# Patient Record
Sex: MALE | Race: ASIAN | NOT HISPANIC OR LATINO
[De-identification: names, ages, dates, MRNs, and addresses within clinical notes are randomized per-mention and may not be internally consistent; named-entity substitution may affect disease eponyms.]

---

## 2020-05-02 RX ADMIN — CEFEPIME 100 MILLIGRAM(S): 1 INJECTION, POWDER, FOR SOLUTION INTRAMUSCULAR; INTRAVENOUS at 14:15

## 2020-08-03 ENCOUNTER — FORM ENCOUNTER (OUTPATIENT)
Age: 60
End: 2020-08-03

## 2020-08-04 ENCOUNTER — INPATIENT (INPATIENT)
Facility: HOSPITAL | Age: 60
LOS: 28 days | Discharge: NURSING, REHAB OR LONG-TERM CARE | DRG: 23 | End: 2020-09-02
Attending: NEUROLOGICAL SURGERY | Admitting: NEUROLOGICAL SURGERY
Payer: COMMERCIAL

## 2020-08-04 VITALS
RESPIRATION RATE: 22 BRPM | SYSTOLIC BLOOD PRESSURE: 159 MMHG | DIASTOLIC BLOOD PRESSURE: 92 MMHG | HEART RATE: 70 BPM | OXYGEN SATURATION: 100 % | TEMPERATURE: 98 F

## 2020-08-04 DIAGNOSIS — S92.902A UNSPECIFIED FRACTURE OF LEFT FOOT, INITIAL ENCOUNTER FOR CLOSED FRACTURE: Chronic | ICD-10-CM

## 2020-08-04 DIAGNOSIS — B19.10 UNSPECIFIED VIRAL HEPATITIS B WITHOUT HEPATIC COMA: ICD-10-CM

## 2020-08-04 DIAGNOSIS — I61.4 NONTRAUMATIC INTRACEREBRAL HEMORRHAGE IN CEREBELLUM: ICD-10-CM

## 2020-08-04 DIAGNOSIS — E78.5 HYPERLIPIDEMIA, UNSPECIFIED: ICD-10-CM

## 2020-08-04 DIAGNOSIS — B20 HUMAN IMMUNODEFICIENCY VIRUS [HIV] DISEASE: ICD-10-CM

## 2020-08-04 LAB
A1C WITH ESTIMATED AVERAGE GLUCOSE RESULT: 5.6 % — SIGNIFICANT CHANGE UP (ref 4–5.6)
ALBUMIN SERPL ELPH-MCNC: 4.8 G/DL — SIGNIFICANT CHANGE UP (ref 3.3–5)
ALP SERPL-CCNC: 92 U/L — SIGNIFICANT CHANGE UP (ref 40–120)
ALT FLD-CCNC: 29 U/L — SIGNIFICANT CHANGE UP (ref 10–45)
ANION GAP SERPL CALC-SCNC: 12 MMOL/L — SIGNIFICANT CHANGE UP (ref 5–17)
ANION GAP SERPL CALC-SCNC: 15 MMOL/L — SIGNIFICANT CHANGE UP (ref 5–17)
APTT BLD: 31.1 SEC — SIGNIFICANT CHANGE UP (ref 27.5–35.5)
APTT BLD: 36.6 SEC — HIGH (ref 27.5–35.5)
AST SERPL-CCNC: 28 U/L — SIGNIFICANT CHANGE UP (ref 10–40)
BASE EXCESS BLDA CALC-SCNC: -0.2 MMOL/L — SIGNIFICANT CHANGE UP (ref -2–3)
BASE EXCESS BLDA CALC-SCNC: -4.2 MMOL/L — LOW (ref -2–3)
BASE EXCESS BLDA CALC-SCNC: -4.3 MMOL/L — LOW (ref -2–3)
BASE EXCESS BLDA CALC-SCNC: -8.4 MMOL/L — LOW (ref -2–3)
BASOPHILS # BLD AUTO: 0.01 K/UL — SIGNIFICANT CHANGE UP (ref 0–0.2)
BASOPHILS # BLD AUTO: 0.05 K/UL — SIGNIFICANT CHANGE UP (ref 0–0.2)
BASOPHILS NFR BLD AUTO: 0.1 % — SIGNIFICANT CHANGE UP (ref 0–2)
BASOPHILS NFR BLD AUTO: 0.3 % — SIGNIFICANT CHANGE UP (ref 0–2)
BILIRUB SERPL-MCNC: 0.7 MG/DL — SIGNIFICANT CHANGE UP (ref 0.2–1.2)
BUN SERPL-MCNC: 16 MG/DL — SIGNIFICANT CHANGE UP (ref 7–23)
BUN SERPL-MCNC: 18 MG/DL — SIGNIFICANT CHANGE UP (ref 7–23)
CA-I BLDA-SCNC: 0.72 MMOL/L — LOW (ref 1.12–1.3)
CA-I BLDA-SCNC: 1.05 MMOL/L — LOW (ref 1.12–1.3)
CA-I BLDA-SCNC: SIGNIFICANT CHANGE UP MMOL/L (ref 1.12–1.3)
CALCIUM SERPL-MCNC: 10.7 MG/DL — HIGH (ref 8.4–10.5)
CALCIUM SERPL-MCNC: 9.2 MG/DL — SIGNIFICANT CHANGE UP (ref 8.4–10.5)
CHLORIDE SERPL-SCNC: 108 MMOL/L — SIGNIFICANT CHANGE UP (ref 96–108)
CHLORIDE SERPL-SCNC: 98 MMOL/L — SIGNIFICANT CHANGE UP (ref 96–108)
CHOLEST SERPL-MCNC: 271 MG/DL — HIGH (ref 10–199)
CO2 SERPL-SCNC: 23 MMOL/L — SIGNIFICANT CHANGE UP (ref 22–31)
CO2 SERPL-SCNC: 24 MMOL/L — SIGNIFICANT CHANGE UP (ref 22–31)
COHGB MFR BLDA: 0.3 % — SIGNIFICANT CHANGE UP
COHGB MFR BLDA: 0.4 % — SIGNIFICANT CHANGE UP
COHGB MFR BLDA: 0.8 % — SIGNIFICANT CHANGE UP
CREAT SERPL-MCNC: 0.87 MG/DL — SIGNIFICANT CHANGE UP (ref 0.5–1.3)
CREAT SERPL-MCNC: 0.95 MG/DL — SIGNIFICANT CHANGE UP (ref 0.5–1.3)
EOSINOPHIL # BLD AUTO: 0 K/UL — SIGNIFICANT CHANGE UP (ref 0–0.5)
EOSINOPHIL # BLD AUTO: 0 K/UL — SIGNIFICANT CHANGE UP (ref 0–0.5)
EOSINOPHIL NFR BLD AUTO: 0 % — SIGNIFICANT CHANGE UP (ref 0–6)
EOSINOPHIL NFR BLD AUTO: 0 % — SIGNIFICANT CHANGE UP (ref 0–6)
ESTIMATED AVERAGE GLUCOSE: 114 MG/DL — SIGNIFICANT CHANGE UP (ref 68–114)
GLUCOSE BLDC GLUCOMTR-MCNC: 149 MG/DL — HIGH (ref 70–99)
GLUCOSE BLDC GLUCOMTR-MCNC: 151 MG/DL — HIGH (ref 70–99)
GLUCOSE BLDC GLUCOMTR-MCNC: 163 MG/DL — HIGH (ref 70–99)
GLUCOSE BLDC GLUCOMTR-MCNC: 166 MG/DL — HIGH (ref 70–99)
GLUCOSE BLDC GLUCOMTR-MCNC: 174 MG/DL — HIGH (ref 70–99)
GLUCOSE SERPL-MCNC: 144 MG/DL — HIGH (ref 70–99)
GLUCOSE SERPL-MCNC: 183 MG/DL — HIGH (ref 70–99)
HAV IGM SER-ACNC: ABNORMAL
HBV CORE IGM SER-ACNC: SIGNIFICANT CHANGE UP
HBV SURFACE AG SER-ACNC: REACTIVE
HCO3 BLDA-SCNC: 15 MMOL/L — LOW (ref 21–28)
HCO3 BLDA-SCNC: 18 MMOL/L — LOW (ref 21–28)
HCO3 BLDA-SCNC: 18 MMOL/L — LOW (ref 21–28)
HCO3 BLDA-SCNC: 24 MMOL/L — SIGNIFICANT CHANGE UP (ref 21–28)
HCT VFR BLD CALC: 36.3 % — LOW (ref 39–50)
HCT VFR BLD CALC: 48.7 % — SIGNIFICANT CHANGE UP (ref 39–50)
HCV AB S/CO SERPL IA: 0.12 S/CO — SIGNIFICANT CHANGE UP
HCV AB SERPL-IMP: SIGNIFICANT CHANGE UP
HDLC SERPL-MCNC: 36 MG/DL — LOW
HGB BLD-MCNC: 12.1 G/DL — LOW (ref 13–17)
HGB BLD-MCNC: 16.1 G/DL — SIGNIFICANT CHANGE UP (ref 13–17)
HGB BLDA-MCNC: 11.5 G/DL — LOW (ref 13–17)
HGB BLDA-MCNC: 12 G/DL — LOW (ref 13–17)
HGB BLDA-MCNC: 9.9 G/DL — LOW (ref 13–17)
IMM GRANULOCYTES NFR BLD AUTO: 0.6 % — SIGNIFICANT CHANGE UP (ref 0–1.5)
IMM GRANULOCYTES NFR BLD AUTO: 0.6 % — SIGNIFICANT CHANGE UP (ref 0–1.5)
INR BLD: 1.09 — SIGNIFICANT CHANGE UP (ref 0.88–1.16)
INR BLD: 1.21 — HIGH (ref 0.88–1.16)
LIPID PNL WITH DIRECT LDL SERPL: 185 MG/DL — HIGH
LYMPHOCYTES # BLD AUTO: 0.75 K/UL — LOW (ref 1–3.3)
LYMPHOCYTES # BLD AUTO: 0.81 K/UL — LOW (ref 1–3.3)
LYMPHOCYTES # BLD AUTO: 4.8 % — LOW (ref 13–44)
LYMPHOCYTES # BLD AUTO: 9.2 % — LOW (ref 13–44)
MAGNESIUM SERPL-MCNC: 2.2 MG/DL — SIGNIFICANT CHANGE UP (ref 1.6–2.6)
MAGNESIUM SERPL-MCNC: 2.3 MG/DL — SIGNIFICANT CHANGE UP (ref 1.6–2.6)
MCHC RBC-ENTMCNC: 27.8 PG — SIGNIFICANT CHANGE UP (ref 27–34)
MCHC RBC-ENTMCNC: 28.2 PG — SIGNIFICANT CHANGE UP (ref 27–34)
MCHC RBC-ENTMCNC: 33.1 GM/DL — SIGNIFICANT CHANGE UP (ref 32–36)
MCHC RBC-ENTMCNC: 33.3 GM/DL — SIGNIFICANT CHANGE UP (ref 32–36)
MCV RBC AUTO: 84.1 FL — SIGNIFICANT CHANGE UP (ref 80–100)
MCV RBC AUTO: 84.6 FL — SIGNIFICANT CHANGE UP (ref 80–100)
METHGB MFR BLDA: 0.4 % — SIGNIFICANT CHANGE UP
MONOCYTES # BLD AUTO: 0.17 K/UL — SIGNIFICANT CHANGE UP (ref 0–0.9)
MONOCYTES # BLD AUTO: 0.64 K/UL — SIGNIFICANT CHANGE UP (ref 0–0.9)
MONOCYTES NFR BLD AUTO: 1.9 % — LOW (ref 2–14)
MONOCYTES NFR BLD AUTO: 4.1 % — SIGNIFICANT CHANGE UP (ref 2–14)
NEUTROPHILS # BLD AUTO: 13.96 K/UL — HIGH (ref 1.8–7.4)
NEUTROPHILS # BLD AUTO: 7.72 K/UL — HIGH (ref 1.8–7.4)
NEUTROPHILS NFR BLD AUTO: 88.2 % — HIGH (ref 43–77)
NEUTROPHILS NFR BLD AUTO: 90.2 % — HIGH (ref 43–77)
NRBC # BLD: 0 /100 WBCS — SIGNIFICANT CHANGE UP (ref 0–0)
NRBC # BLD: 0 /100 WBCS — SIGNIFICANT CHANGE UP (ref 0–0)
O2 CT VFR BLDA CALC: 14.9 ML/DL — SIGNIFICANT CHANGE UP (ref 15–23)
O2 CT VFR BLDA CALC: 17 ML/DL — SIGNIFICANT CHANGE UP (ref 15–23)
O2 CT VFR BLDA CALC: 17.7 ML/DL — SIGNIFICANT CHANGE UP (ref 15–23)
OXYHGB MFR BLDA: 99 % — SIGNIFICANT CHANGE UP (ref 94–100)
PCO2 BLDA: 25 MMHG — LOW (ref 35–48)
PCO2 BLDA: 26 MMHG — LOW (ref 35–48)
PCO2 BLDA: 27 MMHG — LOW (ref 35–48)
PCO2 BLDA: 36 MMHG — SIGNIFICANT CHANGE UP (ref 35–48)
PH BLDA: 7.38 — SIGNIFICANT CHANGE UP (ref 7.35–7.45)
PH BLDA: 7.43 — SIGNIFICANT CHANGE UP (ref 7.35–7.45)
PH BLDA: 7.46 — HIGH (ref 7.35–7.45)
PH BLDA: 7.47 — HIGH (ref 7.35–7.45)
PHOSPHATE SERPL-MCNC: 3 MG/DL — SIGNIFICANT CHANGE UP (ref 2.5–4.5)
PHOSPHATE SERPL-MCNC: 3.3 MG/DL — SIGNIFICANT CHANGE UP (ref 2.5–4.5)
PLATELET # BLD AUTO: 189 K/UL — SIGNIFICANT CHANGE UP (ref 150–400)
PLATELET # BLD AUTO: 207 K/UL — SIGNIFICANT CHANGE UP (ref 150–400)
PO2 BLDA: 172 MMHG — HIGH (ref 83–108)
PO2 BLDA: 377 MMHG — HIGH (ref 83–108)
PO2 BLDA: 384 MMHG — HIGH (ref 83–108)
PO2 BLDA: 409 MMHG — HIGH (ref 83–108)
POTASSIUM BLDA-SCNC: 3.2 MMOL/L — LOW (ref 3.5–4.9)
POTASSIUM BLDA-SCNC: 3.5 MMOL/L — SIGNIFICANT CHANGE UP (ref 3.5–4.9)
POTASSIUM BLDA-SCNC: 3.7 MMOL/L — SIGNIFICANT CHANGE UP (ref 3.5–4.9)
POTASSIUM SERPL-MCNC: 3.8 MMOL/L — SIGNIFICANT CHANGE UP (ref 3.5–5.3)
POTASSIUM SERPL-MCNC: 4 MMOL/L — SIGNIFICANT CHANGE UP (ref 3.5–5.3)
POTASSIUM SERPL-SCNC: 3.8 MMOL/L — SIGNIFICANT CHANGE UP (ref 3.5–5.3)
POTASSIUM SERPL-SCNC: 4 MMOL/L — SIGNIFICANT CHANGE UP (ref 3.5–5.3)
PROT SERPL-MCNC: 8.6 G/DL — HIGH (ref 6–8.3)
PROTHROM AB SERPL-ACNC: 13 SEC — SIGNIFICANT CHANGE UP (ref 10.6–13.6)
PROTHROM AB SERPL-ACNC: 14.4 SEC — HIGH (ref 10.6–13.6)
RBC # BLD: 4.29 M/UL — SIGNIFICANT CHANGE UP (ref 4.2–5.8)
RBC # BLD: 5.79 M/UL — SIGNIFICANT CHANGE UP (ref 4.2–5.8)
RBC # FLD: 13.6 % — SIGNIFICANT CHANGE UP (ref 10.3–14.5)
RBC # FLD: 14.2 % — SIGNIFICANT CHANGE UP (ref 10.3–14.5)
SAO2 % BLDA: 100 % — SIGNIFICANT CHANGE UP (ref 95–100)
SARS-COV-2 RNA SPEC QL NAA+PROBE: SIGNIFICANT CHANGE UP
SODIUM BLDA-SCNC: 121 MMOL/L — LOW (ref 138–146)
SODIUM BLDA-SCNC: 123 MMOL/L — LOW (ref 138–146)
SODIUM BLDA-SCNC: 125 MMOL/L — LOW (ref 138–146)
SODIUM SERPL-SCNC: 136 MMOL/L — SIGNIFICANT CHANGE UP (ref 135–145)
SODIUM SERPL-SCNC: 144 MMOL/L — SIGNIFICANT CHANGE UP (ref 135–145)
TOTAL CHOLESTEROL/HDL RATIO MEASUREMENT: 7.5 RATIO — SIGNIFICANT CHANGE UP (ref 3.4–9.6)
TRIGL SERPL-MCNC: 252 MG/DL — HIGH (ref 10–149)
TROPONIN T SERPL-MCNC: <0.01 NG/ML — SIGNIFICANT CHANGE UP (ref 0–0.01)
TSH SERPL-MCNC: 0.46 UIU/ML — SIGNIFICANT CHANGE UP (ref 0.35–4.94)
WBC # BLD: 15.5 K/UL — HIGH (ref 3.8–10.5)
WBC # BLD: 8.76 K/UL — SIGNIFICANT CHANGE UP (ref 3.8–10.5)
WBC # FLD AUTO: 15.5 K/UL — HIGH (ref 3.8–10.5)
WBC # FLD AUTO: 8.76 K/UL — SIGNIFICANT CHANGE UP (ref 3.8–10.5)

## 2020-08-04 PROCEDURE — 36556 INSERT NON-TUNNEL CV CATH: CPT

## 2020-08-04 PROCEDURE — 99223 1ST HOSP IP/OBS HIGH 75: CPT | Mod: 25

## 2020-08-04 PROCEDURE — 69990 MICROSURGERY ADD-ON: CPT | Mod: 59

## 2020-08-04 PROCEDURE — 36227 PLACE CATH XTRNL CAROTID: CPT

## 2020-08-04 PROCEDURE — 71045 X-RAY EXAM CHEST 1 VIEW: CPT | Mod: 26

## 2020-08-04 PROCEDURE — 99292 CRITICAL CARE ADDL 30 MIN: CPT

## 2020-08-04 PROCEDURE — 61343 CRNEC SOPL CRV LAM DCMPRN: CPT

## 2020-08-04 PROCEDURE — 70450 CT HEAD/BRAIN W/O DYE: CPT | Mod: 26,77

## 2020-08-04 PROCEDURE — 93970 EXTREMITY STUDY: CPT | Mod: 26

## 2020-08-04 PROCEDURE — 99291 CRITICAL CARE FIRST HOUR: CPT | Mod: 25

## 2020-08-04 PROCEDURE — 70450 CT HEAD/BRAIN W/O DYE: CPT | Mod: 26

## 2020-08-04 PROCEDURE — 36620 INSERTION CATHETER ARTERY: CPT

## 2020-08-04 PROCEDURE — 36224 PLACE CATH CAROTD ART: CPT | Mod: 50

## 2020-08-04 PROCEDURE — 36226 PLACE CATH VERTEBRAL ART: CPT | Mod: LT

## 2020-08-04 RX ORDER — SODIUM CHLORIDE 9 MG/ML
1000 INJECTION INTRAMUSCULAR; INTRAVENOUS; SUBCUTANEOUS
Refills: 0 | Status: DISCONTINUED | OUTPATIENT
Start: 2020-08-04 | End: 2020-08-05

## 2020-08-04 RX ORDER — CHLORHEXIDINE GLUCONATE 213 G/1000ML
1 SOLUTION TOPICAL
Refills: 0 | Status: DISCONTINUED | OUTPATIENT
Start: 2020-08-04 | End: 2020-08-21

## 2020-08-04 RX ORDER — SODIUM CHLORIDE 9 MG/ML
30 INJECTION INTRAMUSCULAR; INTRAVENOUS; SUBCUTANEOUS ONCE
Refills: 0 | Status: COMPLETED | OUTPATIENT
Start: 2020-08-04 | End: 2020-08-04

## 2020-08-04 RX ORDER — TRANEXAMIC ACID 100 MG/ML
1000 INJECTION, SOLUTION INTRAVENOUS EVERY 8 HOURS
Refills: 0 | Status: DISCONTINUED | OUTPATIENT
Start: 2020-08-04 | End: 2020-08-04

## 2020-08-04 RX ORDER — PROPOFOL 10 MG/ML
5 INJECTION, EMULSION INTRAVENOUS
Qty: 1000 | Refills: 0 | Status: DISCONTINUED | OUTPATIENT
Start: 2020-08-04 | End: 2020-08-05

## 2020-08-04 RX ORDER — LEVETIRACETAM 250 MG/1
1000 TABLET, FILM COATED ORAL EVERY 12 HOURS
Refills: 0 | Status: DISCONTINUED | OUTPATIENT
Start: 2020-08-04 | End: 2020-08-08

## 2020-08-04 RX ORDER — DEXTROSE 50 % IN WATER 50 %
25 SYRINGE (ML) INTRAVENOUS ONCE
Refills: 0 | Status: DISCONTINUED | OUTPATIENT
Start: 2020-08-04 | End: 2020-08-13

## 2020-08-04 RX ORDER — TENOFOVIR DISOPROXIL FUMARATE 300 MG/1
300 TABLET, FILM COATED ORAL DAILY
Refills: 0 | Status: DISCONTINUED | OUTPATIENT
Start: 2020-08-04 | End: 2020-08-04

## 2020-08-04 RX ORDER — SODIUM CHLORIDE 9 MG/ML
10 INJECTION INTRAMUSCULAR; INTRAVENOUS; SUBCUTANEOUS
Refills: 0 | Status: DISCONTINUED | OUTPATIENT
Start: 2020-08-04 | End: 2020-09-02

## 2020-08-04 RX ORDER — ATORVASTATIN CALCIUM 80 MG/1
80 TABLET, FILM COATED ORAL AT BEDTIME
Refills: 0 | Status: DISCONTINUED | OUTPATIENT
Start: 2020-08-04 | End: 2020-08-10

## 2020-08-04 RX ORDER — TENOFOVIR DISOPROXIL FUMARATE 300 MG/1
300 TABLET, FILM COATED ORAL EVERY 24 HOURS
Refills: 0 | Status: DISCONTINUED | OUTPATIENT
Start: 2020-08-04 | End: 2020-09-02

## 2020-08-04 RX ORDER — ACETAMINOPHEN 500 MG
650 TABLET ORAL EVERY 6 HOURS
Refills: 0 | Status: DISCONTINUED | OUTPATIENT
Start: 2020-08-04 | End: 2020-08-14

## 2020-08-04 RX ORDER — SODIUM CHLORIDE 9 MG/ML
1000 INJECTION, SOLUTION INTRAVENOUS
Refills: 0 | Status: DISCONTINUED | OUTPATIENT
Start: 2020-08-04 | End: 2020-09-02

## 2020-08-04 RX ORDER — SENNA PLUS 8.6 MG/1
2 TABLET ORAL AT BEDTIME
Refills: 0 | Status: DISCONTINUED | OUTPATIENT
Start: 2020-08-04 | End: 2020-08-08

## 2020-08-04 RX ORDER — INSULIN LISPRO 100/ML
VIAL (ML) SUBCUTANEOUS EVERY 6 HOURS
Refills: 0 | Status: DISCONTINUED | OUTPATIENT
Start: 2020-08-04 | End: 2020-08-12

## 2020-08-04 RX ORDER — PANTOPRAZOLE SODIUM 20 MG/1
40 TABLET, DELAYED RELEASE ORAL DAILY
Refills: 0 | Status: DISCONTINUED | OUTPATIENT
Start: 2020-08-04 | End: 2020-08-08

## 2020-08-04 RX ORDER — HYDRALAZINE HCL 50 MG
10 TABLET ORAL
Refills: 0 | Status: DISCONTINUED | OUTPATIENT
Start: 2020-08-04 | End: 2020-09-02

## 2020-08-04 RX ORDER — DEXTROSE 50 % IN WATER 50 %
25 SYRINGE (ML) INTRAVENOUS ONCE
Refills: 0 | Status: DISCONTINUED | OUTPATIENT
Start: 2020-08-04 | End: 2020-09-02

## 2020-08-04 RX ORDER — INSULIN LISPRO 100/ML
VIAL (ML) SUBCUTANEOUS
Refills: 0 | Status: DISCONTINUED | OUTPATIENT
Start: 2020-08-04 | End: 2020-08-04

## 2020-08-04 RX ORDER — NICARDIPINE HYDROCHLORIDE 30 MG/1
5 CAPSULE, EXTENDED RELEASE ORAL
Qty: 40 | Refills: 0 | Status: DISCONTINUED | OUTPATIENT
Start: 2020-08-04 | End: 2020-08-04

## 2020-08-04 RX ORDER — GLUCAGON INJECTION, SOLUTION 0.5 MG/.1ML
1 INJECTION, SOLUTION SUBCUTANEOUS ONCE
Refills: 0 | Status: DISCONTINUED | OUTPATIENT
Start: 2020-08-04 | End: 2020-09-02

## 2020-08-04 RX ORDER — DEXTROSE 50 % IN WATER 50 %
12.5 SYRINGE (ML) INTRAVENOUS ONCE
Refills: 0 | Status: DISCONTINUED | OUTPATIENT
Start: 2020-08-04 | End: 2020-09-02

## 2020-08-04 RX ORDER — CEFAZOLIN SODIUM 1 G
2000 VIAL (EA) INJECTION ONCE
Refills: 0 | Status: COMPLETED | OUTPATIENT
Start: 2020-08-04 | End: 2020-08-04

## 2020-08-04 RX ORDER — DEXAMETHASONE 0.5 MG/5ML
6 ELIXIR ORAL EVERY 6 HOURS
Refills: 0 | Status: DISCONTINUED | OUTPATIENT
Start: 2020-08-04 | End: 2020-08-06

## 2020-08-04 RX ORDER — DEXTROSE 50 % IN WATER 50 %
15 SYRINGE (ML) INTRAVENOUS ONCE
Refills: 0 | Status: DISCONTINUED | OUTPATIENT
Start: 2020-08-04 | End: 2020-09-02

## 2020-08-04 RX ORDER — CHLORHEXIDINE GLUCONATE 213 G/1000ML
15 SOLUTION TOPICAL EVERY 12 HOURS
Refills: 0 | Status: DISCONTINUED | OUTPATIENT
Start: 2020-08-04 | End: 2020-08-12

## 2020-08-04 RX ORDER — NICARDIPINE HYDROCHLORIDE 30 MG/1
5 CAPSULE, EXTENDED RELEASE ORAL
Qty: 40 | Refills: 0 | Status: DISCONTINUED | OUTPATIENT
Start: 2020-08-04 | End: 2020-08-09

## 2020-08-04 RX ADMIN — ATORVASTATIN CALCIUM 80 MILLIGRAM(S): 80 TABLET, FILM COATED ORAL at 22:41

## 2020-08-04 RX ADMIN — Medication 6 MILLIGRAM(S): at 19:08

## 2020-08-04 RX ADMIN — Medication 2: at 17:16

## 2020-08-04 RX ADMIN — TENOFOVIR DISOPROXIL FUMARATE 300 MILLIGRAM(S): 300 TABLET, FILM COATED ORAL at 22:41

## 2020-08-04 RX ADMIN — Medication 100 MILLIGRAM(S): at 22:08

## 2020-08-04 RX ADMIN — NICARDIPINE HYDROCHLORIDE 25 MG/HR: 30 CAPSULE, EXTENDED RELEASE ORAL at 05:13

## 2020-08-04 RX ADMIN — LEVETIRACETAM 400 MILLIGRAM(S): 250 TABLET, FILM COATED ORAL at 22:42

## 2020-08-04 RX ADMIN — SODIUM CHLORIDE 30 MILLILITER(S): 9 INJECTION INTRAMUSCULAR; INTRAVENOUS; SUBCUTANEOUS at 06:53

## 2020-08-04 RX ADMIN — NICARDIPINE HYDROCHLORIDE 25 MG/HR: 30 CAPSULE, EXTENDED RELEASE ORAL at 22:14

## 2020-08-04 RX ADMIN — PANTOPRAZOLE SODIUM 40 MILLIGRAM(S): 20 TABLET, DELAYED RELEASE ORAL at 17:24

## 2020-08-04 NOTE — PROGRESS NOTE ADULT - ASSESSMENT
59y/M with  HLD (hyperlipidemia)      PLAN:   NEURO:  REHAB:  physical therapy evaluation and management    EARLY MOB:      PULM:  Room air, incentive spirometry  CARDIO:  SBP goal 100-150mm Hg  ENDO:  Blood sugar goals 140-180 mg/dL, continue insulin sliding scale  GI:  PPI for GI prophylaxis  DIET:  RENAL:    HEM/ONC:  VTE Prophylaxis:   ID: afebrile, no leukocytosis  Social: will update family    CORE MEASURES:  1.  NIHSS =   2.  ICH Score =   3.  VTE prophylaxis: [ ] administered within 24h of admission OR [ ] reason not done  4.  Dysphagia screening: [ ] performed before any oral meds / liquids / food    ATTENDING ATTESTATION:  I was physically present for the key portions of the evaluation and management (E/M) service provided.  I agree with the above history, physical and plan, which I have reviewed and edited where appropriate.    Patient at high risk for neurological deterioration or death due to:  ICU delirium, aspiration PNA, DVT / PE.  Critical care time:  I have personally provided 60 minutes of critical care time, excluding time spent on separate procedures.      Plan discussed with RN, house staff.

## 2020-08-04 NOTE — H&P ADULT - ATTENDING COMMENTS
Patient seen and examined 8/4/20. He was transferred from an outside hospital with large cerebellar ICH/IVH. Catheter angiogram negative for vascular bleed source. Patient taken emergently for suboccipital decompression and ICH evacuation. PA team able to reach son for consent. I personally attempted to speak with son multiple times preoperatively via the listed phone number but was unsuccessful. Given patient poor examination at presentation, he is high risk for death and permanent morbidity. Will move ahead with planned surgery as a potentially life-saving measure.    Bryan Blank M.D.

## 2020-08-04 NOTE — PROGRESS NOTE ADULT - ATTENDING COMMENTS
ATTENDING ATTESTATION:  I was physically present for the key portions of the evaluation and management (E/M) service provided.  I agree with the above history, physical and plan, which I have reviewed and edited where appropriate.    Patient at high risk for neurological deterioration or death due to:  infections, herniation, DVT/PE.  Critical care time:  I have personally provided 60 minutes of critical care time, excluding time spent on separate procedures.      Plan discussed with RN, house staff.

## 2020-08-04 NOTE — H&P ADULT - NSHPLABSRESULTS_GEN_ALL_CORE
16.1   15.50 )-----------( 207      ( 04 Aug 2020 05:03 )             48.7   08-04    136  |  98  |  16  ----------------------------<  144<H>  4.0   |  23  |  0.87    Ca    9.2      04 Aug 2020 05:03  Phos  3.0     08-04  Mg     2.3     08-04    TPro  8.6<H>  /  Alb  4.8  /  TBili  0.7  /  DBili  x   /  AST  28  /  ALT  29  /  AlkPhos  92  08-04

## 2020-08-04 NOTE — BRIEF OPERATIVE NOTE - NSICDXBRIEFPREOP_GEN_ALL_CORE_FT
PRE-OP DIAGNOSIS:  ICH (intracerebral hemorrhage) 04-Aug-2020 08:19:11  Davon Gonzalez
PRE-OP DIAGNOSIS:  ICH (intracerebral hemorrhage) 04-Aug-2020 08:19:11  Davon Gonzalez

## 2020-08-04 NOTE — BRIEF OPERATIVE NOTE - NSICDXBRIEFPOSTOP_GEN_ALL_CORE_FT
POST-OP DIAGNOSIS:  ICH (intracerebral hemorrhage) 04-Aug-2020 08:19:20  Davon Gonzalez
POST-OP DIAGNOSIS:  ICH (intracerebral hemorrhage) 04-Aug-2020 08:19:20  Davon Gonzalez

## 2020-08-04 NOTE — H&P ADULT - NSHPPHYSICALEXAM_GEN_ALL_CORE
Gen: NAD, Intubated, sedated  HEENT: PERRL, 2mm. EOM limited. Right frontal EVD site C/D/I.  Neck: Supple  Lungs: Clear b/l, no W/R/R.  Heart: S1, S2. NSR.  Abd: Soft, NT/ND. +BS  Exts: Pulses 2+ throughout  Neuro: CNs limited. Left LE withdrawal to noxious stimulus. No eye opening. Not following commands. Exam performed under some sedation.

## 2020-08-04 NOTE — PROGRESS NOTE ADULT - ASSESSMENT
59y/M with cerebellar ICH with 4th ventricular effacement    PLAN:     NEURO:  -Urgent cerebral angiogram to rule out underlying vascular malformation, and operative decompression for evacuation of cerebellar ICH  -Received both mannitol and 23.4% NaCl; on propofol infusion 5-20 mcg/kg/min  -sBP < 150 mmHg  -EVD at 15 cm    PULM:  ventilated, no chest XR infiltrates  CARDIO:  SBP goal 100-150mm Hg, on nicardipine infusion  ENDO:  Blood sugar goals 140-180 mg/dL, continue insulin sliding scale  GI:  PPI for GI prophylaxis  DIET:  NPO  RENAL:  euvolemic  HEM:  Hb 16.1  VTE Prophylaxis:  SCDs  ID: leukocytosis  Social: updated family    *****    CORE MEASURES:    1.  ICH Score = 4  2.  VTE prophylaxis: [ ] administered within 24h of admission OR [X ] reason not done (acute ICH)    ***** 59y/M with cerebellar ICH with 4th ventricular effacement    PLAN:     NEURO:  -Urgent cerebral angiogram to rule out underlying vascular malformation, and operative decompression for evacuation of cerebellar ICH  -Received both mannitol and 23.4% NaCl; on propofol infusion 5-20 mcg/kg/min  -sBP < 150 mmHg  -EVD at 15 cm    PULM:  ventilated, no chest XR infiltrates  CARDIO:  SBP goal 100-150mm Hg, on nicardipine infusion  ENDO:  Blood sugar goals 140-180 mg/dL, continue insulin sliding scale  GI:  PPI for GI prophylaxis  DIET:  NPO  RENAL:  euvolemic  HEM:  Hb 16.1  VTE Prophylaxis:  SCDs  ID: leukocytosis  Social: updated family    *****    CORE MEASURES:    1.  ICH Score = 4  2.  VTE prophylaxis: [ ] administered within 24h of admission OR [X ] reason not done (acute ICH)    *****    59y/M with  Hepatitis B  HLD (hyperlipidemia)      PLAN:   NEURO: neurochecks q1h, continue propofol, fentanyl PRN tylenol for pain  SBP control  REHAB:  physical therapy evaluation and management    EARLY MOB:      PULM:  cont full vent support   CARDIO:  SBP goal 100-140mm Hg, TTE with bubble study  ENDO:  Blood sugar goals 140-180 mg/dL, continue insulin sliding scale, A1C lipid profile TSH  GI:  PPI for GI prophylaxis  DIET: NPO for now  RENAL:  Na goal ~145, cont NS, recheck BMP, may need hypertonic saline  HEM/ONC: no coagulopathy, no h/o antiplatelet / anticoagulant use  VTE Prophylaxis: SCDs, no DVT chemoprophylaxis for now as patient is high risk for bleed (post-op); baseline LE Doppler for DVT suspected on admission (transfer)  ID: afebrile, no leukocytosis, periop ancef then d/c  Social: will update family    CORE MEASURES:  1.  NIHSS =   2.  ICH Score = 4  3.  VTE prophylaxis: [ ] administered within 24h of admission OR [x] reason not done - fresh bleed  4.  Dysphagia screening: [X] performed before any oral meds / liquids / food 59y/M with  1.	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	Hepatitis B  3.	dyslipidemia     PLAN:   NEURO: neurochecks q1h, continue propofol, fentanyl PRN tylenol for pain  SBP control  REHAB:  physical therapy evaluation and management    EARLY MOB:      PULM:  cont full vent support   CARDIO:  SBP goal 100-140mm Hg, TTE with bubble study  ENDO:  Blood sugar goals 140-180 mg/dL, continue insulin sliding scale, A1C lipid profile TSH; continue statins  GI:  PPI for GI prophylaxis  DIET: NPO for now  RENAL:  Na goal ~145, cont NS, recheck BMP, may need hypertonic saline  HEM/ONC: no coagulopathy, no h/o antiplatelet / anticoagulant use  VTE Prophylaxis: SCDs, no DVT chemoprophylaxis for now as patient is high risk for bleed (post-op); baseline LE Doppler for DVT suspected on admission (transfer)  ID: afebrile, no leukocytosis, periop ancef then d/c  Social: will update family    CORE MEASURES:  1.  NIHSS =   2.  ICH Score = 4  3.  VTE prophylaxis: [ ] administered within 24h of admission OR [x] reason not done - fresh bleed  4.  Dysphagia screening: [X] performed before any oral meds / liquids / food 59y/M with  1.	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	Hepatitis B  3.	dyslipidemia     PLAN:   NEURO: neurochecks q1h, continue propofol, fentanyl PRN tylenol for pain  SBP control  REHAB:  physical therapy evaluation and management    EARLY MOB:      PULM:  cont full vent support   CARDIO:  SBP goal 100-140mm Hg, TTE with bubble study  ENDO:  Blood sugar goals 140-180 mg/dL, continue insulin sliding scale, A1C lipid profile TSH; continue statins  GI:  PPI for GI prophylaxis  DIET: NPO for now  RENAL:  Na goal ~145, cont NS, recheck BMP, may need hypertonic saline  HEM/ONC: no coagulopathy, no h/o antiplatelet / anticoagulant use  VTE Prophylaxis: SCDs, no DVT chemoprophylaxis for now as patient is high risk for bleed (post-op); baseline LE Doppler for DVT suspected on admission (transfer)  ID: afebrile, no leukocytosis, periop ancef then d/c  Social: son Perry updated ([phone]).  2 daughters Shantell  and Tammie ; wife name is Julián Sahu    CORE MEASURES:  1.  NIHSS =   2.  ICH Score = 4  3.  VTE prophylaxis: [ ] administered within 24h of admission OR [x] reason not done - fresh bleed  4.  Dysphagia screening: [X] performed before any oral meds / liquids / food 59y/M with  1.	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	Hepatitis B  3.	dyslipidemia     PLAN:   NEURO: neurochecks q1h, continue propofol, fentanyl PRN tylenol for pain  SBP control  REHAB:  physical therapy evaluation and management    EARLY MOB:      PULM:  cont full vent support   CARDIO:  SBP goal 100-140mm Hg, TTE with bubble study  ENDO:  Blood sugar goals 140-180 mg/dL, continue insulin sliding scale, A1C lipid profile TSH; continue statins  GI:  PPI for GI prophylaxis  DIET: NPO for now  RENAL:  Na goal ~145, cont NS, recheck BMP, may need hypertonic saline  HEM/ONC: no coagulopathy, no h/o antiplatelet / anticoagulant use  VTE Prophylaxis: SCDs, no DVT chemoprophylaxis for now as patient is high risk for bleed (post-op); baseline LE Doppler for DVT suspected on admission (transfer)  ID: afebrile, no leukocytosis, periop ancef then d/c  Social: son Perry updated ([phone]).  2 daughters Shantell  and Tammie ; wife name is Julián Sahu    CORE MEASURES:  1.  NIHSS =   2.  ICH Score = 4  3.  VTE prophylaxis: [ ] administered within 24h of admission OR [x] reason not done - fresh bleed  4.  Dysphagia screening: [X] performed before any oral meds / liquids / food    ATTENDING ATTESTATION:  I was physically present for the key portions of the evaluation and management (E/M) service provided.  I agree with the above history, physical and plan, which I have reviewed and edited where appropriate.    Patient at high risk for neurological deterioration or death due to:  ICU delirium, aspiration PNA, DVT / PE.  Critical care time:  I have personally provided 30 minutes of critical care time, excluding time spent on separate procedures.      Plan discussed with RN, house staff.

## 2020-08-04 NOTE — PROGRESS NOTE ADULT - SUBJECTIVE AND OBJECTIVE BOX
=================================  NEUROCRITICAL CARE ATTENDING NOTE  =================================    VEDA WARREN   MRN-[id number]  Summary:  59y/M    HPI:  59 year old male with PMH of HLD and unclear liver disease had acute onset of severe headache, dizziness and vomiting x1 today, was brought into [facility] with continued symptoms, as well as hypertension and multiple episodes of vomiting. Patient was intubated for airway protection with CT Head performed demonstrating a large left cerebellar hemorrhage. CTA Head/Neck performed is suggestive of underlying vascular pathology such as AVM. RIght frontal ventriculostomy placed at [facility] and patient transferred to [facility] for further work-up. Patient arrives to [facility] intubated, sedated and on Nicardipine drip. Patient's son provided history and denies any Aspirin or other anticoagulant use. Denies any prior history of hypertension, smoking, or ETOH abuse. (04 Aug 2020 05:01)      COURSE IN THE HOSPITAL:    Past Medical History: HLD (hyperlipidemia)    Allergies:  No Known Allergies    Home meds:   Omega-3 350 mg oral capsule: 1 cap(s) orally once a day  tenofovir disoproxil fumarate 300 mg oral tablet: 1 tab(s) orally once a day      PHYSICAL EXAMINATION  T(C): 36.7 (08-04 @ 04:30), Max: 36.7 (08-04 @ 04:30)  HR: 72 (08-04 @ 06:04) (70 - 72)  BP: 136/76 (08-04 @ 06:00) (136/76 - 159/92)  RR: 15 (08-04 @ 06:04) (14 - 22)  SpO2: 100% (08-04 @ 06:04) (100% - 100%)  CVP(mm Hg): --  NEUROLOGIC EXAMINATION:  Patient is awake, alert, fully oriented, pupils 2-3mm equal and briskly reactive to light, EOMs intact, muscle strength 5/5 on all 4s.  GENERAL: not intubated, not in cardiorespiratory distress  EENT:  anicteric  CARDIOVASCULAR: (+) S1 S2, normal rate and regular rhythm  PULMONARY: clear to auscultation bilaterally  ABDOMEN: soft, nontender with normoactive bowel sounds  EXTREMITIES: no edema  SKIN: no rash    LABS:  CAPILLARY BLOOD GLUCOSE      POCT Blood Glucose.: 149 mg/dL (04 Aug 2020 06:26)                          16.1   15.50 )-----------( 207      ( 04 Aug 2020 05:03 )             48.7     08-04    136  |  98  |  16  ----------------------------<  144<H>  4.0   |  23  |  0.87    Ca    9.2      04 Aug 2020 05:03  Phos  3.0     08-04  Mg     2.3     08-04    TPro  8.6<H>  /  Alb  4.8  /  TBili  0.7  /  DBili  x   /  AST  28  /  ALT  29  /  AlkPhos  92  08-04 08-03 @ 07:01  -  08-04 @ 06:45  --------------------------------------------------------  IN: 0 mL / OUT: 200 mL / NET: -200 mL        Bacteriology:  CSF studies:  EEG:  Neuroimaging:  Other imaging:    MEDICATIONS:  tranexamic acid IVPB 1000 milliGRAM(s) IV Intermittent every 8 hours    tenofovir disoproxil fumarate (VIREAD) 300 milliGRAM(s) Oral daily    acetaminophen   Tablet .. 650 milliGRAM(s) Oral every 6 hours PRN  propofol Infusion 5 MICROgram(s)/kG/Min IV Continuous <Continuous>    hydrALAZINE Injectable 10 milliGRAM(s) IV Push every 2 hours PRN  niCARdipine Infusion 5 mG/Hr IV Continuous <Continuous>      senna 2 Tablet(s) Oral at bedtime PRN      atorvastatin 80 milliGRAM(s) Oral at bedtime  dextrose 50% Injectable 25 Gram(s) IV Push once  glucagon  Injectable 1 milliGRAM(s) IntraMuscular once PRN  insulin lispro (HumaLOG) corrective regimen sliding scale   SubCutaneous every 6 hours    chlorhexidine 0.12% Liquid 15 milliLiter(s) Oral Mucosa every 12 hours  sodium chloride 0.9%. 1000 milliLiter(s) IV Continuous <Continuous>  sodium chloride 23.4% Injectable 30 milliLiter(s) IV Push once      IV FLUIDS:  DRIPS:  DIET:  Lines:  Drains:      Wounds:    CODE STATUS:  Full Code                       GOALS OF CARE:  aggressive                      DISPOSITION:  ICU

## 2020-08-04 NOTE — H&P ADULT - ASSESSMENT
59 year old male with HLD, ?HIV presents with acute severe headache, dizziness and vomiting, found to have large left cerebellar IPH with IVH, s/p right frontal EVD at [facility] and transfer to [facility] for further intervention, NIH 28, ICH5, BD#1 59 year old male with HLD, ?Hep B? presents with acute severe headache, dizziness and vomiting, found to have large left cerebellar IPH with IVH, s/p right frontal EVD at [facility] and transfer to [facility] for further intervention, NIH 28, ICH5, BD#1

## 2020-08-04 NOTE — BRIEF OPERATIVE NOTE - OPERATION/FINDINGS
Under general anesthesia using a 6 fr sheath right CFA, cerebral angiogram was performed.  Findings: No aneurysm, AVM, or early venous shunting.  Full report to follow.  Patient tolerated procedure well,  hemodynamically stable.  Right groin/vasc access site: Exoseal and  manual compression applied, hemostasis achieved, no hematoma.  Patient was transferred to OR  Above d/w Dr. Mckee

## 2020-08-04 NOTE — PROGRESS NOTE ADULT - SUBJECTIVE AND OBJECTIVE BOX
==============================================   NEUROCRITICAL CARE ATTENDING NOTE (Tuesday, August 4, 2020)  ==============================================    VEDA WARREN   MRN-[id number]     HPI:  59 year old male with PMH of HLD and unclear liver disease had acute onset of severe headache, dizziness and vomiting x1 today, was brought into [facility] with continued symptoms, as well as hypertension and multiple episodes of vomiting. Patient was intubated for airway protection with CT Head performed demonstrating a large left cerebellar hemorrhage. CTA Head/Neck performed is suggestive of underlying vascular pathology such as AVM. [place] [facility] [place] [facility] for further work-up. Patient arrives to [facility] intubated, sedated and on Nicardipine drip. Patient's son provided history and denies any Aspirin or other anticoagulant use. Denies any prior history of hypertension, smoking, or ETOH abuse. (04 Aug 2020 05:01)      Past Medical History: HLD (hyperlipidemia), hepatitis B    Allergies:  No Known Allergies    Home meds:   Omega-3 350 mg oral capsule: 1 cap(s) orally once a day  tenofovir disoproxil fumarate 300 mg oral tablet: 1 tab(s) orally once a day    Current Meds:  MEDICATIONS  (STANDING):  atorvastatin 80 milliGRAM(s) Oral at bedtime  insulin lispro (HumaLOG) corrective regimen sliding scale   SubCutaneous every 6 hours  niCARdipine Infusion 5 mG/Hr (25 mL/Hr) IV Continuous <Continuous>  propofol Infusion 5 MICROgram(s)/kG/Min (1.79 mL/Hr) IV Continuous <Continuous>  sodium chloride 0.9%. 1000 milliLiter(s) (75 mL/Hr) IV Continuous <Continuous>  tenofovir disoproxil fumarate (VIREAD) 300 milliGRAM(s) Oral daily  tranexamic acid IVPB 1000 milliGRAM(s) IV Intermittent every 8 hours    MEDICATIONS  (PRN):  acetaminophen   Tablet .. 650 milliGRAM(s) Oral every 6 hours PRN Temp greater or equal to 38.5C (101.3F), Mild Pain (1 - 3)  glucagon  Injectable 1 milliGRAM(s) IntraMuscular once PRN Glucose LESS THAN 70 milligrams/deciliter  hydrALAZINE Injectable 10 milliGRAM(s) IV Push every 2 hours PRN SBP > 140  senna 2 Tablet(s) Oral at bedtime PRN Constipation    PHYSICAL EXAMINATION    ICU Vital Signs Last 24 Hrs  T(C): 36.7 (04 Aug 2020 04:30), Max: 36.7 (04 Aug 2020 04:30)  T(F): 98 (04 Aug 2020 04:30), Max: 98 (04 Aug 2020 04:30)  HR: 77 (04 Aug 2020 07:00) (70 - 77)  BP: 138/75 (04 Aug 2020 07:00) (136/76 - 159/92)  BP(mean): 100 (04 Aug 2020 07:00) (100 - 100)  RR: 19 (04 Aug 2020 07:00) (14 - 22)  SpO2: 100% (04 Aug 2020 07:00) (100% - 100%)    NEUROLOGIC EXAMINATION:  Patient is intubated and sedated, on nicardipine infusion at 5 mg/hour.  Pupils 3 mm sluggish.  LEs withdraw to pain.   GENERAL: AC/CMV FiO2 50,  mL, PEEP 5, RR 14, PIP 18  EENT:  anicteric  CARDIOVASCULAR: (+) S1 S2, normal rate and regular rhythm  PULMONARY: clear to auscultation bilaterally  ABDOMEN: soft, nontender with normoactive bowel sounds  EXTREMITIES: no edema  SKIN: no rash    08-03 @ 07:01  -  08-04 @ 06:45  --------------------------------------------------------  IN: 0 mL / OUT: 200 mL / NET: -200 mL    LABS:                        16.1   15.50 )-----------( 207      ( 04 Aug 2020 05:03 )             48.7     08-04    136  |  98  |  16  ----------------------------<  144<H>  4.0   |  23  |  0.87    Ca    9.2      04 Aug 2020 05:03  Phos  3.0     08-04  Mg     2.3     08-04    TPro  8.6<H>  /  Alb  4.8  /  TBili  0.7  /  DBili  x   /  AST  28  /  ALT  29  /  AlkPhos  92  08-04    CAPILLARY BLOOD GLUCOSE    POCT Blood Glucose.: 149 mg/dL (04 Aug 2020 06:26    Neuroimaging:  CT 08.04.2020 - 4.8 X 2.7 cm cerebellar midline ICH with effacement of 4th ventricle, crowding of foramen magnum, early upward herniation, IVH noted, EVD in place  CTA 08.04.2020 - Spot sign noted within cerebellar ICH  Other imaging:  CXR 08.03.2020 - No infiltrates    IV FLUIDS: IV NS at 75 mL/hour  DRIPS: Propofol infusion at 5 mcg/kg/min, Nicardipine infusion at 5 mg/hr  Lines: Right IJ central line, Left radial arterial line  Drains:  EVD at 15 cm    CODE STATUS:  Full Code                       GOALS OF CARE:  aggressive                      DISPOSITION:  ICU ==============================================   NEUROCRITICAL CARE ATTENDING NOTE (Tuesday, August 4, 2020)  ==============================================    VEDA WARREN   MRN-[id number]     HPI:  59 year old male with PMH of HLD and unclear liver disease had acute onset of severe headache, dizziness and vomiting x1 today, was brought into [facility] with continued symptoms, as well as hypertension and multiple episodes of vomiting. Patient was intubated for airway protection with CT Head performed demonstrating a large left cerebellar hemorrhage. CTA Head/Neck performed is suggestive of underlying vascular pathology such as AVM. RIght frontal ventriculostomy placed at [facility] [place] and patient transferred to [facility] for further work-up. Patient arrives to [facility] intubated, sedated and on Nicardipine drip. Patient's son provided history and denies any Aspirin or other anticoagulant use. Denies any prior history of hypertension, smoking, or ETOH abuse. (04 Aug 2020 05:01)      Past Medical History: HLD (hyperlipidemia), hepatitis B    Allergies:  No Known Allergies    Home meds:   Omega-3 350 mg oral capsule: 1 cap(s) orally once a day  tenofovir disoproxil fumarate 300 mg oral tablet: 1 tab(s) orally once a day    Current Meds:  MEDICATIONS  (STANDING):  atorvastatin 80 milliGRAM(s) Oral at bedtime  insulin lispro (HumaLOG) corrective regimen sliding scale   SubCutaneous every 6 hours  niCARdipine Infusion 5 mG/Hr (25 mL/Hr) IV Continuous <Continuous>  propofol Infusion 5 MICROgram(s)/kG/Min (1.79 mL/Hr) IV Continuous <Continuous>  sodium chloride 0.9%. 1000 milliLiter(s) (75 mL/Hr) IV Continuous <Continuous>  tenofovir disoproxil fumarate (VIREAD) 300 milliGRAM(s) Oral daily  tranexamic acid IVPB 1000 milliGRAM(s) IV Intermittent every 8 hours    MEDICATIONS  (PRN):  acetaminophen   Tablet .. 650 milliGRAM(s) Oral every 6 hours PRN Temp greater or equal to 38.5C (101.3F), Mild Pain (1 - 3)  glucagon  Injectable 1 milliGRAM(s) IntraMuscular once PRN Glucose LESS THAN 70 milligrams/deciliter  hydrALAZINE Injectable 10 milliGRAM(s) IV Push every 2 hours PRN SBP > 140  senna 2 Tablet(s) Oral at bedtime PRN Constipation    PHYSICAL EXAMINATION    ICU Vital Signs Last 24 Hrs  T(C): 36.7 (04 Aug 2020 04:30), Max: 36.7 (04 Aug 2020 04:30)  T(F): 98 (04 Aug 2020 04:30), Max: 98 (04 Aug 2020 04:30)  HR: 77 (04 Aug 2020 07:00) (70 - 77)  BP: 138/75 (04 Aug 2020 07:00) (136/76 - 159/92)  BP(mean): 100 (04 Aug 2020 07:00) (100 - 100)  RR: 19 (04 Aug 2020 07:00) (14 - 22)  SpO2: 100% (04 Aug 2020 07:00) (100% - 100%)    NEUROLOGIC EXAMINATION:  Patient is intubated and sedated, on nicardipine infusion at 5 mg/hour.  Pupils 3 mm sluggish, decreased corneals.  LEs withdraw to pain.   GENERAL: AC/CMV FiO2 50,  mL, PEEP 5, RR 14, PIP 18  EENT:  anicteric  CARDIOVASCULAR: (+) S1 S2, normal rate and regular rhythm  PULMONARY: clear to auscultation bilaterally  ABDOMEN: soft, nontender with normoactive bowel sounds  EXTREMITIES: no edema  SKIN: no rash    08-03 @ 07:01  -  08-04 @ 06:45  --------------------------------------------------------  IN: 0 mL / OUT: 200 mL / NET: -200 mL    LABS:                        16.1   15.50 )-----------( 207      ( 04 Aug 2020 05:03 )             48.7     08-04    136  |  98  |  16  ----------------------------<  144<H>  4.0   |  23  |  0.87    Ca    9.2      04 Aug 2020 05:03  Phos  3.0     08-04  Mg     2.3     08-04    TPro  8.6<H>  /  Alb  4.8  /  TBili  0.7  /  DBili  x   /  AST  28  /  ALT  29  /  AlkPhos  92  08-04    CAPILLARY BLOOD GLUCOSE    POCT Blood Glucose.: 149 mg/dL (04 Aug 2020 06:26    Neuroimaging:  CT 08.04.2020 - 4.8 X 2.7 cm cerebellar midline ICH with effacement of 4th ventricle, crowding of foramen magnum, early upward herniation, IVH noted, EVD in place  CTA 08.04.2020 - Spot sign noted within cerebellar ICH  Other imaging:  CXR 08.03.2020 - No infiltrates    IV FLUIDS: IV NS at 75 mL/hour  DRIPS: Propofol infusion at 5 mcg/kg/min, Nicardipine infusion at 5 mg/hr  Lines: Right IJ central line, Left radial arterial line  Drains:  EVD at 15 cm    CODE STATUS:  Full Code                       GOALS OF CARE:  aggressive                      DISPOSITION:  ICU ==============================================   NEUROCRITICAL CARE ATTENDING NOTE (Tuesday, August 4, 2020)  ==============================================    VEDA WARREN   MRN-[id number]     HPI:  59 year old male with PMH of HLD and unclear liver disease had acute onset of severe headache, dizziness and vomiting x1 today, was brought into [facility] with continued symptoms, as well as hypertension and multiple episodes of vomiting. Patient was intubated for airway protection with CT Head performed demonstrating a large left cerebellar hemorrhage. CTA Head/Neck performed is suggestive of underlying vascular pathology such as AVM. RIght frontal ventriculostomy placed at [facility] [place] and patient transferred to [facility] for further work-up. Patient arrives to [facility] intubated, sedated and on Nicardipine drip. Patient's son provided history and denies any Aspirin or other anticoagulant use. Denies any prior history of hypertension, smoking, or ETOH abuse. (04 Aug 2020 05:01)      Past Medical History: HLD (hyperlipidemia), hepatitis B    Allergies:  No Known Allergies    Home meds:   Omega-3 350 mg oral capsule: 1 cap(s) orally once a day  tenofovir disoproxil fumarate 300 mg oral tablet: 1 tab(s) orally once a day    Current Meds:  MEDICATIONS  (STANDING):  atorvastatin 80 milliGRAM(s) Oral at bedtime  insulin lispro (HumaLOG) corrective regimen sliding scale   SubCutaneous every 6 hours  niCARdipine Infusion 5 mG/Hr (25 mL/Hr) IV Continuous <Continuous>  propofol Infusion 5 MICROgram(s)/kG/Min (1.79 mL/Hr) IV Continuous <Continuous>  sodium chloride 0.9%. 1000 milliLiter(s) (75 mL/Hr) IV Continuous <Continuous>  tenofovir disoproxil fumarate (VIREAD) 300 milliGRAM(s) Oral daily  tranexamic acid IVPB 1000 milliGRAM(s) IV Intermittent every 8 hours    MEDICATIONS  (PRN):  acetaminophen   Tablet .. 650 milliGRAM(s) Oral every 6 hours PRN Temp greater or equal to 38.5C (101.3F), Mild Pain (1 - 3)  glucagon  Injectable 1 milliGRAM(s) IntraMuscular once PRN Glucose LESS THAN 70 milligrams/deciliter  hydrALAZINE Injectable 10 milliGRAM(s) IV Push every 2 hours PRN SBP > 140  senna 2 Tablet(s) Oral at bedtime PRN Constipation    PHYSICAL EXAMINATION    ICU Vital Signs Last 24 Hrs  T(C): 36.7 (04 Aug 2020 04:30), Max: 36.7 (04 Aug 2020 04:30)  T(F): 98 (04 Aug 2020 04:30), Max: 98 (04 Aug 2020 04:30)  HR: 77 (04 Aug 2020 07:00) (70 - 77)  BP: 138/75 (04 Aug 2020 07:00) (136/76 - 159/92)  BP(mean): 100 (04 Aug 2020 07:00) (100 - 100)  RR: 19 (04 Aug 2020 07:00) (14 - 22)  SpO2: 100% (04 Aug 2020 07:00) (100% - 100%)    NEUROLOGIC EXAMINATION:  Patient is intubated and sedated, on nicardipine infusion at 5 mg/hour.  Pupils 3 mm sluggish, decreased corneals.  LEs TF.  Upgoing plantars bilaterally.   GENERAL: AC/CMV FiO2 50,  mL, PEEP 5, RR 14, PIP 18  EENT:  anicteric  CARDIOVASCULAR: (+) S1 S2, normal rate and regular rhythm  PULMONARY: clear to auscultation bilaterally  ABDOMEN: soft, nontender with normoactive bowel sounds  EXTREMITIES: no edema  SKIN: no rash    08-03 @ 07:01  -  08-04 @ 06:45  --------------------------------------------------------  IN: 0 mL / OUT: 200 mL / NET: -200 mL    LABS:                        16.1   15.50 )-----------( 207      ( 04 Aug 2020 05:03 )             48.7     08-04    136  |  98  |  16  ----------------------------<  144<H>  4.0   |  23  |  0.87    Ca    9.2      04 Aug 2020 05:03  Phos  3.0     08-04  Mg     2.3     08-04    TPro  8.6<H>  /  Alb  4.8  /  TBili  0.7  /  DBili  x   /  AST  28  /  ALT  29  /  AlkPhos  92  08-04    CAPILLARY BLOOD GLUCOSE    POCT Blood Glucose.: 149 mg/dL (04 Aug 2020 06:26    Neuroimaging:  CT 08.04.2020 - 4.8 X 2.7 cm cerebellar midline ICH with effacement of 4th ventricle, crowding of foramen magnum, early upward herniation, IVH noted, EVD in place  CTA 08.04.2020 - Spot sign noted within cerebellar ICH  Other imaging:  CXR 08.03.2020 - No infiltrates    IV FLUIDS: IV NS at 75 mL/hour  DRIPS: Propofol infusion at 5 mcg/kg/min, Nicardipine infusion at 5 mg/hr  Lines: Right IJ central line, Left radial arterial line  Drains:  EVD at 15 cm    CODE STATUS:  Full Code                       GOALS OF CARE:  aggressive                      DISPOSITION:  ICU =================================  NEUROCRITICAL CARE ATTENDING NOTE  =================================    VEDA WARREN   MRN-[id number]  HPI: 59M with dyslipidemia, ?liver disease, presented with acute onset severe HA, dizziness, vomiting - brought to [facility] where CT showed L cerebella rhemorrahge.  Intubated for airway protection.  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  R EVD inserted, transferred to [facility] for further management.  given mannitol 25G in [facility],     COURSE IN THE HOSPITAL:  08/03 bleed  08/04 admitted to [facility], 23.4 given x1     Past Medical History: Hepatitis B HLD (hyperlipidemia)  Allergies:  No Known Allergies  Home meds:   ·	Omega-3 350 mg oral capsule: 1 cap(s) orally once a day  ·	tenofovir disoproxil fumarate 300 mg oral tablet: 1 tab(s) orally once a day    PHYSICAL EXAMINATION  T(C): 36.7 (08-04 @ 04:30), Max: 36.7 (08-04 @ 04:30) HR: 77 (08-04 @ 07:00) (70 - 77) BP: 138/75 (08-04 @ 07:00) (136/76 - 159/92) RR: 19 (08-04 @ 07:00) (14 - 22) SpO2: 100% (08-04 @ 07:00) (100% - 100%)  NEUROLOGIC EXAMINATION:  Patient is sedated, does not open eyes does not follow commands withdraws L LE, pupils fixed? 2mm   GENERAL: intubated AC 34534 40% +5  EENT:  anicteric  CARDIOVASCULAR: (+) S1 S2, normal rate and regular rhythm  PULMONARY: clear to auscultation bilaterally  ABDOMEN: soft, nontender with normoactive bowel sounds  EXTREMITIES: no edema  SKIN: no rash    LABS:  CAPILLARY BLOOD GLUCOSE 149                16.1   15.50 )-----------( 207      ( 04 Aug 2020 05:03 )             48.7     136  |  98  |  16  ----------------------------<  144<H>  4.0   |  23  |  0.87    Ca    9.2      04 Aug 2020 05:03  Phos  3.0     08-04  Mg     2.3     08-04    TPro  8.6<H>  /  Alb  4.8  /  TBili  0.7  /  DBili  x   /  AST  28  /  ALT  29  /  AlkPhos  92  08-04    08-03 @ 07:01  -  08-04 @ 07:00  IN: 142.5 mL / OUT: 385 mL / NET: -242.5 mL    PT/INR - ( 04 Aug 2020 05:03 )   PT: 13.0 sec;   INR: 1.09   PTT - ( 04 Aug 2020 05:03 )  PTT:36.6 sec    Bacteriology:  CSF studies:  EEG:  Neuroimaging:  Other imaging:    MEDICATIONS: tranexamic acid 1G IV q8h tenofovir 300mg  PO daily senna PRN atorvastatin 80mg PO HS mod ISS peridex    IV FLUIDS: NS@75  DRIPS: propofol cardene  DIET:  Lines:  Drains:  Vernell HORTON  08/04 EVD at 7cm H20  35cc/hr  Wounds:    CODE STATUS:  Full Code                       GOALS OF CARE:  aggressive                      DISPOSITION:  ICU  ICH Score on admission  = 4, NIHSS =================================  NEUROCRITICAL CARE ATTENDING NOTE  =================================    VEDA WARREN   MRN-[id number]  HPI: 59M with dyslipidemia, ?liver disease, presented with acute onset severe HA, dizziness, vomiting - brought to [facility] where CT showed L cerebellar hemorrahge.  Intubated for airway protection.  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  R EVD inserted, transferred to [facility] for further management.  given mannitol 25G in [facility],     COURSE IN THE HOSPITAL:   bleed   admitted to [facility], 23.4 given x1     Past Medical History: Hepatitis B HLD (hyperlipidemia)  Allergies:  No Known Allergies  Home meds:   ·	Omega-3 350 mg oral capsule: 1 cap(s) orally once a day  ·	tenofovir disoproxil fumarate 300 mg oral tablet: 1 tab(s) orally once a day    PHYSICAL EXAMINATION  T(C): 36.7 (- @ 04:30), Max: 36.7 (08- @ 04:30) HR: 77 (- @ 07:00) (70 - 77) BP: 138/75 (-04 @ 07:00) (136/76 - 159/92) RR: 19 (- @ 07:00) (14 - 22) SpO2: 100% (- @ 07:00) (100% - 100%)  NEUROLOGIC EXAMINATION:  Patient is sedated, does not open eyes does not follow commands withdraws L LE, pupils fixed? 2mm   GENERAL: intubated AC 79887 40% +5  EENT:  anicteric  CARDIOVASCULAR: (+) S1 S2, normal rate and regular rhythm  PULMONARY: clear to auscultation bilaterally  ABDOMEN: soft, nontender with normoactive bowel sounds  EXTREMITIES: no edema  SKIN: no rash    LABS:  CAPILLARY BLOOD GLUCOSE 149                16.1   15.50 )-----------( 207      ( 04 Aug 2020 05:03 )             48.7     136  |  98  |  16  ----------------------------<  144<H>  4.0   |  23  |  0.87    Ca    9.2      04 Aug 2020 05:03  Phos  3.0     -  Mg     2.3     -    TPro  8.6<H>  /  Alb  4.8  /  TBili  0.7  /  DBili  x   /  AST  28  /  ALT  29  /  AlkPhos  92      08-03 @ 07:01  -   @ 07:00  IN: 142.5 mL / OUT: 385 mL / NET: -242.5 mL    PT/INR - ( 04 Aug 2020 05:03 )   PT: 13.0 sec;   INR: 1.09   PTT - ( 04 Aug 2020 05:03 )  PTT:36.6 sec    Bacteriology:  CSF studies:  EEG:  Neuroimaging:    Other imagin/04 CXR:  mild atelectasis L lung base    MEDICATIONS: tranexamic acid 1G IV q8h tenofovir 300mg  PO daily senna PRN atorvastatin 80mg PO HS mod ISS peridex    IV FLUIDS: NS@75  DRIPS: propofol cardene  DIET:  Lines:  Drains:  Vernell HORTON   EVD at 7cm H20  35cc/hr  Wounds:    CODE STATUS:  Full Code                       GOALS OF CARE:  aggressive                      DISPOSITION:  ICU  ICH Score on admission  = 4, NIHSS

## 2020-08-04 NOTE — CHART NOTE - NSCHARTNOTEFT_GEN_A_CORE
Neurosurgical Indications for Screening Dopplers on Admission:       1) Known hypercoagulation disorder (h/o VTE, thrombophilia, HIT, etc.)   2) Admitted from prolonged stay from another institution (straight forward ER transfers not included)  3) Presenting with significant leg immobility   4) Presenting with signs and symptoms of VTE?    5) With significant critical illness, Including "found down" for unknown period of time in HPI  6) With significant neurotrauma (TBI, SCI / TLS spine fractures)   7) Who are comatose   8) With known malignancy (e.g. glioblastoma multiforme, meningioma, etc.). Excludes IA chemo 23hr observation stays  9) On hemodialysis   10) Who have received platelet transfusion or antithrombotic reversal agents recently   11) Who have had recent major orthopedic surgery          Screening dopplers indicated?   Y x  N _    DVT Prophylaxis:  x SCD's   _ chemoprophylaxis

## 2020-08-04 NOTE — H&P ADULT - NSHPSOCIALHISTORY_GEN_ALL_CORE
Family denies any smoking, ETOH use or illicit drug abuse. Works and lives with his wife and 2 children.

## 2020-08-04 NOTE — CHART NOTE - NSCHARTNOTEFT_GEN_A_CORE
Stroke team consulted by neurosurgery today for consult for intracranial hemorrhage.    Imaging reviewed with Dr. Baker    Patient was out of the room for angio and OR. Will follow up tomorrow.

## 2020-08-04 NOTE — H&P ADULT - HISTORY OF PRESENT ILLNESS
59 year old male with PMH of HLD and unclear liver disease had acute onset of severe headache, dizziness and vomiting x1 today, was brought into [facility] with continued symptoms, as well as hypertension and multiple episodes of vomiting. Patient was intubated for airway protection with CT Head performed demonstrating a large left cerebellar hemorrhage. CTA Head/Neck performed is suggestive of underlying vascular pathology such as AVM. [place] [facility] [place] to [facility] for further work-up. Patient arrives to [facility] intubated, sedated and on Nicardipine drip. Patient's son provided history and denies any Aspirin or other anticoagulant use. Denies any prior history of hypertension, smoking, or ETOH abuse.

## 2020-08-04 NOTE — H&P ADULT - NSICDXPASTMEDICALHX_GEN_ALL_CORE_FT
PAST MEDICAL HISTORY:  HLD (hyperlipidemia) PAST MEDICAL HISTORY:  Hepatitis B     HLD (hyperlipidemia)

## 2020-08-04 NOTE — H&P ADULT - PROBLEM SELECTOR PLAN 1
CT Head performed, reviewed with Dr. Blank,  RIght frontal EVD, maintain at 10cm above tragus,  Mannitol received at [facility], Sodium Bullet x1 now,  Tranexamic Acid 1g q8h,  Continue sedation and ventilator support,  Strict neuro checks, I&Os,  Case d/w with family for cerebral angiography and need for suboccipital craniectomy and IPH evacuation, consents in chart,  D/w Dr. Blank, Dr. Whiting, Dr. Mckee

## 2020-08-05 LAB
ANION GAP SERPL CALC-SCNC: 12 MMOL/L — SIGNIFICANT CHANGE UP (ref 5–17)
APPEARANCE CSF: SIGNIFICANT CHANGE UP
APPEARANCE SPUN FLD: SIGNIFICANT CHANGE UP
APPEARANCE UR: CLEAR — SIGNIFICANT CHANGE UP
BILIRUB UR-MCNC: NEGATIVE — SIGNIFICANT CHANGE UP
BUN SERPL-MCNC: 23 MG/DL — SIGNIFICANT CHANGE UP (ref 7–23)
CALCIUM SERPL-MCNC: 9.2 MG/DL — SIGNIFICANT CHANGE UP (ref 8.4–10.5)
CHLORIDE SERPL-SCNC: 112 MMOL/L — HIGH (ref 96–108)
CO2 SERPL-SCNC: 23 MMOL/L — SIGNIFICANT CHANGE UP (ref 22–31)
COLOR CSF: ABNORMAL
COLOR SPEC: YELLOW — SIGNIFICANT CHANGE UP
CREAT SERPL-MCNC: 0.91 MG/DL — SIGNIFICANT CHANGE UP (ref 0.5–1.3)
CSF COMMENTS: SIGNIFICANT CHANGE UP
DIFF PNL FLD: ABNORMAL
GLUCOSE BLDC GLUCOMTR-MCNC: 156 MG/DL — HIGH (ref 70–99)
GLUCOSE BLDC GLUCOMTR-MCNC: 169 MG/DL — HIGH (ref 70–99)
GLUCOSE BLDC GLUCOMTR-MCNC: 172 MG/DL — HIGH (ref 70–99)
GLUCOSE BLDC GLUCOMTR-MCNC: 179 MG/DL — HIGH (ref 70–99)
GLUCOSE BLDC GLUCOMTR-MCNC: 187 MG/DL — HIGH (ref 70–99)
GLUCOSE CSF-MCNC: 117 MG/DL — HIGH (ref 40–70)
GLUCOSE SERPL-MCNC: 178 MG/DL — HIGH (ref 70–99)
GLUCOSE UR QL: NEGATIVE — SIGNIFICANT CHANGE UP
GRAM STN FLD: SIGNIFICANT CHANGE UP
HCT VFR BLD CALC: 33.9 % — LOW (ref 39–50)
HCV AB S/CO SERPL IA: 0.08 S/CO — SIGNIFICANT CHANGE UP
HCV AB SERPL-IMP: SIGNIFICANT CHANGE UP
HGB BLD-MCNC: 11.1 G/DL — LOW (ref 13–17)
KETONES UR-MCNC: NEGATIVE — SIGNIFICANT CHANGE UP
LEUKOCYTE ESTERASE UR-ACNC: NEGATIVE — SIGNIFICANT CHANGE UP
LYMPHOCYTES # CSF: 5 % — LOW (ref 40–80)
MAGNESIUM SERPL-MCNC: 2 MG/DL — SIGNIFICANT CHANGE UP (ref 1.6–2.6)
MCHC RBC-ENTMCNC: 28.5 PG — SIGNIFICANT CHANGE UP (ref 27–34)
MCHC RBC-ENTMCNC: 32.7 GM/DL — SIGNIFICANT CHANGE UP (ref 32–36)
MCV RBC AUTO: 87.1 FL — SIGNIFICANT CHANGE UP (ref 80–100)
NEUTROPHILS # CSF: 31 % — HIGH (ref 0–6)
NITRITE UR-MCNC: NEGATIVE — SIGNIFICANT CHANGE UP
NRBC # BLD: 0 /100 WBCS — SIGNIFICANT CHANGE UP (ref 0–0)
NRBC NFR CSF: 18 /UL — HIGH (ref 0–5)
PH UR: 5.5 — SIGNIFICANT CHANGE UP (ref 5–8)
PHOSPHATE SERPL-MCNC: 2.6 MG/DL — SIGNIFICANT CHANGE UP (ref 2.5–4.5)
PLATELET # BLD AUTO: 190 K/UL — SIGNIFICANT CHANGE UP (ref 150–400)
POTASSIUM SERPL-MCNC: 3.5 MMOL/L — SIGNIFICANT CHANGE UP (ref 3.5–5.3)
POTASSIUM SERPL-SCNC: 3.5 MMOL/L — SIGNIFICANT CHANGE UP (ref 3.5–5.3)
PROT CSF-MCNC: 39 MG/DL — SIGNIFICANT CHANGE UP (ref 15–45)
PROT UR-MCNC: NEGATIVE MG/DL — SIGNIFICANT CHANGE UP
RBC # BLD: 3.89 M/UL — LOW (ref 4.2–5.8)
RBC # CSF: HIGH /UL (ref 0–0)
RBC # FLD: 14.4 % — SIGNIFICANT CHANGE UP (ref 10.3–14.5)
SODIUM SERPL-SCNC: 147 MMOL/L — HIGH (ref 135–145)
SP GR SPEC: 1.02 — SIGNIFICANT CHANGE UP (ref 1–1.03)
SPECIMEN SOURCE: SIGNIFICANT CHANGE UP
TUBE TYPE: SIGNIFICANT CHANGE UP
UROBILINOGEN FLD QL: 0.2 E.U./DL — SIGNIFICANT CHANGE UP
WBC # BLD: 12.56 K/UL — HIGH (ref 3.8–10.5)
WBC # FLD AUTO: 12.56 K/UL — HIGH (ref 3.8–10.5)

## 2020-08-05 PROCEDURE — 99291 CRITICAL CARE FIRST HOUR: CPT

## 2020-08-05 PROCEDURE — 71045 X-RAY EXAM CHEST 1 VIEW: CPT | Mod: 26

## 2020-08-05 PROCEDURE — 95720 EEG PHY/QHP EA INCR W/VEEG: CPT

## 2020-08-05 PROCEDURE — 99222 1ST HOSP IP/OBS MODERATE 55: CPT

## 2020-08-05 PROCEDURE — 99232 SBSQ HOSP IP/OBS MODERATE 35: CPT | Mod: 25

## 2020-08-05 PROCEDURE — 93306 TTE W/DOPPLER COMPLETE: CPT | Mod: 26

## 2020-08-05 RX ORDER — SODIUM CHLORIDE 9 MG/ML
1000 INJECTION INTRAMUSCULAR; INTRAVENOUS; SUBCUTANEOUS
Refills: 0 | Status: DISCONTINUED | OUTPATIENT
Start: 2020-08-05 | End: 2020-08-06

## 2020-08-05 RX ORDER — POTASSIUM PHOSPHATE, MONOBASIC POTASSIUM PHOSPHATE, DIBASIC 236; 224 MG/ML; MG/ML
30 INJECTION, SOLUTION INTRAVENOUS ONCE
Refills: 0 | Status: COMPLETED | OUTPATIENT
Start: 2020-08-05 | End: 2020-08-05

## 2020-08-05 RX ORDER — SODIUM CHLORIDE 9 MG/ML
1000 INJECTION INTRAMUSCULAR; INTRAVENOUS; SUBCUTANEOUS ONCE
Refills: 0 | Status: COMPLETED | OUTPATIENT
Start: 2020-08-05 | End: 2020-08-05

## 2020-08-05 RX ADMIN — TENOFOVIR DISOPROXIL FUMARATE 300 MILLIGRAM(S): 300 TABLET, FILM COATED ORAL at 23:17

## 2020-08-05 RX ADMIN — LEVETIRACETAM 400 MILLIGRAM(S): 250 TABLET, FILM COATED ORAL at 10:03

## 2020-08-05 RX ADMIN — SODIUM CHLORIDE 2000 MILLILITER(S): 9 INJECTION INTRAMUSCULAR; INTRAVENOUS; SUBCUTANEOUS at 22:14

## 2020-08-05 RX ADMIN — Medication 2 MILLIGRAM(S): at 12:06

## 2020-08-05 RX ADMIN — Medication 2: at 01:09

## 2020-08-05 RX ADMIN — ATORVASTATIN CALCIUM 80 MILLIGRAM(S): 80 TABLET, FILM COATED ORAL at 23:17

## 2020-08-05 RX ADMIN — CHLORHEXIDINE GLUCONATE 1 APPLICATION(S): 213 SOLUTION TOPICAL at 07:27

## 2020-08-05 RX ADMIN — Medication 2: at 17:51

## 2020-08-05 RX ADMIN — LEVETIRACETAM 400 MILLIGRAM(S): 250 TABLET, FILM COATED ORAL at 23:17

## 2020-08-05 RX ADMIN — CHLORHEXIDINE GLUCONATE 15 MILLILITER(S): 213 SOLUTION TOPICAL at 17:50

## 2020-08-05 RX ADMIN — NICARDIPINE HYDROCHLORIDE 25 MG/HR: 30 CAPSULE, EXTENDED RELEASE ORAL at 09:20

## 2020-08-05 RX ADMIN — Medication 6 MILLIGRAM(S): at 06:28

## 2020-08-05 RX ADMIN — Medication 2: at 07:25

## 2020-08-05 RX ADMIN — PANTOPRAZOLE SODIUM 40 MILLIGRAM(S): 20 TABLET, DELAYED RELEASE ORAL at 12:06

## 2020-08-05 RX ADMIN — Medication 2: at 12:00

## 2020-08-05 RX ADMIN — Medication 6 MILLIGRAM(S): at 17:50

## 2020-08-05 RX ADMIN — Medication 650 MILLIGRAM(S): at 10:33

## 2020-08-05 RX ADMIN — Medication 6 MILLIGRAM(S): at 00:43

## 2020-08-05 RX ADMIN — POTASSIUM PHOSPHATE, MONOBASIC POTASSIUM PHOSPHATE, DIBASIC 83.33 MILLIMOLE(S): 236; 224 INJECTION, SOLUTION INTRAVENOUS at 12:06

## 2020-08-05 RX ADMIN — Medication 6 MILLIGRAM(S): at 23:17

## 2020-08-05 RX ADMIN — Medication 2: at 22:00

## 2020-08-05 RX ADMIN — CHLORHEXIDINE GLUCONATE 15 MILLILITER(S): 213 SOLUTION TOPICAL at 07:39

## 2020-08-05 RX ADMIN — Medication 650 MILLIGRAM(S): at 12:00

## 2020-08-05 RX ADMIN — Medication 6 MILLIGRAM(S): at 12:06

## 2020-08-05 NOTE — PROGRESS NOTE ADULT - SUBJECTIVE AND OBJECTIVE BOX
S/Overnight events:        Hospital Course:   POD# 1 S/P cerebral angio, negative for AVM.   s/p SOC craniectomy evacuation of cerebellar hematoma.         Vital Signs Last 24 Hrs  T(C): 38.4 (05 Aug 2020 10:33), Max: 38.4 (05 Aug 2020 10:33)  T(F): 101.2 (05 Aug 2020 10:33), Max: 101.2 (05 Aug 2020 10:33)  HR: 109 (05 Aug 2020 11:00) (69 - 115)  BP: 124/59 (05 Aug 2020 11:00) (90/52 - 124/66)  BP(mean): 83 (05 Aug 2020 11:00) (66 - 90)  RR: 18 (05 Aug 2020 11:00) (12 - 20)  SpO2: 99% (05 Aug 2020 11:00) (96% - 100%)    I&O's Detail    04 Aug 2020 07:01  -  05 Aug 2020 07:00  --------------------------------------------------------  IN:    Enteral Tube Flush: 80 mL    IV PiggyBack: 200 mL    niCARdipine Infusion: 550 mL    propofol Infusion: 14.2 mL    sodium chloride 0.9%.: 1125 mL  Total IN: 1969.2 mL    OUT:    External Ventricular Device: 68 mL    Indwelling Catheter - Urethral: 1315 mL  Total OUT: 1383 mL    Total NET: 586.2 mL      05 Aug 2020 07:01  -  05 Aug 2020 11:41  --------------------------------------------------------  IN:    Enteral Tube Flush: 100 mL    IV PiggyBack: 100 mL    niCARdipine Infusion: 187.5 mL    sodium chloride 0.9%.: 375 mL  Total IN: 762.5 mL    OUT:    External Ventricular Device: 39 mL    Indwelling Catheter - Urethral: 340 mL  Total OUT: 379 mL    Total NET: 383.5 mL        I&O's Summary    04 Aug 2020 07:01  -  05 Aug 2020 07:00  --------------------------------------------------------  IN: 1969.2 mL / OUT: 1383 mL / NET: 586.2 mL    05 Aug 2020 07:01  -  05 Aug 2020 11:41  --------------------------------------------------------  IN: 762.5 mL / OUT: 379 mL / NET: 383.5 mL        PHYSICAL EXAM:  Pt intubated   HEENT: 3mm b/l non reactive to light, not opening eyes   Neck: supple   Lungs: ctab   Heart: s1, s2  Abd: soft, non-distended   Exts: warm, 2+ pulses throughout   Neuro: Not following commands, no corneal, localizes on the left/slightly on right, +gag       TUBES/LINES:  [] CVC  [] A-line  [] Lumbar Drain  [] Ventriculostomy  [] Other    DIET:  [] NPO  [] Mechanical  [] Tube feeds    LABS:                        11.1   12.56 )-----------( 190      ( 05 Aug 2020 05:42 )             33.9     08-05    147<H>  |  112<H>  |  23  ----------------------------<  178<H>  3.5   |  23  |  0.91    Ca    9.2      05 Aug 2020 05:42  Phos  2.6     08-05  Mg     2.0     08-05    TPro  8.6<H>  /  Alb  4.8  /  TBili  0.7  /  DBili  x   /  AST  28  /  ALT  29  /  AlkPhos  92  08-04    PT/INR - ( 04 Aug 2020 15:15 )   PT: 14.4 sec;   INR: 1.21          PTT - ( 04 Aug 2020 15:15 )  PTT:31.1 sec    CARDIAC MARKERS ( 04 Aug 2020 05:03 )  x     / <0.01 ng/mL / x     / x     / x          CAPILLARY BLOOD GLUCOSE      POCT Blood Glucose.: 169 mg/dL (05 Aug 2020 11:12)  POCT Blood Glucose.: 172 mg/dL (05 Aug 2020 05:49)  POCT Blood Glucose.: 156 mg/dL (05 Aug 2020 00:50)  POCT Blood Glucose.: 166 mg/dL (04 Aug 2020 16:49)  POCT Blood Glucose.: 151 mg/dL (04 Aug 2020 13:31)  POCT Blood Glucose.: 163 mg/dL (04 Aug 2020 12:42)  POCT Blood Glucose.: 174 mg/dL (04 Aug 2020 11:47)      Drug Levels: [] N/A    CSF Analysis: [] N/A      Allergies    No Known Allergies    Intolerances      MEDICATIONS:  Antibiotics:  tenofovir disoproxil fumarate (VIREAD) 300 milliGRAM(s) Oral every 24 hours    Neuro:  acetaminophen   Tablet .. 650 milliGRAM(s) Oral every 6 hours PRN  levETIRAcetam  IVPB 1000 milliGRAM(s) IV Intermittent every 12 hours    Anticoagulation:    OTHER:  atorvastatin 80 milliGRAM(s) Oral at bedtime  chlorhexidine 0.12% Liquid 15 milliLiter(s) Oral Mucosa every 12 hours  chlorhexidine 4% Liquid 1 Application(s) Topical <User Schedule>  dexAMETHasone  Injectable 6 milliGRAM(s) IV Push every 6 hours  dextrose 40% Gel 15 Gram(s) Oral once PRN  dextrose 50% Injectable 12.5 Gram(s) IV Push once  dextrose 50% Injectable 25 Gram(s) IV Push once  dextrose 50% Injectable 25 Gram(s) IV Push once  dextrose 50% Injectable 25 Gram(s) IV Push once  glucagon  Injectable 1 milliGRAM(s) IntraMuscular once PRN  glucagon  Injectable 1 milliGRAM(s) IntraMuscular once PRN  hydrALAZINE Injectable 10 milliGRAM(s) IV Push every 2 hours PRN  insulin lispro (HumaLOG) corrective regimen sliding scale   SubCutaneous every 6 hours  niCARdipine Infusion 5 mG/Hr IV Continuous <Continuous>  pantoprazole  Injectable 40 milliGRAM(s) IV Push daily  senna 2 Tablet(s) Oral at bedtime PRN    IVF:  dextrose 5%. 1000 milliLiter(s) IV Continuous <Continuous>  potassium phosphate IVPB 30 milliMole(s) IV Intermittent once  sodium chloride 0.9%. 1000 milliLiter(s) IV Continuous <Continuous>    CULTURES:    RADIOLOGY & ADDITIONAL TESTS:      ASSESSMENT:  59y male transfer from [place] presents s/p SOC craniectomy for cerebellar hematoma evacuation     INTRACRANIAL BLEED  No pertinent family history in first degree relatives  Handoff  Hepatitis B  HLD (hyperlipidemia)  ICH (intracerebral hemorrhage)  ICH (intracerebral hemorrhage)  Hepatitis B  HLD (hyperlipidemia)  HIV (human immunodeficiency virus infection)  Cerebellar hemorrhage  Craniectomy, subocciptal, with cervical laminectomy for medulla and spinal cord decompression  Percutaneous insertion of arterial line  Angiogram, carotid and cerebral vessels, bilateral  Foot fracture, left      PLAN:  NEURO:    CARDIOVASCULAR:    PULMONARY:    RENAL:    GI:    ID:    ENDO:    DVT PROPHYLAXIS:    DISPOSITION:       Assessment:  Present when checked    []  GCS  E   V  M     Heart Failure: []Acute, [] acute on chronic , []chronic  Heart Failure:  [] Diastolic (HFpEF), [] Systolic (HFrEF), []Combined (HFpEF and HFrEF), [] RHF, [] Pulm HTN, [] Other    [] SARAH, [] ATN, [] AIN, [] other  [] CKD1, [] CKD2, [] CKD 3, [] CKD 4, [] CKD 5, []ESRD    Encephalopathy: [] Metabolic, [] Hepatic, [] toxic, [] Neurological, [] Other    Abnormal Nurtitional Status: [] malnurtition (see nutrition note), [ ]underweight: BMI < 19, [] morbid obesity: BMI >40, [] Cachexia    [] Sepsis  [] hypovolemic shock,[] cardiogenic shock, [] hemorrhagic shock, [] neuogenic shock  [] Acute Respiratory Failure  []Cerebral edema, [] Brain compression/ herniation,   [] Functional quadriplegia  [] Acute blood loss anemia S/Overnight events: No overnight events       Hospital Course:   POD# 1 S/P cerebral angio, negative for AVM.   s/p SOC craniectomy evacuation of cerebellar hematoma.         Vital Signs Last 24 Hrs  T(C): 38.4 (05 Aug 2020 10:33), Max: 38.4 (05 Aug 2020 10:33)  T(F): 101.2 (05 Aug 2020 10:33), Max: 101.2 (05 Aug 2020 10:33)  HR: 109 (05 Aug 2020 11:00) (69 - 115)  BP: 124/59 (05 Aug 2020 11:00) (90/52 - 124/66)  BP(mean): 83 (05 Aug 2020 11:00) (66 - 90)  RR: 18 (05 Aug 2020 11:00) (12 - 20)  SpO2: 99% (05 Aug 2020 11:00) (96% - 100%)    I&O's Detail    04 Aug 2020 07:01  -  05 Aug 2020 07:00  --------------------------------------------------------  IN:    Enteral Tube Flush: 80 mL    IV PiggyBack: 200 mL    niCARdipine Infusion: 550 mL    propofol Infusion: 14.2 mL    sodium chloride 0.9%.: 1125 mL  Total IN: 1969.2 mL    OUT:    External Ventricular Device: 68 mL    Indwelling Catheter - Urethral: 1315 mL  Total OUT: 1383 mL    Total NET: 586.2 mL      05 Aug 2020 07:01  -  05 Aug 2020 11:41  --------------------------------------------------------  IN:    Enteral Tube Flush: 100 mL    IV PiggyBack: 100 mL    niCARdipine Infusion: 187.5 mL    sodium chloride 0.9%.: 375 mL  Total IN: 762.5 mL    OUT:    External Ventricular Device: 39 mL    Indwelling Catheter - Urethral: 340 mL  Total OUT: 379 mL    Total NET: 383.5 mL        I&O's Summary    04 Aug 2020 07:01  -  05 Aug 2020 07:00  --------------------------------------------------------  IN: 1969.2 mL / OUT: 1383 mL / NET: 586.2 mL    05 Aug 2020 07:01  -  05 Aug 2020 11:41  --------------------------------------------------------  IN: 762.5 mL / OUT: 379 mL / NET: 383.5 mL        PHYSICAL EXAM:  Pt intubated   HEENT: 3mm b/l non reactive to light, not opening eyes   Neck: supple   Lungs: ctab   Heart: s1, s2  Abd: soft, non-distended   Exts: warm, 2+ pulses throughout   Neuro: Not following commands, no corneal, localizes on the left/slightly on right, +gag       TUBES/LINES:  [] CVC  [] A-line  [] Lumbar Drain  [] Ventriculostomy  [] Other    DIET:  [] NPO  [] Mechanical  [] Tube feeds    LABS:                        11.1   12.56 )-----------( 190      ( 05 Aug 2020 05:42 )             33.9     08-05    147<H>  |  112<H>  |  23  ----------------------------<  178<H>  3.5   |  23  |  0.91    Ca    9.2      05 Aug 2020 05:42  Phos  2.6     08-05  Mg     2.0     08-05    TPro  8.6<H>  /  Alb  4.8  /  TBili  0.7  /  DBili  x   /  AST  28  /  ALT  29  /  AlkPhos  92  08-04    PT/INR - ( 04 Aug 2020 15:15 )   PT: 14.4 sec;   INR: 1.21          PTT - ( 04 Aug 2020 15:15 )  PTT:31.1 sec    CARDIAC MARKERS ( 04 Aug 2020 05:03 )  x     / <0.01 ng/mL / x     / x     / x          CAPILLARY BLOOD GLUCOSE      POCT Blood Glucose.: 169 mg/dL (05 Aug 2020 11:12)  POCT Blood Glucose.: 172 mg/dL (05 Aug 2020 05:49)  POCT Blood Glucose.: 156 mg/dL (05 Aug 2020 00:50)  POCT Blood Glucose.: 166 mg/dL (04 Aug 2020 16:49)  POCT Blood Glucose.: 151 mg/dL (04 Aug 2020 13:31)  POCT Blood Glucose.: 163 mg/dL (04 Aug 2020 12:42)  POCT Blood Glucose.: 174 mg/dL (04 Aug 2020 11:47)      Drug Levels: [] N/A    CSF Analysis: [] N/A      Allergies    No Known Allergies    Intolerances      MEDICATIONS:  Antibiotics:  tenofovir disoproxil fumarate (VIREAD) 300 milliGRAM(s) Oral every 24 hours    Neuro:  acetaminophen   Tablet .. 650 milliGRAM(s) Oral every 6 hours PRN  levETIRAcetam  IVPB 1000 milliGRAM(s) IV Intermittent every 12 hours    Anticoagulation:    OTHER:  atorvastatin 80 milliGRAM(s) Oral at bedtime  chlorhexidine 0.12% Liquid 15 milliLiter(s) Oral Mucosa every 12 hours  chlorhexidine 4% Liquid 1 Application(s) Topical <User Schedule>  dexAMETHasone  Injectable 6 milliGRAM(s) IV Push every 6 hours  dextrose 40% Gel 15 Gram(s) Oral once PRN  dextrose 50% Injectable 12.5 Gram(s) IV Push once  dextrose 50% Injectable 25 Gram(s) IV Push once  dextrose 50% Injectable 25 Gram(s) IV Push once  dextrose 50% Injectable 25 Gram(s) IV Push once  glucagon  Injectable 1 milliGRAM(s) IntraMuscular once PRN  glucagon  Injectable 1 milliGRAM(s) IntraMuscular once PRN  hydrALAZINE Injectable 10 milliGRAM(s) IV Push every 2 hours PRN  insulin lispro (HumaLOG) corrective regimen sliding scale   SubCutaneous every 6 hours  niCARdipine Infusion 5 mG/Hr IV Continuous <Continuous>  pantoprazole  Injectable 40 milliGRAM(s) IV Push daily  senna 2 Tablet(s) Oral at bedtime PRN    IVF:  dextrose 5%. 1000 milliLiter(s) IV Continuous <Continuous>  potassium phosphate IVPB 30 milliMole(s) IV Intermittent once  sodium chloride 0.9%. 1000 milliLiter(s) IV Continuous <Continuous>    CULTURES:    RADIOLOGY & ADDITIONAL TESTS:      ASSESSMENT:  59y male transfer from [place] presents s/p SOC craniectomy for cerebellar hematoma evacuation     INTRACRANIAL BLEED  No pertinent family history in first degree relatives  Handoff  Hepatitis B  HLD (hyperlipidemia)  ICH (intracerebral hemorrhage)  ICH (intracerebral hemorrhage)  Hepatitis B  HLD (hyperlipidemia)  HIV (human immunodeficiency virus infection)  Cerebellar hemorrhage  Craniectomy, subocciptal, with cervical laminectomy for medulla and spinal cord decompression  Percutaneous insertion of arterial line  Angiogram, carotid and cerebral vessels, bilateral  Foot fracture, left      PLAN:  NEURO:  -neuro checks q1h   -EVD @ 5cm H20   -continue sz prophylaxis with Levetiracetam    CARDIOVASCULAR:  -SBP goal 100-140 mmHg    PULMONARY:  -Continue ventilation support     RENAL:  Na goal , continue NS     GI:  -Bowel regimen  -PPI for GI ppx    ID:  -spiked temp 101  -f/i fever workup/culture     ENDO:      DVT PROPHYLAXIS:    DISPOSITION:       Assessment:  Present when checked    []  GCS  E   V  M     Heart Failure: []Acute, [] acute on chronic , []chronic  Heart Failure:  [] Diastolic (HFpEF), [] Systolic (HFrEF), []Combined (HFpEF and HFrEF), [] RHF, [] Pulm HTN, [] Other    [] SARAH, [] ATN, [] AIN, [] other  [] CKD1, [] CKD2, [] CKD 3, [] CKD 4, [] CKD 5, []ESRD    Encephalopathy: [] Metabolic, [] Hepatic, [] toxic, [] Neurological, [] Other    Abnormal Nurtitional Status: [] malnurtition (see nutrition note), [ ]underweight: BMI < 19, [] morbid obesity: BMI >40, [] Cachexia    [] Sepsis  [] hypovolemic shock,[] cardiogenic shock, [] hemorrhagic shock, [] neuogenic shock  [] Acute Respiratory Failure  []Cerebral edema, [] Brain compression/ herniation,   [] Functional quadriplegia  [] Acute blood loss anemia S/Overnight events: No overnight events       Hospital Course:   POD# 1 S/P cerebral angio, negative for AVM.   s/p SOC craniectomy evacuation of cerebellar hematoma.         Vital Signs Last 24 Hrs  T(C): 38.4 (05 Aug 2020 10:33), Max: 38.4 (05 Aug 2020 10:33)  T(F): 101.2 (05 Aug 2020 10:33), Max: 101.2 (05 Aug 2020 10:33)  HR: 109 (05 Aug 2020 11:00) (69 - 115)  BP: 124/59 (05 Aug 2020 11:00) (90/52 - 124/66)  BP(mean): 83 (05 Aug 2020 11:00) (66 - 90)  RR: 18 (05 Aug 2020 11:00) (12 - 20)  SpO2: 99% (05 Aug 2020 11:00) (96% - 100%)    I&O's Detail    04 Aug 2020 07:01  -  05 Aug 2020 07:00  --------------------------------------------------------  IN:    Enteral Tube Flush: 80 mL    IV PiggyBack: 200 mL    niCARdipine Infusion: 550 mL    propofol Infusion: 14.2 mL    sodium chloride 0.9%.: 1125 mL  Total IN: 1969.2 mL    OUT:    External Ventricular Device: 68 mL    Indwelling Catheter - Urethral: 1315 mL  Total OUT: 1383 mL    Total NET: 586.2 mL      05 Aug 2020 07:01  -  05 Aug 2020 11:41  --------------------------------------------------------  IN:    Enteral Tube Flush: 100 mL    IV PiggyBack: 100 mL    niCARdipine Infusion: 187.5 mL    sodium chloride 0.9%.: 375 mL  Total IN: 762.5 mL    OUT:    External Ventricular Device: 39 mL    Indwelling Catheter - Urethral: 340 mL  Total OUT: 379 mL    Total NET: 383.5 mL        I&O's Summary    04 Aug 2020 07:01  -  05 Aug 2020 07:00  --------------------------------------------------------  IN: 1969.2 mL / OUT: 1383 mL / NET: 586.2 mL    05 Aug 2020 07:01  -  05 Aug 2020 11:41  --------------------------------------------------------  IN: 762.5 mL / OUT: 379 mL / NET: 383.5 mL        PHYSICAL EXAM:  Pt intubated   HEENT: 3mm b/l non reactive to light, not opening eyes   Neck: supple   Lungs: ctab   Heart: s1, s2  Abd: soft, non-distended   Exts: warm, 2+ pulses throughout   Neuro: Not following commands, no corneal, localizes on the left/slightly on right, +gag       TUBES/LINES:  [] CVC  [] A-line  [] Lumbar Drain  [] Ventriculostomy  [] Other    DIET:  [] NPO  [] Mechanical  [] Tube feeds    LABS:                        11.1   12.56 )-----------( 190      ( 05 Aug 2020 05:42 )             33.9     08-05    147<H>  |  112<H>  |  23  ----------------------------<  178<H>  3.5   |  23  |  0.91    Ca    9.2      05 Aug 2020 05:42  Phos  2.6     08-05  Mg     2.0     08-05    TPro  8.6<H>  /  Alb  4.8  /  TBili  0.7  /  DBili  x   /  AST  28  /  ALT  29  /  AlkPhos  92  08-04    PT/INR - ( 04 Aug 2020 15:15 )   PT: 14.4 sec;   INR: 1.21          PTT - ( 04 Aug 2020 15:15 )  PTT:31.1 sec    CARDIAC MARKERS ( 04 Aug 2020 05:03 )  x     / <0.01 ng/mL / x     / x     / x          CAPILLARY BLOOD GLUCOSE      POCT Blood Glucose.: 169 mg/dL (05 Aug 2020 11:12)  POCT Blood Glucose.: 172 mg/dL (05 Aug 2020 05:49)  POCT Blood Glucose.: 156 mg/dL (05 Aug 2020 00:50)  POCT Blood Glucose.: 166 mg/dL (04 Aug 2020 16:49)  POCT Blood Glucose.: 151 mg/dL (04 Aug 2020 13:31)  POCT Blood Glucose.: 163 mg/dL (04 Aug 2020 12:42)  POCT Blood Glucose.: 174 mg/dL (04 Aug 2020 11:47)      Drug Levels: [] N/A    CSF Analysis: [] N/A      Allergies    No Known Allergies    Intolerances      MEDICATIONS:  Antibiotics:  tenofovir disoproxil fumarate (VIREAD) 300 milliGRAM(s) Oral every 24 hours    Neuro:  acetaminophen   Tablet .. 650 milliGRAM(s) Oral every 6 hours PRN  levETIRAcetam  IVPB 1000 milliGRAM(s) IV Intermittent every 12 hours    Anticoagulation:    OTHER:  atorvastatin 80 milliGRAM(s) Oral at bedtime  chlorhexidine 0.12% Liquid 15 milliLiter(s) Oral Mucosa every 12 hours  chlorhexidine 4% Liquid 1 Application(s) Topical <User Schedule>  dexAMETHasone  Injectable 6 milliGRAM(s) IV Push every 6 hours  dextrose 40% Gel 15 Gram(s) Oral once PRN  dextrose 50% Injectable 12.5 Gram(s) IV Push once  dextrose 50% Injectable 25 Gram(s) IV Push once  dextrose 50% Injectable 25 Gram(s) IV Push once  dextrose 50% Injectable 25 Gram(s) IV Push once  glucagon  Injectable 1 milliGRAM(s) IntraMuscular once PRN  glucagon  Injectable 1 milliGRAM(s) IntraMuscular once PRN  hydrALAZINE Injectable 10 milliGRAM(s) IV Push every 2 hours PRN  insulin lispro (HumaLOG) corrective regimen sliding scale   SubCutaneous every 6 hours  niCARdipine Infusion 5 mG/Hr IV Continuous <Continuous>  pantoprazole  Injectable 40 milliGRAM(s) IV Push daily  senna 2 Tablet(s) Oral at bedtime PRN    IVF:  dextrose 5%. 1000 milliLiter(s) IV Continuous <Continuous>  potassium phosphate IVPB 30 milliMole(s) IV Intermittent once  sodium chloride 0.9%. 1000 milliLiter(s) IV Continuous <Continuous>    CULTURES:    RADIOLOGY & ADDITIONAL TESTS:      ASSESSMENT:  59y male transfer from [place] presents s/p SOC craniectomy for cerebellar hematoma evacuation     INTRACRANIAL BLEED  No pertinent family history in first degree relatives  Handoff  Hepatitis B  HLD (hyperlipidemia)  ICH (intracerebral hemorrhage)  ICH (intracerebral hemorrhage)  Hepatitis B  HLD (hyperlipidemia)  HIV (human immunodeficiency virus infection)  Cerebellar hemorrhage  Craniectomy, subocciptal, with cervical laminectomy for medulla and spinal cord decompression  Percutaneous insertion of arterial line  Angiogram, carotid and cerebral vessels, bilateral  Foot fracture, left      PLAN:  NEURO:  -neuro checks q1h   -EVD @ 5cm H20   -continue sz prophylaxis with Levetiracetam    CARDIOVASCULAR:  -SBP goal 100-140 mmHg    PULMONARY:  -Continue ventilation support     RENAL:  Na goal , continue NS     GI:  -Bowel regimen  -PPI for GI ppx    ID:  -spiked temp 101  -f/i fever workup/culture       DVT PROPHYLAXIS: SCDs in place    DISPOSITION:   Continue ICU care  Plan discussed with Dr. Blank and       Assessment:  Present when checked    []  GCS  E   V  M     Heart Failure: []Acute, [] acute on chronic , []chronic  Heart Failure:  [] Diastolic (HFpEF), [] Systolic (HFrEF), []Combined (HFpEF and HFrEF), [] RHF, [] Pulm HTN, [] Other    [] SARAH, [] ATN, [] AIN, [] other  [] CKD1, [] CKD2, [] CKD 3, [] CKD 4, [] CKD 5, []ESRD    Encephalopathy: [] Metabolic, [] Hepatic, [] toxic, [] Neurological, [] Other    Abnormal Nurtitional Status: [] malnurtition (see nutrition note), [ ]underweight: BMI < 19, [] morbid obesity: BMI >40, [] Cachexia    [] Sepsis  [] hypovolemic shock,[] cardiogenic shock, [] hemorrhagic shock, [] neuogenic shock  [] Acute Respiratory Failure  []Cerebral edema, [] Brain compression/ herniation,   [] Functional quadriplegia  [] Acute blood loss anemia

## 2020-08-05 NOTE — PROGRESS NOTE ADULT - SUBJECTIVE AND OBJECTIVE BOX
=================================  NEUROCRITICAL CARE ATTENDING NOTE  =================================    VEDA WARREN   MRN-[id number]  HPI: 59M with dyslipidemia, ?liver disease, presented with acute onset severe HA, dizziness, vomiting - brought to [facility] where CT showed L cerebellar hemorrahge.  Intubated for airway protection.  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  R EVD inserted, transferred to [facility] for further management.  given mannitol 25G in [facility],     COURSE IN THE HOSPITAL:   bleed   admitted to [facility], 23.4 given x1     Past Medical History: Hepatitis B HLD (hyperlipidemia)  Allergies:  No Known Allergies  Home meds:   ·	Omega-3 350 mg oral capsule: 1 cap(s) orally once a day  ·	tenofovir disoproxil fumarate 300 mg oral tablet: 1 tab(s) orally once a day    PHYSICAL EXAMINATION  T(C): 36.7 (- @ 04:30), Max: 36.7 (08- @ 04:30) HR: 77 (- @ 07:00) (70 - 77) BP: 138/75 (-04 @ 07:00) (136/76 - 159/92) RR: 19 (- @ 07:00) (14 - 22) SpO2: 100% (- @ 07:00) (100% - 100%)  NEUROLOGIC EXAMINATION:  Patient is sedated, does not open eyes does not follow commands withdraws L LE, pupils fixed? 2mm   GENERAL: intubated AC 73466 40% +5  EENT:  anicteric  CARDIOVASCULAR: (+) S1 S2, normal rate and regular rhythm  PULMONARY: clear to auscultation bilaterally  ABDOMEN: soft, nontender with normoactive bowel sounds  EXTREMITIES: no edema  SKIN: no rash    LABS:  CAPILLARY BLOOD GLUCOSE 149                16.1   15.50 )-----------( 207      ( 04 Aug 2020 05:03 )             48.7     136  |  98  |  16  ----------------------------<  144<H>  4.0   |  23  |  0.87    Ca    9.2      04 Aug 2020 05:03  Phos  3.0     -  Mg     2.3     -    TPro  8.6<H>  /  Alb  4.8  /  TBili  0.7  /  DBili  x   /  AST  28  /  ALT  29  /  AlkPhos  92      08-03 @ 07:01  -   @ 07:00  IN: 142.5 mL / OUT: 385 mL / NET: -242.5 mL    PT/INR - ( 04 Aug 2020 05:03 )   PT: 13.0 sec;   INR: 1.09   PTT - ( 04 Aug 2020 05:03 )  PTT:36.6 sec    Bacteriology:  CSF studies:  EEG:  Neuroimaging:    Other imagin/04 CXR:  mild atelectasis L lung base    MEDICATIONS: tranexamic acid 1G IV q8h tenofovir 300mg  PO daily senna PRN atorvastatin 80mg PO HS mod ISS peridex    IV FLUIDS: NS@75  DRIPS: propofol cardene  DIET:  Lines:  Drains:  Vernell HORTON   EVD at 7cm H20  35cc/hr  Wounds:    CODE STATUS:  Full Code                       GOALS OF CARE:  aggressive                      DISPOSITION:  ICU  ICH Score on admission  = 4, NIHSS =================================  NEUROCRITICAL CARE ATTENDING NOTE  =================================    VEDA WARREN   MRN-[id number]  HPI: 59M with dyslipidemia, ?liver disease, presented with acute onset severe HA, dizziness, vomiting - brought to [facility] where CT showed L cerebellar hemorrahge.  Intubated for airway protection.  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  R EVD inserted, transferred to [facility] for further management.  given mannitol 25G in [facility],     COURSE IN THE HOSPITAL:   bleed   admitted to [facility], 23.4 given x1        Past Medical History: Hepatitis B HLD (hyperlipidemia)  Allergies:  No Known Allergies  Home meds:   ·	Omega-3 350 mg oral capsule: 1 cap(s) orally once a day  ·	tenofovir disoproxil fumarate 300 mg oral tablet: 1 tab(s) orally once a day    PHYSICAL EXAMINATION  T(C): 36.8 ( @ 06:03), Max: 37.2 ( @ 00:47) HR: 107 ( @ 07:00) (69 - 113) BP: 120/59 ( @ 07:00) (90/52 - 124/66) RR: 17 ( @ 07:00) (12 - 19) SpO2: 99% ( @ 07:00) (99% - 100%)  NEUROLOGIC EXAMINATION:  Patient is sedated, does not open eyes does not follow commands withdraws L LE, pupils fixed? 2mm   does not open eyes, does not follow commands, (+) corneals, pupils fixed, (+) cough/gag, withdrawing all 4s  GENERAL: intubated AC 71587 40% +5  EENT:  anicteric  CARDIOVASCULAR: (+) S1 S2, normal rate and regular rhythm  PULMONARY: clear to auscultation bilaterally  ABDOMEN: soft, nontender with normoactive bowel sounds  EXTREMITIES: no edema  SKIN: no rash    LABS:   CAPILLARY BLOOD GLUCOSE 172 156 166 151 163 174                           11.1   12.56 )-----------( 190      ( 05 Aug 2020 05:42 )             33.9     WBC: 12.56 ()  8.76 ()  15.50 ()   Hb: 11.1 ()  12.1 ()  16.1 ()   Plt: 190 ()  189 ()  207 ()        147<H>  |  112<H>  |  23  ----------------------------<  178<H>  3.5   |  23  |  0.91    Ca    9.2      05 Aug 2020 05:42  Phos  2.6       Mg     2.0         TPro  8.6<H>  /  Alb  4.8  /  TBili  0.7  /  DBili  x   /  AST  28  /  ALT  29  /  AlkPhos  92        Na:147 ( @ 05:42)  144 ( @ 15:15)  136 ( @ 05:03)   BUN: 23 ()  18 ()  16 ()   Crea:0.91 ()  0.95 ()  0.87 ()         @ 07:01  -   @ 07:00  --------------------------------------------------------  IN: 1969.2 mL / OUT: 1383 mL / NET: 586.2 mL     @ 07:01  -   @ 07:16  --------------------------------------------------------  IN: 112.5 mL / OUT: 0 mL / NET: 112.5 mL        LABS:  CAPILLARY BLOOD GLUCOSE 149                16.1   15.50 )-----------( 207      ( 04 Aug 2020 05:03 )             48.7     136  |  98  |  16  ----------------------------<  144<H>  4.0   |  23  |  0.87    Ca    9.2      04 Aug 2020 05:03  Phos  3.0       Mg     2.3         TPro  8.6<H>  /  Alb  4.8  /  TBili  0.7  /  DBili  x   /  AST  28  /  ALT  29  /  AlkPhos  92   @ 07:01  -   @ 07:00  IN: 142.5 mL / OUT: 385 mL / NET: -242.5 mL    PT/INR - ( 04 Aug 2020 05:03 )   PT: 13.0 sec;   INR: 1.09   PTT - ( 04 Aug 2020 05:03 )  PTT:36.6 sec    Bacteriology:  CSF studies:  EEG:  Neuroimaging:    Other imagin/04 CXR:  mild atelectasis L lung base    MEDICATIONS: tranexamic acid 1G IV q8h tenofovir 300mg  PO daily senna PRN atorvastatin 80mg PO HS mod ISS peridex    IV FLUIDS: NS@75  DRIPS: propofol cardene  DIET:  Lines:  Drains:  Matt R IJ   EVD at 7cm H20  35cc/hr   EVD at 5cm H20   Wounds:    CODE STATUS:  Full Code                       GOALS OF CARE:  aggressive                      DISPOSITION:  ICU  ICH Score on admission  = 4, NIHSS =================================  NEUROCRITICAL CARE ATTENDING NOTE  =================================    VEDA WARREN   MRN-[id number]  HPI: 59M with dyslipidemia, h/o Hep B infection, presented with acute onset severe HA, dizziness, vomiting - brought to [facility] where CT showed L cerebellar hemorrhage.  Intubated for airway protection.  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  R EVD inserted, transferred to [facility] for further management.  given mannitol 25G in [facility],     COURSE IN THE HOSPITAL:   bleed   admitted to [facility], 23.4 given x1; OR for SOC evacuation of ICH   remained intubated overnight    Past Medical History: Hepatitis B HLD (hyperlipidemia)  Allergies:  No Known Allergies  Home meds:   ·	Omega-3 350 mg oral capsule: 1 cap(s) orally once a day  ·	tenofovir disoproxil fumarate 300 mg oral tablet: 1 tab(s) orally once a day    PHYSICAL EXAMINATION  T(C): 36.8 ( @ 06:03), Max: 37.2 ( @ 00:47) HR: 107 ( @ 07:00) (69 - 113) BP: 120/59 (- @ 07:00) (90/52 - 124/66) RR: 17 ( @ 07:00) (12 - 19) SpO2: 99% ( @ 07:00) (99% - 100%)  NEUROLOGIC EXAMINATION:  Patient is sedated, does not open eyes does not follow commands withdraws L LE, pupils 2mm nonreactive, localized L UE, withdrew L LE, withdraws R UE, R LE withdraws (+) cough/gag  GENERAL: intubated  12 40% +5  EENT:  anicteric  CARDIOVASCULAR: (+) S1 S2, normal rate and regular rhythm  PULMONARY: clear to auscultation bilaterally  ABDOMEN: soft, nontender with normoactive bowel sounds  EXTREMITIES: no edema  SKIN: no rash    LABS:   CAPILLARY BLOOD GLUCOSE 172 156 166 151 163 174                           11.1 (12.1)  12.56 )-----------( 190      ( 05 Aug 2020 05:42 )             33.9     147<H>  |  112<H>  |  23  ----------------------------<  178<H>  3.5   |  23  |  0.91    Ca    9.2      05 Aug 2020 05:42  Phos  2.6       Mg     2.0         TPro  8.6<H>  /  Alb  4.8  /  TBili  0.7  /  DBili  x   /  AST  28  /  ALT  29  /  AlkPhos  92   @ 07:01  -   @ 07:00  IN: 1969.2 mL / OUT: 1383 mL / NET: 586.2 mL    HbA1C = 5.6 ()  LDL = 185 ()   HDL = 36 () TG = 252 ()  TSH = 0.460 ()    Bacteriology:  CSF studies:  EEG:  Neuroimagin/04 CT head:  post-surgical changes, stable HCP  Other imagin/04 LE doppler: NEG   CXR:  mild atelectasis L lung base    MEDICATIONS: tranexamic acid 1G IV q8h tenofovir 300mg  PO daily senna PRN atorvastatin 80mg PO HS mod ISS peridex  MEDICATIONS:     tenofovir disoproxil fumarate (VIREAD) 300 Oral every 24 hours  levETIRAcetam  IVPB 1000 IV Intermittent every 12 hours  propofol Infusion 5 IV Continuous <Continuous>  niCARdipine Infusion 5 mG/Hr IV Continuous <Continuous>  pantoprazole  Injectable 40 IV Push daily  atorvastatin 80 Oral at bedtime  dexAMETHasone  Injectable 6 IV Push every 6 hours  dextrose 50% Injectable 12.5 IV Push once  dextrose 50% Injectable 25 IV Push once  dextrose 50% Injectable 25 IV Push once  dextrose 50% Injectable 25 IV Push once  insulin lispro (HumaLOG) corrective regimen sliding scale  SubCutaneous every 6 hours  chlorhexidine 0.12% Liquid 15 Oral Mucosa every 12 hours  chlorhexidine 4% Liquid 1 Topical <User Schedule>  dextrose 5%. 1000 IV Continuous <Continuous>  sodium chloride 0.9%. 1000 IV Continuous <Continuous>  acetaminophen   Tablet .. 650 Oral every 6 hours PRN  dextrose 40% Gel 15 Oral once PRN  glucagon  Injectable 1 IntraMuscular once PRN  glucagon  Injectable 1 IntraMuscular once PRN  hydrALAZINE Injectable 10 IV Push every 2 hours PRN  senna 2 Oral at bedtime PRN  sodium chloride 0.9% lock flush 10 IV Push every 1 hour PRN  niCARdipine Infusion 5 mG/Hr (25 mL/Hr) IV Continuous <Continuous>  propofol Infusion 5 MICROgram(s)/kG/Min (1.79 mL/Hr) IV Continuous <Continuous>      IV FLUIDS: NS@75  DRIPS: cardene @ 7.5  DIET:  Lines:  Drains:  Matt R IJ   EVD at 7cm H20  35cc/hr  / EVD at 5cm H20 ICP 2-5 68cc/24h  Wounds:    CODE STATUS:  Full Code                       GOALS OF CARE:  aggressive                      DISPOSITION:  ICU  ICH Score on admission  = 4, NIHSS =================================  NEUROCRITICAL CARE ATTENDING NOTE  =================================    VEDA WARREN   MRN-[id number]  HPI: 59M with dyslipidemia, h/o Hep B infection, presented with acute onset severe HA, dizziness, vomiting - brought to [facility] where CT showed L cerebellar hemorrhage.  Intubated for airway protection.  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  R EVD inserted, transferred to [facility] for further management.  given mannitol 25G in [facility],     COURSE IN THE HOSPITAL:   bleed   admitted to [facility], 23.4 given x1; OR for SOC evacuation of ICH   remained intubated overnight    Past Medical History: Hepatitis B HLD (hyperlipidemia)  Allergies:  No Known Allergies  Home meds:   ·	Omega-3 350 mg oral capsule: 1 cap(s) orally once a day  ·	tenofovir disoproxil fumarate 300 mg oral tablet: 1 tab(s) orally once a day    PHYSICAL EXAMINATION  T(C): 36.8 ( @ 06:03), Max: 37.2 ( @ 00:47) HR: 107 ( @ 07:00) (69 - 113) BP: 120/59 (- @ 07:00) (90/52 - 124/66) RR: 17 ( @ 07:00) (12 - 19) SpO2: 99% ( @ 07:00) (99% - 100%)  NEUROLOGIC EXAMINATION:  Patient is sedated, does not open eyes does not follow commands withdraws L LE, pupils 2mm nonreactive, localized L UE, withdrew L LE, withdraws R UE, R LE withdraws (+) cough/gag  GENERAL: intubated  12 40% +5  EENT:  anicteric  CARDIOVASCULAR: (+) S1 S2, normal rate and regular rhythm  PULMONARY: clear to auscultation bilaterally  ABDOMEN: soft, nontender with normoactive bowel sounds  EXTREMITIES: no edema  SKIN: no rash    LABS:   CAPILLARY BLOOD GLUCOSE 172 156 166 151 163 174                           11.1 (12.1)  12.56 )-----------( 190      ( 05 Aug 2020 05:42 )             33.9     147<H>  |  112<H>  |  23  ----------------------------<  178<H>  3.5   |  23  |  0.91    Ca    9.2      05 Aug 2020 05:42  Phos  2.6       Mg     2.0         TPro  8.6<H>  /  Alb  4.8  /  TBili  0.7  /  DBili  x   /  AST  28  /  ALT  29  /  AlkPhos  92   @ 07:01  -   @ 07:00  IN: 1969.2 mL / OUT: 1383 mL / NET: 586.2 mL    HbA1C = 5.6 ()  LDL = 185 ()   HDL = 36 () TG = 252 ()  TSH = 0.460 ()    Bacteriology:  CSF studies:  EEG:  Neuroimagin/04 CT head:  post-surgical changes, stable HCP  Other imagin/04 LE doppler: NEG   CXR:  mild atelectasis L lung base    MEDICATIONS:   tenofovir 300mg PO q24h levetiracetam 1G IV q12h pantoprazole 40 IV daily atorvastatin 80mg PO HS dexamethasone 6mg IV q6h mod ISS Peridex senna PRN     IV FLUIDS: NS@75  DRIPS: propofol cardene @ 7.5  DIET:  Lines:  Drains:  Vernell HORTON   EVD at 7cm H20  35cc/hr   EVD at 5cm H20 ICP 2-5 68cc/24h  Wounds:    CODE STATUS:  Full Code                       GOALS OF CARE:  aggressive                      DISPOSITION:  ICU  ICH Score on admission  = 4, NIHSS

## 2020-08-05 NOTE — CONSULT NOTE ADULT - SUBJECTIVE AND OBJECTIVE BOX
Neurology Stroke Consult Note    HPI:  obtained from chart - unable to obtain from patient     59 year old male with PMH of HLD and unclear liver disease had acute onset of severe headache, dizziness and vomiting x1 today, was brought into [facility] on 2020 with continued symptoms, as well as hypertension and multiple episodes of vomiting. Patient was intubated for airway protection with CT Head performed demonstrating a large left cerebellar hemorrhage. CTA Head/Neck performed is suggestive of underlying vascular pathology such as AVM. RIght frontal ventriculostomy placed at [facility] [place] [facility] and patient transferred to [facility] for further work-up. Patient arrived to [facility] intubated, sedated and on Nicardipine drip. Patient's son provided history and denies any Aspirin or other anticoagulant use. Denies any prior history of hypertension, smoking, or ETOH abuse.    Stroke consulted due to bleed. patient currently POD# 1 S/P cerebral angio, negative for AVM.  s/p SOC craniectomy evacuation of cerebellar hematoma.     Patient seen and examined this morning. Patient intubated, however, has been off propofol since last night at 7pm. The patient was given ativan 2mg prior to neuro examination by team.       PAST MEDICAL & SURGICAL HISTORY:  Hepatitis B  HLD (hyperlipidemia)  Foot fracture, left: s/p repair       FAMILY HISTORY:  No pertinent family history in first degree relatives      SOCIAL HISTORY:  Denies smoking, drinking, or drug use    ROS:  Constitutional: No fever, weight loss or fatigue  Eyes: No eye pain, visual disturbances, or discharge  ENMT:  No difficulty hearing, tinnitus, vertigo;  No sinus or throat pain  Neck: No pain or stiffness  Respiratory: No cough, wheezing, chills or hemoptysis  Cardiovascular: No chest pain, palpitations, shortness of breath, dizziness or leg swelling  Gastrointestinal: No abdominal pain. No nausea, vomiting or hematemesis; No diarrhea or constipation. Nohematochezia.  Genitourinary: No dysuria, frequency, hematuria or incontinence  Neurological: As per HPI  Skin: No itching, burning, rashes or lesions   Endocrine: No heat or cold intolerance; No hair loss  Musculoskeletal: No joint pain or swelling; No muscle, back or extremity pain  Psychiatric: No depression, anxiety, mood swings or difficulty sleeping  Heme/Lymph: No easy bruising or bleeding gums    MEDICATIONS  (STANDING):  atorvastatin 80 milliGRAM(s) Oral at bedtime  chlorhexidine 0.12% Liquid 15 milliLiter(s) Oral Mucosa every 12 hours  chlorhexidine 4% Liquid 1 Application(s) Topical <User Schedule>  dexAMETHasone  Injectable 6 milliGRAM(s) IV Push every 6 hours  dextrose 5%. 1000 milliLiter(s) (50 mL/Hr) IV Continuous <Continuous>  dextrose 50% Injectable 12.5 Gram(s) IV Push once  dextrose 50% Injectable 25 Gram(s) IV Push once  dextrose 50% Injectable 25 Gram(s) IV Push once  dextrose 50% Injectable 25 Gram(s) IV Push once  insulin lispro (HumaLOG) corrective regimen sliding scale   SubCutaneous every 6 hours  levETIRAcetam  IVPB 1000 milliGRAM(s) IV Intermittent every 12 hours  niCARdipine Infusion 5 mG/Hr (25 mL/Hr) IV Continuous <Continuous>  pantoprazole  Injectable 40 milliGRAM(s) IV Push daily  sodium chloride 0.9%. 1000 milliLiter(s) (75 mL/Hr) IV Continuous <Continuous>  tenofovir disoproxil fumarate (VIREAD) 300 milliGRAM(s) Oral every 24 hours    MEDICATIONS  (PRN):  acetaminophen   Tablet .. 650 milliGRAM(s) Oral every 6 hours PRN Temp greater or equal to 38.5C (101.3F), Mild Pain (1 - 3)  dextrose 40% Gel 15 Gram(s) Oral once PRN Blood Glucose LESS THAN 70 milliGRAM(s)/deciliter  glucagon  Injectable 1 milliGRAM(s) IntraMuscular once PRN Glucose LESS THAN 70 milligrams/deciliter  glucagon  Injectable 1 milliGRAM(s) IntraMuscular once PRN Glucose LESS THAN 70 milligrams/deciliter  hydrALAZINE Injectable 10 milliGRAM(s) IV Push every 2 hours PRN SBP > 140  senna 2 Tablet(s) Oral at bedtime PRN Constipation  sodium chloride 0.9% lock flush 10 milliLiter(s) IV Push every 1 hour PRN Pre/post blood products, medications, blood draw, and to maintain line patency      Allergies    No Known Allergies    Intolerances        Vital Signs Last 24 Hrs  T(C): 38.4 (05 Aug 2020 10:33), Max: 38.4 (05 Aug 2020 10:33)  T(F): 101.2 (05 Aug 2020 10:33), Max: 101.2 (05 Aug 2020 10:33)  HR: 106 (05 Aug 2020 13:17) (69 - 115)  BP: 114/59 (05 Aug 2020 13:00) (90/52 - 124/66)  BP(mean): 80 (05 Aug 2020 13:00) (66 - 90)  RR: 16 (05 Aug 2020 13:00) (12 - 20)  SpO2: 98% (05 Aug 2020 13:17) (96% - 100%)    Physical exam:  General: No acute distress,  intubated, comatose     Neurologic:  -Mental status:  intubated, off propofol- given ativan 2mg prior to examination, comatose, not following commands, no verbal output, does not attempt to speak   -Cranial nerves:   II: Absent blink to threat  III, IV, VI: Absent oculocephalic reflex, pupils about 3mm, fixed, nonreactive  V: Present Corneal reflex  VII: Face is appears symmetric with ETT in place  Motor/Sensation: No movement or withdrawal to noxious x 4 extremities  Coordination: unable to assess   Reflexes: Downgoing toes bilaterally     NIHSS: 30     LABS:                        11.1   12.56 )-----------( 190      ( 05 Aug 2020 05:42 )             33.9     08-05    147<H>  |  112<H>  |  23  ----------------------------<  178<H>  3.5   |  23  |  0.91    Ca    9.2      05 Aug 2020 05:42  Phos  2.6     08-05  Mg     2.0     08-05    TPro  8.6<H>  /  Alb  4.8  /  TBili  0.7  /  DBili  x   /  AST  28  /  ALT  29  /  AlkPhos  92  08-04    PT/INR - ( 04 Aug 2020 15:15 )   PT: 14.4 sec;   INR: 1.21          PTT - ( 04 Aug 2020 15:15 )  PTT:31.1 sec  Urinalysis Basic - ( 05 Aug 2020 11:51 )    Color: Yellow / Appearance: Clear / S.025 / pH: x  Gluc: x / Ketone: NEGATIVE  / Bili: Negative / Urobili: 0.2 E.U./dL   Blood: x / Protein: NEGATIVE mg/dL / Nitrite: NEGATIVE   Leuk Esterase: NEGATIVE / RBC: > 10 /HPF / WBC < 5 /HPF   Sq Epi: x / Non Sq Epi: 0-5 /HPF / Bacteria: Present /HPF    RADIOLOGY & ADDITIONAL TESTS:     CT Head No Cont (20 @ 16:11) >  IMPRESSION: Status post midline suboccipital craniectomy for evacuation of large cerebellar hematoma with postsurgical changes. Right-sided EVD catheter in place with relatively stable hydrocephalus.    CT Head No Cont (20 @ 05:35) >    IMPRESSION:    Compared to 2020, there is increased intraventricular hemorrhage in hydrocephalus despite interval placement of EVD.Source of hemorrhage is from a left cerebellar parenchymal bleed. Small volume subarachnoid hemorrhage, and new small right posterior interhemispheric subdural hemorrhage.      Assessment and Plan  59 year old male with PMH of HLD and unclear liver disease  with left cerebellar parenchymal bleed, small SAH, and small right posterior subdural hemorrhage s/p POD#1 cerebral angio which was negative for AVM & and SOC craniectomy evacuation of cerebellar hematoma, EVD in place.     Neuro  - Repeat CT head with any acute change in mental status.   - Keep temp <100F and maintain glucose 140-180.   - No antiplatelets/ AC due to bleed  - follow up veeg read  - continue seizure prophylaxis and maintain seizure precautions     -A1C with Estimated Average Glucose Result: 5.6  - LDL: 85   - TSH: 0.460      DVT Prophylaxis:   - Bilateral SCDs Neurology Stroke Consult Note    HPI:  obtained from chart - unable to obtain from patient     59 year old male with PMH of HLD and unclear liver disease had acute onset of severe headache, dizziness and vomiting x1 today, was brought into [facility] on 2020 with continued symptoms, as well as hypertension and multiple episodes of vomiting. Patient was intubated for airway protection with CT Head performed demonstrating a large left cerebellar hemorrhage. CTA Head/Neck performed is suggestive of underlying vascular pathology such as AVM. RIght frontal ventriculostomy placed at [facility] and patient transferred to [facility] for further work-up. Patient arrived to [facility] intubated, sedated and on Nicardipine drip. Patient's son provided history and denies any Aspirin or other anticoagulant use. Denies any prior history of hypertension, smoking, or ETOH abuse.    Stroke consulted due to bleed. patient currently POD# 1 S/P cerebral angio, negative for AVM.  s/p SOC craniectomy evacuation of cerebellar hematoma.     Patient seen and examined this morning. Patient intubated, however, has been off propofol since last night at 7pm. The patient was given ativan 2mg prior to neuro examination by team.       PAST MEDICAL & SURGICAL HISTORY:  Hepatitis B  HLD (hyperlipidemia)  Foot fracture, left: s/p repair       FAMILY HISTORY:  No pertinent family history in first degree relatives      SOCIAL HISTORY:  Denies smoking, drinking, or drug use    ROS:  Constitutional: No fever, weight loss or fatigue  Eyes: No eye pain, visual disturbances, or discharge  ENMT:  No difficulty hearing, tinnitus, vertigo;  No sinus or throat pain  Neck: No pain or stiffness  Respiratory: No cough, wheezing, chills or hemoptysis  Cardiovascular: No chest pain, palpitations, shortness of breath, dizziness or leg swelling  Gastrointestinal: No abdominal pain. No nausea, vomiting or hematemesis; No diarrhea or constipation. Nohematochezia.  Genitourinary: No dysuria, frequency, hematuria or incontinence  Neurological: As per HPI  Skin: No itching, burning, rashes or lesions   Endocrine: No heat or cold intolerance; No hair loss  Musculoskeletal: No joint pain or swelling; No muscle, back or extremity pain  Psychiatric: No depression, anxiety, mood swings or difficulty sleeping  Heme/Lymph: No easy bruising or bleeding gums    MEDICATIONS  (STANDING):  atorvastatin 80 milliGRAM(s) Oral at bedtime  chlorhexidine 0.12% Liquid 15 milliLiter(s) Oral Mucosa every 12 hours  chlorhexidine 4% Liquid 1 Application(s) Topical <User Schedule>  dexAMETHasone  Injectable 6 milliGRAM(s) IV Push every 6 hours  dextrose 5%. 1000 milliLiter(s) (50 mL/Hr) IV Continuous <Continuous>  dextrose 50% Injectable 12.5 Gram(s) IV Push once  dextrose 50% Injectable 25 Gram(s) IV Push once  dextrose 50% Injectable 25 Gram(s) IV Push once  dextrose 50% Injectable 25 Gram(s) IV Push once  insulin lispro (HumaLOG) corrective regimen sliding scale   SubCutaneous every 6 hours  levETIRAcetam  IVPB 1000 milliGRAM(s) IV Intermittent every 12 hours  niCARdipine Infusion 5 mG/Hr (25 mL/Hr) IV Continuous <Continuous>  pantoprazole  Injectable 40 milliGRAM(s) IV Push daily  sodium chloride 0.9%. 1000 milliLiter(s) (75 mL/Hr) IV Continuous <Continuous>  tenofovir disoproxil fumarate (VIREAD) 300 milliGRAM(s) Oral every 24 hours    MEDICATIONS  (PRN):  acetaminophen   Tablet .. 650 milliGRAM(s) Oral every 6 hours PRN Temp greater or equal to 38.5C (101.3F), Mild Pain (1 - 3)  dextrose 40% Gel 15 Gram(s) Oral once PRN Blood Glucose LESS THAN 70 milliGRAM(s)/deciliter  glucagon  Injectable 1 milliGRAM(s) IntraMuscular once PRN Glucose LESS THAN 70 milligrams/deciliter  glucagon  Injectable 1 milliGRAM(s) IntraMuscular once PRN Glucose LESS THAN 70 milligrams/deciliter  hydrALAZINE Injectable 10 milliGRAM(s) IV Push every 2 hours PRN SBP > 140  senna 2 Tablet(s) Oral at bedtime PRN Constipation  sodium chloride 0.9% lock flush 10 milliLiter(s) IV Push every 1 hour PRN Pre/post blood products, medications, blood draw, and to maintain line patency      Allergies    No Known Allergies    Intolerances        Vital Signs Last 24 Hrs  T(C): 38.4 (05 Aug 2020 10:33), Max: 38.4 (05 Aug 2020 10:33)  T(F): 101.2 (05 Aug 2020 10:33), Max: 101.2 (05 Aug 2020 10:33)  HR: 106 (05 Aug 2020 13:17) (69 - 115)  BP: 114/59 (05 Aug 2020 13:00) (90/52 - 124/66)  BP(mean): 80 (05 Aug 2020 13:00) (66 - 90)  RR: 16 (05 Aug 2020 13:00) (12 - 20)  SpO2: 98% (05 Aug 2020 13:17) (96% - 100%)    Physical exam:  General: No acute distress,  intubated, comatose     Neurologic:  -Mental status:  intubated, off propofol- given ativan 2mg prior to examination, comatose, not following commands, no verbal output, does not attempt to speak   -Cranial nerves:   II: Absent blink to threat  III, IV, VI: Absent oculocephalic reflex, pupils about 3mm- reactive but sluggish, left eye slightly deviated downward  V: Present Corneal reflex  VII: Face is appears symmetric with ETT in place  X, XI: gag reflext present   Motor/Sensation: Withdraws to noxious x 4 extremities, withdraws more quickly on the right side.  Coordination: unable to assess   Reflexes: Downgoing toes bilaterally     NIHSS: 26     LABS:                        11.1   12.56 )-----------( 190      ( 05 Aug 2020 05:42 )             33.9     08-05    147<H>  |  112<H>  |  23  ----------------------------<  178<H>  3.5   |  23  |  0.91    Ca    9.2      05 Aug 2020 05:42  Phos  2.6     08-05  Mg     2.0     08-05    TPro  8.6<H>  /  Alb  4.8  /  TBili  0.7  /  DBili  x   /  AST  28  /  ALT  29  /  AlkPhos  92  08-04    PT/INR - ( 04 Aug 2020 15:15 )   PT: 14.4 sec;   INR: 1.21          PTT - ( 04 Aug 2020 15:15 )  PTT:31.1 sec  Urinalysis Basic - ( 05 Aug 2020 11:51 )    Color: Yellow / Appearance: Clear / S.025 / pH: x  Gluc: x / Ketone: NEGATIVE  / Bili: Negative / Urobili: 0.2 E.U./dL   Blood: x / Protein: NEGATIVE mg/dL / Nitrite: NEGATIVE   Leuk Esterase: NEGATIVE / RBC: > 10 /HPF / WBC < 5 /HPF   Sq Epi: x / Non Sq Epi: 0-5 /HPF / Bacteria: Present /HPF    RADIOLOGY & ADDITIONAL TESTS:     CT Head No Cont (20 @ 16:11) >  IMPRESSION: Status post midline suboccipital craniectomy for evacuation of large cerebellar hematoma with postsurgical changes. Right-sided EVD catheter in place with relatively stable hydrocephalus.    CT Head No Cont (20 @ 05:35) >    IMPRESSION:    Compared to 2020, there is increased intraventricular hemorrhage in hydrocephalus despite interval placement of EVD.Source of hemorrhage is from a left cerebellar parenchymal bleed. Small volume subarachnoid hemorrhage, and new small right posterior interhemispheric subdural hemorrhage.      Assessment and Plan  59 year old male with PMH of HLD and unclear liver disease  with left cerebellar parenchymal bleed, small SAH, and small right posterior subdural hemorrhage s/p POD#1 cerebral angio which was negative for AVM & and SOC craniectomy evacuation of cerebellar hematoma, EVD in place. Hemorrhage may be secondary to hypertension. Stroke will continue to follow.      Neuro  - Repeat CT head with any acute change in mental status.   - Keep temp <100F and maintain glucose 140-180.   - No antiplatelets/ AC due to bleed  - follow up veeg report - evidence of possible seizures   - continue seizure prophylaxis and maintain seizure precautions   - agree with BP goals 100-140    -A1C with Estimated Average Glucose Result: 5.6  - LDL: 85   - TSH: 0.460    DVT Prophylaxis:   - Bilateral SCDs     - inpatient management as per NSGY Neurology Stroke Consult Note    HPI:  obtained from chart - unable to obtain from patient     59 year old male with PMH of HLD and unclear liver disease had acute onset of severe headache, dizziness and vomiting x1 today, was brought into [facility] on 2020 with continued symptoms, as well as hypertension and multiple episodes of vomiting. Patient was intubated for airway protection with CT Head performed demonstrating a large left cerebellar hemorrhage. CTA Head/Neck performed is suggestive of underlying vascular pathology such as AVM. RIght frontal ventriculostomy placed at [facility] and patient transferred to [facility] for further work-up. Patient arrived to [facility] intubated, sedated and on Nicardipine drip. Patient's son provided history and denies any Aspirin or other anticoagulant use. Denies any prior history of hypertension, smoking, or ETOH abuse.    Stroke consulted due to bleed. patient currently POD# 1 S/P cerebral angio, negative for AVM.  s/p SOC craniectomy evacuation of cerebellar hematoma.     Patient seen and examined this morning. Patient intubated, however, has been off propofol since last night at 7pm. The patient was given ativan 2mg prior to neuro examination by team.       PAST MEDICAL & SURGICAL HISTORY:  Hepatitis B  HLD (hyperlipidemia)  Foot fracture, left: s/p repair       FAMILY HISTORY:  No pertinent family history in first degree relatives      SOCIAL HISTORY:  Unknown     ROS:  Constitutional: No fever, weight loss or fatigue  Eyes: No eye pain, visual disturbances, or discharge  ENMT:  No difficulty hearing, tinnitus, vertigo;  No sinus or throat pain  Neck: No pain or stiffness  Respiratory: No cough, wheezing, chills or hemoptysis  Cardiovascular: No chest pain, palpitations, shortness of breath, dizziness or leg swelling  Gastrointestinal: No abdominal pain. No nausea, vomiting or hematemesis; No diarrhea or constipation. Nohematochezia.  Genitourinary: No dysuria, frequency, hematuria or incontinence  Neurological: As per HPI  Skin: No itching, burning, rashes or lesions   Endocrine: No heat or cold intolerance; No hair loss  Musculoskeletal: No joint pain or swelling; No muscle, back or extremity pain  Psychiatric: No depression, anxiety, mood swings or difficulty sleeping  Heme/Lymph: No easy bruising or bleeding gums    MEDICATIONS  (STANDING):  atorvastatin 80 milliGRAM(s) Oral at bedtime  chlorhexidine 0.12% Liquid 15 milliLiter(s) Oral Mucosa every 12 hours  chlorhexidine 4% Liquid 1 Application(s) Topical <User Schedule>  dexAMETHasone  Injectable 6 milliGRAM(s) IV Push every 6 hours  dextrose 5%. 1000 milliLiter(s) (50 mL/Hr) IV Continuous <Continuous>  dextrose 50% Injectable 12.5 Gram(s) IV Push once  dextrose 50% Injectable 25 Gram(s) IV Push once  dextrose 50% Injectable 25 Gram(s) IV Push once  dextrose 50% Injectable 25 Gram(s) IV Push once  insulin lispro (HumaLOG) corrective regimen sliding scale   SubCutaneous every 6 hours  levETIRAcetam  IVPB 1000 milliGRAM(s) IV Intermittent every 12 hours  niCARdipine Infusion 5 mG/Hr (25 mL/Hr) IV Continuous <Continuous>  pantoprazole  Injectable 40 milliGRAM(s) IV Push daily  sodium chloride 0.9%. 1000 milliLiter(s) (75 mL/Hr) IV Continuous <Continuous>  tenofovir disoproxil fumarate (VIREAD) 300 milliGRAM(s) Oral every 24 hours    MEDICATIONS  (PRN):  acetaminophen   Tablet .. 650 milliGRAM(s) Oral every 6 hours PRN Temp greater or equal to 38.5C (101.3F), Mild Pain (1 - 3)  dextrose 40% Gel 15 Gram(s) Oral once PRN Blood Glucose LESS THAN 70 milliGRAM(s)/deciliter  glucagon  Injectable 1 milliGRAM(s) IntraMuscular once PRN Glucose LESS THAN 70 milligrams/deciliter  glucagon  Injectable 1 milliGRAM(s) IntraMuscular once PRN Glucose LESS THAN 70 milligrams/deciliter  hydrALAZINE Injectable 10 milliGRAM(s) IV Push every 2 hours PRN SBP > 140  senna 2 Tablet(s) Oral at bedtime PRN Constipation  sodium chloride 0.9% lock flush 10 milliLiter(s) IV Push every 1 hour PRN Pre/post blood products, medications, blood draw, and to maintain line patency      Allergies    No Known Allergies    Intolerances        Vital Signs Last 24 Hrs  T(C): 38.4 (05 Aug 2020 10:33), Max: 38.4 (05 Aug 2020 10:33)  T(F): 101.2 (05 Aug 2020 10:33), Max: 101.2 (05 Aug 2020 10:33)  HR: 106 (05 Aug 2020 13:17) (69 - 115)  BP: 114/59 (05 Aug 2020 13:00) (90/52 - 124/66)  BP(mean): 80 (05 Aug 2020 13:00) (66 - 90)  RR: 16 (05 Aug 2020 13:00) (12 - 20)  SpO2: 98% (05 Aug 2020 13:17) (96% - 100%)    Physical exam:  General: No acute distress,  intubated, comatose     Neurologic:  -Mental status:  intubated, off propofol- given ativan 2mg prior to examination, comatose, not following commands, no verbal output, does not attempt to speak   -Cranial nerves:   II: Absent blink to threat  III, IV, VI: Absent oculocephalic reflex, pupils about 3mm- reactive but sluggish, left eye slightly deviated downward  V: Present Corneal reflex  VII: Face is appears symmetric with ETT in place  X, XI: gag reflex  present   Motor/Sensation: Withdraws to noxious x 4 extremities, withdraws more quickly on the right side.  Coordination: unable to assess   Reflexes: Downgoing toes bilaterally     NIHSS: 26  ICH Score: 4    LABS:                        11.1   12.56 )-----------( 190      ( 05 Aug 2020 05:42 )             33.9     08-05    147<H>  |  112<H>  |  23  ----------------------------<  178<H>  3.5   |  23  |  0.91    Ca    9.2      05 Aug 2020 05:42  Phos  2.6     08-05  Mg     2.0     08-05    TPro  8.6<H>  /  Alb  4.8  /  TBili  0.7  /  DBili  x   /  AST  28  /  ALT  29  /  AlkPhos  92  08-04    PT/INR - ( 04 Aug 2020 15:15 )   PT: 14.4 sec;   INR: 1.21          PTT - ( 04 Aug 2020 15:15 )  PTT:31.1 sec  Urinalysis Basic - ( 05 Aug 2020 11:51 )    Color: Yellow / Appearance: Clear / S.025 / pH: x  Gluc: x / Ketone: NEGATIVE  / Bili: Negative / Urobili: 0.2 E.U./dL   Blood: x / Protein: NEGATIVE mg/dL / Nitrite: NEGATIVE   Leuk Esterase: NEGATIVE / RBC: > 10 /HPF / WBC < 5 /HPF   Sq Epi: x / Non Sq Epi: 0-5 /HPF / Bacteria: Present /HPF    RADIOLOGY & ADDITIONAL TESTS:     CT Head No Cont (20 @ 16:11) >  IMPRESSION: Status post midline suboccipital craniectomy for evacuation of large cerebellar hematoma with postsurgical changes. Right-sided EVD catheter in place with relatively stable hydrocephalus.    CT Head No Cont (20 @ 05:35) >    IMPRESSION:    Compared to 2020, there is increased intraventricular hemorrhage in hydrocephalus despite interval placement of EVD.Source of hemorrhage is from a left cerebellar parenchymal bleed. Small volume subarachnoid hemorrhage, and new small right posterior interhemispheric subdural hemorrhage.      Assessment and Plan  59 year old male with PMH of HLD and unclear liver disease  with left cerebellar parenchymal bleed, small SAH, and small right posterior subdural hemorrhage s/p POD#1 cerebral angio which was negative for AVM & and SOC craniectomy evacuation of cerebellar hematoma, EVD in place. Hemorrhage may be secondary to hypertension. Stroke will continue to follow.      Neuro  - Repeat CT head with any acute change in mental status.   - Keep temp <100F and maintain glucose 140-180.   - No antiplatelets/ AC due to bleed  - follow up veeg report - evidence of possible seizures   - continue seizure prophylaxis and maintain seizure precautions   - agree with BP goals 100-140    -A1C with Estimated Average Glucose Result: 5.6  - LDL: 85   - TSH: 0.460    DVT Prophylaxis:   - Bilateral SCDs     - inpatient management as per NSGY

## 2020-08-05 NOTE — EEG REPORT - NS EEG TEXT BOX
[facility] Department of Neurology  Inpatient Continuous video-Electroencephalography Report    Patient Name:	VEDA WARREN    :	1960  MRN:	[id number]    Study Start Date/Time:  2020, 7:18:32 PM  Study End Date/Time:	    Referred by: select    Brief Clinical History:  VEDA WARREN is a 59 year old Male.    Technologist notes:-    Diagnosis Code:   R56.9 convulsions/seizure  CPT: 60763 EEG with video 12-26h    Pertinent Medications on Initiation      Acquisition Details:  Electroencephalography was acquired using a minimum of 21 channels on an [employer] Neurology system v 8.5.1 with electrode placement according to the standard International 10-20 system following ACNS (American Clinical Neurophysiology Society) guidelines for Long-Term Video EEG monitoring.  Anterior temporal T1 and T2 electrodes were utilized whenever possible.   The XLTEK automated spike & seizure detections were all reviewed in detail, in addition to extensive portions of raw EEG.    Day 1: 2020 @ 7:18:32 PM to next morning @ 07:00 am  Background:  continuous, with predominantly theta/delta.frequencies.  Symmetry:  Continuous (90+%)high voltage polymorphic delta  slowing over the Left Central and Parietal regions.   Posterior Dominant Rhythm:  No clear PDR   Organization: Rudimentary.  Voltage:  Normal (20+ uV)  Variability: Yes. 		Reactivity: Yes.  N2 sleep: Symmetric, synchronous spindles and K complexes.  Spontaneous Activity:  GPDs   with    triphasic  morphology   were present, at times  at  2 Hz  frequency range  Periodic/rhythmic activity:  None.  Events:  No electrographic seizures or significant clinical events.  Provocations:  Hyperventilation and Photic stimulation: was not performed.  Daily Summary:    Moderate generalized  slowing suggestive of a similar degree of diffuse or multifocal  dysfunction. The  findings   are     c/w  breach   artifact    over    the   Left    central   and  parietal    regions.  Generalized periodic discharges with triphasic morphology, which is a nonspecific finding often seen in the setting of a diffuse or multifocal pathologic process (e.g. from renal/hepatic disease), but may also indicate underlying hyperexcitability. Clinical correlation is advised. The findings  were discussed with Neurosurgery  team.     Read by:  Jada Rodriguez MD

## 2020-08-05 NOTE — PROGRESS NOTE ADULT - ASSESSMENT
59y/M with  1.	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	Hepatitis B  3.	dyslipidemia     PLAN:   NEURO: neurochecks q1h, continue propofol, fentanyl PRN tylenol for pain  SBP control  REHAB:  physical therapy evaluation and management    EARLY MOB:      PULM:  cont full vent support   CARDIO:  SBP goal 100-140mm Hg, TTE with bubble study  ENDO:  Blood sugar goals 140-180 mg/dL, continue insulin sliding scale, A1C lipid profile TSH; continue statins  GI:  PPI for GI prophylaxis  DIET: NPO for now  RENAL:  Na goal ~145, cont NS, recheck BMP, may need hypertonic saline  HEM/ONC: no coagulopathy, no h/o antiplatelet / anticoagulant use  VTE Prophylaxis: SCDs, no DVT chemoprophylaxis for now as patient is high risk for bleed (post-op); baseline LE Doppler for DVT suspected on admission (transfer)  ID: afebrile, no leukocytosis, periop ancef then d/c  Social: son Perry updated ([phone]).  2 daughters Shantell  and Tammie ; wife name is Julián Sahu    CORE MEASURES:  1.  NIHSS =   2.  ICH Score = 4  3.  VTE prophylaxis: [ ] administered within 24h of admission OR [x] reason not done - fresh bleed  4.  Dysphagia screening: [X] performed before any oral meds / liquids / food    ATTENDING ATTESTATION:  I was physically present for the key portions of the evaluation and management (E/M) service provided.  I agree with the above history, physical and plan, which I have reviewed and edited where appropriate.    Patient at high risk for neurological deterioration or death due to:  ICU delirium, aspiration PNA, DVT / PE.  Critical care time:  I have personally provided 30 minutes of critical care time, excluding time spent on separate procedures.      Plan discussed with RN, house staff. 59y/M with  1.	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	Hepatitis B  3.	dyslipidemia     PLAN:   NEURO: neurochecks q1h, dexmedetomidine if sedation needed  EVD at 5 cm H20   f/u EEG results, if NEG d/c   seizure prophylaxis: levetiracetam  as per NS  REHAB:  physical therapy evaluation and management    EARLY MOB:   bed rest    PULM:  cont full vent support  CARDIO:  SBP goal 100-140mm Hg, f/u TTE with bubble study  ENDO:  Blood sugar goals 140-180 mg/dL, continue insulin sliding, continue statins  GI:  PPI for GI prophylaxis  DIET: jevity to start  RENAL:  d/c IVF once TF at goal; Na goal ~145, cont NS,   HEM/ONC: no coagulopathy, no h/o antiplatelet / anticoagulant use  VTE Prophylaxis: SCDs, no DVT chemoprophylaxis for now as patient is high risk for bleed (post-op); will re-evaluate   ID: afebrile, no leukocytosis  Social: son Perry updated ([phone]).  2 daughters Shantell  and Tammie ; wife name is Julián Sahu    CORE MEASURES:  1.  NIHSS =   2.  ICH Score = 4  3.  VTE prophylaxis: [ ] administered within 24h of admission OR [x] reason not done - fresh bleed  4.  Dysphagia screening: [X] performed before any oral meds / liquids / food    ATTENDING ATTESTATION:  I was physically present for the key portions of the evaluation and management (E/M) service provided.  I agree with the above history, physical and plan, which I have reviewed and edited where appropriate.    Patient at high risk for neurological deterioration or death due to:  ICU delirium, aspiration PNA, DVT / PE.  Critical care time:  I have personally provided 30 minutes of critical care time, excluding time spent on separate procedures.      Plan discussed with RN, house staff. 59y/M with  1.	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	Hepatitis B  3.	dyslipidemia     PLAN:   NEURO: neurochecks q1h, dexmedetomidine if sedation needed  EVD at 5 cm H20   f/u EEG results, if NEG d/c   seizure prophylaxis: levetiracetam  as per NS  REHAB:  physical therapy evaluation and management    EARLY MOB:   bed rest    PULM:  cont full vent support  CARDIO:  SBP goal 100-140mm Hg, f/u TTE with bubble study  ENDO:  Blood sugar goals 140-180 mg/dL, continue insulin sliding, continue statins  GI:  PPI for GI prophylaxis  DIET: jevity to start  RENAL:  d/c IVF once TF at goal; Na goal ~145, cont NS,   HEM/ONC: no coagulopathy, no h/o antiplatelet / anticoagulant use  VTE Prophylaxis: SCDs, no DVT chemoprophylaxis for now as patient is high risk for bleed (post-op); will re-evaluate   ID: afebrile, no leukocytosis  Social: son Perry updated ([phone]).  2 daughters Shantell  and Tammie ; wife name is Julián Sahu    CORE MEASURES:  1.  NIHSS =   2.  ICH Score = 4  3.  VTE prophylaxis: [ ] administered within 24h of admission OR [x] reason not done - fresh bleed  4.  Dysphagia screening: [X] performed before any oral meds / liquids / food    ATTENDING ATTESTATION:  I was physically present for the key portions of the evaluation and management (E/M) service provided.  I agree with the above history, physical and plan, which I have reviewed and edited where appropriate.    Patient at high risk for neurological deterioration or death due to:  ICU delirium, aspiration PNA, DVT / PE.  Critical care time:  I have personally provided 60 minutes of critical care time, excluding time spent on separate procedures.      Plan discussed with RN, house staff. 59y/M with  1.	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	Hepatitis B  3.	dyslipidemia     PLAN:   NEURO: neurochecks q1h, dexmedetomidine if sedation needed  EVD at 5 cm H20   f/u EEG results, if NEG d/c   seizure prophylaxis: levetiracetam  as per NS  REHAB:  physical therapy evaluation and management    EARLY MOB:   bed rest    PULM:  cont full vent support  CARDIO:  SBP goal 100-140mm Hg, f/u TTE with bubble study  ENDO:  Blood sugar goals 140-180 mg/dL, continue insulin sliding, continue statins  GI:  PPI for GI prophylaxis  DIET: jevity to start  RENAL:  d/c IVF once TF at goal; Na goal ~145, cont NS,   HEM/ONC: no coagulopathy, no h/o antiplatelet / anticoagulant use  VTE Prophylaxis: SCDs, no DVT chemoprophylaxis for now as patient is high risk for bleed (post-op); will re-evaluate   ID: afebrile, no leukocytosis  Social: son Perry updated ([phone]).  2 daughters Shantell  and Tamime ; wife name is Julián Sahu    CORE MEASURES:  1.  NIHSS =   2.  ICH Score = 4  3.  VTE prophylaxis: [ ] administered within 24h of admission OR [x] reason not done - fresh bleed  4.  Dysphagia screening: [X] performed before any oral meds / liquids / food    ATTENDING ATTESTATION:  I was physically present for the key portions of the evaluation and management (E/M) service provided.  I agree with the above history, physical and plan, which I have reviewed and edited where appropriate.    Patient at high risk for neurological deterioration or death due to:  ICU delirium, aspiration PNA, DVT / PE.  Critical care time:  I have personally provided 30 minutes of critical care time, excluding time spent on separate procedures.      Plan discussed with RN, house staff.

## 2020-08-06 LAB
ANION GAP SERPL CALC-SCNC: 10 MMOL/L — SIGNIFICANT CHANGE UP (ref 5–17)
BUN SERPL-MCNC: 25 MG/DL — HIGH (ref 7–23)
CALCIUM SERPL-MCNC: 8.5 MG/DL — SIGNIFICANT CHANGE UP (ref 8.4–10.5)
CHLORIDE SERPL-SCNC: 116 MMOL/L — HIGH (ref 96–108)
CO2 SERPL-SCNC: 24 MMOL/L — SIGNIFICANT CHANGE UP (ref 22–31)
CREAT SERPL-MCNC: 0.79 MG/DL — SIGNIFICANT CHANGE UP (ref 0.5–1.3)
GLUCOSE BLDC GLUCOMTR-MCNC: 179 MG/DL — HIGH (ref 70–99)
GLUCOSE BLDC GLUCOMTR-MCNC: 187 MG/DL — HIGH (ref 70–99)
GLUCOSE BLDC GLUCOMTR-MCNC: 189 MG/DL — HIGH (ref 70–99)
GLUCOSE BLDC GLUCOMTR-MCNC: 206 MG/DL — HIGH (ref 70–99)
GLUCOSE BLDC GLUCOMTR-MCNC: 231 MG/DL — HIGH (ref 70–99)
GLUCOSE SERPL-MCNC: 188 MG/DL — HIGH (ref 70–99)
HCT VFR BLD CALC: 30.4 % — LOW (ref 39–50)
HGB BLD-MCNC: 9.9 G/DL — LOW (ref 13–17)
LACTATE SERPL-SCNC: 1.3 MMOL/L — SIGNIFICANT CHANGE UP (ref 0.5–2)
MAGNESIUM SERPL-MCNC: 2.4 MG/DL — SIGNIFICANT CHANGE UP (ref 1.6–2.6)
MCHC RBC-ENTMCNC: 28.4 PG — SIGNIFICANT CHANGE UP (ref 27–34)
MCHC RBC-ENTMCNC: 32.6 GM/DL — SIGNIFICANT CHANGE UP (ref 32–36)
MCV RBC AUTO: 87.1 FL — SIGNIFICANT CHANGE UP (ref 80–100)
NRBC # BLD: 0 /100 WBCS — SIGNIFICANT CHANGE UP (ref 0–0)
PHOSPHATE SERPL-MCNC: 2.4 MG/DL — LOW (ref 2.5–4.5)
PLATELET # BLD AUTO: 190 K/UL — SIGNIFICANT CHANGE UP (ref 150–400)
POTASSIUM SERPL-MCNC: 3.8 MMOL/L — SIGNIFICANT CHANGE UP (ref 3.5–5.3)
POTASSIUM SERPL-SCNC: 3.8 MMOL/L — SIGNIFICANT CHANGE UP (ref 3.5–5.3)
RBC # BLD: 3.49 M/UL — LOW (ref 4.2–5.8)
RBC # FLD: 14.9 % — HIGH (ref 10.3–14.5)
SODIUM SERPL-SCNC: 150 MMOL/L — HIGH (ref 135–145)
WBC # BLD: 19.92 K/UL — HIGH (ref 3.8–10.5)
WBC # FLD AUTO: 19.92 K/UL — HIGH (ref 3.8–10.5)

## 2020-08-06 PROCEDURE — 99292 CRITICAL CARE ADDL 30 MIN: CPT | Mod: 24

## 2020-08-06 PROCEDURE — 95720 EEG PHY/QHP EA INCR W/VEEG: CPT

## 2020-08-06 PROCEDURE — 71045 X-RAY EXAM CHEST 1 VIEW: CPT | Mod: 26

## 2020-08-06 PROCEDURE — 70450 CT HEAD/BRAIN W/O DYE: CPT | Mod: 26

## 2020-08-06 PROCEDURE — 99291 CRITICAL CARE FIRST HOUR: CPT

## 2020-08-06 RX ORDER — DEXAMETHASONE 0.5 MG/5ML
1 ELIXIR ORAL EVERY 12 HOURS
Refills: 0 | Status: COMPLETED | OUTPATIENT
Start: 2020-08-09 | End: 2020-08-09

## 2020-08-06 RX ORDER — DEXAMETHASONE 0.5 MG/5ML
2 ELIXIR ORAL EVERY 8 HOURS
Refills: 0 | Status: COMPLETED | OUTPATIENT
Start: 2020-08-07 | End: 2020-08-08

## 2020-08-06 RX ORDER — ENOXAPARIN SODIUM 100 MG/ML
40 INJECTION SUBCUTANEOUS
Refills: 0 | Status: DISCONTINUED | OUTPATIENT
Start: 2020-08-06 | End: 2020-08-10

## 2020-08-06 RX ORDER — VANCOMYCIN HCL 1 G
1000 VIAL (EA) INTRAVENOUS EVERY 12 HOURS
Refills: 0 | Status: DISCONTINUED | OUTPATIENT
Start: 2020-08-06 | End: 2020-08-06

## 2020-08-06 RX ORDER — POTASSIUM PHOSPHATE, MONOBASIC POTASSIUM PHOSPHATE, DIBASIC 236; 224 MG/ML; MG/ML
30 INJECTION, SOLUTION INTRAVENOUS ONCE
Refills: 0 | Status: COMPLETED | OUTPATIENT
Start: 2020-08-06 | End: 2020-08-06

## 2020-08-06 RX ORDER — DEXAMETHASONE 0.5 MG/5ML
ELIXIR ORAL
Refills: 0 | Status: COMPLETED | OUTPATIENT
Start: 2020-08-06 | End: 2020-08-10

## 2020-08-06 RX ORDER — SODIUM CHLORIDE 9 MG/ML
1000 INJECTION INTRAMUSCULAR; INTRAVENOUS; SUBCUTANEOUS ONCE
Refills: 0 | Status: COMPLETED | OUTPATIENT
Start: 2020-08-06 | End: 2020-08-06

## 2020-08-06 RX ORDER — AMLODIPINE BESYLATE 2.5 MG/1
10 TABLET ORAL DAILY
Refills: 0 | Status: DISCONTINUED | OUTPATIENT
Start: 2020-08-06 | End: 2020-08-15

## 2020-08-06 RX ORDER — VANCOMYCIN HCL 1 G
1000 VIAL (EA) INTRAVENOUS EVERY 12 HOURS
Refills: 0 | Status: DISCONTINUED | OUTPATIENT
Start: 2020-08-06 | End: 2020-08-07

## 2020-08-06 RX ORDER — DOXAZOSIN MESYLATE 4 MG
2 TABLET ORAL AT BEDTIME
Refills: 0 | Status: DISCONTINUED | OUTPATIENT
Start: 2020-08-06 | End: 2020-08-07

## 2020-08-06 RX ORDER — FUROSEMIDE 40 MG
20 TABLET ORAL ONCE
Refills: 0 | Status: COMPLETED | OUTPATIENT
Start: 2020-08-06 | End: 2020-08-06

## 2020-08-06 RX ORDER — PIPERACILLIN AND TAZOBACTAM 4; .5 G/20ML; G/20ML
3.38 INJECTION, POWDER, LYOPHILIZED, FOR SOLUTION INTRAVENOUS EVERY 6 HOURS
Refills: 0 | Status: COMPLETED | OUTPATIENT
Start: 2020-08-06 | End: 2020-08-13

## 2020-08-06 RX ORDER — DEXAMETHASONE 0.5 MG/5ML
4 ELIXIR ORAL EVERY 6 HOURS
Refills: 0 | Status: COMPLETED | OUTPATIENT
Start: 2020-08-06 | End: 2020-08-07

## 2020-08-06 RX ORDER — SODIUM CHLORIDE 9 MG/ML
1000 INJECTION INTRAMUSCULAR; INTRAVENOUS; SUBCUTANEOUS
Refills: 0 | Status: DISCONTINUED | OUTPATIENT
Start: 2020-08-06 | End: 2020-08-06

## 2020-08-06 RX ORDER — PIPERACILLIN AND TAZOBACTAM 4; .5 G/20ML; G/20ML
3.38 INJECTION, POWDER, LYOPHILIZED, FOR SOLUTION INTRAVENOUS ONCE
Refills: 0 | Status: COMPLETED | OUTPATIENT
Start: 2020-08-06 | End: 2020-08-06

## 2020-08-06 RX ADMIN — Medication 250 MILLIGRAM(S): at 07:30

## 2020-08-06 RX ADMIN — PIPERACILLIN AND TAZOBACTAM 200 GRAM(S): 4; .5 INJECTION, POWDER, LYOPHILIZED, FOR SOLUTION INTRAVENOUS at 23:53

## 2020-08-06 RX ADMIN — Medication 4 MILLIGRAM(S): at 17:57

## 2020-08-06 RX ADMIN — Medication 250 MILLIGRAM(S): at 19:08

## 2020-08-06 RX ADMIN — Medication 6 MILLIGRAM(S): at 06:58

## 2020-08-06 RX ADMIN — ENOXAPARIN SODIUM 40 MILLIGRAM(S): 100 INJECTION SUBCUTANEOUS at 22:12

## 2020-08-06 RX ADMIN — Medication 2 MILLIGRAM(S): at 22:12

## 2020-08-06 RX ADMIN — PANTOPRAZOLE SODIUM 40 MILLIGRAM(S): 20 TABLET, DELAYED RELEASE ORAL at 12:47

## 2020-08-06 RX ADMIN — AMLODIPINE BESYLATE 10 MILLIGRAM(S): 2.5 TABLET ORAL at 22:34

## 2020-08-06 RX ADMIN — Medication 2: at 12:50

## 2020-08-06 RX ADMIN — Medication 650 MILLIGRAM(S): at 01:37

## 2020-08-06 RX ADMIN — CHLORHEXIDINE GLUCONATE 15 MILLILITER(S): 213 SOLUTION TOPICAL at 17:57

## 2020-08-06 RX ADMIN — Medication 2: at 06:00

## 2020-08-06 RX ADMIN — Medication 2: at 17:56

## 2020-08-06 RX ADMIN — PIPERACILLIN AND TAZOBACTAM 200 GRAM(S): 4; .5 INJECTION, POWDER, LYOPHILIZED, FOR SOLUTION INTRAVENOUS at 07:52

## 2020-08-06 RX ADMIN — CHLORHEXIDINE GLUCONATE 15 MILLILITER(S): 213 SOLUTION TOPICAL at 06:59

## 2020-08-06 RX ADMIN — ATORVASTATIN CALCIUM 80 MILLIGRAM(S): 80 TABLET, FILM COATED ORAL at 22:12

## 2020-08-06 RX ADMIN — TENOFOVIR DISOPROXIL FUMARATE 300 MILLIGRAM(S): 300 TABLET, FILM COATED ORAL at 22:12

## 2020-08-06 RX ADMIN — Medication 650 MILLIGRAM(S): at 00:34

## 2020-08-06 RX ADMIN — Medication 650 MILLIGRAM(S): at 23:01

## 2020-08-06 RX ADMIN — LEVETIRACETAM 400 MILLIGRAM(S): 250 TABLET, FILM COATED ORAL at 10:17

## 2020-08-06 RX ADMIN — CHLORHEXIDINE GLUCONATE 1 APPLICATION(S): 213 SOLUTION TOPICAL at 06:56

## 2020-08-06 RX ADMIN — NICARDIPINE HYDROCHLORIDE 25 MG/HR: 30 CAPSULE, EXTENDED RELEASE ORAL at 22:12

## 2020-08-06 RX ADMIN — PIPERACILLIN AND TAZOBACTAM 200 GRAM(S): 4; .5 INJECTION, POWDER, LYOPHILIZED, FOR SOLUTION INTRAVENOUS at 17:57

## 2020-08-06 RX ADMIN — PIPERACILLIN AND TAZOBACTAM 200 GRAM(S): 4; .5 INJECTION, POWDER, LYOPHILIZED, FOR SOLUTION INTRAVENOUS at 12:46

## 2020-08-06 RX ADMIN — SODIUM CHLORIDE 2000 MILLILITER(S): 9 INJECTION INTRAMUSCULAR; INTRAVENOUS; SUBCUTANEOUS at 05:00

## 2020-08-06 RX ADMIN — NICARDIPINE HYDROCHLORIDE 25 MG/HR: 30 CAPSULE, EXTENDED RELEASE ORAL at 18:23

## 2020-08-06 RX ADMIN — Medication 6 MILLIGRAM(S): at 12:47

## 2020-08-06 RX ADMIN — POTASSIUM PHOSPHATE, MONOBASIC POTASSIUM PHOSPHATE, DIBASIC 85 MILLIMOLE(S): 236; 224 INJECTION, SOLUTION INTRAVENOUS at 06:58

## 2020-08-06 RX ADMIN — Medication 20 MILLIGRAM(S): at 22:34

## 2020-08-06 RX ADMIN — LEVETIRACETAM 400 MILLIGRAM(S): 250 TABLET, FILM COATED ORAL at 22:11

## 2020-08-06 RX ADMIN — Medication 4: at 22:14

## 2020-08-06 RX ADMIN — Medication 4 MILLIGRAM(S): at 23:53

## 2020-08-06 NOTE — PROGRESS NOTE ADULT - SUBJECTIVE AND OBJECTIVE BOX
=================================  NEUROCRITICAL CARE ATTENDING NOTE  =================================    VEDA WARREN   MRN-[id number]  HPI: 59M with dyslipidemia, h/o Hep B infection, presented with acute onset severe HA, dizziness, vomiting - brought to [facility] where CT showed L cerebellar hemorrhage.  Intubated for airway protection.  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  R EVD inserted, transferred to [facility] for further management.  given mannitol 25G in [facility],     COURSE IN THE HOSPITAL:   bleed   admitted to [facility], 23.4 given x1; OR for SOC evacuation of ICH   remained intubated overnight   tachycardia overnight, Tmax 38.4, ?PNA, ABx     Past Medical History: Hepatitis B HLD (hyperlipidemia)  Allergies:  No Known Allergies  Home meds:   ·	Omega-3 350 mg oral capsule: 1 cap(s) orally once a day  ·	tenofovir disoproxil fumarate 300 mg oral tablet: 1 tab(s) orally once a day    PHYSICAL EXAMINATION  T(C): 37.2 ( @ 04:53), Max: 38.4 ( @ 10:33)  HR: 110 ( @ 05:45) (103 - 127)  BP: 118/58 ( @ 05:00) (105/69 - 141/67)  RR: 17 ( @ 05:45) (13 - 20)  SpO2: 99% ( @ 05:45) (96% - 100%)    NEUROLOGIC EXAMINATION:  Patient is sedated, does not open eyes does not follow commands withdraws L LE, pupils 2mm nonreactive, localized L UE, withdrew L LE, withdraws R UE, R LE withdraws (+) cough/gag  SPONTANEOUS MOVEMENT L ARM /LEG WITHDRAWAL ALL 4S, PUPILS SLUGGISH BILATERALLY (+) COUGH/GAG  GENERAL: intubated  12 40% +5  EENT:  anicteric  CARDIOVASCULAR: (+) S1 S2, normal rate and regular rhythm  PULMONARY: clear to auscultation bilaterally  ABDOMEN: soft, nontender with normoactive bowel sounds  EXTREMITIES: no edema  SKIN: no rash    LABS:   CAPILLARY BLOOD GLUCOSE      POCT Blood Glucose.: 179 mg/dL (06 Aug 2020 05:35)  POCT Blood Glucose.: 187 mg/dL (05 Aug 2020 21:38)  POCT Blood Glucose.: 179 mg/dL (05 Aug 2020 17:44)  POCT Blood Glucose.: 169 mg/dL (05 Aug 2020 11:12)                          9.9    19.92 )-----------( 190      ( 06 Aug 2020 03:31 )             30.4     WBC: 19.92 ()  12.56 ()  8.76 ()   Hb: 9.9 ()  11.1 ()  12.1 ()   Plt: 190 ()  190 ()  189 ()        150<H>  |  116<H>  |  25<H>  ----------------------------<  188<H>  3.8   |  24  |  0.79    Ca    8.5      06 Aug 2020 03:31  Phos  2.4       Mg     2.4             Na:150 ( @ 03:31)  147 ( @ 05:42)  144 ( @ 15:15)  136 ( @ 05:03)   BUN: 25 ()  23 ()  18 ()  16 ()   Crea:0.79 ()  0.91 ()  0.95 ()  0.87 ()         @ 07:01  -   @ 07:00  --------------------------------------------------------  IN: 1969.2 mL / OUT: 1383 mL / NET: 586.2 mL     @ 07:01  -   @ 06:51  --------------------------------------------------------  IN: 4142.3 mL / OUT: 1613 mL / NET: 2529.3 mL        LABS:   CAPILLARY BLOOD GLUCOSE 172 156 166 151 163 174                11.1 (12.1)  12.56 )-----------( 190      ( 05 Aug 2020 05:42 )             33.9     147<H>  |  112<H>  |  23  ----------------------------<  178<H>  3.5   |  23  |  0.91    Ca    9.2      05 Aug 2020 05:42  Phos  2.6       Mg     2.0         TPro  8.6<H>  /  Alb  4.8  /  TBili  0.7  /  DBili  x   /  AST  28  /  ALT  29  /  AlkPhos  92   @ 07:01  -   @ 07:00  IN: 1969.2 mL / OUT: 1383 mL / NET: 586.2 mL    HbA1C = 5.6 ()  LDL = 185 ()   HDL = 36 () TG = 252 ()  TSH = 0.460 ()    Bacteriology:  CSF studies:  EEG:  Neuroimagin/04 CT head:  post-surgical changes, stable HCP  Other imagin/04 LE doppler: NEG   CXR:  mild atelectasis L lung base    MEDICATIONS:   tenofovir 300mg PO q24h levetiracetam 1G IV q12h pantoprazole 40 IV daily atorvastatin 80mg PO HS dexamethasone 6mg IV q6h mod ISS Peridex senna PRN     IV FLUIDS: NS@75  DRIPS: propofol cardene @ 7.5  DIET:  Lines:  Drains:  Matt R IJ   EVD at 7cm H20  35cc/hr   EVD at 5cm H20 ICP 2-5 68cc/24h   EVD at 5cm H20    Wounds:      CODE STATUS:  Full Code                       GOALS OF CARE:  aggressive                      DISPOSITION:  ICU  ICH Score on admission  = 4, NIHSS =================================  NEUROCRITICAL CARE ATTENDING NOTE  =================================    VEDA WARREN   MRN-[id number]  HPI: 59M with dyslipidemia, h/o Hep B infection, presented with acute onset severe HA, dizziness, vomiting - brought to [facility] where CT showed L cerebellar hemorrhage.  Intubated for airway protection.  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  R EVD inserted, transferred to [facility] for further management.  given mannitol 25G in [facility],     COURSE IN THE HOSPITAL:   bleed   admitted to [facility], 23.4 given x1; OR for SOC evacuation of ICH   remained intubated overnight   tachycardia overnight, Tmax 38.4, ?PNA, ABx     Past Medical History: Hepatitis B HLD (hyperlipidemia)  Allergies:  No Known Allergies  Home meds:   ·	Omega-3 350 mg oral capsule: 1 cap(s) orally once a day  ·	tenofovir disoproxil fumarate 300 mg oral tablet: 1 tab(s) orally once a day    PHYSICAL EXAMINATION  T(C): 37.2 ( @ 04:53), Max: 38.4 ( @ 10:33)  HR: 110 ( @ 05:45) (103 - 127)  BP: 118/58 ( @ 05:00) (105/69 - 141/67)  RR: 17 ( @ 05:45) (13 - 20)  SpO2: 99% ( @ 05:45) (96% - 100%)    NEUROLOGIC EXAMINATION:  Patient is sedated, does not open eyes does not follow commands withdraws L LE, pupils 2mm nonreactive, localized L UE, withdrew L LE, withdraws R UE, R LE withdraws (+) cough/gag  SPONTANEOUS MOVEMENT L ARM /LEG WITHDRAWAL ALL 4S, PUPILS SLUGGISH BILATERALLY (+) COUGH/GAG  GENERAL: intubated  12 40% +5  EENT:  anicteric  CARDIOVASCULAR: (+) S1 S2, normal rate and regular rhythm  PULMONARY: clear to auscultation bilaterally  ABDOMEN: soft, nontender with normoactive bowel sounds  EXTREMITIES: no edema  SKIN: no rash    LABS:   CAPILLARY BLOOD GLUCOSE      POCT Blood Glucose.: 179 mg/dL (06 Aug 2020 05:35)  POCT Blood Glucose.: 187 mg/dL (05 Aug 2020 21:38)  POCT Blood Glucose.: 179 mg/dL (05 Aug 2020 17:44)  POCT Blood Glucose.: 169 mg/dL (05 Aug 2020 11:12)                          9.9    19.92 )-----------( 190      ( 06 Aug 2020 03:31 )             30.4     WBC: 19.92 ()  12.56 ()  8.76 ()   Hb: 9.9 ()  11.1 ()  12.1 ()   Plt: 190 ()  190 ()  189 ()        150<H>  |  116<H>  |  25<H>  ----------------------------<  188<H>  3.8   |  24  |  0.79    Ca    8.5      06 Aug 2020 03:31  Phos  2.4       Mg     2.4             Na:150 ( @ 03:31)  147 ( @ 05:42)  144 ( @ 15:15)  136 ( @ 05:03)   BUN: 25 ()  23 ()  18 ()  16 ()   Crea:0.79 ()  0.91 ()  0.95 ()  0.87 ()         @ 07:01  -   @ 07:00  --------------------------------------------------------  IN: 1969.2 mL / OUT: 1383 mL / NET: 586.2 mL     @ 07:01  -   @ 06:51  --------------------------------------------------------  IN: 4142.3 mL / OUT: 1613 mL / NET: 2529.3 mL        LABS:   CAPILLARY BLOOD GLUCOSE 172 156 166 151 163 174                11.1 (12.1)  12.56 )-----------( 190      ( 05 Aug 2020 05:42 )             33.9     147<H>  |  112<H>  |  23  ----------------------------<  178<H>  3.5   |  23  |  0.91    Ca    9.2      05 Aug 2020 05:42  Phos  2.6       Mg     2.0         TPro  8.6<H>  /  Alb  4.8  /  TBili  0.7  /  DBili  x   /  AST  28  /  ALT  29  /  AlkPhos  92   @ 07:01  -   @ 07:00  IN: 1969.2 mL / OUT: 1383 mL / NET: 586.2 mL    HbA1C = 5.6 ()  LDL = 185 ()   HDL = 36 () TG = 252 ()  TSH = 0.460 ()    Bacteriology:  CSF studies:  EEG:  Neuroimagin/04 CT head:  post-surgical changes, stable HCP  Other imagin/04 LE doppler: NEG   CXR:  mild atelectasis L lung base    MEDICATIONS:   tenofovir 300mg PO q24h levetiracetam 1G IV q12h pantoprazole 40 IV daily atorvastatin 80mg PO HS dexamethasone 6mg IV q6h mod ISS Peridex senna PRN   MEDICATIONS:   piperacillin/tazobactam 3.375 IV q6h tenofovir 300mg PO daily vancomycin 1G IV q12h levetiracetam 1G IV q12h pantoprazole 40 IV daily atorvastatin 80mg PO HS dexamethasone 6mg IV q6h mod ISS Peridex senna HS PRN   cardene    IV FLUIDS: NS@75  DRIPS: propofol cardene @ 7.5  DIET:  Lines:  Drains:  Vernell HORTON   EVD at 7cm H20  35cc/hr   EVD at 5cm H20 ICP 2-5 68cc/24h   EVD at 5cm H20    Wounds:      CODE STATUS:  Full Code                       GOALS OF CARE:  aggressive                      DISPOSITION:  ICU  ICH Score on admission  = 4, NIHSS =================================  NEUROCRITICAL CARE ATTENDING NOTE  =================================    VEDA WARREN   MRN-[id number]  HPI: 59M with dyslipidemia, h/o Hep B infection, presented with acute onset severe HA, dizziness, vomiting - brought to [facility] where CT showed L cerebellar hemorrhage.  Intubated for airway protection.  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  R EVD inserted, transferred to [facility] for further management.  given mannitol 25G in [facility],     COURSE IN THE HOSPITAL:   bleed   admitted to [facility], 23.4 given x1; OR for SOC evacuation of ICH   remained intubated overnight   tachycardia overnight, Tmax 38.4, ?PNA, ABx     Past Medical History: Hepatitis B HLD (hyperlipidemia)  Allergies:  No Known Allergies  Home meds:   ·	Omega-3 350 mg oral capsule: 1 cap(s) orally once a day  ·	tenofovir disoproxil fumarate 300 mg oral tablet: 1 tab(s) orally once a day    PHYSICAL EXAMINATION  T(C): 37.2 ( @ 04:53), Max: 38.4 ( @ 10:33) HR: 110 ( @ 05:45) (103 - 127) BP: 118/58 ( @ 05:00) (105/69 - 141/67) RR: 17 ( @ 05:45) (13 - 20) SpO2: 99% ( @ 05:45) (96% - 100%)  NEUROLOGIC EXAMINATION:  Patient opens eyes to noxious, B pupils 3mm sluggish, does not follow commands, localizes B UE R>L, withdraws B LE , (+) cough / gag   GENERAL: CPAP 10/5 40%  EENT:  anicteric  CARDIOVASCULAR: (+) S1 S2, normal rate and regular rhythm  PULMONARY: clear to auscultation bilaterally  ABDOMEN: soft, nontender with normoactive bowel sounds  EXTREMITIES: no edema  SKIN: no rash    LABS:   CAPILLARY BLOOD GLUCOSE 179 187 179 169 - received 8 units insulin lispro              (12)     9.9  (11)  19.92 )-----------( 190      ( 06 Aug 2020 03:31 )             30.4     150<H>  |  116<H>  |  25<H>  ----------------------------<  188<H>  3.8   |  24  |  0.79    Ca    8.5      06 Aug 2020 03:31  Phos  2.4       Mg     2.4      @ 07:01  -  0805 @ 07:00  IN: 1969.2 mL / OUT: 1383 mL / NET: 586.2 mL    HbA1C = 5.6 ()  LDL = 185 ()   HDL = 36 () TG = 252 ()  TSH = 0.460 ()    Bacteriology:   UA NEG   CSF NGTD   Blood CS NG12h x2    CSF studies:  EEG:  Neuroimagin/04 CT head:  post-surgical changes, stable HCP  Other imagin/04 LE doppler: NEG   CXR:  mild atelectasis L lung base    MEDICATIONS:   piperacillin/tazobactam 3.375 IV q6h tenofovir 300mg PO daily vancomycin 1G IV q12h levetiracetam 1G IV q12h pantoprazole 40 IV daily atorvastatin 80mg PO HS dexamethasone 6mg IV q6h mod ISS Peridex senna HS PRN   cardene    IV FLUIDS: NS@100  DRIPS: cardene @ 2.5  DIET:  Lines:  Drains:  Matt R IJ   EVD at 7cm H20  35cc/hr   EVD at 5cm H20 ICP 2-5 68cc/24h   EVD at 5cm H20    Wounds:    CODE STATUS:  Full Code                       GOALS OF CARE:  aggressive                      DISPOSITION:  ICU  ICH Score on admission  = 4, NIHSS

## 2020-08-06 NOTE — CHART NOTE - NSCHARTNOTEFT_GEN_A_CORE
Admitting Diagnosis:   Patient is a 59y old  Male who presents with a chief complaint of Left cerebellar intraparenchymal hemorrhage (06 Aug 2020 04:48)    Consult: Yes [   ]  No [ X  ]    Reason for Initial Nutrition Assessment: ICU LOS    PAST MEDICAL & SURGICAL HISTORY:  Hepatitis B  HLD (hyperlipidemia)  Foot fracture, left: s/p repair 2019    Current Nutrition Order:  NPO with TF via NGT  Jevity 1.2 @50mL/hr x24h (1200mL TV, 1440 kcal, 66gm protein, 968mL free water, 120% RDI)    PO Intake: Good (%) [   ]  Fair (50-75%) [   ] Poor (<25%) [   ]-N/A    GI Issues: no vomiting noted, unable to assess for nausea 2/2 clinical status, no BM during admission    Pain: none apparent at this time    Skin Integrity: Zachariah score 14    Labs:   08-06    150<H>  |  116<H>  |  25<H>  ----------------------------<  188<H>  3.8   |  24  |  0.79    Ca    8.5      06 Aug 2020 03:31  Phos  2.4     08-06  Mg     2.4     08-06    CAPILLARY BLOOD GLUCOSE  POCT Blood Glucose.: 187 mg/dL (06 Aug 2020 12:41)  POCT Blood Glucose.: 206 mg/dL (06 Aug 2020 11:14)  POCT Blood Glucose.: 179 mg/dL (06 Aug 2020 05:35)  POCT Blood Glucose.: 187 mg/dL (05 Aug 2020 21:38)  POCT Blood Glucose.: 179 mg/dL (05 Aug 2020 17:44)    Nutritionally Pertinent Lab Values:  8/4: A1c 5.6%  8/6: POCT 187, 206, 179, Na 150, Glu 188, phos 2.4    Medications:  MEDICATIONS  (STANDING):  atorvastatin 80 milliGRAM(s) Oral at bedtime  chlorhexidine 0.12% Liquid 15 milliLiter(s) Oral Mucosa every 12 hours  chlorhexidine 4% Liquid 1 Application(s) Topical <User Schedule>  dexAMETHasone     Tablet   Oral   dexAMETHasone     Tablet 4 milliGRAM(s) Oral every 6 hours  dextrose 5%. 1000 milliLiter(s) (50 mL/Hr) IV Continuous <Continuous>  dextrose 50% Injectable 12.5 Gram(s) IV Push once  dextrose 50% Injectable 25 Gram(s) IV Push once  dextrose 50% Injectable 25 Gram(s) IV Push once  dextrose 50% Injectable 25 Gram(s) IV Push once  enoxaparin Injectable 40 milliGRAM(s) SubCutaneous <User Schedule>  insulin lispro (HumaLOG) corrective regimen sliding scale   SubCutaneous every 6 hours  levETIRAcetam  IVPB 1000 milliGRAM(s) IV Intermittent every 12 hours  niCARdipine Infusion 5 mG/Hr (25 mL/Hr) IV Continuous <Continuous>  pantoprazole  Injectable 40 milliGRAM(s) IV Push daily  piperacillin/tazobactam IVPB.. 3.375 Gram(s) IV Intermittent every 6 hours  tenofovir disoproxil fumarate (VIREAD) 300 milliGRAM(s) Oral every 24 hours  vancomycin  IVPB 1000 milliGRAM(s) IV Intermittent every 12 hours    MEDICATIONS  (PRN):  acetaminophen   Tablet .. 650 milliGRAM(s) Oral every 6 hours PRN Temp greater or equal to 38.5C (101.3F), Mild Pain (1 - 3)  dextrose 40% Gel 15 Gram(s) Oral once PRN Blood Glucose LESS THAN 70 milliGRAM(s)/deciliter  glucagon  Injectable 1 milliGRAM(s) IntraMuscular once PRN Glucose LESS THAN 70 milligrams/deciliter  glucagon  Injectable 1 milliGRAM(s) IntraMuscular once PRN Glucose LESS THAN 70 milligrams/deciliter  hydrALAZINE Injectable 10 milliGRAM(s) IV Push every 2 hours PRN SBP > 140  senna 2 Tablet(s) Oral at bedtime PRN Constipation  sodium chloride 0.9% lock flush 10 milliLiter(s) IV Push every 1 hour PRN Pre/post blood products, medications, blood draw, and to maintain line patency    Admitted Anthropometrics:  Ht (8/6 per RN): 157.48cm, Wt (8/4): 59.6kg, IBW: 50kg+/-10%, %IBW: 119%, BMI: 24.0    Nutrition Focused Physical Exam: Completed [   ]  Unable to complete [   ]-N/A    Estimated energy needs:  IBW (50kg) used to calculate energy needs as pt vented.  Needs adjusted for vent, post-op.     3017-3904 kcal (25-30 kcal/kg IBW)  70-80gm protein (1.4-1.6gm/kg IBW)  Fluid needs per team    Subjective: 59yMale hx HLD, ?Hep B, presents with acute severe headache, dizziness and vomiting, found to have large left cerebellar IPH with IVH, s/p right frontal EVD at [facility] and transfer to [facility] for further intervention. Now s/p cerebral angio, negative for AVM and s/p SOC craniectomy evacuation of cerebellar hematoma. Pt intubated, on CPAP trial this AM. Infusions running: Nicardipine. MAP 82. Unable to obtain information regarding diet/wt hx 2/2 pt's clinical status and no family at bedside. Per RN, pt tolerating TF at 40mL/hr during assessment. Of note, pt with hyperglycemia, likely steroid induced as pt on Decadron and A1c 5.6%. Please see below for full nutritional recommendations- d/w team, placed pending order for verification. RD to monitor and f/u per protocol.    Nutrition Diagnosis:  Increased nutrient need RT increased protein demand AEB post-op, vented    Goal: Meet >/=75%EER via most appropriate route with good tolerance    Recommendations:  1. Recommend continue Jevity 1.2 change goal rate to 45mL/hr x24h and add Prostat SF 1x/day via NGT (1080mL TV, 1396 kcal, 74gm protein, 871mL free water, 108% RDI, ~28 kcal/kg IBW, 1.5gm protein/kg IBW)  >>order for volume based feeding protocol, monitor tolerance, maintain aspiration precautions  >>water flushes per team  >>monitor need to change to Glucerna TF 2/2 BG levels  2. Monitor labs (BMP, lytes, QECFm1y); replete lytes prn  3. Trend bi-weekly weights    Education: N/A 2/2 pt's clinical status    Risk Level: High [ X  ] Moderate [   ] Low [   ]

## 2020-08-06 NOTE — PROGRESS NOTE ADULT - SUBJECTIVE AND OBJECTIVE BOX
=================================  NEUROCRITICAL CARE ATTENDING NOTE  =================================    VEDA WARREN   MRN-[id number]  HPI: 59M with dyslipidemia, h/o Hep B infection, presented with acute onset severe HA, dizziness, vomiting - brought to [facility] where CT showed L cerebellar hemorrhage.  Intubated for airway protection.  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  R EVD inserted, transferred to [facility] for further management.  given mannitol 25G in [facility],     COURSE IN THE HOSPITAL:   bleed   admitted to [facility], 23.4 given x1; OR for SOC evacuation of ICH   remained intubated overnight   tachycardia overnight, Tmax 38.4, ?PNA, ABx     Past Medical History: Hepatitis B HLD (hyperlipidemia)  Allergies:  No Known Allergies  Home meds:   ·	Omega-3 350 mg oral capsule: 1 cap(s) orally once a day  ·	tenofovir disoproxil fumarate 300 mg oral tablet: 1 tab(s) orally once a day    PHYSICAL EXAMINATION  T(C): 37.2 ( @ 04:53), Max: 38.4 ( @ 10:33) HR: 110 ( @ 05:45) (103 - 127) BP: 118/58 ( @ 05:00) (105/69 - 141/67) RR: 17 ( @ 05:45) (13 - 20) SpO2: 99% ( @ 05:45) (96% - 100%)  NEUROLOGIC EXAMINATION:  Patient opens eyes to noxious, B pupils 3mm sluggish, does not follow commands, localizes B UE R>L, withdraws B LE , (+) cough / gag   GENERAL: CPAP 10/5 40%  EENT:  anicteric  CARDIOVASCULAR: (+) S1 S2, normal rate and regular rhythm  PULMONARY: clear to auscultation bilaterally  ABDOMEN: soft, nontender with normoactive bowel sounds  EXTREMITIES: no edema  SKIN: no rash    LABS:   CAPILLARY BLOOD GLUCOSE 179 187 179 169 - received 8 units insulin lispro              (12)     9.9  (11)  19.92 )-----------( 190      ( 06 Aug 2020 03:31 )             30.4     150<H>  |  116<H>  |  25<H>  ----------------------------<  188<H>  3.8   |  24  |  0.79    Ca    8.5      06 Aug 2020 03:31  Phos  2.4       Mg     2.4      @ 07:01  -  0805 @ 07:00  IN: 1969.2 mL / OUT: 1383 mL / NET: 586.2 mL    HbA1C = 5.6 ()  LDL = 185 ()   HDL = 36 () TG = 252 ()  TSH = 0.460 ()    Bacteriology:   UA NEG   CSF NGTD   Blood CS NG12h x2    CSF studies:  EEG:  Neuroimagin/04 CT head:  post-surgical changes, stable HCP  Other imagin/04 LE doppler: NEG   CXR:  mild atelectasis L lung base    MEDICATIONS:   piperacillin/tazobactam 3.375 IV q6h tenofovir 300mg PO daily vancomycin 1G IV q12h levetiracetam 1G IV q12h pantoprazole 40 IV daily atorvastatin 80mg PO HS dexamethasone 6mg IV q6h mod ISS Peridex senna HS PRN   cardene    IV FLUIDS: NS@100  DRIPS: cardene @ 2.5  DIET:  Lines:  Drains:  Matt R IJ   EVD at 7cm H20  35cc/hr   EVD at 5cm H20 ICP 2-5 68cc/24h   EVD at 5cm H20    Wounds:    CODE STATUS:  Full Code                       GOALS OF CARE:  aggressive                      DISPOSITION:  ICU  ICH Score on admission  = 4, NIHSS

## 2020-08-06 NOTE — PROGRESS NOTE ADULT - ASSESSMENT
59y/M with  1.	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	Hepatitis B  3.	dyslipidemia     PLAN:   NEURO: neurochecks q1h, dexmedetomidine if sedation needed  EVD at 5 cm H20   f/u EEG results, if NEG d/c; decadron - taper quickly if ok with NS  seizure prophylaxis: levetiracetam  as per NS  REHAB:  physical therapy evaluation and management    EARLY MOB:   bed rest    PULM:  CPAP 10/5  CARDIO:  SBP goal 100-140mm Hg, f/u TTE with bubble study  ENDO:  Blood sugar goals 140-180 mg/dL, continue insulin sliding, continue statins  GI:  PPI for GI prophylaxis  DIET: jevity   RENAL:  d/c IVF once TF Na goal ~145, cont NS  HEM/ONC: no coagulopathy, no h/o antiplatelet / anticoagulant use  VTE Prophylaxis: SCDs, SQL tonight if ok with NS  ID: afebrile, leukocytosis, piperacillin/tazobactam vanc, vanc trough prior to 4th dose (last day 08/10)  Social: son Perry updated ([phone]).  2 daughters Shantell  and Tammie ; wife name is Julián Sahu    CORE MEASURES:  1.  NIHSS =   2.  ICH Score = 4  3.  VTE prophylaxis: [ ] administered within 24h of admission OR [x] reason not done - fresh bleed  4.  Dysphagia screening: [X] performed before any oral meds / liquids / food    ATTENDING ATTESTATION:  I was physically present for the key portions of the evaluation and management (E/M) service provided.  I agree with the above history, physical and plan, which I have reviewed and edited where appropriate.    Patient at high risk for neurological deterioration or death due to:  ICU delirium, aspiration PNA, DVT / PE.  Critical care time:  I have personally provided 30 minutes of critical care time, excluding time spent on separate procedures.      Plan discussed with RN, house staff.

## 2020-08-06 NOTE — PROGRESS NOTE ADULT - ASSESSMENT
59y/M with  1.	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	Hepatitis B  3.	dyslipidemia     PLAN:   NEURO: neurochecks q1h, dexmedetomidine if sedation needed  EVD at 5 cm H20   f/u EEG results, if NEG d/c   seizure prophylaxis: levetiracetam  as per NS  REHAB:  physical therapy evaluation and management    EARLY MOB:   bed rest    PULM:  cont full vent support  CARDIO:  SBP goal 100-140mm Hg, f/u TTE with bubble study  ENDO:  Blood sugar goals 140-180 mg/dL, continue insulin sliding, continue statins  GI:  PPI for GI prophylaxis  DIET: jevity to start  RENAL:  d/c IVF once TF at goal; Na goal ~145, cont NS,   HEM/ONC: no coagulopathy, no h/o antiplatelet / anticoagulant use  VTE Prophylaxis: SCDs, no DVT chemoprophylaxis for now as patient is high risk for bleed (post-op); will re-evaluate   ID: afebrile, no leukocytosis  Social: son Perry updated ([phone]).  2 daughters Shantell  and Tammie ; wife name is Julián Sahu    CORE MEASURES:  1.  NIHSS =   2.  ICH Score = 4  3.  VTE prophylaxis: [ ] administered within 24h of admission OR [x] reason not done - fresh bleed  4.  Dysphagia screening: [X] performed before any oral meds / liquids / food    ATTENDING ATTESTATION:  I was physically present for the key portions of the evaluation and management (E/M) service provided.  I agree with the above history, physical and plan, which I have reviewed and edited where appropriate.    Patient at high risk for neurological deterioration or death due to:  ICU delirium, aspiration PNA, DVT / PE.  Critical care time:  I have personally provided 60 minutes of critical care time, excluding time spent on separate procedures.      Plan discussed with RN, house staff. 59y/M with  1.	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	Hepatitis B  3.	dyslipidemia     PLAN:   NEURO: neurochecks q1h, dexmedetomidine if sedation needed  EVD at 5 cm H20   f/u EEG results, if NEG d/c; decadron - taper quickly if ok with NS  seizure prophylaxis: levetiracetam  as per NS  REHAB:  physical therapy evaluation and management    EARLY MOB:   bed rest    PULM:  CPAP 10/5  CARDIO:  SBP goal 100-140mm Hg, f/u TTE with bubble study  ENDO:  Blood sugar goals 140-180 mg/dL, continue insulin sliding, continue statins  GI:  PPI for GI prophylaxis  DIET: jevity   RENAL:  d/c IVF once TF Na goal ~145, cont NS  HEM/ONC: no coagulopathy, no h/o antiplatelet / anticoagulant use  VTE Prophylaxis: SCDs, SQL tonight if ok with NS  ID: afebrile, leukocytosis, piperacillin/tazobactam vanc, vanc trough prior to 4th dose (last day 08/10)  Social: son Perry updated ([phone]).  2 daughters Shantell  and Tammie ; wife name is Julián Sahu    CORE MEASURES:  1.  NIHSS =   2.  ICH Score = 4  3.  VTE prophylaxis: [ ] administered within 24h of admission OR [x] reason not done - fresh bleed  4.  Dysphagia screening: [X] performed before any oral meds / liquids / food    ATTENDING ATTESTATION:  I was physically present for the key portions of the evaluation and management (E/M) service provided.  I agree with the above history, physical and plan, which I have reviewed and edited where appropriate.    Patient at high risk for neurological deterioration or death due to:  ICU delirium, aspiration PNA, DVT / PE.  Critical care time:  I have personally provided 60 minutes of critical care time, excluding time spent on separate procedures.      Plan discussed with RN, house staff.

## 2020-08-06 NOTE — EEG REPORT - NS EEG TEXT BOX
[facility] Department of Neurology  Inpatient Continuous video-Electroencephalography Report    Patient Name:	VEDA WARREN    :	1960  MRN:	[id number]    Study Start Date/Time:  2020, 7:18:32 PM  Study End Date/Time:	    Referred by: select    Brief Clinical History:  VEDA WARREN is a 59 year old Male.    Technologist notes:-    Diagnosis Code:   R56.9 convulsions/seizure  CPT: 23434 EEG with video 12-26h    Pertinent Medications on Initiation      Acquisition Details:  Electroencephalography was acquired using a minimum of 21 channels on an [employer] system v 8.5.1 with electrode placement according to the standard International 10-20 system following ACNS (American Clinical Neurophysiology Society) guidelines for Long-Term Video EEG monitoring.  Anterior temporal T1 and T2 electrodes were utilized whenever possible.   The [employer] automated spike & seizure detections were all reviewed in detail, in addition to extensive portions of raw EEG.    Day 1: 2020 @ 7:18:32 PM to next morning @ 07:00 am  Background:  continuous, with predominantly theta/delta.frequencies.  Symmetry:  Continuous (90+%)high voltage polymorphic delta  slowing over the Left Central and Parietal regions.   Posterior Dominant Rhythm:  No clear PDR   Organization: Rudimentary.  Voltage:  Normal (20+ uV)  Variability: Yes. 		Reactivity: Yes.  N2 sleep: Symmetric, synchronous spindles and K complexes.  Spontaneous Activity:  GPDs   with    triphasic  morphology   were present, at times  at  2 Hz  frequency range  Periodic/rhythmic activity:  None.  Events:  No electrographic seizures or significant clinical events.  Provocations:  Hyperventilation and Photic stimulation: was not performed.  Daily Summary:    Moderate generalized  slowing suggestive of a similar degree of diffuse or multifocal  dysfunction. The  findings   are     c/w  breach   artifact    over    the   Left    central   and  parietal    regions.  Generalized periodic discharges with triphasic morphology, which is a nonspecific finding often seen in the setting of a diffuse or multifocal pathologic process (e.g. from renal/hepatic disease), but may also indicate underlying hyperexcitability. Clinical correlation is advised. The findings  were discussed with Neurosurgery  team.     Read by:  Jada Rodriguez MD        Daily Updates (from 07:00 am until 07:00 am):  Day 2  : -    Pertinent medications:   Awake Background:  continuous, with predominantly theta-delta frequencies.  Symmetry:  Occasional (1-9%)FIRDA   Organization: rudimentary anterior-posterior gradient.  Posterior Dominant Rhythm: no clear PDR  Voltage:  Normal (20+ uV)  Variability: Yes. 		Reactivity: Yes.  N2 sleep: symmetric, synchronous sleep spindles/K-complexes.  Spontaneous Activity:  Frequent (1+/min < 1/10s)GPDs   with triphasic morphology, max  1/sec frequency  Periodic/rhythmic activity:  Events:  No electrographic seizures. HR  up  to   100 b/min    was noticed  Provocations:  Hyperventilation and Photic stimulation: not performed.  Daily Summary:  No change from prior day, no events occurred.     Read by:  Jada Rodriguez MD

## 2020-08-06 NOTE — PROGRESS NOTE ADULT - SUBJECTIVE AND OBJECTIVE BOX
HPI:  59 year old male with PMH of HLD and unclear liver disease had acute onset of severe headache, dizziness and vomiting x1 today, was brought into [facility] with continued symptoms, as well as hypertension and multiple episodes of vomiting. Patient was intubated for airway protection with CT Head performed demonstrating a large left cerebellar hemorrhage. CTA Head/Neck performed is suggestive of underlying vascular pathology such as AVM. RIght frontal ventriculostomy placed at [facility] and patient transferred to [facility] for further work-up. Patient arrives to [facility] intubated, sedated and on Nicardipine drip. Patient's son provided history and denies any Aspirin or other anticoagulant use. Denies any prior history of hypertension, smoking, or ETOH abuse. (04 Aug 2020 05:01)        S/Overnight events:  overnight tachycardic, ekg without significant changes, lactate wnl, low grade fever, s/p tyl. bolus x 1L , NS increased to 100cc/hr       Hospital Course:   POD# 0 S/P cerebral angio, negative for AVM. s/p SOC craniectomy evacuation of cerebellar hematoma.   POD # 1:   overnight tachycardic, ekg without significant changes, lactate wnl, low grade fever, s/p tyl. bolus x 1L , NS increased to 100cc/hr; neuro improving.  EVD at 5 cmH2O       Vital Signs Last 24 Hrs  T(C): 37.3 (06 Aug 2020 00:23), Max: 38.4 (05 Aug 2020 10:33)  T(F): 99.1 (06 Aug 2020 00:23), Max: 101.2 (05 Aug 2020 10:33)  HR: 103 (06 Aug 2020 03:00) (103 - 127)  BP: 117/58 (06 Aug 2020 03:00) (105/69 - 141/67)  BP(mean): 82 (06 Aug 2020 03:00) (78 - 96)  RR: 15 (06 Aug 2020 03:00) (13 - 20)  SpO2: 98% (06 Aug 2020 03:00) (96% - 100%)    I&O's Detail    04 Aug 2020 07:01  -  05 Aug 2020 07:00  --------------------------------------------------------  IN:    Enteral Tube Flush: 80 mL    IV PiggyBack: 200 mL    niCARdipine Infusion: 550 mL    propofol Infusion: 14.2 mL    sodium chloride 0.9%: 1125 mL  Total IN: 1969.2 mL    OUT:    External Ventricular Device: 68 mL    Indwelling Catheter - Urethral: 1315 mL  Total OUT: 1383 mL    Total NET: 586.2 mL      05 Aug 2020 07:  -  06 Aug 2020 04:49  --------------------------------------------------------  IN:    Enteral Tube Flush: 220 mL    IV PiggyBack: 100 mL    niCARdipine Infusion: 807.5 mL    ns in tub fed  [id number]: 290 mL    sodium chloride 0.9%: 675 mL    sodium chloride 0.9%: 150 mL    Sodium Chloride 0.9% IV Bolus: 1000 mL    sodium chloride 0.9%.: 200 mL    Solution: 499.8 mL  Total IN: 3942.3 mL    OUT:    External Ventricular Device: 188 mL    Indwelling Catheter - Urethral: 340 mL    Intermittent Catheterization -  Stomal: 750 mL    Voided: 325 mL  Total OUT: 1603 mL    Total NET: 2339.3 mL        I&O's Summary    04 Aug 2020 07:  -  05 Aug 2020 07:00  --------------------------------------------------------  IN: 1969.2 mL / OUT: 1383 mL / NET: 586.2 mL    05 Aug 2020 07:  -  06 Aug 2020 04:49  --------------------------------------------------------  IN: 3942.3 mL / OUT: 1603 mL / NET: 2339.3 mL        PHYSICAL EXAM:  *****      Incision/Wound:    DEVICE/DRAIN DRESSING:    TUBES/LINES:  [x] CVC  [x] A-line  [] Lumbar Drain  [x] Ventriculostomy  [] Other    DIET:  [] NPO  [] Mechanical  [x] Tube feeds    LABS:                        9.9    19.92 )-----------( 190      ( 06 Aug 2020 03:31 )             30.4     08-06    150<H>  |  116<H>  |  25<H>  ----------------------------<  188<H>  3.8   |  24  |  0.79    Ca    8.5      06 Aug 2020 03:31  Phos  2.4     08-06  Mg     2.4     08-06    TPro  8.6<H>  /  Alb  4.8  /  TBili  0.7  /  DBili  x   /  AST  28  /  ALT  29  /  AlkPhos  92  08-04    PT/INR - ( 04 Aug 2020 15:15 )   PT: 14.4 sec;   INR: 1.21          PTT - ( 04 Aug 2020 15:15 )  PTT:31.1 sec  Urinalysis Basic - ( 05 Aug 2020 11:51 )    Color: Yellow / Appearance: Clear / S.025 / pH: x  Gluc: x / Ketone: NEGATIVE  / Bili: Negative / Urobili: 0.2 E.U./dL   Blood: x / Protein: NEGATIVE mg/dL / Nitrite: NEGATIVE   Leuk Esterase: NEGATIVE / RBC: > 10 /HPF / WBC < 5 /HPF   Sq Epi: x / Non Sq Epi: 0-5 /HPF / Bacteria: Present /HPF      CARDIAC MARKERS ( 04 Aug 2020 05:03 )  x     / <0.01 ng/mL / x     / x     / x          CAPILLARY BLOOD GLUCOSE      POCT Blood Glucose.: 187 mg/dL (05 Aug 2020 21:38)  POCT Blood Glucose.: 179 mg/dL (05 Aug 2020 17:44)  POCT Blood Glucose.: 169 mg/dL (05 Aug 2020 11:12)  POCT Blood Glucose.: 172 mg/dL (05 Aug 2020 05:49)      Drug Levels: [] N/A    CSF Analysis: [] N/A  RBC Count - Spinal Fluid: 093901 /uL ( @ 11:26)  CSF Lymphocytes: 5 % ( @ 11:26)  Glucose, CSF: 117 mg/dL ( @ 11:26)  Protein, CSF: 39 mg/dL ( @ 11:26)      Allergies    No Known Allergies    Intolerances      MEDICATIONS:  Antibiotics:  tenofovir disoproxil fumarate (VIREAD) 300 milliGRAM(s) Oral every 24 hours    Neuro:  acetaminophen   Tablet .. 650 milliGRAM(s) Oral every 6 hours PRN  levETIRAcetam  IVPB 1000 milliGRAM(s) IV Intermittent every 12 hours    Anticoagulation:    OTHER:  atorvastatin 80 milliGRAM(s) Oral at bedtime  chlorhexidine 0.12% Liquid 15 milliLiter(s) Oral Mucosa every 12 hours  chlorhexidine 4% Liquid 1 Application(s) Topical <User Schedule>  dexAMETHasone  Injectable 6 milliGRAM(s) IV Push every 6 hours  dextrose 40% Gel 15 Gram(s) Oral once PRN  dextrose 50% Injectable 12.5 Gram(s) IV Push once  dextrose 50% Injectable 25 Gram(s) IV Push once  dextrose 50% Injectable 25 Gram(s) IV Push once  dextrose 50% Injectable 25 Gram(s) IV Push once  glucagon  Injectable 1 milliGRAM(s) IntraMuscular once PRN  glucagon  Injectable 1 milliGRAM(s) IntraMuscular once PRN  hydrALAZINE Injectable 10 milliGRAM(s) IV Push every 2 hours PRN  insulin lispro (HumaLOG) corrective regimen sliding scale   SubCutaneous every 6 hours  niCARdipine Infusion 5 mG/Hr IV Continuous <Continuous>  pantoprazole  Injectable 40 milliGRAM(s) IV Push daily  senna 2 Tablet(s) Oral at bedtime PRN    IVF:  dextrose 5%. 1000 milliLiter(s) IV Continuous <Continuous>  sodium chloride 0.9%. 1000 milliLiter(s) IV Continuous <Continuous>    CULTURES:  Culture Results:   No growth at 12 hours ( @ 11:44)  Culture Results:   No growth at 12 hours ( @ 11:44)    RADIOLOGY & ADDITIONAL TESTS:      ASSESSMENT:  59yMale HLD, ?Hep B? presents with acute severe headache, dizziness and vomiting, found to have large left cerebellar IPH with IVH, s/p right frontal EVD at [facility] and transfer to [facility] for further intervention, NIH 28, ICH5, BD#3    INTRACRANIAL BLEED  No pertinent family history in first degree relatives  Handoff  Hepatitis B  HLD (hyperlipidemia)  ICH (intracerebral hemorrhage)  ICH (intracerebral hemorrhage)  Hepatitis B  HLD (hyperlipidemia)  HIV (human immunodeficiency virus infection)  Cerebellar hemorrhage  Craniectomy, subocciptal, with cervical laminectomy for medulla and spinal cord decompression  Percutaneous insertion of arterial line  Angiogram, carotid and cerebral vessels, bilateral  Foot fracture, left      Plan:     Neuro:   - neurochecks q 1   - vitals q 1   - pain control prn with tyl  - post op CT stable   - cont. decadron - 1 week taper   - cont. keppra 1 g BID   - Keep EEG for now, questionable hyperexcitability   - Stroke core measure   Cardio:   - SBP goal < 140  - cont. cardene     Pulm:   - wean to extubate   - on full vent support now  - put in cpap   - echo with no HF     Renal:   - IVF restarted   - f/u UO   - voididng   - na 150     GI:   - Diet- tf   - PPI for ulcer prophylaxis   - Bowel regimen prn     Heme:   - H&H stable     ID:   - fever culture yesterday   - increased wbc   - s/p bolus   - cxr this am with mild increased markings and posssible consolidation   - ? start abx   - f/u final culture     Endo:   - ISS         DVT PROPHYLAXIS: start chemo ppx if ok with Dr. Blank, baseline doppler neg   [x] Venodynes                                [] Heparin/Lovenox    FALL RISK:  [] Low Risk                                    [] Impulsive    DISPOSITION: ICU status       Assessment:  Present when checked    []  GCS  E   V  M     Heart Failure: []Acute, [] acute on chronic , []chronic  Heart Failure:  [] Diastolic (HFpEF), [] Systolic (HFrEF), []Combined (HFpEF and HFrEF), [] RHF, [] Pulm HTN, [] Other    [] SARAH, [] ATN, [] AIN, [] other  [] CKD1, [] CKD2, [] CKD 3, [] CKD 4, [] CKD 5, []ESRD    Encephalopathy: [] Metabolic, [] Hepatic, [] toxic, [] Neurological, [] Other    Abnormal Nurtitional Status: [] malnurtition (see nutrition note), [ ]underweight: BMI < 19, [] morbid obesity: BMI >40, [] Cachexia    [] Sepsis  [] hypovolemic shock,[] cardiogenic shock, [] hemorrhagic shock, [] neuogenic shock  [] Acute Respiratory Failure  []Cerebral edema, [] Brain compression/ herniation,   [] Functional quadriplegia  [] Acute blood loss anemia HPI:  59 year old male with PMH of HLD and unclear liver disease had acute onset of severe headache, dizziness and vomiting x1 today, was brought into [facility] with continued symptoms, as well as hypertension and multiple episodes of vomiting. Patient was intubated for airway protection with CT Head performed demonstrating a large left cerebellar hemorrhage. CTA Head/Neck performed is suggestive of underlying vascular pathology such as AVM. RIght frontal ventriculostomy placed at [facility] and patient transferred to [facility] for further work-up. Patient arrives to [facility] intubated, sedated and on Nicardipine drip. Patient's son provided history and denies any Aspirin or other anticoagulant use. Denies any prior history of hypertension, smoking, or ETOH abuse. (04 Aug 2020 05:01)        S/Overnight events:  overnight tachycardic, ekg without significant changes, lactate wnl, low grade fever, s/p tyl. bolus x 1L , NS increased to 100cc/hr       Hospital Course:   POD# 0 S/P cerebral angio, negative for AVM. s/p SOC craniectomy evacuation of cerebellar hematoma.   POD # 1:   overnight tachycardic, ekg without significant changes, lactate wnl, low grade fever, s/p tyl. bolus x 1L , NS increased to 100cc/hr; neuro improving.  EVD at 5 cmH2O       Vital Signs Last 24 Hrs  T(C): 37.3 (06 Aug 2020 00:23), Max: 38.4 (05 Aug 2020 10:33)  T(F): 99.1 (06 Aug 2020 00:23), Max: 101.2 (05 Aug 2020 10:33)  HR: 103 (06 Aug 2020 03:00) (103 - 127)  BP: 117/58 (06 Aug 2020 03:00) (105/69 - 141/67)  BP(mean): 82 (06 Aug 2020 03:00) (78 - 96)  RR: 15 (06 Aug 2020 03:00) (13 - 20)  SpO2: 98% (06 Aug 2020 03:00) (96% - 100%)    I&O's Detail    04 Aug 2020 07:01  -  05 Aug 2020 07:00  --------------------------------------------------------  IN:    Enteral Tube Flush: 80 mL    IV PiggyBack: 200 mL    niCARdipine Infusion: 550 mL    propofol Infusion: 14.2 mL    sodium chloride 0.9%: 1125 mL  Total IN: 1969.2 mL    OUT:    External Ventricular Device: 68 mL    Indwelling Catheter - Urethral: 1315 mL  Total OUT: 1383 mL    Total NET: 586.2 mL      05 Aug 2020 07:  -  06 Aug 2020 04:49  --------------------------------------------------------  IN:    Enteral Tube Flush: 220 mL    IV PiggyBack: 100 mL    niCARdipine Infusion: 807.5 mL    ns in tub fed  [id number]: 290 mL    sodium chloride 0.9%: 675 mL    sodium chloride 0.9%: 150 mL    Sodium Chloride 0.9% IV Bolus: 1000 mL    sodium chloride 0.9%.: 200 mL    Solution: 499.8 mL  Total IN: 3942.3 mL    OUT:    External Ventricular Device: 188 mL    Indwelling Catheter - Urethral: 340 mL    Intermittent Catheterization -  Stomal: 750 mL    Voided: 325 mL  Total OUT: 1603 mL    Total NET: 2339.3 mL        I&O's Summary    04 Aug 2020 07:  -  05 Aug 2020 07:00  --------------------------------------------------------  IN: 1969.2 mL / OUT: 1383 mL / NET: 586.2 mL    05 Aug 2020 07:  -  06 Aug 2020 04:49  --------------------------------------------------------  IN: 3942.3 mL / OUT: 1603 mL / NET: 2339.3 mL        PHYSICAL EXAM:  Intubated, not opening eyes spont.   + cough, + gag, +corneals   Pupils 4mm bilaterally sluggish reactive   briskly localizing in all ext.   ruff   EVD   A line       Incision/Wound: crani incision site c/d/i     DEVICE/DRAIN DRESSING:    TUBES/LINES:  [x] CVC  [x] A-line  [] Lumbar Drain  [x] Ventriculostomy  [] Other    DIET:  [] NPO  [] Mechanical  [x] Tube feeds    LABS:                        9.9    19.92 )-----------( 190      ( 06 Aug 2020 03:31 )             30.4     08-06    150<H>  |  116<H>  |  25<H>  ----------------------------<  188<H>  3.8   |  24  |  0.79    Ca    8.5      06 Aug 2020 03:31  Phos  2.4     08-06  Mg     2.4     08-06    TPro  8.6<H>  /  Alb  4.8  /  TBili  0.7  /  DBili  x   /  AST  28  /  ALT  29  /  AlkPhos  92  08-04    PT/INR - ( 04 Aug 2020 15:15 )   PT: 14.4 sec;   INR: 1.21          PTT - ( 04 Aug 2020 15:15 )  PTT:31.1 sec  Urinalysis Basic - ( 05 Aug 2020 11:51 )    Color: Yellow / Appearance: Clear / S.025 / pH: x  Gluc: x / Ketone: NEGATIVE  / Bili: Negative / Urobili: 0.2 E.U./dL   Blood: x / Protein: NEGATIVE mg/dL / Nitrite: NEGATIVE   Leuk Esterase: NEGATIVE / RBC: > 10 /HPF / WBC < 5 /HPF   Sq Epi: x / Non Sq Epi: 0-5 /HPF / Bacteria: Present /HPF      CARDIAC MARKERS ( 04 Aug 2020 05:03 )  x     / <0.01 ng/mL / x     / x     / x          CAPILLARY BLOOD GLUCOSE      POCT Blood Glucose.: 187 mg/dL (05 Aug 2020 21:38)  POCT Blood Glucose.: 179 mg/dL (05 Aug 2020 17:44)  POCT Blood Glucose.: 169 mg/dL (05 Aug 2020 11:12)  POCT Blood Glucose.: 172 mg/dL (05 Aug 2020 05:49)      Drug Levels: [] N/A    CSF Analysis: [] N/A  RBC Count - Spinal Fluid: 206283 /uL (08-05 @ 11:26)  CSF Lymphocytes: 5 % ( @ :)  Glucose, CSF: 117 mg/dL ( @ :)  Protein, CSF: 39 mg/dL ( @ :)      Allergies    No Known Allergies    Intolerances      MEDICATIONS:  Antibiotics:  tenofovir disoproxil fumarate (VIREAD) 300 milliGRAM(s) Oral every 24 hours    Neuro:  acetaminophen   Tablet .. 650 milliGRAM(s) Oral every 6 hours PRN  levETIRAcetam  IVPB 1000 milliGRAM(s) IV Intermittent every 12 hours    Anticoagulation:    OTHER:  atorvastatin 80 milliGRAM(s) Oral at bedtime  chlorhexidine 0.12% Liquid 15 milliLiter(s) Oral Mucosa every 12 hours  chlorhexidine 4% Liquid 1 Application(s) Topical <User Schedule>  dexAMETHasone  Injectable 6 milliGRAM(s) IV Push every 6 hours  dextrose 40% Gel 15 Gram(s) Oral once PRN  dextrose 50% Injectable 12.5 Gram(s) IV Push once  dextrose 50% Injectable 25 Gram(s) IV Push once  dextrose 50% Injectable 25 Gram(s) IV Push once  dextrose 50% Injectable 25 Gram(s) IV Push once  glucagon  Injectable 1 milliGRAM(s) IntraMuscular once PRN  glucagon  Injectable 1 milliGRAM(s) IntraMuscular once PRN  hydrALAZINE Injectable 10 milliGRAM(s) IV Push every 2 hours PRN  insulin lispro (HumaLOG) corrective regimen sliding scale   SubCutaneous every 6 hours  niCARdipine Infusion 5 mG/Hr IV Continuous <Continuous>  pantoprazole  Injectable 40 milliGRAM(s) IV Push daily  senna 2 Tablet(s) Oral at bedtime PRN    IVF:  dextrose 5%. 1000 milliLiter(s) IV Continuous <Continuous>  sodium chloride 0.9%. 1000 milliLiter(s) IV Continuous <Continuous>    CULTURES:  Culture Results:   No growth at 12 hours ( @ 11:44)  Culture Results:   No growth at 12 hours ( @ 11:44)    RADIOLOGY & ADDITIONAL TESTS:      ASSESSMENT:  59yMale HLD, ?Hep B? presents with acute severe headache, dizziness and vomiting, found to have large left cerebellar IPH with IVH, s/p right frontal EVD at [facility] and transfer to [facility] for further intervention, NIH 28, ICH5, BD#3    INTRACRANIAL BLEED  No pertinent family history in first degree relatives  Handoff  Hepatitis B  HLD (hyperlipidemia)  ICH (intracerebral hemorrhage)  ICH (intracerebral hemorrhage)  Hepatitis B  HLD (hyperlipidemia)  HIV (human immunodeficiency virus infection)  Cerebellar hemorrhage  Craniectomy, subocciptal, with cervical laminectomy for medulla and spinal cord decompression  Percutaneous insertion of arterial line  Angiogram, carotid and cerebral vessels, bilateral  Foot fracture, left      Plan:     Neuro:   - neurochecks q 1   - vitals q 1   - pain control prn with tyl  - post op CT stable   - cont. decadron - 1 week taper   - cont. keppra 1 g BID   - Keep EEG for now, questionable hyperexcitability   - Stroke core measure   Cardio:   - SBP goal < 140  - cont. cardene     Pulm:   - wean to extubate   - on full vent support now  - put in cpap   - echo with no HF     Renal:   - IVF restarted   - f/u UO   - voididng   - na 150     GI:   - Diet- tf   - PPI for ulcer prophylaxis   - Bowel regimen prn     Heme:   - H&H stable     ID:   - fever culture yesterday   - increased wbc   - s/p bolus   - cxr this am with mild increased markings and posssible consolidation   - ? start abx   - f/u final culture     Endo:   - ISS         DVT PROPHYLAXIS: start chemo ppx if ok with Dr. Blank, baseline doppler neg   [x] Venodynes                                [] Heparin/Lovenox    FALL RISK:  [] Low Risk                                    [] Impulsive    DISPOSITION: ICU status       Assessment:  Present when checked    []  GCS  E   V  M     Heart Failure: []Acute, [] acute on chronic , []chronic  Heart Failure:  [] Diastolic (HFpEF), [] Systolic (HFrEF), []Combined (HFpEF and HFrEF), [] RHF, [] Pulm HTN, [] Other    [] SARAH, [] ATN, [] AIN, [] other  [] CKD1, [] CKD2, [] CKD 3, [] CKD 4, [] CKD 5, []ESRD    Encephalopathy: [] Metabolic, [] Hepatic, [] toxic, [] Neurological, [] Other    Abnormal Nurtitional Status: [] malnurtition (see nutrition note), [ ]underweight: BMI < 19, [] morbid obesity: BMI >40, [] Cachexia    [] Sepsis  [] hypovolemic shock,[] cardiogenic shock, [] hemorrhagic shock, [] neuogenic shock  [] Acute Respiratory Failure  []Cerebral edema, [] Brain compression/ herniation,   [] Functional quadriplegia  [] Acute blood loss anemia

## 2020-08-07 LAB
ALBUMIN SERPL ELPH-MCNC: 3.2 G/DL — LOW (ref 3.3–5)
ALP SERPL-CCNC: 42 U/L — SIGNIFICANT CHANGE UP (ref 40–120)
ALT FLD-CCNC: 17 U/L — SIGNIFICANT CHANGE UP (ref 10–45)
ANION GAP SERPL CALC-SCNC: 9 MMOL/L — SIGNIFICANT CHANGE UP (ref 5–17)
AST SERPL-CCNC: 26 U/L — SIGNIFICANT CHANGE UP (ref 10–40)
BILIRUB SERPL-MCNC: 0.3 MG/DL — SIGNIFICANT CHANGE UP (ref 0.2–1.2)
BUN SERPL-MCNC: 27 MG/DL — HIGH (ref 7–23)
CALCIUM SERPL-MCNC: 7.8 MG/DL — LOW (ref 8.4–10.5)
CHLORIDE SERPL-SCNC: 120 MMOL/L — HIGH (ref 96–108)
CO2 SERPL-SCNC: 26 MMOL/L — SIGNIFICANT CHANGE UP (ref 22–31)
CREAT SERPL-MCNC: 0.85 MG/DL — SIGNIFICANT CHANGE UP (ref 0.5–1.3)
GLUCOSE BLDC GLUCOMTR-MCNC: 199 MG/DL — HIGH (ref 70–99)
GLUCOSE BLDC GLUCOMTR-MCNC: 203 MG/DL — HIGH (ref 70–99)
GLUCOSE BLDC GLUCOMTR-MCNC: 243 MG/DL — HIGH (ref 70–99)
GLUCOSE SERPL-MCNC: 203 MG/DL — HIGH (ref 70–99)
GRAM STN FLD: SIGNIFICANT CHANGE UP
HCT VFR BLD CALC: 28.8 % — LOW (ref 39–50)
HGB BLD-MCNC: 9.3 G/DL — LOW (ref 13–17)
MAGNESIUM SERPL-MCNC: 2.8 MG/DL — HIGH (ref 1.6–2.6)
MCHC RBC-ENTMCNC: 28.2 PG — SIGNIFICANT CHANGE UP (ref 27–34)
MCHC RBC-ENTMCNC: 32.3 GM/DL — SIGNIFICANT CHANGE UP (ref 32–36)
MCV RBC AUTO: 87.3 FL — SIGNIFICANT CHANGE UP (ref 80–100)
NRBC # BLD: 0 /100 WBCS — SIGNIFICANT CHANGE UP (ref 0–0)
PHOSPHATE SERPL-MCNC: 2.7 MG/DL — SIGNIFICANT CHANGE UP (ref 2.5–4.5)
PLATELET # BLD AUTO: 155 K/UL — SIGNIFICANT CHANGE UP (ref 150–400)
POTASSIUM SERPL-MCNC: 3.7 MMOL/L — SIGNIFICANT CHANGE UP (ref 3.5–5.3)
POTASSIUM SERPL-SCNC: 3.7 MMOL/L — SIGNIFICANT CHANGE UP (ref 3.5–5.3)
PROCALCITONIN SERPL-MCNC: 0.1 NG/ML — SIGNIFICANT CHANGE UP (ref 0.02–0.1)
PROT SERPL-MCNC: 5.6 G/DL — LOW (ref 6–8.3)
RBC # BLD: 3.3 M/UL — LOW (ref 4.2–5.8)
RBC # FLD: 14.8 % — HIGH (ref 10.3–14.5)
SODIUM SERPL-SCNC: 155 MMOL/L — HIGH (ref 135–145)
SPECIMEN SOURCE: SIGNIFICANT CHANGE UP
VANCOMYCIN TROUGH SERPL-MCNC: 6.7 UG/ML — LOW (ref 10–20)
WBC # BLD: 14.26 K/UL — HIGH (ref 3.8–10.5)
WBC # FLD AUTO: 14.26 K/UL — HIGH (ref 3.8–10.5)

## 2020-08-07 PROCEDURE — 99291 CRITICAL CARE FIRST HOUR: CPT

## 2020-08-07 PROCEDURE — 99292 CRITICAL CARE ADDL 30 MIN: CPT

## 2020-08-07 PROCEDURE — 99232 SBSQ HOSP IP/OBS MODERATE 35: CPT

## 2020-08-07 PROCEDURE — 99024 POSTOP FOLLOW-UP VISIT: CPT

## 2020-08-07 RX ORDER — METOPROLOL TARTRATE 50 MG
50 TABLET ORAL EVERY 12 HOURS
Refills: 0 | Status: DISCONTINUED | OUTPATIENT
Start: 2020-08-07 | End: 2020-08-08

## 2020-08-07 RX ORDER — POTASSIUM PHOSPHATE, MONOBASIC POTASSIUM PHOSPHATE, DIBASIC 236; 224 MG/ML; MG/ML
15 INJECTION, SOLUTION INTRAVENOUS ONCE
Refills: 0 | Status: COMPLETED | OUTPATIENT
Start: 2020-08-07 | End: 2020-08-07

## 2020-08-07 RX ORDER — VANCOMYCIN HCL 1 G
1250 VIAL (EA) INTRAVENOUS EVERY 12 HOURS
Refills: 0 | Status: DISCONTINUED | OUTPATIENT
Start: 2020-08-07 | End: 2020-08-08

## 2020-08-07 RX ORDER — AMANTADINE HCL 100 MG
100 CAPSULE ORAL
Refills: 0 | Status: DISCONTINUED | OUTPATIENT
Start: 2020-08-07 | End: 2020-08-08

## 2020-08-07 RX ORDER — DOXAZOSIN MESYLATE 4 MG
4 TABLET ORAL AT BEDTIME
Refills: 0 | Status: DISCONTINUED | OUTPATIENT
Start: 2020-08-07 | End: 2020-09-02

## 2020-08-07 RX ORDER — AMANTADINE HCL 100 MG
100 CAPSULE ORAL
Refills: 0 | Status: DISCONTINUED | OUTPATIENT
Start: 2020-08-07 | End: 2020-08-07

## 2020-08-07 RX ORDER — METOPROLOL TARTRATE 50 MG
25 TABLET ORAL EVERY 12 HOURS
Refills: 0 | Status: DISCONTINUED | OUTPATIENT
Start: 2020-08-07 | End: 2020-08-07

## 2020-08-07 RX ORDER — INSULIN GLARGINE 100 [IU]/ML
10 INJECTION, SOLUTION SUBCUTANEOUS ONCE
Refills: 0 | Status: COMPLETED | OUTPATIENT
Start: 2020-08-07 | End: 2020-08-07

## 2020-08-07 RX ADMIN — Medication 650 MILLIGRAM(S): at 00:00

## 2020-08-07 RX ADMIN — ENOXAPARIN SODIUM 40 MILLIGRAM(S): 100 INJECTION SUBCUTANEOUS at 22:54

## 2020-08-07 RX ADMIN — Medication 25 MILLIGRAM(S): at 11:57

## 2020-08-07 RX ADMIN — PIPERACILLIN AND TAZOBACTAM 200 GRAM(S): 4; .5 INJECTION, POWDER, LYOPHILIZED, FOR SOLUTION INTRAVENOUS at 06:26

## 2020-08-07 RX ADMIN — Medication 100 MILLIGRAM(S): at 18:15

## 2020-08-07 RX ADMIN — Medication 2: at 18:15

## 2020-08-07 RX ADMIN — CHLORHEXIDINE GLUCONATE 15 MILLILITER(S): 213 SOLUTION TOPICAL at 06:27

## 2020-08-07 RX ADMIN — LEVETIRACETAM 400 MILLIGRAM(S): 250 TABLET, FILM COATED ORAL at 22:57

## 2020-08-07 RX ADMIN — Medication 4 MILLIGRAM(S): at 23:03

## 2020-08-07 RX ADMIN — LEVETIRACETAM 400 MILLIGRAM(S): 250 TABLET, FILM COATED ORAL at 10:16

## 2020-08-07 RX ADMIN — TENOFOVIR DISOPROXIL FUMARATE 300 MILLIGRAM(S): 300 TABLET, FILM COATED ORAL at 22:59

## 2020-08-07 RX ADMIN — INSULIN GLARGINE 10 UNIT(S): 100 INJECTION, SOLUTION SUBCUTANEOUS at 11:24

## 2020-08-07 RX ADMIN — CHLORHEXIDINE GLUCONATE 1 APPLICATION(S): 213 SOLUTION TOPICAL at 06:27

## 2020-08-07 RX ADMIN — Medication 4 MILLIGRAM(S): at 11:24

## 2020-08-07 RX ADMIN — CHLORHEXIDINE GLUCONATE 15 MILLILITER(S): 213 SOLUTION TOPICAL at 17:03

## 2020-08-07 RX ADMIN — AMLODIPINE BESYLATE 10 MILLIGRAM(S): 2.5 TABLET ORAL at 06:26

## 2020-08-07 RX ADMIN — Medication 4: at 22:19

## 2020-08-07 RX ADMIN — POTASSIUM PHOSPHATE, MONOBASIC POTASSIUM PHOSPHATE, DIBASIC 63.75 MILLIMOLE(S): 236; 224 INJECTION, SOLUTION INTRAVENOUS at 10:57

## 2020-08-07 RX ADMIN — ATORVASTATIN CALCIUM 80 MILLIGRAM(S): 80 TABLET, FILM COATED ORAL at 22:57

## 2020-08-07 RX ADMIN — PANTOPRAZOLE SODIUM 40 MILLIGRAM(S): 20 TABLET, DELAYED RELEASE ORAL at 11:24

## 2020-08-07 RX ADMIN — Medication 250 MILLIGRAM(S): at 07:05

## 2020-08-07 RX ADMIN — Medication 50 MILLIGRAM(S): at 22:57

## 2020-08-07 RX ADMIN — Medication 4 MILLIGRAM(S): at 06:26

## 2020-08-07 RX ADMIN — Medication 100 MILLIGRAM(S): at 12:21

## 2020-08-07 RX ADMIN — PIPERACILLIN AND TAZOBACTAM 200 GRAM(S): 4; .5 INJECTION, POWDER, LYOPHILIZED, FOR SOLUTION INTRAVENOUS at 11:24

## 2020-08-07 RX ADMIN — Medication 4: at 06:56

## 2020-08-07 RX ADMIN — Medication 4: at 11:26

## 2020-08-07 RX ADMIN — PIPERACILLIN AND TAZOBACTAM 200 GRAM(S): 4; .5 INJECTION, POWDER, LYOPHILIZED, FOR SOLUTION INTRAVENOUS at 17:03

## 2020-08-07 RX ADMIN — Medication 2 MILLIGRAM(S): at 23:04

## 2020-08-07 NOTE — PROGRESS NOTE ADULT - ASSESSMENT
59y/M with  1.	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	Hepatitis B  3.	dyslipidemia     PLAN:   NEURO: neurochecks q1h, dexmedetomidine if sedation needed  EVD at 5 cm H20   f/u EEG results, if NEG d/c; decadron - taper quickly if ok with NS  seizure prophylaxis: levetiracetam  as per NS  REHAB:  physical therapy evaluation and management    EARLY MOB:   bed rest    PULM:  CPAP 10/5  CARDIO:  SBP goal 100-140mm Hg, f/u TTE with bubble study  ENDO:  Blood sugar goals 140-180 mg/dL, continue insulin sliding, continue statins  GI:  PPI for GI prophylaxis  DIET: jevity   RENAL:  d/c IVF once TF Na goal ~145, cont NS  HEM/ONC: no coagulopathy, no h/o antiplatelet / anticoagulant use  VTE Prophylaxis: SCDs, SQL tonight if ok with NS  ID: afebrile, leukocytosis, piperacillin/tazobactam vanc, vanc trough prior to 4th dose (last day 08/10)  Social: son Perry updated ([phone]).  2 daughters Shantell  and Tammie ; wife name is Julián Sahu    CORE MEASURES:  1.  NIHSS =   2.  ICH Score = 4  3.  VTE prophylaxis: [ ] administered within 24h of admission OR [x] reason not done - fresh bleed  4.  Dysphagia screening: [X] performed before any oral meds / liquids / food    ATTENDING ATTESTATION:  I was physically present for the key portions of the evaluation and management (E/M) service provided.  I agree with the above history, physical and plan, which I have reviewed and edited where appropriate.    Patient at high risk for neurological deterioration or death due to:  ICU delirium, aspiration PNA, DVT / PE.  Critical care time:  I have personally provided 60 minutes of critical care time, excluding time spent on separate procedures.      Plan discussed with RN, house staff. 59y/M with  1.	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	Hepatitis B  3.	dyslipidemia     PLAN:   NEURO: neurochecks q1h  EVD at 5 cm H20  open to drain   decadron - taper quickly if ok with NS  neurostimulant: start amantadine 100mg PO BID   seizure prophylaxis: levetiracetam  as per NS  REHAB:  physical therapy evaluation and management    EARLY MOB:  HOB up    PULM:  CPAP 8/5  CARDIO:  SBP goal 100-140mm Hg start metoprolol 25mg PO BID, continue amlodipine, taper off cardene  ENDO:  Blood sugar goals 140-180 mg/dL, continue insulin sliding, continue statins; insulin glargine 10 x1 now  GI:  PPI for GI prophylaxis  DIET: jevity   RENAL:  IVL, start free water 250 q8h   HEM/ONC: no coagulopathy, no h/o antiplatelet / anticoagulant use  VTE Prophylaxis: SCDs, SQL   ID: afebrile, leukocytosis, piperacillin/tazobactam vanc, vanc trough prior to 4th dose (last day 08/10)  Social: son Perry updated ([phone]).  2 daughters Shantell  and Tammie ; wife name is Julián Sahu    CORE MEASURES:  1.  NIHSS =   2.  ICH Score = 4  3.  VTE prophylaxis: [ ] administered within 24h of admission OR [x] reason not done - fresh bleed  4.  Dysphagia screening: [X] performed before any oral meds / liquids / food    ATTENDING ATTESTATION:  I was physically present for the key portions of the evaluation and management (E/M) service provided.  I agree with the above history, physical and plan, which I have reviewed and edited where appropriate.    Patient at high risk for neurological deterioration or death due to:  ICU delirium, aspiration PNA, DVT / PE.  Critical care time:  I have personally provided 60 minutes of critical care time, excluding time spent on separate procedures.      Plan discussed with RN, house staff.

## 2020-08-07 NOTE — PROGRESS NOTE ADULT - SUBJECTIVE AND OBJECTIVE BOX
Neurology Stroke Progress Note    INTERVAL HPI/OVERNIGHT EVENTS:  Febrile yesterday evening  Patient seen and examined.     MEDICATIONS  (STANDING):  amLODIPine   Tablet 10 milliGRAM(s) Oral daily  atorvastatin 80 milliGRAM(s) Oral at bedtime  chlorhexidine 0.12% Liquid 15 milliLiter(s) Oral Mucosa every 12 hours  chlorhexidine 4% Liquid 1 Application(s) Topical <User Schedule>  dexAMETHasone     Tablet   Oral   dexAMETHasone     Tablet 4 milliGRAM(s) Oral every 6 hours  dexAMETHasone     Tablet 2 milliGRAM(s) Oral every 8 hours  dextrose 5%. 1000 milliLiter(s) (50 mL/Hr) IV Continuous <Continuous>  dextrose 50% Injectable 12.5 Gram(s) IV Push once  dextrose 50% Injectable 25 Gram(s) IV Push once  dextrose 50% Injectable 25 Gram(s) IV Push once  dextrose 50% Injectable 25 Gram(s) IV Push once  doxazosin 2 milliGRAM(s) Oral at bedtime  enoxaparin Injectable 40 milliGRAM(s) SubCutaneous <User Schedule>  insulin lispro (HumaLOG) corrective regimen sliding scale   SubCutaneous every 6 hours  levETIRAcetam  IVPB 1000 milliGRAM(s) IV Intermittent every 12 hours  niCARdipine Infusion 5 mG/Hr (25 mL/Hr) IV Continuous <Continuous>  pantoprazole  Injectable 40 milliGRAM(s) IV Push daily  piperacillin/tazobactam IVPB.. 3.375 Gram(s) IV Intermittent every 6 hours  potassium phosphate IVPB 15 milliMole(s) IV Intermittent once  tenofovir disoproxil fumarate (VIREAD) 300 milliGRAM(s) Oral every 24 hours  vancomycin  IVPB 1000 milliGRAM(s) IV Intermittent every 12 hours    MEDICATIONS  (PRN):  acetaminophen   Tablet .. 650 milliGRAM(s) Oral every 6 hours PRN Temp greater or equal to 38.5C (101.3F), Mild Pain (1 - 3)  dextrose 40% Gel 15 Gram(s) Oral once PRN Blood Glucose LESS THAN 70 milliGRAM(s)/deciliter  glucagon  Injectable 1 milliGRAM(s) IntraMuscular once PRN Glucose LESS THAN 70 milligrams/deciliter  glucagon  Injectable 1 milliGRAM(s) IntraMuscular once PRN Glucose LESS THAN 70 milligrams/deciliter  hydrALAZINE Injectable 10 milliGRAM(s) IV Push every 2 hours PRN SBP > 140  senna 2 Tablet(s) Oral at bedtime PRN Constipation  sodium chloride 0.9% lock flush 10 milliLiter(s) IV Push every 1 hour PRN Pre/post blood products, medications, blood draw, and to maintain line patency      Allergies    No Known Allergies    Intolerances        ROS: As per HPI, otherwise negative    Vital Signs Last 24 Hrs  T(C): 34.9 (07 Aug 2020 09:15), Max: 38.1 (06 Aug 2020 17:15)  T(F): 94.9 (07 Aug 2020 09:15), Max: 100.6 (06 Aug 2020 17:15)  HR: 91 (07 Aug 2020 09:00) (82 - 119)  BP: 123/59 (07 Aug 2020 09:00) (108/57 - 152/72)  BP(mean): 83 (07 Aug 2020 09:00) (77 - 104)  RR: 22 (07 Aug 2020 07:00) (11 - 25)  SpO2: 99% (07 Aug 2020 09:00) (96% - 99%)    Physical exam:    Neurologic:  Mental status: intubated, not sedated, comatose  +corneal reflex  no movement of any extremity          LABS:                        9.3    14.26 )-----------( 155      ( 07 Aug 2020 06:27 )             28.8     08-07    155<H>  |  120<H>  |  27<H>  ----------------------------<  203<H>  3.7   |  26  |  0.85    Ca    7.8<L>      07 Aug 2020 06:27  Phos  2.7     08-07  Mg     2.8     08-07    TPro  5.6<L>  /  Alb  3.2<L>  /  TBili  0.3  /  DBili  x   /  AST  26  /  ALT  17  /  AlkPhos  42  08-07      Urinalysis Basic - ( 05 Aug 2020 11:51 )    Color: Yellow / Appearance: Clear / S.025 / pH: x  Gluc: x / Ketone: NEGATIVE  / Bili: Negative / Urobili: 0.2 E.U./dL   Blood: x / Protein: NEGATIVE mg/dL / Nitrite: NEGATIVE   Leuk Esterase: NEGATIVE / RBC: > 10 /HPF / WBC < 5 /HPF   Sq Epi: x / Non Sq Epi: 0-5 /HPF / Bacteria: Present /HPF        RADIOLOGY & ADDITIONAL TESTS:  EEG - Daily Summary:    Moderate generalized  slowing suggestive of a similar degree of diffuse or multifocal  dysfunction. The  findings   are     c/w  breach   artifact    over    the   Left    central   and  parietal    regions.  Generalized periodic discharges with triphasic morphology, which is a nonspecific finding often seen in the setting of a diffuse or multifocal pathologic process (e.g. from renal/hepatic disease), but may also indicate underlying hyperexcitability. Clinical correlation is advised. The findings  were discussed with Neurosurgery  team.     Assessment and Plan  59 year old male with PMH of HLD and unclear liver disease  with left cerebellar parenchymal bleed, small SAH, and small right posterior subdural hemorrhage s/p POD#1 cerebral angio which was negative for AVM & and SOC craniectomy evacuation of cerebellar hematoma, EVD in place. Hemorrhage may be secondary to hypertension. Stroke will continue to follow.      Secondary stroke prevention:  -NO antiplatelet as patient has hemorrhage  -Atorvastatin 80    Neuro  - Repeat CT head with any acute change in mental status.   - Keep temp <100F and maintain glucose 140-180.   - No antiplatelets/ AC due to bleed  - EEG with moderate slowing with GPDs - continue Keppra  - continue seizure prophylaxis and maintain seizure precautions     -A1C with Estimated Average Glucose Result: 5.6  - LDL: 85   - TSH: 0.460    DVT Prophylaxis:   - Bilateral SCDs     - inpatient management as per [facility] Neurology Stroke Progress Note    INTERVAL HPI/OVERNIGHT EVENTS:  Febrile yesterday evening  Patient seen and examined. Withdraws to pain only.    MEDICATIONS  (STANDING):  amLODIPine   Tablet 10 milliGRAM(s) Oral daily  atorvastatin 80 milliGRAM(s) Oral at bedtime  chlorhexidine 0.12% Liquid 15 milliLiter(s) Oral Mucosa every 12 hours  chlorhexidine 4% Liquid 1 Application(s) Topical <User Schedule>  dexAMETHasone     Tablet   Oral   dexAMETHasone     Tablet 4 milliGRAM(s) Oral every 6 hours  dexAMETHasone     Tablet 2 milliGRAM(s) Oral every 8 hours  dextrose 5%. 1000 milliLiter(s) (50 mL/Hr) IV Continuous <Continuous>  dextrose 50% Injectable 12.5 Gram(s) IV Push once  dextrose 50% Injectable 25 Gram(s) IV Push once  dextrose 50% Injectable 25 Gram(s) IV Push once  dextrose 50% Injectable 25 Gram(s) IV Push once  doxazosin 2 milliGRAM(s) Oral at bedtime  enoxaparin Injectable 40 milliGRAM(s) SubCutaneous <User Schedule>  insulin lispro (HumaLOG) corrective regimen sliding scale   SubCutaneous every 6 hours  levETIRAcetam  IVPB 1000 milliGRAM(s) IV Intermittent every 12 hours  niCARdipine Infusion 5 mG/Hr (25 mL/Hr) IV Continuous <Continuous>  pantoprazole  Injectable 40 milliGRAM(s) IV Push daily  piperacillin/tazobactam IVPB.. 3.375 Gram(s) IV Intermittent every 6 hours  potassium phosphate IVPB 15 milliMole(s) IV Intermittent once  tenofovir disoproxil fumarate (VIREAD) 300 milliGRAM(s) Oral every 24 hours  vancomycin  IVPB 1000 milliGRAM(s) IV Intermittent every 12 hours    MEDICATIONS  (PRN):  acetaminophen   Tablet .. 650 milliGRAM(s) Oral every 6 hours PRN Temp greater or equal to 38.5C (101.3F), Mild Pain (1 - 3)  dextrose 40% Gel 15 Gram(s) Oral once PRN Blood Glucose LESS THAN 70 milliGRAM(s)/deciliter  glucagon  Injectable 1 milliGRAM(s) IntraMuscular once PRN Glucose LESS THAN 70 milligrams/deciliter  glucagon  Injectable 1 milliGRAM(s) IntraMuscular once PRN Glucose LESS THAN 70 milligrams/deciliter  hydrALAZINE Injectable 10 milliGRAM(s) IV Push every 2 hours PRN SBP > 140  senna 2 Tablet(s) Oral at bedtime PRN Constipation  sodium chloride 0.9% lock flush 10 milliLiter(s) IV Push every 1 hour PRN Pre/post blood products, medications, blood draw, and to maintain line patency      Allergies    No Known Allergies    Intolerances        ROS: As per HPI, otherwise negative    Vital Signs Last 24 Hrs  T(C): 34.9 (07 Aug 2020 09:15), Max: 38.1 (06 Aug 2020 17:15)  T(F): 94.9 (07 Aug 2020 09:15), Max: 100.6 (06 Aug 2020 17:15)  HR: 91 (07 Aug 2020 09:00) (82 - 119)  BP: 123/59 (07 Aug 2020 09:00) (108/57 - 152/72)  BP(mean): 83 (07 Aug 2020 09:00) (77 - 104)  RR: 22 (07 Aug 2020 07:00) (11 - 25)  SpO2: 99% (07 Aug 2020 09:00) (96% - 99%)    Physical exam:    Neurologic:  Mental status: intubated, not sedated, comatose  +corneal reflex  left UE withdraws more than right, both legs withdraw to noxious stimuli          LABS:                        9.3    14.26 )-----------( 155      ( 07 Aug 2020 06:27 )             28.8     08-07    155<H>  |  120<H>  |  27<H>  ----------------------------<  203<H>  3.7   |  26  |  0.85    Ca    7.8<L>      07 Aug 2020 06:27  Phos  2.7     08-07  Mg     2.8     08-07    TPro  5.6<L>  /  Alb  3.2<L>  /  TBili  0.3  /  DBili  x   /  AST  26  /  ALT  17  /  AlkPhos  42  08-07      Urinalysis Basic - ( 05 Aug 2020 11:51 )    Color: Yellow / Appearance: Clear / S.025 / pH: x  Gluc: x / Ketone: NEGATIVE  / Bili: Negative / Urobili: 0.2 E.U./dL   Blood: x / Protein: NEGATIVE mg/dL / Nitrite: NEGATIVE   Leuk Esterase: NEGATIVE / RBC: > 10 /HPF / WBC < 5 /HPF   Sq Epi: x / Non Sq Epi: 0-5 /HPF / Bacteria: Present /HPF        RADIOLOGY & ADDITIONAL TESTS:  < from: CT Head No Cont (20 @ 12:53) >  IMPRESSION:    *  Portable technique and significant streak artifact particularly within the posterior fossa, obscures evaluation of the brain parenchyma. Apparent hypoattenuation of the cerebral hemispheres and occipital lobes bilaterally, right greater than left which may be artifactual but appears more prominent when compared to the 2020; however underlying ischemia is not entirely excluded. Continued attention on follow-up is recommended.  *  Status post midline suboccipital craniectomy for evacuation of large posterior fossa hematoma with continued evolution of postsurgical changesincluding interval decrease in extensive extra-axial, intra-axial  and intra-ventricular air. Right-sided ventricular drainage catheter in place with relatively stable hydrocephalus.    < end of copied text >      EEG - Daily Summary:    Moderate generalized  slowing suggestive of a similar degree of diffuse or multifocal  dysfunction. The  findings   are     c/w  breach   artifact    over    the   Left    central   and  parietal    regions.  Generalized periodic discharges with triphasic morphology, which is a nonspecific finding often seen in the setting of a diffuse or multifocal pathologic process (e.g. from renal/hepatic disease), but may also indicate underlying hyperexcitability. Clinical correlation is advised. The findings  were discussed with Neurosurgery  team.     Assessment and Plan  59 year old male with PMH of HLD and unclear liver disease  with left cerebellar parenchymal bleed, small SAH, and small right posterior subdural hemorrhage s/p POD#1 cerebral angio which was negative for AVM & and SOC craniectomy evacuation of cerebellar hematoma, EVD in place. Hemorrhage may be secondary to hypertension. Stroke will continue to follow.      Secondary stroke prevention:  -NO antiplatelet as patient has hemorrhage  -Atorvastatin 80    Neuro  - Repeat CT head with any acute change in mental status.   - Keep temp <100F and maintain glucose 140-180.   - No antiplatelets/ AC due to bleed  - EEG with moderate slowing with GPDs - continue Keppra  - continue seizure prophylaxis and maintain seizure precautions     -A1C with Estimated Average Glucose Result: 5.6  - LDL: 85   - TSH: 0.460    DVT Prophylaxis:   - Bilateral SCDs     - inpatient management as per [facility] Neurology Stroke Progress Note    INTERVAL HPI/OVERNIGHT EVENTS:  Febrile yesterday evening  Patient seen and examined. Withdraws to pain only.    MEDICATIONS  (STANDING):  amLODIPine   Tablet 10 milliGRAM(s) Oral daily  atorvastatin 80 milliGRAM(s) Oral at bedtime  chlorhexidine 0.12% Liquid 15 milliLiter(s) Oral Mucosa every 12 hours  chlorhexidine 4% Liquid 1 Application(s) Topical <User Schedule>  dexAMETHasone     Tablet   Oral   dexAMETHasone     Tablet 4 milliGRAM(s) Oral every 6 hours  dexAMETHasone     Tablet 2 milliGRAM(s) Oral every 8 hours  dextrose 5%. 1000 milliLiter(s) (50 mL/Hr) IV Continuous <Continuous>  dextrose 50% Injectable 12.5 Gram(s) IV Push once  dextrose 50% Injectable 25 Gram(s) IV Push once  dextrose 50% Injectable 25 Gram(s) IV Push once  dextrose 50% Injectable 25 Gram(s) IV Push once  doxazosin 2 milliGRAM(s) Oral at bedtime  enoxaparin Injectable 40 milliGRAM(s) SubCutaneous <User Schedule>  insulin lispro (HumaLOG) corrective regimen sliding scale   SubCutaneous every 6 hours  levETIRAcetam  IVPB 1000 milliGRAM(s) IV Intermittent every 12 hours  niCARdipine Infusion 5 mG/Hr (25 mL/Hr) IV Continuous <Continuous>  pantoprazole  Injectable 40 milliGRAM(s) IV Push daily  piperacillin/tazobactam IVPB.. 3.375 Gram(s) IV Intermittent every 6 hours  potassium phosphate IVPB 15 milliMole(s) IV Intermittent once  tenofovir disoproxil fumarate (VIREAD) 300 milliGRAM(s) Oral every 24 hours  vancomycin  IVPB 1000 milliGRAM(s) IV Intermittent every 12 hours    MEDICATIONS  (PRN):  acetaminophen   Tablet .. 650 milliGRAM(s) Oral every 6 hours PRN Temp greater or equal to 38.5C (101.3F), Mild Pain (1 - 3)  dextrose 40% Gel 15 Gram(s) Oral once PRN Blood Glucose LESS THAN 70 milliGRAM(s)/deciliter  glucagon  Injectable 1 milliGRAM(s) IntraMuscular once PRN Glucose LESS THAN 70 milligrams/deciliter  glucagon  Injectable 1 milliGRAM(s) IntraMuscular once PRN Glucose LESS THAN 70 milligrams/deciliter  hydrALAZINE Injectable 10 milliGRAM(s) IV Push every 2 hours PRN SBP > 140  senna 2 Tablet(s) Oral at bedtime PRN Constipation  sodium chloride 0.9% lock flush 10 milliLiter(s) IV Push every 1 hour PRN Pre/post blood products, medications, blood draw, and to maintain line patency      Allergies    No Known Allergies    Intolerances        ROS: As per HPI, otherwise negative    Vital Signs Last 24 Hrs  T(C): 34.9 (07 Aug 2020 09:15), Max: 38.1 (06 Aug 2020 17:15)  T(F): 94.9 (07 Aug 2020 09:15), Max: 100.6 (06 Aug 2020 17:15)  HR: 91 (07 Aug 2020 09:00) (82 - 119)  BP: 123/59 (07 Aug 2020 09:00) (108/57 - 152/72)  BP(mean): 83 (07 Aug 2020 09:00) (77 - 104)  RR: 22 (07 Aug 2020 07:00) (11 - 25)  SpO2: 99% (07 Aug 2020 09:00) (96% - 99%)    Physical exam:    Neurologic:  Mental status: intubated, not sedated, comatose - not responsive to deep pain  pupils not responsive to light, +corneal reflex, no oculocephalic reflex, +gag reflex  no spontaneous movement  left UE withdraws more than right, both legs withdraw to noxious stimuli          LABS:                        9.3    14.26 )-----------( 155      ( 07 Aug 2020 06:27 )             28.8     08-07    155<H>  |  120<H>  |  27<H>  ----------------------------<  203<H>  3.7   |  26  |  0.85    Ca    7.8<L>      07 Aug 2020 06:27  Phos  2.7     08-07  Mg     2.8     08-07    TPro  5.6<L>  /  Alb  3.2<L>  /  TBili  0.3  /  DBili  x   /  AST  26  /  ALT  17  /  AlkPhos  42  08-07      Urinalysis Basic - ( 05 Aug 2020 11:51 )    Color: Yellow / Appearance: Clear / S.025 / pH: x  Gluc: x / Ketone: NEGATIVE  / Bili: Negative / Urobili: 0.2 E.U./dL   Blood: x / Protein: NEGATIVE mg/dL / Nitrite: NEGATIVE   Leuk Esterase: NEGATIVE / RBC: > 10 /HPF / WBC < 5 /HPF   Sq Epi: x / Non Sq Epi: 0-5 /HPF / Bacteria: Present /HPF        RADIOLOGY & ADDITIONAL TESTS:  < from: CT Head No Cont (20 @ 12:53) >  IMPRESSION:    *  Portable technique and significant streak artifact particularly within the posterior fossa, obscures evaluation of the brain parenchyma. Apparent hypoattenuation of the cerebral hemispheres and occipital lobes bilaterally, right greater than left which may be artifactual but appears more prominent when compared to the 2020; however underlying ischemia is not entirely excluded. Continued attention on follow-up is recommended.  *  Status post midline suboccipital craniectomy for evacuation of large posterior fossa hematoma with continued evolution of postsurgical changesincluding interval decrease in extensive extra-axial, intra-axial  and intra-ventricular air. Right-sided ventricular drainage catheter in place with relatively stable hydrocephalus.    < end of copied text >      EEG - Daily Summary:    Moderate generalized  slowing suggestive of a similar degree of diffuse or multifocal  dysfunction. The  findings   are     c/w  breach   artifact    over    the   Left    central   and  parietal    regions.  Generalized periodic discharges with triphasic morphology, which is a nonspecific finding often seen in the setting of a diffuse or multifocal pathologic process (e.g. from renal/hepatic disease), but may also indicate underlying hyperexcitability. Clinical correlation is advised. The findings  were discussed with Neurosurgery  team.     Assessment and Plan  59 year old male with PMH of HLD and unclear liver disease  with left cerebellar parenchymal bleed, small SAH, and small right posterior subdural hemorrhage s/p POD#1 cerebral angio which was negative for AVM & and SOC craniectomy evacuation of cerebellar hematoma, EVD in place. Hemorrhage may be secondary to hypertension. Stroke will continue to follow.      Secondary stroke prevention:  -NO antiplatelet as patient has hemorrhage  -Atorvastatin 80    Neuro  - Repeat CT head with any acute change in mental status.   - Keep temp <100F and maintain glucose 140-180.   - No antiplatelets/ AC due to bleed  - EEG with moderate slowing with GPDs - continue Keppra  - continue seizure prophylaxis and maintain seizure precautions     -A1C with Estimated Average Glucose Result: 5.6  - LDL: 85   - TSH: 0.460    DVT Prophylaxis:   - Bilateral SCDs     - inpatient management as per [facility]

## 2020-08-07 NOTE — PROGRESS NOTE ADULT - ATTENDING COMMENTS
Patient seen and examined with PA.  Agree with above.  Large left cerebellar ICH with IVH extension s/p posterior craniotomy  No major change from yesterday's exam - Question if his pupils were reactive to light.  Minimal withdrawal of left arm and right leg on exam.  Plan as per NSICU/Neurosurgery teams

## 2020-08-07 NOTE — PROGRESS NOTE ADULT - SUBJECTIVE AND OBJECTIVE BOX
=================================  NEUROCRITICAL CARE ATTENDING NOTE  =================================    VEDA WARREN   MRN-[id number]  HPI: 59M with dyslipidemia, h/o Hep B infection, presented with acute onset severe HA, dizziness, vomiting - brought to [facility] where CT showed L cerebellar hemorrhage.  Intubated for airway protection.  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  R EVD inserted, transferred to [facility] for further management.  given mannitol 25G in [facility],     COURSE IN THE HOSPITAL:   bleed   admitted to [facility], 23.4 given x1; OR for SOC evacuation of ICH   remained intubated overnight   tachycardia overnight, Tmax 38.4, ?PNA, ABx    Tmax 38.1 diuresed overnight 1.8L    Past Medical History: Hepatitis B HLD (hyperlipidemia)  Allergies:  No Known Allergies  Home meds:   ·	Omega-3 350 mg oral capsule: 1 cap(s) orally once a day  ·	tenofovir disoproxil fumarate 300 mg oral tablet: 1 tab(s) orally once a day    PHYSICAL EXAMINATION  T(C): 38.1 ( @ 01:34), Max: 38.1 ( @ 17:15)   HR: 98 ( @ 05:10) (92 - 119)  BP: 109/58 ( @ 04:00) (108/57 - 152/72)  RR: 21 ( @ 05:10) (11 - 25)  SpO2: 99% ( @ 05:10) (96% - 99%)  NEUROLOGIC EXAMINATION:  Patient opens eyes to noxious, B pupils 3mm sluggish, does not follow commands, localizes B UE R>L, withdraws B LE , (+) cough / gag   GENERAL: CPAP 10/5 40%  EENT:  anicteric  CARDIOVASCULAR: (+) S1 S2, normal rate and regular rhythm  PULMONARY: clear to auscultation bilaterally  ABDOMEN: soft, nontender with normoactive bowel sounds  EXTREMITIES: no edema  SKIN: no rash    LABS:   CAPILLARY BLOOD GLUCOSE 231 189 187 206 - given 12 units insulin lispro over 24h                        9.3    14.26 )-----------( 155      ( 07 Aug 2020 06:27 )             28.8     WBC: 14.26 ()  19.92 ()   Hb: 9.3 ()  9.9 ()   Plt: 155 ()  190 ()        155<H>  |  120<H>  |  27<H>  ----------------------------<  203<H>  3.7   |  26  |  0.85    Ca    7.8<L>      07 Aug 2020 06:27  Phos  2.7       Mg     2.8         TPro  5.6<L>  /  Alb  3.2<L>  /  TBili  0.3  /  DBili  x   /  AST  26  /  ALT  17  /  AlkPhos  42  07      Na:155 ( @ 06:27)  150 ( @ 03:31)  147 ( @ 05:42)  144 ( @ 15:15)   BUN: 27 ()  25 ()  23 ()  18 ()   Crea:0.85 ()  0.79 ()  0.91 ()  0.95 ()    0806 @ 07:01  -  08 @ 07:00  --------------------------------------------------------  IN: 3294.8 mL / OUT: 4347 mL / NET: -1052.2 mL    HbA1C = 5.6 ()  LDL = 185 ()   HDL = 36 () TG = 252 ()  TSH = 0.460 ()    Bacteriology:   UA NEG   CSF NGTD   Blood CS NG12h x2    Culture - CSF with Gram Stain (collected )  Gram Stain ():    Rare White blood cells    No organisms seen  Preliminary Report ():    No growth to date    Culture - Blood (collected )  Preliminary Report ():    No growth at 1 day.    Culture - Blood (collected )  Preliminary Report ():    No growth at 1 day.        CSF studies:  EEG:  Neuroimagin/04 CT head:  post-surgical changes, stable HCP  Other imagin/04 LE doppler: NEG   CXR:  mild atelectasis L lung base    MEDICATIONS:   piperacillin/tazobactam 3.375 IV q6h tenofovir 300mg PO daily vancomycin 1G IV q12h levetiracetam 1G IV q12h pantoprazole 40 IV daily atorvastatin 80mg PO HS dexamethasone 6mg IV q6h mod ISS Peridex senna HS PRN   cardene    MEDICATIONS:     enoxaparin Injectable 40 SubCutaneous <User Schedule>  piperacillin/tazobactam IVPB.. 3.375 IV Intermittent every 6 hours  tenofovir disoproxil fumarate (VIREAD) 300 Oral every 24 hours  vancomycin  IVPB 1000 IV Intermittent every 12 hours  levETIRAcetam  IVPB 1000 IV Intermittent every 12 hours  amLODIPine   Tablet 10 milliGRAM(s) Oral daily  doxazosin 2 milliGRAM(s) Oral at bedtime  niCARdipine Infusion 5 mG/Hr IV Continuous <Continuous>  pantoprazole  Injectable 40 IV Push daily  atorvastatin 80 Oral at bedtime  dexAMETHasone     Tablet  Oral   dexAMETHasone     Tablet 4 Oral every 6 hours  dexAMETHasone     Tablet 2 Oral every 8 hours  dextrose 50% Injectable 12.5 IV Push once  dextrose 50% Injectable 25 IV Push once  dextrose 50% Injectable 25 IV Push once  dextrose 50% Injectable 25 IV Push once  insulin lispro (HumaLOG) corrective regimen sliding scale  SubCutaneous every 6 hours  chlorhexidine 0.12% Liquid 15 Oral Mucosa every 12 hours  chlorhexidine 4% Liquid 1 Topical <User Schedule>  dextrose 5%. 1000 IV Continuous <Continuous>  acetaminophen   Tablet .. 650 Oral every 6 hours PRN  dextrose 40% Gel 15 Oral once PRN  glucagon  Injectable 1 IntraMuscular once PRN  glucagon  Injectable 1 IntraMuscular once PRN  hydrALAZINE Injectable 10 IV Push every 2 hours PRN  senna 2 Oral at bedtime PRN  sodium chloride 0.9% lock flush 10 IV Push every 1 hour PRN  niCARdipine Infusion 5 mG/Hr (25 mL/Hr) IV Continuous <Continuous>      IV FLUIDS: NS@100  DRIPS: cardene @ 2.5  DIET:  Lines:  Drains:  Matt R IJ   EVD at 7cm H20  35cc/hr   EVD at 5cm H20 ICP 2-5 68cc/24h   EVD at 5cm H20    Wounds:    CODE STATUS:  Full Code                       GOALS OF CARE:  aggressive                      DISPOSITION:  ICU  ICH Score on admission  = 4, NIHSS =================================  NEUROCRITICAL CARE ATTENDING NOTE  =================================    VEDA WARREN   MRN-[id number]  HPI: 59M with dyslipidemia, h/o Hep B infection, presented with acute onset severe HA, dizziness, vomiting - brought to [facility] where CT showed L cerebellar hemorrhage.  Intubated for airway protection.  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  R EVD inserted, transferred to [facility] for further management.  given mannitol 25G in [facility],     COURSE IN THE HOSPITAL:   bleed   admitted to [facility], 23.4 given x1; OR for SOC evacuation of ICH   remained intubated overnight   tachycardia overnight, Tmax 38.4, ?PNA, ABx    Tmax 38.1 diuresed overnight 1.8L    Past Medical History: Hepatitis B HLD (hyperlipidemia)  Allergies:  No Known Allergies  Home meds:   ·	Omega-3 350 mg oral capsule: 1 cap(s) orally once a day  ·	tenofovir disoproxil fumarate 300 mg oral tablet: 1 tab(s) orally once a day    PHYSICAL EXAMINATION  T(C): 38.1 ( @ 01:34), Max: 38.1 ( @ 17:15)  HR: 98 ( @ 05:10) (92 - 119) BP: 109/58 ( @ 04:00) (108/57 - 152/72) RR: 21 ( @ 05:10) (11 - 25) SpO2: 99% ( @ 05:10) (96% - 99%)  NEUROLOGIC EXAMINATION:  Patient opens eyes to noxious stimulation, follows commands, pupils 3mm sluggish, does not follow commands, moving all 4s  GENERAL: CPAP 10/5 40%  EENT:  anicteric  CARDIOVASCULAR: (+) S1 S2, normal rate and regular rhythm  PULMONARY: clear to auscultation bilaterally  ABDOMEN: soft, nontender with normoactive bowel sounds  EXTREMITIES: no edema  SKIN: no rash    LABS:   CAPILLARY BLOOD GLUCOSE 231 189 187 206 - given 12 units insulin lispro over 24h             (19)      9.3  (9.9)  14.26 )-----------( 155      ( 07 Aug 2020 06:27 )             28.8     155<H>  |  120<H>  |  27<H>  ----------------------------<  203<H>  3.7   |  26  |  0.85    Ca    7.8<L>      07 Aug 2020 06:27  Phos  2.7       Mg     2.8         TPro  5.6<L>  /  Alb  3.2<L>  /  TBili  0.3  /  DBili  x   /  AST  26  /  ALT  17  /  AlkPhos  42   @ 07:01  -   @ 07:00  IN: 3294.8 mL / OUT: 4347 mL / NET: -1052.2 mL    HbA1C = 5.6 ()  LDL = 185 ()   HDL = 36 () TG = 252 ()  TSH = 0.460 ()    Bacteriology:   UA NEG   CSF NGTD   Blood CS NG1D x2    CSF studies:  EEG:  Neuroimagin/06 CT head:     CT head:  post-surgical changes, stable HCP  Other imagin/04 LE doppler: NEG   CXR:  mild atelectasis L lung base    MEDICATIONS:    SQL 40 daily piperacillin/tazobactam 3.375 IV q6h tenofovir q24h vancomycin 1G IV q12h levetiracetam 1G IV q12h amlodipine 10mg PO daily doxazosin 2mg PO HS pantoprazole 40 IV daily atorvastatin 80mg PO HS dexamethasone 4mg PO q6h mod ISS Peridex senna 2mg HS PRN     IV FLUIDS: NS@100  DRIPS: cardene @ 10  DIET:  Lines:  Drains:  Vernell Lewis   EVD at 7cm H20  35cc/hr   EVD at 5cm H20 ICP 2-5 68cc/24h   EVD at 5cm H20    EVD at 7cm H20 ICP 1-3 202cc/24h    Wounds:    CODE STATUS:  Full Code                       GOALS OF CARE:  aggressive                      DISPOSITION:  ICU  ICH Score on admission  = 4, NIHSS

## 2020-08-07 NOTE — PROGRESS NOTE ADULT - SUBJECTIVE AND OBJECTIVE BOX
HPI:  59 year old male with PMH of HLD and unclear liver disease had acute onset of severe headache, dizziness and vomiting x1 today, was brought into [facility] with continued symptoms, as well as hypertension and multiple episodes of vomiting. Patient was intubated for airway protection with CT Head performed demonstrating a large left cerebellar hemorrhage. CTA Head/Neck performed is suggestive of underlying vascular pathology such as AVM. RIght frontal ventriculostomy placed at [facility] and patient transferred to [facility] for further work-up. Patient arrives to [facility] intubated, sedated and on Nicardipine drip. Patient's son provided history and denies any Aspirin or other anticoagulant use. Denies any prior history of hypertension, smoking, or ETOH abuse. (04 Aug 2020 05:01)          Hospital Course:   POD# 0 S/P cerebral angio, negative for AVM. s/p SOC craniectomy evacuation of cerebellar hematoma.   POD # 1:   overnight tachycardic, ekg without significant changes, lactate wnl, low grade fever, s/p tyl. bolus x 1L , NS increased to 100cc/hr; neuro improving.  EVD at 5 cmH2O   8/6 POD#3, BD#4, Tmax 100.6, Pan Cx 7/5 NGTD, Vanc/Zosyn until 8/10, Vanc trough due today @ 6pm, EEG = no seizure, Lasix overnight for Pulm congest and tachycard, ruff placed for strict I&Os, pending anemia w/u, EVD @ 5, CPAP trials, Card GTT for SBP<140, Started Norvasc for BP optimization      ICU Vital Signs Last 24 Hrs  T(C): 38.1 (07 Aug 2020 01:34), Max: 38.1 (06 Aug 2020 17:15)  T(F): 100.6 (07 Aug 2020 01:34), Max: 100.6 (06 Aug 2020 17:15)  HR: 94 (07 Aug 2020 03:00) (94 - 119)  BP: 110/58 (07 Aug 2020 03:00) (108/57 - 152/72)  BP(mean): 80 (07 Aug 2020 03:00) (77 - 104)  ABP: 118/53 (07 Aug 2020 03:00) (116/53 - 153/64)  ABP(mean): 71 (07 Aug 2020 03:00) (70 - 89)  RR: 14 (07 Aug 2020 03:00) (11 - 25)  SpO2: 99% (07 Aug 2020 03:00) (96% - 99%)          PHYSICAL EXAM:  Intubated, off sedation, opening eyes to noxious stim.   + cough, + gag, +corneals   Pupils 4mm bilaterally sluggish reactive   briskly localizing in all ext, R side better than Left  ruff   EVD @ 5cm H2O  A line       Incision/Wound: crani incision site c/d/i     DEVICE/DRAIN DRESSING:    TUBES/LINES:  [x] CVC  [x] A-line  [] Lumbar Drain  [x] Ventriculostomy  [] Other    DIET:  [] NPO  [] Mechanical  [x] Tube feeds    LABS:          Pending AM Collection Labs             Urinalysis Basic - ( 05 Aug 2020 11:51 )    Color: Yellow / Appearance: Clear / S.025 / pH: x  Gluc: x / Ketone: NEGATIVE  / Bili: Negative / Urobili: 0.2 E.U./dL   Blood: x / Protein: NEGATIVE mg/dL / Nitrite: NEGATIVE   Leuk Esterase: NEGATIVE / RBC: > 10 /HPF / WBC < 5 /HPF   Sq Epi: x / Non Sq Epi: 0-5 /HPF / Bacteria: Present /HPF      CARDIAC MARKERS ( 04 Aug 2020 05:03 )  x     / <0.01 ng/mL / x     / x     / x          CAPILLARY BLOOD GLUCOSE      POCT Blood Glucose.: 187 mg/dL (05 Aug 2020 21:38)  POCT Blood Glucose.: 179 mg/dL (05 Aug 2020 17:44)  POCT Blood Glucose.: 169 mg/dL (05 Aug 2020 11:12)  POCT Blood Glucose.: 172 mg/dL (05 Aug 2020 05:49)      Drug Levels: [] N/A    CSF Analysis: [] N/A  RBC Count - Spinal Fluid: 666409 /uL ( @ 11:26)  CSF Lymphocytes: 5 % ( @ 11:26)  Glucose, CSF: 117 mg/dL ( @ 11:26)  Protein, CSF: 39 mg/dL ( @ 11:26)      Allergies    No Known Allergies    Intolerances      MEDICATIONS:  Antibiotics:  tenofovir disoproxil fumarate (VIREAD) 300 milliGRAM(s) Oral every 24 hours    Neuro:  acetaminophen   Tablet .. 650 milliGRAM(s) Oral every 6 hours PRN  levETIRAcetam  IVPB 1000 milliGRAM(s) IV Intermittent every 12 hours    Anticoagulation:    OTHER:  atorvastatin 80 milliGRAM(s) Oral at bedtime  chlorhexidine 0.12% Liquid 15 milliLiter(s) Oral Mucosa every 12 hours  chlorhexidine 4% Liquid 1 Application(s) Topical <User Schedule>  dexAMETHasone  Injectable 6 milliGRAM(s) IV Push every 6 hours  dextrose 40% Gel 15 Gram(s) Oral once PRN  dextrose 50% Injectable 12.5 Gram(s) IV Push once  dextrose 50% Injectable 25 Gram(s) IV Push once  dextrose 50% Injectable 25 Gram(s) IV Push once  dextrose 50% Injectable 25 Gram(s) IV Push once  glucagon  Injectable 1 milliGRAM(s) IntraMuscular once PRN  glucagon  Injectable 1 milliGRAM(s) IntraMuscular once PRN  hydrALAZINE Injectable 10 milliGRAM(s) IV Push every 2 hours PRN  insulin lispro (HumaLOG) corrective regimen sliding scale   SubCutaneous every 6 hours  niCARdipine Infusion 5 mG/Hr IV Continuous <Continuous>  pantoprazole  Injectable 40 milliGRAM(s) IV Push daily  senna 2 Tablet(s) Oral at bedtime PRN    IVF:  dextrose 5%. 1000 milliLiter(s) IV Continuous <Continuous>  sodium chloride 0.9%. 1000 milliLiter(s) IV Continuous <Continuous>    CULTURES:  Culture Results:   No growth at 12 hours ( @ 11:44)  Culture Results:   No growth at 12 hours ( @ 11:44)    RADIOLOGY & ADDITIONAL TESTS:      ASSESSMENT:  59yMale HLD, ?Hep B? presents with acute severe headache, dizziness and vomiting, found to have large left cerebellar IPH with IVH, s/p right frontal EVD at [facility] and transfer to [facility] for further intervention, NIH 28, ICH5, BD#4    INTRACRANIAL BLEED  No pertinent family history in first degree relatives  Handoff  Hepatitis B  HLD (hyperlipidemia)  ICH (intracerebral hemorrhage)  ICH (intracerebral hemorrhage)  Hepatitis B  HLD (hyperlipidemia)  HIV (human immunodeficiency virus infection)  Cerebellar hemorrhage  Craniectomy, subocciptal, with cervical laminectomy for medulla and spinal cord decompression  Percutaneous insertion of arterial line  Angiogram, carotid and cerebral vessels, bilateral  Foot fracture, left      Plan:     Neuro:   - neurochecks q 1   - vitals q 1   - pain control prn with tyl  - post op CT stable   - cont. decadron - 1 week taper   - cont. keppra 1 g BID   - EEG is off = no evidence of seizure  - Stroke core measure     Cardio:   - SBP goal < 140  - cont. cardene   - Started Norvasc    Pulm:   - CPAP trials  - on full vent support now  - put in cpap   - echo with no HF     Renal:   - Lasix for Pulm Congest  - ruff placed for strict I&Os      GI:   - Diet- tf   - PPI for ulcer prophylaxis   - Bowel regimen prn     Heme:   - H&H stable     ID:   - f/u Lam Cx  - trend WBC, + leukocytosis  - s/p bolus        Endo:   - ISS         DVT PROPHYLAXIS:  [x] Venodynes                                [x] Heparin/Lovenox    FALL RISK:  [] Low Risk                                    [] Impulsive    DISPOSITION: ICU status       Assessment:  Present when checked    []  GCS  E   V  M     Heart Failure: []Acute, [] acute on chronic , []chronic  Heart Failure:  [] Diastolic (HFpEF), [] Systolic (HFrEF), []Combined (HFpEF and HFrEF), [] RHF, [] Pulm HTN, [] Other    [] SARAH, [] ATN, [] AIN, [] other  [] CKD1, [] CKD2, [] CKD 3, [] CKD 4, [] CKD 5, []ESRD    Encephalopathy: [] Metabolic, [] Hepatic, [] toxic, [] Neurological, [] Other    Abnormal Nurtitional Status: [] malnurtition (see nutrition note), [ ]underweight: BMI < 19, [] morbid obesity: BMI >40, [] Cachexia    [] Sepsis  [] hypovolemic shock,[] cardiogenic shock, [] hemorrhagic shock, [] neuogenic shock  [] Acute Respiratory Failure  []Cerebral edema, [] Brain compression/ herniation,   [] Functional quadriplegia  [] Acute blood loss anemia

## 2020-08-07 NOTE — PROGRESS NOTE ADULT - ASSESSMENT
59y/M with  1.	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	Hepatitis B  3.	dyslipidemia     PLAN:   NEURO: neurochecks q1h  EVD at 5 cm H20  open to drain   decadron - taper quickly if ok with NS  neurostimulant: start amantadine 100mg PO BID   seizure prophylaxis: levetiracetam  as per NS  REHAB:  physical therapy evaluation and management    EARLY MOB:  HOB up    PULM:  CPAP 8/5  CARDIO:  SBP goal 100-140mm Hg start metoprolol 25mg PO BID, continue amlodipine, taper off cardene  ENDO:  Blood sugar goals 140-180 mg/dL, continue insulin sliding, continue statins; insulin glargine 10 x1 now  GI:  PPI for GI prophylaxis  DIET: jevity   RENAL:  IVL, start free water 250 q8h   HEM/ONC: no coagulopathy, no h/o antiplatelet / anticoagulant use  VTE Prophylaxis: SCDs, SQL   ID: afebrile, leukocytosis, piperacillin/tazobactam vanc, vanc trough prior to 4th dose (last day 08/10)  Social: son Perry updated ([phone]).  2 daughters Shantell  and Tammie ; wife name is Julián Sahu    CORE MEASURES:  1.  NIHSS =   2.  ICH Score = 4  3.  VTE prophylaxis: [ ] administered within 24h of admission OR [x] reason not done - fresh bleed  4.  Dysphagia screening: [X] performed before any oral meds / liquids / food    ATTENDING ATTESTATION:  I was physically present for the key portions of the evaluation and management (E/M) service provided.  I agree with the above history, physical and plan, which I have reviewed and edited where appropriate.    Patient at high risk for neurological deterioration or death due to:  ICU delirium, aspiration PNA, DVT / PE.  Critical care time:  I have personally provided 30 minutes of critical care time, excluding time spent on separate procedures.      Plan discussed with RN, house staff.

## 2020-08-07 NOTE — PROGRESS NOTE ADULT - SUBJECTIVE AND OBJECTIVE BOX
=================================  NEUROCRITICAL CARE ATTENDING NOTE  =================================    VEDA WARREN   MRN-[id number]  HPI: 59M with dyslipidemia, h/o Hep B infection, presented with acute onset severe HA, dizziness, vomiting - brought to [facility] where CT showed L cerebellar hemorrhage.  Intubated for airway protection.  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  R EVD inserted, transferred to [facility] for further management.  given mannitol 25G in [facility],     COURSE IN THE HOSPITAL:   bleed   admitted to [facility], 23.4 given x1; OR for SOC evacuation of ICH   remained intubated overnight   tachycardia overnight, Tmax 38.4, ?PNA, ABx    Tmax 38.1 diuresed overnight 1.8L    Past Medical History: Hepatitis B HLD (hyperlipidemia)  Allergies:  No Known Allergies  Home meds:   ·	Omega-3 350 mg oral capsule: 1 cap(s) orally once a day  ·	tenofovir disoproxil fumarate 300 mg oral tablet: 1 tab(s) orally once a day    PHYSICAL EXAMINATION  T(C): 38.1 ( @ 01:34), Max: 38.1 ( @ 17:15)  HR: 98 ( @ 05:10) (92 - 119) BP: 109/58 ( @ 04:00) (108/57 - 152/72) RR: 21 ( @ 05:10) (11 - 25) SpO2: 99% ( @ 05:10) (96% - 99%)  NEUROLOGIC EXAMINATION:  Patient opens eyes to noxious stimulation, follows commands, pupils 3mm sluggish, does not follow commands, moving all 4s  GENERAL: CPAP 10/5 40%  EENT:  anicteric  CARDIOVASCULAR: (+) S1 S2, normal rate and regular rhythm  PULMONARY: clear to auscultation bilaterally  ABDOMEN: soft, nontender with normoactive bowel sounds  EXTREMITIES: no edema  SKIN: no rash    LABS:   CAPILLARY BLOOD GLUCOSE 231 189 187 206 - given 12 units insulin lispro over 24h             (19)      9.3  (9.9)  14.26 )-----------( 155      ( 07 Aug 2020 06:27 )             28.8     155<H>  |  120<H>  |  27<H>  ----------------------------<  203<H>  3.7   |  26  |  0.85    Ca    7.8<L>      07 Aug 2020 06:27  Phos  2.7       Mg     2.8         TPro  5.6<L>  /  Alb  3.2<L>  /  TBili  0.3  /  DBili  x   /  AST  26  /  ALT  17  /  AlkPhos  42   @ 07:01  -   @ 07:00  IN: 3294.8 mL / OUT: 4347 mL / NET: -1052.2 mL    HbA1C = 5.6 ()  LDL = 185 ()   HDL = 36 () TG = 252 ()  TSH = 0.460 ()    Bacteriology:   UA NEG   CSF NGTD   Blood CS NG1D x2    CSF studies:  EEG:  Neuroimagin/06 CT head:     CT head:  post-surgical changes, stable HCP  Other imagin/04 LE doppler: NEG   CXR:  mild atelectasis L lung base    MEDICATIONS:    SQL 40 daily piperacillin/tazobactam 3.375 IV q6h tenofovir q24h vancomycin 1G IV q12h levetiracetam 1G IV q12h amlodipine 10mg PO daily doxazosin 2mg PO HS pantoprazole 40 IV daily atorvastatin 80mg PO HS dexamethasone 4mg PO q6h mod ISS Peridex senna 2mg HS PRN     IV FLUIDS: NS@100  DRIPS: cardene @ 10  DIET:  Lines:  Drains:  Vernell Lewis   EVD at 7cm H20  35cc/hr   EVD at 5cm H20 ICP 2-5 68cc/24h   EVD at 5cm H20    EVD at 7cm H20 ICP 1-3 202cc/24h    Wounds:    CODE STATUS:  Full Code                       GOALS OF CARE:  aggressive                      DISPOSITION:  ICU  ICH Score on admission  = 4, NIHSS

## 2020-08-08 PROBLEM — Z00.00 ENCOUNTER FOR PREVENTIVE HEALTH EXAMINATION: Status: ACTIVE | Noted: 2020-08-08

## 2020-08-08 LAB
ANION GAP SERPL CALC-SCNC: 8 MMOL/L — SIGNIFICANT CHANGE UP (ref 5–17)
BUN SERPL-MCNC: 27 MG/DL — HIGH (ref 7–23)
CALCIUM SERPL-MCNC: 7.8 MG/DL — LOW (ref 8.4–10.5)
CHLORIDE SERPL-SCNC: 118 MMOL/L — HIGH (ref 96–108)
CO2 SERPL-SCNC: 28 MMOL/L — SIGNIFICANT CHANGE UP (ref 22–31)
CREAT SERPL-MCNC: 0.76 MG/DL — SIGNIFICANT CHANGE UP (ref 0.5–1.3)
GLUCOSE BLDC GLUCOMTR-MCNC: 156 MG/DL — HIGH (ref 70–99)
GLUCOSE BLDC GLUCOMTR-MCNC: 161 MG/DL — HIGH (ref 70–99)
GLUCOSE BLDC GLUCOMTR-MCNC: 204 MG/DL — HIGH (ref 70–99)
GLUCOSE BLDC GLUCOMTR-MCNC: 206 MG/DL — HIGH (ref 70–99)
GLUCOSE SERPL-MCNC: 209 MG/DL — HIGH (ref 70–99)
HCT VFR BLD CALC: 29.3 % — LOW (ref 39–50)
HGB BLD-MCNC: 9.4 G/DL — LOW (ref 13–17)
MAGNESIUM SERPL-MCNC: 3 MG/DL — HIGH (ref 1.6–2.6)
MCHC RBC-ENTMCNC: 28.5 PG — SIGNIFICANT CHANGE UP (ref 27–34)
MCHC RBC-ENTMCNC: 32.1 GM/DL — SIGNIFICANT CHANGE UP (ref 32–36)
MCV RBC AUTO: 88.8 FL — SIGNIFICANT CHANGE UP (ref 80–100)
NRBC # BLD: 0 /100 WBCS — SIGNIFICANT CHANGE UP (ref 0–0)
PHOSPHATE SERPL-MCNC: 2.3 MG/DL — LOW (ref 2.5–4.5)
PLATELET # BLD AUTO: 151 K/UL — SIGNIFICANT CHANGE UP (ref 150–400)
POTASSIUM SERPL-MCNC: 3.8 MMOL/L — SIGNIFICANT CHANGE UP (ref 3.5–5.3)
POTASSIUM SERPL-SCNC: 3.8 MMOL/L — SIGNIFICANT CHANGE UP (ref 3.5–5.3)
RBC # BLD: 3.3 M/UL — LOW (ref 4.2–5.8)
RBC # FLD: 14.4 % — SIGNIFICANT CHANGE UP (ref 10.3–14.5)
SODIUM SERPL-SCNC: 154 MMOL/L — HIGH (ref 135–145)
WBC # BLD: 12.93 K/UL — HIGH (ref 3.8–10.5)
WBC # FLD AUTO: 12.93 K/UL — HIGH (ref 3.8–10.5)

## 2020-08-08 PROCEDURE — 99291 CRITICAL CARE FIRST HOUR: CPT

## 2020-08-08 PROCEDURE — 99232 SBSQ HOSP IP/OBS MODERATE 35: CPT

## 2020-08-08 PROCEDURE — 71045 X-RAY EXAM CHEST 1 VIEW: CPT | Mod: 26

## 2020-08-08 PROCEDURE — 99292 CRITICAL CARE ADDL 30 MIN: CPT | Mod: 24

## 2020-08-08 RX ORDER — INSULIN GLARGINE 100 [IU]/ML
14 INJECTION, SOLUTION SUBCUTANEOUS EVERY MORNING
Refills: 0 | Status: DISCONTINUED | OUTPATIENT
Start: 2020-08-09 | End: 2020-08-09

## 2020-08-08 RX ORDER — AMANTADINE HCL 100 MG
200 CAPSULE ORAL
Refills: 0 | Status: DISCONTINUED | OUTPATIENT
Start: 2020-08-08 | End: 2020-08-12

## 2020-08-08 RX ORDER — ENOXAPARIN SODIUM 100 MG/ML
40 INJECTION SUBCUTANEOUS DAILY
Refills: 0 | Status: DISCONTINUED | OUTPATIENT
Start: 2020-08-08 | End: 2020-08-08

## 2020-08-08 RX ORDER — LEVETIRACETAM 250 MG/1
1000 TABLET, FILM COATED ORAL
Refills: 0 | Status: DISCONTINUED | OUTPATIENT
Start: 2020-08-08 | End: 2020-08-17

## 2020-08-08 RX ORDER — INSULIN HUMAN 100 [IU]/ML
3 INJECTION, SOLUTION SUBCUTANEOUS EVERY 6 HOURS
Refills: 0 | Status: DISCONTINUED | OUTPATIENT
Start: 2020-08-08 | End: 2020-08-09

## 2020-08-08 RX ORDER — VANCOMYCIN HCL 1 G
VIAL (EA) INTRAVENOUS
Refills: 0 | Status: DISCONTINUED | OUTPATIENT
Start: 2020-08-08 | End: 2020-08-08

## 2020-08-08 RX ORDER — PANTOPRAZOLE SODIUM 20 MG/1
40 TABLET, DELAYED RELEASE ORAL DAILY
Refills: 0 | Status: DISCONTINUED | OUTPATIENT
Start: 2020-08-08 | End: 2020-08-08

## 2020-08-08 RX ORDER — POTASSIUM PHOSPHATE, MONOBASIC POTASSIUM PHOSPHATE, DIBASIC 236; 224 MG/ML; MG/ML
30 INJECTION, SOLUTION INTRAVENOUS ONCE
Refills: 0 | Status: COMPLETED | OUTPATIENT
Start: 2020-08-08 | End: 2020-08-08

## 2020-08-08 RX ORDER — FUROSEMIDE 40 MG
30 TABLET ORAL ONCE
Refills: 0 | Status: COMPLETED | OUTPATIENT
Start: 2020-08-09 | End: 2020-08-09

## 2020-08-08 RX ORDER — AMANTADINE HCL 100 MG
200 CAPSULE ORAL ONCE
Refills: 0 | Status: COMPLETED | OUTPATIENT
Start: 2020-08-08 | End: 2020-08-08

## 2020-08-08 RX ORDER — METOPROLOL TARTRATE 50 MG
75 TABLET ORAL EVERY 12 HOURS
Refills: 0 | Status: DISCONTINUED | OUTPATIENT
Start: 2020-08-08 | End: 2020-08-10

## 2020-08-08 RX ORDER — INSULIN GLARGINE 100 [IU]/ML
14 INJECTION, SOLUTION SUBCUTANEOUS ONCE
Refills: 0 | Status: COMPLETED | OUTPATIENT
Start: 2020-08-08 | End: 2020-08-08

## 2020-08-08 RX ORDER — FUROSEMIDE 40 MG
20 TABLET ORAL ONCE
Refills: 0 | Status: COMPLETED | OUTPATIENT
Start: 2020-08-08 | End: 2020-08-08

## 2020-08-08 RX ORDER — VANCOMYCIN HCL 1 G
1250 VIAL (EA) INTRAVENOUS EVERY 12 HOURS
Refills: 0 | Status: DISCONTINUED | OUTPATIENT
Start: 2020-08-08 | End: 2020-08-09

## 2020-08-08 RX ORDER — PANTOPRAZOLE SODIUM 20 MG/1
40 TABLET, DELAYED RELEASE ORAL DAILY
Refills: 0 | Status: DISCONTINUED | OUTPATIENT
Start: 2020-08-08 | End: 2020-08-12

## 2020-08-08 RX ADMIN — TENOFOVIR DISOPROXIL FUMARATE 300 MILLIGRAM(S): 300 TABLET, FILM COATED ORAL at 21:49

## 2020-08-08 RX ADMIN — INSULIN GLARGINE 14 UNIT(S): 100 INJECTION, SOLUTION SUBCUTANEOUS at 11:44

## 2020-08-08 RX ADMIN — Medication 4 MILLIGRAM(S): at 23:17

## 2020-08-08 RX ADMIN — Medication 50 MILLIGRAM(S): at 07:30

## 2020-08-08 RX ADMIN — ATORVASTATIN CALCIUM 80 MILLIGRAM(S): 80 TABLET, FILM COATED ORAL at 21:49

## 2020-08-08 RX ADMIN — PIPERACILLIN AND TAZOBACTAM 200 GRAM(S): 4; .5 INJECTION, POWDER, LYOPHILIZED, FOR SOLUTION INTRAVENOUS at 07:29

## 2020-08-08 RX ADMIN — Medication 1 DROP(S): at 21:55

## 2020-08-08 RX ADMIN — AMLODIPINE BESYLATE 10 MILLIGRAM(S): 2.5 TABLET ORAL at 07:30

## 2020-08-08 RX ADMIN — Medication 1 DROP(S): at 13:33

## 2020-08-08 RX ADMIN — Medication 20 MILLIGRAM(S): at 10:19

## 2020-08-08 RX ADMIN — INSULIN HUMAN 3 UNIT(S): 100 INJECTION, SOLUTION SUBCUTANEOUS at 12:52

## 2020-08-08 RX ADMIN — NICARDIPINE HYDROCHLORIDE 25 MG/HR: 30 CAPSULE, EXTENDED RELEASE ORAL at 23:15

## 2020-08-08 RX ADMIN — CHLORHEXIDINE GLUCONATE 15 MILLILITER(S): 213 SOLUTION TOPICAL at 18:15

## 2020-08-08 RX ADMIN — Medication 2 MILLIGRAM(S): at 13:33

## 2020-08-08 RX ADMIN — CHLORHEXIDINE GLUCONATE 1 APPLICATION(S): 213 SOLUTION TOPICAL at 07:30

## 2020-08-08 RX ADMIN — Medication 200 MILLIGRAM(S): at 10:21

## 2020-08-08 RX ADMIN — PANTOPRAZOLE SODIUM 40 MILLIGRAM(S): 20 TABLET, DELAYED RELEASE ORAL at 12:44

## 2020-08-08 RX ADMIN — Medication 166.67 MILLIGRAM(S): at 18:15

## 2020-08-08 RX ADMIN — Medication 200 MILLIGRAM(S): at 15:50

## 2020-08-08 RX ADMIN — Medication 166.67 MILLIGRAM(S): at 03:38

## 2020-08-08 RX ADMIN — Medication 2: at 23:20

## 2020-08-08 RX ADMIN — LEVETIRACETAM 1000 MILLIGRAM(S): 250 TABLET, FILM COATED ORAL at 21:49

## 2020-08-08 RX ADMIN — CHLORHEXIDINE GLUCONATE 15 MILLILITER(S): 213 SOLUTION TOPICAL at 07:29

## 2020-08-08 RX ADMIN — Medication 4: at 11:48

## 2020-08-08 RX ADMIN — INSULIN HUMAN 3 UNIT(S): 100 INJECTION, SOLUTION SUBCUTANEOUS at 18:32

## 2020-08-08 RX ADMIN — ENOXAPARIN SODIUM 40 MILLIGRAM(S): 100 INJECTION SUBCUTANEOUS at 21:49

## 2020-08-08 RX ADMIN — Medication 50 MILLIGRAM(S): at 17:28

## 2020-08-08 RX ADMIN — Medication 4: at 07:22

## 2020-08-08 RX ADMIN — Medication 2 MILLIGRAM(S): at 07:29

## 2020-08-08 RX ADMIN — PIPERACILLIN AND TAZOBACTAM 200 GRAM(S): 4; .5 INJECTION, POWDER, LYOPHILIZED, FOR SOLUTION INTRAVENOUS at 00:14

## 2020-08-08 RX ADMIN — POTASSIUM PHOSPHATE, MONOBASIC POTASSIUM PHOSPHATE, DIBASIC 83.33 MILLIMOLE(S): 236; 224 INJECTION, SOLUTION INTRAVENOUS at 10:19

## 2020-08-08 RX ADMIN — Medication 2: at 17:30

## 2020-08-08 RX ADMIN — PIPERACILLIN AND TAZOBACTAM 200 GRAM(S): 4; .5 INJECTION, POWDER, LYOPHILIZED, FOR SOLUTION INTRAVENOUS at 11:44

## 2020-08-08 RX ADMIN — PIPERACILLIN AND TAZOBACTAM 200 GRAM(S): 4; .5 INJECTION, POWDER, LYOPHILIZED, FOR SOLUTION INTRAVENOUS at 17:28

## 2020-08-08 NOTE — PROGRESS NOTE ADULT - ATTENDING COMMENTS
ATTENDING ATTESTATION:  I was physically present for the key portions of the evaluation and management (E/M) service provided.  I agree with the above history, physical and plan, which I have reviewed and edited where appropriate.    Patient at high risk for neurological deterioration or death due to:  ICU delirium, aspiration PNA, DVT / PE.  Critical care time:  I have personally provided 30 minutes of critical care time, excluding time spent on separate procedures.      Plan discussed with RN, house staff.

## 2020-08-08 NOTE — PROGRESS NOTE ADULT - SUBJECTIVE AND OBJECTIVE BOX
=================================  NEUROCRITICAL CARE ATTENDING NOTE  =================================    VEDA WARREN   MRN-[id number]  HPI: 59M with dyslipidemia, h/o Hep B infection, presented with acute onset severe HA, dizziness, vomiting - brought to [facility] where CT showed L cerebellar hemorrhage.  Intubated for airway protection.  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  R EVD inserted, transferred to [facility] for further management.  given mannitol 25G in [facility],     COURSE IN THE HOSPITAL:   bleed   admitted to [facility], 23.4 given x1; OR for SOC evacuation of ICH   remained intubated overnight   tachycardia overnight, Tmax 38.4, ?PNA, ABx    Tmax 38.1 diuresed overnight 1.8L    Past Medical History: Hepatitis B HLD (hyperlipidemia)  Allergies:  No Known Allergies  Home meds:   ·	Omega-3 350 mg oral capsule: 1 cap(s) orally once a day  ·	tenofovir disoproxil fumarate 300 mg oral tablet: 1 tab(s) orally once a day    PHYSICAL EXAMINATION  T(C): 38.1 ( @ 01:34), Max: 38.1 ( @ 17:15)  HR: 98 ( @ 05:10) (92 - 119) BP: 109/58 ( @ 04:00) (108/57 - 152/72) RR: 21 ( @ 05:10) (11 - 25) SpO2: 99% ( @ 05:10) (96% - 99%)  NEUROLOGIC EXAMINATION:  Patient opens eyes to noxious stimulation, follows commands, pupils 3mm sluggish, does not follow commands, moving all 4s  GENERAL: CPAP 10/5 40%  EENT:  anicteric  CARDIOVASCULAR: (+) S1 S2, normal rate and regular rhythm  PULMONARY: clear to auscultation bilaterally  ABDOMEN: soft, nontender with normoactive bowel sounds  EXTREMITIES: no edema  SKIN: no rash    LABS:   CAPILLARY BLOOD GLUCOSE 231 189 187 206 - given 12 units insulin lispro over 24h             (19)      9.3  (9.9)  14.26 )-----------( 155      ( 07 Aug 2020 06:27 )             28.8     155<H>  |  120<H>  |  27<H>  ----------------------------<  203<H>  3.7   |  26  |  0.85    Ca    7.8<L>      07 Aug 2020 06:27  Phos  2.7       Mg     2.8         TPro  5.6<L>  /  Alb  3.2<L>  /  TBili  0.3  /  DBili  x   /  AST  26  /  ALT  17  /  AlkPhos  42   @ 07:01  -   @ 07:00  IN: 3294.8 mL / OUT: 4347 mL / NET: -1052.2 mL    HbA1C = 5.6 ()  LDL = 185 ()   HDL = 36 () TG = 252 ()  TSH = 0.460 ()    Bacteriology:   UA NEG   CSF NGTD   Blood CS NG1D x2    CSF studies:  EEG:  Neuroimagin/06 CT head:     CT head:  post-surgical changes, stable HCP  Other imagin/04 LE doppler: NEG   CXR:  mild atelectasis L lung base    MEDICATIONS:    SQL 40 daily piperacillin/tazobactam 3.375 IV q6h tenofovir q24h vancomycin 1G IV q12h levetiracetam 1G IV q12h amlodipine 10mg PO daily doxazosin 2mg PO HS pantoprazole 40 IV daily atorvastatin 80mg PO HS dexamethasone 4mg PO q6h mod ISS Peridex senna 2mg HS PRN     IV FLUIDS: NS@100  DRIPS: cardene @ 10  DIET:  Lines:  Drains:  Vernell Lewis   EVD at 7cm H20  35cc/hr   EVD at 5cm H20 ICP 2-5 68cc/24h   EVD at 5cm H20    EVD at 7cm H20 ICP 1-3 202cc/24h    Wounds:    CODE STATUS:  Full Code                       GOALS OF CARE:  aggressive                      DISPOSITION:  ICU  ICH Score on admission  = 4, NIHSS

## 2020-08-08 NOTE — PROGRESS NOTE ADULT - ASSESSMENT
59y/M with  1.	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	Hepatitis B  3.	dyslipidemia     PLAN:   NEURO: neurochecks q1h,   EVD at 5 cm H20  open to drain   decadron - taper quickly if ok with NS  neurostimulant: inc amantadine 200mg PO BID   seizure prophylaxis: levetiracetam  as per NS  REHAB:  physical therapy evaluation and management    EARLY MOB:  HOB up    PULM:  CPAP 8/5 40%, furosemide 20mg IV x1; 1L net negative   CARDIO:  SBP goal 100-140mm Hg start metoprolol 25mg PO BID, continue amlodipine, taper off cardene  ENDO:  Blood sugar goals 140-180 mg/dL, continue insulin sliding, continue statins; insulin glargine 14, nutritional 3-3-3-3  GI:  PPI for GI prophylaxis; d/c bowel regimen   DIET: jevity   RENAL:  IVL, start free water 250 q6h, cont cardura, TOV tomorrow  HEM/ONC: no coagulopathy, no h/o antiplatelet / anticoagulant use  VTE Prophylaxis: SCDs, SQL   ID: afebrile, leukocytosis, piperacillin/tazobactam vanc, vanc trough prior to 4th dose (last day 08/10)  Social: updated Tammie yesterday; son Perry updated ([phone]).  2 daughters Shantell  and Tammie ; wife name is Julián Sahu    CORE MEASURES:  1.  NIHSS =   2.  ICH Score = 4  3.  VTE prophylaxis: [ ] administered within 24h of admission OR [x] reason not done - fresh bleed  4.  Dysphagia screening: [X] performed before any oral meds / liquids / food

## 2020-08-08 NOTE — PROGRESS NOTE ADULT - ASSESSMENT
59y/M with  1.	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	Hepatitis B  3.	dyslipidemia     PLAN:   NEURO: neurochecks q1h  EVD at 5 cm H20  open to drain   decadron - taper quickly if ok with NS  neurostimulant: start amantadine 100mg PO BID   seizure prophylaxis: levetiracetam  as per NS  REHAB:  physical therapy evaluation and management    EARLY MOB:  HOB up    PULM:  CPAP 8/5  CARDIO:  SBP goal 100-140mm Hg start metoprolol 25mg PO BID, continue amlodipine, taper off cardene  ENDO:  Blood sugar goals 140-180 mg/dL, continue insulin sliding, continue statins; insulin glargine 10 x1 now  GI:  PPI for GI prophylaxis  DIET: jevity   RENAL:  IVL, start free water 250 q8h   HEM/ONC: no coagulopathy, no h/o antiplatelet / anticoagulant use  VTE Prophylaxis: SCDs, SQL   ID: afebrile, leukocytosis, piperacillin/tazobactam vanc, vanc trough prior to 4th dose (last day 08/10)  Social: son Perry updated ([phone]).  2 daughters Shantell  and Tammie ; wife name is Julián Sahu    CORE MEASURES:  1.  NIHSS =   2.  ICH Score = 4  3.  VTE prophylaxis: [ ] administered within 24h of admission OR [x] reason not done - fresh bleed  4.  Dysphagia screening: [X] performed before any oral meds / liquids / food    ATTENDING ATTESTATION:  I was physically present for the key portions of the evaluation and management (E/M) service provided.  I agree with the above history, physical and plan, which I have reviewed and edited where appropriate.    Patient at high risk for neurological deterioration or death due to:  ICU delirium, aspiration PNA, DVT / PE.  Critical care time:  I have personally provided 30 minutes of critical care time, excluding time spent on separate procedures.      Plan discussed with RN, house staff.

## 2020-08-08 NOTE — PROGRESS NOTE ADULT - SUBJECTIVE AND OBJECTIVE BOX
S/Overnight events: No signigicant events overnight. On CPAP this morning       Hospital Course:   POD#       Vital Signs Last 24 Hrs  T(C): 37.7 (08 Aug 2020 09:00), Max: 37.7 (08 Aug 2020 09:00)  T(F): 99.9 (08 Aug 2020 09:00), Max: 99.9 (08 Aug 2020 09:00)  HR: 84 (08 Aug 2020 13:00) (72 - 105)  BP: 117/59 (08 Aug 2020 01:00) (116/59 - 145/67)  BP(mean): 82 (08 Aug 2020 01:00) (81 - 97)  RR: 16 (08 Aug 2020 13:00) (11 - 20)  SpO2: 100% (08 Aug 2020 13:00) (96% - 100%)    I&O's Detail    07 Aug 2020 07:01  -  08 Aug 2020 07:00  --------------------------------------------------------  IN:    Enteral Tube Flush: 830 mL    IV PiggyBack: 900 mL    niCARdipine Infusion: 440 mL    ns in tub fed  [id number]: 1035 mL    Solution: 100 mL  Total IN: 3305 mL    OUT:    External Ventricular Device: 194 mL    Indwelling Catheter - Urethral: 2808 mL  Total OUT: 3002 mL    Total NET: 303 mL      08 Aug 2020 07:01  -  08 Aug 2020 13:13  --------------------------------------------------------  IN:    IV PiggyBack: 100 mL    niCARdipine Infusion: 150 mL    ns in tub fed  [id number]: 45 mL    Solution: 333.2 mL  Total IN: 628.2 mL    OUT:    External Ventricular Device: 76 mL    Indwelling Catheter - Urethral: 1235 mL  Total OUT: 1311 mL    Total NET: -682.8 mL        I&O's Summary    07 Aug 2020 07:01  -  08 Aug 2020 07:00  --------------------------------------------------------  IN: 3305 mL / OUT: 3002 mL / NET: 303 mL    08 Aug 2020 07:01  -  08 Aug 2020 13:13  --------------------------------------------------------  IN: 628.2 mL / OUT: 1311 mL / NET: -682.8 mL        PHYSICAL EXAM:  Gen:  HEENT:  Neck:  Lungs:  Heart:  Abd:  Exts:  Neuro:    TUBES/LINES:  [] CVC  [] A-line  [] Lumbar Drain  [] Ventriculostomy  [] Other    DIET:  [] NPO  [] Mechanical  [] Tube feeds    LABS:                        9.4    12.93 )-----------( 151      ( 08 Aug 2020 05:28 )             29.3     08-08    154<H>  |  118<H>  |  27<H>  ----------------------------<  209<H>  3.8   |  28  |  0.76    Ca    7.8<L>      08 Aug 2020 05:28  Phos  2.3     08-08  Mg     3.0     08-08    TPro  5.6<L>  /  Alb  3.2<L>  /  TBili  0.3  /  DBili  x   /  AST  26  /  ALT  17  /  AlkPhos  42  08-07            CAPILLARY BLOOD GLUCOSE      POCT Blood Glucose.: 206 mg/dL (08 Aug 2020 11:28)  POCT Blood Glucose.: 204 mg/dL (08 Aug 2020 06:37)  POCT Blood Glucose.: 243 mg/dL (07 Aug 2020 21:51)  POCT Blood Glucose.: 199 mg/dL (07 Aug 2020 17:49)      Drug Levels: [] N/A  Vancomycin Level, Trough: 6.7 ug/mL (08-07 @ 17:57)    CSF Analysis: [] N/A      Allergies    No Known Allergies    Intolerances      MEDICATIONS:  Antibiotics:  piperacillin/tazobactam IVPB.. 3.375 Gram(s) IV Intermittent every 6 hours  tenofovir disoproxil fumarate (VIREAD) 300 milliGRAM(s) Oral every 24 hours  vancomycin  IVPB 1250 milliGRAM(s) IV Intermittent every 12 hours    Neuro:  acetaminophen   Tablet .. 650 milliGRAM(s) Oral every 6 hours PRN  amantadine Syrup 200 milliGRAM(s) Oral <User Schedule>  levETIRAcetam  Solution 1000 milliGRAM(s) Oral two times a day    Anticoagulation:  enoxaparin Injectable 40 milliGRAM(s) SubCutaneous <User Schedule>    OTHER:  amLODIPine   Tablet 10 milliGRAM(s) Oral daily  artificial tears (preservative free) Ophthalmic Solution 1 Drop(s) Both EYES three times a day  atorvastatin 80 milliGRAM(s) Oral at bedtime  chlorhexidine 0.12% Liquid 15 milliLiter(s) Oral Mucosa every 12 hours  chlorhexidine 4% Liquid 1 Application(s) Topical <User Schedule>  dexAMETHasone     Tablet   Oral   dexAMETHasone     Tablet 2 milliGRAM(s) Oral every 8 hours  dextrose 40% Gel 15 Gram(s) Oral once PRN  dextrose 50% Injectable 12.5 Gram(s) IV Push once  dextrose 50% Injectable 25 Gram(s) IV Push once  dextrose 50% Injectable 25 Gram(s) IV Push once  dextrose 50% Injectable 25 Gram(s) IV Push once  doxazosin 4 milliGRAM(s) Oral at bedtime  glucagon  Injectable 1 milliGRAM(s) IntraMuscular once PRN  glucagon  Injectable 1 milliGRAM(s) IntraMuscular once PRN  hydrALAZINE Injectable 10 milliGRAM(s) IV Push every 2 hours PRN  insulin lispro (HumaLOG) corrective regimen sliding scale   SubCutaneous every 6 hours  insulin regular  human recombinant 3 Unit(s) SubCutaneous every 6 hours  metoprolol tartrate 50 milliGRAM(s) Oral every 12 hours  niCARdipine Infusion 5 mG/Hr IV Continuous <Continuous>  pantoprazole  Injectable 40 milliGRAM(s) IV Push daily    IVF:  dextrose 5%. 1000 milliLiter(s) IV Continuous <Continuous>    CULTURES:  Culture Results:   Numerous Enterobacter cloacae complex  Susceptibility to follow.  Culture in progress (08-07 @ 19:32)  Culture Results:   No growth to date (08-05 @ 11:46)    RADIOLOGY & ADDITIONAL TESTS:      ASSESSMENT:  59y Male s/p    INTRACRANIAL BLEED  No pertinent family history in first degree relatives  Handoff  Hepatitis B  HLD (hyperlipidemia)  ICH (intracerebral hemorrhage)  ICH (intracerebral hemorrhage)  Hepatitis B  HLD (hyperlipidemia)  HIV (human immunodeficiency virus infection)  Cerebellar hemorrhage  Craniectomy, subocciptal, with cervical laminectomy for medulla and spinal cord decompression  Percutaneous insertion of arterial line  Angiogram, carotid and cerebral vessels, bilateral  Foot fracture, left      PLAN:  NEURO:    CARDIOVASCULAR:    PULMONARY:    RENAL:    GI:    ID:    ENDO:    DVT PROPHYLAXIS:    DISPOSITION:       Assessment:  Present when checked    []  GCS  E   V  M     Heart Failure: []Acute, [] acute on chronic , []chronic  Heart Failure:  [] Diastolic (HFpEF), [] Systolic (HFrEF), []Combined (HFpEF and HFrEF), [] RHF, [] Pulm HTN, [] Other    [] SARAH, [] ATN, [] AIN, [] other  [] CKD1, [] CKD2, [] CKD 3, [] CKD 4, [] CKD 5, []ESRD    Encephalopathy: [] Metabolic, [] Hepatic, [] toxic, [] Neurological, [] Other    Abnormal Nurtitional Status: [] malnurtition (see nutrition note), [ ]underweight: BMI < 19, [] morbid obesity: BMI >40, [] Cachexia    [] Sepsis  [] hypovolemic shock,[] cardiogenic shock, [] hemorrhagic shock, [] neuogenic shock  [] Acute Respiratory Failure  []Cerebral edema, [] Brain compression/ herniation,   [] Functional quadriplegia  [] Acute blood loss anemia Overnight events: No significant events. On CPAP this AM    HPI:  59 year old male with PMH of HLD and unclear liver disease had acute onset of severe headache, dizziness and vomiting x1 today, was brought into [facility] with continued symptoms, as well as hypertension and multiple episodes of vomiting. Patient was intubated for airway protection with CT Head performed demonstrating a large left cerebellar hemorrhage. CTA Head/Neck performed is suggestive of underlying vascular pathology such as AVM. RIght frontal ventriculostomy placed at [facility] and patient transferred to [facility] for further work-up. Patient arrives to [facility] intubated, sedated and on Nicardipine drip. Patient's son provided history and denies any Aspirin or other anticoagulant use. Denies any prior history of hypertension, smoking, or ETOH abuse. (04 Aug 2020 05:01)      POD# 0 S/P cerebral angio, negative for AVM. s/p SOC craniectomy evacuation of cerebellar hematoma.   POD # 1:   overnight tachycardic, ekg without significant changes, lactate wnl, low grade fever, s/p tyl. bolus x 1L , NS increased to 100cc/hr; neuro improving.  EVD at 5 cmH2O   8/6 POD#3, BD#4, Tmax 100.6, Pan Cx 7/5 NGTD, Vanc/Zosyn until 8/10, Vanc trough due today @ 6pm, EEG = no seizure, Lasix overnight for Pulm congest and tachycard, ruff placed for strict I&Os, pending anemia w/u, EVD @ 5, CPAP trials, Card GTT for SBP<140, Started Norvasc for BP optimization  8/7:  8/8:  S/P cerebral angio, negative for AVM. s/p SOC craniectomy evacuation of cerebellar hematoma. No signigicant events overnight. On CPAP this morning       Vital Signs Last 24 Hrs  T(C): 37.7 (08 Aug 2020 09:00), Max: 37.7 (08 Aug 2020 09:00)  T(F): 99.9 (08 Aug 2020 09:00), Max: 99.9 (08 Aug 2020 09:00)  HR: 84 (08 Aug 2020 13:00) (72 - 105)  BP: 117/59 (08 Aug 2020 01:00) (116/59 - 145/67)  BP(mean): 82 (08 Aug 2020 01:00) (81 - 97)  RR: 16 (08 Aug 2020 13:00) (11 - 20)  SpO2: 100% (08 Aug 2020 13:00) (96% - 100%)    I&O's Detail    07 Aug 2020 07:01  -  08 Aug 2020 07:00  --------------------------------------------------------  IN:    Enteral Tube Flush: 830 mL    IV PiggyBack: 900 mL    niCARdipine Infusion: 440 mL    ns in tub fed  [id number]: 1035 mL    Solution: 100 mL  Total IN: 3305 mL    OUT:    External Ventricular Device: 194 mL    Indwelling Catheter - Urethral: 2808 mL  Total OUT: 3002 mL    Total NET: 303 mL      08 Aug 2020 07:01  -  08 Aug 2020 13:13  --------------------------------------------------------  IN:    IV PiggyBack: 100 mL    niCARdipine Infusion: 150 mL    ns in tub fed  [id number]: 45 mL    Solution: 333.2 mL  Total IN: 628.2 mL    OUT:    External Ventricular Device: 76 mL    Indwelling Catheter - Urethral: 1235 mL  Total OUT: 1311 mL    Total NET: -682.8 mL        I&O's Summary    07 Aug 2020 07:01  -  08 Aug 2020 07:00  --------------------------------------------------------  IN: 3305 mL / OUT: 3002 mL / NET: 303 mL    08 Aug 2020 07:01  -  08 Aug 2020 13:13  --------------------------------------------------------  IN: 628.2 mL / OUT: 1311 mL / NET: -682.8 mL        PHYSICAL EXAM:  Intubated   OE to noxious stim, following simple commands  +cough, +gag, + corneal   pupils 4mm slowly reactive to light   Localizing to pain on all ext's, more significant on right side     EVD @ 5 cm H20    TUBES/LINES:  [] CVC  [] A-line  [] Lumbar Drain  [] Ventriculostomy  [] Other    DIET:  [] NPO  [] Mechanical  [] Tube feeds    LABS:                        9.4    12.93 )-----------( 151      ( 08 Aug 2020 05:28 )             29.3     08-08    154<H>  |  118<H>  |  27<H>  ----------------------------<  209<H>  3.8   |  28  |  0.76    Ca    7.8<L>      08 Aug 2020 05:28  Phos  2.3     08-08  Mg     3.0     08-08    TPro  5.6<L>  /  Alb  3.2<L>  /  TBili  0.3  /  DBili  x   /  AST  26  /  ALT  17  /  AlkPhos  42  08-07            CAPILLARY BLOOD GLUCOSE      POCT Blood Glucose.: 206 mg/dL (08 Aug 2020 11:28)  POCT Blood Glucose.: 204 mg/dL (08 Aug 2020 06:37)  POCT Blood Glucose.: 243 mg/dL (07 Aug 2020 21:51)  POCT Blood Glucose.: 199 mg/dL (07 Aug 2020 17:49)      Drug Levels: [] N/A  Vancomycin Level, Trough: 6.7 ug/mL (08-07 @ 17:57)    CSF Analysis: [] N/A      Allergies    No Known Allergies    Intolerances      MEDICATIONS:  Antibiotics:  piperacillin/tazobactam IVPB.. 3.375 Gram(s) IV Intermittent every 6 hours  tenofovir disoproxil fumarate (VIREAD) 300 milliGRAM(s) Oral every 24 hours  vancomycin  IVPB 1250 milliGRAM(s) IV Intermittent every 12 hours    Neuro:  acetaminophen   Tablet .. 650 milliGRAM(s) Oral every 6 hours PRN  amantadine Syrup 200 milliGRAM(s) Oral <User Schedule>  levETIRAcetam  Solution 1000 milliGRAM(s) Oral two times a day    Anticoagulation:  enoxaparin Injectable 40 milliGRAM(s) SubCutaneous <User Schedule>    OTHER:  amLODIPine   Tablet 10 milliGRAM(s) Oral daily  artificial tears (preservative free) Ophthalmic Solution 1 Drop(s) Both EYES three times a day  atorvastatin 80 milliGRAM(s) Oral at bedtime  chlorhexidine 0.12% Liquid 15 milliLiter(s) Oral Mucosa every 12 hours  chlorhexidine 4% Liquid 1 Application(s) Topical <User Schedule>  dexAMETHasone     Tablet   Oral   dexAMETHasone     Tablet 2 milliGRAM(s) Oral every 8 hours  dextrose 40% Gel 15 Gram(s) Oral once PRN  dextrose 50% Injectable 12.5 Gram(s) IV Push once  dextrose 50% Injectable 25 Gram(s) IV Push once  dextrose 50% Injectable 25 Gram(s) IV Push once  dextrose 50% Injectable 25 Gram(s) IV Push once  doxazosin 4 milliGRAM(s) Oral at bedtime  glucagon  Injectable 1 milliGRAM(s) IntraMuscular once PRN  glucagon  Injectable 1 milliGRAM(s) IntraMuscular once PRN  hydrALAZINE Injectable 10 milliGRAM(s) IV Push every 2 hours PRN  insulin lispro (HumaLOG) corrective regimen sliding scale   SubCutaneous every 6 hours  insulin regular  human recombinant 3 Unit(s) SubCutaneous every 6 hours  metoprolol tartrate 50 milliGRAM(s) Oral every 12 hours  niCARdipine Infusion 5 mG/Hr IV Continuous <Continuous>  pantoprazole  Injectable 40 milliGRAM(s) IV Push daily    IVF:  dextrose 5%. 1000 milliLiter(s) IV Continuous <Continuous>    CULTURES:  Culture Results:   Numerous Enterobacter cloacae complex  Susceptibility to follow.  Culture in progress (08-07 @ 19:32)  Culture Results:   No growth to date (08-05 @ 11:46)    RADIOLOGY & ADDITIONAL TESTS:      ASSESSMENT:  59y Male s/p SOC craniectomy evacuation of cerebellar hematoma.     INTRACRANIAL BLEED  No pertinent family history in first degree relatives  Handoff  Hepatitis B  HLD (hyperlipidemia)  ICH (intracerebral hemorrhage)  ICH (intracerebral hemorrhage)  Hepatitis B  HLD (hyperlipidemia)  HIV (human immunodeficiency virus infection)  Cerebellar hemorrhage  Craniectomy, subocciptal, with cervical laminectomy for medulla and spinal cord decompression  Percutaneous insertion of arterial line  Angiogram, carotid and cerebral vessels, bilateral  Foot fracture, left      PLAN:  NEURO:  -neuro checks q1hr  -EVD remains open at 5 cm H20  -Amantadine 200mg BID  -Keppra continued for sz ppx    CARDIOVASCULAR:    PULMONARY:    RENAL:    GI:    ID:    ENDO:    DVT PROPHYLAXIS:    DISPOSITION:       Assessment:  Present when checked    []  GCS  E   V  M     Heart Failure: []Acute, [] acute on chronic , []chronic  Heart Failure:  [] Diastolic (HFpEF), [] Systolic (HFrEF), []Combined (HFpEF and HFrEF), [] RHF, [] Pulm HTN, [] Other    [] SARAH, [] ATN, [] AIN, [] other  [] CKD1, [] CKD2, [] CKD 3, [] CKD 4, [] CKD 5, []ESRD    Encephalopathy: [] Metabolic, [] Hepatic, [] toxic, [] Neurological, [] Other    Abnormal Nurtitional Status: [] malnurtition (see nutrition note), [ ]underweight: BMI < 19, [] morbid obesity: BMI >40, [] Cachexia    [] Sepsis  [] hypovolemic shock,[] cardiogenic shock, [] hemorrhagic shock, [] neuogenic shock  [] Acute Respiratory Failure  []Cerebral edema, [] Brain compression/ herniation,   [] Functional quadriplegia  [] Acute blood loss anemia Overnight events: No significant events. On CPAP this AM    HPI:  59 year old male with PMH of HLD and unclear liver disease had acute onset of severe headache, dizziness and vomiting x1 today, was brought into [facility] with continued symptoms, as well as hypertension and multiple episodes of vomiting. Patient was intubated for airway protection with CT Head performed demonstrating a large left cerebellar hemorrhage. CTA Head/Neck performed is suggestive of underlying vascular pathology such as AVM. RIght frontal ventriculostomy placed at [facility] and patient transferred to [facility] for further work-up. Patient arrives to [facility] intubated, sedated and on Nicardipine drip. Patient's son provided history and denies any Aspirin or other anticoagulant use. Denies any prior history of hypertension, smoking, or ETOH abuse. (04 Aug 2020 05:01)      POD# 0 S/P cerebral angio, negative for AVM. s/p SOC craniectomy evacuation of cerebellar hematoma.   POD # 1:   overnight tachycardic, ekg without significant changes, lactate wnl, low grade fever, s/p tyl. bolus x 1L , NS increased to 100cc/hr; neuro improving.  EVD at 5 cmH2O   8/6 POD#3, BD#4, Tmax 100.6, Pan Cx 7/5 NGTD, Vanc/Zosyn until 8/10, Vanc trough due today @ 6pm, EEG = no seizure, Lasix overnight for Pulm congest and tachycard, ruff placed for strict I&Os, pending anemia w/u, EVD @ 5, CPAP trials, Card GTT for SBP<140, Started Norvasc for BP optimization  8/7:  8/8:  S/P cerebral angio, negative for AVM. s/p SOC craniectomy evacuation of cerebellar hematoma. No signigicant events overnight. On CPAP this morning       Vital Signs Last 24 Hrs  T(C): 37.7 (08 Aug 2020 09:00), Max: 37.7 (08 Aug 2020 09:00)  T(F): 99.9 (08 Aug 2020 09:00), Max: 99.9 (08 Aug 2020 09:00)  HR: 84 (08 Aug 2020 13:00) (72 - 105)  BP: 117/59 (08 Aug 2020 01:00) (116/59 - 145/67)  BP(mean): 82 (08 Aug 2020 01:00) (81 - 97)  RR: 16 (08 Aug 2020 13:00) (11 - 20)  SpO2: 100% (08 Aug 2020 13:00) (96% - 100%)    I&O's Detail    07 Aug 2020 07:01  -  08 Aug 2020 07:00  --------------------------------------------------------  IN:    Enteral Tube Flush: 830 mL    IV PiggyBack: 900 mL    niCARdipine Infusion: 440 mL    ns in tub fed  [id number]: 1035 mL    Solution: 100 mL  Total IN: 3305 mL    OUT:    External Ventricular Device: 194 mL    Indwelling Catheter - Urethral: 2808 mL  Total OUT: 3002 mL    Total NET: 303 mL      08 Aug 2020 07:01  -  08 Aug 2020 13:13  --------------------------------------------------------  IN:    IV PiggyBack: 100 mL    niCARdipine Infusion: 150 mL    ns in tub fed  [id number]: 45 mL    Solution: 333.2 mL  Total IN: 628.2 mL    OUT:    External Ventricular Device: 76 mL    Indwelling Catheter - Urethral: 1235 mL  Total OUT: 1311 mL    Total NET: -682.8 mL        I&O's Summary    07 Aug 2020 07:01  -  08 Aug 2020 07:00  --------------------------------------------------------  IN: 3305 mL / OUT: 3002 mL / NET: 303 mL    08 Aug 2020 07:01  -  08 Aug 2020 13:13  --------------------------------------------------------  IN: 628.2 mL / OUT: 1311 mL / NET: -682.8 mL        PHYSICAL EXAM:  Intubated   OE to noxious stim, following simple commands  +cough, +gag, + corneal   pupils 4mm slowly reactive to light   Localizing to pain on all ext's, more significant on right side     EVD @ 5 cm H20    TUBES/LINES:  [] CVC  [] A-line  [] Lumbar Drain  [] Ventriculostomy  [] Other    DIET:  [] NPO  [] Mechanical  [] Tube feeds    LABS:                        9.4    12.93 )-----------( 151      ( 08 Aug 2020 05:28 )             29.3     08-08    154<H>  |  118<H>  |  27<H>  ----------------------------<  209<H>  3.8   |  28  |  0.76    Ca    7.8<L>      08 Aug 2020 05:28  Phos  2.3     08-08  Mg     3.0     08-08    TPro  5.6<L>  /  Alb  3.2<L>  /  TBili  0.3  /  DBili  x   /  AST  26  /  ALT  17  /  AlkPhos  42  08-07            CAPILLARY BLOOD GLUCOSE      POCT Blood Glucose.: 206 mg/dL (08 Aug 2020 11:28)  POCT Blood Glucose.: 204 mg/dL (08 Aug 2020 06:37)  POCT Blood Glucose.: 243 mg/dL (07 Aug 2020 21:51)  POCT Blood Glucose.: 199 mg/dL (07 Aug 2020 17:49)      Drug Levels: [] N/A  Vancomycin Level, Trough: 6.7 ug/mL (08-07 @ 17:57)    CSF Analysis: [] N/A      Allergies    No Known Allergies    Intolerances      MEDICATIONS:  Antibiotics:  piperacillin/tazobactam IVPB.. 3.375 Gram(s) IV Intermittent every 6 hours  tenofovir disoproxil fumarate (VIREAD) 300 milliGRAM(s) Oral every 24 hours  vancomycin  IVPB 1250 milliGRAM(s) IV Intermittent every 12 hours    Neuro:  acetaminophen   Tablet .. 650 milliGRAM(s) Oral every 6 hours PRN  amantadine Syrup 200 milliGRAM(s) Oral <User Schedule>  levETIRAcetam  Solution 1000 milliGRAM(s) Oral two times a day    Anticoagulation:  enoxaparin Injectable 40 milliGRAM(s) SubCutaneous <User Schedule>    OTHER:  amLODIPine   Tablet 10 milliGRAM(s) Oral daily  artificial tears (preservative free) Ophthalmic Solution 1 Drop(s) Both EYES three times a day  atorvastatin 80 milliGRAM(s) Oral at bedtime  chlorhexidine 0.12% Liquid 15 milliLiter(s) Oral Mucosa every 12 hours  chlorhexidine 4% Liquid 1 Application(s) Topical <User Schedule>  dexAMETHasone     Tablet   Oral   dexAMETHasone     Tablet 2 milliGRAM(s) Oral every 8 hours  dextrose 40% Gel 15 Gram(s) Oral once PRN  dextrose 50% Injectable 12.5 Gram(s) IV Push once  dextrose 50% Injectable 25 Gram(s) IV Push once  dextrose 50% Injectable 25 Gram(s) IV Push once  dextrose 50% Injectable 25 Gram(s) IV Push once  doxazosin 4 milliGRAM(s) Oral at bedtime  glucagon  Injectable 1 milliGRAM(s) IntraMuscular once PRN  glucagon  Injectable 1 milliGRAM(s) IntraMuscular once PRN  hydrALAZINE Injectable 10 milliGRAM(s) IV Push every 2 hours PRN  insulin lispro (HumaLOG) corrective regimen sliding scale   SubCutaneous every 6 hours  insulin regular  human recombinant 3 Unit(s) SubCutaneous every 6 hours  metoprolol tartrate 50 milliGRAM(s) Oral every 12 hours  niCARdipine Infusion 5 mG/Hr IV Continuous <Continuous>  pantoprazole  Injectable 40 milliGRAM(s) IV Push daily    IVF:  dextrose 5%. 1000 milliLiter(s) IV Continuous <Continuous>    CULTURES:  Culture Results:   Numerous Enterobacter cloacae complex  Susceptibility to follow.  Culture in progress (08-07 @ 19:32)  Culture Results:   No growth to date (08-05 @ 11:46)    RADIOLOGY & ADDITIONAL TESTS:      ASSESSMENT:  59y Male s/p SOC craniectomy evacuation of cerebellar hematoma.     INTRACRANIAL BLEED  No pertinent family history in first degree relatives  Handoff  Hepatitis B  HLD (hyperlipidemia)  ICH (intracerebral hemorrhage)  ICH (intracerebral hemorrhage)  Hepatitis B  HLD (hyperlipidemia)  HIV (human immunodeficiency virus infection)  Cerebellar hemorrhage  Craniectomy, subocciptal, with cervical laminectomy for medulla and spinal cord decompression  Percutaneous insertion of arterial line  Angiogram, carotid and cerebral vessels, bilateral  Foot fracture, left      PLAN:  NEURO:  -neuro checks q1hr  -EVD remains open at 5 cm H20  -Amantadine 200mg BID  -Keppra continued for sz ppx    CARDIOVASCULAR:  -SBP goal 100-140 mmHg    PULMONARY:  -Continue vent support     RENAL:  -On free water 250 q6h  -Cardura to be continued     GI:  -PPI for GI ppx     ID:  -afebrile  -trend WBCs    ENDO:  -ISS    DVT PROPHYLAXIS: On Lovenox 40 subQ    DISPOSITION:   Continue ICU care  Plan d/w Dr. Sneed and Dr. Blank       Assessment:  Present when checked    []  GCS  E   V  M     Heart Failure: []Acute, [] acute on chronic , []chronic  Heart Failure:  [] Diastolic (HFpEF), [] Systolic (HFrEF), []Combined (HFpEF and HFrEF), [] RHF, [] Pulm HTN, [] Other    [] SARAH, [] ATN, [] AIN, [] other  [] CKD1, [] CKD2, [] CKD 3, [] CKD 4, [] CKD 5, []ESRD    Encephalopathy: [] Metabolic, [] Hepatic, [] toxic, [] Neurological, [] Other    Abnormal Nurtitional Status: [] malnurtition (see nutrition note), [ ]underweight: BMI < 19, [] morbid obesity: BMI >40, [] Cachexia    [] Sepsis  [] hypovolemic shock,[] cardiogenic shock, [] hemorrhagic shock, [] neuogenic shock  [] Acute Respiratory Failure  []Cerebral edema, [] Brain compression/ herniation,   [] Functional quadriplegia  [] Acute blood loss anemia

## 2020-08-08 NOTE — PROGRESS NOTE ADULT - ASSESSMENT
S/Overnight events:     HPI: 59M with dyslipidemia, h/o Hep B infection, presented with acute onset severe HA, dizziness, vomiting - brought to [facility] where CT showed L cerebellar hemorrhage.  Intubated for airway protection.  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  R EVD inserted, transferred to [facility] for further management.  given mannitol 25G in [facility],       Hospital Course:   POD#       Vital Signs Last 24 Hrs  T(C): 37.7 (08 Aug 2020 09:00), Max: 37.7 (08 Aug 2020 09:00)  T(F): 99.9 (08 Aug 2020 09:00), Max: 99.9 (08 Aug 2020 09:00)  HR: 80 (08 Aug 2020 12:00) (72 - 105)  BP: 117/59 (08 Aug 2020 01:00) (116/59 - 145/67)  BP(mean): 82 (08 Aug 2020 01:00) (81 - 97)  RR: 16 (08 Aug 2020 12:00) (11 - 20)  SpO2: 100% (08 Aug 2020 12:00) (96% - 100%)    I&O's Detail    07 Aug 2020 07:01  -  08 Aug 2020 07:00  --------------------------------------------------------  IN:    Enteral Tube Flush: 830 mL    IV PiggyBack: 900 mL    niCARdipine Infusion: 440 mL    ns in tub fed  [id number]: 1035 mL    Solution: 100 mL  Total IN: 3305 mL    OUT:    External Ventricular Device: 194 mL    Indwelling Catheter - Urethral: 2808 mL  Total OUT: 3002 mL    Total NET: 303 mL      08 Aug 2020 07:01  -  08 Aug 2020 12:47  --------------------------------------------------------  IN:    IV PiggyBack: 100 mL    niCARdipine Infusion: 100 mL    ns in tub fed  [id number]: 45 mL    Solution: 249.9 mL  Total IN: 494.9 mL    OUT:    External Ventricular Device: 62 mL    Indwelling Catheter - Urethral: 1085 mL  Total OUT: 1147 mL    Total NET: -652.1 mL        I&O's Summary    07 Aug 2020 07:01  -  08 Aug 2020 07:00  --------------------------------------------------------  IN: 3305 mL / OUT: 3002 mL / NET: 303 mL    08 Aug 2020 07:01  -  08 Aug 2020 12:47  --------------------------------------------------------  IN: 494.9 mL / OUT: 1147 mL / NET: -652.1 mL        PHYSICAL EXAM:  Gen:  HEENT:  Neck:  Lungs:  Heart:  Abd:  Exts:  Neuro:    TUBES/LINES:  [] CVC  [] A-line  [] Lumbar Drain  [] Ventriculostomy  [] Other    DIET:  [] NPO  [] Mechanical  [] Tube feeds    LABS:                        9.4    12.93 )-----------( 151      ( 08 Aug 2020 05:28 )             29.3     08-08    154<H>  |  118<H>  |  27<H>  ----------------------------<  209<H>  3.8   |  28  |  0.76    Ca    7.8<L>      08 Aug 2020 05:28  Phos  2.3     08-08  Mg     3.0     08-08    TPro  5.6<L>  /  Alb  3.2<L>  /  TBili  0.3  /  DBili  x   /  AST  26  /  ALT  17  /  AlkPhos  42  08-07            CAPILLARY BLOOD GLUCOSE      POCT Blood Glucose.: 206 mg/dL (08 Aug 2020 11:28)  POCT Blood Glucose.: 204 mg/dL (08 Aug 2020 06:37)  POCT Blood Glucose.: 243 mg/dL (07 Aug 2020 21:51)  POCT Blood Glucose.: 199 mg/dL (07 Aug 2020 17:49)      Drug Levels: [] N/A  Vancomycin Level, Trough: 6.7 ug/mL (08-07 @ 17:57)    CSF Analysis: [] N/A      Allergies    No Known Allergies    Intolerances      MEDICATIONS:  Antibiotics:  piperacillin/tazobactam IVPB.. 3.375 Gram(s) IV Intermittent every 6 hours  tenofovir disoproxil fumarate (VIREAD) 300 milliGRAM(s) Oral every 24 hours  vancomycin  IVPB 1250 milliGRAM(s) IV Intermittent every 12 hours    Neuro:  acetaminophen   Tablet .. 650 milliGRAM(s) Oral every 6 hours PRN  amantadine Syrup 200 milliGRAM(s) Oral <User Schedule>  levETIRAcetam  Solution 1000 milliGRAM(s) Oral two times a day    Anticoagulation:  enoxaparin Injectable 40 milliGRAM(s) SubCutaneous <User Schedule>    OTHER:  amLODIPine   Tablet 10 milliGRAM(s) Oral daily  artificial tears (preservative free) Ophthalmic Solution 1 Drop(s) Both EYES three times a day  atorvastatin 80 milliGRAM(s) Oral at bedtime  chlorhexidine 0.12% Liquid 15 milliLiter(s) Oral Mucosa every 12 hours  chlorhexidine 4% Liquid 1 Application(s) Topical <User Schedule>  dexAMETHasone     Tablet   Oral   dexAMETHasone     Tablet 2 milliGRAM(s) Oral every 8 hours  dextrose 40% Gel 15 Gram(s) Oral once PRN  dextrose 50% Injectable 12.5 Gram(s) IV Push once  dextrose 50% Injectable 25 Gram(s) IV Push once  dextrose 50% Injectable 25 Gram(s) IV Push once  dextrose 50% Injectable 25 Gram(s) IV Push once  doxazosin 4 milliGRAM(s) Oral at bedtime  glucagon  Injectable 1 milliGRAM(s) IntraMuscular once PRN  glucagon  Injectable 1 milliGRAM(s) IntraMuscular once PRN  hydrALAZINE Injectable 10 milliGRAM(s) IV Push every 2 hours PRN  insulin lispro (HumaLOG) corrective regimen sliding scale   SubCutaneous every 6 hours  insulin regular  human recombinant 3 Unit(s) SubCutaneous every 6 hours  metoprolol tartrate 50 milliGRAM(s) Oral every 12 hours  niCARdipine Infusion 5 mG/Hr IV Continuous <Continuous>  pantoprazole  Injectable 40 milliGRAM(s) IV Push daily    IVF:  dextrose 5%. 1000 milliLiter(s) IV Continuous <Continuous>    CULTURES:  Culture Results:   Numerous Enterobacter cloacae complex  Susceptibility to follow.  Culture in progress (08-07 @ 19:32)  Culture Results:   No growth to date (08-05 @ 11:46)    RADIOLOGY & ADDITIONAL TESTS:      ASSESSMENT:  59y Male s/p    INTRACRANIAL BLEED  No pertinent family history in first degree relatives  Handoff  Hepatitis B  HLD (hyperlipidemia)  ICH (intracerebral hemorrhage)  ICH (intracerebral hemorrhage)  Hepatitis B  HLD (hyperlipidemia)  HIV (human immunodeficiency virus infection)  Cerebellar hemorrhage  Craniectomy, subocciptal, with cervical laminectomy for medulla and spinal cord decompression  Percutaneous insertion of arterial line  Angiogram, carotid and cerebral vessels, bilateral  Foot fracture, left      PLAN:  NEURO:    CARDIOVASCULAR:    PULMONARY:    RENAL:    GI:    ID:    ENDO:    DVT PROPHYLAXIS:    DISPOSITION:       Assessment:  Present when checked    []  GCS  E   V  M     Heart Failure: []Acute, [] acute on chronic , []chronic  Heart Failure:  [] Diastolic (HFpEF), [] Systolic (HFrEF), []Combined (HFpEF and HFrEF), [] RHF, [] Pulm HTN, [] Other    [] SARAH, [] ATN, [] AIN, [] other  [] CKD1, [] CKD2, [] CKD 3, [] CKD 4, [] CKD 5, []ESRD    Encephalopathy: [] Metabolic, [] Hepatic, [] toxic, [] Neurological, [] Other    Abnormal Nurtitional Status: [] malnurtition (see nutrition note), [ ]underweight: BMI < 19, [] morbid obesity: BMI >40, [] Cachexia    [] Sepsis  [] hypovolemic shock,[] cardiogenic shock, [] hemorrhagic shock, [] neuogenic shock  [] Acute Respiratory Failure  []Cerebral edema, [] Brain compression/ herniation,   [] Functional quadriplegia  [] Acute blood loss anemia

## 2020-08-08 NOTE — PROGRESS NOTE ADULT - SUBJECTIVE AND OBJECTIVE BOX
=================================  NEUROCRITICAL CARE ATTENDING NOTE  =================================    VEDA WARREN   MRN-[id number]  HPI: 59M with dyslipidemia, h/o Hep B infection, presented with acute onset severe HA, dizziness, vomiting - brought to [facility] where CT showed L cerebellar hemorrhage.  Intubated for airway protection.  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  R EVD inserted, transferred to [facility] for further management.  given mannitol 25G in [facility],     COURSE IN THE HOSPITAL:   bleed   admitted to [facility], 23.4 given x1; OR for SOC evacuation of ICH   remained intubated overnight   tachycardia overnight, Tmax 38.4, ?PNA, ABx    Tmax 38.1 diuresed overnight 1.8L   No significant events overnight.     Past Medical History: Hepatitis B HLD (hyperlipidemia)  Allergies:  No Known Allergies  Home meds:   ·	Omega-3 350 mg oral capsule: 1 cap(s) orally once a day  ·	tenofovir disoproxil fumarate 300 mg oral tablet: 1 tab(s) orally once a day    PHYSICAL EXAMINATION  T(C): 37.1 ( @ 05:46), Max: 37.1 ( @ 17:30) HR: 81 ( @ 07:00) (72 - 105) BP: 117/59 ( @ 01:00) (116/59 - 145/67) RR: 20 ( @ 07:00) (12 - 20) SpO2: 99% ( @ 07:00) (96% - 100%)  NEUROLOGIC EXAMINATION:  Patient opens eyes to noxious stimulation, follows commands, pupils 3mm sluggish, does not follow commands, moving all 4s  GENERAL: CPAP 10/5 40%  EENT:  anicteric  CARDIOVASCULAR: (+) S1 S2, normal rate and regular rhythm  PULMONARY: clear to auscultation bilaterally  ABDOMEN: soft, nontender with normoactive bowel sounds  EXTREMITIES: no edema  SKIN: no rash    LABS:   CAPILLARY BLOOD GLUCOSE 204 243 199 203     (14.26)   9.4  (9.3)  12.93 )-----------( 151      ( 08 Aug 2020 05:28 )             29.3   (155)  154<H>  |  118<H>  |  27<H>  ----------------------------<  209<H>  3.8   |  28  |  0.76    Ca    7.8<L>      08 Aug 2020 05:28  Phos  2.3       Mg     3.0         TPro  5.6<L>  /  Alb  3.2<L>  /  TBili  0.3  /  DBili  x   /  AST  26  /  ALT  17  /  AlkPhos  42   @ 07:01  -   @ 07:00  IN: 3110 mL / OUT: 2805 mL / NET: 305 mL    HbA1C = 5.6 ()  LDL = 185 ()   HDL = 36 () TG = 252 ()  TSH = 0.460 ()    Bacteriology:  X   UA NEG   sputum few GNR   CSF NGTD   Blood CS NG2D x2    CSF studies:    L   *** [id number] *** %N31 %L5   EEG:  Neuroimagin/06 CT head:     CT head:  post-surgical changes, stable HCP  Other imagin/04 LE doppler: NEG   CXR:  mild atelectasis L lung base    MEDICATIONS:    SQL 40 daily piperacillin/tazobactam 3.375 IV q6h tenofovir q24h vancomycin 1G IV q12h levetiracetam 1G IV q12h amlodipine 10mg PO daily doxazosin 2mg PO HS pantoprazole 40 IV daily atorvastatin 80mg PO HS dexamethasone 4mg PO q6h mod ISS Peridex senna 2mg HS PRN   MEDICATIONS:  SQL 40 daily piperacillin/tazobactam 3.375 IV q6h tenofovir 300mg PO q24h vancomycin 1250 IV q12h amantadine 100mg PO BID levetiracetam 1G IV q12h amlodipine 10mg PO daily doxazosin 4mg PO HS metoprolol 50mg PO q12h pantoprazole 40 IV daily atorvastatin 80mg PO HS dexamethasone 2mg PO q8hh mod ISS Peridex senna PRN      IV FLUIDS: NS@100  DRIPS: cardene @ 10  DIET:  Lines:  Drains:  Vernell Lewis   EVD at 7cm H20  35cc/hr  / EVD at 5cm H20 ICP 2-5 68cc/24h  / EVD at 5cm H20   08/ EVD at 7cm H20 ICP 1-3 202cc/24h    Wounds:    CODE STATUS:  Full Code                       GOALS OF CARE:  aggressive                      DISPOSITION:  ICU  ICH Score on admission  = 4, NIHSS =================================  NEUROCRITICAL CARE ATTENDING NOTE  =================================    VEDA WARREN   MRN-[id number]  HPI: 59M with dyslipidemia, h/o Hep B infection, presented with acute onset severe HA, dizziness, vomiting - brought to [facility] where CT showed L cerebellar hemorrhage.  Intubated for airway protection.  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  R EVD inserted, transferred to [facility] for further management.  given mannitol 25G in [facility],     COURSE IN THE HOSPITAL:   bleed   admitted to [facility], 23.4 given x1; OR for SOC evacuation of ICH   remained intubated overnight   tachycardia overnight, Tmax 38.4, ?PNA, ABx    Tmax 38.1 diuresed overnight 1.8L.     No significant events overnight.  CPAP this morning    Past Medical History: Hepatitis B HLD (hyperlipidemia)  Allergies:  No Known Allergies  Home meds:   ·	Omega-3 350 mg oral capsule: 1 cap(s) orally once a day  ·	tenofovir disoproxil fumarate 300 mg oral tablet: 1 tab(s) orally once a day    PHYSICAL EXAMINATION  T(C): 37.1 ( @ 05:46), Max: 37.1 ( @ 17:30) HR: 81 ( @ 07:00) (72 - 105) BP: 117/59 ( @ 01:00) (116/59 - 145/67) RR: 20 ( @ 07:00) (12 - 20) SpO2: 99% ( @ 07:00) (96% - 100%)  NEUROLOGIC EXAMINATION:  Patient opens eyes to voice, follows commands, pupils 3mm sluggish, does not follow commands, moving all 4s  GENERAL: CPAP 10/5 40%  EENT:  anicteric  CARDIOVASCULAR: (+) S1 S2, normal rate and regular rhythm  PULMONARY: clear to auscultation bilaterally  ABDOMEN: soft, nontender with normoactive bowel sounds  EXTREMITIES: no edema  SKIN: no rash    LABS:   CAPILLARY BLOOD GLUCOSE 204 243 199 203  - insulin lispro 14 units over 24h     (14.26)   9.4  (9.3)  12.93 )-----------( 151      ( 08 Aug 2020 05:28 )             29.3   (155)  154<H>  |  118<H>  |  27<H>  ----------------------------<  209<H>  3.8   |  28  |  0.76    Ca    7.8<L>      08 Aug 2020 05:28  Phos  2.3       Mg     3.0         TPro  5.6<L>  /  Alb  3.2<L>  /  TBili  0.3  /  DBili  x   /  AST  26  /  ALT  17  /  AlkPhos  42   @ 07:01  -   @ 07:00  IN: 3110 mL / OUT: 2805 mL / NET: 305 mL    HbA1C = 5.6 ()  LDL = 185 ()   HDL = 36 () TG = 252 ()  TSH = 0.460 ()    Bacteriology:    UA NEG   sputum few GNR   CSF NGTD   Blood CS NG2D x2    CSF studies:    L   *** [id number] WBC36 *** %N31 %L5   EEG:  Neuroimagin/06 CT head:     CT head:  post-surgical changes, stable HCP  Other imagin/04 LE doppler: NEG   CXR:  mild atelectasis L lung base    MEDICATIONS:  SQL 40 daily piperacillin/tazobactam 3.375 IV q6h tenofovir 300mg PO q24h vancomycin 1250 IV q12h amantadine 100mg PO BID levetiracetam 1G IV q12h amlodipine 10mg PO daily doxazosin 4mg PO HS metoprolol 50mg PO q12h pantoprazole 40 IV daily atorvastatin 80mg PO HS dexamethasone 2mg PO q8hh mod ISS Peridex senna PRN      IV FLUIDS: IVL  DRIPS: cardene @ 5  DIET: Jevity   Lines:  Drains:  Vernell Lewis   EVD at 7cm H20  35cc/hr   EVD at 5cm H20 ICP 2-5 68cc/24h   EVD at 5cm H20    EVD at 5cm H20 ICP 1-3 202cc/24h     EVD at 5 cm H20 ICP 1-4 output 194cc/24h  Wounds:    CODE STATUS:  Full Code                       GOALS OF CARE:  aggressive                      DISPOSITION:  ICU  ICH Score on admission  = 4, NIHSS

## 2020-08-08 NOTE — PROGRESS NOTE ADULT - SUBJECTIVE AND OBJECTIVE BOX
======================================================   NEUROCRITICAL CARE ATTENDING NOTE (2020 EVENING)  ======================================================    VEDA WARREN   MRN-[id number]  HPI: 59M with dyslipidemia, h/o Hep B infection, presented with acute onset severe HA, dizziness, vomiting - brought to [facility] where CT showed L cerebellar hemorrhage.  Intubated for airway protection.  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  R EVD inserted, transferred to [facility] for further management.  given mannitol 25G in [facility],     COURSE IN THE HOSPITAL:   bleed   admitted to [facility], 23.4 given x1; OR for SOC evacuation of ICH   remained intubated overnight   tachycardia overnight, Tmax 38.4, ?PNA, ABx    Tmax 38.1 diuresed overnight 1.8L.     No significant events overnight.  CPAP this morning    Past Medical History: Hepatitis B HLD (hyperlipidemia)  Allergies:  No Known Allergies  Home meds:   ·	Omega-3 350 mg oral capsule: 1 cap(s) orally once a day  ·	tenofovir disoproxil fumarate 300 mg oral tablet: 1 tab(s) orally once a day    Current Meds:  MEDICATIONS  (STANDING):  amantadine Syrup 200 milliGRAM(s) Oral <User Schedule>  amLODIPine   Tablet 10 milliGRAM(s) Oral daily  artificial tears (preservative free) Ophthalmic Solution 1 Drop(s) Both EYES three times a day  atorvastatin 80 milliGRAM(s) Oral at bedtime  dexAMETHasone     Tablet   Oral   doxazosin 4 milliGRAM(s) Oral at bedtime  enoxaparin Injectable 40 milliGRAM(s) SubCutaneous <User Schedule>  insulin lispro (HumaLOG) corrective regimen sliding scale   SubCutaneous every 6 hours  insulin regular  human recombinant 3 Unit(s) SubCutaneous every 6 hours  levETIRAcetam  Solution 1000 milliGRAM(s) Oral two times a day  metoprolol tartrate 50 milliGRAM(s) Oral every 12 hours  niCARdipine Infusion 5 mG/Hr (25 mL/Hr) IV Continuous <Continuous>  pantoprazole  Injectable 40 milliGRAM(s) IV Push daily  piperacillin/tazobactam IVPB.. 3.375 Gram(s) IV Intermittent every 6 hours  tenofovir disoproxil fumarate (VIREAD) 300 milliGRAM(s) Oral every 24 hours  vancomycin  IVPB 1250 milliGRAM(s) IV Intermittent every 12 hours    MEDICATIONS  (PRN):  acetaminophen   Tablet .. 650 milliGRAM(s) Oral every 6 hours PRN Temp greater or equal to 38.5C (101.3F), Mild Pain (1 - 3)  hydrALAZINE Injectable 10 milliGRAM(s) IV Push every 2 hours PRN SBP > 140    PHYSICAL EXAMINATION    ICU Vital Signs Last 24 Hrs ___  T(C): 37.7 (08 Aug 2020 09:00), Max: 37.7 (08 Aug 2020 09:00)  T(F): 99.9 (08 Aug 2020 09:00), Max: 99.9 (08 Aug 2020 09:00)  HR: 84 (08 Aug 2020 14:00) (72 - 105)  BP: 117/59 (08 Aug 2020 01:00) (116/59 - 145/67)  BP(mean): 82 (08 Aug 2020 01:00) (81 - 97)  ABP: 138/61 (08 Aug 2020 14:00) (118/50 - 150/60)  ABP(mean): 84 (08 Aug 2020 14:00) (69 - 86)  RR: 16 (08 Aug 2020 13:00) (11 - 20)  SpO2: 100% (08 Aug 2020 14:00) (96% - 100%)    NEUROLOGIC EXAMINATION:  Patient opens eyes to voice, ____ commands, pupils 3mm sluggish, moving all 4s _____  GENERAL: CPAP 10/5 40%  EENT:  anicteric  CARDIOVASCULAR: (+) S1 S2, normal rate and regular rhythm  PULMONARY: clear to auscultation bilaterally  ABDOMEN: soft, nontender with normoactive bowel sounds  EXTREMITIES: no edema  SKIN: no rash    LABS:   CAPILLARY BLOOD GLUCOSE 204 243 199 203  - insulin lispro 14 units over 24h     (14.26)   9.4  (9.3)  12.93 )-----------( 151      ( 08 Aug 2020 05:28 )             29.3   (155)  154<H>  |  118<H>  |  27<H>  ----------------------------<  209<H>  3.8   |  28  |  0.76    Ca    7.8<L>      08 Aug 2020 05:28  Phos  2.3       Mg     3.0         TPro  5.6<L>  /  Alb  3.2<L>  /  TBili  0.3  /  DBili  x   /  AST  26  /  ALT  17  /  AlkPhos  42   @ 07:01  -   @ 07:00  IN: 3110 mL / OUT: 2805 mL / NET: 305 mL    HbA1C = 5.6 ()  LDL = 185 ()   HDL = 36 () TG = 252 ()  TSH = 0.460 ()    Bacteriology:    UA NEG   sputum few GNR   CSF NGTD   Blood CS NG2D x2    CSF studies:    L   *** [id number] *** %N31 %L5   EEG:  Neuroimagin/06 CT head:    Status post midline suboccipital craniectomy for evacuation of large posterior fossa hematoma with continued evolution of postsurgical changesincluding interval decrease in extensive extra-axial, intra-axial  and intra-ventricular air. Right-sided ventricular drainage catheter in place with relatively stable hydrocephalus.   CT head:  post-surgical changes, stable HCP  Other imagin/04 LE doppler: NEG   CXR:  mild atelectasis L lung base     IV FLUIDS: IVL  DRIPS: cardene @ 5 mg/hr  DIET: Jevity   Lines:  Drains:  Matt KSENIA HORTON Debbie   EVD at 7cm H20  35cc/hr   EVD at 5cm H20 ICP 2-5 68cc/24h   EVD at 5cm H20    EVD at 5cm H20 ICP 1-3 202cc/24h     EVD at 5 cm H20 ICP 1-4 output 194cc/24h  Wounds:    CODE STATUS:  Full Code                       GOALS OF CARE:  aggressive                      DISPOSITION:  ICU  ICH Score on admission  = 4, NIHSS ======================================================   NEUROCRITICAL CARE ATTENDING NOTE (2020 EVENING)  ======================================================    VEDA WARREN   MRN-[id number]  HPI: 59M with dyslipidemia, h/o Hep B infection, presented with acute onset severe HA, dizziness, vomiting - brought to [facility] where CT showed L cerebellar hemorrhage.  Intubated for airway protection.  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  R EVD inserted, transferred to [facility] for further management.  given mannitol 25G in [facility],     COURSE IN THE HOSPITAL:   bleed   admitted to [facility], 23.4 given x1; OR for SOC evacuation of ICH   remained intubated overnight   tachycardia overnight, Tmax 38.4, ?PNA, ABx    Tmax 38.1 diuresed overnight 1.8L.     No significant events overnight.  CPAP this morning    Past Medical History: Hepatitis B HLD (hyperlipidemia)  Allergies:  No Known Allergies  Home meds:   ·	Omega-3 350 mg oral capsule: 1 cap(s) orally once a day  ·	tenofovir disoproxil fumarate 300 mg oral tablet: 1 tab(s) orally once a day    Current Meds:  MEDICATIONS  (STANDING):  amantadine Syrup 200 milliGRAM(s) Oral <User Schedule>  amLODIPine   Tablet 10 milliGRAM(s) Oral daily  artificial tears (preservative free) Ophthalmic Solution 1 Drop(s) Both EYES three times a day  atorvastatin 80 milliGRAM(s) Oral at bedtime  dexAMETHasone     Tablet   Oral   doxazosin 4 milliGRAM(s) Oral at bedtime  enoxaparin Injectable 40 milliGRAM(s) SubCutaneous <User Schedule>  insulin lispro (HumaLOG) corrective regimen sliding scale   SubCutaneous every 6 hours  insulin regular  human recombinant 3 Unit(s) SubCutaneous every 6 hours  levETIRAcetam  Solution 1000 milliGRAM(s) Oral two times a day  metoprolol tartrate 75 milliGRAM(s) Oral every 12 hours  niCARdipine Infusion 5 mG/Hr (25 mL/Hr) IV Continuous <Continuous>  pantoprazole  Injectable 40 milliGRAM(s) IV Push daily  piperacillin/tazobactam IVPB.. 3.375 Gram(s) IV Intermittent every 6 hours  tenofovir disoproxil fumarate (VIREAD) 300 milliGRAM(s) Oral every 24 hours  vancomycin  IVPB 1250 milliGRAM(s) IV Intermittent every 12 hours    MEDICATIONS  (PRN):  acetaminophen   Tablet .. 650 milliGRAM(s) Oral every 6 hours PRN Temp greater or equal to 38.5C (101.3F), Mild Pain (1 - 3)  hydrALAZINE Injectable 10 milliGRAM(s) IV Push every 2 hours PRN SBP > 140    PHYSICAL EXAMINATION    ICU Vital Signs Last 24 Hrs  T(C): 37.2 (08 Aug 2020 22:15), Max: 38.1 (08 Aug 2020 15:00)  T(F): 99 (08 Aug 2020 22:15), Max: 100.5 (08 Aug 2020 15:00)  HR: 90 (08 Aug 2020 22:00) (72 - 105)  BP: 130/64 (08 Aug 2020 22:00) (116/59 - 145/67)  BP(mean): 91 (08 Aug 2020 22:00) (81 - 97)  ABP: 143/60 (08 Aug 2020 22:00) (118/50 - 159/66)  ABP(mean): 84 (08 Aug 2020 22:00) (69 - 93)  RR: 12 (08 Aug 2020 22:00) (11 - 20)  SpO2: 99% (08 Aug 2020 22:00) (99% - 100%)    NEUROLOGIC EXAMINATION:  pupils 3 mm reactive, does not obey for EOM, tongue midline,  and moves toes bilaterally; not able to lift head  GENERAL: CPAP 10/5 40% -> AC 12 450 PEEP 5 PIP 15 (O/N()  EENT:  anicteric  CARDIOVASCULAR: (+) S1 S2, normal rate and regular rhythm  PULMONARY: clear to auscultation bilaterally  ABDOMEN: soft, nontender with normoactive bowel sounds  EXTREMITIES: no edema  SKIN: no rash    LABS:   CAPILLARY BLOOD GLUCOSE 204 243 199 203  - insulin lispro 14 units over 24h     (14.26)   9.4  (9.3)  12.93 )-----------( 151      ( 08 Aug 2020 05:28 )             29.3   (155)  154<H>  |  118<H>  |  27<H>  ----------------------------<  209<H>  3.8   |  28  |  0.76    Ca    7.8<L>      08 Aug 2020 05:28  Phos  2.3       Mg     3.0         TPro  5.6<L>  /  Alb  3.2<L>  /  TBili  0.3  /  DBili  x   /  AST  26  /  ALT  17  /  AlkPhos  42   @ 07:01  -   @ 07:00  IN: 3110 mL / OUT: 2805 mL / NET: 305 mL    HbA1C = 5.6 ()  LDL = 185 ()   HDL = 36 () TG = 252 ()  TSH = 0.460 ()    Bacteriology:    UA NEG   sputum few GNR   CSF NGTD   Blood CS NG2D x2    CSF studies:    L   *** [id number] *** %N31 %L5   EEG:  Neuroimagin/06 CT head:    Status post midline suboccipital craniectomy for evacuation of large posterior fossa hematoma with continued evolution of postsurgical changesincluding interval decrease in extensive extra-axial, intra-axial  and intra-ventricular air. Right-sided ventricular drainage catheter in place with relatively stable hydrocephalus.   CT head:  post-surgical changes, stable HCP  Other imagin/04 LE doppler: NEG   CXR:  mild atelectasis L lung base     IV FLUIDS: IVL  DRIPS: cardene @ 5 mg/hr  DIET: Jevity   Lines:  Drains:  Matt KSENIA Lewis   EVD at 7cm H20  35cc/hr   EVD at 5cm H20 ICP 2-5 68cc/24h   EVD at 5cm H20    EVD at 5cm H20 ICP 1-3 202cc/24h     EVD at 5 cm H20 ICP 1-4 output 194cc/24h  Wounds:    CODE STATUS:  Full Code                       GOALS OF CARE:  aggressive                      DISPOSITION:  ICU  ICH Score on admission  = 4, NIHSS ======================================================   NEUROCRITICAL CARE ATTENDING NOTE (2020 EVENING)  ======================================================    VEDA WARREN   MRN-[id number]  HPI: 59M with dyslipidemia, h/o Hep B infection, presented with acute onset severe HA, dizziness, vomiting - brought to [facility] where CT showed L cerebellar hemorrhage.  Intubated for airway protection.  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  R EVD inserted, transferred to [facility] for further management.  given mannitol 25G in [facility].    COURSE IN THE HOSPITAL:   bleed   admitted to [facility], 23.4 given x1; OR for SOC evacuation of ICH   remained intubated overnight   tachycardia overnight, Tmax 38.4, ?PNA, ABx    Tmax 38.1 diuresed overnight 1.8L.     No significant events overnight.  CPAP trials.    Past Medical History: Hepatitis B HLD (hyperlipidemia)  Allergies:  No Known Allergies  Home meds:   ·	Omega-3 350 mg oral capsule: 1 cap(s) orally once a day  ·	tenofovir disoproxil fumarate 300 mg oral tablet: 1 tab(s) orally once a day    Current Meds:  MEDICATIONS  (STANDING):  amantadine Syrup 200 milliGRAM(s) Oral <User Schedule>  amLODIPine   Tablet 10 milliGRAM(s) Oral daily  artificial tears (preservative free) Ophthalmic Solution 1 Drop(s) Both EYES three times a day  atorvastatin 80 milliGRAM(s) Oral at bedtime  dexAMETHasone     Tablet   Oral   doxazosin 4 milliGRAM(s) Oral at bedtime  enoxaparin Injectable 40 milliGRAM(s) SubCutaneous <User Schedule>  insulin lispro (HumaLOG) corrective regimen sliding scale   SubCutaneous every 6 hours  insulin regular  human recombinant 3 Unit(s) SubCutaneous every 6 hours  levETIRAcetam  Solution 1000 milliGRAM(s) Oral two times a day  metoprolol tartrate 75 milliGRAM(s) Oral every 12 hours  niCARdipine Infusion 5 mG/Hr (25 mL/Hr) IV Continuous <Continuous>  pantoprazole  Injectable 40 milliGRAM(s) IV Push daily  piperacillin/tazobactam IVPB.. 3.375 Gram(s) IV Intermittent every 6 hours  tenofovir disoproxil fumarate (VIREAD) 300 milliGRAM(s) Oral every 24 hours  vancomycin  IVPB 1250 milliGRAM(s) IV Intermittent every 12 hours    MEDICATIONS  (PRN):  acetaminophen   Tablet .. 650 milliGRAM(s) Oral every 6 hours PRN Temp greater or equal to 38.5C (101.3F), Mild Pain (1 - 3)  hydrALAZINE Injectable 10 milliGRAM(s) IV Push every 2 hours PRN SBP > 140    PHYSICAL EXAMINATION    ICU Vital Signs Last 24 Hrs  T(C): 37.2 (08 Aug 2020 22:15), Max: 38.1 (08 Aug 2020 15:00)  T(F): 99 (08 Aug 2020 22:15), Max: 100.5 (08 Aug 2020 15:00)  HR: 90 (08 Aug 2020 22:00) (72 - 105)  BP: 130/64 (08 Aug 2020 22:00) (116/59 - 145/67)  BP(mean): 91 (08 Aug 2020 22:00) (81 - 97)  ABP: 143/60 (08 Aug 2020 22:00) (118/50 - 159/66)  ABP(mean): 84 (08 Aug 2020 22:00) (69 - 93)  RR: 12 (08 Aug 2020 22:00) (11 - 20)  SpO2: 99% (08 Aug 2020 22:00) (99% - 100%)    NEUROLOGIC EXAMINATION:  pupils 3 mm reactive, does not obey for EOM, tongue midline,  and moves toes bilaterally; not able to lift head  GENERAL: CPAP 10/5 40% -> AC 12 450 PEEP 5 PIP 15 (O/N()  EENT:  anicteric  CARDIOVASCULAR: (+) S1 S2, normal rate and regular rhythm  PULMONARY: clear to auscultation bilaterally  ABDOMEN: soft, nontender with normoactive bowel sounds  EXTREMITIES: no edema  SKIN: no rash    LABS:   CAPILLARY BLOOD GLUCOSE 204 243 199 203  - insulin lispro 14 units over 24h     (14.26)   9.4  (9.3)  12.93 )-----------( 151      ( 08 Aug 2020 05:28 )             29.3   (155)  154<H>  |  118<H>  |  27<H>  ----------------------------<  209<H>  3.8   |  28  |  0.76    Ca    7.8<L>      08 Aug 2020 05:28  Phos  2.3       Mg     3.0         TPro  5.6<L>  /  Alb  3.2<L>  /  TBili  0.3  /  DBili  x   /  AST  26  /  ALT  17  /  AlkPhos  42   @ 07:01  -   @ 07:00  IN: 3110 mL / OUT: 2805 mL / NET: 305 mL    HbA1C = 5.6 ()  LDL = 185 ()   HDL = 36 () TG = 252 ()  TSH = 0.460 ()    Bacteriology:    UA NEG   sputum few GNR   CSF NGTD   Blood CS NG2D x2    CSF studies:    L   *** [id number] *** %N31 %L5   EEG:  Neuroimagin/06 CT head:    Status post midline suboccipital craniectomy for evacuation of large posterior fossa hematoma with continued evolution of postsurgical changesincluding interval decrease in extensive extra-axial, intra-axial  and intra-ventricular air. Right-sided ventricular drainage catheter in place with relatively stable hydrocephalus.   CT head:  post-surgical changes, stable HCP  Other imagin/04 LE doppler: NEG   CXR:  mild atelectasis L lung base     IV FLUIDS: IVL  DRIPS: cardene @ 5 mg/hr  DIET: Jevity   Lines:  Drains:  Matt KSENIA Lewis   EVD at 7cm H20  35cc/hr   EVD at 5cm H20 ICP 2-5 68cc/24h   EVD at 5cm H20    EVD at 5cm H20 ICP 1-3 202cc/24h     EVD at 5 cm H20 ICP 1-4 output 194cc/24h  Wounds:    CODE STATUS:  Full Code                       GOALS OF CARE:  aggressive                      DISPOSITION:  ICU  ICH Score on admission  = 4, NIHSS

## 2020-08-08 NOTE — PROGRESS NOTE ADULT - ASSESSMENT
59y/M with  1.	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	Hepatitis B  3.	dyslipidemia     PLAN:   NEURO: neurochecks q1h  EVD at 5 cm H20  open to drain   decadron - taper quickly if ok with NS  neurostimulant: start amantadine 100mg PO BID   seizure prophylaxis: levetiracetam  as per NS  REHAB:  physical therapy evaluation and management    EARLY MOB:  HOB up    PULM:  CPAP 8/5  CARDIO:  SBP goal 100-140mm Hg start metoprolol 25mg PO BID, continue amlodipine, taper off cardene  ENDO:  Blood sugar goals 140-180 mg/dL, continue insulin sliding, continue statins; insulin glargine 10 x1 now  GI:  PPI for GI prophylaxis  DIET: jevity   RENAL:  IVL, start free water 250 q8h   HEM/ONC: no coagulopathy, no h/o antiplatelet / anticoagulant use  VTE Prophylaxis: SCDs, SQL   ID: afebrile, leukocytosis, piperacillin/tazobactam vanc, vanc trough prior to 4th dose (last day 08/10)  Social: son Perry updated ([phone]).  2 daughters Shantell  and Tammie ; wife name is Julián Sahu    CORE MEASURES:  1.  NIHSS =   2.  ICH Score = 4  3.  VTE prophylaxis: [ ] administered within 24h of admission OR [x] reason not done - fresh bleed  4.  Dysphagia screening: [X] performed before any oral meds / liquids / food    ATTENDING ATTESTATION:  I was physically present for the key portions of the evaluation and management (E/M) service provided.  I agree with the above history, physical and plan, which I have reviewed and edited where appropriate.    Patient at high risk for neurological deterioration or death due to:  ICU delirium, aspiration PNA, DVT / PE.  Critical care time:  I have personally provided 30 minutes of critical care time, excluding time spent on separate procedures.      Plan discussed with RN, house staff. 59y/M with  1.	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	Hepatitis B  3.	dyslipidemia     PLAN:   NEURO: neurochecks q1h,   EVD at 5 cm H20  open to drain   decadron - taper quickly if ok with NS  neurostimulant: inc amantadine 200mg PO BID   seizure prophylaxis: levetiracetam  as per NS  REHAB:  physical therapy evaluation and management    EARLY MOB:  HOB up    PULM:  CPAP 8/5 40%, furosemide 20mg IV x1; 1L net negative   CARDIO:  SBP goal 100-140mm Hg start metoprolol 25mg PO BID, continue amlodipine, taper off cardene  ENDO:  Blood sugar goals 140-180 mg/dL, continue insulin sliding, continue statins; insulin glargine 14, nutritional 3-3-3-3  GI:  PPI for GI prophylaxis; d/c bowel regimen   DIET: jevity   RENAL:  IVL, start free water 250 q6h, cont cardura, TOV tomorrow  HEM/ONC: no coagulopathy, no h/o antiplatelet / anticoagulant use  VTE Prophylaxis: SCDs, SQL   ID: afebrile, leukocytosis, piperacillin/tazobactam vanc, vanc trough prior to 4th dose (last day 08/10)  Social: updated Tammie yesterday; son Perry updated ([phone]).  2 daughters Shantell  and Tammie ; wife name is Julián Sahu    CORE MEASURES:  1.  NIHSS =   2.  ICH Score = 4  3.  VTE prophylaxis: [ ] administered within 24h of admission OR [x] reason not done - fresh bleed  4.  Dysphagia screening: [X] performed before any oral meds / liquids / food    ATTENDING ATTESTATION:  I was physically present for the key portions of the evaluation and management (E/M) service provided.  I agree with the above history, physical and plan, which I have reviewed and edited where appropriate.    Patient at high risk for neurological deterioration or death due to:  ICU delirium, aspiration PNA, DVT / PE.  Critical care time:  I have personally provided 30 minutes of critical care time, excluding time spent on separate procedures.      Plan discussed with RN, house staff.

## 2020-08-09 LAB
-  AMPICILLIN/SULBACTAM: SIGNIFICANT CHANGE UP
-  AMPICILLIN: SIGNIFICANT CHANGE UP
-  CEFAZOLIN: SIGNIFICANT CHANGE UP
-  CEFEPIME: SIGNIFICANT CHANGE UP
-  CEFTRIAXONE: SIGNIFICANT CHANGE UP
-  CIPROFLOXACIN: SIGNIFICANT CHANGE UP
-  GENTAMICIN: SIGNIFICANT CHANGE UP
-  PIPERACILLIN/TAZOBACTAM: SIGNIFICANT CHANGE UP
-  TOBRAMYCIN: SIGNIFICANT CHANGE UP
-  TRIMETHOPRIM/SULFAMETHOXAZOLE: SIGNIFICANT CHANGE UP
ANION GAP SERPL CALC-SCNC: 10 MMOL/L — SIGNIFICANT CHANGE UP (ref 5–17)
APPEARANCE CSF: SIGNIFICANT CHANGE UP
APPEARANCE SPUN FLD: ABNORMAL
APPEARANCE UR: CLEAR — SIGNIFICANT CHANGE UP
BILIRUB UR-MCNC: NEGATIVE — SIGNIFICANT CHANGE UP
BUN SERPL-MCNC: 31 MG/DL — HIGH (ref 7–23)
CALCIUM SERPL-MCNC: 7.7 MG/DL — LOW (ref 8.4–10.5)
CHLORIDE SERPL-SCNC: 116 MMOL/L — HIGH (ref 96–108)
CO2 SERPL-SCNC: 26 MMOL/L — SIGNIFICANT CHANGE UP (ref 22–31)
COLOR CSF: ABNORMAL
COLOR SPEC: YELLOW — SIGNIFICANT CHANGE UP
CREAT SERPL-MCNC: 1.04 MG/DL — SIGNIFICANT CHANGE UP (ref 0.5–1.3)
CSF COMMENTS: SIGNIFICANT CHANGE UP
CULTURE RESULTS: SIGNIFICANT CHANGE UP
DIFF PNL FLD: ABNORMAL
GLUCOSE BLDC GLUCOMTR-MCNC: 157 MG/DL — HIGH (ref 70–99)
GLUCOSE BLDC GLUCOMTR-MCNC: 158 MG/DL — HIGH (ref 70–99)
GLUCOSE BLDC GLUCOMTR-MCNC: 174 MG/DL — HIGH (ref 70–99)
GLUCOSE BLDC GLUCOMTR-MCNC: 178 MG/DL — HIGH (ref 70–99)
GLUCOSE CSF-MCNC: 102 MG/DL — HIGH (ref 40–70)
GLUCOSE SERPL-MCNC: 171 MG/DL — HIGH (ref 70–99)
GLUCOSE UR QL: NEGATIVE — SIGNIFICANT CHANGE UP
GRAM STN FLD: SIGNIFICANT CHANGE UP
HCT VFR BLD CALC: 32.3 % — LOW (ref 39–50)
HGB BLD-MCNC: 10.5 G/DL — LOW (ref 13–17)
KETONES UR-MCNC: NEGATIVE — SIGNIFICANT CHANGE UP
LACTATE CSF-MCNC: 3.1 MMOL/L — HIGH (ref 1.1–2.4)
LEUKOCYTE ESTERASE UR-ACNC: NEGATIVE — SIGNIFICANT CHANGE UP
LYMPHOCYTES # CSF: 3 % — LOW (ref 40–80)
MAGNESIUM SERPL-MCNC: 2.8 MG/DL — HIGH (ref 1.6–2.6)
MCHC RBC-ENTMCNC: 28.5 PG — SIGNIFICANT CHANGE UP (ref 27–34)
MCHC RBC-ENTMCNC: 32.5 GM/DL — SIGNIFICANT CHANGE UP (ref 32–36)
MCV RBC AUTO: 87.5 FL — SIGNIFICANT CHANGE UP (ref 80–100)
METHOD TYPE: SIGNIFICANT CHANGE UP
NEUTROPHILS # CSF: 1 % — SIGNIFICANT CHANGE UP (ref 0–6)
NITRITE UR-MCNC: NEGATIVE — SIGNIFICANT CHANGE UP
NRBC # BLD: 0 /100 WBCS — SIGNIFICANT CHANGE UP (ref 0–0)
NRBC NFR CSF: 4 /UL — SIGNIFICANT CHANGE UP (ref 0–5)
ORGANISM # SPEC MICROSCOPIC CNT: SIGNIFICANT CHANGE UP
ORGANISM # SPEC MICROSCOPIC CNT: SIGNIFICANT CHANGE UP
PH UR: 5.5 — SIGNIFICANT CHANGE UP (ref 5–8)
PHOSPHATE SERPL-MCNC: 3.1 MG/DL — SIGNIFICANT CHANGE UP (ref 2.5–4.5)
PLATELET # BLD AUTO: 185 K/UL — SIGNIFICANT CHANGE UP (ref 150–400)
POTASSIUM SERPL-MCNC: 3.6 MMOL/L — SIGNIFICANT CHANGE UP (ref 3.5–5.3)
POTASSIUM SERPL-SCNC: 3.6 MMOL/L — SIGNIFICANT CHANGE UP (ref 3.5–5.3)
PROT CSF-MCNC: 16 MG/DL — SIGNIFICANT CHANGE UP (ref 15–45)
PROT UR-MCNC: NEGATIVE MG/DL — SIGNIFICANT CHANGE UP
RBC # BLD: 3.69 M/UL — LOW (ref 4.2–5.8)
RBC # CSF: 4150 /UL — HIGH (ref 0–0)
RBC # FLD: 14.3 % — SIGNIFICANT CHANGE UP (ref 10.3–14.5)
SODIUM SERPL-SCNC: 152 MMOL/L — HIGH (ref 135–145)
SP GR SPEC: 1.01 — SIGNIFICANT CHANGE UP (ref 1–1.03)
SPECIMEN SOURCE: SIGNIFICANT CHANGE UP
SPECIMEN SOURCE: SIGNIFICANT CHANGE UP
TUBE TYPE: SIGNIFICANT CHANGE UP
UROBILINOGEN FLD QL: 0.2 E.U./DL — SIGNIFICANT CHANGE UP
WBC # BLD: 11.1 K/UL — HIGH (ref 3.8–10.5)
WBC # FLD AUTO: 11.1 K/UL — HIGH (ref 3.8–10.5)

## 2020-08-09 PROCEDURE — 99291 CRITICAL CARE FIRST HOUR: CPT

## 2020-08-09 RX ORDER — SODIUM CHLORIDE 9 MG/ML
500 INJECTION, SOLUTION INTRAVENOUS ONCE
Refills: 0 | Status: COMPLETED | OUTPATIENT
Start: 2020-08-09 | End: 2020-08-09

## 2020-08-09 RX ORDER — ACETAMINOPHEN 500 MG
650 TABLET ORAL EVERY 6 HOURS
Refills: 0 | Status: COMPLETED | OUTPATIENT
Start: 2020-08-09 | End: 2020-08-10

## 2020-08-09 RX ORDER — INSULIN GLARGINE 100 [IU]/ML
20 INJECTION, SOLUTION SUBCUTANEOUS EVERY MORNING
Refills: 0 | Status: DISCONTINUED | OUTPATIENT
Start: 2020-08-10 | End: 2020-08-14

## 2020-08-09 RX ORDER — INSULIN GLARGINE 100 [IU]/ML
6 INJECTION, SOLUTION SUBCUTANEOUS ONCE
Refills: 0 | Status: COMPLETED | OUTPATIENT
Start: 2020-08-09 | End: 2020-08-09

## 2020-08-09 RX ORDER — ACETAMINOPHEN 500 MG
1000 TABLET ORAL ONCE
Refills: 0 | Status: COMPLETED | OUTPATIENT
Start: 2020-08-09 | End: 2020-08-09

## 2020-08-09 RX ORDER — INSULIN HUMAN 100 [IU]/ML
4 INJECTION, SOLUTION SUBCUTANEOUS EVERY 6 HOURS
Refills: 0 | Status: DISCONTINUED | OUTPATIENT
Start: 2020-08-09 | End: 2020-08-19

## 2020-08-09 RX ADMIN — AMLODIPINE BESYLATE 10 MILLIGRAM(S): 2.5 TABLET ORAL at 06:02

## 2020-08-09 RX ADMIN — CHLORHEXIDINE GLUCONATE 1 APPLICATION(S): 213 SOLUTION TOPICAL at 06:44

## 2020-08-09 RX ADMIN — INSULIN HUMAN 4 UNIT(S): 100 INJECTION, SOLUTION SUBCUTANEOUS at 16:29

## 2020-08-09 RX ADMIN — Medication 650 MILLIGRAM(S): at 12:00

## 2020-08-09 RX ADMIN — PANTOPRAZOLE SODIUM 40 MILLIGRAM(S): 20 TABLET, DELAYED RELEASE ORAL at 11:11

## 2020-08-09 RX ADMIN — Medication 75 MILLIGRAM(S): at 17:13

## 2020-08-09 RX ADMIN — Medication 2: at 16:30

## 2020-08-09 RX ADMIN — Medication 650 MILLIGRAM(S): at 01:32

## 2020-08-09 RX ADMIN — TENOFOVIR DISOPROXIL FUMARATE 300 MILLIGRAM(S): 300 TABLET, FILM COATED ORAL at 22:24

## 2020-08-09 RX ADMIN — Medication 2: at 23:51

## 2020-08-09 RX ADMIN — Medication 4 MILLIGRAM(S): at 22:24

## 2020-08-09 RX ADMIN — PIPERACILLIN AND TAZOBACTAM 200 GRAM(S): 4; .5 INJECTION, POWDER, LYOPHILIZED, FOR SOLUTION INTRAVENOUS at 17:12

## 2020-08-09 RX ADMIN — Medication 1 DROP(S): at 07:30

## 2020-08-09 RX ADMIN — INSULIN HUMAN 3 UNIT(S): 100 INJECTION, SOLUTION SUBCUTANEOUS at 06:10

## 2020-08-09 RX ADMIN — LEVETIRACETAM 1000 MILLIGRAM(S): 250 TABLET, FILM COATED ORAL at 22:24

## 2020-08-09 RX ADMIN — ATORVASTATIN CALCIUM 80 MILLIGRAM(S): 80 TABLET, FILM COATED ORAL at 22:25

## 2020-08-09 RX ADMIN — LEVETIRACETAM 1000 MILLIGRAM(S): 250 TABLET, FILM COATED ORAL at 11:10

## 2020-08-09 RX ADMIN — CHLORHEXIDINE GLUCONATE 15 MILLILITER(S): 213 SOLUTION TOPICAL at 06:03

## 2020-08-09 RX ADMIN — Medication 2: at 11:30

## 2020-08-09 RX ADMIN — Medication 30 MILLIGRAM(S): at 04:00

## 2020-08-09 RX ADMIN — SODIUM CHLORIDE 500 MILLILITER(S): 9 INJECTION, SOLUTION INTRAVENOUS at 11:15

## 2020-08-09 RX ADMIN — INSULIN HUMAN 4 UNIT(S): 100 INJECTION, SOLUTION SUBCUTANEOUS at 11:12

## 2020-08-09 RX ADMIN — ENOXAPARIN SODIUM 40 MILLIGRAM(S): 100 INJECTION SUBCUTANEOUS at 22:25

## 2020-08-09 RX ADMIN — PIPERACILLIN AND TAZOBACTAM 200 GRAM(S): 4; .5 INJECTION, POWDER, LYOPHILIZED, FOR SOLUTION INTRAVENOUS at 11:11

## 2020-08-09 RX ADMIN — CHLORHEXIDINE GLUCONATE 15 MILLILITER(S): 213 SOLUTION TOPICAL at 17:13

## 2020-08-09 RX ADMIN — Medication 1 DROP(S): at 22:26

## 2020-08-09 RX ADMIN — Medication 200 MILLIGRAM(S): at 11:06

## 2020-08-09 RX ADMIN — Medication 1000 MILLIGRAM(S): at 07:12

## 2020-08-09 RX ADMIN — Medication 650 MILLIGRAM(S): at 11:11

## 2020-08-09 RX ADMIN — Medication 75 MILLIGRAM(S): at 06:02

## 2020-08-09 RX ADMIN — Medication 1 DROP(S): at 13:09

## 2020-08-09 RX ADMIN — INSULIN GLARGINE 6 UNIT(S): 100 INJECTION, SOLUTION SUBCUTANEOUS at 11:29

## 2020-08-09 RX ADMIN — Medication 1 MILLIGRAM(S): at 17:13

## 2020-08-09 RX ADMIN — PIPERACILLIN AND TAZOBACTAM 200 GRAM(S): 4; .5 INJECTION, POWDER, LYOPHILIZED, FOR SOLUTION INTRAVENOUS at 06:03

## 2020-08-09 RX ADMIN — INSULIN GLARGINE 14 UNIT(S): 100 INJECTION, SOLUTION SUBCUTANEOUS at 07:06

## 2020-08-09 RX ADMIN — INSULIN HUMAN 4 UNIT(S): 100 INJECTION, SOLUTION SUBCUTANEOUS at 23:52

## 2020-08-09 RX ADMIN — Medication 650 MILLIGRAM(S): at 01:02

## 2020-08-09 RX ADMIN — INSULIN HUMAN 3 UNIT(S): 100 INJECTION, SOLUTION SUBCUTANEOUS at 00:08

## 2020-08-09 RX ADMIN — Medication 166.67 MILLIGRAM(S): at 07:12

## 2020-08-09 RX ADMIN — Medication 2: at 07:08

## 2020-08-09 RX ADMIN — Medication 200 MILLIGRAM(S): at 15:00

## 2020-08-09 RX ADMIN — Medication 400 MILLIGRAM(S): at 06:45

## 2020-08-09 RX ADMIN — PIPERACILLIN AND TAZOBACTAM 200 GRAM(S): 4; .5 INJECTION, POWDER, LYOPHILIZED, FOR SOLUTION INTRAVENOUS at 23:33

## 2020-08-09 RX ADMIN — Medication 1 MILLIGRAM(S): at 06:02

## 2020-08-09 RX ADMIN — Medication 650 MILLIGRAM(S): at 17:12

## 2020-08-09 RX ADMIN — PIPERACILLIN AND TAZOBACTAM 200 GRAM(S): 4; .5 INJECTION, POWDER, LYOPHILIZED, FOR SOLUTION INTRAVENOUS at 00:20

## 2020-08-09 RX ADMIN — Medication 650 MILLIGRAM(S): at 18:00

## 2020-08-09 NOTE — PROGRESS NOTE ADULT - SUBJECTIVE AND OBJECTIVE BOX
=================================  NEUROCRITICAL CARE ATTENDING NOTE  =================================    VEDA WARREN   MRN-[id number]  HPI: 59M with dyslipidemia, h/o Hep B infection, presented with acute onset severe HA, dizziness, vomiting - brought to [facility] where CT showed L cerebellar hemorrhage.  Intubated for airway protection.  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  R EVD inserted, transferred to [facility] for further management.  given mannitol 25G in [facility],     COURSE IN THE HOSPITAL:   bleed   admitted to [facility], 23.4 given x1; OR for SOC evacuation of ICH   remained intubated overnight   tachycardia overnight, Tmax 38.4, ?PNA, ABx    Tmax 38.1 diuresed overnight 1.8L.     No significant events overnight.  CPAP this morning   Tmax 39.7    Past Medical History: Hepatitis B HLD (hyperlipidemia)  Allergies:  No Known Allergies  Home meds:   ·	Omega-3 350 mg oral capsule: 1 cap(s) orally once a day  ·	tenofovir disoproxil fumarate 300 mg oral tablet: 1 tab(s) orally once a day    PHYSICAL EXAMINATION  T(C): 37.8 ( @ 09:30), Max: 39.7 ( @ 06:00) HR: 69 ( @ :00) (69 - 120) BP: 113/64 ( @ 09:00) (105/54 - 134/62) RR: 20 ( @ 09:00) (12 - 20) SpO2: 100% ( @ :00) (98% - 100%)  NEUROLOGIC EXAMINATION:  Patient opens eyes to vigorous noxious stimulation; follows commands (two fingers R UE), pupils 3mm sluggish, withdraws all 4s to noxious  GENERAL:  40% 12 +5  EENT:  anicteric  CARDIOVASCULAR: (+) S1 S2, normal rate and regular rhythm  PULMONARY: clear to auscultation bilaterally  ABDOMEN: soft, nontender with normoactive bowel sounds  EXTREMITIES: no edema  SKIN: no rash    LABS:   CAPILLARY BLOOD GLUCOSE 174 156 161 206  14 unit insulin glargine given- 10 units coverage, 3-3-3-3 nutritional              (12.93)    10.5  (9.4)   11.10 )-----------( 185      ( 09 Aug 2020 04:59 )             32.3     152<H>  |  116<H>  |  31<H>  ----------------------------<  171<H>  3.6   |  26  |  1.04    Ca    7.7<L>      09 Aug 2020 04:59  Phos  3.1       Mg     2.8      @ 07:01  -   @ 07:00  IN: 2738.8 mL / OUT: 3930 mL / NET: -1191.2 mL    HbA1C = 5.6 ()  LDL = 185 ()   HDL = 36 () TG = 252 ()  TSH = 0.460 ()    Bacteriology:    UA NEG   CSF no organisms   sputum CS: enterobacter cloacae S: piperacillin/tazobactam    CSF NGTD   Blood CS NG3D x2    CSF studies:    L   *** [id number] WBC36 *** %N31 %L5   EEG:  Neuroimagin/06 CT head:     CT head:  post-surgical changes, stable HCP  Other imagin/04 LE doppler: NEG   CXR:  mild atelectasis L lung base    MEDICATIONS:   SQL 40 dailiy piperacillin/tazobactam 3.375 IV q6h tenofovir 300 q24h vancomycin 1250 IV q12h amantadine 200mg PO BID levetiracetam 1G BID amlodipine 10mg PO daily doxazosin 4mg PO HS metoprolol 75mg PO q12h pantoprazole 40 IV atorvastatin 80gm PO HS dexamethasone 1mg PO q12h insulin glargine 14 lispro 3 q6 Peridex artificial tears      IV FLUIDS: IVL  DRIPS: cardene @ off  DIET: Glucerna   Lines:  Drains:  Vernell Lewis   EVD at 7cm H20  35cc/hr   EVD at 5cm H20 ICP 2-5 68cc/24h   EVD at 5cm H20    EVD at 5cm H20 ICP 1-3 202cc/24h     EVD at 5 cm H20 ICP 1-4 output 194cc/24h   EVD at 5cm H20 ICP 0 to 5	 output 280cc/24h  Wounds:    CODE STATUS:  Full Code                       GOALS OF CARE:  aggressive                      DISPOSITION:  ICU  ICH Score on admission  = 4, NIHSS

## 2020-08-09 NOTE — PROGRESS NOTE ADULT - ASSESSMENT
59y/M with  1.	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	Hepatitis B  3.	dyslipidemia     PLAN:   NEURO: neurochecks q1h, no sedation  EVD at 5 cm H20  open to drain   decadron - quick taper  neurostimulant: amantadine 200mg PO BID   seizure prophylaxis: levetiracetam  as per NS  REHAB:  physical therapy evaluation and management    EARLY MOB:  HOB up    PULM:  CPAP 10/5 40%  CARDIO:  SBP goal 100-140mm Hg cont metoprolol 75mg PO BID, continue amlodipine  ENDO:  Blood sugar goals 140-180 mg/dL, continue insulin sliding, continue statins; insulin glargine 20 nutritional 4-4-4-4  GI:  PPI for GI prophylaxis  DIET: jevity   RENAL:  IVL, inc free water 350 q6h, LR bolus 500cc x1, cont cardura, TOV today   HEM/ONC: no coagulopathy, no h/o antiplatelet / anticoagulant use  VTE Prophylaxis: SCDs, SQL   ID: febrile, leukocytosis, piperacillin/tazobactam d/c vanc (last day 08/12)  Social: updated wife yesterday; son Perry updated ([phone]).  2 daughters Shantell  and Tammie ; wife name is Julián Sahu    CORE MEASURES:  1.  NIHSS =   2.  ICH Score = 4  3.  VTE prophylaxis: [ ] administered within 24h of admission OR [x] reason not done - fresh bleed  4.  Dysphagia screening: [X] performed before any oral meds / liquids / food    ATTENDING ATTESTATION:  I was physically present for the key portions of the evaluation and management (E/M) service provided.  I agree with the above history, physical and plan, which I have reviewed and edited where appropriate.    Patient at high risk for neurological deterioration or death due to:  ICU delirium, aspiration PNA, DVT / PE.  Critical care time:  I have personally provided 30 minutes of critical care time, excluding time spent on separate procedures.      Plan discussed with RN, house staff.

## 2020-08-09 NOTE — PROGRESS NOTE ADULT - SUBJECTIVE AND OBJECTIVE BOX
S/Overnight events:        Hospital Course:   POD#       Vital Signs Last 24 Hrs  T(C): 36.8 (09 Aug 2020 11:00), Max: 39.7 (09 Aug 2020 06:00)  T(F): 98.2 (09 Aug 2020 11:00), Max: 103.5 (09 Aug 2020 06:00)  HR: 66 (09 Aug 2020 11:42) (63 - 120)  BP: 119/63 (09 Aug 2020 11:00) (105/54 - 134/62)  BP(mean): 86 (09 Aug 2020 11:00) (75 - 92)  RR: 2 (09 Aug 2020 11:00) (2 - 20)  SpO2: 100% (09 Aug 2020 11:42) (98% - 100%)    I&O's Detail    08 Aug 2020 07:  -  09 Aug 2020 07:00  --------------------------------------------------------  IN:    IV PiggyBack: 600 mL    niCARdipine Infusion: 604 mL    ns in tub fed  [id number]: 1035 mL    Solution: 499.8 mL  Total IN: 2738.8 mL    OUT:    External Ventricular Device: 280 mL    Indwelling Catheter - Urethral: 3650 mL  Total OUT: 3930 mL    Total NET: -1191.2 mL      09 Aug 2020 07:  -  09 Aug 2020 13:07  --------------------------------------------------------  IN:    IV PiggyBack: 100 mL    Lactated Ringers IV Bolus: 500 mL    ns in tub fed  [id number]: 180 mL  Total IN: 780 mL    OUT:    External Ventricular Device: 57 mL    Indwelling Catheter - Urethral: 500 mL  Total OUT: 557 mL    Total NET: 223 mL        I&O's Summary    08 Aug 2020 07:01  -  09 Aug 2020 07:00  --------------------------------------------------------  IN: 2738.8 mL / OUT: 3930 mL / NET: -1191.2 mL    09 Aug 2020 07:01  -  09 Aug 2020 13:07  --------------------------------------------------------  IN: 780 mL / OUT: 557 mL / NET: 223 mL        PHYSICAL EXAM:  Gen:  HEENT:  Neck:  Lungs:  Heart:  Abd:  Exts:  Neuro:    TUBES/LINES:  [] CVC  [] A-line  [] Lumbar Drain  [] Ventriculostomy  [] Other    DIET:  [] NPO  [] Mechanical  [] Tube feeds    LABS:                        10.5   11.10 )-----------( 185      ( 09 Aug 2020 04:59 )             32.3     08-09    152<H>  |  116<H>  |  31<H>  ----------------------------<  171<H>  3.6   |  26  |  1.04    Ca    7.7<L>      09 Aug 2020 04:59  Phos  3.1       Mg     2.8     0809        Urinalysis Basic - ( 09 Aug 2020 04:59 )    Color: Yellow / Appearance: Clear / S.010 / pH: x  Gluc: x / Ketone: NEGATIVE  / Bili: Negative / Urobili: 0.2 E.U./dL   Blood: x / Protein: NEGATIVE mg/dL / Nitrite: NEGATIVE   Leuk Esterase: NEGATIVE / RBC: < 5 /HPF / WBC < 5 /HPF   Sq Epi: x / Non Sq Epi: 0-5 /HPF / Bacteria: Present /HPF          CAPILLARY BLOOD GLUCOSE      POCT Blood Glucose.: 178 mg/dL (09 Aug 2020 11:01)  POCT Blood Glucose.: 174 mg/dL (09 Aug 2020 06:04)  POCT Blood Glucose.: 156 mg/dL (08 Aug 2020 23:09)  POCT Blood Glucose.: 161 mg/dL (08 Aug 2020 15:52)      Drug Levels: [] N/A  Vancomycin Level, Trough: 6.7 ug/mL ( @ 17:57)    CSF Analysis: [] N/A  RBC Count - Spinal Fluid: 4150 /uL ( @ 03:46)  CSF Lymphocytes: 3 % ( @ 03:46)  Glucose, CSF: 102 mg/dL ( @ 03:38)  Protein, CSF: 16 mg/dL ( @ 03:38)      Allergies    No Known Allergies    Intolerances      MEDICATIONS:  Antibiotics:  piperacillin/tazobactam IVPB.. 3.375 Gram(s) IV Intermittent every 6 hours  tenofovir disoproxil fumarate (VIREAD) 300 milliGRAM(s) Oral every 24 hours    Neuro:  acetaminophen    Suspension .. 650 milliGRAM(s) Oral every 6 hours  acetaminophen   Tablet .. 650 milliGRAM(s) Oral every 6 hours PRN  amantadine Syrup 200 milliGRAM(s) Oral <User Schedule>  levETIRAcetam  Solution 1000 milliGRAM(s) Oral two times a day    Anticoagulation:  enoxaparin Injectable 40 milliGRAM(s) SubCutaneous <User Schedule>    OTHER:  amLODIPine   Tablet 10 milliGRAM(s) Oral daily  artificial tears (preservative free) Ophthalmic Solution 1 Drop(s) Both EYES three times a day  atorvastatin 80 milliGRAM(s) Oral at bedtime  chlorhexidine 0.12% Liquid 15 milliLiter(s) Oral Mucosa every 12 hours  chlorhexidine 4% Liquid 1 Application(s) Topical <User Schedule>  dexAMETHasone     Tablet   Oral   dexAMETHasone     Tablet 1 milliGRAM(s) Oral every 12 hours  dextrose 40% Gel 15 Gram(s) Oral once PRN  dextrose 50% Injectable 12.5 Gram(s) IV Push once  dextrose 50% Injectable 25 Gram(s) IV Push once  dextrose 50% Injectable 25 Gram(s) IV Push once  dextrose 50% Injectable 25 Gram(s) IV Push once  doxazosin 4 milliGRAM(s) Oral at bedtime  glucagon  Injectable 1 milliGRAM(s) IntraMuscular once PRN  glucagon  Injectable 1 milliGRAM(s) IntraMuscular once PRN  hydrALAZINE Injectable 10 milliGRAM(s) IV Push every 2 hours PRN  insulin glargine Injectable (LANTUS) 20 Unit(s) SubCutaneous every morning  insulin lispro (HumaLOG) corrective regimen sliding scale   SubCutaneous every 6 hours  insulin regular  human recombinant 4 Unit(s) SubCutaneous every 6 hours  metoprolol tartrate 75 milliGRAM(s) Oral every 12 hours  pantoprazole  Injectable 40 milliGRAM(s) IV Push daily    IVF:  dextrose 5%. 1000 milliLiter(s) IV Continuous <Continuous>    CULTURES:  Culture Results:   Numerous Enterobacter cloacae complex  No routine respiratory aliza Isolated ( @ 19:32)  Culture Results:   No growth to date ( @ 11:46)    RADIOLOGY & ADDITIONAL TESTS:      ASSESSMENT:  59y Male s/p    INTRACRANIAL BLEED  No pertinent family history in first degree relatives  Handoff  Hepatitis B  HLD (hyperlipidemia)  ICH (intracerebral hemorrhage)  ICH (intracerebral hemorrhage)  Hepatitis B  HLD (hyperlipidemia)  HIV (human immunodeficiency virus infection)  Cerebellar hemorrhage  Craniectomy, subocciptal, with cervical laminectomy for medulla and spinal cord decompression  Percutaneous insertion of arterial line  Angiogram, carotid and cerebral vessels, bilateral  Foot fracture, left      PLAN:  NEURO:    CARDIOVASCULAR:    PULMONARY:    RENAL:    GI:    ID:    ENDO:    DVT PROPHYLAXIS:    DISPOSITION:       Assessment:  Present when checked    []  GCS  E   V  M     Heart Failure: []Acute, [] acute on chronic , []chronic  Heart Failure:  [] Diastolic (HFpEF), [] Systolic (HFrEF), []Combined (HFpEF and HFrEF), [] RHF, [] Pulm HTN, [] Other    [] SARAH, [] ATN, [] AIN, [] other  [] CKD1, [] CKD2, [] CKD 3, [] CKD 4, [] CKD 5, []ESRD    Encephalopathy: [] Metabolic, [] Hepatic, [] toxic, [] Neurological, [] Other    Abnormal Nurtitional Status: [] malnurtition (see nutrition note), [ ]underweight: BMI < 19, [] morbid obesity: BMI >40, [] Cachexia    [] Sepsis  [] hypovolemic shock,[] cardiogenic shock, [] hemorrhagic shock, [] neuogenic shock  [] Acute Respiratory Failure  []Cerebral edema, [] Brain compression/ herniation,   [] Functional quadriplegia  [] Acute blood loss anemia

## 2020-08-10 LAB
ALBUMIN SERPL ELPH-MCNC: 3.1 G/DL — LOW (ref 3.3–5)
ALP SERPL-CCNC: 54 U/L — SIGNIFICANT CHANGE UP (ref 40–120)
ALT FLD-CCNC: 118 U/L — HIGH (ref 10–45)
ANION GAP SERPL CALC-SCNC: 10 MMOL/L — SIGNIFICANT CHANGE UP (ref 5–17)
AST SERPL-CCNC: 65 U/L — HIGH (ref 10–40)
BILIRUB SERPL-MCNC: 0.3 MG/DL — SIGNIFICANT CHANGE UP (ref 0.2–1.2)
BUN SERPL-MCNC: 31 MG/DL — HIGH (ref 7–23)
CALCIUM SERPL-MCNC: 8.2 MG/DL — LOW (ref 8.4–10.5)
CHLORIDE SERPL-SCNC: 116 MMOL/L — HIGH (ref 96–108)
CO2 SERPL-SCNC: 26 MMOL/L — SIGNIFICANT CHANGE UP (ref 22–31)
CREAT SERPL-MCNC: 0.75 MG/DL — SIGNIFICANT CHANGE UP (ref 0.5–1.3)
CULTURE RESULTS: SIGNIFICANT CHANGE UP
CULTURE RESULTS: SIGNIFICANT CHANGE UP
GLUCOSE BLDC GLUCOMTR-MCNC: 108 MG/DL — HIGH (ref 70–99)
GLUCOSE BLDC GLUCOMTR-MCNC: 117 MG/DL — HIGH (ref 70–99)
GLUCOSE BLDC GLUCOMTR-MCNC: 138 MG/DL — HIGH (ref 70–99)
GLUCOSE BLDC GLUCOMTR-MCNC: 138 MG/DL — HIGH (ref 70–99)
GLUCOSE SERPL-MCNC: 156 MG/DL — HIGH (ref 70–99)
HCT VFR BLD CALC: 32.6 % — LOW (ref 39–50)
HGB BLD-MCNC: 10.7 G/DL — LOW (ref 13–17)
MAGNESIUM SERPL-MCNC: 2.7 MG/DL — HIGH (ref 1.6–2.6)
MCHC RBC-ENTMCNC: 28.7 PG — SIGNIFICANT CHANGE UP (ref 27–34)
MCHC RBC-ENTMCNC: 32.8 GM/DL — SIGNIFICANT CHANGE UP (ref 32–36)
MCV RBC AUTO: 87.4 FL — SIGNIFICANT CHANGE UP (ref 80–100)
NRBC # BLD: 0 /100 WBCS — SIGNIFICANT CHANGE UP (ref 0–0)
OSMOLALITY SERPL: 322 MOSM/KG — HIGH (ref 280–301)
PHOSPHATE SERPL-MCNC: 2.9 MG/DL — SIGNIFICANT CHANGE UP (ref 2.5–4.5)
PLATELET # BLD AUTO: 177 K/UL — SIGNIFICANT CHANGE UP (ref 150–400)
POTASSIUM SERPL-MCNC: 3.6 MMOL/L — SIGNIFICANT CHANGE UP (ref 3.5–5.3)
POTASSIUM SERPL-SCNC: 3.6 MMOL/L — SIGNIFICANT CHANGE UP (ref 3.5–5.3)
PROT SERPL-MCNC: 5.4 G/DL — LOW (ref 6–8.3)
RBC # BLD: 3.73 M/UL — LOW (ref 4.2–5.8)
RBC # FLD: 13.8 % — SIGNIFICANT CHANGE UP (ref 10.3–14.5)
SODIUM SERPL-SCNC: 152 MMOL/L — HIGH (ref 135–145)
SPECIMEN SOURCE: SIGNIFICANT CHANGE UP
SPECIMEN SOURCE: SIGNIFICANT CHANGE UP
WBC # BLD: 12.66 K/UL — HIGH (ref 3.8–10.5)
WBC # FLD AUTO: 12.66 K/UL — HIGH (ref 3.8–10.5)

## 2020-08-10 PROCEDURE — 70450 CT HEAD/BRAIN W/O DYE: CPT | Mod: 26

## 2020-08-10 PROCEDURE — 71045 X-RAY EXAM CHEST 1 VIEW: CPT | Mod: 26

## 2020-08-10 PROCEDURE — 61107 TDH PNXR IMPLT VENTR CATH: CPT

## 2020-08-10 PROCEDURE — 99291 CRITICAL CARE FIRST HOUR: CPT | Mod: 24

## 2020-08-10 RX ORDER — POTASSIUM PHOSPHATE, MONOBASIC POTASSIUM PHOSPHATE, DIBASIC 236; 224 MG/ML; MG/ML
15 INJECTION, SOLUTION INTRAVENOUS ONCE
Refills: 0 | Status: COMPLETED | OUTPATIENT
Start: 2020-08-10 | End: 2020-08-10

## 2020-08-10 RX ORDER — ATORVASTATIN CALCIUM 80 MG/1
40 TABLET, FILM COATED ORAL AT BEDTIME
Refills: 0 | Status: DISCONTINUED | OUTPATIENT
Start: 2020-08-10 | End: 2020-08-17

## 2020-08-10 RX ORDER — MIDAZOLAM HYDROCHLORIDE 1 MG/ML
2 INJECTION, SOLUTION INTRAMUSCULAR; INTRAVENOUS ONCE
Refills: 0 | Status: DISCONTINUED | OUTPATIENT
Start: 2020-08-10 | End: 2020-08-10

## 2020-08-10 RX ORDER — METOPROLOL TARTRATE 50 MG
100 TABLET ORAL EVERY 12 HOURS
Refills: 0 | Status: DISCONTINUED | OUTPATIENT
Start: 2020-08-10 | End: 2020-08-14

## 2020-08-10 RX ORDER — FENTANYL CITRATE 50 UG/ML
50 INJECTION INTRAVENOUS ONCE
Refills: 0 | Status: DISCONTINUED | OUTPATIENT
Start: 2020-08-10 | End: 2020-08-10

## 2020-08-10 RX ORDER — POTASSIUM PHOSPHATE, MONOBASIC POTASSIUM PHOSPHATE, DIBASIC 236; 224 MG/ML; MG/ML
15 INJECTION, SOLUTION INTRAVENOUS ONCE
Refills: 0 | Status: DISCONTINUED | OUTPATIENT
Start: 2020-08-10 | End: 2020-08-10

## 2020-08-10 RX ADMIN — LEVETIRACETAM 1000 MILLIGRAM(S): 250 TABLET, FILM COATED ORAL at 21:50

## 2020-08-10 RX ADMIN — Medication 1 DROP(S): at 13:51

## 2020-08-10 RX ADMIN — MIDAZOLAM HYDROCHLORIDE 2 MILLIGRAM(S): 1 INJECTION, SOLUTION INTRAMUSCULAR; INTRAVENOUS at 21:52

## 2020-08-10 RX ADMIN — POTASSIUM PHOSPHATE, MONOBASIC POTASSIUM PHOSPHATE, DIBASIC 63.75 MILLIMOLE(S): 236; 224 INJECTION, SOLUTION INTRAVENOUS at 07:48

## 2020-08-10 RX ADMIN — FENTANYL CITRATE 50 MICROGRAM(S): 50 INJECTION INTRAVENOUS at 22:26

## 2020-08-10 RX ADMIN — Medication 1 DROP(S): at 21:48

## 2020-08-10 RX ADMIN — AMLODIPINE BESYLATE 10 MILLIGRAM(S): 2.5 TABLET ORAL at 06:40

## 2020-08-10 RX ADMIN — LEVETIRACETAM 1000 MILLIGRAM(S): 250 TABLET, FILM COATED ORAL at 11:18

## 2020-08-10 RX ADMIN — PIPERACILLIN AND TAZOBACTAM 200 GRAM(S): 4; .5 INJECTION, POWDER, LYOPHILIZED, FOR SOLUTION INTRAVENOUS at 11:25

## 2020-08-10 RX ADMIN — Medication 650 MILLIGRAM(S): at 00:01

## 2020-08-10 RX ADMIN — Medication 650 MILLIGRAM(S): at 23:19

## 2020-08-10 RX ADMIN — INSULIN HUMAN 4 UNIT(S): 100 INJECTION, SOLUTION SUBCUTANEOUS at 11:32

## 2020-08-10 RX ADMIN — Medication 650 MILLIGRAM(S): at 06:39

## 2020-08-10 RX ADMIN — Medication 650 MILLIGRAM(S): at 23:20

## 2020-08-10 RX ADMIN — FENTANYL CITRATE 50 MICROGRAM(S): 50 INJECTION INTRAVENOUS at 21:52

## 2020-08-10 RX ADMIN — Medication 4 MILLIGRAM(S): at 21:50

## 2020-08-10 RX ADMIN — PIPERACILLIN AND TAZOBACTAM 200 GRAM(S): 4; .5 INJECTION, POWDER, LYOPHILIZED, FOR SOLUTION INTRAVENOUS at 18:12

## 2020-08-10 RX ADMIN — Medication 200 MILLIGRAM(S): at 15:44

## 2020-08-10 RX ADMIN — PIPERACILLIN AND TAZOBACTAM 200 GRAM(S): 4; .5 INJECTION, POWDER, LYOPHILIZED, FOR SOLUTION INTRAVENOUS at 23:14

## 2020-08-10 RX ADMIN — INSULIN GLARGINE 20 UNIT(S): 100 INJECTION, SOLUTION SUBCUTANEOUS at 07:00

## 2020-08-10 RX ADMIN — Medication 75 MILLIGRAM(S): at 06:39

## 2020-08-10 RX ADMIN — CHLORHEXIDINE GLUCONATE 15 MILLILITER(S): 213 SOLUTION TOPICAL at 17:51

## 2020-08-10 RX ADMIN — TENOFOVIR DISOPROXIL FUMARATE 300 MILLIGRAM(S): 300 TABLET, FILM COATED ORAL at 21:50

## 2020-08-10 RX ADMIN — Medication 10 MILLIGRAM(S): at 23:39

## 2020-08-10 RX ADMIN — INSULIN HUMAN 4 UNIT(S): 100 INJECTION, SOLUTION SUBCUTANEOUS at 06:40

## 2020-08-10 RX ADMIN — PANTOPRAZOLE SODIUM 40 MILLIGRAM(S): 20 TABLET, DELAYED RELEASE ORAL at 11:25

## 2020-08-10 RX ADMIN — Medication 200 MILLIGRAM(S): at 11:18

## 2020-08-10 RX ADMIN — INSULIN HUMAN 4 UNIT(S): 100 INJECTION, SOLUTION SUBCUTANEOUS at 18:12

## 2020-08-10 RX ADMIN — PIPERACILLIN AND TAZOBACTAM 200 GRAM(S): 4; .5 INJECTION, POWDER, LYOPHILIZED, FOR SOLUTION INTRAVENOUS at 06:39

## 2020-08-10 RX ADMIN — Medication 1 DROP(S): at 06:40

## 2020-08-10 RX ADMIN — Medication 650 MILLIGRAM(S): at 00:15

## 2020-08-10 RX ADMIN — CHLORHEXIDINE GLUCONATE 15 MILLILITER(S): 213 SOLUTION TOPICAL at 06:39

## 2020-08-10 RX ADMIN — Medication 650 MILLIGRAM(S): at 07:18

## 2020-08-10 RX ADMIN — CHLORHEXIDINE GLUCONATE 1 APPLICATION(S): 213 SOLUTION TOPICAL at 06:40

## 2020-08-10 RX ADMIN — ATORVASTATIN CALCIUM 40 MILLIGRAM(S): 80 TABLET, FILM COATED ORAL at 21:49

## 2020-08-10 RX ADMIN — Medication 100 MILLIGRAM(S): at 18:12

## 2020-08-10 NOTE — PROGRESS NOTE ADULT - SUBJECTIVE AND OBJECTIVE BOX
==============================================   NEUROCRITICAL CARE ATTENDING NOTE (Monday, August 10, 2020)  ==============================================    VEDA WARREN   MRN-[id number]  HPI: 59M with dyslipidemia, h/o Hep B infection, presented with acute onset severe HA, dizziness, vomiting - brought to [facility] where CT showed L cerebellar hemorrhage.  Intubated for airway protection.  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  R EVD inserted, transferred to [facility] for further management.  given mannitol 25G in [facility],     COURSE IN THE HOSPITAL:   bleed   admitted to [facility], 23.4 given x1; OR for SOC evacuation of ICH   remained intubated overnight   tachycardia overnight, Tmax 38.4, ?PNA, ABx    Tmax 38.1 diuresed overnight 1.8L.     No significant events overnight.  CPAP this morning   Tmax 39.7 (enterobacter sputum, on zosyn)  08/10 Trial of CPAP    Past Medical History: Hepatitis B HLD (hyperlipidemia)  Allergies:  No Known Allergies  Home meds:   ·	Omega-3 350 mg oral capsule: 1 cap(s) orally once a day  ·	tenofovir disoproxil fumarate 300 mg oral tablet: 1 tab(s) orally once a day    Current Meds:  MEDICATIONS  (STANDING):  amantadine Syrup 200 milliGRAM(s) Oral <User Schedule>  amLODIPine   Tablet 10 milliGRAM(s) Oral daily  atorvastatin 40 milliGRAM(s) Oral at bedtime  doxazosin 4 milliGRAM(s) Oral at bedtime  enoxaparin Injectable 40 milliGRAM(s) SubCutaneous <User Schedule>  insulin glargine Injectable (LANTUS) 20 Unit(s) SubCutaneous every morning  insulin lispro (HumaLOG) corrective regimen sliding scale   SubCutaneous every 6 hours  insulin regular  human recombinant 4 Unit(s) SubCutaneous every 6 hours  levETIRAcetam  Solution 1000 milliGRAM(s) Oral two times a day  metoprolol tartrate 100 milliGRAM(s) Oral every 12 hours  pantoprazole  Injectable 40 milliGRAM(s) IV Push daily  piperacillin/tazobactam IVPB.. 3.375 Gram(s) IV Intermittent every 6 hours  tenofovir disoproxil fumarate (VIREAD) 300 milliGRAM(s) Oral every 24 hours    MEDICATIONS  (PRN):  acetaminophen   Tablet .. 650 milliGRAM(s) Oral every 6 hours PRN Temp greater or equal to 38.5C (101.3F), Mild Pain (1 - 3)  hydrALAZINE Injectable 10 milliGRAM(s) IV Push every 2 hours PRN SBP > 140    PHYSICAL EXAMINATION    ICU Vital Signs Last 24 Hrs  T(C): 37.2 (10 Aug 2020 09:28), Max: 37.4 (09 Aug 2020 22:16)  T(F): 99 (10 Aug 2020 09:28), Max: 99.4 (09 Aug 2020 22:16)  HR: 63 (10 Aug 2020 12:) (61 - 75)  BP: 125/73 (10 Aug 2020 12:) (125/69 - 150/68)  BP(mean): 94 (10 Aug 2020 12:) (88 - 110)  ABP: 130/58 (10 Aug 2020 12:00) (130/58 - 167/70)  ABP(mean): 81 (10 Aug 2020 12:) (81 - 101)  RR: 12 (10 Aug 2020 12:00) (10 - 18)  SpO2: 100% (10 Aug 2020 12:) (97% - 100%)    NEUROLOGIC EXAMINATION:    -opens eyes spontaneously today, pupils 3 mm reactive bilaterally, tongue midline, obeys first order commands ( and plantars)  GENERAL: trial of CPAP/PS (apneas noted)  AC RR 12, , FiO2 40, PEEP 5, ITime 1, MAP 7, PIP 15  EENT:  anicteric  CARDIOVASCULAR: (+) S1 S2, normal rate and regular rhythm  PULMONARY: clear to auscultation bilaterally  ABDOMEN: soft, nontender with normoactive bowel sounds  EXTREMITIES: no edema  SKIN: no rash    I/O's    20 @ 07:01  -  08-10-20 @ 07:00  --------------------------------------------------------  IN: 3650 mL / OUT: 4432 mL / NET: -782 mL    08-10-20 @ 07:01  -  08-10-20 @ 13:13  --------------------------------------------------------  IN: 45 mL / OUT: 245 mL / NET: -200 mL    LABS:                        10.7   12.66 )-----------( 177      ( 10 Aug 2020 06:07 )             32.6     08-10    152<H>  |  116<H>  |  31<H>  ----------------------------<  156<H>  3.6   |  26  |  0.75    Ca    8.2<L>      10 Aug 2020 06:07  Phos  2.9     08-10  Mg     2.7     10    TPro  5.4<L>  /  Alb  3.1<L>  /  TBili  0.3  /  DBili  x   /  AST  65<H>  /  ALT  118<H>  /  AlkPhos  54  08-10    Urinalysis Basic - ( 09 Aug 2020 04:59 )    Color: Yellow / Appearance: Clear / S.010 / pH: x  Gluc: x / Ketone: NEGATIVE  / Bili: Negative / Urobili: 0.2 E.U./dL   Blood: x / Protein: NEGATIVE mg/dL / Nitrite: NEGATIVE   Leuk Esterase: NEGATIVE / RBC: < 5 /HPF / WBC < 5 /HPF   Sq Epi: x / Non Sq Epi: 0-5 /HPF / Bacteria: Present /HPF    CAPILLARY BLOOD GLUCOSE    POCT Blood Glucose.: 138 mg/dL (10 Aug 2020 10:27)  POCT Blood Glucose.: 138 mg/dL (10 Aug 2020 06:36)  POCT Blood Glucose.: 158 mg/dL (09 Aug 2020 23:39)  POCT Blood Glucose.: 157 mg/dL (09 Aug 2020 16:01)    HbA1C = 5.6 (-04)  LDL = 185 (08-04)   HDL = 36 () TG = 252 ()  TSH = 0.460 ()    Bacteriology:    CSF no organisms   sputum CS: enterobacter cloacae S: piperacillin/tazobactam    CSF NGTD   Blood CS NG5D x2   UA NEG    CSF studies:    L   *** [id number] *** %N31 %L5   EEG:  Neuroimagin/10 CT head:  Compared to 2020, there is improving intraventricular hemorrhage, resolution of intraventricular gas, and mild decrease in ventricular size. Postsurgical change and edema in the posterior fossa is approximately stable.   CT head:  post-surgical changes, stable HCP  Other imagin/04 LE doppler: NEG   CXR:  mild atelectasis L lung base  08/10 CXR:  Small left pleural effusion, unchanged.    IV FLUIDS: IVL  DRIPS: cardene @ off  DIET: Glucerna   Lines:  Drains:  Matt R IJ Debbie   EVD at 7cm H20  35cc/hr   EVD at 5cm H20 ICP 2-5 68cc/24h   EVD at 5cm H20    EVD at 5cm H20 ICP 1-3 202cc/24h     EVD at 5 cm H20 ICP 1-4 output 194cc/24h   EVD at 5cm H20 ICP 0 to 5	 output 280cc/24h  Wounds:    CODE STATUS:  Full Code                       GOALS OF CARE:  aggressive                      DISPOSITION:  ICU  ICH Score on admission  = 4, NIHSS

## 2020-08-10 NOTE — MEDICAL STUDENT PROGRESS NOTE(EDUCATION) - SUBJECTIVE AND OBJECTIVE BOX
Overnight events: Fever of 101.8 F around 7 pm last night, given tylenol, at 9 pm fever came down to 100.1 F and then resolved. On CPAP this AM    HPI:  59 year old male with PMH of HLD and unclear liver disease had acute onset of severe headache, dizziness and vomiting x1 today, was brought into [facility] with continued symptoms, as well as hypertension and multiple episodes of vomiting. Patient was intubated for airway protection with CT Head performed demonstrating a large left cerebellar hemorrhage. CTA Head/Neck performed is suggestive of underlying vascular pathology such as AVM. RIght frontal ventriculostomy placed at [facility] and patient transferred to [facility] for further work-up. Patient arrives to [facility] intubated, sedated and on Nicardipine drip. Patient's son provided history and denies any Aspirin or other anticoagulant use. Denies any prior history of hypertension, smoking, or ETOH abuse. (04 Aug 2020 05:01)    POD# 0 S/P cerebral angio, negative for AVM. s/p SOC craniectomy evacuation of cerebellar hematoma.   POD # 1:   overnight tachycardic, ekg without significant changes, lactate wnl, low grade fever, s/p tyl. bolus x 1L , NS increased to 100cc/hr; neuro improving.  EVD at 5 cmH2O   8/6 POD#3, BD#4, Tmax 100.6, Pan Cx 7/ NGTD, Vanc/Zosyn until 8/10, Vanc trough due today @ 6pm, EEG = no seizure, Lasix overnight for Pulm congest and tachycard, ruff placed for strict I&Os, pending anemia w/u, EVD @ 5, CPAP trials, Card GTT for SBP<140, Started Norvasc for BP optimization  :  :  S/P cerebral angio, negative for AVM. s/p SOC craniectomy evacuation of cerebellar hematoma. No significant events overnight. On CPAP this morning   8/10: fevers overnight, resolved this morning, Pancultured, (+) Enterbacter, on CPAP this AM    Allergies: No Known Allergies or Intolerances    MEDICATIONS:  Antibiotics:  piperacillin/tazobactam IVPB.. 3.375 Gram(s) IV Intermittent every 6 hours  tenofovir disoproxil fumarate (VIREAD) 300 milliGRAM(s) Oral every 24 hours  vancomycin  IVPB 1250 milliGRAM(s) IV Intermittent every 12 hours    Neuro:  acetaminophen   Tablet .. 650 milliGRAM(s) Oral every 6 hours PRN  amantadine Syrup 200 milliGRAM(s) Oral <User Schedule>  levETIRAcetam  Solution 1000 milliGRAM(s) Oral two times a day    Anticoagulation:  enoxaparin Injectable 40 milliGRAM(s) SubCutaneous <User Schedule>    OTHER:  amLODIPine   Tablet 10 milliGRAM(s) Oral daily  artificial tears (preservative free) Ophthalmic Solution 1 Drop(s) Both EYES three times a day  atorvastatin 80 milliGRAM(s) Oral at bedtime  chlorhexidine 0.12% Liquid 15 milliLiter(s) Oral Mucosa every 12 hours  chlorhexidine 4% Liquid 1 Application(s) Topical <User Schedule>  dexAMETHasone     Tablet   Oral   dexAMETHasone     Tablet 2 milliGRAM(s) Oral every 8 hours  dextrose 40% Gel 15 Gram(s) Oral once PRN  dextrose 50% Injectable 12.5 Gram(s) IV Push once  dextrose 50% Injectable 25 Gram(s) IV Push once  dextrose 50% Injectable 25 Gram(s) IV Push once  dextrose 50% Injectable 25 Gram(s) IV Push once  doxazosin 4 milliGRAM(s) Oral at bedtime  glucagon  Injectable 1 milliGRAM(s) IntraMuscular once PRN  glucagon  Injectable 1 milliGRAM(s) IntraMuscular once PRN  hydrALAZINE Injectable 10 milliGRAM(s) IV Push every 2 hours PRN  insulin lispro (HumaLOG) corrective regimen sliding scale   SubCutaneous every 6 hours  insulin regular  human recombinant 3 Unit(s) SubCutaneous every 6 hours  metoprolol tartrate 50 milliGRAM(s) Oral every 12 hours  niCARdipine Infusion 5 mG/Hr IV Continuous <Continuous>  pantoprazole  Injectable 40 milliGRAM(s) IV Push daily    IVF:  dextrose 5%. 1000 milliLiter(s) IV Continuous <Continuous>    Vital Signs Last 24 Hrs  T(C): 37.7 (08 Aug 2020 09:00), Max: 37.7 (08 Aug 2020 09:00)  T(F): 99.9 (08 Aug 2020 09:00), Max: 99.9 (08 Aug 2020 09:00)  HR: 84 (08 Aug 2020 13:00) (72 - 105)  BP: 117/59 (08 Aug 2020 01:00) (116/59 - 145/67)  BP(mean): 82 (08 Aug 2020 01:00) (81 - 97)  RR: 16 (08 Aug 2020 13:) (11 - 20)  SpO2: 100% (08 Aug 2020 13:) (96% - 100%)    I&O's Detail    07 Aug 2020 07:01  -  08 Aug 2020 07:00  --------------------------------------------------------  IN:    Enteral Tube Flush: 830 mL    IV PiggyBack: 900 mL    niCARdipine Infusion: 440 mL    ns in tub fed  [id number]: 1035 mL    Solution: 100 mL  Total IN: 3305 mL    OUT:    External Ventricular Device: 194 mL    Indwelling Catheter - Urethral: 2808 mL  Total OUT: 3002 mL    Total NET: 303 mL    08 Aug 2020 07:  -  08 Aug 2020 13:13  --------------------------------------------------------  IN:    IV PiggyBack: 100 mL    niCARdipine Infusion: 150 mL    ns in tub fed  [id number]: 45 mL    Solution: 333.2 mL  Total IN: 628.2 mL    OUT:    External Ventricular Device: 76 mL    Indwelling Catheter - Urethral: 1235 mL  Total OUT: 1311 mL    Total NET: -682.8 mL    I&O's Summary    07 Aug 2020 07:  -  08 Aug 2020 07:00  --------------------------------------------------------  IN: 3305 mL / OUT: 3002 mL / NET: 303 mL    08 Aug 2020 07:  -  08 Aug 2020 13:13  --------------------------------------------------------  IN: 628.2 mL / OUT: 1311 mL / NET: -682.8 mL        PHYSICAL EXAM:  General: Patient is on CPAP/PS (Apneas noted)  	AC RR 12, , FiO2 40, PEEP 5, ITime 1, MAP 7, PIP 15  EENT: anicteric, pupils 3mm, PERRL B/L, EOMs sluggish  CARDIOVASCULAR: (+) S1 S2, normal rate and regular rhythm  PULMONARY: clear to auscultation bilaterally  ABDOMEN: soft, nontender with normoactive bowel sounds  EXTREMITIES: no edema, DP/PTs palpable B/L  Neuro: opens eyes spontaneously, obeys simple commands to , wiggling toes and plantar flexion, localizing to pain x 4,  +cough, +gag, + corneal   Skin: no rashes      EVD @ 5 cm H20    TUBES/LINES:  [] CVC  [] A-line  [] Lumbar Drain  [] Ventriculostomy  [] Other    DIET:  [] NPO  [] Mechanical  [] Tube feeds    LABS:                        9.4    12.93 )-----------( 151      ( 08 Aug 2020 05:28 )             29.3     08-08    154<H>  |  118<H>  |  27<H>  ----------------------------<  209<H>  3.8   |  28  |  0.76    Ca    7.8<L>      08 Aug 2020 05:28  Phos  2.3     08-08  Mg     3.0     08-08    TPro  5.6<L>  /  Alb  3.2<L>  /  TBili  0.3  /  DBili  x   /  AST  26  /  ALT  17  /  AlkPhos  42      CAPILLARY BLOOD GLUCOSE    POCT Blood Glucose.: 206 mg/dL (08 Aug 2020 11:28)  POCT Blood Glucose.: 204 mg/dL (08 Aug 2020 06:37)  POCT Blood Glucose.: 243 mg/dL (07 Aug 2020 21:51)  POCT Blood Glucose.: 199 mg/dL (07 Aug 2020 17:49)      Drug Levels: [] N/A  Vancomycin Level, Trough: 6.7 ug/mL ( @ 17:57)    CSF studies:    L   *** [id number] *** %N31 %L5     CULTURES:  Culture Results:   Numerous Enterobacter cloacae complex  Susceptibility to follow.  Culture in progress ( @ 19:32)  Culture Results:   No growth to date ( @ 11:46)    RADIOLOGY & ADDITIONAL TESTS:  Neuroimagin/10 CT head:  Compared to 2020, there is improving intraventricular hemorrhage, resolution of intraventricular gas, and mild decrease in ventricular size. Postsurgical change and edema in the posterior fossa is approximately stable.   CT head:  post-surgical changes, stable HCP  Other imagin/04 LE doppler: NEG   CXR:  mild atelectasis L lung base  08/10 CXR:  Small left pleural effusion, unchanged.

## 2020-08-10 NOTE — PROGRESS NOTE ADULT - ASSESSMENT
59y/M with  1.	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	Hepatitis B  3.	dyslipidemia     PLAN:   NEURO: neurochecks q1h, no sedation  EVD at 5 cm H20  open to drain   neurostimulant: amantadine 200mg PO BID   seizure prophylaxis: levetiracetam  as per NS  REHAB:  physical therapy evaluation and management    EARLY MOB:  HOB up    PULM:  CPAP 10/5 40%  CARDIO:  SBP goal 100-140mm Hg cont metoprolol 100 mg PO BID, continue amlodipine 10 mg daily  ENDO:  Blood sugar goals 140-180 mg/dL, continue insulin sliding, continue statins; insulin glargine 20 nutritional 4-4-4-4  GI:  PPI for GI prophylaxis  DIET: jevity   RENAL:  IVL, D/C Matt --> [place] cath today  HEM/ONC: no coagulopathy, no h/o antiplatelet / anticoagulant use  VTE Prophylaxis: SCDs, SQL   ID: febrile, leukocytosis, piperacillin/tazobactam d/c vanc (last day 08/12)  Social: updated wife yesterday; son Perry updated ([phone]).  2 daughters Shantell  and Tammie ; wife name is Julián Sahu    CORE MEASURES:  1.  NIHSS =   2.  ICH Score = 4  3.  VTE prophylaxis: [ ] administered within 24h of admission OR [x] reason not done - fresh bleed  4.  Dysphagia screening: [X] performed before any oral meds / liquids / food

## 2020-08-10 NOTE — MEDICAL STUDENT PROGRESS NOTE(EDUCATION) - NS MD HP STUD ASPLAN PLAN FT
ASSESSMENT:  59y Male s/p SOC craniectomy evacuation of cerebellar hematoma.     INTRACRANIAL BLEED  No pertinent family history in first degree relatives  Handoff  Hepatitis B  HLD (hyperlipidemia)  ICH (intracerebral hemorrhage)  ICH (intracerebral hemorrhage)  Hepatitis B  HLD (hyperlipidemia)  HIV (human immunodeficiency virus infection)  Cerebellar hemorrhage  Craniectomy, subocciptal, with cervical laminectomy for medulla and spinal cord decompression  Percutaneous insertion of arterial line  Angiogram, carotid and cerebral vessels, bilateral  Foot fracture, left      PLAN:   NEURO: neurochecks q1h, no sedation  EVD at 5 cm H20  open to drain   neurostimulant: amantadine 200mg PO BID   seizure prophylaxis: levetiracetam  as per NS  REHAB:  physical therapy evaluation and management    EARLY MOB:  HOB up    PULM:  CPAP 10/5 40% continued, Plan to Extubate if tolerating CPAP  CARDIO:  SBP goal 100-140mm Hg cont metoprolol 100 mg PO BID, continue amlodipine 10 mg daily  	holding parameters: SBP <110, HR <55  ENDO:  Blood sugar goals 140-180 mg/dL, continue insulin sliding, continue statins; insulin glargine 20 nutritional 4-4-4-4  GI:  PPI for GI prophylaxis, has a rectal tube in place, monitor liver enzymes (elevated AST/ALT and ALP but previously normal)  DIET: jevity   RENAL:  IVL, D/C Matt --> [place] cath today,  HEM/ONC: no coagulopathy, no h/o antiplatelet / anticoagulant use  VTE Prophylaxis: SCDs, SQL   ID: febrile, leukocytosis, piperacillin/tazobactam d/c vanc (last day 08/12)  Social: updated wife yesterday; son Perry updated ([phone]).  2 daughters Shantell  and Tammie ; wife name is Julián Sahu    CORE MEASURES:  1.  NIHSS =   2.  ICH Score = 4  3.  VTE prophylaxis: [ ] administered within 24h of admission OR [x] reason not done - fresh bleed  4.  Dysphagia screening: [X] performed before any oral meds / liquids / food    D/W Dr. Whiting and Dr. Blank ASSESSMENT:  59y Male s/p SOC craniectomy evacuation of cerebellar hematoma.     INTRACRANIAL BLEED  No pertinent family history in first degree relatives  Handoff  Hepatitis B  HLD (hyperlipidemia)  ICH (intracerebral hemorrhage)  ICH (intracerebral hemorrhage)  Hepatitis B  HLD (hyperlipidemia)  HIV (human immunodeficiency virus infection)  Cerebellar hemorrhage  Craniectomy, subocciptal, with cervical laminectomy for medulla and spinal cord decompression  Percutaneous insertion of arterial line  Angiogram, carotid and cerebral vessels, bilateral  Foot fracture, left      PLAN:   NEURO: neurochecks q1h, no sedation  EVD at 5 cm H20  open to drain   neurostimulant: amantadine 200mg PO BID   seizure prophylaxis: levetiracetam  as per NS  REHAB:  physical therapy evaluation and management    EARLY MOB:  HOB up    PULM:  CPAP 10/5 40% continued, Plan to Extubate if tolerating CPAP  CARDIO:  SBP goal 100-140mm Hg cont metoprolol 100 mg PO BID, continue amlodipine 10 mg daily  	holding parameters: SBP <110, HR <55  ENDO:  Blood sugar goals 140-180 mg/dL, continue insulin sliding, continue statins; insulin glargine 20 nutritional 4-4-4-4  GI:  PPI for GI prophylaxis, has a rectal tube in place, monitor liver enzymes (elevated AST/ALT and ALP but previously normal), NPO at 6 am for extubation tomorrow  DIET: jevity   RENAL:  IVL, D/C Matt --> [place] cath today,   - check plasma osm, if <320, then start on Mannitol 30g, followed by Lasix 20g - to decrease cerebellar swelling  HEM/ONC: no coagulopathy, no h/o antiplatelet / anticoagulant use  VTE Prophylaxis: SCDs, SQL   ID: febrile, leukocytosis, piperacillin/tazobactam d/c vanc (last day 08/12)  Social: updated wife yesterday; son Perry updated ([phone]).  2 daughters Shantell  and Tammie ; wife name is Julián Sahu    CORE MEASURES:  1.  NIHSS =   2.  ICH Score = 4  3.  VTE prophylaxis: [ ] administered within 24h of admission OR [x] reason not done - fresh bleed  4.  Dysphagia screening: [X] performed before any oral meds / liquids / food    D/W Dr. Whiting and Dr. Blank

## 2020-08-10 NOTE — PROGRESS NOTE ADULT - ATTENDING COMMENTS
ATTENDING ATTESTATION:    I was physically present for the key portions of the evaluation and management (E/M) service provided.  I agree with the above history, physical and plan, which I have reviewed and edited where appropriate.    Patient at high risk for neurological deterioration or death due to: infections, DVT/PE.    Critical care time:  I have personally provided 60 minutes of critical care time, excluding time spent on separately-billable procedures.    Plan discussed with multidisciplinary staff and attendings.

## 2020-08-10 NOTE — CHART NOTE - NSCHARTNOTEFT_GEN_A_CORE
Admitting Diagnosis:   Patient is a 59y old  Male who presents with a chief complaint of Left cerebellar intraparenchymal hemorrhage (10 Aug 2020 13:09)    PAST MEDICAL & SURGICAL HISTORY:  Hepatitis B  HLD (hyperlipidemia)  Foot fracture, left: s/p repair 2019    Current Nutrition Order:  NPO with TF via NGT  Glucerna 1.2 @45mL/hr x24h (1080mL TV, 1296 kcal, 64gm protein, 869mL free water, 86% RDI)     PO Intake: Good (%) [   ]  Fair (50-75%) [   ] Poor (<25%) [   ]-N/A    GI Issues: no vomiting noted, unable to assess for nausea 2/2 clinical status, +rectal tube (2000mL output x24h)    Pain: none apparent    Skin Integrity: Zachariah score 13    Labs:   08-10    152<H>  |  116<H>  |  31<H>  ----------------------------<  156<H>  3.6   |  26  |  0.75    Ca    8.2<L>      10 Aug 2020 06:07  Phos  2.9     08-10  Mg     2.7     08-10    TPro  5.4<L>  /  Alb  3.1<L>  /  TBili  0.3  /  DBili  x   /  AST  65<H>  /  ALT  118<H>  /  AlkPhos  54  08-10    CAPILLARY BLOOD GLUCOSE  POCT Blood Glucose.: 138 mg/dL (10 Aug 2020 10:27)  POCT Blood Glucose.: 138 mg/dL (10 Aug 2020 06:36)  POCT Blood Glucose.: 158 mg/dL (09 Aug 2020 23:39)  POCT Blood Glucose.: 157 mg/dL (09 Aug 2020 16:01)    Medications:  MEDICATIONS  (STANDING):  amantadine Syrup 200 milliGRAM(s) Oral <User Schedule>  amLODIPine   Tablet 10 milliGRAM(s) Oral daily  artificial tears (preservative free) Ophthalmic Solution 1 Drop(s) Both EYES three times a day  atorvastatin 40 milliGRAM(s) Oral at bedtime  chlorhexidine 0.12% Liquid 15 milliLiter(s) Oral Mucosa every 12 hours  chlorhexidine 4% Liquid 1 Application(s) Topical <User Schedule>  dextrose 5%. 1000 milliLiter(s) (50 mL/Hr) IV Continuous <Continuous>  dextrose 50% Injectable 12.5 Gram(s) IV Push once  dextrose 50% Injectable 25 Gram(s) IV Push once  dextrose 50% Injectable 25 Gram(s) IV Push once  dextrose 50% Injectable 25 Gram(s) IV Push once  doxazosin 4 milliGRAM(s) Oral at bedtime  enoxaparin Injectable 40 milliGRAM(s) SubCutaneous <User Schedule>  insulin glargine Injectable (LANTUS) 20 Unit(s) SubCutaneous every morning  insulin lispro (HumaLOG) corrective regimen sliding scale   SubCutaneous every 6 hours  insulin regular  human recombinant 4 Unit(s) SubCutaneous every 6 hours  levETIRAcetam  Solution 1000 milliGRAM(s) Oral two times a day  metoprolol tartrate 100 milliGRAM(s) Oral every 12 hours  pantoprazole  Injectable 40 milliGRAM(s) IV Push daily  piperacillin/tazobactam IVPB.. 3.375 Gram(s) IV Intermittent every 6 hours  tenofovir disoproxil fumarate (VIREAD) 300 milliGRAM(s) Oral every 24 hours    MEDICATIONS  (PRN):  acetaminophen   Tablet .. 650 milliGRAM(s) Oral every 6 hours PRN Temp greater or equal to 38.5C (101.3F), Mild Pain (1 - 3)  dextrose 40% Gel 15 Gram(s) Oral once PRN Blood Glucose LESS THAN 70 milliGRAM(s)/deciliter  glucagon  Injectable 1 milliGRAM(s) IntraMuscular once PRN Glucose LESS THAN 70 milligrams/deciliter  glucagon  Injectable 1 milliGRAM(s) IntraMuscular once PRN Glucose LESS THAN 70 milligrams/deciliter  hydrALAZINE Injectable 10 milliGRAM(s) IV Push every 2 hours PRN SBP > 140  sodium chloride 0.9% lock flush 10 milliLiter(s) IV Push every 1 hour PRN Pre/post blood products, medications, blood draw, and to maintain line patency    Weight:  59.6kg (8/4)    Weight Change:     Admitted Anthropometrics:  Ht (8/6 per RN): 157.48cm, Wt (8/4): 59.6kg, IBW: 50kg+/-10%, %IBW: 119%, BMI: 24.0    Nutrition Focused Physical Exam: Completed [   ]  Unable to complete [   ]-N/A    Estimated energy needs:  IBW (50kg) used to calculate energy needs as pt vented.  Needs adjusted for vent, post-op.     6298-5792 kcal (25-30 kcal/kg IBW)  70-80gm protein (1.4-1.6gm/kg IBW)  Fluid needs per team    Subjective: 59yMale hx HLD, ?Hep B, presents with acute severe headache, dizziness and vomiting, found to have large left cerebellar IPH with IVH, s/p right frontal EVD at [facility] and transfer to [facility] for further intervention. Now s/p cerebral angio, negative for AVM and s/p SOC craniectomy evacuation of cerebellar hematoma. Pt intubated, on CPAP trial this AM. Infusions running: Nicardipine. MAP 82. Unable to obtain information regarding diet/wt hx 2/2 pt's clinical status and no family at bedside. Per RN, pt tolerating TF at 40mL/hr during assessment. Of note, pt with hyperglycemia, likely steroid induced as pt on Decadron and A1c 5.6%. Please see below for full nutritional recommendations- d/w team, placed pending order for verification. RD to monitor and f/u per protocol.    Nutrition Diagnosis:  Increased nutrient need RT increased protein demand AEB post-op, vented    Goal: Meet >/=75%EER via most appropriate route with good tolerance    Recommendations:  1. Recommend continue Jevity 1.2 change goal rate to 45mL/hr x24h and add Prostat SF 1x/day via NGT (1080mL TV, 1396 kcal, 74gm protein, 871mL free water, 108% RDI, ~28 kcal/kg IBW, 1.5gm protein/kg IBW)  >>order for volume based feeding protocol, monitor tolerance, maintain aspiration precautions  >>water flushes per team  >>monitor need to change to Glucerna TF 2/2 BG levels  2. Monitor labs (BMP, lytes, [id number]); replete lytes prn  3. Trend bi-weekly weights    Education: N/A 2/2 pt's clinical status    Risk Level: High [ X ] Moderate [   ] Low [   ] Admitting Diagnosis:   Patient is a 59y old  Male who presents with a chief complaint of Left cerebellar intraparenchymal hemorrhage (10 Aug 2020 13:09)    PAST MEDICAL & SURGICAL HISTORY:  Hepatitis B  HLD (hyperlipidemia)  Foot fracture, left: s/p repair 2019    Current Nutrition Order:  NPO with TF via NGT  Glucerna 1.2 @45mL/hr x24h (1080mL TV, 1296 kcal, 64gm protein, 869mL free water, 86% RDI)     PO Intake: Good (%) [   ]  Fair (50-75%) [   ] Poor (<25%) [   ]-N/A    GI Issues: no vomiting noted, unable to assess for nausea 2/2 clinical status, +rectal tube (2000mL output x24h)    Pain: none apparent    Skin Integrity: Zachariah score 13    Labs:   08-10    152<H>  |  116<H>  |  31<H>  ----------------------------<  156<H>  3.6   |  26  |  0.75    Ca    8.2<L>      10 Aug 2020 06:07  Phos  2.9     08-10  Mg     2.7     08-10    TPro  5.4<L>  /  Alb  3.1<L>  /  TBili  0.3  /  DBili  x   /  AST  65<H>  /  ALT  118<H>  /  AlkPhos  54  08-10    CAPILLARY BLOOD GLUCOSE  POCT Blood Glucose.: 138 mg/dL (10 Aug 2020 10:27)  POCT Blood Glucose.: 138 mg/dL (10 Aug 2020 06:36)  POCT Blood Glucose.: 158 mg/dL (09 Aug 2020 23:39)  POCT Blood Glucose.: 157 mg/dL (09 Aug 2020 16:01)    Medications:  MEDICATIONS  (STANDING):  amantadine Syrup 200 milliGRAM(s) Oral <User Schedule>  amLODIPine   Tablet 10 milliGRAM(s) Oral daily  artificial tears (preservative free) Ophthalmic Solution 1 Drop(s) Both EYES three times a day  atorvastatin 40 milliGRAM(s) Oral at bedtime  chlorhexidine 0.12% Liquid 15 milliLiter(s) Oral Mucosa every 12 hours  chlorhexidine 4% Liquid 1 Application(s) Topical <User Schedule>  dextrose 5%. 1000 milliLiter(s) (50 mL/Hr) IV Continuous <Continuous>  dextrose 50% Injectable 12.5 Gram(s) IV Push once  dextrose 50% Injectable 25 Gram(s) IV Push once  dextrose 50% Injectable 25 Gram(s) IV Push once  dextrose 50% Injectable 25 Gram(s) IV Push once  doxazosin 4 milliGRAM(s) Oral at bedtime  enoxaparin Injectable 40 milliGRAM(s) SubCutaneous <User Schedule>  insulin glargine Injectable (LANTUS) 20 Unit(s) SubCutaneous every morning  insulin lispro (HumaLOG) corrective regimen sliding scale   SubCutaneous every 6 hours  insulin regular  human recombinant 4 Unit(s) SubCutaneous every 6 hours  levETIRAcetam  Solution 1000 milliGRAM(s) Oral two times a day  metoprolol tartrate 100 milliGRAM(s) Oral every 12 hours  pantoprazole  Injectable 40 milliGRAM(s) IV Push daily  piperacillin/tazobactam IVPB.. 3.375 Gram(s) IV Intermittent every 6 hours  tenofovir disoproxil fumarate (VIREAD) 300 milliGRAM(s) Oral every 24 hours    MEDICATIONS  (PRN):  acetaminophen   Tablet .. 650 milliGRAM(s) Oral every 6 hours PRN Temp greater or equal to 38.5C (101.3F), Mild Pain (1 - 3)  dextrose 40% Gel 15 Gram(s) Oral once PRN Blood Glucose LESS THAN 70 milliGRAM(s)/deciliter  glucagon  Injectable 1 milliGRAM(s) IntraMuscular once PRN Glucose LESS THAN 70 milligrams/deciliter  glucagon  Injectable 1 milliGRAM(s) IntraMuscular once PRN Glucose LESS THAN 70 milligrams/deciliter  hydrALAZINE Injectable 10 milliGRAM(s) IV Push every 2 hours PRN SBP > 140  sodium chloride 0.9% lock flush 10 milliLiter(s) IV Push every 1 hour PRN Pre/post blood products, medications, blood draw, and to maintain line patency    Weight:  59.6kg (8/4)    Weight Change:     Admitted Anthropometrics:  Ht (8/6 per RN): 157.48cm, Wt (8/4): 59.6kg, IBW: 50kg+/-10%, %IBW: 119%, BMI: 24.0    Nutrition Focused Physical Exam: Completed [   ]  Unable to complete [   ]-N/A    Estimated energy needs:  IBW (50kg) used to calculate energy needs as pt vented.  Needs adjusted for vent, post-op.     7174-6461 kcal (25-30 kcal/kg IBW)  70-80gm protein (1.4-1.6gm/kg IBW)  Fluid needs per team    Subjective: 59 year old male with PMH of HLD and unclear liver disease had acute onset of severe headache, dizziness and vomiting x1 today, was brought into [facility] with continued symptoms, as well as hypertension and multiple episodes of vomiting. Pt was intubated for airway protection with CT Head performed demonstrating a large left cerebellar hemorrhage. CTA Head/Neck performed is suggestive of underlying vascular pathology such as AVM. RIght frontal ventriculostomy placed at [facility] and patient transferred to [facility] for further work-up. S/P cerebral angio, negative for AVM and s/p SOC craniectomy evacuation of cerebellar hematoma on 8/4. Pt assessed this AM, remains intubated, vent mode: AC/CMV. Plan for CPAP trial. Pt now off decadron, POCT 138-178. Pt has been previously tolerating TF at goal of 45mL/hr but TF currently held to check NGT placement via xray.     Nutrition Diagnosis:  Increased nutrient need RT increased protein demand AEB post-op, vented    Goal: Meet >/=75%EER via most appropriate route with good tolerance    Recommendations:  1. Recommend change TF regimen to Jevity 1.2 goal of 45mL/hr x24h and add Prostat SF 1x/day via NGT to best meet pt's needs at this time (1080mL TV, 1396 kcal, 74gm protein, 871mL free water, 108% RDI, ~28 kcal/kg IBW, 1.5gm protein/kg IBW)  >>order for volume based feeding protocol, monitor tolerance, maintain aspiration precautions  >>water flushes per team  2. Monitor labs (BMP, lytes, [id number]); replete lytes prn  3. Obtain current weight and trend bi-weekly weights  4. If diarrhea continues, consider r/o cdiff and/or add metamucil and probiotic    Education: N/A 2/2 pt's clinical status    Risk Level: High [ X ]  Moderate [   ] Low [   ]

## 2020-08-11 LAB
ALBUMIN SERPL ELPH-MCNC: 3 G/DL — LOW (ref 3.3–5)
ALP SERPL-CCNC: 46 U/L — SIGNIFICANT CHANGE UP (ref 40–120)
ALT FLD-CCNC: 103 U/L — HIGH (ref 10–45)
ANION GAP SERPL CALC-SCNC: 8 MMOL/L — SIGNIFICANT CHANGE UP (ref 5–17)
APPEARANCE CSF: SIGNIFICANT CHANGE UP
APPEARANCE SPUN FLD: ABNORMAL
APPEARANCE UR: CLEAR — SIGNIFICANT CHANGE UP
AST SERPL-CCNC: 48 U/L — HIGH (ref 10–40)
BILIRUB SERPL-MCNC: 0.3 MG/DL — SIGNIFICANT CHANGE UP (ref 0.2–1.2)
BILIRUB UR-MCNC: NEGATIVE — SIGNIFICANT CHANGE UP
BUN SERPL-MCNC: 30 MG/DL — HIGH (ref 7–23)
CALCIUM SERPL-MCNC: 8.3 MG/DL — LOW (ref 8.4–10.5)
CHLORIDE SERPL-SCNC: 118 MMOL/L — HIGH (ref 96–108)
CO2 SERPL-SCNC: 25 MMOL/L — SIGNIFICANT CHANGE UP (ref 22–31)
COLOR CSF: ABNORMAL
COLOR SPEC: YELLOW — SIGNIFICANT CHANGE UP
CREAT SERPL-MCNC: 0.76 MG/DL — SIGNIFICANT CHANGE UP (ref 0.5–1.3)
CSF COMMENTS: SIGNIFICANT CHANGE UP
DIFF PNL FLD: ABNORMAL
GLUCOSE BLDC GLUCOMTR-MCNC: 111 MG/DL — HIGH (ref 70–99)
GLUCOSE BLDC GLUCOMTR-MCNC: 114 MG/DL — HIGH (ref 70–99)
GLUCOSE BLDC GLUCOMTR-MCNC: 128 MG/DL — HIGH (ref 70–99)
GLUCOSE BLDC GLUCOMTR-MCNC: 147 MG/DL — HIGH (ref 70–99)
GLUCOSE CSF-MCNC: 81 MG/DL — HIGH (ref 40–70)
GLUCOSE SERPL-MCNC: 150 MG/DL — HIGH (ref 70–99)
GLUCOSE UR QL: NEGATIVE — SIGNIFICANT CHANGE UP
GRAM STN FLD: SIGNIFICANT CHANGE UP
GRAM STN FLD: SIGNIFICANT CHANGE UP
HCT VFR BLD CALC: 33.3 % — LOW (ref 39–50)
HGB BLD-MCNC: 10.6 G/DL — LOW (ref 13–17)
KETONES UR-MCNC: NEGATIVE — SIGNIFICANT CHANGE UP
LDH CSF L TO P-CCNC: 160 U/L — SIGNIFICANT CHANGE UP
LDH FLD-CCNC: 160 U/L — SIGNIFICANT CHANGE UP
LEUKOCYTE ESTERASE UR-ACNC: NEGATIVE — SIGNIFICANT CHANGE UP
LYMPHOCYTES # CSF: 2 % — LOW (ref 40–80)
MAGNESIUM SERPL-MCNC: 2.7 MG/DL — HIGH (ref 1.6–2.6)
MCHC RBC-ENTMCNC: 28 PG — SIGNIFICANT CHANGE UP (ref 27–34)
MCHC RBC-ENTMCNC: 31.8 GM/DL — LOW (ref 32–36)
MCV RBC AUTO: 88.1 FL — SIGNIFICANT CHANGE UP (ref 80–100)
MONOS+MACROS NFR CSF: 1 % — LOW (ref 15–45)
NEUTROPHILS # CSF: 8 % — HIGH (ref 0–6)
NITRITE UR-MCNC: NEGATIVE — SIGNIFICANT CHANGE UP
NRBC # BLD: 0 /100 WBCS — SIGNIFICANT CHANGE UP (ref 0–0)
NRBC NFR CSF: 11 /UL — HIGH (ref 0–5)
PH UR: 7 — SIGNIFICANT CHANGE UP (ref 5–8)
PHOSPHATE SERPL-MCNC: 3.2 MG/DL — SIGNIFICANT CHANGE UP (ref 2.5–4.5)
PLATELET # BLD AUTO: 191 K/UL — SIGNIFICANT CHANGE UP (ref 150–400)
POTASSIUM SERPL-MCNC: 3.5 MMOL/L — SIGNIFICANT CHANGE UP (ref 3.5–5.3)
POTASSIUM SERPL-SCNC: 3.5 MMOL/L — SIGNIFICANT CHANGE UP (ref 3.5–5.3)
PROT CSF-MCNC: 20 MG/DL — SIGNIFICANT CHANGE UP (ref 15–45)
PROT SERPL-MCNC: 5.8 G/DL — LOW (ref 6–8.3)
PROT UR-MCNC: NEGATIVE MG/DL — SIGNIFICANT CHANGE UP
RBC # BLD: 3.78 M/UL — LOW (ref 4.2–5.8)
RBC # CSF: HIGH /UL (ref 0–0)
RBC # FLD: 14 % — SIGNIFICANT CHANGE UP (ref 10.3–14.5)
SODIUM SERPL-SCNC: 151 MMOL/L — HIGH (ref 135–145)
SP GR SPEC: 1.02 — SIGNIFICANT CHANGE UP (ref 1–1.03)
SPECIMEN SOURCE: SIGNIFICANT CHANGE UP
SPECIMEN SOURCE: SIGNIFICANT CHANGE UP
TUBE TYPE: SIGNIFICANT CHANGE UP
UROBILINOGEN FLD QL: 0.2 E.U./DL — SIGNIFICANT CHANGE UP
WBC # BLD: 16.66 K/UL — HIGH (ref 3.8–10.5)
WBC # FLD AUTO: 16.66 K/UL — HIGH (ref 3.8–10.5)

## 2020-08-11 PROCEDURE — 99291 CRITICAL CARE FIRST HOUR: CPT | Mod: 24

## 2020-08-11 PROCEDURE — 99024 POSTOP FOLLOW-UP VISIT: CPT

## 2020-08-11 PROCEDURE — 70450 CT HEAD/BRAIN W/O DYE: CPT | Mod: 26

## 2020-08-11 PROCEDURE — 71045 X-RAY EXAM CHEST 1 VIEW: CPT | Mod: 26

## 2020-08-11 RX ORDER — LOSARTAN POTASSIUM 100 MG/1
25 TABLET, FILM COATED ORAL DAILY
Refills: 0 | Status: DISCONTINUED | OUTPATIENT
Start: 2020-08-11 | End: 2020-08-11

## 2020-08-11 RX ORDER — ENOXAPARIN SODIUM 100 MG/ML
40 INJECTION SUBCUTANEOUS EVERY 24 HOURS
Refills: 0 | Status: DISCONTINUED | OUTPATIENT
Start: 2020-08-11 | End: 2020-08-19

## 2020-08-11 RX ORDER — SODIUM CHLORIDE 9 MG/ML
1000 INJECTION, SOLUTION INTRAVENOUS
Refills: 0 | Status: DISCONTINUED | OUTPATIENT
Start: 2020-08-11 | End: 2020-08-12

## 2020-08-11 RX ORDER — METHYLPHENIDATE HCL 5 MG
10 TABLET ORAL ONCE
Refills: 0 | Status: DISCONTINUED | OUTPATIENT
Start: 2020-08-11 | End: 2020-08-11

## 2020-08-11 RX ORDER — POTASSIUM CHLORIDE 20 MEQ
20 PACKET (EA) ORAL ONCE
Refills: 0 | Status: COMPLETED | OUTPATIENT
Start: 2020-08-11 | End: 2020-08-11

## 2020-08-11 RX ORDER — LOSARTAN POTASSIUM 100 MG/1
25 TABLET, FILM COATED ORAL DAILY
Refills: 0 | Status: DISCONTINUED | OUTPATIENT
Start: 2020-08-11 | End: 2020-08-12

## 2020-08-11 RX ADMIN — ATORVASTATIN CALCIUM 40 MILLIGRAM(S): 80 TABLET, FILM COATED ORAL at 21:19

## 2020-08-11 RX ADMIN — CHLORHEXIDINE GLUCONATE 1 APPLICATION(S): 213 SOLUTION TOPICAL at 07:00

## 2020-08-11 RX ADMIN — Medication 100 MILLIGRAM(S): at 17:32

## 2020-08-11 RX ADMIN — Medication 10 MILLIGRAM(S): at 11:47

## 2020-08-11 RX ADMIN — AMLODIPINE BESYLATE 10 MILLIGRAM(S): 2.5 TABLET ORAL at 07:10

## 2020-08-11 RX ADMIN — Medication 1 DROP(S): at 07:00

## 2020-08-11 RX ADMIN — LEVETIRACETAM 1000 MILLIGRAM(S): 250 TABLET, FILM COATED ORAL at 21:20

## 2020-08-11 RX ADMIN — Medication 200 MILLIGRAM(S): at 10:51

## 2020-08-11 RX ADMIN — Medication 650 MILLIGRAM(S): at 17:00

## 2020-08-11 RX ADMIN — Medication 650 MILLIGRAM(S): at 22:53

## 2020-08-11 RX ADMIN — CHLORHEXIDINE GLUCONATE 15 MILLILITER(S): 213 SOLUTION TOPICAL at 17:32

## 2020-08-11 RX ADMIN — CHLORHEXIDINE GLUCONATE 15 MILLILITER(S): 213 SOLUTION TOPICAL at 07:09

## 2020-08-11 RX ADMIN — PIPERACILLIN AND TAZOBACTAM 200 GRAM(S): 4; .5 INJECTION, POWDER, LYOPHILIZED, FOR SOLUTION INTRAVENOUS at 11:46

## 2020-08-11 RX ADMIN — INSULIN HUMAN 4 UNIT(S): 100 INJECTION, SOLUTION SUBCUTANEOUS at 00:22

## 2020-08-11 RX ADMIN — Medication 200 MILLIGRAM(S): at 16:25

## 2020-08-11 RX ADMIN — INSULIN HUMAN 4 UNIT(S): 100 INJECTION, SOLUTION SUBCUTANEOUS at 23:19

## 2020-08-11 RX ADMIN — Medication 50 MILLIEQUIVALENT(S): at 10:49

## 2020-08-11 RX ADMIN — LOSARTAN POTASSIUM 25 MILLIGRAM(S): 100 TABLET, FILM COATED ORAL at 17:32

## 2020-08-11 RX ADMIN — SODIUM CHLORIDE 75 MILLILITER(S): 9 INJECTION, SOLUTION INTRAVENOUS at 11:47

## 2020-08-11 RX ADMIN — TENOFOVIR DISOPROXIL FUMARATE 300 MILLIGRAM(S): 300 TABLET, FILM COATED ORAL at 21:22

## 2020-08-11 RX ADMIN — Medication 1 DROP(S): at 16:25

## 2020-08-11 RX ADMIN — ENOXAPARIN SODIUM 40 MILLIGRAM(S): 100 INJECTION SUBCUTANEOUS at 21:18

## 2020-08-11 RX ADMIN — PANTOPRAZOLE SODIUM 40 MILLIGRAM(S): 20 TABLET, DELAYED RELEASE ORAL at 11:46

## 2020-08-11 RX ADMIN — PIPERACILLIN AND TAZOBACTAM 200 GRAM(S): 4; .5 INJECTION, POWDER, LYOPHILIZED, FOR SOLUTION INTRAVENOUS at 17:32

## 2020-08-11 RX ADMIN — INSULIN GLARGINE 20 UNIT(S): 100 INJECTION, SOLUTION SUBCUTANEOUS at 10:55

## 2020-08-11 RX ADMIN — PIPERACILLIN AND TAZOBACTAM 200 GRAM(S): 4; .5 INJECTION, POWDER, LYOPHILIZED, FOR SOLUTION INTRAVENOUS at 07:00

## 2020-08-11 RX ADMIN — Medication 4 MILLIGRAM(S): at 21:20

## 2020-08-11 RX ADMIN — Medication 650 MILLIGRAM(S): at 16:25

## 2020-08-11 RX ADMIN — PIPERACILLIN AND TAZOBACTAM 200 GRAM(S): 4; .5 INJECTION, POWDER, LYOPHILIZED, FOR SOLUTION INTRAVENOUS at 23:14

## 2020-08-11 RX ADMIN — Medication 100 MILLIGRAM(S): at 07:08

## 2020-08-11 RX ADMIN — Medication 1 DROP(S): at 21:19

## 2020-08-11 RX ADMIN — LEVETIRACETAM 1000 MILLIGRAM(S): 250 TABLET, FILM COATED ORAL at 10:49

## 2020-08-11 RX ADMIN — Medication 650 MILLIGRAM(S): at 21:32

## 2020-08-11 RX ADMIN — INSULIN HUMAN 4 UNIT(S): 100 INJECTION, SOLUTION SUBCUTANEOUS at 07:00

## 2020-08-11 NOTE — PROGRESS NOTE ADULT - SUBJECTIVE AND OBJECTIVE BOX
==============================================   NEUROCRITICAL CARE ATTENDING NOTE (2020)  ==============================================    VEDA WARREN   MRN-[id number]  HPI: 59M with dyslipidemia, h/o Hep B infection, presented with acute onset severe HA, dizziness, vomiting - brought to [facility] where CT showed L cerebellar hemorrhage.  Intubated for airway protection.  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  R EVD inserted, transferred to [facility] for further management.  Given mannitol 25G in [facility],     COURSE IN THE HOSPITAL:   bleed   admitted to [facility], 23.4 given x1; OR for SOC evacuation of ICH   remained intubated overnight   tachycardia overnight, Tmax 38.4, ?PNA, ABx    Tmax 38.1 diuresed overnight 1.8L.     No significant events overnight.  CPAP this morning   Tmax 39.7 (enterobacter sputum, on zosyn)  08/10 Trial of CPAP   Neurologically stable, for extubation.    Past Medical History: Hepatitis B HLD (hyperlipidemia)  Allergies:  No Known Allergies  Home meds:   ·	Omega-3 350 mg oral capsule: 1 cap(s) orally once a day  ·	tenofovir disoproxil fumarate 300 mg oral tablet: 1 tab(s) orally once a day    Current Meds:  MEDICATIONS  (STANDING):  amantadine Syrup 200 milliGRAM(s) Oral <User Schedule>  amLODIPine   Tablet 10 milliGRAM(s) Oral daily  artificial tears (preservative free) Ophthalmic Solution 1 Drop(s) Both EYES three times a day  atorvastatin 40 milliGRAM(s) Oral at bedtime  doxazosin 4 milliGRAM(s) Oral at bedtime  enoxaparin Injectable 40 milliGRAM(s) SubCutaneous every 24 hours  insulin glargine Injectable (LANTUS) 20 Unit(s) SubCutaneous every morning  insulin lispro (HumaLOG) corrective regimen sliding scale   SubCutaneous every 6 hours  insulin regular  human recombinant 4 Unit(s) SubCutaneous every 6 hours  lactated ringers. 1000 milliLiter(s) (75 mL/Hr) IV Continuous <Continuous>  levETIRAcetam  Solution 1000 milliGRAM(s) Oral two times a day  metoprolol tartrate 100 milliGRAM(s) Oral every 12 hours  pantoprazole  Injectable 40 milliGRAM(s) IV Push daily  piperacillin/tazobactam IVPB.. 3.375 Gram(s) IV Intermittent every 6 hours  tenofovir disoproxil fumarate (VIREAD) 300 milliGRAM(s) Oral every 24 hours    MEDICATIONS  (PRN):  acetaminophen   Tablet .. 650 milliGRAM(s) Oral every 6 hours PRN Temp greater or equal to 38.5C (101.3F), Mild Pain (1 - 3)  hydrALAZINE Injectable 10 milliGRAM(s) IV Push every 2 hours PRN SBP > 140    PHYSICAL EXAMINATION    ICU Vital Signs Last 24 Hrs  T(C): 37.8 (11 Aug 2020 14:15), Max: 38.4 (10 Aug 2020 22:01)  T(F): 100.1 (11 Aug 2020 14:15), Max: 101.1 (10 Aug 2020 22:01)  HR: 74 (11 Aug 2020 15:) (55 - 83)  BP: 143/70 (11 Aug 2020 15:) (108/57 - 155/109)  BP(mean): 95 (11 Aug 2020 15:) (77 - 126)  ABP: 156/65 (11 Aug 2020 15:) (118/52 - 164/79)  ABP(mean): 91 (11 Aug 2020 15:00) (72 - 107)  RR: 14 (11 Aug 2020 15:) (8 - 20)  SpO2: 100% (11 Aug 2020 15:) (100% - 100%)    NEUROLOGIC EXAMINATION:    -opens eyes spontaneously today, pupils 3 mm reactive bilaterally, occasional skew, tongue midline, obeys second order commands, able to lift head  EENT:  anicteric  CARDIOVASCULAR: (+) S1 S2, normal rate and regular rhythm  PULMONARY: clear to auscultation bilaterally  ABDOMEN: soft, nontender with normoactive bowel sounds  EXTREMITIES: no edema  SKIN: no rash    I/O's    08-10-20 @ 07:01  -  20 @ 07:00  --------------------------------------------------------  IN: 1110 mL / OUT: 3326 mL / NET: -2216 mL    20 @ 07:01  -  20 @ 15:33  --------------------------------------------------------  IN: 510 mL / OUT: 620 mL / NET: -110 mL    LABS:                        10.6   16.66 )-----------( 191      ( 11 Aug 2020 05:11 )             33.3     08-11    151<H>  |  118<H>  |  30<H>  ----------------------------<  150<H>  3.5   |  25  |  0.76    Ca    8.3<L>      11 Aug 2020 05:11  Phos  3.2     08-11  Mg     2.7     -11    TPro  5.8<L>  /  Alb  3.0<L>  /  TBili  0.3  /  DBili  x   /  AST  48<H>  /  ALT  103<H>  /  AlkPhos  46  08-11    Urinalysis Basic - ( 11 Aug 2020 13:31 )    Color: Yellow / Appearance: Clear / S.020 / pH: x  Gluc: x / Ketone: NEGATIVE  / Bili: Negative / Urobili: 0.2 E.U./dL   Blood: x / Protein: NEGATIVE mg/dL / Nitrite: NEGATIVE   Leuk Esterase: NEGATIVE / RBC: > 10 /HPF / WBC < 5 /HPF   Sq Epi: x / Non Sq Epi: 0-5 /HPF / Bacteria: Present /HPF    CAPILLARY BLOOD GLUCOSE    POCT Blood Glucose.: 114 mg/dL (11 Aug 2020 10:52)  POCT Blood Glucose.: 147 mg/dL (11 Aug 2020 07:40)  POCT Blood Glucose.: 117 mg/dL (10 Aug 2020 23:45)  POCT Blood Glucose.: 108 mg/dL (10 Aug 2020 16:28)    Culture - CSF with Gram Stain (collected 20 @ 09:11)  Source: .CSF CSF  Gram Stain (20 @ 09:40):    No organisms seen    No White blood cells    HbA1C = 5.6 ()  LDL = 185 ()   HDL = 36 () TG = 252 ()  TSH = 0.460 ()    Bacteriology:    CSF no organisms   sputum CS: enterobacter cloacae S: piperacillin/tazobactam    CSF NGTD   Blood CS NG5D x2   UA NEG    CSF studies:    L   *** [id number] *** %N31 %L5     Neuroimagin/10 CT head:  Compared to 2020, there is improving intraventricular hemorrhage, resolution of intraventricular gas, and mild decrease in ventricular size. Postsurgical change and edema in the posterior fossa is approximately stable.   CT head:  post-surgical changes, stable HCP  Other imagin/04 LE doppler: NEG   CXR:  mild atelectasis L lung base  08/10 CXR:  Small left pleural effusion, unchanged.    IV FLUIDS: IV RL at 75 mL/hr  DRIPS:  DIET: NPO  Lines:  Drains:  Matt R SOL Lewis   EVD at 7cm H20  35cc/hr   EVD at 5cm H20 ICP 2-5 68cc/24h   EVD at 5cm H20    EVD at 5cm H20 ICP 1-3 202cc/24h     EVD at 5 cm H20 ICP 1-4 output 194cc/24h   EVD at 5 cm H20 ICP 0 to 5 output 280cc/24h  08/10 EVD at 5 cm H20 ICP 5 to 10 output 5-20 cc/h    CODE STATUS:  Full Code                       GOALS OF CARE:  aggressive                      DISPOSITION:  ICU  ICH Score on admission  = 4, NIHSS

## 2020-08-11 NOTE — PROGRESS NOTE ADULT - SUBJECTIVE AND OBJECTIVE BOX
HPI:  59 year old male with PMH of HLD and unclear liver disease had acute onset of severe headache, dizziness and vomiting x1 today, was brought into [facility] with continued symptoms, as well as hypertension and multiple episodes of vomiting. Patient was intubated for airway protection with CT Head performed demonstrating a large left cerebellar hemorrhage. CTA Head/Neck performed is suggestive of underlying vascular pathology such as AVM. [place] [facility] [place] [facility] for further work-up. Patient arrives to [facility] intubated, sedated and on Nicardipine drip. Patient's son provided history and denies any Aspirin or other anticoagulant use. Denies any prior history of hypertension, smoking, or ETOH abuse. (04 Aug 2020 05:01)    POD# 0 S/P cerebral angio, negative for AVM. s/p SOC craniectomy evacuation of cerebellar hematoma.   POD # 1:   overnight tachycardic, ekg without significant changes, lactate wnl, low grade fever, s/p tyl. bolus x 1L , NS increased to 100cc/hr; neuro improving.  EVD at 5 cmH2O   8/ POD#3, BD#4, Tmax 100.6, Pan Cx 7/5 NGTD, Vanc/Zosyn until 8/10, Vanc trough due today @ 6pm, EEG = no seizure, Lasix overnight for Pulm congest and tachycard, ruff placed for strict I&Os, pending anemia w/u, EVD @ 5, CPAP trials, Card GTT for SBP<140, Started Norvasc for BP optimization  :  :  S/P cerebral angio, negative for AVM. s/p SOC craniectomy evacuation of cerebellar hematoma. No significant events overnight. On CPAP this morning   8/10: fevers overnight, resolved this morning, Pancultured, (+) Enterbacter, on CPAP this AM   POD#7, EVD replaced (hard pass), Neuro exam stable, EVD @ 5cm H2O, Zosyn for sputum + enterobact, Plan to extubated today, HCT in AM    ICU Vital Signs Last 24 Hrs  T(C): 37.4 (11 Aug 2020 03:00), Max: 38.4 (10 Aug 2020 22:01)  T(F): 99.4 (11 Aug 2020 03:00), Max: 101.1 (10 Aug 2020 22:01)  HR: 60 (11 Aug 2020 04:00) (60 - 83)  BP: 126/66 (11 Aug 2020 03:00) (108/57 - 154/81)  BP(mean): 90 (11 Aug 2020 03:00) (77 - 119)  ABP: 143/61 (11 Aug 2020 04:00) (118/52 - 167/70)  ABP(mean): 87 (11 Aug 2020 04:00) (72 - 107)  RR: 16 (11 Aug 2020 04:00) (12 - 16)  SpO2: 100% (11 Aug 2020 04:00) (97% - 100%)      PHYSICAL EXAM:  General: Patient is on CPAP/PS (Apneas noted)  	AC RR 12, , FiO2 40, PEEP 5, ITime 1, MAP 7, PIP 15  EENT: anicteric, pupils 3mm, PERRL B/L, EOMs sluggish  CARDIOVASCULAR: (+) S1 S2, normal rate and regular rhythm  PULMONARY: clear to auscultation bilaterally  ABDOMEN: soft, nontender with normoactive bowel sounds  EXTREMITIES: no edema, DP/PTs palpable B/L  Neuro: opens eyes spontaneously, obeys simple commands to , wiggling toes and plantar flexion, localizing to pain x 4,  +cough, +gag, + corneal   Skin: no rashes      EVD @ 5 cm H20    TUBES/LINES:  [] CVC  [] A-line  [] Lumbar Drain  [] Ventriculostomy  [] Other    DIET:  [] NPO  [] Mechanical  [] Tube feeds    LABS:                     Pending AM Labs Collection    CAPILLARY BLOOD GLUCOSE    POCT Blood Glucose.: 206 mg/dL (08 Aug 2020 11:28)  POCT Blood Glucose.: 204 mg/dL (08 Aug 2020 06:37)  POCT Blood Glucose.: 243 mg/dL (07 Aug 2020 21:51)  POCT Blood Glucose.: 199 mg/dL (07 Aug 2020 17:49)      Drug Levels: [] N/A  Vancomycin Level, Trough: 6.7 ug/mL ( @ 17:57)    CSF studies:    L   *** [id number] *** %N31 %L5     CULTURES:  Culture Results:   Numerous Enterobacter cloacae complex  Susceptibility to follow.  Culture in progress ( @ 19:32)  Culture Results:   No growth to date ( @ 11:46)    RADIOLOGY & ADDITIONAL TESTS:  Neuroimagin/10 CT head:  Compared to 2020, there is improving intraventricular hemorrhage, resolution of intraventricular gas, and mild decrease in ventricular size. Postsurgical change and edema in the posterior fossa is approximately stable.   CT head:  post-surgical changes, stable HCP  Other imagin/04 LE doppler: NEG   CXR:  mild atelectasis L lung base  08/10 CXR:  Small left pleural effusion, unchanged.    Assessment and Plan:    Assessment/Plan:  · Plan		  ASSESSMENT:  59y Male s/p SOC craniectomy evacuation of cerebellar hematoma.     INTRACRANIAL BLEED  No pertinent family history in first degree relatives  Handoff  Hepatitis B  HLD (hyperlipidemia)  ICH (intracerebral hemorrhage)  ICH (intracerebral hemorrhage)  Hepatitis B  HLD (hyperlipidemia)  HIV (human immunodeficiency virus infection)  Cerebellar hemorrhage  Craniectomy, subocciptal, with cervical laminectomy for medulla and spinal cord decompression  Percutaneous insertion of arterial line  Angiogram, carotid and cerebral vessels, bilateral  Foot fracture, left      PLAN:   NEURO: neurochecks q1h, no sedation  EVD at 5 cm H20  open to drain   neurostimulant: amantadine 200mg PO BID   seizure prophylaxis: levetiracetam  as per NS  REHAB:  physical therapy evaluation and management    EARLY MOB:  HOB up    PULM:  CPAP 10/5 40% continued, Plan to Extubate if tolerating CPAP  CARDIO:  SBP goal 100-140mm Hg cont metoprolol 100 mg PO BID, continue amlodipine 10 mg daily  	holding parameters: SBP <110, HR <55  ENDO:  Blood sugar goals 140-180 mg/dL, continue insulin sliding, continue statins; insulin glargine 20 nutritional 4-4-4-4  GI:  PPI for GI prophylaxis, has a rectal tube in place, monitor liver enzymes (elevated AST/ALT and ALP but previously normal), NPO at 6 am for extubation tomorrow  DIET: jevity   RENAL:  IVL, D/C Ruff --> SC PRN  HEM/ONC: no coagulopathy, no h/o antiplatelet / anticoagulant use  VTE Prophylaxis: SCDs, SQL   ID: febrile, leukocytosis, piperacillin/tazobactam d/c vanc (last day )  Social: updated wife yesterday; son Perry updated ([phone]).  2 daughters Shantell  and Tammie ; wife name is Julián Sahu    CORE MEASURES:  1.  NIHSS =   2.  ICH Score = 4  3.  VTE prophylaxis: [ ] administered within 24h of admission OR [x] reason not done - fresh bleed  4.  Dysphagia screening: [X] performed before any oral meds / liquids / food    D/W Dr. Whiting and Dr. Blank HPI:  59 year old male with PMH of HLD and unclear liver disease had acute onset of severe headache, dizziness and vomiting x1 today, was brought into [facility] with continued symptoms, as well as hypertension and multiple episodes of vomiting. Patient was intubated for airway protection with CT Head performed demonstrating a large left cerebellar hemorrhage. CTA Head/Neck performed is suggestive of underlying vascular pathology such as AVM. [place]ht frontal ventricul[place] [facility] [place] and patient transferred to [facility] for further work-up. Patient arrives to [facility] intubated, sedated and on Nicardipine drip. Patient's son provided history and denies any Aspirin or other anticoagulant use. Denies any prior history of hypertension, smoking, or ETOH abuse. (04 Aug 2020 05:01)    POD# 0 S/P cerebral angio, negative for AVM. s/p SOC craniectomy evacuation of cerebellar hematoma.   POD # 1:   overnight tachycardic, ekg without significant changes, lactate wnl, low grade fever, s/p tyl. bolus x 1L , NS increased to 100cc/hr; neuro improving.  EVD at 5 cmH2O   8/ POD#3, BD#4, Tmax 100.6, Pan Cx 7/ NGTD, Vanc/Zosyn until 8/10, Vanc trough due today @ 6pm, EEG = no seizure, Lasix overnight for Pulm congest and tachycard, ruff placed for strict I&Os, pending anemia w/u, EVD @ 5, CPAP trials, Card GTT for SBP<140, Started Norvasc for BP optimization  :  :  S/P cerebral angio, negative for AVM. s/p SOC craniectomy evacuation of cerebellar hematoma. No significant events overnight. On CPAP this morning   8/10: fevers overnight, resolved this morning, Pancultured, (+) Enterbacter, on CPAP this AM   POD#7 SOC, BD # 8, EVD replaced (hard pass), Neuro exam stable, EVD @ 5cm H2O, Zosyn for sputum + enterobact, Plan to extubated today, HCT in AM    ICU Vital Signs Last 24 Hrs  T(C): 37.4 (11 Aug 2020 03:00), Max: 38.4 (10 Aug 2020 22:01)  T(F): 99.4 (11 Aug 2020 03:00), Max: 101.1 (10 Aug 2020 22:01)  HR: 60 (11 Aug 2020 04:00) (60 - 83)  BP: 126/66 (11 Aug 2020 03:00) (108/57 - 154/81)  BP(mean): 90 (11 Aug 2020 03:00) (77 - 119)  ABP: 143/61 (11 Aug 2020 04:00) (118/52 - 167/70)  ABP(mean): 87 (11 Aug 2020 04:00) (72 - 107)  RR: 16 (11 Aug 2020 04:00) (12 - 16)  SpO2: 100% (11 Aug 2020 04:00) (97% - 100%)      PHYSICAL EXAM:  General: Patient is on CPAP/PS (Apneas noted)  	AC RR 12, , FiO2 40, PEEP 5, ITime 1, MAP 7, PIP 15  EENT: anicteric, pupils 3mm, PERRL B/L, EOMs sluggish  CARDIOVASCULAR: (+) S1 S2, normal rate and regular rhythm  PULMONARY: clear to auscultation bilaterally  ABDOMEN: soft, nontender with normoactive bowel sounds  EXTREMITIES: no edema, DP/PTs palpable B/L  Neuro: opens eyes spontaneously, obeys simple commands to , wiggling toes and plantar flexion, localizing to pain x 4,  +cough, +gag, + corneal   Skin: no rashes      EVD @ 5 cm H20    TUBES/LINES:  [] CVC  [] A-line  [] Lumbar Drain  [] Ventriculostomy  [] Other    DIET:  [] NPO  [] Mechanical  [] Tube feeds    LABS:                     Pending AM Labs Collection    CAPILLARY BLOOD GLUCOSE    POCT Blood Glucose.: 206 mg/dL (08 Aug 2020 11:28)  POCT Blood Glucose.: 204 mg/dL (08 Aug 2020 06:37)  POCT Blood Glucose.: 243 mg/dL (07 Aug 2020 21:51)  POCT Blood Glucose.: 199 mg/dL (07 Aug 2020 17:49)      Drug Levels: [] N/A  Vancomycin Level, Trough: 6.7 ug/mL ( @ 17:57)    CSF studies:    L   *** [id number] WBC36 *** %N31 %L5     CULTURES:  Culture Results:   Numerous Enterobacter cloacae complex  Susceptibility to follow.  Culture in progress ( @ 19:32)  Culture Results:   No growth to date ( @ 11:46)    RADIOLOGY & ADDITIONAL TESTS:  Neuroimagin/10 CT head:  Compared to 2020, there is improving intraventricular hemorrhage, resolution of intraventricular gas, and mild decrease in ventricular size. Postsurgical change and edema in the posterior fossa is approximately stable.   CT head:  post-surgical changes, stable HCP  Other imagin/04 LE doppler: NEG   CXR:  mild atelectasis L lung base  08/10 CXR:  Small left pleural effusion, unchanged.    Assessment and Plan:    Assessment/Plan:  · Plan		  ASSESSMENT:  59y Male s/p SOC craniectomy evacuation of cerebellar hematoma.     INTRACRANIAL BLEED  No pertinent family history in first degree relatives  Handoff  Hepatitis B  HLD (hyperlipidemia)  ICH (intracerebral hemorrhage)  ICH (intracerebral hemorrhage)  Hepatitis B  HLD (hyperlipidemia)  HIV (human immunodeficiency virus infection)  Cerebellar hemorrhage  Craniectomy, subocciptal, with cervical laminectomy for medulla and spinal cord decompression  Percutaneous insertion of arterial line  Angiogram, carotid and cerebral vessels, bilateral  Foot fracture, left      PLAN:   NEURO: neurochecks q1h, no sedation  EVD at 5 cm H20  open to drain   neurostimulant: amantadine 200mg PO BID   seizure prophylaxis: levetiracetam  as per NS  REHAB:  physical therapy evaluation and management    EARLY MOB:  HOB up    PULM:  CPAP 10/5 40% continued, Plan to Extubate if tolerating CPAP  CARDIO:  SBP goal 100-140mm Hg cont metoprolol 100 mg PO BID, continue amlodipine 10 mg daily  	holding parameters: SBP <110, HR <55  ENDO:  Blood sugar goals 140-180 mg/dL, continue insulin sliding, continue statins; insulin glargine 20 nutritional 4-4-4-4  GI:  PPI for GI prophylaxis, has a rectal tube in place, monitor liver enzymes (elevated AST/ALT and ALP but previously normal), NPO at 6 am for extubation tomorrow  DIET: jevity   RENAL:  IVL, D/C Ruff --> SC PRN  HEM/ONC: no coagulopathy, no h/o antiplatelet / anticoagulant use  VTE Prophylaxis: SCDs, SQL   ID: febrile, leukocytosis, piperacillin/tazobactam d/c vanc (last day )  Social: updated wife yesterday; son Perry updated ([phone]).  2 daughters Shantell  and Tammie ; wife name is Julián Sahu    CORE MEASURES:  1.  NIHSS =   2.  ICH Score = 4  3.  VTE prophylaxis: [ ] administered within 24h of admission OR [x] reason not done - fresh bleed  4.  Dysphagia screening: [X] performed before any oral meds / liquids / food    D/W Dr. Whiting and Dr. Blank

## 2020-08-11 NOTE — PROGRESS NOTE ADULT - ASSESSMENT
59y/M with  1.	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	Hepatitis B  3.	dyslipidemia     PLAN:   NEURO: neurochecks q1h, no sedation  EVD at 5 cm H20 open to drain   neurostimulant: amantadine 200mg PO BID, given ritalin 10 mg this AM (for extubation)  seizure prophylaxis: levetiracetam as per NS  REHAB:  physical therapy evaluation and management    EARLY MOB:  HOB up    PULM:  CPAP 10/5 40% --> extubation  CARDIO:  SBP goal 100-140mm Hg cont metoprolol 100 mg PO BID, continue amlodipine 10 mg daily  ENDO:  Blood sugar goals 140-180 mg/dL, continue insulin sliding, continue statins; insulin glargine 20 nutritional 4-4-4-4  GI:  PPI for GI prophylaxis  DIET: jevity   RENAL:  IVL, D/C Matt --> [place] cath today  HEM/ONC: no coagulopathy, no h/o antiplatelet / anticoagulant use  VTE Prophylaxis: SCDs, SQL   ID: febrile, leukocytosis, piperacillin/tazobactam d/c vanc (last day 08/12)  Social: updated wife yesterday; son Perry updated ([phone]).  2 daughters Shantell  and Tammie ; wife name is Julián Sahu    CORE MEASURES:  1.  NIHSS =   2.  ICH Score = 4  3.  VTE prophylaxis: [ ] administered within 24h of admission OR [x] reason not done - fresh bleed  4.  Dysphagia screening: [X] performed before any oral meds / liquids / food

## 2020-08-12 LAB
ANION GAP SERPL CALC-SCNC: 10 MMOL/L — SIGNIFICANT CHANGE UP (ref 5–17)
ANION GAP SERPL CALC-SCNC: 8 MMOL/L — SIGNIFICANT CHANGE UP (ref 5–17)
BUN SERPL-MCNC: 21 MG/DL — SIGNIFICANT CHANGE UP (ref 7–23)
BUN SERPL-MCNC: 25 MG/DL — HIGH (ref 7–23)
C DIFF GDH STL QL: NEGATIVE — SIGNIFICANT CHANGE UP
C DIFF GDH STL QL: SIGNIFICANT CHANGE UP
CALCIUM SERPL-MCNC: 8 MG/DL — LOW (ref 8.4–10.5)
CALCIUM SERPL-MCNC: 8.1 MG/DL — LOW (ref 8.4–10.5)
CHLORIDE SERPL-SCNC: 116 MMOL/L — HIGH (ref 96–108)
CHLORIDE SERPL-SCNC: 117 MMOL/L — HIGH (ref 96–108)
CO2 SERPL-SCNC: 24 MMOL/L — SIGNIFICANT CHANGE UP (ref 22–31)
CO2 SERPL-SCNC: 25 MMOL/L — SIGNIFICANT CHANGE UP (ref 22–31)
CREAT SERPL-MCNC: 0.67 MG/DL — SIGNIFICANT CHANGE UP (ref 0.5–1.3)
CREAT SERPL-MCNC: 0.7 MG/DL — SIGNIFICANT CHANGE UP (ref 0.5–1.3)
GLUCOSE BLDC GLUCOMTR-MCNC: 113 MG/DL — HIGH (ref 70–99)
GLUCOSE BLDC GLUCOMTR-MCNC: 122 MG/DL — HIGH (ref 70–99)
GLUCOSE BLDC GLUCOMTR-MCNC: 129 MG/DL — HIGH (ref 70–99)
GLUCOSE BLDC GLUCOMTR-MCNC: 148 MG/DL — HIGH (ref 70–99)
GLUCOSE SERPL-MCNC: 130 MG/DL — HIGH (ref 70–99)
GLUCOSE SERPL-MCNC: 148 MG/DL — HIGH (ref 70–99)
HCT VFR BLD CALC: 31.3 % — LOW (ref 39–50)
HGB BLD-MCNC: 10.1 G/DL — LOW (ref 13–17)
MAGNESIUM SERPL-MCNC: 2.4 MG/DL — SIGNIFICANT CHANGE UP (ref 1.6–2.6)
MCHC RBC-ENTMCNC: 28.1 PG — SIGNIFICANT CHANGE UP (ref 27–34)
MCHC RBC-ENTMCNC: 32.3 GM/DL — SIGNIFICANT CHANGE UP (ref 32–36)
MCV RBC AUTO: 86.9 FL — SIGNIFICANT CHANGE UP (ref 80–100)
NRBC # BLD: 0 /100 WBCS — SIGNIFICANT CHANGE UP (ref 0–0)
PHOSPHATE SERPL-MCNC: 2.7 MG/DL — SIGNIFICANT CHANGE UP (ref 2.5–4.5)
PLATELET # BLD AUTO: 190 K/UL — SIGNIFICANT CHANGE UP (ref 150–400)
POTASSIUM SERPL-MCNC: 3.1 MMOL/L — LOW (ref 3.5–5.3)
POTASSIUM SERPL-MCNC: 3.3 MMOL/L — LOW (ref 3.5–5.3)
POTASSIUM SERPL-SCNC: 3.1 MMOL/L — LOW (ref 3.5–5.3)
POTASSIUM SERPL-SCNC: 3.3 MMOL/L — LOW (ref 3.5–5.3)
RBC # BLD: 3.6 M/UL — LOW (ref 4.2–5.8)
RBC # FLD: 13.5 % — SIGNIFICANT CHANGE UP (ref 10.3–14.5)
SODIUM SERPL-SCNC: 150 MMOL/L — HIGH (ref 135–145)
SODIUM SERPL-SCNC: 150 MMOL/L — HIGH (ref 135–145)
WBC # BLD: 16.14 K/UL — HIGH (ref 3.8–10.5)
WBC # FLD AUTO: 16.14 K/UL — HIGH (ref 3.8–10.5)

## 2020-08-12 PROCEDURE — 99291 CRITICAL CARE FIRST HOUR: CPT | Mod: 24

## 2020-08-12 PROCEDURE — 71045 X-RAY EXAM CHEST 1 VIEW: CPT | Mod: 26

## 2020-08-12 PROCEDURE — 70450 CT HEAD/BRAIN W/O DYE: CPT | Mod: 26

## 2020-08-12 RX ORDER — FINASTERIDE 5 MG/1
5 TABLET, FILM COATED ORAL DAILY
Refills: 0 | Status: DISCONTINUED | OUTPATIENT
Start: 2020-08-12 | End: 2020-09-02

## 2020-08-12 RX ORDER — LOSARTAN POTASSIUM 100 MG/1
50 TABLET, FILM COATED ORAL DAILY
Refills: 0 | Status: DISCONTINUED | OUTPATIENT
Start: 2020-08-13 | End: 2020-08-13

## 2020-08-12 RX ORDER — INSULIN HUMAN 100 [IU]/ML
INJECTION, SOLUTION SUBCUTANEOUS EVERY 6 HOURS
Refills: 0 | Status: DISCONTINUED | OUTPATIENT
Start: 2020-08-12 | End: 2020-09-02

## 2020-08-12 RX ORDER — FENTANYL CITRATE 50 UG/ML
50 INJECTION INTRAVENOUS ONCE
Refills: 0 | Status: DISCONTINUED | OUTPATIENT
Start: 2020-08-12 | End: 2020-08-12

## 2020-08-12 RX ORDER — LOSARTAN POTASSIUM 100 MG/1
25 TABLET, FILM COATED ORAL ONCE
Refills: 0 | Status: COMPLETED | OUTPATIENT
Start: 2020-08-12 | End: 2020-08-12

## 2020-08-12 RX ORDER — POTASSIUM CHLORIDE 20 MEQ
40 PACKET (EA) ORAL ONCE
Refills: 0 | Status: COMPLETED | OUTPATIENT
Start: 2020-08-12 | End: 2020-08-12

## 2020-08-12 RX ORDER — PSYLLIUM SEED (WITH DEXTROSE)
1 POWDER (GRAM) ORAL DAILY
Refills: 0 | Status: DISCONTINUED | OUTPATIENT
Start: 2020-08-12 | End: 2020-08-12

## 2020-08-12 RX ORDER — PSYLLIUM SEED (WITH DEXTROSE)
1 POWDER (GRAM) ORAL DAILY
Refills: 0 | Status: DISCONTINUED | OUTPATIENT
Start: 2020-08-12 | End: 2020-09-02

## 2020-08-12 RX ORDER — MIDAZOLAM HYDROCHLORIDE 1 MG/ML
2 INJECTION, SOLUTION INTRAMUSCULAR; INTRAVENOUS ONCE
Refills: 0 | Status: DISCONTINUED | OUTPATIENT
Start: 2020-08-12 | End: 2020-08-12

## 2020-08-12 RX ORDER — POTASSIUM CHLORIDE 20 MEQ
20 PACKET (EA) ORAL
Refills: 0 | Status: COMPLETED | OUTPATIENT
Start: 2020-08-12 | End: 2020-08-12

## 2020-08-12 RX ORDER — LIDOCAINE HYDROCHLORIDE AND EPINEPHRINE 10; 10 MG/ML; UG/ML
20 INJECTION, SOLUTION INFILTRATION; PERINEURAL ONCE
Refills: 0 | Status: COMPLETED | OUTPATIENT
Start: 2020-08-12 | End: 2020-08-12

## 2020-08-12 RX ORDER — AMANTADINE HCL 100 MG
100 CAPSULE ORAL
Refills: 0 | Status: DISCONTINUED | OUTPATIENT
Start: 2020-08-12 | End: 2020-08-14

## 2020-08-12 RX ADMIN — PIPERACILLIN AND TAZOBACTAM 200 GRAM(S): 4; .5 INJECTION, POWDER, LYOPHILIZED, FOR SOLUTION INTRAVENOUS at 19:18

## 2020-08-12 RX ADMIN — INSULIN HUMAN 4 UNIT(S): 100 INJECTION, SOLUTION SUBCUTANEOUS at 11:43

## 2020-08-12 RX ADMIN — ENOXAPARIN SODIUM 40 MILLIGRAM(S): 100 INJECTION SUBCUTANEOUS at 21:27

## 2020-08-12 RX ADMIN — LEVETIRACETAM 1000 MILLIGRAM(S): 250 TABLET, FILM COATED ORAL at 21:27

## 2020-08-12 RX ADMIN — Medication 1 DROP(S): at 05:50

## 2020-08-12 RX ADMIN — Medication 200 MILLIGRAM(S): at 09:19

## 2020-08-12 RX ADMIN — Medication 40 MILLIEQUIVALENT(S): at 19:20

## 2020-08-12 RX ADMIN — LOSARTAN POTASSIUM 25 MILLIGRAM(S): 100 TABLET, FILM COATED ORAL at 11:48

## 2020-08-12 RX ADMIN — Medication 50 MILLIEQUIVALENT(S): at 11:28

## 2020-08-12 RX ADMIN — INSULIN HUMAN 4 UNIT(S): 100 INJECTION, SOLUTION SUBCUTANEOUS at 23:56

## 2020-08-12 RX ADMIN — LOSARTAN POTASSIUM 25 MILLIGRAM(S): 100 TABLET, FILM COATED ORAL at 05:52

## 2020-08-12 RX ADMIN — Medication 1 PACKET(S): at 16:36

## 2020-08-12 RX ADMIN — Medication 1 DROP(S): at 21:25

## 2020-08-12 RX ADMIN — LIDOCAINE HYDROCHLORIDE AND EPINEPHRINE 20 MILLILITER(S): 10; 10 INJECTION, SOLUTION INFILTRATION; PERINEURAL at 14:32

## 2020-08-12 RX ADMIN — PIPERACILLIN AND TAZOBACTAM 200 GRAM(S): 4; .5 INJECTION, POWDER, LYOPHILIZED, FOR SOLUTION INTRAVENOUS at 05:53

## 2020-08-12 RX ADMIN — INSULIN HUMAN 4 UNIT(S): 100 INJECTION, SOLUTION SUBCUTANEOUS at 06:11

## 2020-08-12 RX ADMIN — AMLODIPINE BESYLATE 10 MILLIGRAM(S): 2.5 TABLET ORAL at 05:49

## 2020-08-12 RX ADMIN — FENTANYL CITRATE 50 MICROGRAM(S): 50 INJECTION INTRAVENOUS at 15:16

## 2020-08-12 RX ADMIN — CHLORHEXIDINE GLUCONATE 1 APPLICATION(S): 213 SOLUTION TOPICAL at 05:50

## 2020-08-12 RX ADMIN — Medication 650 MILLIGRAM(S): at 05:57

## 2020-08-12 RX ADMIN — PIPERACILLIN AND TAZOBACTAM 200 GRAM(S): 4; .5 INJECTION, POWDER, LYOPHILIZED, FOR SOLUTION INTRAVENOUS at 23:59

## 2020-08-12 RX ADMIN — Medication 50 MILLIEQUIVALENT(S): at 19:20

## 2020-08-12 RX ADMIN — Medication 50 MILLIEQUIVALENT(S): at 06:53

## 2020-08-12 RX ADMIN — TENOFOVIR DISOPROXIL FUMARATE 300 MILLIGRAM(S): 300 TABLET, FILM COATED ORAL at 21:29

## 2020-08-12 RX ADMIN — ATORVASTATIN CALCIUM 40 MILLIGRAM(S): 80 TABLET, FILM COATED ORAL at 21:26

## 2020-08-12 RX ADMIN — INSULIN GLARGINE 20 UNIT(S): 100 INJECTION, SOLUTION SUBCUTANEOUS at 09:18

## 2020-08-12 RX ADMIN — INSULIN HUMAN 4 UNIT(S): 100 INJECTION, SOLUTION SUBCUTANEOUS at 18:10

## 2020-08-12 RX ADMIN — Medication 100 MILLIGRAM(S): at 16:36

## 2020-08-12 RX ADMIN — Medication 50 MILLIEQUIVALENT(S): at 09:30

## 2020-08-12 RX ADMIN — FENTANYL CITRATE 50 MICROGRAM(S): 50 INJECTION INTRAVENOUS at 14:30

## 2020-08-12 RX ADMIN — MIDAZOLAM HYDROCHLORIDE 2 MILLIGRAM(S): 1 INJECTION, SOLUTION INTRAMUSCULAR; INTRAVENOUS at 15:31

## 2020-08-12 RX ADMIN — CHLORHEXIDINE GLUCONATE 15 MILLILITER(S): 213 SOLUTION TOPICAL at 05:50

## 2020-08-12 RX ADMIN — Medication 650 MILLIGRAM(S): at 06:11

## 2020-08-12 RX ADMIN — Medication 1 DROP(S): at 14:35

## 2020-08-12 RX ADMIN — PIPERACILLIN AND TAZOBACTAM 200 GRAM(S): 4; .5 INJECTION, POWDER, LYOPHILIZED, FOR SOLUTION INTRAVENOUS at 11:43

## 2020-08-12 RX ADMIN — LEVETIRACETAM 1000 MILLIGRAM(S): 250 TABLET, FILM COATED ORAL at 09:20

## 2020-08-12 RX ADMIN — Medication 4 MILLIGRAM(S): at 21:26

## 2020-08-12 RX ADMIN — Medication 50 MILLIEQUIVALENT(S): at 21:27

## 2020-08-12 RX ADMIN — Medication 100 MILLIGRAM(S): at 18:10

## 2020-08-12 RX ADMIN — Medication 100 MILLIGRAM(S): at 05:52

## 2020-08-12 NOTE — PROVIDER CONTACT NOTE (CHANGE IN STATUS NOTIFICATION) - SITUATION
Pt agitated, squirming and struggling in be, legs repeatedly off bed multiple times in less than 5 min. Noted EVD output rate 10 ml in less than 1 minute. Previous duty RN performing SC q 4 hrs. Pt reportedly becomes agitated if retaining urine.

## 2020-08-12 NOTE — PROGRESS NOTE ADULT - SUBJECTIVE AND OBJECTIVE BOX
HPI:  59 year old male with PMH of HLD and unclear liver disease had acute onset of severe headache, dizziness and vomiting x1 today, was brought into [facility] with continued symptoms, as well as hypertension and multiple episodes of vomiting. Patient was intubated for airway protection with CT Head performed demonstrating a large left cerebellar hemorrhage. CTA Head/Neck performed is suggestive of underlying vascular pathology such as AVM. RIght frontal ventriculostomy placed at [facility] [place] and patient transferred to [facility] for further work-up. Patient arrives to [facility] intubated, sedated and on Nicardipine drip. Patient's son provided history and denies any Aspirin or other anticoagulant use. Denies any prior history of hypertension, smoking, or ETOH abuse. (04 Aug 2020 05:01)    S/Overnight events: Neuro exam stable. EVD stopped draining, flushed distally and proximally, ICP waveform diminished and not draining, otherwise no issues.       Hospital Course:   POD# 0 S/P cerebral angio, negative for AVM. s/p SOC craniectomy evacuation of cerebellar hematoma.   POD # 1:   overnight tachycardic, ekg without significant changes, lactate wnl, low grade fever, s/p tyl. bolus x 1L , NS increased to 100cc/hr; neuro improving.  EVD at 5 cmH2O   8/ POD#3, BD#4, Tmax 100.6, Pan Cx 7/ NGTD, Vanc/Zosyn until 8/10, Vanc trough due today @ 6pm, EEG = no seizure, Lasix overnight for Pulm congest and tachycard, ruff placed for strict I&Os, pending anemia w/u, EVD @ 5, CPAP trials, Card GTT for SBP<140, Started Norvasc for BP optimization  :  :  S/P cerebral angio, negative for AVM. s/p SOC craniectomy evacuation of cerebellar hematoma. No significant events overnight. On CPAP this morning   8/10: fevers overnight, resolved this morning, Pancultured, (+) Enterbacter, on CPAP this AM   POD#7 SOC, BD # 8, EVD replaced (hard pass), Neuro exam stable, EVD @ 5cm H2O, Zosyn for sputum + enterobact, Plan to extubated today, HCT in AM   POD#8 s/p SOC, BD#9, s/p EVD replacement (hard pass on 8/10/20), neuro exam stable, EVD @5cm H2O. Zosyn for enterobacter in sputum. O/n EVD stopped draining, flushed distally and proximally, waveform poor, neuro exam remained stable throughout, ICPs <8 prior.        Vital Signs Last 24 Hrs  T(C): 36.9 (11 Aug 2020 23:00), Max: 38.1 (11 Aug 2020 17:00)  T(F): 98.5 (11 Aug 2020 23:00), Max: 100.5 (11 Aug 2020 17:00)  HR: 59 (12 Aug 2020 04:00) (54 - 83)  BP: 133/71 (12 Aug 2020 04:00) (114/64 - 155/109)  BP(mean): 97 (12 Aug 2020 04:00) (84 - 126)  RR: 15 (12 Aug 2020 04:00) (8 - 20)  SpO2: 99% (12 Aug 2020 04:00) (98% - 100%)    I&O's Detail    10 Aug 2020 07:  -  11 Aug 2020 07:00  --------------------------------------------------------  IN:    Enteral Tube Flush: 120 mL    ns in tub fed  [id number]: 990 mL  Total IN: 1110 mL    OUT:    External Ventricular Device: 126 mL    Indwelling Catheter - Urethral: 200 mL    Intermittent Catheterization - Urethral: 2200 mL    Stool: 800 mL  Total OUT: 3326 mL    Total NET: -2216 mL      11 Aug 2020 07:01  -  12 Aug 2020 04:20  --------------------------------------------------------  IN:    Enteral Tube Flush: 120 mL    IV PiggyBack: 150 mL    lactated ringers.: 525 mL    ns in tub fed  [id number]: 497 mL  Total IN: 1292 mL    OUT:    External Ventricular Device: 166 mL    Intermittent Catheterization - Urethral: 2380 mL    Stool: 850 mL  Total OUT: 3396 mL    Total NET: -2104 mL        I&O's Summary    10 Aug 2020 07:  -  11 Aug 2020 07:00  --------------------------------------------------------  IN: 1110 mL / OUT: 3326 mL / NET: -2216 mL    11 Aug 2020 07:  -  12 Aug 2020 04:20  --------------------------------------------------------  IN: 1292 mL / OUT: 3396 mL / NET: -2104 mL        PHYSICAL EXAM:  General: Patient sat well on RA, NAD, NGT in-place, A&O x3  EENT: anicteric, pupils 3mm, PERRL B/L, EOMs sluggish  CARDIOVASCULAR: (+) S1 S2, normal rate and regular rhythm  PULMONARY: clear to auscultation bilaterally  ABDOMEN: soft, nontender with normoactive bowel sounds  EXTREMITIES: no edema, DP/PTs palpable B/L  Neuro: dysarthric speech, OE spontaneously, FC x4 extremities (, shows 2 fingers, wiggling toes)   MUNOZ x4 spont and to command   Skin: no rashes    Incision/Wound: R frontal EVD site oozing blood, dressing replaced, SOC site dressing C/D/I     DEVICE/DRAIN DRESSING:    TUBES/LINES:  [] CVC  [X] A-line  [] Lumbar Drain  [X] Ventriculostomy - @5cm H2O   [] Other    DIET:  [] NPO  [] Mechanical  [X] Tube feeds    LABS:                        10.6   16.66 )-----------( 191      ( 11 Aug 2020 05:11 )             33.3     08-11    151<H>  |  118<H>  |  30<H>  ----------------------------<  150<H>  3.5   |  25  |  0.76    Ca    8.3<L>      11 Aug 2020 05:11  Phos  3.2       Mg     2.7         TPro  5.8<L>  /  Alb  3.0<L>  /  TBili  0.3  /  DBili  x   /  AST  48<H>  /  ALT  103<H>  /  AlkPhos  46        Urinalysis Basic - ( 11 Aug 2020 13:31 )    Color: Yellow / Appearance: Clear / S.020 / pH: x  Gluc: x / Ketone: NEGATIVE  / Bili: Negative / Urobili: 0.2 E.U./dL   Blood: x / Protein: NEGATIVE mg/dL / Nitrite: NEGATIVE   Leuk Esterase: NEGATIVE / RBC: > 10 /HPF / WBC < 5 /HPF   Sq Epi: x / Non Sq Epi: 0-5 /HPF / Bacteria: Present /HPF          CAPILLARY BLOOD GLUCOSE      POCT Blood Glucose.: 128 mg/dL (11 Aug 2020 23:15)  POCT Blood Glucose.: 111 mg/dL (11 Aug 2020 16:15)  POCT Blood Glucose.: 114 mg/dL (11 Aug 2020 10:52)  POCT Blood Glucose.: 147 mg/dL (11 Aug 2020 07:40)      Drug Levels: [] N/A  Vancomycin Level, Trough: 6.7 ug/mL ( @ 17:57)    CSF Analysis: [] N/A  RBC Count - Spinal Fluid: 81584 /uL ( @ 09:08)  CSF Lymphocytes: 2 % ( @ 09:08)  Glucose, CSF: 81 mg/dL ( @ 09:08)  Protein, CSF: 20 mg/dL ( @ 09:08)      Allergies    No Known Allergies    Intolerances      MEDICATIONS:  Antibiotics:  piperacillin/tazobactam IVPB.. 3.375 Gram(s) IV Intermittent every 6 hours  tenofovir disoproxil fumarate (VIREAD) 300 milliGRAM(s) Oral every 24 hours    Neuro:  acetaminophen   Tablet .. 650 milliGRAM(s) Oral every 6 hours PRN  amantadine Syrup 200 milliGRAM(s) Oral <User Schedule>  levETIRAcetam  Solution 1000 milliGRAM(s) Oral two times a day    Anticoagulation:  enoxaparin Injectable 40 milliGRAM(s) SubCutaneous every 24 hours    OTHER:  amLODIPine   Tablet 10 milliGRAM(s) Oral daily  artificial tears (preservative free) Ophthalmic Solution 1 Drop(s) Both EYES three times a day  atorvastatin 40 milliGRAM(s) Oral at bedtime  chlorhexidine 0.12% Liquid 15 milliLiter(s) Oral Mucosa every 12 hours  chlorhexidine 4% Liquid 1 Application(s) Topical <User Schedule>  dextrose 40% Gel 15 Gram(s) Oral once PRN  dextrose 50% Injectable 12.5 Gram(s) IV Push once  dextrose 50% Injectable 25 Gram(s) IV Push once  dextrose 50% Injectable 25 Gram(s) IV Push once  dextrose 50% Injectable 25 Gram(s) IV Push once  doxazosin 4 milliGRAM(s) Oral at bedtime  glucagon  Injectable 1 milliGRAM(s) IntraMuscular once PRN  glucagon  Injectable 1 milliGRAM(s) IntraMuscular once PRN  hydrALAZINE Injectable 10 milliGRAM(s) IV Push every 2 hours PRN  insulin glargine Injectable (LANTUS) 20 Unit(s) SubCutaneous every morning  insulin lispro (HumaLOG) corrective regimen sliding scale   SubCutaneous every 6 hours  insulin regular  human recombinant 4 Unit(s) SubCutaneous every 6 hours  losartan 25 milliGRAM(s) Oral daily  metoprolol tartrate 100 milliGRAM(s) Oral every 12 hours  pantoprazole  Injectable 40 milliGRAM(s) IV Push daily    IVF:  dextrose 5%. 1000 milliLiter(s) IV Continuous <Continuous>  lactated ringers. 1000 milliLiter(s) IV Continuous <Continuous>    CULTURES:  Culture Results:   No growth at 12 hours (08-11 @ 15:24)  Culture Results:   No growth at 12 hours (08-11 @ 15:24)    RADIOLOGY & ADDITIONAL TESTS:      ASSESSMENT:  59y Male POD#8 s/p SOC craniectomy evacuation of cerebellar hematoma.     INTRACRANIAL BLEED  No pertinent family history in first degree relatives  Handoff  Hepatitis B  HLD (hyperlipidemia)  ICH (intracerebral hemorrhage)  ICH (intracerebral hemorrhage)  Hepatitis B  HLD (hyperlipidemia)  HIV (human immunodeficiency virus infection)  Cerebellar hemorrhage  Craniectomy, subocciptal, with cervical laminectomy for medulla and spinal cord decompression  Percutaneous insertion of arterial line  Angiogram, carotid and cerebral vessels, bilateral  Foot fracture, left      PLAN:    NEURO: neurochecks q1h, no sedation  EVD at 5 cm H20  open to drain   neurostimulant: amantadine 200mg PO BID   seizure prophylaxis: levetiracetam  as per NS  REHAB:  physical therapy evaluation and management    EARLY MOB:  HOB up    PULM:  CPAP 10/5 40% continued, Plan to Extubate if tolerating CPAP  CARDIO:  SBP goal 100-140mm Hg cont metoprolol 100 mg PO BID, continue amlodipine 10 mg daily  	holding parameters: SBP <110, HR <55  ENDO:  Blood sugar goals 140-180 mg/dL, continue insulin sliding, continue statins; insulin glargine 20 nutritional 4-4-4-4  GI:  PPI for GI prophylaxis, has a rectal tube in place, monitor liver enzymes (elevated AST/ALT and ALP but previously normal), NPO at 6 am for extubation tomorrow  DIET: jevity   RENAL:  IVL, D/C Ruff --> SC PRN  HEM/ONC: no coagulopathy, no h/o antiplatelet / anticoagulant use  VTE Prophylaxis: SCDs, SQL   ID: febrile, leukocytosis, piperacillin/tazobactam d/c vanc (last day )  Social: updated wife yesterday; son Perry updated ([phone]).  2 daughters Shantell  and Tammie ; wife name is Julián Sahu    CORE MEASURES:  1.  NIHSS =   2.  ICH Score = 4  3.  VTE prophylaxis: [ ] administered within 24h of admission OR [x] reason not done - fresh bleed  4.  Dysphagia screening: [X] performed before any oral meds / liquids / food    D/W Dr. Whiting and Dr. Blank

## 2020-08-12 NOTE — PROGRESS NOTE ADULT - SUBJECTIVE AND OBJECTIVE BOX
================================================   NEUROCRITICAL CARE ATTENDING NOTE (2020)  ================================================    VEDA WARREN   MRN-[id number]  HPI: 59M with dyslipidemia, h/o Hep B infection, presented with acute onset severe HA, dizziness, vomiting - brought to [facility] where CT showed L cerebellar hemorrhage.  Intubated for airway protection.  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  R EVD inserted, transferred to [facility] for further management.  Given mannitol 25G in [facility].    COURSE IN THE HOSPITAL:   bleed   admitted to [facility], 23.4 given x1; OR for SOC evacuation of ICH   remained intubated overnight   tachycardia overnight, Tmax 38.4, ?PNA, ABx    Tmax 38.1 diuresed overnight 1.8L.     No significant events overnight.  CPAP this morning   Tmax 39.7 (enterobacter sputum, on zosyn)  08/10 Trial of CPAP   Neurologically stable, for extubation.   Extubated.  Neurologically stable.    Past Medical History: Hepatitis B HLD (hyperlipidemia)  Allergies:  No Known Allergies  Home meds:   ·	Omega-3 350 mg oral capsule: 1 cap(s) orally once a day  ·	tenofovir disoproxil fumarate 300 mg oral tablet: 1 tab(s) orally once a day    Current Meds:  MEDICATIONS  (STANDING):  amantadine Syrup 100 milliGRAM(s) Oral <User Schedule>  amLODIPine   Tablet 10 milliGRAM(s) Oral daily  atorvastatin 40 milliGRAM(s) Oral at bedtime  doxazosin 4 milliGRAM(s) Oral at bedtime  enoxaparin Injectable 40 milliGRAM(s) SubCutaneous every 24 hours  insulin glargine Injectable (LANTUS) 20 Unit(s) SubCutaneous every morning  insulin regular  human corrective regimen sliding scale.   SubCutaneous every 6 hours  insulin regular  human recombinant 4 Unit(s) SubCutaneous every 6 hours  levETIRAcetam  Solution 1000 milliGRAM(s) Oral two times a day  losartan 50 milliGRAM(s) Oral daily  metoprolol tartrate 100 milliGRAM(s) Oral every 12 hours  piperacillin/tazobactam IVPB.. 3.375 Gram(s) IV Intermittent every 6 hours  psyllium Powder 1 Packet(s) Oral daily  tenofovir disoproxil fumarate (VIREAD) 300 milliGRAM(s) Oral every 24 hours    MEDICATIONS  (PRN):  acetaminophen   Tablet .. 650 milliGRAM(s) Oral every 6 hours PRN Temp greater or equal to 38.5C (101.3F), Mild Pain (1 - 3)  hydrALAZINE Injectable 10 milliGRAM(s) IV Push every 2 hours PRN SBP > 140    PHYSICAL EXAMINATION    ICU Vital Signs Last 24 Hrs  T(C): 37.3 (12 Aug 2020 09:20), Max: 38.1 (11 Aug 2020 17:00)  T(F): 99.2 (12 Aug 2020 09:20), Max: 100.5 (11 Aug 2020 17:00)  HR: 64 (12 Aug 2020 09:00) (54 - 83)  BP: 130/71 (12 Aug 2020 09:00) (114/64 - 168/80)  BP(mean): 94 (12 Aug 2020 09:00) (84 - 115)  ABP: 148/64 (12 Aug 2020 09:00) (132/59 - 172/64)  ABP(mean): 90 (12 Aug 2020 09:00) (80 - 106)  RR: 14 (12 Aug 2020 09:) (12 - 18)  SpO2: 99% (12 Aug 2020 09:) (98% - 100%)    NEUROLOGIC EXAMINATION:    -opens eyes spontaneously today, pupils 3 mm reactive bilaterally, otqenx2ro (L > R), hypophonic, tongue midline, obeys second order commands UE/LE  EENT:  anicteric  CARDIOVASCULAR: (+) S1 S2, normal rate and regular rhythm  PULMONARY: clear to auscultation bilaterally  ABDOMEN: soft, nontender with normoactive bowel sounds  EXTREMITIES: no edema  SKIN: no rash    I/O's    20 @ 07:01  -  20 @ 07:00  --------------------------------------------------------  IN: 1442 mL / OUT: 4097 mL / NET: -2655 mL    20 @ 07:01  -  20 @ 13:46  --------------------------------------------------------  IN: 180 mL / OUT: 225 mL / NET: -45 mL    LABS:                        10.1   16.14 )-----------( 190      ( 12 Aug 2020 05:31 )             31.3     08-12    150<H>  |  116<H>  |  25<H>  ----------------------------<  148<H>  3.1<L>   |  24  |  0.70    Ca    8.1<L>      12 Aug 2020 05:31  Phos  2.7     -12  Mg     2.4     -12    TPro  5.8<L>  /  Alb  3.0<L>  /  TBili  0.3  /  DBili  x   /  AST  48<H>  /  ALT  103<H>  /  AlkPhos  46  08-11    Urinalysis Basic - ( 11 Aug 2020 13:31 )    Color: Yellow / Appearance: Clear / S.020 / pH: x  Gluc: x / Ketone: NEGATIVE  / Bili: Negative / Urobili: 0.2 E.U./dL   Blood: x / Protein: NEGATIVE mg/dL / Nitrite: NEGATIVE   Leuk Esterase: NEGATIVE / RBC: > 10 /HPF / WBC < 5 /HPF   Sq Epi: x / Non Sq Epi: 0-5 /HPF / Bacteria: Present /HPF    CAPILLARY BLOOD GLUCOSE    POCT Blood Glucose.: 129 mg/dL (12 Aug 2020 12:11)  POCT Blood Glucose.: 113 mg/dL (12 Aug 2020 05:48)  POCT Blood Glucose.: 128 mg/dL (11 Aug 2020 23:15)  POCT Blood Glucose.: 111 mg/dL (11 Aug 2020 16:15)    Culture - Sputum (collected 20 @ 17:29)  Source: .Sputum Sputum  Gram Stain (20 @ 22:04):    Rare epithelial cells    Rare WBC's    Few Gram Negative Rods  Preliminary Report (20 @ 13:11):    Moderate Enterobacter cloacae complex    Moderate Klebsiella pneumoniae / variicola    Susceptibility to follow.    Culture in progress    Culture - Blood (collected 20 @ 15:24)  Source: .Blood Blood-Peripheral  Preliminary Report (20 @ 04:00):    No growth at 12 hours    Culture - Blood (collected 20 @ 15:24)  Source: .Blood Blood-Peripheral  Preliminary Report (20 @ 04:00):    No growth at 12 hours    HbA1C = 5.6 ()  LDL = 185 ()   HDL = 36 () TG = 252 ()  TSH = 0.460 ()    Bacteriology:   sputum: enterobacter cloacae, klebsiella pneumoniae    CSF no organisms   sputum CS: enterobacter cloacae S: piperacillin/tazobactam    CSF NGTD   Blood CS NG5D x2   UA NEG    CSF studies:    L   *** [id number] *** %N31 %L5     Neuroimagin/11 CT head:  Right frontal EVD catheter as above. The ventricles are stable in size from prior examination. No new or increasing intracranial hemorrhage or mass effect.  08/10 CT head:  Compared to 2020, there is improving intraventricular hemorrhage, resolution of intraventricular gas, and mild decrease in ventricular size. Postsurgical change and edema in the posterior fossa is approximately stable.   CT head:  post-surgical changes, stable HCP  Other imagin/04 LE doppler: NEG   CXR:  clear   CXR:  mild atelectasis L lung base  08/10 CXR:  Small left pleural effusion, unchanged.    IV FLUIDS: IVL  DRIPS:  DIET: Glucerna 1.2 goal of 45 mL/hr  Lines:  Drains:  Matt R SOL Debbie   EVD at 7cm H20  35cc/hr   EVD at 5cm H20 ICP 2-5 68cc/24h   EVD at 5cm H20    EVD at 5cm H20 ICP 1-3 202cc/24h     EVD at 5 cm H20 ICP 1-4 output 194cc/24h   EVD at 5 cm H20 ICP 0 to 5 output 280cc/24h  08/10 EVD at 5 cm H20 ICP 5 to 10 output 5-20cc/h   EVD at 5 cm H20 ICP < 10 output 167cc/24h    CODE STATUS:  Full Code                       GOALS OF CARE:  aggressive                      DISPOSITION:  ICU  ICH Score on admission  = 4, NIHSS

## 2020-08-12 NOTE — PROCEDURE NOTE - ADDITIONAL PROCEDURE DETAILS
Insertion of right internal jugular central venous line under sterile Seldinger technique.  Pending chest X-ray to confirm placement.
RF EVD site prepped and draped in sterile fashion. Skin staples and anchoring sutures removed. [place] hole irrigated with saline. Prior EVD removed and new bactiseal catheter easily placed. blood tinged CSF egress noted, did not appear to be under high pressure. Catheter was tunneled using the same exit site and secured with sutures. Sterile biopatch dressing placed. No complications.

## 2020-08-12 NOTE — PROGRESS NOTE ADULT - ASSESSMENT
59y/M with  1.	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	Hepatitis B  3.	dyslipidemia     PLAN:   NEURO: neurochecks q1h, no sedation  EVD at 5 cm H20 open to drain  neurostimulant: amantadine 100 mg PO BID  neurologically stable post extubation  seizure prophylaxis: levetiracetam as per NS  REHAB:  physical therapy evaluation and management    EARLY MOB:  HOB up    PULM:  on room air, good oxygen saturations  CARDIO:  SBP goal 100-140mm Hg, metoprolol 100 mg BID, norvasc 10 mg OD, losartan 50 mg OD  ENDO:  Blood sugar goals 140-180 mg/dL, continue insulin sliding, continue statins; insulin glargine 20 nutritional 4-4-4-4  GI:  PPI for GI prophylaxis  DIET: glucerna 1.2 (dietitian - assess to add metamucil)  RENAL:  IVL [place] cath today, slight negative balance  HEM/ONC: no coagulopathy, no h/o antiplatelet / anticoagulant use  VTE Prophylaxis: SCDs, SQL   ID: febrile, leukocytosis, piperacillin/tazobactam d/c vanc (last day 08/12)  Social: updated wife yesterday; son Perry updated ([phone]).  2 daughters Shantell  and Tammie ; wife name is Julián Sahu    CORE MEASURES:  1.  NIHSS =   2.  ICH Score = 4  3.  VTE prophylaxis: [ ] administered within 24h of admission OR [x] reason not done - fresh bleed  4.  Dysphagia screening: [X] performed before any oral meds / liquids / food

## 2020-08-13 LAB
-  AMPICILLIN/SULBACTAM: SIGNIFICANT CHANGE UP
-  AMPICILLIN/SULBACTAM: SIGNIFICANT CHANGE UP
-  AMPICILLIN: SIGNIFICANT CHANGE UP
-  AMPICILLIN: SIGNIFICANT CHANGE UP
-  CEFAZOLIN: SIGNIFICANT CHANGE UP
-  CEFAZOLIN: SIGNIFICANT CHANGE UP
-  CEFEPIME: SIGNIFICANT CHANGE UP
-  CEFTRIAXONE: SIGNIFICANT CHANGE UP
-  CEFTRIAXONE: SIGNIFICANT CHANGE UP
-  CIPROFLOXACIN: SIGNIFICANT CHANGE UP
-  CIPROFLOXACIN: SIGNIFICANT CHANGE UP
-  GENTAMICIN: SIGNIFICANT CHANGE UP
-  GENTAMICIN: SIGNIFICANT CHANGE UP
-  MEROPENEM: SIGNIFICANT CHANGE UP
-  PIPERACILLIN/TAZOBACTAM: SIGNIFICANT CHANGE UP
-  PIPERACILLIN/TAZOBACTAM: SIGNIFICANT CHANGE UP
-  TOBRAMYCIN: SIGNIFICANT CHANGE UP
-  TOBRAMYCIN: SIGNIFICANT CHANGE UP
-  TRIMETHOPRIM/SULFAMETHOXAZOLE: SIGNIFICANT CHANGE UP
-  TRIMETHOPRIM/SULFAMETHOXAZOLE: SIGNIFICANT CHANGE UP
ANION GAP SERPL CALC-SCNC: 8 MMOL/L — SIGNIFICANT CHANGE UP (ref 5–17)
APPEARANCE CSF: SIGNIFICANT CHANGE UP
APPEARANCE SPUN FLD: ABNORMAL
APPEARANCE UR: CLEAR — SIGNIFICANT CHANGE UP
BILIRUB UR-MCNC: NEGATIVE — SIGNIFICANT CHANGE UP
BUN SERPL-MCNC: 22 MG/DL — SIGNIFICANT CHANGE UP (ref 7–23)
CALCIUM SERPL-MCNC: 8.4 MG/DL — SIGNIFICANT CHANGE UP (ref 8.4–10.5)
CHLORIDE SERPL-SCNC: 115 MMOL/L — HIGH (ref 96–108)
CHLORIDE UR-SCNC: 167 MMOL/L — SIGNIFICANT CHANGE UP
CO2 SERPL-SCNC: 26 MMOL/L — SIGNIFICANT CHANGE UP (ref 22–31)
COLOR CSF: ABNORMAL
COLOR SPEC: YELLOW — SIGNIFICANT CHANGE UP
CREAT SERPL-MCNC: 0.72 MG/DL — SIGNIFICANT CHANGE UP (ref 0.5–1.3)
CSF COMMENTS: SIGNIFICANT CHANGE UP
CULTURE RESULTS: SIGNIFICANT CHANGE UP
DIFF PNL FLD: ABNORMAL
GLUCOSE BLDC GLUCOMTR-MCNC: 110 MG/DL — HIGH (ref 70–99)
GLUCOSE BLDC GLUCOMTR-MCNC: 133 MG/DL — HIGH (ref 70–99)
GLUCOSE BLDC GLUCOMTR-MCNC: 139 MG/DL — HIGH (ref 70–99)
GLUCOSE BLDC GLUCOMTR-MCNC: 149 MG/DL — HIGH (ref 70–99)
GLUCOSE CSF-MCNC: 66 MG/DL — SIGNIFICANT CHANGE UP (ref 40–70)
GLUCOSE SERPL-MCNC: 141 MG/DL — HIGH (ref 70–99)
GLUCOSE UR QL: NEGATIVE — SIGNIFICANT CHANGE UP
GRAM STN FLD: SIGNIFICANT CHANGE UP
HCT VFR BLD CALC: 31.5 % — LOW (ref 39–50)
HGB BLD-MCNC: 10.2 G/DL — LOW (ref 13–17)
KETONES UR-MCNC: NEGATIVE — SIGNIFICANT CHANGE UP
LEUKOCYTE ESTERASE UR-ACNC: NEGATIVE — SIGNIFICANT CHANGE UP
LYMPHOCYTES # CSF: 3 % — SIGNIFICANT CHANGE UP (ref 40–80)
MAGNESIUM SERPL-MCNC: 2.5 MG/DL — SIGNIFICANT CHANGE UP (ref 1.6–2.6)
MCHC RBC-ENTMCNC: 27.9 PG — SIGNIFICANT CHANGE UP (ref 27–34)
MCHC RBC-ENTMCNC: 32.4 GM/DL — SIGNIFICANT CHANGE UP (ref 32–36)
MCV RBC AUTO: 86.3 FL — SIGNIFICANT CHANGE UP (ref 80–100)
METHOD TYPE: SIGNIFICANT CHANGE UP
METHOD TYPE: SIGNIFICANT CHANGE UP
MONOS+MACROS NFR CSF: 1 % — SIGNIFICANT CHANGE UP (ref 15–45)
NEUTROPHILS # CSF: 81 % — SIGNIFICANT CHANGE UP (ref 0–6)
NITRITE UR-MCNC: NEGATIVE — SIGNIFICANT CHANGE UP
NRBC # BLD: 0 /100 WBCS — SIGNIFICANT CHANGE UP (ref 0–0)
NRBC NFR CSF: 85 /UL — HIGH (ref 0–5)
ORGANISM # SPEC MICROSCOPIC CNT: SIGNIFICANT CHANGE UP
OSMOLALITY SERPL: 311 MOSM/KG — HIGH (ref 280–301)
OSMOLALITY UR: 653 MOSM/KG — SIGNIFICANT CHANGE UP (ref 300–900)
PH UR: 6.5 — SIGNIFICANT CHANGE UP (ref 5–8)
PHOSPHATE SERPL-MCNC: 2.9 MG/DL — SIGNIFICANT CHANGE UP (ref 2.5–4.5)
PLATELET # BLD AUTO: 210 K/UL — SIGNIFICANT CHANGE UP (ref 150–400)
POTASSIUM SERPL-MCNC: 3.4 MMOL/L — LOW (ref 3.5–5.3)
POTASSIUM SERPL-SCNC: 3.4 MMOL/L — LOW (ref 3.5–5.3)
POTASSIUM UR-SCNC: 82 MMOL/L — SIGNIFICANT CHANGE UP
PROT CSF-MCNC: 42 MG/DL — SIGNIFICANT CHANGE UP (ref 15–45)
PROT UR-MCNC: NEGATIVE MG/DL — SIGNIFICANT CHANGE UP
RBC # BLD: 3.65 M/UL — LOW (ref 4.2–5.8)
RBC # CSF: HIGH /UL (ref 0–0)
RBC # FLD: 13.7 % — SIGNIFICANT CHANGE UP (ref 10.3–14.5)
SODIUM SERPL-SCNC: 149 MMOL/L — HIGH (ref 135–145)
SODIUM UR-SCNC: 145 MMOL/L — SIGNIFICANT CHANGE UP
SP GR SPEC: 1.02 — SIGNIFICANT CHANGE UP (ref 1–1.03)
SPECIMEN SOURCE: SIGNIFICANT CHANGE UP
SPECIMEN SOURCE: SIGNIFICANT CHANGE UP
TUBE TYPE: SIGNIFICANT CHANGE UP
UROBILINOGEN FLD QL: 0.2 E.U./DL — SIGNIFICANT CHANGE UP
WBC # BLD: 14.6 K/UL — HIGH (ref 3.8–10.5)
WBC # FLD AUTO: 14.6 K/UL — HIGH (ref 3.8–10.5)

## 2020-08-13 PROCEDURE — 99291 CRITICAL CARE FIRST HOUR: CPT | Mod: 24

## 2020-08-13 PROCEDURE — 93970 EXTREMITY STUDY: CPT | Mod: 26

## 2020-08-13 PROCEDURE — 36415 COLL VENOUS BLD VENIPUNCTURE: CPT

## 2020-08-13 PROCEDURE — 62272 THER SPI PNXR DRG CSF: CPT

## 2020-08-13 PROCEDURE — 71045 X-RAY EXAM CHEST 1 VIEW: CPT | Mod: 26

## 2020-08-13 RX ORDER — POTASSIUM CHLORIDE 20 MEQ
40 PACKET (EA) ORAL ONCE
Refills: 0 | Status: COMPLETED | OUTPATIENT
Start: 2020-08-13 | End: 2020-08-13

## 2020-08-13 RX ORDER — LOSARTAN POTASSIUM 100 MG/1
25 TABLET, FILM COATED ORAL ONCE
Refills: 0 | Status: COMPLETED | OUTPATIENT
Start: 2020-08-13 | End: 2020-08-13

## 2020-08-13 RX ORDER — POTASSIUM CHLORIDE 20 MEQ
20 PACKET (EA) ORAL
Refills: 0 | Status: COMPLETED | OUTPATIENT
Start: 2020-08-13 | End: 2020-08-13

## 2020-08-13 RX ORDER — LOSARTAN POTASSIUM 100 MG/1
75 TABLET, FILM COATED ORAL DAILY
Refills: 0 | Status: DISCONTINUED | OUTPATIENT
Start: 2020-08-14 | End: 2020-08-14

## 2020-08-13 RX ORDER — ACETYLCYSTEINE 200 MG/ML
1 VIAL (ML) MISCELLANEOUS ONCE
Refills: 0 | Status: COMPLETED | OUTPATIENT
Start: 2020-08-13 | End: 2020-08-13

## 2020-08-13 RX ADMIN — INSULIN HUMAN 4 UNIT(S): 100 INJECTION, SOLUTION SUBCUTANEOUS at 06:57

## 2020-08-13 RX ADMIN — Medication 40 MILLIEQUIVALENT(S): at 09:26

## 2020-08-13 RX ADMIN — Medication 4 MILLIGRAM(S): at 22:32

## 2020-08-13 RX ADMIN — TENOFOVIR DISOPROXIL FUMARATE 300 MILLIGRAM(S): 300 TABLET, FILM COATED ORAL at 22:32

## 2020-08-13 RX ADMIN — Medication 1 PACKET(S): at 12:29

## 2020-08-13 RX ADMIN — Medication 10 MILLIGRAM(S): at 05:23

## 2020-08-13 RX ADMIN — Medication 1 DROP(S): at 05:46

## 2020-08-13 RX ADMIN — INSULIN HUMAN 4 UNIT(S): 100 INJECTION, SOLUTION SUBCUTANEOUS at 23:20

## 2020-08-13 RX ADMIN — Medication 50 MILLIEQUIVALENT(S): at 06:51

## 2020-08-13 RX ADMIN — LOSARTAN POTASSIUM 50 MILLIGRAM(S): 100 TABLET, FILM COATED ORAL at 05:26

## 2020-08-13 RX ADMIN — Medication 100 MILLIGRAM(S): at 15:51

## 2020-08-13 RX ADMIN — Medication 100 MILLIGRAM(S): at 17:06

## 2020-08-13 RX ADMIN — PIPERACILLIN AND TAZOBACTAM 200 GRAM(S): 4; .5 INJECTION, POWDER, LYOPHILIZED, FOR SOLUTION INTRAVENOUS at 05:21

## 2020-08-13 RX ADMIN — INSULIN HUMAN 4 UNIT(S): 100 INJECTION, SOLUTION SUBCUTANEOUS at 17:05

## 2020-08-13 RX ADMIN — LEVETIRACETAM 1000 MILLIGRAM(S): 250 TABLET, FILM COATED ORAL at 09:27

## 2020-08-13 RX ADMIN — CHLORHEXIDINE GLUCONATE 1 APPLICATION(S): 213 SOLUTION TOPICAL at 05:21

## 2020-08-13 RX ADMIN — LEVETIRACETAM 1000 MILLIGRAM(S): 250 TABLET, FILM COATED ORAL at 22:31

## 2020-08-13 RX ADMIN — Medication 650 MILLIGRAM(S): at 22:31

## 2020-08-13 RX ADMIN — Medication 100 MILLIGRAM(S): at 05:22

## 2020-08-13 RX ADMIN — ATORVASTATIN CALCIUM 40 MILLIGRAM(S): 80 TABLET, FILM COATED ORAL at 22:33

## 2020-08-13 RX ADMIN — Medication 1 DROP(S): at 15:50

## 2020-08-13 RX ADMIN — INSULIN HUMAN 4 UNIT(S): 100 INJECTION, SOLUTION SUBCUTANEOUS at 12:29

## 2020-08-13 RX ADMIN — Medication 1 DROP(S): at 22:32

## 2020-08-13 RX ADMIN — LOSARTAN POTASSIUM 25 MILLIGRAM(S): 100 TABLET, FILM COATED ORAL at 12:29

## 2020-08-13 RX ADMIN — Medication 650 MILLIGRAM(S): at 23:15

## 2020-08-13 RX ADMIN — INSULIN GLARGINE 20 UNIT(S): 100 INJECTION, SOLUTION SUBCUTANEOUS at 06:58

## 2020-08-13 RX ADMIN — Medication 1 MILLILITER(S): at 22:31

## 2020-08-13 RX ADMIN — Medication 100 MILLIGRAM(S): at 09:26

## 2020-08-13 RX ADMIN — FINASTERIDE 5 MILLIGRAM(S): 5 TABLET, FILM COATED ORAL at 12:29

## 2020-08-13 RX ADMIN — AMLODIPINE BESYLATE 10 MILLIGRAM(S): 2.5 TABLET ORAL at 05:23

## 2020-08-13 RX ADMIN — ENOXAPARIN SODIUM 40 MILLIGRAM(S): 100 INJECTION SUBCUTANEOUS at 22:32

## 2020-08-13 NOTE — PROGRESS NOTE ADULT - SUBJECTIVE AND OBJECTIVE BOX
===============================================   NEUROCRITICAL CARE ATTENDING NOTE ()  ===============================================    VEDA WARREN   MRN-[id number]  HPI: 59M with dyslipidemia, h/o Hep B infection, presented with acute onset severe HA, dizziness, vomiting - brought to [facility] where CT showed L cerebellar hemorrhage.  Intubated for airway protection.  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  R EVD inserted, transferred to [facility] for further management.  Given mannitol 25G in [facility].    COURSE IN THE HOSPITAL:   bleed   admitted to [facility], 23.4 given x1; OR for SOC evacuation of ICH   remained intubated overnight   tachycardia overnight, Tmax 38.4, ?PNA, ABx    Tmax 38.1 diuresed overnight 1.8L.     No significant events overnight.  CPAP this morning   Tmax 39.7 (enterobacter sputum, on zosyn)  08/10 Trial of CPAP   Neurologically stable, for extubation.   Extubated.  Neurologically stable.   Neurologically stable.    Past Medical History: Hepatitis B HLD (hyperlipidemia)  Allergies:  No Known Allergies  Home meds:   ·	Omega-3 350 mg oral capsule: 1 cap(s) orally once a day  ·	tenofovir disoproxil fumarate 300 mg oral tablet: 1 tab(s) orally once a day    Current Meds:  MEDICATIONS  (STANDING):  amantadine Syrup 100 milliGRAM(s) Oral <User Schedule>  amLODIPine   Tablet 10 milliGRAM(s) Oral daily  atorvastatin 40 milliGRAM(s) Oral at bedtime  doxazosin 4 milliGRAM(s) Oral at bedtime  enoxaparin Injectable 40 milliGRAM(s) SubCutaneous every 24 hours  finasteride 5 milliGRAM(s) Oral daily  insulin glargine Injectable (LANTUS) 20 Unit(s) SubCutaneous every morning  insulin regular  human corrective regimen sliding scale.   SubCutaneous every 6 hours  insulin regular  human recombinant 4 Unit(s) SubCutaneous every 6 hours  levETIRAcetam  Solution 1000 milliGRAM(s) Oral two times a day  metoprolol tartrate 100 milliGRAM(s) Oral every 12 hours  losartan 75 mg Oral daily  psyllium Powder 1 Packet(s) Oral daily  tenofovir disoproxil fumarate (VIREAD) 300 milliGRAM(s) Oral every 24 hours    MEDICATIONS  (PRN):  acetaminophen   Tablet .. 650 milliGRAM(s) Oral every 6 hours PRN Temp greater or equal to 38.5C (101.3F), Mild Pain (1 - 3)  hydrALAZINE Injectable 10 milliGRAM(s) IV Push every 2 hours PRN SBP > 140    PHYSICAL EXAMINATION    ICU Vital Signs Last 24 Hrs  T(C): 37.4 (13 Aug 2020 09:56), Max: 37.4 (13 Aug 2020 09:56)  T(F): 99.3 (13 Aug 2020 09:56), Max: 99.3 (13 Aug 2020 09:56)  HR: 69 (13 Aug 2020 13:00) (60 - 76)  BP: 133/73 (13 Aug 2020 13:00) (100/56 - 174/92)  BP(mean): 97 (13 Aug 2020 13:00) (73 - 123)  ABP: 158/63 (13 Aug 2020 13:00) (112/45 - 182/72)  ABP(mean): 89 (13 Aug 2020 13:00) (63 - 107)  RR: 17 (13 Aug 2020 13:00) (10 - 27)  SpO2: 98% (13 Aug 2020 13:00) (96% - 100%)    NEUROLOGIC EXAMINATION:    -opens eyes spontaneously today, pupils 3 mm reactive bilaterally, improving [id number] (L > R), hypophonic, cerebellar dysarthria, tongue midline, obeys second order commands UE/LE  EENT:  anicteric  CARDIOVASCULAR: (+) S1 S2, normal rate and regular rhythm  PULMONARY: clear to auscultation bilaterally  ABDOMEN: soft, nontender with normoactive bowel sounds  EXTREMITIES: no edema  SKIN: no rash    Allergies: No Known Allergies    I/O's    20 @ 07:01  -  20 @ 07:00  --------------------------------------------------------  IN: 1760 mL / OUT: 3543 mL / NET: -1783 mL    20 @ 07:01  -  20 @ 14:11  --------------------------------------------------------  IN: 325 mL / OUT: 563 mL / NET: -238 mL    LABS:                        10.2   14.60 )-----------( 210      ( 13 Aug 2020 05:01 )             31.5     08-13    149<H>  |  115<H>  |  22  ----------------------------<  141<H>  3.4<L>   |  26  |  0.72    Ca    8.4      13 Aug 2020 05:01  Phos  2.9     08-  Mg     2.5         CAPILLARY BLOOD GLUCOSE    POCT Blood Glucose.: 110 mg/dL (13 Aug 2020 11:37)  POCT Blood Glucose.: 149 mg/dL (13 Aug 2020 06:55)  POCT Blood Glucose.: 122 mg/dL (12 Aug 2020 23:45)  POCT Blood Glucose.: 148 mg/dL (12 Aug 2020 17:45)    HbA1C = 5.6 ()  LDL = 185 ()   HDL = 36 () TG = 252 ()  TSH = 0.460 ()    Bacteriology:   sputum: enterobacter cloacae, klebsiella pneumoniae    CSF no organisms   sputum CS: enterobacter cloacae S: piperacillin/tazobactam    CSF NGTD   Blood CS NG5D x2   UA NEG    CSF studies:    L   *** [id number] *** %N31 %L5     Neuroimagin/11 CT head:  Right frontal EVD catheter as above. The ventricles are stable in size from prior examination. No new or increasing intracranial hemorrhage or mass effect.  08/10 CT head:  Compared to 2020, there is improving intraventricular hemorrhage, resolution of intraventricular gas, and mild decrease in ventricular size. Postsurgical change and edema in the posterior fossa is approximately stable.   CT head:  post-surgical changes, stable HCP  Other imagin/04 LE doppler: NEG   CXR:  clear   CXR:  mild atelectasis L lung base  08/10 CXR:  Small left pleural effusion, unchanged.    IV FLUIDS: IVL  DRIPS:  DIET: Glucerna 1.2 goal of 45 mL/hr  Lines:  Drains:  Matt R IJ [place]   EVD at 7cm H20  35cc/hr   EVD at 5cm H20 ICP 2-5 68cc/24h   EVD at 5cm H20    EVD at 5cm H20 ICP 1-3 202cc/24h     EVD at 5 cm H20 ICP 1-4 output 194cc/24h   EVD at 5 cm H20 ICP 0 to 5 output 280cc/24h  08/10 EVD at 5 cm H20 ICP 5 to 10 output 5-20cc/h   EVD at 5 cm H20 ICP < 10 output 167cc/24h   EVD at 0 cm H20 ICP 1-6 output 5-20 cc/h    CODE STATUS:  Full Code                       GOALS OF CARE:  aggressive                      DISPOSITION:  ICU  ICH Score on admission  = 4, NIHSS

## 2020-08-13 NOTE — CHART NOTE - NSCHARTNOTEFT_GEN_A_CORE
Admitting Diagnosis:   Patient is a 59y old  Male who presents with a chief complaint of Left cerebellar intraparenchymal hemorrhage (12 Aug 2020 13:42)    PAST MEDICAL & SURGICAL HISTORY:  Hepatitis B  HLD (hyperlipidemia)  Foot fracture, left: s/p repair 2019    Current Nutrition Order:   NPO with TF via NGT  Jevity 1.2 goal 45mL/hr x24h with Prostat Sf 1x/day   +VBF protocol     PO Intake: Good (%) [   ]  Fair (50-75%) [   ] Poor (<25%) [   ]-N/A    GI Issues: no vomiting noted, unable to assess for nausea 2/2 clinical status, +rectal tube (250mL output x24h)    Pain: none apparent at this time    Skin Integrity: Zachariah score 15    Labs:   08-13    149<H>  |  115<H>  |  22  ----------------------------<  141<H>  3.4<L>   |  26  |  0.72    Ca    8.4      13 Aug 2020 05:01  Phos  2.9     08-13  Mg     2.5     08-13    CAPILLARY BLOOD GLUCOSE  POCT Blood Glucose.: 110 mg/dL (13 Aug 2020 11:37)  POCT Blood Glucose.: 149 mg/dL (13 Aug 2020 06:55)  POCT Blood Glucose.: 122 mg/dL (12 Aug 2020 23:45)  POCT Blood Glucose.: 148 mg/dL (12 Aug 2020 17:45)  POCT Blood Glucose.: 129 mg/dL (12 Aug 2020 12:11)    Medications:  MEDICATIONS  (STANDING):  amantadine Syrup 100 milliGRAM(s) Oral <User Schedule>  amLODIPine   Tablet 10 milliGRAM(s) Oral daily  artificial tears (preservative free) Ophthalmic Solution 1 Drop(s) Both EYES three times a day  atorvastatin 40 milliGRAM(s) Oral at bedtime  chlorhexidine 4% Liquid 1 Application(s) Topical <User Schedule>  dextrose 5%. 1000 milliLiter(s) (50 mL/Hr) IV Continuous <Continuous>  dextrose 50% Injectable 12.5 Gram(s) IV Push once  dextrose 50% Injectable 25 Gram(s) IV Push once  dextrose 50% Injectable 25 Gram(s) IV Push once  dextrose 50% Injectable 25 Gram(s) IV Push once  doxazosin 4 milliGRAM(s) Oral at bedtime  enoxaparin Injectable 40 milliGRAM(s) SubCutaneous every 24 hours  finasteride 5 milliGRAM(s) Oral daily  insulin glargine Injectable (LANTUS) 20 Unit(s) SubCutaneous every morning  insulin regular  human corrective regimen sliding scale.   SubCutaneous every 6 hours  insulin regular  human recombinant 4 Unit(s) SubCutaneous every 6 hours  levETIRAcetam  Solution 1000 milliGRAM(s) Oral two times a day  losartan 25 milliGRAM(s) Oral once  metoprolol tartrate 100 milliGRAM(s) Oral every 12 hours  psyllium Powder 1 Packet(s) Oral daily  tenofovir disoproxil fumarate (VIREAD) 300 milliGRAM(s) Oral every 24 hours    MEDICATIONS  (PRN):  acetaminophen   Tablet .. 650 milliGRAM(s) Oral every 6 hours PRN Temp greater or equal to 38.5C (101.3F), Mild Pain (1 - 3)  dextrose 40% Gel 15 Gram(s) Oral once PRN Blood Glucose LESS THAN 70 milliGRAM(s)/deciliter  glucagon  Injectable 1 milliGRAM(s) IntraMuscular once PRN Glucose LESS THAN 70 milligrams/deciliter  glucagon  Injectable 1 milliGRAM(s) IntraMuscular once PRN Glucose LESS THAN 70 milligrams/deciliter  hydrALAZINE Injectable 10 milliGRAM(s) IV Push every 2 hours PRN SBP > 140  sodium chloride 0.9% lock flush 10 milliLiter(s) IV Push every 1 hour PRN Pre/post blood products, medications, blood draw, and to maintain line patency    Weight:  59.6kg (8/4)    Weight Change: No updated weights since 8/4. Recommend obtain current weight and trend bi-weekly weights for further assessment.    Admitted Anthropometrics:  Ht (8/6 per RN): 157.48cm, Wt (8/4): 59.6kg, IBW: 50kg+/-10%, %IBW: 119%, BMI: 24.0    Nutrition Focused Physical Exam: Completed [   ]  Unable to complete [   ]-N/A    Estimated energy needs:  ABW (59.6kg) used to calculate energy needs due to pt's current body weight within % IBW.  Needs adjusted for post-op, hypermetabolic state.     3530-0268 kcal (25-30 kcal/kg IBW)  72-83gm protein (1.2-1.4gm/kg IBW)  Fluid needs per team    Subjective: 59 year old male with PMH of HLD and unclear liver disease had acute onset of severe headache, dizziness and vomiting x1 today, was brought into [facility] with continued symptoms, as well as hypertension and multiple episodes of vomiting. Pt was intubated for airway protection with CT Head performed demonstrating a large left cerebellar hemorrhage. CTA Head/Neck performed is suggestive of underlying vascular pathology such as AVM. RIght frontal ventriculostomy placed at [facility] and patient transferred to [facility] for further work-up. S/P cerebral angio, negative for AVM and s/p SOC craniectomy evacuation of cerebellar hematoma on 8/4. Pt assessed this AM, extubated on 8/11. Pt remains on TF as not appropriate for SLP eval at this time. Feeds running at 45mL/hr, tolerating per RN. Pt with less output in rectal tube. Please see below for full nutritional recommendations- d/w team, placed pending order for verification. RD to monitor and f/u per protocol.    Nutrition Diagnosis:  Increased nutrient need RT increased protein demand AEB post-op, vented    Goal: Meet >/=75%EER via most appropriate route with good tolerance    Recommendations:  1. Recommend continue Jevity 1.2 change increase 10-20mL q4h to goal 60mL/hr x24h via NGT to best meet pt's needs at this time (1440mL TV, 1728 kcal, 79gm protein, 1162mL free water, 140% RDI, ~29 kcal/kg IBW, ~1.3gm protein/kg IBW)  >>continue order for volume based feeding protocol, monitor tolerance, maintain aspiration precautions  >>water flushes per team  2. Recommend SLP eval as medically appropriate  3. Monitor labs (BMP, lytes, [id number]); replete lytes prn  4. Obtain current weight and trend bi-weekly weights  5. If diarrhea continues, add metamucil and/or probiotic    Education: N/A 2/2 pt's clinical status    Risk Level: High [ X ]  Moderate [   ] Low [   ]

## 2020-08-13 NOTE — PROGRESS NOTE ADULT - ASSESSMENT
59y/M with  1.	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	Hepatitis B  3.	dyslipidemia     PLAN:   NEURO: neurologically stable  EVD at 0 cm H20 open to drain  neurostimulant: amantadine 100 mg PO BID  seizure prophylaxis: levetiracetam as per NS  REHAB:  physical therapy evaluation and management    EARLY MOB:  HOB up    PULM:  on room air, good oxygen saturations  CARDIO:  SBP goal 100-140mm Hg, metoprolol 100 mg BID, norvasc 10 mg OD, losartan 75 mg OD  ENDO:  Blood sugar goals 140-180 mg/dL, continue insulin sliding, continue statins; insulin glargine 20 nutritional 4-4-4-4  GI:  PPI for GI prophylaxis  DIET: glucerna 1.2 with metamucil  RENAL:  negative balance (goal euvolemia)  HEM/ONC: no coagulopathy, no h/o antiplatelet / anticoagulant use  VTE Prophylaxis: SCDs, SQL   ID: febrile, leukocytosis, piperacillin/tazobactam finished 7 day course  Social: updated wife yesterday; son Perry updated ([phone]).  2 daughters Shantell  and Tammie ; wife name is Juilán Sahu    CORE MEASURES:  1.  NIHSS =   2.  ICH Score = 4  3.  VTE prophylaxis: [ ] administered within 24h of admission OR [x] reason not done - fresh bleed  4.  Dysphagia screening: [X] performed before any oral meds / liquids / food

## 2020-08-13 NOTE — SWALLOW BEDSIDE ASSESSMENT ADULT - ADDITIONAL RECOMMENDATIONS
-Please provide conservative ice chips for health and hydration of the oropharyngeal mucosa following aggressive oral care.  -Ongoing reassessment

## 2020-08-13 NOTE — PROGRESS NOTE ADULT - SUBJECTIVE AND OBJECTIVE BOX
S/Overnight events:        Hospital Course:   POD#       Vital Signs Last 24 Hrs  T(C): 38.2 (13 Aug 2020 14:00), Max: 38.2 (13 Aug 2020 14:00)  T(F): 100.7 (13 Aug 2020 14:00), Max: 100.7 (13 Aug 2020 14:00)  HR: 74 (13 Aug 2020 14:00) (60 - 76)  BP: 118/65 (13 Aug 2020 14:00) (100/56 - 174/92)  BP(mean): 87 (13 Aug 2020 14:00) (73 - 123)  RR: 15 (13 Aug 2020 14:00) (10 - 27)  SpO2: 98% (13 Aug 2020 14:00) (98% - 100%)    I&O's Detail    12 Aug 2020 07:  -  13 Aug 2020 07:00  --------------------------------------------------------  IN:    Enteral Tube Flush: 380 mL    IV PiggyBack: 300 mL    ns in tub fed  [id number]: 1080 mL  Total IN: 1760 mL    OUT:    External Ventricular Device: 213 mL    Indwelling Catheter - Urethral: 1680 mL    Intermittent Catheterization - Urethral: 1400 mL    Stool: 250 mL  Total OUT: 3543 mL    Total NET: -1783 mL      13 Aug 2020 07:  -  13 Aug 2020 16:43  --------------------------------------------------------  IN:    Enteral Tube Flush: 220 mL    ns in tub fed  [id number]: 505 mL  Total IN: 725 mL    OUT:    External Ventricular Device: 97 mL    Indwelling Catheter - Urethral: 690 mL  Total OUT: 787 mL    Total NET: -62 mL        I&O's Summary    12 Aug 2020 07:  -  13 Aug 2020 07:00  --------------------------------------------------------  IN: 1760 mL / OUT: 3543 mL / NET: -1783 mL    13 Aug 2020 07:01  -  13 Aug 2020 16:43  --------------------------------------------------------  IN: 725 mL / OUT: 787 mL / NET: -62 mL        PHYSICAL EXAM:  Neurological:    Motor exam:         [] Upper extremity              Bi(c5)  WE(c6)  EE(c7)   FF(c8)                                                R         5/5        5/5        5/5       5/5                                               L          5/5        5/5        5/5       5/5         [] Lower extremeity          HF(l2)   KE(l3)    TA(l4)   EHL(l5)  GS(s1)                                                 R        5/5        5/5        5/5       5/5         5/5                                               L         5/5        5/5       5/5       5/5          5/5                                                        [] warm well perfused; capillary refill <3 seconds     Sensation: [] intact to light touch  [] decreased:       Cardiovascular:  Respiratory:  Gastrointestinal:  Genitourinary:  Extremities:    Incision/Wound:    DEVICE/DRAIN DRESSING:    TUBES/LINES:  [] CVC  [] A-line  [] Lumbar Drain  [] Ventriculostomy  [] Other    DIET:  [] NPO  [] Mechanical  [] Tube feeds    LABS:                        10.2   14.60 )-----------( 210      ( 13 Aug 2020 05:01 )             31.5     08-13    149<H>  |  115<H>  |  22  ----------------------------<  141<H>  3.4<L>   |  26  |  0.72    Ca    8.4      13 Aug 2020 05:01  Phos  2.9     08-13  Mg     2.5     08-13        Urinalysis Basic - ( 13 Aug 2020 14:52 )    Color: Yellow / Appearance: Clear / S.020 / pH: x  Gluc: x / Ketone: NEGATIVE  / Bili: Negative / Urobili: 0.2 E.U./dL   Blood: x / Protein: NEGATIVE mg/dL / Nitrite: NEGATIVE   Leuk Esterase: NEGATIVE / RBC: 5-10 /HPF / WBC < 5 /HPF   Sq Epi: x / Non Sq Epi: 0-5 /HPF / Bacteria: Present /HPF          CAPILLARY BLOOD GLUCOSE      POCT Blood Glucose.: 110 mg/dL (13 Aug 2020 11:37)  POCT Blood Glucose.: 149 mg/dL (13 Aug 2020 06:55)  POCT Blood Glucose.: 122 mg/dL (12 Aug 2020 23:45)  POCT Blood Glucose.: 148 mg/dL (12 Aug 2020 17:45)      Drug Levels: [] N/A  Vancomycin Level, Trough: 6.7 ug/mL ( @ 17:57)    CSF Analysis: [] N/A  RBC Count - Spinal Fluid: 92541 /uL ( @ 15:35)  CSF Lymphocytes: 3 % ( @ 15:35)  Glucose, CSF: 66 mg/dL ( @ 15:35)  Protein, CSF: 42 mg/dL ( @ 15:35)      Allergies    No Known Allergies    Intolerances      MEDICATIONS:  Antibiotics:  tenofovir disoproxil fumarate (VIREAD) 300 milliGRAM(s) Oral every 24 hours    Neuro:  acetaminophen   Tablet .. 650 milliGRAM(s) Oral every 6 hours PRN  amantadine Syrup 100 milliGRAM(s) Oral <User Schedule>  levETIRAcetam  Solution 1000 milliGRAM(s) Oral two times a day    Anticoagulation:  enoxaparin Injectable 40 milliGRAM(s) SubCutaneous every 24 hours    OTHER:  amLODIPine   Tablet 10 milliGRAM(s) Oral daily  artificial tears (preservative free) Ophthalmic Solution 1 Drop(s) Both EYES three times a day  atorvastatin 40 milliGRAM(s) Oral at bedtime  chlorhexidine 4% Liquid 1 Application(s) Topical <User Schedule>  dextrose 40% Gel 15 Gram(s) Oral once PRN  dextrose 50% Injectable 12.5 Gram(s) IV Push once  dextrose 50% Injectable 25 Gram(s) IV Push once  dextrose 50% Injectable 25 Gram(s) IV Push once  dextrose 50% Injectable 25 Gram(s) IV Push once  doxazosin 4 milliGRAM(s) Oral at bedtime  finasteride 5 milliGRAM(s) Oral daily  glucagon  Injectable 1 milliGRAM(s) IntraMuscular once PRN  glucagon  Injectable 1 milliGRAM(s) IntraMuscular once PRN  hydrALAZINE Injectable 10 milliGRAM(s) IV Push every 2 hours PRN  insulin glargine Injectable (LANTUS) 20 Unit(s) SubCutaneous every morning  insulin regular  human corrective regimen sliding scale.   SubCutaneous every 6 hours  insulin regular  human recombinant 4 Unit(s) SubCutaneous every 6 hours  metoprolol tartrate 100 milliGRAM(s) Oral every 12 hours  psyllium Powder 1 Packet(s) Oral daily    IVF:  dextrose 5%. 1000 milliLiter(s) IV Continuous <Continuous>    CULTURES:  Culture Results:   Moderate Enterobacter cloacae complex  Moderate Klebsiella pneumoniae / variicola  Accompanied by normal respiratory aliza ( @ 17:29)  Culture Results:   No growth at 2 days. ( @ 15:24)    RADIOLOGY & ADDITIONAL TESTS:      ASSESSMENT:  59y Male s/p    INTRACRANIAL BLEED  No pertinent family history in first degree relatives  Handoff  Hepatitis B  HLD (hyperlipidemia)  ICH (intracerebral hemorrhage)  ICH (intracerebral hemorrhage)  Hepatitis B  HLD (hyperlipidemia)  HIV (human immunodeficiency virus infection)  Cerebellar hemorrhage  Craniectomy, subocciptal, with cervical laminectomy for medulla and spinal cord decompression  Percutaneous insertion of arterial line  Angiogram, carotid and cerebral vessels, bilateral  Foot fracture, left      PLAN:  NEURO:    CARDIOVASCULAR:    PULMONARY:    RENAL:    GI:    HEME:    ID:    ENDO:    DVT PROPHYLAXIS:  [] Venodynes                                [] Heparin/Lovenox    FALL RISK:  [] Low Risk                                    [] Impulsive    DISPOSITION:       Assessment:  Present when checked    []  GCS  E   V  M     Heart Failure: []Acute, [] acute on chronic , []chronic  Heart Failure:  [] Diastolic (HFpEF), [] Systolic (HFrEF), []Combined (HFpEF and HFrEF), [] RHF, [] Pulm HTN, [] Other    [] SARAH, [] ATN, [] AIN, [] other  [] CKD1, [] CKD2, [] CKD 3, [] CKD 4, [] CKD 5, []ESRD    Encephalopathy: [] Metabolic, [] Hepatic, [] toxic, [] Neurological, [] Other    Abnormal Nurtitional Status: [] malnurtition (see nutrition note), [ ]underweight: BMI < 19, [] morbid obesity: BMI >40, [] Cachexia    [] Sepsis  [] hypovolemic shock,[] cardiogenic shock, [] hemorrhagic shock, [] neuogenic shock  [] Acute Respiratory Failure  []Cerebral edema, [] Brain compression/ herniation,   [] Functional quadriplegia  [] Acute blood loss anemia HPI:  59 year old male with PMH of HLD and unclear liver disease had acute onset of severe headache, dizziness and vomiting x1 today, was brought into [facility] with continued symptoms, as well as hypertension and multiple episodes of vomiting. Patient was intubated for airway protection with CT Head performed demonstrating a large left cerebellar hemorrhage. CTA Head/Neck performed is suggestive of underlying vascular pathology such as AVM. RIght frontal ventricul[place] placed at [facility] [place] and patient transferred to [facility] for further work-up. Patient arrives to [facility] intubated, sedated and on Nicardipine drip. Patient's son provided history and denies any Aspirin or other anticoagulant use. Denies any prior history of hypertension, smoking, or ETOH abuse. (04 Aug 2020 05:01)    S/Overnight events: Neuro exam stable. EVD stopped draining, flushed distally and proximally, ICP waveform diminished and not draining, otherwise no issues.       Hospital Course:   POD# 0 S/P cerebral angio, negative for AVM. s/p SOC craniectomy evacuation of cerebellar hematoma.   POD # 1:   overnight tachycardic, ekg without significant changes, lactate wnl, low grade fever, s/p tyl. bolus x 1L , NS increased to 100cc/hr; neuro improving.  EVD at 5 cmH2O   8/ POD#3, BD#4, Tmax 100.6, Pan Cx 7/ NGTD, Vanc/Zosyn until 8/10, Vanc trough due today @ 6pm, EEG = no seizure, Lasix overnight for Pulm congest and tachycard, ruff placed for strict I&Os, pending anemia w/u, EVD @ 5, CPAP trials, Card GTT for SBP<140, Started Norvasc for BP optimization  :  :  S/P cerebral angio, negative for AVM. s/p SOC craniectomy evacuation of cerebellar hematoma. No significant events overnight. On CPAP this morning   8/10: fevers overnight, resolved this morning, Pancultured, (+) Enterbacter, on CPAP this AM   POD#7 SOC, BD # 8, EVD replaced (hard pass), Neuro exam stable, EVD @ 5cm H2O, Zosyn for sputum + enterobact, Plan to extubated today, HCT in AM   POD#8 s/p SOC, BD#9, s/p EVD replacement (hard pass on 8/10/20), neuro exam stable, EVD @5cm H2O. Zosyn for enterobacter in sputum. O/n EVD stopped draining, flushed distally and proximally, waveform poor, neuro exam remained stable throughout, ICPs <8 prior.     EVD soft pass yesterday, spiked temp today and re cultured o/w neuro exam stable      Vital Signs Last 24 Hrs  T(C): 38.2 (13 Aug 2020 14:00), Max: 38.2 (13 Aug 2020 14:00)  T(F): 100.7 (13 Aug 2020 14:00), Max: 100.7 (13 Aug 2020 14:00)  HR: 74 (13 Aug 2020 14:00) (60 - 76)  BP: 118/65 (13 Aug 2020 14:00) (100/56 - 174/92)  BP(mean): 87 (13 Aug 2020 14:00) (73 - 123)  RR: 15 (13 Aug 2020 14:00) (10 - 27)  SpO2: 98% (13 Aug 2020 14:00) (98% - 100%)    I&O's Detail    12 Aug 2020 07:01  -  13 Aug 2020 07:00  --------------------------------------------------------  IN:    Enteral Tube Flush: 380 mL    IV PiggyBack: 300 mL    ns in tub fed  [id number]: 1080 mL  Total IN: 1760 mL    OUT:    External Ventricular Device: 213 mL    Indwelling Catheter - Urethral: 1680 mL    Intermittent Catheterization - Urethral: 1400 mL    Stool: 250 mL  Total OUT: 3543 mL    Total NET: -1783 mL      13 Aug 2020 07:01  -  13 Aug 2020 16:43  --------------------------------------------------------  IN:    Enteral Tube Flush: 220 mL    ns in tub fed  [id number]: 505 mL  Total IN: 725 mL    OUT:    External Ventricular Device: 97 mL    Indwelling Catheter - Urethral: 690 mL  Total OUT: 787 mL    Total NET: -62 mL        I&O's Summary    12 Aug 2020 07:  -  13 Aug 2020 07:00  --------------------------------------------------------  IN: 1760 mL / OUT: 3543 mL / NET: -1783 mL    13 Aug 2020 07:  -  13 Aug 2020 16:43  --------------------------------------------------------  IN: 725 mL / OUT: 787 mL / NET: -62 mL        PHYSICAL EXAM:  Neurological:    Motor exam:         [] Upper extremity              Bi(c5)  WE(c6)  EE(c7)   FF(c8)                                                R         5/5        5/5        5/5       5/5                                               L          5/5        5/5        5/5       5/5         [] Lower extremeity          HF(l2)   KE(l3)    TA(l4)   EHL(l5)  GS(s1)                                                 R        5/5        5/5        5/5       5/5         5/5                                               L         5/5        5/5       5/5       5/5          5/5                                                        [] warm well perfused; capillary refill <3 seconds     Sensation: [] intact to light touch  [] decreased:       Cardiovascular:  Respiratory:  Gastrointestinal:  Genitourinary:  Extremities:    Incision/Wound:    DEVICE/DRAIN DRESSING:    TUBES/LINES:  [] CVC  [] A-line  [] Lumbar Drain  [] Ventriculostomy  [] Other    DIET:  [] NPO  [] Mechanical  [] Tube feeds    LABS:                        10.2   14.60 )-----------( 210      ( 13 Aug 2020 05:01 )             31.5     08-13    149<H>  |  115<H>  |  22  ----------------------------<  141<H>  3.4<L>   |  26  |  0.72    Ca    8.4      13 Aug 2020 05:01  Phos  2.9     08-13  Mg     2.5             Urinalysis Basic - ( 13 Aug 2020 14:52 )    Color: Yellow / Appearance: Clear / S.020 / pH: x  Gluc: x / Ketone: NEGATIVE  / Bili: Negative / Urobili: 0.2 E.U./dL   Blood: x / Protein: NEGATIVE mg/dL / Nitrite: NEGATIVE   Leuk Esterase: NEGATIVE / RBC: 5-10 /HPF / WBC < 5 /HPF   Sq Epi: x / Non Sq Epi: 0-5 /HPF / Bacteria: Present /HPF          CAPILLARY BLOOD GLUCOSE      POCT Blood Glucose.: 110 mg/dL (13 Aug 2020 11:37)  POCT Blood Glucose.: 149 mg/dL (13 Aug 2020 06:55)  POCT Blood Glucose.: 122 mg/dL (12 Aug 2020 23:45)  POCT Blood Glucose.: 148 mg/dL (12 Aug 2020 17:45)      Drug Levels: [] N/A  Vancomycin Level, Trough: 6.7 ug/mL ( @ 17:57)    CSF Analysis: [] N/A  RBC Count - Spinal Fluid: 67435 /uL ( @ 15:35)  CSF Lymphocytes: 3 % ( @ 15:35)  Glucose, CSF: 66 mg/dL ( @ 15:35)  Protein, CSF: 42 mg/dL ( @ 15:35)      Allergies    No Known Allergies    Intolerances      MEDICATIONS:  Antibiotics:  tenofovir disoproxil fumarate (VIREAD) 300 milliGRAM(s) Oral every 24 hours    Neuro:  acetaminophen   Tablet .. 650 milliGRAM(s) Oral every 6 hours PRN  amantadine Syrup 100 milliGRAM(s) Oral <User Schedule>  levETIRAcetam  Solution 1000 milliGRAM(s) Oral two times a day    Anticoagulation:  enoxaparin Injectable 40 milliGRAM(s) SubCutaneous every 24 hours    OTHER:  amLODIPine   Tablet 10 milliGRAM(s) Oral daily  artificial tears (preservative free) Ophthalmic Solution 1 Drop(s) Both EYES three times a day  atorvastatin 40 milliGRAM(s) Oral at bedtime  chlorhexidine 4% Liquid 1 Application(s) Topical <User Schedule>  dextrose 40% Gel 15 Gram(s) Oral once PRN  dextrose 50% Injectable 12.5 Gram(s) IV Push once  dextrose 50% Injectable 25 Gram(s) IV Push once  dextrose 50% Injectable 25 Gram(s) IV Push once  dextrose 50% Injectable 25 Gram(s) IV Push once  doxazosin 4 milliGRAM(s) Oral at bedtime  finasteride 5 milliGRAM(s) Oral daily  glucagon  Injectable 1 milliGRAM(s) IntraMuscular once PRN  glucagon  Injectable 1 milliGRAM(s) IntraMuscular once PRN  hydrALAZINE Injectable 10 milliGRAM(s) IV Push every 2 hours PRN  insulin glargine Injectable (LANTUS) 20 Unit(s) SubCutaneous every morning  insulin regular  human corrective regimen sliding scale.   SubCutaneous every 6 hours  insulin regular  human recombinant 4 Unit(s) SubCutaneous every 6 hours  metoprolol tartrate 100 milliGRAM(s) Oral every 12 hours  psyllium Powder 1 Packet(s) Oral daily    IVF:  dextrose 5%. 1000 milliLiter(s) IV Continuous <Continuous>    CULTURES:  Culture Results:   Moderate Enterobacter cloacae complex  Moderate Klebsiella pneumoniae / variicola  Accompanied by normal respiratory aliza ( @ 17:29)  Culture Results:   No growth at 2 days. ( @ 15:24)    RADIOLOGY & ADDITIONAL TESTS:      ASSESSMENT:  59y Male s/p    INTRACRANIAL BLEED  No pertinent family history in first degree relatives  Handoff  Hepatitis B  HLD (hyperlipidemia)  ICH (intracerebral hemorrhage)  ICH (intracerebral hemorrhage)  Hepatitis B  HLD (hyperlipidemia)  HIV (human immunodeficiency virus infection)  Cerebellar hemorrhage  Craniectomy, subocciptal, with cervical laminectomy for medulla and spinal cord decompression  Percutaneous insertion of arterial line  Angiogram, carotid and cerebral vessels, bilateral  Foot fracture, left      PLAN:  NEURO:    CARDIOVASCULAR:    PULMONARY:    RENAL:    GI:    HEME:    ID:    ENDO:    DVT PROPHYLAXIS:  [] Venodynes                                [] Heparin/Lovenox    FALL RISK:  [] Low Risk                                    [] Impulsive    DISPOSITION:       Assessment:  Present when checked    []  GCS  E   V  M     Heart Failure: []Acute, [] acute on chronic , []chronic  Heart Failure:  [] Diastolic (HFpEF), [] Systolic (HFrEF), []Combined (HFpEF and HFrEF), [] RHF, [] Pulm HTN, [] Other    [] SARAH, [] ATN, [] AIN, [] other  [] CKD1, [] CKD2, [] CKD 3, [] CKD 4, [] CKD 5, []ESRD    Encephalopathy: [] Metabolic, [] Hepatic, [] toxic, [] Neurological, [] Other    Abnormal Nurtitional Status: [] malnurtition (see nutrition note), [ ]underweight: BMI < 19, [] morbid obesity: BMI >40, [] Cachexia    [] Sepsis  [] hypovolemic shock,[] cardiogenic shock, [] hemorrhagic shock, [] neuogenic shock  [] Acute Respiratory Failure  []Cerebral edema, [] Brain compression/ herniation,   [] Functional quadriplegia  [] Acute blood loss anemia HPI:  59 year old male with PMH of HLD and unclear liver disease had acute onset of severe headache, dizziness and vomiting x1 today, was brought into [facility] with continued symptoms, as well as hypertension and multiple episodes of vomiting. Patient was intubated for airway protection with CT Head performed demonstrating a large left cerebellar hemorrhage. CTA Head/Neck performed is suggestive of underlying vascular pathology such as AVM. RIght frontal ventriculostomy placed at [facility] [place] [place] and patient transferred to [facility] for further work-up. Patient arrives to [facility] intubated, sedated and on Nicardipine drip. Patient's son provided history and denies any Aspirin or other anticoagulant use. Denies any prior history of hypertension, smoking, or ETOH abuse. (04 Aug 2020 05:01)    S/Overnight events: Neuro exam stable. EVD stopped draining, flushed distally and proximally, ICP waveform diminished and not draining, otherwise no issues.       Hospital Course:   POD# 0 S/P cerebral angio, negative for AVM. s/p SOC craniectomy evacuation of cerebellar hematoma.   POD # 1:   overnight tachycardic, ekg without significant changes, lactate wnl, low grade fever, s/p tyl. bolus x 1L , NS increased to 100cc/hr; neuro improving.  EVD at 5 cmH2O   8/ POD#3, BD#4, Tmax 100.6, Pan Cx 7/ NGTD, Vanc/Zosyn until 8/10, Vanc trough due today @ 6pm, EEG = no seizure, Lasix overnight for Pulm congest and tachycard, ruff placed for strict I&Os, pending anemia w/u, EVD @ 5, CPAP trials, Card GTT for SBP<140, Started Norvasc for BP optimization  :  :  S/P cerebral angio, negative for AVM. s/p SOC craniectomy evacuation of cerebellar hematoma. No significant events overnight. On CPAP this morning   8/10: fevers overnight, resolved this morning, Pancultured, (+) Enterbacter, on CPAP this AM   POD#7 SOC, BD # 8, EVD replaced (hard pass), Neuro exam stable, EVD @ 5cm H2O, Zosyn for sputum + enterobact, Plan to extubated today, HCT in AM   POD#8 s/p SOC, BD#9, s/p EVD replacement (hard pass on 8/10/20), neuro exam stable, EVD @5cm H2O. Zosyn for enterobacter in sputum. O/n EVD stopped draining, flushed distally and proximally, waveform poor, neuro exam remained stable throughout, ICPs <8 prior.     EVD soft pass yesterday, spiked temp today and re cultured o/w neuro exam stable      Vital Signs Last 24 Hrs  T(C): 38.2 (13 Aug 2020 14:00), Max: 38.2 (13 Aug 2020 14:00)  T(F): 100.7 (13 Aug 2020 14:00), Max: 100.7 (13 Aug 2020 14:00)  HR: 74 (13 Aug 2020 14:00) (60 - 76)  BP: 118/65 (13 Aug 2020 14:00) (100/56 - 174/92)  BP(mean): 87 (13 Aug 2020 14:00) (73 - 123)  RR: 15 (13 Aug 2020 14:00) (10 - 27)  SpO2: 98% (13 Aug 2020 14:00) (98% - 100%)    I&O's Detail    12 Aug 2020 07:01  -  13 Aug 2020 07:00  --------------------------------------------------------  IN:    Enteral Tube Flush: 380 mL    IV PiggyBack: 300 mL    ns in tub fed  [id number]: 1080 mL  Total IN: 1760 mL    OUT:    External Ventricular Device: 213 mL    Indwelling Catheter - Urethral: 1680 mL    Intermittent Catheterization - Urethral: 1400 mL    Stool: 250 mL  Total OUT: 3543 mL    Total NET: -1783 mL      13 Aug 2020 07:01  -  13 Aug 2020 16:43  --------------------------------------------------------  IN:    Enteral Tube Flush: 220 mL    ns in tub fed  [id number]: 505 mL  Total IN: 725 mL    OUT:    External Ventricular Device: 97 mL    Indwelling Catheter - Urethral: 690 mL  Total OUT: 787 mL    Total NET: -62 mL        I&O's Summary    12 Aug 2020 07:01  -  13 Aug 2020 07:00  --------------------------------------------------------  IN: 1760 mL / OUT: 3543 mL / NET: -1783 mL    13 Aug 2020 07:  -  13 Aug 2020 16:43  --------------------------------------------------------  IN: 725 mL / OUT: 787 mL / NET: -62 mL        PHYSICAL EXAM:  A&O x 1-2, OE, FC   face symmetric  Follows simple commands all 4's  no focal defitics  incison sites c/d/i  EVD intact    TUBES/LINES:  [x] CVC  [x] A-line  [] Lumbar Drain  [x] Ventriculostomy  [] Other    DIET:  [] NPO  [] Mechanical  [x] Tube feeds    LABS:                        10.2   14.60 )-----------( 210      ( 13 Aug 2020 05:01 )             31.5     08-13    149<H>  |  115<H>  |  22  ----------------------------<  141<H>  3.4<L>   |  26  |  0.72    Ca    8.4      13 Aug 2020 05:01  Phos  2.9     08-13  Mg     2.5     08-13        Urinalysis Basic - ( 13 Aug 2020 14:52 )    Color: Yellow / Appearance: Clear / S.020 / pH: x  Gluc: x / Ketone: NEGATIVE  / Bili: Negative / Urobili: 0.2 E.U./dL   Blood: x / Protein: NEGATIVE mg/dL / Nitrite: NEGATIVE   Leuk Esterase: NEGATIVE / RBC: 5-10 /HPF / WBC < 5 /HPF   Sq Epi: x / Non Sq Epi: 0-5 /HPF / Bacteria: Present /HPF          CAPILLARY BLOOD GLUCOSE      POCT Blood Glucose.: 110 mg/dL (13 Aug 2020 11:37)  POCT Blood Glucose.: 149 mg/dL (13 Aug 2020 06:55)  POCT Blood Glucose.: 122 mg/dL (12 Aug 2020 23:45)  POCT Blood Glucose.: 148 mg/dL (12 Aug 2020 17:45)      Drug Levels: [] N/A  Vancomycin Level, Trough: 6.7 ug/mL ( @ 17:57)    CSF Analysis: [] N/A  RBC Count - Spinal Fluid: 47195 /uL ( @ 15:35)  CSF Lymphocytes: 3 % ( @ 15:35)  Glucose, CSF: 66 mg/dL ( @ 15:35)  Protein, CSF: 42 mg/dL ( @ 15:35)      Allergies    No Known Allergies    Intolerances      MEDICATIONS:  Antibiotics:  tenofovir disoproxil fumarate (VIREAD) 300 milliGRAM(s) Oral every 24 hours    Neuro:  acetaminophen   Tablet .. 650 milliGRAM(s) Oral every 6 hours PRN  amantadine Syrup 100 milliGRAM(s) Oral <User Schedule>  levETIRAcetam  Solution 1000 milliGRAM(s) Oral two times a day    Anticoagulation:  enoxaparin Injectable 40 milliGRAM(s) SubCutaneous every 24 hours    OTHER:  amLODIPine   Tablet 10 milliGRAM(s) Oral daily  artificial tears (preservative free) Ophthalmic Solution 1 Drop(s) Both EYES three times a day  atorvastatin 40 milliGRAM(s) Oral at bedtime  chlorhexidine 4% Liquid 1 Application(s) Topical <User Schedule>  dextrose 40% Gel 15 Gram(s) Oral once PRN  dextrose 50% Injectable 12.5 Gram(s) IV Push once  dextrose 50% Injectable 25 Gram(s) IV Push once  dextrose 50% Injectable 25 Gram(s) IV Push once  dextrose 50% Injectable 25 Gram(s) IV Push once  doxazosin 4 milliGRAM(s) Oral at bedtime  finasteride 5 milliGRAM(s) Oral daily  glucagon  Injectable 1 milliGRAM(s) IntraMuscular once PRN  glucagon  Injectable 1 milliGRAM(s) IntraMuscular once PRN  hydrALAZINE Injectable 10 milliGRAM(s) IV Push every 2 hours PRN  insulin glargine Injectable (LANTUS) 20 Unit(s) SubCutaneous every morning  insulin regular  human corrective regimen sliding scale.   SubCutaneous every 6 hours  insulin regular  human recombinant 4 Unit(s) SubCutaneous every 6 hours  metoprolol tartrate 100 milliGRAM(s) Oral every 12 hours  psyllium Powder 1 Packet(s) Oral daily    IVF:  dextrose 5%. 1000 milliLiter(s) IV Continuous <Continuous>    CULTURES:  Culture Results:   Moderate Enterobacter cloacae complex  Moderate Klebsiella pneumoniae / variicola  Accompanied by normal respiratory aliza ( @ 17:29)  Culture Results:   No growth at 2 days. ( @ 15:24)    RADIOLOGY & ADDITIONAL TESTS:    < from: CT Head No Cont (20 @ 11:17) >    Right frontal EVD catheter as above. The ventricles are stable in size from prior examination. No new or increasing intracranial hemorrhage or mass effect..    < end of copied text >    ASSESSMENT:  59y Male s/p ICH/IVH with EVD     INTRACRANIAL BLEED  No pertinent family history in first degree relatives  Handoff  Hepatitis B  HLD (hyperlipidemia)  ICH (intracerebral hemorrhage)  ICH (intracerebral hemorrhage)  Hepatitis B  HLD (hyperlipidemia)  HIV (human immunodeficiency virus infection)  Cerebellar hemorrhage  Craniectomy, subocciptal, with cervical laminectomy for medulla and spinal cord decompression  Percutaneous insertion of arterial line  Angiogram, carotid and cerebral vessels, bilateral  Foot fracture, left      PLAN:  NEURO:  q1h neuro checkcs  q1h vitals  q1h ICP monitoring  keep EVD open to drain at 0    CARDIOVASCULAR:  stable  inc losartan  keep a line and TLC    PULMONARY:  RA    RENAL:  HLIV  dc ruff    GI:  NGT   failed s/s  pending re-eval  rectal tube  cont metamucil    HEME:  stable    ID:  febrile, f/u pan culture  hold off abx for now    ENDO:  ISS  lantus + reg insuline pre meal    DVT PROPHYLAXIS:  [x] Venodynes                                [x] Heparin/Lovenox    FALL RISK:  [] Low Risk                                    [] Impulsive    DISPOSITION:   rehab pending  ICU status  full code   d.w Dr. Whiting and Abhay    Assessment:  Present when checked    []  GCS  E   V  M     Heart Failure: []Acute, [] acute on chronic , []chronic  Heart Failure:  [] Diastolic (HFpEF), [] Systolic (HFrEF), []Combined (HFpEF and HFrEF), [] RHF, [] Pulm HTN, [] Other    [] SARAH, [] ATN, [] AIN, [] other  [] CKD1, [] CKD2, [] CKD 3, [] CKD 4, [] CKD 5, []ESRD    Encephalopathy: [] Metabolic, [] Hepatic, [] toxic, [] Neurological, [] Other    Abnormal Nurtitional Status: [] malnurtition (see nutrition note), [ ]underweight: BMI < 19, [] morbid obesity: BMI >40, [] Cachexia    [] Sepsis  [] hypovolemic shock,[] cardiogenic shock, [] hemorrhagic shock, [] neuogenic shock  [] Acute Respiratory Failure  []Cerebral edema, [] Brain compression/ herniation,   [] Functional quadriplegia  [] Acute blood loss anemia

## 2020-08-14 LAB
ALBUMIN SERPL ELPH-MCNC: 3.3 G/DL — SIGNIFICANT CHANGE UP (ref 3.3–5)
ALP SERPL-CCNC: 54 U/L — SIGNIFICANT CHANGE UP (ref 40–120)
ALT FLD-CCNC: 60 U/L — HIGH (ref 10–45)
ANION GAP SERPL CALC-SCNC: 8 MMOL/L — SIGNIFICANT CHANGE UP (ref 5–17)
APPEARANCE CSF: SIGNIFICANT CHANGE UP
APPEARANCE SPUN FLD: ABNORMAL
APPEARANCE UR: CLEAR — SIGNIFICANT CHANGE UP
AST SERPL-CCNC: 26 U/L — SIGNIFICANT CHANGE UP (ref 10–40)
BILIRUB DIRECT SERPL-MCNC: <0.2 MG/DL — SIGNIFICANT CHANGE UP (ref 0–0.2)
BILIRUB INDIRECT FLD-MCNC: >0.2 MG/DL — SIGNIFICANT CHANGE UP (ref 0.2–1)
BILIRUB SERPL-MCNC: 0.4 MG/DL — SIGNIFICANT CHANGE UP (ref 0.2–1.2)
BILIRUB UR-MCNC: NEGATIVE — SIGNIFICANT CHANGE UP
BUN SERPL-MCNC: 25 MG/DL — HIGH (ref 7–23)
CALCIUM SERPL-MCNC: 8.6 MG/DL — SIGNIFICANT CHANGE UP (ref 8.4–10.5)
CHLORIDE SERPL-SCNC: 113 MMOL/L — HIGH (ref 96–108)
CO2 SERPL-SCNC: 28 MMOL/L — SIGNIFICANT CHANGE UP (ref 22–31)
COLOR CSF: YELLOW
COLOR SPEC: YELLOW — SIGNIFICANT CHANGE UP
CREAT SERPL-MCNC: 0.77 MG/DL — SIGNIFICANT CHANGE UP (ref 0.5–1.3)
CSF COMMENTS: SIGNIFICANT CHANGE UP
DIFF PNL FLD: ABNORMAL
GLUCOSE BLDC GLUCOMTR-MCNC: 112 MG/DL — HIGH (ref 70–99)
GLUCOSE BLDC GLUCOMTR-MCNC: 127 MG/DL — HIGH (ref 70–99)
GLUCOSE BLDC GLUCOMTR-MCNC: 145 MG/DL — HIGH (ref 70–99)
GLUCOSE BLDC GLUCOMTR-MCNC: 155 MG/DL — HIGH (ref 70–99)
GLUCOSE CSF-MCNC: 77 MG/DL — HIGH (ref 40–70)
GLUCOSE SERPL-MCNC: 149 MG/DL — HIGH (ref 70–99)
GLUCOSE UR QL: NEGATIVE — SIGNIFICANT CHANGE UP
GRAM STN FLD: SIGNIFICANT CHANGE UP
GRAM STN FLD: SIGNIFICANT CHANGE UP
HCT VFR BLD CALC: 32.8 % — LOW (ref 39–50)
HGB BLD-MCNC: 10.7 G/DL — LOW (ref 13–17)
KETONES UR-MCNC: NEGATIVE — SIGNIFICANT CHANGE UP
LACTATE SERPL-SCNC: 2 MMOL/L — SIGNIFICANT CHANGE UP (ref 0.5–2)
LEUKOCYTE ESTERASE UR-ACNC: NEGATIVE — SIGNIFICANT CHANGE UP
LYMPHOCYTES # CSF: 1 % — LOW (ref 40–80)
MAGNESIUM SERPL-MCNC: 2.6 MG/DL — SIGNIFICANT CHANGE UP (ref 1.6–2.6)
MCHC RBC-ENTMCNC: 28.4 PG — SIGNIFICANT CHANGE UP (ref 27–34)
MCHC RBC-ENTMCNC: 32.6 GM/DL — SIGNIFICANT CHANGE UP (ref 32–36)
MCV RBC AUTO: 87 FL — SIGNIFICANT CHANGE UP (ref 80–100)
NEUTROPHILS # CSF: 1 % — SIGNIFICANT CHANGE UP (ref 0–6)
NITRITE UR-MCNC: NEGATIVE — SIGNIFICANT CHANGE UP
NRBC # BLD: 0 /100 WBCS — SIGNIFICANT CHANGE UP (ref 0–0)
NRBC NFR CSF: 2 /UL — SIGNIFICANT CHANGE UP (ref 0–5)
PH UR: 5.5 — SIGNIFICANT CHANGE UP (ref 5–8)
PHOSPHATE SERPL-MCNC: 3.5 MG/DL — SIGNIFICANT CHANGE UP (ref 2.5–4.5)
PLATELET # BLD AUTO: 220 K/UL — SIGNIFICANT CHANGE UP (ref 150–400)
POTASSIUM SERPL-MCNC: 3.9 MMOL/L — SIGNIFICANT CHANGE UP (ref 3.5–5.3)
POTASSIUM SERPL-SCNC: 3.9 MMOL/L — SIGNIFICANT CHANGE UP (ref 3.5–5.3)
PROCALCITONIN SERPL-MCNC: 0.22 NG/ML — HIGH (ref 0.02–0.1)
PROT CSF-MCNC: 39 MG/DL — SIGNIFICANT CHANGE UP (ref 15–45)
PROT SERPL-MCNC: 6.3 G/DL — SIGNIFICANT CHANGE UP (ref 6–8.3)
PROT UR-MCNC: ABNORMAL MG/DL
RBC # BLD: 3.77 M/UL — LOW (ref 4.2–5.8)
RBC # CSF: 4350 /UL — HIGH (ref 0–0)
RBC # FLD: 14 % — SIGNIFICANT CHANGE UP (ref 10.3–14.5)
SODIUM SERPL-SCNC: 149 MMOL/L — HIGH (ref 135–145)
SP GR SPEC: 1.02 — SIGNIFICANT CHANGE UP (ref 1–1.03)
SPECIMEN SOURCE: SIGNIFICANT CHANGE UP
SPECIMEN SOURCE: SIGNIFICANT CHANGE UP
TUBE TYPE: SIGNIFICANT CHANGE UP
UROBILINOGEN FLD QL: 0.2 E.U./DL — SIGNIFICANT CHANGE UP
WBC # BLD: 21.65 K/UL — HIGH (ref 3.8–10.5)
WBC # FLD AUTO: 21.65 K/UL — HIGH (ref 3.8–10.5)

## 2020-08-14 PROCEDURE — 99291 CRITICAL CARE FIRST HOUR: CPT | Mod: 24

## 2020-08-14 PROCEDURE — 70450 CT HEAD/BRAIN W/O DYE: CPT | Mod: 26

## 2020-08-14 PROCEDURE — 71045 X-RAY EXAM CHEST 1 VIEW: CPT | Mod: 26

## 2020-08-14 PROCEDURE — 99254 IP/OBS CNSLTJ NEW/EST MOD 60: CPT | Mod: GC

## 2020-08-14 PROCEDURE — 99024 POSTOP FOLLOW-UP VISIT: CPT

## 2020-08-14 RX ORDER — LABETALOL HCL 100 MG
200 TABLET ORAL
Refills: 0 | Status: DISCONTINUED | OUTPATIENT
Start: 2020-08-14 | End: 2020-08-15

## 2020-08-14 RX ORDER — VANCOMYCIN HCL 1 G
1000 VIAL (EA) INTRAVENOUS EVERY 12 HOURS
Refills: 0 | Status: DISCONTINUED | OUTPATIENT
Start: 2020-08-14 | End: 2020-08-17

## 2020-08-14 RX ORDER — ACETAMINOPHEN 500 MG
1000 TABLET ORAL ONCE
Refills: 0 | Status: COMPLETED | OUTPATIENT
Start: 2020-08-14 | End: 2020-08-14

## 2020-08-14 RX ORDER — CEFEPIME 1 G/1
2000 INJECTION, POWDER, FOR SOLUTION INTRAMUSCULAR; INTRAVENOUS ONCE
Refills: 0 | Status: COMPLETED | OUTPATIENT
Start: 2020-08-14 | End: 2020-08-14

## 2020-08-14 RX ORDER — BROMOCRIPTINE MESYLATE 5 MG/1
5 CAPSULE ORAL EVERY 8 HOURS
Refills: 0 | Status: DISCONTINUED | OUTPATIENT
Start: 2020-08-14 | End: 2020-08-14

## 2020-08-14 RX ORDER — INSULIN GLARGINE 100 [IU]/ML
15 INJECTION, SOLUTION SUBCUTANEOUS EVERY MORNING
Refills: 0 | Status: DISCONTINUED | OUTPATIENT
Start: 2020-08-14 | End: 2020-08-19

## 2020-08-14 RX ORDER — LOSARTAN POTASSIUM 100 MG/1
100 TABLET, FILM COATED ORAL DAILY
Refills: 0 | Status: DISCONTINUED | OUTPATIENT
Start: 2020-08-14 | End: 2020-08-15

## 2020-08-14 RX ORDER — SODIUM CHLORIDE 9 MG/ML
250 INJECTION INTRAMUSCULAR; INTRAVENOUS; SUBCUTANEOUS ONCE
Refills: 0 | Status: COMPLETED | OUTPATIENT
Start: 2020-08-14 | End: 2020-08-14

## 2020-08-14 RX ORDER — CEFEPIME 1 G/1
2000 INJECTION, POWDER, FOR SOLUTION INTRAMUSCULAR; INTRAVENOUS EVERY 8 HOURS
Refills: 0 | Status: DISCONTINUED | OUTPATIENT
Start: 2020-08-14 | End: 2020-08-19

## 2020-08-14 RX ORDER — LOSARTAN POTASSIUM 100 MG/1
25 TABLET, FILM COATED ORAL ONCE
Refills: 0 | Status: COMPLETED | OUTPATIENT
Start: 2020-08-14 | End: 2020-08-14

## 2020-08-14 RX ORDER — ACETAMINOPHEN 500 MG
650 TABLET ORAL EVERY 6 HOURS
Refills: 0 | Status: DISCONTINUED | OUTPATIENT
Start: 2020-08-14 | End: 2020-08-16

## 2020-08-14 RX ORDER — IBUPROFEN 200 MG
400 TABLET ORAL EVERY 8 HOURS
Refills: 0 | Status: DISCONTINUED | OUTPATIENT
Start: 2020-08-14 | End: 2020-08-16

## 2020-08-14 RX ADMIN — INSULIN GLARGINE 20 UNIT(S): 100 INJECTION, SOLUTION SUBCUTANEOUS at 07:21

## 2020-08-14 RX ADMIN — AMLODIPINE BESYLATE 10 MILLIGRAM(S): 2.5 TABLET ORAL at 05:55

## 2020-08-14 RX ADMIN — Medication 1 PACKET(S): at 11:53

## 2020-08-14 RX ADMIN — Medication 1000 MILLIGRAM(S): at 12:00

## 2020-08-14 RX ADMIN — INSULIN HUMAN 4 UNIT(S): 100 INJECTION, SOLUTION SUBCUTANEOUS at 07:21

## 2020-08-14 RX ADMIN — Medication 250 MILLIGRAM(S): at 15:10

## 2020-08-14 RX ADMIN — Medication 100 MILLIGRAM(S): at 05:56

## 2020-08-14 RX ADMIN — Medication 650 MILLIGRAM(S): at 20:00

## 2020-08-14 RX ADMIN — Medication 0.1 MILLIGRAM(S): at 19:14

## 2020-08-14 RX ADMIN — Medication 200 MILLIGRAM(S): at 19:15

## 2020-08-14 RX ADMIN — Medication 1 DROP(S): at 14:00

## 2020-08-14 RX ADMIN — CEFEPIME 100 MILLIGRAM(S): 1 INJECTION, POWDER, FOR SOLUTION INTRAMUSCULAR; INTRAVENOUS at 14:30

## 2020-08-14 RX ADMIN — Medication 650 MILLIGRAM(S): at 19:14

## 2020-08-14 RX ADMIN — Medication 400 MILLIGRAM(S): at 11:36

## 2020-08-14 RX ADMIN — LOSARTAN POTASSIUM 75 MILLIGRAM(S): 100 TABLET, FILM COATED ORAL at 05:56

## 2020-08-14 RX ADMIN — FINASTERIDE 5 MILLIGRAM(S): 5 TABLET, FILM COATED ORAL at 11:53

## 2020-08-14 RX ADMIN — INSULIN HUMAN 2: 100 INJECTION, SOLUTION SUBCUTANEOUS at 11:54

## 2020-08-14 RX ADMIN — CHLORHEXIDINE GLUCONATE 1 APPLICATION(S): 213 SOLUTION TOPICAL at 05:55

## 2020-08-14 RX ADMIN — LEVETIRACETAM 1000 MILLIGRAM(S): 250 TABLET, FILM COATED ORAL at 11:38

## 2020-08-14 RX ADMIN — INSULIN HUMAN 4 UNIT(S): 100 INJECTION, SOLUTION SUBCUTANEOUS at 11:54

## 2020-08-14 RX ADMIN — Medication 1 DROP(S): at 05:55

## 2020-08-14 RX ADMIN — SODIUM CHLORIDE 1000 MILLILITER(S): 9 INJECTION INTRAMUSCULAR; INTRAVENOUS; SUBCUTANEOUS at 20:30

## 2020-08-14 RX ADMIN — LOSARTAN POTASSIUM 25 MILLIGRAM(S): 100 TABLET, FILM COATED ORAL at 11:53

## 2020-08-14 NOTE — OCCUPATIONAL THERAPY INITIAL EVALUATION ADULT - DIAGNOSIS, OT EVAL
Pt presents with deficits in vision, perception, cognition, functional balance, coordination, activity tolerance, currently requiring 2 person assist for bed mobility/transfers, extensive assist for ADLs.

## 2020-08-14 NOTE — PROGRESS NOTE ADULT - SUBJECTIVE AND OBJECTIVE BOX
=============================================   NEUROCRITICAL CARE ATTENDING NOTE (2020)  =============================================    VEDA WARREN   MRN-[id number]  HPI: 59M with dyslipidemia, h/o Hep B infection, presented with acute onset severe HA, dizziness, vomiting - brought to [facility] where CT showed L cerebellar hemorrhage.  Intubated for airway protection.  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  R EVD inserted, transferred to [facility] for further management.  Given mannitol 25G in [facility].    COURSE IN THE HOSPITAL:   bleed   admitted to [facility], 23.4 given x1; OR for SOC evacuation of ICH   remained intubated overnight   tachycardia overnight, Tmax 38.4, ?PNA, ABx    Tmax 38.1 diuresed overnight 1.8L.     No significant events overnight.  CPAP this morning   Tmax 39.7 (enterobacter sputum, on zosyn)  08/10 Trial of CPAP   Neurologically stable, for extubation.   Extubated.  Neurologically stable.   Neurologically stable.   Febrile overnight.  Suctioned for secretions.  Continued neurological improvement.    Past Medical History: Hepatitis B HLD (hyperlipidemia)  Allergies:  No Known Allergies  Home meds:   ·	Omega-3 350 mg oral capsule: 1 cap(s) orally once a day  ·	tenofovir disoproxil fumarate 300 mg oral tablet: 1 tab(s) orally once a day    Current Meds:  MEDICATIONS  (STANDING):  acetaminophen    Suspension .. 650 milliGRAM(s) Oral every 6 hours  amLODIPine   Tablet 10 milliGRAM(s) Oral daily  artificial tears (preservative free) Ophthalmic Solution 1 Drop(s) Both EYES three times a day  atorvastatin 40 milliGRAM(s) Oral at bedtime  bromocriptine Capsule 5 milliGRAM(s) Oral every 8 hours  cefepime   IVPB 2000 milliGRAM(s) IV Intermittent every 8 hours  cloNIDine 0.1 milliGRAM(s) Oral two times a day (hold if sBP < 110 mmHg)  doxazosin 4 milliGRAM(s) Oral at bedtime  enoxaparin Injectable 40 milliGRAM(s) SubCutaneous every 24 hours  finasteride 5 milliGRAM(s) Oral daily  ibuprofen  Tablet. 400 milliGRAM(s) Oral every 8 hours  insulin glargine Injectable (LANTUS) 15 Unit(s) SubCutaneous every morning  insulin regular  human corrective regimen sliding scale.   SubCutaneous every 6 hours  insulin regular  human recombinant 4 Unit(s) SubCutaneous every 6 hours  labetalol 200 milliGRAM(s) Enteral Tube two times a day  levETIRAcetam  Solution 1000 milliGRAM(s) Oral two times a day  losartan 100 milliGRAM(s) Oral daily  psyllium Powder 1 Packet(s) Oral daily  tenofovir disoproxil fumarate (VIREAD) 300 milliGRAM(s) Oral every 24 hours  vancomycin  IVPB 1000 milliGRAM(s) IV Intermittent every 12 hours    MEDICATIONS  (PRN):  hydrALAZINE Injectable 10 milliGRAM(s) IV Push every 2 hours PRN SBP > 140    PHYSICAL EXAMINATION    ICU Vital Signs Last 24 Hrs  T(C): 39.7 (14 Aug 2020 12:00), Max: 40 (14 Aug 2020 11:00)  T(F): 103.4 (14 Aug 2020 12:00), Max: 104 (14 Aug 2020 11:00)  HR: 81 (14 Aug 2020 12:00) (64 - 98)  BP: 124/65 (14 Aug 2020 12:00) (118/64 - 154/73)  BP(mean): 88 (14 Aug 2020 12:00) (84 - 118)  ABP: 185/68 (13 Aug 2020 22:00) (156/63 - 185/68)  ABP(mean): 97 (13 Aug 2020 22:00) (89 - 98)  RR: 19 (14 Aug 2020 12:00) (11 - 27)  SpO2: 92% (14 Aug 2020 12:00) (92% - 100%)    NEUROLOGIC EXAMINATION:    -opens eyes spontaneously today, pupils 3 mm reactive bilaterally, improving dsxtib3df (L > R), hypophonic, cerebellar dysarthria, tongue midline, obeys second order commands UE/LE, sits up with assistance for physiotherapy  EENT:  anicteric  CARDIOVASCULAR: (+) S1 S2, normal rate and regular rhythm  PULMONARY: clear to auscultation bilaterally  ABDOMEN: soft, nontender with normoactive bowel sounds  EXTREMITIES: no edema  SKIN: no rash    I/O's    20 @ 07:01  -  20 @ 07:00  --------------------------------------------------------  IN: 1785 mL / OUT: 2695 mL / NET: -910 mL    20 @ 07:  -  20 @ 12:43  --------------------------------------------------------  IN: 210 mL / OUT: 315 mL / NET: -105 mL    LABS:               10.7   21.65 )-----------( 220      ( 14 Aug 2020 04:28 )             32.8     08-14    149<H>  |  113<H>  |  25<H>  ----------------------------<  149<H>  3.9   |  28  |  0.77    Ca    8.6      14 Aug 2020 04:28  Phos  3.5     08-14  Mg     2.6     -14    TPro  6.3  /  Alb  3.3  /  TBili  0.4  /  DBili  <0.2  /  AST  26  /  ALT  60<H>  /  AlkPhos  54  08-14    Urinalysis Basic - ( 13 Aug 2020 14:52 )    Color: Yellow / Appearance: Clear / S.020 / pH: x  Gluc: x / Ketone: NEGATIVE  / Bili: Negative / Urobili: 0.2 E.U./dL   Blood: x / Protein: NEGATIVE mg/dL / Nitrite: NEGATIVE   Leuk Esterase: NEGATIVE / RBC: 5-10 /HPF / WBC < 5 /HPF   Sq Epi: x / Non Sq Epi: 0-5 /HPF / Bacteria: Present /HPF    CAPILLARY BLOOD GLUCOSE    POCT Blood Glucose.: 155 mg/dL (14 Aug 2020 11:30)  POCT Blood Glucose.: 127 mg/dL (14 Aug 2020 06:38)  POCT Blood Glucose.: 133 mg/dL (13 Aug 2020 23:17)  POCT Blood Glucose.: 139 mg/dL (13 Aug 2020 16:56)    HbA1C = 5.6 ()  LDL = 185 ()   HDL = 36 () TG = 252 ()  TSH = 0.460 ()    C diff negative    Culture - CSF with Gram Stain (collected 20 @ 15:58)  Source: .CSF CSF  Gram Stain (20 @ 16:20):    No organisms seen    No WBC's seen.    Culture - Blood (collected 20 @ 15:50)  Source: .Blood Blood  Preliminary Report (20 @ 04:01):    No growth at 12 hours    Culture - Blood (collected 20 @ 15:50)  Source: .Blood Blood  Preliminary Report (20 @ 04:01):    No growth at 12 hours    CSF WBC:RBC = 0.002 (CSF lactate pending ___)    Culture - Sputum . (20 @ 17:29)    -  Trimethoprim/Sulfamethoxazole: S <=0.5/9.5    -  Trimethoprim/Sulfamethoxazole: S <=0.5/9.5    Gram Stain:   Rare epithelial cells  Rare WBC's  Few Gram Negative Rods    -  Ampicillin: R >16 These ampicillin results predict results for amoxicillin    -  Ampicillin: S <=8 These ampicillin results predict results for amoxicillin    -  Ampicillin/Sulbactam: S <=4/2 Enterobacter, Citrobacter, and Serratia may develop resistance during prolonged therapy (3-4 days)    -  Ampicillin/Sulbactam: R >16/8 Enterobacter, Citrobacter, and Serratia may develop resistance during prolonged therapy (3-4 days)    -  Cefazolin: R >16 Enterobacter, Citrobacter, and Serratia may develop resistance during prolonged therapy (3-4 days)    -  Cefazolin: S <=2 Enterobacter, Citrobacter, and Serratia may develop resistance during prolonged therapy (3-4 days)    -  Cefepime: S <=2    -  Ceftriaxone: R 32 Enterobacter, Citrobacter, and Serratia may develop resistance during prolonged therapy    -  Ceftriaxone: S <=1 Enterobacter, Citrobacter, and Serratia may develop resistance during prolonged therapy    -  Ciprofloxacin: S <=0.25    -  Ciprofloxacin: S <=0.25    -  Gentamicin: S <=2    -  Gentamicin: S <=2    -  Meropenem: S <=1    -  Piperacillin/Tazobactam: S 16    -  Piperacillin/Tazobactam: S <=8    -  Tobramycin: S <=2    -  Tobramycin: S <=2    Specimen Source: .Sputum Sputum    Culture Results:   Moderate Enterobacter cloacae complex  Moderate Klebsiella pneumoniae / variicola  Accompanied by normal respiratory aliza    Organism Identification: Enterobacter cloacae complex  Klebsiella species    Organism: Enterobacter cloacae complex    Organism: Klebsiella species    Method Type: MARKELL    Method Type: MARKELL    Bacteriology:   sputum: enterobacter cloacae, klebsiella pneumoniae    CSF no organisms   sputum CS: enterobacter cloacae S: piperacillin/tazobactam    CSF NGTD   Blood CS NG5D x2   UA NEG    CSF studies:    L   *** [id number] *** %N31 %L5     Neuroimagin/11 CT head:  Right frontal EVD catheter as above. The ventricles are stable in size from prior examination. No new or increasing intracranial hemorrhage or mass effect.  08/10 CT head:  Compared to 2020, there is improving intraventricular hemorrhage, resolution of intraventricular gas, and mild decrease in ventricular size. Postsurgical change and edema in the posterior fossa is approximately stable.   CT head:  post-surgical changes, stable HCP  Other imagin/04 LE doppler: NEG   CXR:  clear   CXR:  mild atelectasis L lung base  08/10 CXR:  Small left pleural effusion, unchanged.    IV FLUIDS: IVL  DRIPS:  DIET: Jevity 1.2 goal of 60 mL/hr with metamucil  Lines:  Drains:  Matt KSENIA Plattne   EVD at 7cm H20  35cc/hr   EVD at 5cm H20 ICP 2-5 68cc/24h   EVD at 5cm H20    EVD at 5cm H20 ICP 1-3 202cc/24h     EVD at 5 cm H20 ICP 1-4 output 194cc/24h   EVD at 5 cm H20 ICP 0 to 5 output 280cc/24h  08/10 EVD at 5 cm H20 ICP 5 to 10 output 5-20cc/h   EVD at 5 cm H20 ICP < 10 output 167cc/24h   EVD at 0 cm H20 ICP 1-6 output 5-20 cc/h   EVD at 0 cm H20 ICP < 6 (flattened waveform occasionally) output 210 cc/24 h    CODE STATUS:  Full Code                       GOALS OF CARE:  aggressive                      DISPOSITION:  ICU  ICH Score on admission  = 4, NIHSS

## 2020-08-14 NOTE — CONSULT NOTE ADULT - SUBJECTIVE AND OBJECTIVE BOX
Consultation Requested by:    Patient is a 59y old  Male who presents with a chief complaint of Left cerebellar intraparenchymal hemorrhage (14 Aug 2020 12:40)    HPI:  59 year old male with PMH of HLD and unclear liver disease had acute onset of severe headache, dizziness and vomiting x1 today, was brought into [facility] with continued symptoms, as well as hypertension and multiple episodes of vomiting. Patient was intubated for airway protection with CT Head performed demonstrating a large left cerebellar hemorrhage. CTA Head/Neck performed is suggestive of underlying vascular pathology such as AVM. RIght frontal ventriculostomy placed at [facility] and patient transferred to [facility] for further work-up. Patient arrives to [facility] intubated, sedated and on Nicardipine drip. Patient's son provided history and denies any Aspirin or other anticoagulant use. Denies any prior history of hypertension, smoking, or ETOH abuse. (04 Aug 2020 05:01)      REVIEW OF SYSTEMS  All review of systems negative, except for those marked:  General:		[] Abnormal:  	[] Night Sweats		[] Fever		[] Weight Loss  Pulmonary/Cough:	[] Abnormal:  Cardiac/Chest Pain:	[] Abnormal:  Gastrointestinal:   	[] Abnormal:  Eyes:			        [] Abnormal:  ENT:		         	[] Abnormal:  Dysuria:		                [] Abnormal:  Musculoskeletal	:	[] Abnormal:  Endocrine:		        [] Abnormal:  Lymph Nodes:		[] Abnormal:  Headache:		        [] Abnormal:  Skin:			        [] Abnormal:  Allergy/Immune:       	[] Abnormal:  Psychiatric:		        [] Abnormal:  [] All other review of systems negative  [] Unable to obtain (explain):    Recent Ill Contacts:	[] No	[] Yes:  Recent Travel History:	[] No	[] Yes:  Recent Animal/Insect Exposure/Tick Bites:	[] No	[] Yes:    Allergies    No Known Allergies    Intolerances      Antimicrobials:  cefepime   IVPB 2000 milliGRAM(s) IV Intermittent once  cefepime   IVPB 2000 milliGRAM(s) IV Intermittent every 8 hours  tenofovir disoproxil fumarate (VIREAD) 300 milliGRAM(s) Oral every 24 hours  vancomycin  IVPB 1000 milliGRAM(s) IV Intermittent every 12 hours      Other Medications:  acetaminophen    Suspension .. 650 milliGRAM(s) Oral every 6 hours  amLODIPine   Tablet 10 milliGRAM(s) Oral daily  artificial tears (preservative free) Ophthalmic Solution 1 Drop(s) Both EYES three times a day  atorvastatin 40 milliGRAM(s) Oral at bedtime  bromocriptine Capsule 5 milliGRAM(s) Oral every 8 hours  chlorhexidine 4% Liquid 1 Application(s) Topical <User Schedule>  cloNIDine 0.1 milliGRAM(s) Oral two times a day  dextrose 40% Gel 15 Gram(s) Oral once PRN  dextrose 5%. 1000 milliLiter(s) IV Continuous <Continuous>  dextrose 50% Injectable 12.5 Gram(s) IV Push once  dextrose 50% Injectable 25 Gram(s) IV Push once  doxazosin 4 milliGRAM(s) Oral at bedtime  enoxaparin Injectable 40 milliGRAM(s) SubCutaneous every 24 hours  finasteride 5 milliGRAM(s) Oral daily  glucagon  Injectable 1 milliGRAM(s) IntraMuscular once PRN  glucagon  Injectable 1 milliGRAM(s) IntraMuscular once PRN  hydrALAZINE Injectable 10 milliGRAM(s) IV Push every 2 hours PRN  ibuprofen  Tablet. 400 milliGRAM(s) Oral every 8 hours  insulin glargine Injectable (LANTUS) 15 Unit(s) SubCutaneous every morning  insulin regular  human corrective regimen sliding scale.   SubCutaneous every 6 hours  insulin regular  human recombinant 4 Unit(s) SubCutaneous every 6 hours  labetalol 200 milliGRAM(s) Enteral Tube two times a day  levETIRAcetam  Solution 1000 milliGRAM(s) Oral two times a day  losartan 100 milliGRAM(s) Oral daily  psyllium Powder 1 Packet(s) Oral daily  sodium chloride 0.9% lock flush 10 milliLiter(s) IV Push every 1 hour PRN      FAMILY HISTORY:  No pertinent family history in first degree relatives    PAST MEDICAL & SURGICAL HISTORY:  Hepatitis B  HLD (hyperlipidemia)  Foot fracture, left: s/p repair 2019    SOCIAL HISTORY:    IMMUNIZATIONS  [] Up to Date		[] Not Up to Date:  Recent Immunizations:	[] No	[] Yes:    Daily     Daily   Head Circumference:  Vital Signs Last 24 Hrs  T(C): 39.7 (14 Aug 2020 12:00), Max: 40 (14 Aug 2020 11:00)  T(F): 103.4 (14 Aug 2020 12:00), Max: 104 (14 Aug 2020 11:00)  HR: 81 (14 Aug 2020 12:00) (64 - 98)  BP: 124/65 (14 Aug 2020 12:00) (118/64 - 154/73)  BP(mean): 88 (14 Aug 2020 12:00) (84 - 118)  RR: 19 (14 Aug 2020 12:00) (11 - 27)  SpO2: 92% (14 Aug 2020 12:00) (92% - 100%)    PHYSICAL EXAM    Respiratory Support:		[] No	[] Yes:  Vasoactive medication infusion:	[] No	[] Yes:  Venous catheters:		[] No	[] Yes:  Bladder catheter:		        [] No	[] Yes:  Other catheters or tubes:	[] No	[] Yes:    Lab Results:                        10.7   21.65 )-----------( 220      ( 14 Aug 2020 04:28 )             32.8     08-14    149<H>  |  113<H>  |  25<H>  ----------------------------<  149<H>  3.9   |  28  |  0.77    Ca    8.6      14 Aug 2020 04:28  Phos  3.5     08-14  Mg     2.6     08-14    TPro  6.3  /  Alb  3.3  /  TBili  0.4  /  DBili  <0.2  /  AST  26  /  ALT  60<H>  /  AlkPhos  54  08-14    LIVER FUNCTIONS - ( 14 Aug 2020 04:28 )  Alb: 3.3 g/dL / Pro: 6.3 g/dL / ALK PHOS: 54 U/L / ALT: 60 U/L / AST: 26 U/L / GGT: x             Urinalysis Basic - ( 13 Aug 2020 14:52 )    Color: Yellow / Appearance: Clear / S.020 / pH: x  Gluc: x / Ketone: NEGATIVE  / Bili: Negative / Urobili: 0.2 E.U./dL   Blood: x / Protein: NEGATIVE mg/dL / Nitrite: NEGATIVE   Leuk Esterase: NEGATIVE / RBC: 5-10 /HPF / WBC < 5 /HPF   Sq Epi: x / Non Sq Epi: 0-5 /HPF / Bacteria: Present /HPF        MICROBIOLOGY  [] Pathology slides reviewed and/or discussed with pathologist  [] Microbiology findings discussed with microbiologist or slides reviewed  [] Images erviewed with radiologist  [] Case discussed with an attending physician in addition to the patient's primary physician  [] Records, reports from outside [facility] reviewed    [] Patient requires continued monitoring for:  [] Total time spent by attending physician: __ minutes, excluding procedure time. Consultation Requested by:    Patient is a 59y old  Male who presents with a chief complaint of Left cerebellar intraparenchymal hemorrhage (14 Aug 2020 12:40)    HPI:  59 year old male with PMH of HLD and unclear liver disease had acute onset of severe headache, dizziness and vomiting x1 today, was brought into [facility] with continued symptoms, as well as hypertension and multiple episodes of vomiting. Patient was intubated for airway protection with CT Head performed demonstrating a large left cerebellar hemorrhage. CTA Head/Neck performed is suggestive of underlying vascular pathology such as AVM. RIght frontal ventriculostomy placed at [facility] and patient transferred to [facility] for further work-up. Patient arrives to [facility] intubated, sedated and on Nicardipine drip. Patient's son provided history and denies any Aspirin or other anticoagulant use. Denies any prior history of hypertension, smoking, or ETOH abuse. (04 Aug 2020 05:01)    Allergies    No Known Allergies    Intolerances      Antimicrobials:  cefepime   IVPB 2000 milliGRAM(s) IV Intermittent once  cefepime   IVPB 2000 milliGRAM(s) IV Intermittent every 8 hours  tenofovir disoproxil fumarate (VIREAD) 300 milliGRAM(s) Oral every 24 hours  vancomycin  IVPB 1000 milliGRAM(s) IV Intermittent every 12 hours      Other Medications:  acetaminophen    Suspension .. 650 milliGRAM(s) Oral every 6 hours  amLODIPine   Tablet 10 milliGRAM(s) Oral daily  artificial tears (preservative free) Ophthalmic Solution 1 Drop(s) Both EYES three times a day  atorvastatin 40 milliGRAM(s) Oral at bedtime  bromocriptine Capsule 5 milliGRAM(s) Oral every 8 hours  chlorhexidine 4% Liquid 1 Application(s) Topical <User Schedule>  cloNIDine 0.1 milliGRAM(s) Oral two times a day  dextrose 40% Gel 15 Gram(s) Oral once PRN  dextrose 5%. 1000 milliLiter(s) IV Continuous <Continuous>  dextrose 50% Injectable 12.5 Gram(s) IV Push once  dextrose 50% Injectable 25 Gram(s) IV Push once  doxazosin 4 milliGRAM(s) Oral at bedtime  enoxaparin Injectable 40 milliGRAM(s) SubCutaneous every 24 hours  finasteride 5 milliGRAM(s) Oral daily  glucagon  Injectable 1 milliGRAM(s) IntraMuscular once PRN  glucagon  Injectable 1 milliGRAM(s) IntraMuscular once PRN  hydrALAZINE Injectable 10 milliGRAM(s) IV Push every 2 hours PRN  ibuprofen  Tablet. 400 milliGRAM(s) Oral every 8 hours  insulin glargine Injectable (LANTUS) 15 Unit(s) SubCutaneous every morning  insulin regular  human corrective regimen sliding scale.   SubCutaneous every 6 hours  insulin regular  human recombinant 4 Unit(s) SubCutaneous every 6 hours  labetalol 200 milliGRAM(s) Enteral Tube two times a day  levETIRAcetam  Solution 1000 milliGRAM(s) Oral two times a day  losartan 100 milliGRAM(s) Oral daily  psyllium Powder 1 Packet(s) Oral daily  sodium chloride 0.9% lock flush 10 milliLiter(s) IV Push every 1 hour PRN      FAMILY HISTORY:  No pertinent family history in first degree relatives    PAST MEDICAL & SURGICAL HISTORY:  Hepatitis B  HLD (hyperlipidemia)  Foot fracture, left: s/p repair     SOCIAL HISTORY:    IMMUNIZATIONS  [] Up to Date		[] Not Up to Date:  Recent Immunizations:	[] No	[] Yes:    Daily     Daily   Head Circumference:  Vital Signs Last 24 Hrs  T(C): 39.7 (14 Aug 2020 12:00), Max: 40 (14 Aug 2020 11:00)  T(F): 103.4 (14 Aug 2020 12:00), Max: 104 (14 Aug 2020 11:00)  HR: 81 (14 Aug 2020 12:00) (64 - 98)  BP: 124/65 (14 Aug 2020 12:00) (118/64 - 154/73)  BP(mean): 88 (14 Aug 2020 12:00) (84 - 118)  RR: 19 (14 Aug 2020 12:00) (11 - 27)  SpO2: 92% (14 Aug 2020 12:00) (92% - 100%)      PHYSICAL EXAM:    Constitutional: alert, minimally interactive, tracks, appears confused, NAD, comfortable in bed,   HEENT: stapales on head with site c/d/i, drain with serosanguinous fluid, PERRLA, EOMI, no conjunctival pallor or scleral icterus, MMM  Neck: Supple, no JVD  Respiratory: CTA B/L. No w/r/r.   Cardiovascular: RRR, normal S1 and S2, no m/r/g.   Gastrointestinal: +BS, soft NTND, no guarding or rebound tenderness, no palpable masses   Extremities: wwp; no cyanosis, clubbing or edema.   Vascular: Pulses equal and strong throughout.   Neurological: AAOx0, minimally interactive, reacts to name, tracks, nonverbal  Skin: No gross skin abnormalities or rashes      Lab Results:                        10.7   21.65 )-----------( 220      ( 14 Aug 2020 04:28 )             32.8     08-14    149<H>  |  113<H>  |  25<H>  ----------------------------<  149<H>  3.9   |  28  |  0.77    Ca    8.6      14 Aug 2020 04:28  Phos  3.5     08-14  Mg     2.6     08-14    TPro  6.3  /  Alb  3.3  /  TBili  0.4  /  DBili  <0.2  /  AST  26  /  ALT  60<H>  /  AlkPhos  54  08-14    LIVER FUNCTIONS - ( 14 Aug 2020 04:28 )  Alb: 3.3 g/dL / Pro: 6.3 g/dL / ALK PHOS: 54 U/L / ALT: 60 U/L / AST: 26 U/L / GGT: x             Urinalysis Basic - ( 13 Aug 2020 14:52 )    Color: Yellow / Appearance: Clear / S.020 / pH: x  Gluc: x / Ketone: NEGATIVE  / Bili: Negative / Urobili: 0.2 E.U./dL   Blood: x / Protein: NEGATIVE mg/dL / Nitrite: NEGATIVE   Leuk Esterase: NEGATIVE / RBC: 5-10 /HPF / WBC < 5 /HPF   Sq Epi: x / Non Sq Epi: 0-5 /HPF / Bacteria: Present /HPF        MICROBIOLOGY  [] Pathology slides reviewed and/or discussed with pathologist  [] Microbiology findings discussed with microbiologist or slides reviewed  [] Images erviewed with radiologist  [] Case discussed with an attending physician in addition to the patient's primary physician  [] Records, reports from outside [facility] [place]    [] Patient requires continued monitoring for:  [] Total time spent by attending physician: __ minutes, excluding procedure time.

## 2020-08-14 NOTE — OCCUPATIONAL THERAPY INITIAL EVALUATION ADULT - VISUAL ASSESSMENT: TRACKING
tracks midline to left; midline to right with max prompting and assistance for head/neck control; unable to track vertically

## 2020-08-14 NOTE — PHYSICAL THERAPY INITIAL EVALUATION ADULT - DID THE PATIENT HAVE SURGERY?
Craniectomy, subocciptal, with cervical laminectomy for medulla and spinal cord decompression 04-Aug-2020 15:13:59 for evacuation of cerebellar hematoma Nancy Ruff E/yes

## 2020-08-14 NOTE — PHYSICAL THERAPY INITIAL EVALUATION ADULT - PERTINENT HX OF CURRENT PROBLEM, REHAB EVAL
59 year old male with PMH of HLD and unclear liver disease had acute onset of severe headache, dizziness and vomiting x1 today, was brought into [facility] with continued symptoms, as well as hypertension and multiple episodes of vomiting. Patient was intubated for airway protection with CT Head performed demonstrating a large left cerebellar hemorrhage.  patient transferred to [facility] for further work-up. See sunrise for full HPI.

## 2020-08-14 NOTE — OCCUPATIONAL THERAPY INITIAL EVALUATION ADULT - MANUAL MUSCLE TESTING RESULTS, REHAB EVAL
bilateral shoulders/elbows 1/5; R forearm/wrist 3/5, R digits 3+/5; L forearm/wrist 2+/5, L digits 3-/5

## 2020-08-14 NOTE — OCCUPATIONAL THERAPY INITIAL EVALUATION ADULT - ADDITIONAL COMMENTS
As per care coordination: Patient lives with wife, son, and daughter in a house. Patient was independent with ADLs and ambulation prior to admission.

## 2020-08-14 NOTE — OCCUPATIONAL THERAPY INITIAL EVALUATION ADULT - PLANNED THERAPY INTERVENTIONS, OT EVAL
motor coordination training/transfer training/fine motor coordination training/parent/caregiver training.../ADL retraining/balance training/cognitive, visual perceptual/neuromuscular re-education/strengthening/bed mobility training/ROM

## 2020-08-14 NOTE — PROGRESS NOTE ADULT - SUBJECTIVE AND OBJECTIVE BOX
HPI:  59 year old male with PMH of HLD and unclear liver disease had acute onset of severe headache, dizziness and vomiting x1 today, was brought into [facility] with continued symptoms, as well as hypertension and multiple episodes of vomiting. Patient was intubated for airway protection with CT Head performed demonstrating a large left cerebellar hemorrhage. CTA Head/Neck performed is suggestive of underlying vascular pathology such as AVM. RIght frontal [place] [facility] [place] to [facility] for further work-up. Patient arrives to [facility] intubated, sedated and on Nicardipine drip. Patient's son provided history and denies any Aspirin or other anticoagulant use. Denies any prior history of hypertension, smoking, or ETOH abuse. (04 Aug 2020 05:01)    Hospital Course:   POD# 0 S/P cerebral angio, negative for AVM. s/p SOC craniectomy evacuation of cerebellar hematoma.   POD # 1:   overnight tachycardic, ekg without significant changes, lactate wnl, low grade fever, s/p tyl. bolus x 1L , NS increased to 100cc/hr; neuro improving.  EVD at 5 cmH2O   8/6 POD#3, BD#4, Tmax 100.6, Pan Cx 7/5 NGTD, Vanc/Zosyn until 8/10, Vanc trough due today @ 6pm, EEG = no seizure, Lasix overnight for Pulm congest and tachycard, ruff placed for strict I&Os, pending anemia w/u, EVD @ 5, CPAP trials, Card GTT for SBP<140, Started Norvasc for BP optimization  :  :  S/P cerebral angio, negative for AVM. s/p SOC craniectomy evacuation of cerebellar hematoma. No significant events overnight. On CPAP this morning   8/10: fevers overnight, resolved this morning, Pancultured, (+) Enterbacter, on CPAP this AM  8/11 POD#7 SOC, BD # 8, EVD replaced (hard pass), Neuro exam stable, EVD @ 5cm H2O, Zosyn for sputum + enterobact, Plan to extubated today, HCT in AM  8/12 POD#8 s/p SOC, BD#9, s/p EVD replacement (hard pass on 8/10/20), neuro exam stable, EVD @5cm H2O. Zosyn for enterobacter in sputum. O/n EVD stopped draining, flushed distally and proximally, waveform poor, neuro exam remained stable throughout, ICPs <8 prior.     EVD soft pass yesterday, spiked temp today and re cultured o/w neuro exam stable  : EMIL overnight, neuro stable. Mucomyst for secretions overnight     Vital Signs Last 24 Hrs  T(C): 38.9 (14 Aug 2020 00:58), Max: 38.9 (14 Aug 2020 00:58)  T(F): 102 (14 Aug 2020 00:58), Max: 102 (14 Aug 2020 00:58)  HR: 80 (14 Aug 2020 00:00) (62 - 81)  BP: 118/64 (14 Aug 2020 00:00) (100/56 - 161/80)  BP(mean): 84 (14 Aug 2020 00:00) (73 - 114)  RR: 16 (14 Aug 2020 00:00) (10 - 27)  SpO2: 97% (14 Aug 2020 00:00) (96% - 100%)    I&O's Detail    12 Aug 2020 07:01  -  13 Aug 2020 07:00  --------------------------------------------------------  IN:    Enteral Tube Flush: 380 mL    IV PiggyBack: 300 mL    ns in tub fed  [id number]: 1080 mL  Total IN: 1760 mL    OUT:    External Ventricular Device: 213 mL    Indwelling Catheter - Urethral: 1680 mL    Intermittent Catheterization - Urethral: 1400 mL    Stool: 250 mL  Total OUT: 3543 mL    Total NET: -1783 mL      13 Aug 2020 07:01  -  14 Aug 2020 01:54  --------------------------------------------------------  IN:    Enteral Tube Flush: 390 mL    ns in tub fed  [id number]: 925 mL  Total IN: 1315 mL    OUT:    External Ventricular Device: 170 mL    Indwelling Catheter - Urethral: 1435 mL    Stool: 550 mL  Total OUT: 2155 mL    Total NET: -840 mL        I&O's Summary    12 Aug 2020 07:01  -  13 Aug 2020 07:00  --------------------------------------------------------  IN: 1760 mL / OUT: 3543 mL / NET: -1783 mL    13 Aug 2020 07:  -  14 Aug 2020 01:54  --------------------------------------------------------  IN: 1315 mL / OUT: 2155 mL / NET: -840 mL        PHYSICAL EXAM:  Gen: laying in hospital bed, NAD  Neuro: Awake and alert, OE spont, FC  CN II-XII: PERRL, EOMI  Motor: MAEx4 with good strength  Crani site C/D/I    TUBES/LINES:  [] CVC  [] A-line  [] Lumbar Drain  [] Ventriculostomy  [] Other    DIET:  [] NPO  [] Mechanical  [] Tube feeds    LABS:                        10.2   14.60 )-----------( 210      ( 13 Aug 2020 05:01 )             31.5     08-13    149<H>  |  115<H>  |  22  ----------------------------<  141<H>  3.4<L>   |  26  |  0.72    Ca    8.4      13 Aug 2020 05:01  Phos  2.9     08-13  Mg     2.5     08-13        Urinalysis Basic - ( 13 Aug 2020 14:52 )    Color: Yellow / Appearance: Clear / S.020 / pH: x  Gluc: x / Ketone: NEGATIVE  / Bili: Negative / Urobili: 0.2 E.U./dL   Blood: x / Protein: NEGATIVE mg/dL / Nitrite: NEGATIVE   Leuk Esterase: NEGATIVE / RBC: 5-10 /HPF / WBC < 5 /HPF   Sq Epi: x / Non Sq Epi: 0-5 /HPF / Bacteria: Present /HPF          CAPILLARY BLOOD GLUCOSE      POCT Blood Glucose.: 133 mg/dL (13 Aug 2020 23:17)  POCT Blood Glucose.: 139 mg/dL (13 Aug 2020 16:56)  POCT Blood Glucose.: 110 mg/dL (13 Aug 2020 11:37)  POCT Blood Glucose.: 149 mg/dL (13 Aug 2020 06:55)      Drug Levels: [] N/A  Vancomycin Level, Trough: 6.7 ug/mL ( @ 17:57)    CSF Analysis: [] N/A  RBC Count - Spinal Fluid: 64524 /uL ( @ 15:35)  CSF Lymphocytes: 3 % ( @ 15:35)  Glucose, CSF: 66 mg/dL ( @ 15:35)  Protein, CSF: 42 mg/dL ( @ 15:35)      Allergies    No Known Allergies    Intolerances      MEDICATIONS:  Antibiotics:  tenofovir disoproxil fumarate (VIREAD) 300 milliGRAM(s) Oral every 24 hours    Neuro:  acetaminophen   Tablet .. 650 milliGRAM(s) Oral every 6 hours PRN  amantadine Syrup 100 milliGRAM(s) Oral <User Schedule>  levETIRAcetam  Solution 1000 milliGRAM(s) Oral two times a day    Anticoagulation:  enoxaparin Injectable 40 milliGRAM(s) SubCutaneous every 24 hours    OTHER:  amLODIPine   Tablet 10 milliGRAM(s) Oral daily  artificial tears (preservative free) Ophthalmic Solution 1 Drop(s) Both EYES three times a day  atorvastatin 40 milliGRAM(s) Oral at bedtime  chlorhexidine 4% Liquid 1 Application(s) Topical <User Schedule>  dextrose 40% Gel 15 Gram(s) Oral once PRN  dextrose 50% Injectable 12.5 Gram(s) IV Push once  dextrose 50% Injectable 25 Gram(s) IV Push once  doxazosin 4 milliGRAM(s) Oral at bedtime  finasteride 5 milliGRAM(s) Oral daily  glucagon  Injectable 1 milliGRAM(s) IntraMuscular once PRN  glucagon  Injectable 1 milliGRAM(s) IntraMuscular once PRN  hydrALAZINE Injectable 10 milliGRAM(s) IV Push every 2 hours PRN  insulin glargine Injectable (LANTUS) 20 Unit(s) SubCutaneous every morning  insulin regular  human corrective regimen sliding scale.   SubCutaneous every 6 hours  insulin regular  human recombinant 4 Unit(s) SubCutaneous every 6 hours  losartan 75 milliGRAM(s) Oral daily  metoprolol tartrate 100 milliGRAM(s) Oral every 12 hours  psyllium Powder 1 Packet(s) Oral daily    IVF:  dextrose 5%. 1000 milliLiter(s) IV Continuous <Continuous>    CULTURES:  Culture Results:   Moderate Enterobacter cloacae complex  Moderate Klebsiella pneumoniae / variicola  Accompanied by normal respiratory aliza ( @ 17:29)  Culture Results:   No growth at 2 days. ( @ 15:24)    RADIOLOGY & ADDITIONAL TESTS:      ASSESSMENT:  59y Male with ICH/IVH s/p SOC crani POD #10 (20), s/p EVD placement    PLAN:  NEURO:  - neuro checks  - vitals checks  - pain control   - EVD at 0, monitor ICP and output  - cont Amantadine  - cont Keppra    CARDIOVASCULAR:  - -140  - cont Losartan, Amlodipine     PULMONARY:  - on room air, no issues    RENAL:  - IVL  - cont Proscar, Cardura     GI:  - TF via NGT  - rectal tube    HEME:  - H/H stable    ID:  - febrile overnight  - f/u saenz cx     ENDO:  - ISS, Lantus, Lispro     DVT PROPHYLAXIS:  [x] Venodynes                                [x] Heparin/Lovenox    DISPOSITION: ICU status, full code, dispo pending    d/w Dr. Blank    Assessment:  Present when checked    []  GCS  E   V  M     Heart Failure: []Acute, [] acute on chronic , []chronic  Heart Failure:  [] Diastolic (HFpEF), [] Systolic (HFrEF), []Combined (HFpEF and HFrEF), [] RHF, [] Pulm HTN, [] Other    [] SARAH, [] ATN, [] AIN, [] other  [] CKD1, [] CKD2, [] CKD 3, [] CKD 4, [] CKD 5, []ESRD    Encephalopathy: [] Metabolic, [] Hepatic, [] toxic, [] Neurological, [] Other    Abnormal Nurtitional Status: [] malnurtition (see nutrition note), [ ]underweight: BMI < 19, [] morbid obesity: BMI >40, [] Cachexia    [] Sepsis  [] hypovolemic shock,[] cardiogenic shock, [] hemorrhagic shock, [] neuogenic shock  [] Acute Respiratory Failure  []Cerebral edema, [] Brain compression/ herniation,   [] Functional quadriplegia  [] Acute blood loss anemia

## 2020-08-14 NOTE — OCCUPATIONAL THERAPY INITIAL EVALUATION ADULT - BALANCE DISTURBANCE, IDENTIFIED IMPAIRMENT CONTRIBUTE, REHAB EVAL
impaired motor control/impaired postural control/impaired coordination/decreased ROM/impaired sensory feedback/abnormal muscle tone/decreased strength

## 2020-08-14 NOTE — OCCUPATIONAL THERAPY INITIAL EVALUATION ADULT - IMPAIRED TRANSFERS: SIT/STAND, REHAB EVAL
impaired motor control/impaired sensory feedback/decreased strength/impaired balance/abnormal muscle tone/decreased ROM/cognition/impaired coordination/narrow base of support/impaired postural control

## 2020-08-14 NOTE — PROGRESS NOTE ADULT - ASSESSMENT
59y/M with  1.	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	Hepatitis B  3.	dyslipidemia     PLAN:   NEURO: neurological improvement  EVD at 0 cm H20 open to drain  seizure prophylaxis: levetiracetam as per NS  REHAB:  physical therapy evaluation and management    EARLY MOB:  HOB up    PULM:  on room air, good oxygen saturations  CARDIO:  SBP goal 100-140mm Hg, MTP changed to labetolol 200 mg BID, norvasc 10 mg OD, losartan 100 mg OD, clonidine 0.1 mg BID (with hold parameters for clonidine)  ENDO:  Blood sugar goals 140-180 mg/dL, continue insulin sliding, continue statins; insulin glargine 20 nutritional 4-4-4-4  GI:  PPI for GI prophylaxis  DIET: jevity 1.2 with metamucil  RENAL:  negative balance (goal euvolemia)  HEM/ONC: no coagulopathy, no h/o antiplatelet / anticoagulant use  VTE Prophylaxis: SCDs, SQL   ID: febrile 104 at noon, leukocytosis (procalcitonin pending) - vancomycin, ceftriaxone; repeat cultures, follow-up with ID (standing tylenol, ibuprofen with ice packs and cooling blankets); will review to rule out drug fever; if persistent, abdominal/liver ultrasound  Social: updated wife yesterday; son Perry updated ([phone]).  2 daughters Shantell  and Tammie ; wife name is Julián Sahu    CORE MEASURES:  1.  NIHSS =   2.  ICH Score = 4  3.  VTE prophylaxis: [ ] administered within 24h of admission OR [x] reason not done - fresh bleed  4.  Dysphagia screening: [X] performed before any oral meds / liquids / food 59y/M with  1.	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	Hepatitis B  3.	dyslipidemia     PLAN:   NEURO: neurological improvement  EVD at 0 cm H20 open to drain  seizure prophylaxis: levetiracetam as per NS  REHAB:  physical therapy evaluation and management    EARLY MOB:  HOB up    PULM:  on room air, good oxygen saturations  CARDIO:  SBP goal 100-140mm Hg, MTP changed to labetolol 200 mg BID, norvasc 10 mg OD, losartan 100 mg OD, clonidine 0.1 mg BID (with hold parameters for clonidine)  ENDO:  Blood sugar goals 140-180 mg/dL, continue insulin sliding, continue statins; insulin glargine 15 nutritional 4-4-4-4  GI:  PPI for GI prophylaxis  DIET: jevity 1.2 with metamucil  RENAL:  negative balance (goal euvolemia)  HEM/ONC: no coagulopathy, no h/o antiplatelet / anticoagulant use  VTE Prophylaxis: SCDs, SQL   ID: febrile 104 at noon, leukocytosis (procalcitonin pending) - vancomycin, ceftriaxone; repeat cultures, follow-up with ID (standing tylenol, ibuprofen with ice packs and cooling blankets); will review to rule out drug fever; if persistent, abdominal/liver ultrasound  Social: updated wife yesterday; son Perry updated ([phone]).  2 daughters Shantell  and Tammie ; wife name is Julián Sahu    CORE MEASURES:  1.  NIHSS =   2.  ICH Score = 4  3.  VTE prophylaxis: [ ] administered within 24h of admission OR [x] reason not done - fresh bleed  4.  Dysphagia screening: [X] performed before any oral meds / liquids / food

## 2020-08-14 NOTE — PHYSICAL THERAPY INITIAL EVALUATION ADULT - GENERAL OBSERVATIONS, REHAB EVAL
Pt presents in [facility]ei-supine, (+) telemetry, EVD (clamped by RN), (+) NGT off feed, ruff cath, consents to PT.

## 2020-08-15 LAB
ANION GAP SERPL CALC-SCNC: 11 MMOL/L — SIGNIFICANT CHANGE UP (ref 5–17)
BASOPHILS # BLD AUTO: 0 K/UL — SIGNIFICANT CHANGE UP (ref 0–0.2)
BASOPHILS NFR BLD AUTO: 0 % — SIGNIFICANT CHANGE UP (ref 0–2)
BUN SERPL-MCNC: 30 MG/DL — HIGH (ref 7–23)
CALCIUM SERPL-MCNC: 8 MG/DL — LOW (ref 8.4–10.5)
CHLORIDE SERPL-SCNC: 117 MMOL/L — HIGH (ref 96–108)
CO2 SERPL-SCNC: 24 MMOL/L — SIGNIFICANT CHANGE UP (ref 22–31)
CREAT SERPL-MCNC: 0.9 MG/DL — SIGNIFICANT CHANGE UP (ref 0.5–1.3)
EOSINOPHIL # BLD AUTO: 0 K/UL — SIGNIFICANT CHANGE UP (ref 0–0.5)
EOSINOPHIL NFR BLD AUTO: 0 % — SIGNIFICANT CHANGE UP (ref 0–6)
GLUCOSE BLDC GLUCOMTR-MCNC: 104 MG/DL — HIGH (ref 70–99)
GLUCOSE BLDC GLUCOMTR-MCNC: 109 MG/DL — HIGH (ref 70–99)
GLUCOSE BLDC GLUCOMTR-MCNC: 122 MG/DL — HIGH (ref 70–99)
GLUCOSE BLDC GLUCOMTR-MCNC: 150 MG/DL — HIGH (ref 70–99)
GLUCOSE BLDC GLUCOMTR-MCNC: 190 MG/DL — HIGH (ref 70–99)
GLUCOSE SERPL-MCNC: 142 MG/DL — HIGH (ref 70–99)
HCT VFR BLD CALC: 29.3 % — LOW (ref 39–50)
HGB BLD-MCNC: 9.3 G/DL — LOW (ref 13–17)
LACTATE CSF-MCNC: 6.3 MMOL/L — HIGH (ref 1.1–2.4)
LACTATE SERPL-SCNC: 1.1 MMOL/L — SIGNIFICANT CHANGE UP (ref 0.5–2)
LYMPHOCYTES # BLD AUTO: 2.07 K/UL — SIGNIFICANT CHANGE UP (ref 1–3.3)
LYMPHOCYTES # BLD AUTO: 6.9 % — LOW (ref 13–44)
MAGNESIUM SERPL-MCNC: 2.6 MG/DL — SIGNIFICANT CHANGE UP (ref 1.6–2.6)
MCHC RBC-ENTMCNC: 28.6 PG — SIGNIFICANT CHANGE UP (ref 27–34)
MCHC RBC-ENTMCNC: 31.7 GM/DL — LOW (ref 32–36)
MCV RBC AUTO: 90.2 FL — SIGNIFICANT CHANGE UP (ref 80–100)
MONOCYTES # BLD AUTO: 1.02 K/UL — HIGH (ref 0–0.9)
MONOCYTES NFR BLD AUTO: 3.4 % — SIGNIFICANT CHANGE UP (ref 2–14)
NEUTROPHILS # BLD AUTO: 26.85 K/UL — HIGH (ref 1.8–7.4)
NEUTROPHILS NFR BLD AUTO: 88.8 % — HIGH (ref 43–77)
NRBC # BLD: 0 /100 WBCS — SIGNIFICANT CHANGE UP (ref 0–0)
PHOSPHATE SERPL-MCNC: 3.8 MG/DL — SIGNIFICANT CHANGE UP (ref 2.5–4.5)
PLATELET # BLD AUTO: 192 K/UL — SIGNIFICANT CHANGE UP (ref 150–400)
POTASSIUM SERPL-MCNC: 3.4 MMOL/L — LOW (ref 3.5–5.3)
POTASSIUM SERPL-SCNC: 3.4 MMOL/L — LOW (ref 3.5–5.3)
PROCALCITONIN SERPL-MCNC: 0.24 NG/ML — HIGH (ref 0.02–0.1)
RBC # BLD: 3.25 M/UL — LOW (ref 4.2–5.8)
RBC # FLD: 14.3 % — SIGNIFICANT CHANGE UP (ref 10.3–14.5)
SODIUM SERPL-SCNC: 152 MMOL/L — HIGH (ref 135–145)
WBC # BLD: 29.93 K/UL — HIGH (ref 3.8–10.5)
WBC # FLD AUTO: 29.93 K/UL — HIGH (ref 3.8–10.5)

## 2020-08-15 PROCEDURE — 71045 X-RAY EXAM CHEST 1 VIEW: CPT | Mod: 26

## 2020-08-15 PROCEDURE — 99233 SBSQ HOSP IP/OBS HIGH 50: CPT

## 2020-08-15 PROCEDURE — 99291 CRITICAL CARE FIRST HOUR: CPT | Mod: 24

## 2020-08-15 RX ORDER — SODIUM CHLORIDE 9 MG/ML
500 INJECTION INTRAMUSCULAR; INTRAVENOUS; SUBCUTANEOUS ONCE
Refills: 0 | Status: COMPLETED | OUTPATIENT
Start: 2020-08-15 | End: 2020-08-15

## 2020-08-15 RX ORDER — NOREPINEPHRINE BITARTRATE/D5W 8 MG/250ML
0.05 PLASTIC BAG, INJECTION (ML) INTRAVENOUS
Qty: 8 | Refills: 0 | Status: DISCONTINUED | OUTPATIENT
Start: 2020-08-15 | End: 2020-08-16

## 2020-08-15 RX ORDER — SODIUM CHLORIDE 9 MG/ML
1000 INJECTION, SOLUTION INTRAVENOUS
Refills: 0 | Status: DISCONTINUED | OUTPATIENT
Start: 2020-08-15 | End: 2020-08-15

## 2020-08-15 RX ORDER — VANCOMYCIN HCL 1 G
125 VIAL (EA) INTRAVENOUS EVERY 6 HOURS
Refills: 0 | Status: DISCONTINUED | OUTPATIENT
Start: 2020-08-15 | End: 2020-08-15

## 2020-08-15 RX ORDER — POTASSIUM PHOSPHATE, MONOBASIC POTASSIUM PHOSPHATE, DIBASIC 236; 224 MG/ML; MG/ML
30 INJECTION, SOLUTION INTRAVENOUS ONCE
Refills: 0 | Status: COMPLETED | OUTPATIENT
Start: 2020-08-15 | End: 2020-08-15

## 2020-08-15 RX ORDER — POTASSIUM CHLORIDE 20 MEQ
40 PACKET (EA) ORAL EVERY 4 HOURS
Refills: 0 | Status: COMPLETED | OUTPATIENT
Start: 2020-08-15 | End: 2020-08-15

## 2020-08-15 RX ORDER — SODIUM CHLORIDE 9 MG/ML
1000 INJECTION INTRAMUSCULAR; INTRAVENOUS; SUBCUTANEOUS
Refills: 0 | Status: DISCONTINUED | OUTPATIENT
Start: 2020-08-15 | End: 2020-08-16

## 2020-08-15 RX ADMIN — CEFEPIME 100 MILLIGRAM(S): 1 INJECTION, POWDER, FOR SOLUTION INTRAMUSCULAR; INTRAVENOUS at 06:59

## 2020-08-15 RX ADMIN — LEVETIRACETAM 1000 MILLIGRAM(S): 250 TABLET, FILM COATED ORAL at 22:14

## 2020-08-15 RX ADMIN — CHLORHEXIDINE GLUCONATE 1 APPLICATION(S): 213 SOLUTION TOPICAL at 06:59

## 2020-08-15 RX ADMIN — Medication 650 MILLIGRAM(S): at 08:00

## 2020-08-15 RX ADMIN — ATORVASTATIN CALCIUM 40 MILLIGRAM(S): 80 TABLET, FILM COATED ORAL at 00:10

## 2020-08-15 RX ADMIN — Medication 40 MILLIEQUIVALENT(S): at 10:44

## 2020-08-15 RX ADMIN — Medication 1 DROP(S): at 06:58

## 2020-08-15 RX ADMIN — Medication 400 MILLIGRAM(S): at 06:58

## 2020-08-15 RX ADMIN — FINASTERIDE 5 MILLIGRAM(S): 5 TABLET, FILM COATED ORAL at 13:11

## 2020-08-15 RX ADMIN — INSULIN HUMAN 4 UNIT(S): 100 INJECTION, SOLUTION SUBCUTANEOUS at 07:14

## 2020-08-15 RX ADMIN — Medication 250 MILLIGRAM(S): at 03:48

## 2020-08-15 RX ADMIN — Medication 650 MILLIGRAM(S): at 01:15

## 2020-08-15 RX ADMIN — Medication 650 MILLIGRAM(S): at 02:00

## 2020-08-15 RX ADMIN — ENOXAPARIN SODIUM 40 MILLIGRAM(S): 100 INJECTION SUBCUTANEOUS at 00:10

## 2020-08-15 RX ADMIN — Medication 650 MILLIGRAM(S): at 13:10

## 2020-08-15 RX ADMIN — INSULIN GLARGINE 15 UNIT(S): 100 INJECTION, SOLUTION SUBCUTANEOUS at 07:14

## 2020-08-15 RX ADMIN — TENOFOVIR DISOPROXIL FUMARATE 300 MILLIGRAM(S): 300 TABLET, FILM COATED ORAL at 00:11

## 2020-08-15 RX ADMIN — Medication 1 PACKET(S): at 13:10

## 2020-08-15 RX ADMIN — Medication 40 MILLIEQUIVALENT(S): at 06:58

## 2020-08-15 RX ADMIN — CEFEPIME 100 MILLIGRAM(S): 1 INJECTION, POWDER, FOR SOLUTION INTRAMUSCULAR; INTRAVENOUS at 00:11

## 2020-08-15 RX ADMIN — SODIUM CHLORIDE 100 MILLILITER(S): 9 INJECTION INTRAMUSCULAR; INTRAVENOUS; SUBCUTANEOUS at 14:15

## 2020-08-15 RX ADMIN — Medication 4 MILLIGRAM(S): at 00:11

## 2020-08-15 RX ADMIN — Medication 400 MILLIGRAM(S): at 13:54

## 2020-08-15 RX ADMIN — LEVETIRACETAM 1000 MILLIGRAM(S): 250 TABLET, FILM COATED ORAL at 00:11

## 2020-08-15 RX ADMIN — SODIUM CHLORIDE 1000 MILLILITER(S): 9 INJECTION INTRAMUSCULAR; INTRAVENOUS; SUBCUTANEOUS at 02:40

## 2020-08-15 RX ADMIN — ATORVASTATIN CALCIUM 40 MILLIGRAM(S): 80 TABLET, FILM COATED ORAL at 22:14

## 2020-08-15 RX ADMIN — Medication 400 MILLIGRAM(S): at 07:30

## 2020-08-15 RX ADMIN — Medication 650 MILLIGRAM(S): at 18:15

## 2020-08-15 RX ADMIN — Medication 400 MILLIGRAM(S): at 13:10

## 2020-08-15 RX ADMIN — Medication 4 MILLIGRAM(S): at 22:14

## 2020-08-15 RX ADMIN — Medication 250 MILLIGRAM(S): at 15:00

## 2020-08-15 RX ADMIN — SODIUM CHLORIDE 75 MILLILITER(S): 9 INJECTION, SOLUTION INTRAVENOUS at 07:01

## 2020-08-15 RX ADMIN — Medication 1 DROP(S): at 13:11

## 2020-08-15 RX ADMIN — Medication 5.59 MICROGRAM(S)/KG/MIN: at 03:22

## 2020-08-15 RX ADMIN — CEFEPIME 100 MILLIGRAM(S): 1 INJECTION, POWDER, FOR SOLUTION INTRAMUSCULAR; INTRAVENOUS at 22:15

## 2020-08-15 RX ADMIN — Medication 1 DROP(S): at 00:12

## 2020-08-15 RX ADMIN — Medication 125 MILLIGRAM(S): at 13:11

## 2020-08-15 RX ADMIN — BROMOCRIPTINE MESYLATE 5 MILLIGRAM(S): 5 CAPSULE ORAL at 00:00

## 2020-08-15 RX ADMIN — Medication 400 MILLIGRAM(S): at 22:15

## 2020-08-15 RX ADMIN — TENOFOVIR DISOPROXIL FUMARATE 300 MILLIGRAM(S): 300 TABLET, FILM COATED ORAL at 22:14

## 2020-08-15 RX ADMIN — LEVETIRACETAM 1000 MILLIGRAM(S): 250 TABLET, FILM COATED ORAL at 10:44

## 2020-08-15 RX ADMIN — Medication 400 MILLIGRAM(S): at 00:09

## 2020-08-15 RX ADMIN — Medication 650 MILLIGRAM(S): at 07:00

## 2020-08-15 RX ADMIN — INSULIN HUMAN 4 UNIT(S): 100 INJECTION, SOLUTION SUBCUTANEOUS at 00:10

## 2020-08-15 RX ADMIN — Medication 125 MILLIGRAM(S): at 07:00

## 2020-08-15 RX ADMIN — Medication 650 MILLIGRAM(S): at 14:00

## 2020-08-15 RX ADMIN — ENOXAPARIN SODIUM 40 MILLIGRAM(S): 100 INJECTION SUBCUTANEOUS at 23:31

## 2020-08-15 RX ADMIN — POTASSIUM PHOSPHATE, MONOBASIC POTASSIUM PHOSPHATE, DIBASIC 83.33 MILLIMOLE(S): 236; 224 INJECTION, SOLUTION INTRAVENOUS at 14:51

## 2020-08-15 RX ADMIN — Medication 1 DROP(S): at 22:14

## 2020-08-15 RX ADMIN — CEFEPIME 100 MILLIGRAM(S): 1 INJECTION, POWDER, FOR SOLUTION INTRAMUSCULAR; INTRAVENOUS at 14:51

## 2020-08-15 RX ADMIN — Medication 400 MILLIGRAM(S): at 01:15

## 2020-08-15 RX ADMIN — Medication 650 MILLIGRAM(S): at 19:15

## 2020-08-15 NOTE — CHART NOTE - NSCHARTNOTEFT_GEN_A_CORE
patient had episode of hypotension around pm, resolved after fluid bolus    again around 2am SBP dropped 70s, MAP 50s, afebrile, got 750cc fluid will minimal improvement.    WBC = 29 (prev 24) despite antibiotics being broadened, c-diff was negative 2 days ago, will send lactate, pro ladarius, d/c feeds, start fluids, and treat empirically for cdiff

## 2020-08-15 NOTE — PROGRESS NOTE ADULT - ASSESSMENT
Chronic HBV, cerebellar hemorrhage s/p craniotomy, course c/b Enterobacter VAP s/p treatment, now with recurrent fever as well as ongoing diarrhea. ?central fevers ?leukemoid reaction 2/2 cerebellar hemorrhage; sputum cx with Klebsiella, Enterobacter, MSSA however without pneumonia on CXR--regardless should all be covered by cefepime; d/c PO vancomycin (C. diff testing negative 8/12); can check GI PCR panel and stool culture to assess for other causes of infectious diarrhea (arcadio as diarrhea began in community); while abdomen is benign, can consider CT AP for further evaluation of diarrhea; continue IV vancomycin and cefepime.  Dr. Hendricks/ID Team 1 to resume care Monday 8/17

## 2020-08-15 NOTE — PROGRESS NOTE ADULT - SUBJECTIVE AND OBJECTIVE BOX
INTERVAL HPI/OVERNIGHT EVENTS: Fever curve down; continued diarrhea--tube feeds held this AM; also with copious oropharyngeal secretions. Collateral from patient's wife (obtained via [employer] ): patient has postprandial diarrhea for the past 6 months.    ROS: UTO      ANTIBIOTICS/RELEVANT:    MEDICATIONS  (STANDING):  acetaminophen    Suspension .. 650 milliGRAM(s) Oral every 6 hours  artificial tears (preservative free) Ophthalmic Solution 1 Drop(s) Both EYES three times a day  atorvastatin 40 milliGRAM(s) Oral at bedtime  cefepime   IVPB 2000 milliGRAM(s) IV Intermittent every 8 hours  chlorhexidine 4% Liquid 1 Application(s) Topical <User Schedule>  dextrose 5%. 1000 milliLiter(s) (50 mL/Hr) IV Continuous <Continuous>  dextrose 50% Injectable 12.5 Gram(s) IV Push once  dextrose 50% Injectable 25 Gram(s) IV Push once  doxazosin 4 milliGRAM(s) Oral at bedtime  enoxaparin Injectable 40 milliGRAM(s) SubCutaneous every 24 hours  finasteride 5 milliGRAM(s) Oral daily  ibuprofen  Tablet. 400 milliGRAM(s) Oral every 8 hours  insulin glargine Injectable (LANTUS) 15 Unit(s) SubCutaneous every morning  insulin regular  human corrective regimen sliding scale.   SubCutaneous every 6 hours  insulin regular  human recombinant 4 Unit(s) SubCutaneous every 6 hours  levETIRAcetam  Solution 1000 milliGRAM(s) Oral two times a day  norepinephrine Infusion 0.05 MICROgram(s)/kG/Min (5.59 mL/Hr) IV Continuous <Continuous>  psyllium Powder 1 Packet(s) Oral daily  sodium chloride 0.9%. 1000 milliLiter(s) (100 mL/Hr) IV Continuous <Continuous>  tenofovir disoproxil fumarate (VIREAD) 300 milliGRAM(s) Oral every 24 hours  vancomycin  IVPB 1000 milliGRAM(s) IV Intermittent every 12 hours    MEDICATIONS  (PRN):  dextrose 40% Gel 15 Gram(s) Oral once PRN Blood Glucose LESS THAN 70 milliGRAM(s)/deciliter  glucagon  Injectable 1 milliGRAM(s) IntraMuscular once PRN Glucose LESS THAN 70 milligrams/deciliter  glucagon  Injectable 1 milliGRAM(s) IntraMuscular once PRN Glucose LESS THAN 70 milligrams/deciliter  hydrALAZINE Injectable 10 milliGRAM(s) IV Push every 2 hours PRN SBP > 140  sodium chloride 0.9% lock flush 10 milliLiter(s) IV Push every 1 hour PRN Pre/post blood products, medications, blood draw, and to maintain line patency        Vital Signs Last 24 Hrs  T(C): 36.9 (15 Aug 2020 14:45), Max: 37.8 (14 Aug 2020 19:00)  T(F): 98.4 (15 Aug 2020 14:45), Max: 100 (14 Aug 2020 19:00)  HR: 62 (15 Aug 2020 15:00) (54 - 88)  BP: 112/66 (15 Aug 2020 15:00) (78/45 - 143/62)  BP(mean): 84 (15 Aug 2020 15:00) (57 - 101)  RR: 22 (15 Aug 2020 15:00) (11 - 25)  SpO2: 92% (15 Aug 2020 15:00) (92% - 99%)    PHYSICAL EXAM:  Constitutional:Well-developed, well nourished  Eyes:HARLEY, EOMI  Ear/Nose/Throat: no oral lesion, no sinus tenderness on percussion	  Neck:no JVD, no lymphadenopathy, supple  Respiratory: CTA lew  Cardiovascular: S1S2 RRR, no murmurs  Gastrointestinal:soft, (+) BS, no HSM  Extremities:no e/e/c  Vascular: DP Pulse:	right normal; left normal      LABS:                        9.3    29.93 )-----------( 192      ( 15 Aug 2020 05:12 )             29.3     08-15    152<H>  |  117<H>  |  30<H>  ----------------------------<  142<H>  3.4<L>   |  24  |  0.90    Ca    8.0<L>      15 Aug 2020 05:12  Phos  3.8     08-15  Mg     2.6     08-15    TPro  6.3  /  Alb  3.3  /  TBili  0.4  /  DBili  <0.2  /  AST  26  /  ALT  60<H>  /  AlkPhos  54  08-14      Urinalysis Basic - ( 14 Aug 2020 13:16 )    Color: Yellow / Appearance: Clear / S.025 / pH: x  Gluc: x / Ketone: NEGATIVE  / Bili: Negative / Urobili: 0.2 E.U./dL   Blood: x / Protein: Trace mg/dL / Nitrite: NEGATIVE   Leuk Esterase: NEGATIVE / RBC: < 5 /HPF / WBC < 5 /HPF   Sq Epi: x / Non Sq Epi: x / Bacteria: Present /HPF        MICROBIOLOGY: Culture - Sputum . (20 @ 14:14)    Gram Stain:   Few epithelial cells  Numerous white blood cells  Numerous Gram Positive Rods  Moderate Gram Negative Diplococci  Few Gram positive cocci in pairs  Few Gram Negative Rods  Rare Yeast    Specimen Source: .Sputum Sputum    Culture Results:   Moderate Klebsiella pneumoniae / vericola Susceptibility to follow.  Moderate Enterobacter cloacae Susceptibility to follow.  Moderate Staphylococcus aureus Presumptive Methicillin susceptible  Confirmation to follow within 24 hrs. Susceptibility to follow.  Accompanied with Normal Respiratory Lynne        RADIOLOGY & ADDITIONAL STUDIES: CXR  without infiltrates

## 2020-08-15 NOTE — CONSULT NOTE ADULT - SUBJECTIVE AND OBJECTIVE BOX
60 yo male s/p decompression craniotomy for left cerebellar intraparenchymal hemorrhagic stroke. Patient has failed SLP trials due to risk for aspiration. Was intubated for 5 days due to neuro status. Patient currently NPO with NGT. Currently being worked up by Infectious Disease for positive sputum cultures and diarrhea. Wife present at bedside during exam. Patient able to follow commands. Wife states patient has never had a problem with his voice or swallowing prior to hospitalization. Reports his voice sounds hoarse.    MEDICATIONS  (STANDING):  acetaminophen    Suspension .. 650 milliGRAM(s) Oral every 6 hours  artificial tears (preservative free) Ophthalmic Solution 1 Drop(s) Both EYES three times a day  atorvastatin 40 milliGRAM(s) Oral at bedtime  cefepime   IVPB 2000 milliGRAM(s) IV Intermittent every 8 hours  chlorhexidine 4% Liquid 1 Application(s) Topical <User Schedule>  dextrose 5%. 1000 milliLiter(s) (50 mL/Hr) IV Continuous <Continuous>  dextrose 50% Injectable 12.5 Gram(s) IV Push once  dextrose 50% Injectable 25 Gram(s) IV Push once  doxazosin 4 milliGRAM(s) Oral at bedtime  enoxaparin Injectable 40 milliGRAM(s) SubCutaneous every 24 hours  finasteride 5 milliGRAM(s) Oral daily  ibuprofen  Tablet. 400 milliGRAM(s) Oral every 8 hours  insulin glargine Injectable (LANTUS) 15 Unit(s) SubCutaneous every morning  insulin regular  human corrective regimen sliding scale.   SubCutaneous every 6 hours  insulin regular  human recombinant 4 Unit(s) SubCutaneous every 6 hours  levETIRAcetam  Solution 1000 milliGRAM(s) Oral two times a day  norepinephrine Infusion 0.05 MICROgram(s)/kG/Min (5.59 mL/Hr) IV Continuous <Continuous>  psyllium Powder 1 Packet(s) Oral daily  sodium chloride 0.9%. 1000 milliLiter(s) (100 mL/Hr) IV Continuous <Continuous>  tenofovir disoproxil fumarate (VIREAD) 300 milliGRAM(s) Oral every 24 hours  vancomycin  IVPB 1000 milliGRAM(s) IV Intermittent every 12 hours    MEDICATIONS  (PRN):  dextrose 40% Gel 15 Gram(s) Oral once PRN Blood Glucose LESS THAN 70 milliGRAM(s)/deciliter  glucagon  Injectable 1 milliGRAM(s) IntraMuscular once PRN Glucose LESS THAN 70 milligrams/deciliter  glucagon  Injectable 1 milliGRAM(s) IntraMuscular once PRN Glucose LESS THAN 70 milligrams/deciliter  hydrALAZINE Injectable 10 milliGRAM(s) IV Push every 2 hours PRN SBP > 140  sodium chloride 0.9% lock flush 10 milliLiter(s) IV Push every 1 hour PRN Pre/post blood products, medications, blood draw, and to maintain line patency      Vital Signs Last 24 Hrs  T(C): 36.9 (15 Aug 2020 17:00), Max: 37.8 (15 Aug 2020 00:00)  T(F): 98.4 (15 Aug 2020 17:00), Max: 100 (15 Aug 2020 00:00)  HR: 75 (15 Aug 2020 19:00) (54 - 76)  BP: 132/77 (15 Aug 2020 19:00) (78/45 - 135/63)  BP(mean): 97 (15 Aug 2020 19:00) (57 - 97)  RR: 20 (15 Aug 2020 19:00) (11 - 28)  SpO2: 95% (15 Aug 2020 19:00) (92% - 99%)    Physical exam  Gen: Follows commands, voice soft and breathy, weak cough  Head: bifrontal craniotomy scar clean/dry/intact  Eyes: pupils pinpoint, EOM intact  Face: facial nerve intact  Oral cavity/oropharynx: moist mucous membranes, torus palatinus, tongue midline  Neck: soft, nontender, flat    Fiberoptic nasolaryngoscopy:  Nasal passages clear, nasopharynx clear, posterior oropharynx clear, base of tongue WNL, epiglottis sharp, pooling of secretions in right pyriformis-unable to clear, left vocal cord with complete immobility in paramedian position, right vocal cord normal mobility, incomplete glottic closure. Airway patent.                                               9.3                   Neurophils% (auto):   88.8   (08-15 @ 05:12):    29.93)-----------(192          Lymphocytes% (auto):  6.9                                           29.3                   Eosinphils% (auto):   0.0      Manual%: Neutrophils x    ; Lymphocytes x    ; Eosinophils x    ; Bands%: 0.9  ; Blasts x          08-15    152<H>  |  117<H>  |  30<H>  ----------------------------<  142<H>  3.4<L>   |  24  |  0.90    Ca    8.0<L>      15 Aug 2020 05:12  Phos  3.8     08-15  Mg     2.6     08-15    TPro  6.3  /  Alb  3.3  /  TBili  0.4  /  DBili  <0.2  /  AST  26  /  ALT  60<H>  /  AlkPhos  54  08-14          RECENT CULTURES:  08-14 @ 14:14 .Sputum Sputum     Moderate Klebsiella variicola Susceptibility to follow.  Moderate Enterobacter cloacae Susceptibility to follow.  Moderate Staphylococcus aureus Presumptive Methicillin susceptible  Confirmation to follow within 24 hrs. Susceptibility to follow.  Accompanied with Normal Respiratory Aliza    Few epithelial cells  Numerous white blood cells  Numerous Gram Positive Rods  Moderate Gram Negative Diplococci  Few Gram positive cocci in pairs  Few Gram Negative Rods  Rare Yeast    08-14 @ 13:25 .Blood Blood     No growth at 1 day.      08-14 @ 13:18 .CSF CSF     No growth to date    Rare White blood cells  No organisms seen    08-13 @ 15:58 .CSF CSF     No growth to date    No organisms seen  No WBC's seen.    08-13 @ 15:50 .Blood Blood     No growth at 2 days.      08-11 @ 17:29 .Sputum Sputum Enterobacter cloacae complex  Klebsiella species    Moderate Enterobacter cloacae complex  Moderate Klebsiella pneumoniae / variicola  Accompanied by normal respiratory aliza    Rare epithelial cells  Rare WBC's  Few Gram Negative Rods    08-11 @ 15:24 .Blood Blood-Peripheral     No growth at 4 days.      08-11 @ 09:11 .CSF CSF     No growth to date    No organisms seen  No White blood cells    60 yo male s/p decompression craniotomy for left cerebellar intraparenchymal hemorrhagic stroke, with exam showing complete immobility of the left vocal cord likely 2/2 to cerebellar stroke vs. traumatic intubation, not fully protecting airway, but can be intubated orally.    -Agree with NPO status per SLP  -May consider PEG as long term plan  -Recommend outpatient vocal cord augmentation when medically cleared  -Patient can see Dr. Musa at [phone]  -Please page ENT with questions or concerns

## 2020-08-15 NOTE — PROGRESS NOTE ADULT - SUBJECTIVE AND OBJECTIVE BOX
· Reason for Admission	Left cerebellar intraparenchymal hemorrhage	      · Subjective and Objective: 	  HPI:  59 year old male with PMH of HLD and unclear liver disease had acute onset of severe headache, dizziness and vomiting x1 today, was brought into [facility] with continued symptoms, as well as hypertension and multiple episodes of vomiting. Patient was intubated for airway protection with CT Head performed demonstrating a large left cerebellar hemorrhage. CTA Head/Neck performed is suggestive of underlying vascular pathology such as AVM. RIght frontal [place]ul[place] [facility] and [place] to [facility] for further work-up. Patient arrives to [facility] intubated, sedated and on Nicardipine drip. Patient's son provided history and denies any Aspirin or other anticoagulant use. Denies any prior history of hypertension, smoking, or ETOH abuse. (04 Aug 2020 05:01)    Hospital Course:   POD# 0 S/P cerebral angio, negative for AVM. s/p SOC craniectomy evacuation of cerebellar hematoma.   POD # 1:   overnight tachycardic, ekg without significant changes, lactate wnl, low grade fever, s/p tyl. bolus x 1L , NS increased to 100cc/hr; neuro improving.  EVD at 5 cmH2O   8/ POD#3, BD#4, Tmax 100.6, Pan Cx 7/5 NGTD, Vanc/Zosyn until 8/10, Vanc trough due today @ 6pm, EEG = no seizure, Lasix overnight for Pulm congest and tachycard, ruff placed for strict I&Os, pending anemia w/u, EVD @ 5, CPAP trials, Card GTT for SBP<140, Started Norvasc for BP optimization  :  :  S/P cerebral angio, negative for AVM. s/p SOC craniectomy evacuation of cerebellar hematoma. No significant events overnight. On CPAP this morning   8/10: fevers overnight, resolved this morning, Pancultured, (+) Enterbacter, on CPAP this AM   POD#7 SOC, BD # 8, EVD replaced (hard pass), Neuro exam stable, EVD @ 5cm H2O, Zosyn for sputum + enterobact, Plan to extubated today, HCT in AM   POD#8 s/p SOC, BD#9, s/p EVD replacement (hard pass on 8/10/20), neuro exam stable, EVD @5cm H2O. Zosyn for enterobacter in sputum. O/n EVD stopped draining, flushed distally and proximally, waveform poor, neuro exam remained stable throughout, ICPs <8 prior.     EVD soft pass yesterday, spiked temp today and re cultured o/w neuro exam stable  : EMIL overnight, neuro stable. Mucomyst for secretions overnight   8/15 EVD dc/d, temp spike to 104, pan culture, ID consulted, vanco and cefepime started       Vital Signs Last 24 Hrs  T(C): 37.8 (14 Aug 2020 19:00), Max: 40 (14 Aug 2020 11:00)  T(F): 100 (14 Aug 2020 19:00), Max: 104 (14 Aug 2020 11:00)  HR: 88 (14 Aug 2020 19:) (59 - 98)  BP: 137/76 (14 Aug 2020 19:00) (99/57 - 154/73)  BP(mean): 101 (14 Aug 2020 19:) (71 - 118)  RR: 25 (14 Aug 2020 19:) (13 - 27)  SpO2: 95% (14 Aug 2020 19:00) (92% - 100%)      I&O's Summary    13 Aug 2020 07:01  -  14 Aug 2020 07:00  --------------------------------------------------------  IN: 1785 mL / OUT: 2695 mL / NET: -910 mL    14 Aug 2020 07:01  -  15 Aug 2020 01:24  --------------------------------------------------------  IN: 800 mL / OUT: 515 mL / NET: 285 mL          PHYSICAL EXAM:  Gen: laying in hospital bed, NAD  Neuro: Awake and alert, OE spont, FC  CN II-XII: PERRL, EOMI  Motor: MAEx4 with good strength  Crani site C/D/I    TUBES/LINES:  [] CVC  [] A-line  [] Lumbar Drain  [] Ventriculostomy  [] Other    DIET:  [] NPO  [] Mechanical  [x] Tube feeds    LABS:                                   10.7   21.65 )-----------( 220      ( 14 Aug 2020 04:28 )             32.8       Urinalysis Basic - ( 14 Aug 2020 13:16 )    Color: Yellow / Appearance: Clear / S.025 / pH: x  Gluc: x / Ketone: NEGATIVE  / Bili: Negative / Urobili: 0.2 E.U./dL   Blood: x / Protein: Trace mg/dL / Nitrite: NEGATIVE   Leuk Esterase: NEGATIVE / RBC: < 5 /HPF / WBC < 5 /HPF   Sq Epi: x / Non Sq Epi: x / Bacteria: Present /HPF              Culture - Sputum (collected 14 Aug 2020 14:14)  Source: .Sputum Sputum  Gram Stain (14 Aug 2020 17:44):    Few epithelial cells    Numerous white blood cells    Numerous Gram Positive Rods    Moderate Gram Negative Diplococci    Few Gram positive cocci in pairs    Few Gram Negative Rods    Rare Yeast    Culture - CSF with Gram Stain (collected 14 Aug 2020 13:18)  Source: .CSF CSF  Gram Stain (14 Aug 2020 13:33):    Rare White blood cells    No organisms seen    Culture - CSF with Gram Stain (collected 13 Aug 2020 15:58)  Source: .CSF CSF  Gram Stain (13 Aug 2020 16:20):    No organisms seen    No WBC's seen.    Culture - Blood (collected 13 Aug 2020 15:50)  Source: .Blood Blood  Preliminary Report (14 Aug 2020 16:01):    No growth at 1 day.    Culture - Blood (collected 13 Aug 2020 15:50)  Source: .Blood Blood  Preliminary Report (14 Aug 2020 16:01):    No growth at 1 day.    149<H>  |  113<H>  |  25<H>  ----------------------------<  149<H>  3.9   |  28  |  0.77    Ca    8.6      14 Aug 2020 04:28  Phos  3.5     08-14  Mg     2.6     -14    TPro  6.3  /  Alb  3.3  /  TBili  0.4  /  DBili  <0.2  /  AST  26  /  ALT  60<H>  /  AlkPhos  54  14    CULTURES:  Culture Results:   Moderate Enterobacter cloacae complex  Moderate Klebsiella pneumoniae / variicola  Accompanied by normal respiratory aliza ( @ 17:29)  Culture Results:   No growth at 2 days. ( @ 15:24)        MEDICATIONS  (STANDING):  acetaminophen    Suspension .. 650 milliGRAM(s) Oral every 6 hours  amLODIPine   Tablet 10 milliGRAM(s) Oral daily  artificial tears (preservative free) Ophthalmic Solution 1 Drop(s) Both EYES three times a day  atorvastatin 40 milliGRAM(s) Oral at bedtime  cefepime   IVPB 2000 milliGRAM(s) IV Intermittent every 8 hours  chlorhexidine 4% Liquid 1 Application(s) Topical <User Schedule>  cloNIDine 0.1 milliGRAM(s) Oral two times a day  dextrose 5%. 1000 milliLiter(s) (50 mL/Hr) IV Continuous <Continuous>  dextrose 50% Injectable 12.5 Gram(s) IV Push once  dextrose 50% Injectable 25 Gram(s) IV Push once  doxazosin 4 milliGRAM(s) Oral at bedtime  enoxaparin Injectable 40 milliGRAM(s) SubCutaneous every 24 hours  finasteride 5 milliGRAM(s) Oral daily  ibuprofen  Tablet. 400 milliGRAM(s) Oral every 8 hours  insulin glargine Injectable (LANTUS) 15 Unit(s) SubCutaneous every morning  insulin regular  human corrective regimen sliding scale.   SubCutaneous every 6 hours  insulin regular  human recombinant 4 Unit(s) SubCutaneous every 6 hours  labetalol 200 milliGRAM(s) Enteral Tube two times a day  levETIRAcetam  Solution 1000 milliGRAM(s) Oral two times a day  losartan 100 milliGRAM(s) Oral daily  psyllium Powder 1 Packet(s) Oral daily  tenofovir disoproxil fumarate (VIREAD) 300 milliGRAM(s) Oral every 24 hours  vancomycin  IVPB 1000 milliGRAM(s) IV Intermittent every 12 hours    RADIOLOGY & ADDITIONAL TESTS:      ASSESSMENT:  59y Male with ICH/IVH s/p SOC crani POD #10 (20), s/p EVD placement    PLAN:  NEURO:  - neuro checks  - vitals checks  - pain control   - cont Amantadine  - cont Keppra    CARDIOVASCULAR:  - -140  - cont Losartan, Amlodipine     PULMONARY:  - on room air, no issues    RENAL:  - IVL  - cont Proscar, Cardura     GI:  - TF via NGT  - rectal tube    HEME:  - H/H stable    ID:  - appreciate ID input, will resume antibiotic and f/u work up   - f/u pan cx     ENDO:  - ISS, Lantus, Lispro     DVT PROPHYLAXIS:  [x] Venodynes                                [x] Heparin/Lovenox    DISPOSITION: ICU status, full code, dispo pending

## 2020-08-16 LAB
-  AMIKACIN: SIGNIFICANT CHANGE UP
-  AMPICILLIN/SULBACTAM: SIGNIFICANT CHANGE UP
-  AMPICILLIN/SULBACTAM: SIGNIFICANT CHANGE UP
-  AMPICILLIN: SIGNIFICANT CHANGE UP
-  AMPICILLIN: SIGNIFICANT CHANGE UP
-  AZTREONAM: SIGNIFICANT CHANGE UP
-  CEFAZOLIN: SIGNIFICANT CHANGE UP
-  CEFEPIME: SIGNIFICANT CHANGE UP
-  CEFEPIME: SIGNIFICANT CHANGE UP
-  CEFOTAXIME: SIGNIFICANT CHANGE UP
-  CEFOXITIN: SIGNIFICANT CHANGE UP
-  CEFTAZIDIME: SIGNIFICANT CHANGE UP
-  CEFTRIAXONE: SIGNIFICANT CHANGE UP
-  CEFTRIAXONE: SIGNIFICANT CHANGE UP
-  CEFUROXIME: SIGNIFICANT CHANGE UP
-  CIPROFLOXACIN: SIGNIFICANT CHANGE UP
-  CIPROFLOXACIN: SIGNIFICANT CHANGE UP
-  CLINDAMYCIN: SIGNIFICANT CHANGE UP
-  ERTAPENEM: SIGNIFICANT CHANGE UP
-  ERYTHROMYCIN: SIGNIFICANT CHANGE UP
-  GENTAMICIN: SIGNIFICANT CHANGE UP
-  GENTAMICIN: SIGNIFICANT CHANGE UP
-  LEVOFLOXACIN: SIGNIFICANT CHANGE UP
-  LINEZOLID: SIGNIFICANT CHANGE UP
-  MEROPENEM: SIGNIFICANT CHANGE UP
-  MEROPENEM: SIGNIFICANT CHANGE UP
-  OXACILLIN: SIGNIFICANT CHANGE UP
-  PIPERACILLIN/TAZOBACTAM: SIGNIFICANT CHANGE UP
-  PIPERACILLIN/TAZOBACTAM: SIGNIFICANT CHANGE UP
-  RIFAMPIN: SIGNIFICANT CHANGE UP
-  TIGECYCLINE: SIGNIFICANT CHANGE UP
-  TOBRAMYCIN: SIGNIFICANT CHANGE UP
-  TOBRAMYCIN: SIGNIFICANT CHANGE UP
-  TRIMETHOPRIM/SULFAMETHOXAZOLE: SIGNIFICANT CHANGE UP
-  VANCOMYCIN: SIGNIFICANT CHANGE UP
ANION GAP SERPL CALC-SCNC: 9 MMOL/L — SIGNIFICANT CHANGE UP (ref 5–17)
BUN SERPL-MCNC: 21 MG/DL — SIGNIFICANT CHANGE UP (ref 7–23)
CALCIUM SERPL-MCNC: 8 MG/DL — LOW (ref 8.4–10.5)
CHLORIDE SERPL-SCNC: 122 MMOL/L — HIGH (ref 96–108)
CO2 SERPL-SCNC: 21 MMOL/L — LOW (ref 22–31)
CREAT SERPL-MCNC: 0.7 MG/DL — SIGNIFICANT CHANGE UP (ref 0.5–1.3)
CULTURE RESULTS: SIGNIFICANT CHANGE UP
GLUCOSE BLDC GLUCOMTR-MCNC: 102 MG/DL — HIGH (ref 70–99)
GLUCOSE BLDC GLUCOMTR-MCNC: 102 MG/DL — HIGH (ref 70–99)
GLUCOSE BLDC GLUCOMTR-MCNC: 88 MG/DL — SIGNIFICANT CHANGE UP (ref 70–99)
GLUCOSE BLDC GLUCOMTR-MCNC: 91 MG/DL — SIGNIFICANT CHANGE UP (ref 70–99)
GLUCOSE SERPL-MCNC: 110 MG/DL — HIGH (ref 70–99)
HCT VFR BLD CALC: 27.5 % — LOW (ref 39–50)
HGB BLD-MCNC: 8.8 G/DL — LOW (ref 13–17)
MAGNESIUM SERPL-MCNC: 2.8 MG/DL — HIGH (ref 1.6–2.6)
MCHC RBC-ENTMCNC: 28.9 PG — SIGNIFICANT CHANGE UP (ref 27–34)
MCHC RBC-ENTMCNC: 32 GM/DL — SIGNIFICANT CHANGE UP (ref 32–36)
MCV RBC AUTO: 90.2 FL — SIGNIFICANT CHANGE UP (ref 80–100)
METHOD TYPE: SIGNIFICANT CHANGE UP
NRBC # BLD: 0 /100 WBCS — SIGNIFICANT CHANGE UP (ref 0–0)
ORGANISM # SPEC MICROSCOPIC CNT: SIGNIFICANT CHANGE UP
PHOSPHATE SERPL-MCNC: 2.4 MG/DL — LOW (ref 2.5–4.5)
PLATELET # BLD AUTO: 156 K/UL — SIGNIFICANT CHANGE UP (ref 150–400)
POTASSIUM SERPL-MCNC: 3.5 MMOL/L — SIGNIFICANT CHANGE UP (ref 3.5–5.3)
POTASSIUM SERPL-SCNC: 3.5 MMOL/L — SIGNIFICANT CHANGE UP (ref 3.5–5.3)
RBC # BLD: 3.05 M/UL — LOW (ref 4.2–5.8)
RBC # FLD: 15 % — HIGH (ref 10.3–14.5)
SODIUM SERPL-SCNC: 152 MMOL/L — HIGH (ref 135–145)
SPECIMEN SOURCE: SIGNIFICANT CHANGE UP
WBC # BLD: 26.68 K/UL — HIGH (ref 3.8–10.5)
WBC # FLD AUTO: 26.68 K/UL — HIGH (ref 3.8–10.5)

## 2020-08-16 PROCEDURE — 99291 CRITICAL CARE FIRST HOUR: CPT | Mod: 24

## 2020-08-16 PROCEDURE — 71045 X-RAY EXAM CHEST 1 VIEW: CPT | Mod: 26

## 2020-08-16 PROCEDURE — 74177 CT ABD & PELVIS W/CONTRAST: CPT | Mod: 26

## 2020-08-16 PROCEDURE — 99232 SBSQ HOSP IP/OBS MODERATE 35: CPT

## 2020-08-16 RX ORDER — ACETAMINOPHEN 500 MG
650 TABLET ORAL EVERY 6 HOURS
Refills: 0 | Status: DISCONTINUED | OUTPATIENT
Start: 2020-08-16 | End: 2020-09-02

## 2020-08-16 RX ORDER — POTASSIUM CHLORIDE 20 MEQ
40 PACKET (EA) ORAL ONCE
Refills: 0 | Status: COMPLETED | OUTPATIENT
Start: 2020-08-16 | End: 2020-08-16

## 2020-08-16 RX ORDER — IOHEXOL 300 MG/ML
30 INJECTION, SOLUTION INTRAVENOUS ONCE
Refills: 0 | Status: COMPLETED | OUTPATIENT
Start: 2020-08-16 | End: 2020-08-16

## 2020-08-16 RX ORDER — POTASSIUM PHOSPHATE, MONOBASIC POTASSIUM PHOSPHATE, DIBASIC 236; 224 MG/ML; MG/ML
15 INJECTION, SOLUTION INTRAVENOUS ONCE
Refills: 0 | Status: COMPLETED | OUTPATIENT
Start: 2020-08-16 | End: 2020-08-16

## 2020-08-16 RX ORDER — SODIUM CHLORIDE 9 MG/ML
1000 INJECTION INTRAMUSCULAR; INTRAVENOUS; SUBCUTANEOUS
Refills: 0 | Status: DISCONTINUED | OUTPATIENT
Start: 2020-08-16 | End: 2020-08-18

## 2020-08-16 RX ORDER — METOPROLOL TARTRATE 50 MG
12.5 TABLET ORAL EVERY 12 HOURS
Refills: 0 | Status: DISCONTINUED | OUTPATIENT
Start: 2020-08-16 | End: 2020-09-02

## 2020-08-16 RX ADMIN — CEFEPIME 100 MILLIGRAM(S): 1 INJECTION, POWDER, FOR SOLUTION INTRAMUSCULAR; INTRAVENOUS at 04:59

## 2020-08-16 RX ADMIN — Medication 12.5 MILLIGRAM(S): at 19:04

## 2020-08-16 RX ADMIN — Medication 650 MILLIGRAM(S): at 06:39

## 2020-08-16 RX ADMIN — CHLORHEXIDINE GLUCONATE 1 APPLICATION(S): 213 SOLUTION TOPICAL at 06:39

## 2020-08-16 RX ADMIN — Medication 1 PACKET(S): at 11:49

## 2020-08-16 RX ADMIN — Medication 400 MILLIGRAM(S): at 06:39

## 2020-08-16 RX ADMIN — TENOFOVIR DISOPROXIL FUMARATE 300 MILLIGRAM(S): 300 TABLET, FILM COATED ORAL at 21:56

## 2020-08-16 RX ADMIN — Medication 12.5 MILLIGRAM(S): at 11:49

## 2020-08-16 RX ADMIN — Medication 250 MILLIGRAM(S): at 02:09

## 2020-08-16 RX ADMIN — Medication 650 MILLIGRAM(S): at 21:57

## 2020-08-16 RX ADMIN — LEVETIRACETAM 1000 MILLIGRAM(S): 250 TABLET, FILM COATED ORAL at 11:50

## 2020-08-16 RX ADMIN — Medication 650 MILLIGRAM(S): at 02:06

## 2020-08-16 RX ADMIN — Medication 40 MILLIEQUIVALENT(S): at 12:08

## 2020-08-16 RX ADMIN — Medication 650 MILLIGRAM(S): at 05:17

## 2020-08-16 RX ADMIN — CEFEPIME 100 MILLIGRAM(S): 1 INJECTION, POWDER, FOR SOLUTION INTRAMUSCULAR; INTRAVENOUS at 15:02

## 2020-08-16 RX ADMIN — Medication 650 MILLIGRAM(S): at 00:05

## 2020-08-16 RX ADMIN — Medication 1 DROP(S): at 05:18

## 2020-08-16 RX ADMIN — INSULIN HUMAN 4 UNIT(S): 100 INJECTION, SOLUTION SUBCUTANEOUS at 05:19

## 2020-08-16 RX ADMIN — ENOXAPARIN SODIUM 40 MILLIGRAM(S): 100 INJECTION SUBCUTANEOUS at 21:57

## 2020-08-16 RX ADMIN — LEVETIRACETAM 1000 MILLIGRAM(S): 250 TABLET, FILM COATED ORAL at 21:52

## 2020-08-16 RX ADMIN — Medication 400 MILLIGRAM(S): at 04:58

## 2020-08-16 RX ADMIN — INSULIN HUMAN 4 UNIT(S): 100 INJECTION, SOLUTION SUBCUTANEOUS at 00:05

## 2020-08-16 RX ADMIN — Medication 4 MILLIGRAM(S): at 21:52

## 2020-08-16 RX ADMIN — Medication 10 MILLIGRAM(S): at 19:40

## 2020-08-16 RX ADMIN — Medication 250 MILLIGRAM(S): at 15:02

## 2020-08-16 RX ADMIN — FINASTERIDE 5 MILLIGRAM(S): 5 TABLET, FILM COATED ORAL at 11:49

## 2020-08-16 RX ADMIN — POTASSIUM PHOSPHATE, MONOBASIC POTASSIUM PHOSPHATE, DIBASIC 63.75 MILLIMOLE(S): 236; 224 INJECTION, SOLUTION INTRAVENOUS at 06:39

## 2020-08-16 RX ADMIN — CEFEPIME 100 MILLIGRAM(S): 1 INJECTION, POWDER, FOR SOLUTION INTRAMUSCULAR; INTRAVENOUS at 21:57

## 2020-08-16 RX ADMIN — IOHEXOL 30 MILLILITER(S): 300 INJECTION, SOLUTION INTRAVENOUS at 15:01

## 2020-08-16 RX ADMIN — Medication 1 DROP(S): at 15:02

## 2020-08-16 RX ADMIN — ATORVASTATIN CALCIUM 40 MILLIGRAM(S): 80 TABLET, FILM COATED ORAL at 21:56

## 2020-08-16 RX ADMIN — Medication 650 MILLIGRAM(S): at 23:35

## 2020-08-16 RX ADMIN — Medication 1 DROP(S): at 21:56

## 2020-08-16 NOTE — PROGRESS NOTE ADULT - SUBJECTIVE AND OBJECTIVE BOX
=============================================   NEUROCRITICAL CARE ATTENDING NOTE (2020)  =============================================    VEDA WARREN   MRN-[id number]  HPI: 59M with dyslipidemia, h/o Hep B infection, presented with acute onset severe HA, dizziness, vomiting - brought to [facility] where CT showed L cerebellar hemorrhage.  Intubated for airway protection.  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  R EVD inserted, transferred to [facility] for further management.  Given mannitol 25G in [facility].    COURSE IN THE HOSPITAL:   bleed   admitted to [facility], 23.4 given x1; OR for SOC evacuation of ICH   remained intubated overnight   tachycardia overnight, Tmax 38.4, ?PNA, ABx    Tmax 38.1 diuresed overnight 1.8L.     No significant events overnight.  CPAP this morning   Tmax 39.7 (enterobacter sputum, on zosyn)  08/10 Trial of CPAP   Neurologically stable, for extubation.   Extubated.  Neurologically stable.   Neurologically stable.   Febrile overnight.  Suctioned for secretions.  Continued neurological improvement.  08/15 Hypotensive overnight, resolved this morning.  Neurologically stable.  Continued diarrhea.    Past Medical History: Hepatitis B HLD (hyperlipidemia)  Allergies:  No Known Allergies  Home meds:   ·	Omega-3 350 mg oral capsule: 1 cap(s) orally once a day  ·	tenofovir disoproxil fumarate 300 mg oral tablet: 1 tab(s) orally once a day    Current Meds:      PHYSICAL EXAMINATION    ICU Vital Signs Last 24 Hrs  T(C): 36.4 (16 Aug 2020 05:53), Max: 37.2 (16 Aug 2020 00:32)  T(F): 97.6 (16 Aug 2020 05:53), Max: 98.9 (16 Aug 2020 00:32)  HR: 88 (16 Aug 2020 07:00) (60 - 88)  BP: 136/69 (16 Aug 2020 07:00) (104/59 - 137/64)  BP(mean): 97 (16 Aug 2020 07:00) (77 - 97)  RR: 11 (16 Aug 2020 07:00) (11 - 28)  SpO2: 97% (16 Aug 2020 07:00) (92% - 98%)    NEUROLOGIC EXAMINATION:    -opens eyes spontaneously today, pupils 3 mm reactive bilaterally, improving [id number] (L > R), hypophonic, cerebellar dysarthria, tongue midline, obeys second order commands UE/LE, sits up with assistance for physiotherapy  EENT:  anicteric  CARDIOVASCULAR: (+) S1 S2, normal rate and regular rhythm  PULMONARY: clear to auscultation bilaterally  ABDOMEN: soft, nontender with normoactive bowel sounds  EXTREMITIES: no edema  SKIN: no rash          HbA1C = 5.6 ()  LDL = 185 ()   HDL = 36 (-) TG = 252 ()  TSH = 0.460 ()    C diff negative  Stool cultures and PCR pending    Culture - Sputum . (20 @ 14:14)    Gram Stain:   Few epithelial cells  Numerous white blood cells  Numerous Gram Positive Rods  Moderate Gram Negative Diplococci  Few Gram positive cocci in pairs  Few Gram Negative Rods  Rare Yeast    Specimen Source: .Sputum Sputum    Culture Results:   Moderate Klebsiella variicola Susceptibility to follow.  Moderate Enterobacter cloacae Susceptibility to follow.  Moderate Staphylococcus aureus Presumptive Methicillin susceptible  Confirmation to follow within 24 hrs. Susceptibility to follow.  Accompanied with Normal Respiratory Aliza    Culture - CSF with Gram Stain (collected 20 @ 15:58)  Source: .CSF CSF  Gram Stain (20 @ 16:20):    No organisms seen    No WBC's seen.  CSF lactate 6.3  CSF WBC:RBC = 0.002    Culture - Blood (collected 20 @ 15:50)  Source: .Blood Blood  Preliminary Report (20 @ 04:01):    No growth at 12 hours    Culture - Blood (collected 20 @ 15:50)  Source: .Blood Blood  Preliminary Report (20 @ 04:01):    No growth at 12 hours    Culture - Sputum . (20 @ 17:29)    -  Trimethoprim/Sulfamethoxazole: S <=0.5/9.5    -  Trimethoprim/Sulfamethoxazole: S <=0.5/9.5    Gram Stain:   Rare epithelial cells  Rare WBC's  Few Gram Negative Rods    -  Ampicillin: R >16 These ampicillin results predict results for amoxicillin    -  Ampicillin: S <=8 These ampicillin results predict results for amoxicillin    -  Ampicillin/Sulbactam: S <=4/2 Enterobacter, Citrobacter, and Serratia may develop resistance during prolonged therapy (3-4 days)    -  Ampicillin/Sulbactam: R >16/8 Enterobacter, Citrobacter, and Serratia may develop resistance during prolonged therapy (3-4 days)    -  Cefazolin: R >16 Enterobacter, Citrobacter, and Serratia may develop resistance during prolonged therapy (3-4 days)    -  Cefazolin: S <=2 Enterobacter, Citrobacter, and Serratia may develop resistance during prolonged therapy (3-4 days)    -  Cefepime: S <=2    -  Ceftriaxone: R 32 Enterobacter, Citrobacter, and Serratia may develop resistance during prolonged therapy    -  Ceftriaxone: S <=1 Enterobacter, Citrobacter, and Serratia may develop resistance during prolonged therapy    -  Ciprofloxacin: S <=0.25    -  Ciprofloxacin: S <=0.25    -  Gentamicin: S <=2    -  Gentamicin: S <=2    -  Meropenem: S <=1    -  Piperacillin/Tazobactam: S 16    -  Piperacillin/Tazobactam: S <=8    -  Tobramycin: S <=2    -  Tobramycin: S <=2    Specimen Source: .Sputum Sputum    Culture Results:   Moderate Enterobacter cloacae complex  Moderate Klebsiella pneumoniae / variicola  Accompanied by normal respiratory aliza    Organism Identification: Enterobacter cloacae complex  Klebsiella species    Organism: Enterobacter cloacae complex    Organism: Klebsiella species    Method Type: MARKELL    Method Type: MARKELL    Bacteriology:   sputum: enterobacter cloacae, klebsiella pneumoniae    CSF no organisms   sputum CS: enterobacter cloacae S: piperacillin/tazobactam    CSF NGTD   Blood CS NG5D x2   UA NEG    CSF studies:    L   *** [id number] *** %N31 %L5     Neuroimagin/11 CT head:  Right frontal EVD catheter as above. The ventricles are stable in size from prior examination. No new or increasing intracranial hemorrhage or mass effect.  08/10 CT head:  Compared to 2020, there is improving intraventricular hemorrhage, resolution of intraventricular gas, and mild decrease in ventricular size. Postsurgical change and edema in the posterior fossa is approximately stable.   CT head:  post-surgical changes, stable HCP  Other imagin/04 LE doppler: NEG   CXR:  clear   CXR:  mild atelectasis L lung base  08/10 CXR:  Small left pleural effusion, unchanged.    IV FLUIDS: IV NS at 100 mL/hour  DRIPS:  DIET: NG feeds held (ENT left vocal cord complete immobility)     EVD at 7cm H20  35cc/hr   EVD at 5cm H20 ICP 2-5 68cc/24h   EVD at 5cm H20    EVD at 5cm H20 ICP 1-3 202cc/24h     EVD at 5 cm H20 ICP 1-4 output 194cc/24h   EVD at 5 cm H20 ICP 0 to 5 output 280cc/24h  08/10 EVD at 5 cm H20 ICP 5 to 10 output 5-20cc/h   EVD at 5 cm H20 ICP < 10 output 167cc/24h   EVD at 0 cm H20 ICP 1-6 output 5-20 cc/h   EVD at 0 cm H20 ICP < 6 (flattened waveform occasionally) output 210 cc/24 h   EVD removed    CODE STATUS:  Full Code                       GOALS OF CARE:  aggressive                      DISPOSITION:  ICU  ICH Score on admission  = 4, NIHSS

## 2020-08-16 NOTE — PROGRESS NOTE ADULT - ASSESSMENT
59y/M with  1.	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	Hepatitis B  3.	dyslipidemia     PLAN:   NEURO: neurologically stable  seizure prophylaxis: levetiracetam as per NS  REHAB:  physical therapy evaluation and management    EARLY MOB:  HOB up    PULM:  on room air, good oxygen saturations  CARDIO:  SBP goal 100-140mm Hg, norepinephrine infusion stopped  ENDO:  Blood sugar goals 140-180 mg/dL, continue insulin sliding, continue statins; insulin glargine 15 nutritional 4-4-4-4  GI:  PPI for GI prophylaxis  DIET: held, consult for PEG/J tube  RENAL:  net positive (resuscitation for sepsis)  HEM/ONC: no coagulopathy, no h/o antiplatelet / anticoagulant use  VTE Prophylaxis: SCDs, SQL   ID: defeversing, leukocytosis; ID recommendations:  vancomycin, cefepime; pending GI workup (abdominal CT, stool cultures)  Social: updated wife yesterday; son Perry updated ([phone]).  2 daughters Shantell  and Tammie ; wife name is Julián Sahu    CORE MEASURES:  1.  NIHSS =   2.  ICH Score = 4  3.  VTE prophylaxis: [ ] administered within 24h of admission OR [x] reason not done - fresh bleed  4.  Dysphagia screening: [X] performed before any oral meds / liquids / food

## 2020-08-16 NOTE — PROGRESS NOTE ADULT - SUBJECTIVE AND OBJECTIVE BOX
=============================================   NEUROCRITICAL CARE ATTENDING NOTE (2020)  =============================================    VEDA WARREN   MRN-[id number]  HPI: 59M with dyslipidemia, h/o Hep B infection, presented with acute onset severe HA, dizziness, vomiting - brought to [facility] where CT showed L cerebellar hemorrhage.  Intubated for airway protection.  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  R EVD inserted, transferred to [facility] for further management.  Given mannitol 25G in [facility].    COURSE IN THE HOSPITAL:   bleed   admitted to [facility], 23.4 given x1; OR for SOC evacuation of ICH   remained intubated overnight   tachycardia overnight, Tmax 38.4, ?PNA, ABx    Tmax 38.1 diuresed overnight 1.8L.     No significant events overnight.  CPAP this morning   Tmax 39.7 (enterobacter sputum, on zosyn)  08/10 Trial of CPAP   Neurologically stable, for extubation.   Extubated.  Neurologically stable.   Neurologically stable.   Febrile overnight.  Suctioned for secretions.  Continued neurological improvement.  08/15 Hypotensive overnight, resolved this morning.  Neurologically stable.  Continued diarrhea.   No significant events.    Past Medical History: Hepatitis B HLD (hyperlipidemia)  Allergies:  No Known Allergies  Home meds:   ·	Omega-3 350 mg oral capsule: 1 cap(s) orally once a day  ·	tenofovir disoproxil fumarate 300 mg oral tablet: 1 tab(s) orally once a day    Current Meds:    MEDICATIONS  (STANDING):  artificial tears (preservative free) Ophthalmic Solution 1 Drop(s) Both EYES three times a day  atorvastatin 40 milliGRAM(s) Oral at bedtime  cefepime   IVPB 2000 milliGRAM(s) IV Intermittent every 8 hours  doxazosin 4 milliGRAM(s) Oral at bedtime  enoxaparin Injectable 40 milliGRAM(s) SubCutaneous every 24 hours  finasteride 5 milliGRAM(s) Oral daily  insulin glargine Injectable (LANTUS) 15 Unit(s) SubCutaneous every morning  insulin regular  human corrective regimen sliding scale.   SubCutaneous every 6 hours  insulin regular  human recombinant 4 Unit(s) SubCutaneous every 6 hours  levETIRAcetam  Solution 1000 milliGRAM(s) Oral two times a day  metoprolol tartrate 12.5 milliGRAM(s) Oral every 12 hours  psyllium Powder 1 Packet(s) Oral daily  sodium chloride 0.9%. 1000 milliLiter(s) (50 mL/Hr) IV Continuous <Continuous>  tenofovir disoproxil fumarate (VIREAD) 300 milliGRAM(s) Oral every 24 hours  vancomycin  IVPB 1000 milliGRAM(s) IV Intermittent every 12 hours    MEDICATIONS  (PRN):  acetaminophen    Suspension .. 650 milliGRAM(s) Oral every 6 hours PRN Temp greater or equal to 38C (100.4F), Mild Pain (1 - 3)  hydrALAZINE Injectable 10 milliGRAM(s) IV Push every 2 hours PRN SBP > 140    PHYSICAL EXAMINATION    ICU Vital Signs Last 24 Hrs  T(C): 36.7 (16 Aug 2020 09:16), Max: 37.2 (16 Aug 2020 00:32)  T(F): 98 (16 Aug 2020 09:16), Max: 98.9 (16 Aug 2020 00:32)  HR: 83 (16 Aug 2020 10:20) (65 - 88)  BP: 145/68 (16 Aug 2020 10:20) (111/59 - 148/70)  BP(mean): 98 (16 Aug 2020 10:20) (80 - 100)  RR: 11 (16 Aug 2020 10:20) (10 - 23)  SpO2: 96% (16 Aug 2020 10:20) (92% - 98%)    NEUROLOGIC EXAMINATION:    -opens eyes spontaneously today, pupils 3 mm reactive bilaterally, improving dysarthria, weak cough, tongue midline, obeys second order commands UE/LE, walked with assistance during PT session  EENT:  anicteric  CARDIOVASCULAR: (+) S1 S2, normal rate and regular rhythm  PULMONARY: clear to auscultation bilaterally  ABDOMEN: soft, nontender with normoactive bowel sounds  EXTREMITIES: no edema  SKIN: no rash    I/O's    08-15-20 @ 07:01  -  20 @ 07:00  --------------------------------------------------------  IN: 3173.3 mL / OUT: 1455 mL / NET: 1718.3 mL    20 @ 07:01  -  20 @ 17:05  --------------------------------------------------------  IN: 100 mL / OUT: 200 mL / NET: -100 mL    LABS:                        8.8    26.68 )-----------( 156      ( 16 Aug 2020 05:18 )             27.5     08-16    152<H>  |  122<H>  |  21  ----------------------------<  110<H>  3.5   |  21<L>  |  0.70    Ca    8.0<L>      16 Aug 2020 05:24  Phos  2.4       Mg     2.8         CAPILLARY BLOOD GLUCOSE    POCT Blood Glucose.: 88 mg/dL (16 Aug 2020 11:40)  POCT Blood Glucose.: 102 mg/dL (16 Aug 2020 05:03)  POCT Blood Glucose.: 104 mg/dL (15 Aug 2020 23:44)  POCT Blood Glucose.: 109 mg/dL (15 Aug 2020 18:16)    HbA1C = 5.6 (04)  LDL = 185 (04)   HDL = 36 (-04) TG = 252 (04)  TSH = 0.460 ()    GI PCR Panel, Stool (collected 20 @ 04:12)  Source: .Stool Feces  Final Report (20 @ 07:17):    GI PCR Results: NOT detected    *******Please Note:*******    GI panel PCR evaluates for:    Campylobacter, Plesiomonas shigelloides, Salmonella,    Vibrio, Yersinia enterocolitica, Enteroaggregative    Escherichia coli (EAEC), Enteropathogenic E.coli (EPEC),    Enterotoxigenic E. coli (ETEC) lt/st, Shiga-like    toxin-producing E. coli (STEC) stx1/stx2,    Shigella/ Enteroinvasive E. coli (EIEC), Cryptosporidium,    Cyclospora cayetanensis, Entamoeba histolytica,    Giardia lamblia, Adenovirus F 40/41, Astrovirus,    Norovirus GI/GII, Rotavirus A, Sapovirus    Culture - Stool (collected 08-15-20 @ 23:46)  Source: .Stool Feces  Preliminary Report (20 @ 09:40):    Culture in progress    Culture - Sputum . (20 @ 14:14)   Specimen Source: .Sputum Sputum   Culture Results:   Moderate Klebsiella variicola  Moderate Enterobacter cloacae  Moderate Staphylococcus aureus  Rare Yeast    -  Amikacin: S <=16    -  Ampicillin: I 16 These ampicillin results predict results for amoxicillin    -  Ampicillin: R >16 These ampicillin results predict results for amoxicillin    -  Ampicillin/Sulbactam: S <=4/2 Enterobacter, Citrobacter, and Serratia may develop resistance during prolonged therapy (3-4 days)    -  Ampicillin/Sulbactam: R >16/8 Enterobacter, Citrobacter, and Serratia may develop resistance during prolonged therapy (3-4 days)    -  Aztreonam: S <=4    -  Cefazolin: S <=2 Enterobacter, Citrobacter, and Serratia may develop resistance during prolonged therapy (3-4 days)    -  Cefazolin: R >16 Enterobacter, Citrobacter, and Serratia may develop resistance during prolonged therapy (3-4 days)    -  Cefazolin: S <=4    -  Cefepime: S <=2    -  Cefepime: S <=2    -  Cefotaxime: S <=2    -  Cefoxitin: S <=8    -  Ceftazidime: S <=1    -  Trimethoprim/Sulfamethoxazole: S <=0.5/9.5    -  Trimethoprim/Sulfamethoxazole: S <=0.5/9.5    -  Trimethoprim/Sulfamethoxazole: S <=0.5/9.5    -  Vancomycin: S 2    -  Ceftriaxone: S <=1 Enterobacter, Citrobacter, and Serratia may develop resistance during prolonged therapy    -  Ceftriaxone: R 32 Enterobacter, Citrobacter, and Serratia may develop resistance during prolonged therapy    -  Cefuroxime: S <=4    -  Ciprofloxacin: S <=0.25    -  Clindamycin: S <=0.25    -  Ertapenem: S <=0.5    -  Erythromycin: S <=0.25    -  Gentamicin: S <=2    -  Gentamicin: S <=2    -  Levofloxacin: S <=0.5    -  Linezolid: S 2    -  Meropenem: S <=1    -  Meropenem: S <=1    -  Oxacillin: S <=0.25    -  Piperacillin/Tazobactam: S <=8    -  Piperacillin/Tazobactam: I 32    -  RIF- Rifampin: S <=1 Should not be used as monotherapy    -  Tigecycline: S <=2    -  Tobramycin: S <=2    -  Tobramycin: S <=2    Culture - CSF with Gram Stain (collected 20 @ 15:58)  Source: .CSF CSF  Gram Stain (20 @ 16:20):    No organisms seen    No WBC's seen.  CSF lactate 6.3  CSF WBC:RBC = 0.002    Culture - Blood (collected 20 @ 15:50)  Source: .Blood Blood  Preliminary Report (20 @ 04:01):    No growth at 12 hours    Culture - Blood (collected 20 @ 15:50)  Source: .Blood Blood  Preliminary Report (20 @ 04:01):    No growth at 12 hours    Bacteriology:   sputum: enterobacter cloacae, klebsiella pneumoniae    CSF no organisms   sputum CS: enterobacter cloacae S: piperacillin/tazobactam    CSF NGTD   Blood CS NG5D x2   UA NEG    CSF studies:    L   *** [id number] *** %N31 %L5     Neuroimagin/11 CT head:  Right frontal EVD catheter as above. The ventricles are stable in size from prior examination. No new or increasing intracranial hemorrhage or mass effect.  08/10 CT head:  Compared to 2020, there is improving intraventricular hemorrhage, resolution of intraventricular gas, and mild decrease in ventricular size. Postsurgical change and edema in the posterior fossa is approximately stable.   CT head:  post-surgical changes, stable HCP  Other imagin/04 LE doppler: NEG   CXR:  clear   CXR:  mild atelectasis L lung base  08/10 CXR:  Small left pleural effusion, unchanged.    IV FLUIDS: IV NS at 50 mL/hour  DRIPS:  DIET: NG feeds held (ENT left vocal cord complete immobility)     EVD at 7cm H20  35cc/hr   EVD at 5cm H20 ICP 2-5 68cc/24h   EVD at 5cm H20    EVD at 5cm H20 ICP 1-3 202cc/24h     EVD at 5 cm H20 ICP 1-4 output 194cc/24h   EVD at 5 cm H20 ICP 0 to 5 output 280cc/24h  08/10 EVD at 5 cm H20 ICP 5 to 10 output 5-20cc/h   EVD at 5 cm H20 ICP < 10 output 167cc/24h   EVD at 0 cm H20 ICP 1-6 output 5-20 cc/h   EVD at 0 cm H20 ICP < 6 (flattened waveform occasionally) output 210 cc/24 h   EVD removed    CODE STATUS:  Full Code                       GOALS OF CARE:  aggressive                      DISPOSITION:  ICU  ICH Score on admission  = 4, NIHSS

## 2020-08-16 NOTE — PROGRESS NOTE ADULT - SUBJECTIVE AND OBJECTIVE BOX
· Subjective and Objective: 	  · Reason for Admission	Left cerebellar intraparenchymal hemorrhage	      · Subjective and Objective: 	  HPI:  59 year old male with PMH of HLD and unclear liver disease had acute onset of severe headache, dizziness and vomiting x1 today, was brought into [facility] with continued symptoms, as well as hypertension and multiple episodes of vomiting. Patient was intubated for airway protection with CT Head performed demonstrating a large left cerebellar hemorrhage. CTA Head/Neck performed is suggestive of underlying vascular pathology such as AVM. RIght frontal ventriculostomy placed at [facility] and patient transferred to [facility] for further work-up. Patient arrives to [facility] intubated, sedated and on Nicardipine drip. Patient's son provided history and denies any Aspirin or other anticoagulant use. Denies any prior history of hypertension, smoking, or ETOH abuse. (04 Aug 2020 05:01)    Hospital Course:   POD# 0 S/P cerebral angio, negative for AVM. s/p SOC craniectomy evacuation of cerebellar hematoma.   POD # 1:   overnight tachycardic, ekg without significant changes, lactate wnl, low grade fever, s/p tyl. bolus x 1L , NS increased to 100cc/hr; neuro improving.  EVD at 5 cmH2O   8/ POD#3, BD#4, Tmax 100.6, Pan Cx 7/5 NGTD, Vanc/Zosyn until 8/10, Vanc trough due today @ 6pm, EEG = no seizure, Lasix overnight for Pulm congest and tachycard, ruff placed for strict I&Os, pending anemia w/u, EVD @ 5, CPAP trials, Card GTT for SBP<140, Started Norvasc for BP optimization  :  :  S/P cerebral angio, negative for AVM. s/p SOC craniectomy evacuation of cerebellar hematoma. No significant events overnight. On CPAP this morning   8/10: fevers overnight, resolved this morning, Pancultured, (+) Enterbacter, on CPAP this AM   POD#7 SOC, BD # 8, EVD replaced (hard pass), Neuro exam stable, EVD @ 5cm H2O, Zosyn for sputum + enterobact, Plan to extubated today, HCT in AM  / POD#8 s/p SOC, BD#9, s/p EVD replacement (hard pass on 8/10/20), neuro exam stable, EVD @5cm H2O. Zosyn for enterobacter in sputum. O/n EVD stopped draining, flushed distally and proximally, waveform poor, neuro exam remained stable throughout, ICPs <8 prior.     EVD soft pass yesterday, spiked temp today and re cultured o/w neuro exam stable  : EMIL overnight, neuro stable. Mucomyst for secretions overnight   8/15 EVD dc/d, temp spike to 104, pan culture, ID consulted, vanco and cefepime started   8/15 levo started, presumed sepsis from aspiration, continues to have diarrhea      Vital Signs Last 24 Hrs  ICU Vital Signs Last 24 Hrs  T(C): 37.2 (16 Aug 2020 00:32), Max: 37.2 (16 Aug 2020 00:32)  T(F): 98.9 (16 Aug 2020 00:32), Max: 98.9 (16 Aug 2020 00:32)  HR: 76 (16 Aug 2020 02:00) (54 - 82)  BP: 118/59 (16 Aug 2020 02:00) (79/51 - 135/71)  BP(mean): 83 (16 Aug 2020 02:00) (60 - 97)  RR: 20 (16 Aug 2020 02:00) (11 - 28)  SpO2: 97% (16 Aug 2020 02:00) (92% - 98%)      08-15    152<H>  |  117<H>  |  30<H>  ----------------------------<  142<H>  3.4<L>   |  24  |  0.90    Ca    8.0<L>      15 Aug 2020 05:12  Phos  3.8     08-15  Mg     2.6     08-15    TPro  6.3  /  Alb  3.3  /  TBili  0.4  /  DBili  <0.2  /  AST  26  /  ALT  60<H>  /  AlkPhos  54  08-14                            9.3    29.93 )-----------( 192      ( 15 Aug 2020 05:12 )             29.3     PHYSICAL EXAM:  Gen: laying in hospital bed, NAD  Neuro: Awake and alert, OE spont, FC  CN II-XII: PERRL, EOMI  Motor: MAEx4 with good strength  Crani site C/D/I    TUBES/LINES:  [] CVC  [] A-line  [] Lumbar Drain  [] Ventriculostomy  [] Other    DIET:  [] NPO  [] Mechanical  [x] Tube feeds        Urinalysis Basic - ( 14 Aug 2020 13:16 )    Color: Yellow / Appearance: Clear / S.025 / pH: x  Gluc: x / Ketone: NEGATIVE  / Bili: Negative / Urobili: 0.2 E.U./dL   Blood: x / Protein: Trace mg/dL / Nitrite: NEGATIVE   Leuk Esterase: NEGATIVE / RBC: < 5 /HPF / WBC < 5 /HPF   Sq Epi: x / Non Sq Epi: x / Bacteria: Present /HPF              Culture - Sputum (collected 14 Aug 2020 14:14)  Source: .Sputum Sputum  Gram Stain (14 Aug 2020 17:44):    Few epithelial cells    Numerous white blood cells    Numerous Gram Positive Rods    Moderate Gram Negative Diplococci    Few Gram positive cocci in pairs    Few Gram Negative Rods    Rare Yeast    Culture - CSF with Gram Stain (collected 14 Aug 2020 13:18)  Source: .CSF CSF  Gram Stain (14 Aug 2020 13:33):    Rare White blood cells    No organisms seen    Culture - CSF with Gram Stain (collected 13 Aug 2020 15:58)  Source: .CSF CSF  Gram Stain (13 Aug 2020 16:20):    No organisms seen    No WBC's seen.    Culture - Blood (collected 13 Aug 2020 15:50)  Source: .Blood Blood  Preliminary Report (14 Aug 2020 16:01):    No growth at 1 day.    Culture - Blood (collected 13 Aug 2020 15:50)  Source: .Blood Blood  Preliminary Report (14 Aug 2020 16:01):    No growth at 1 day.    149<H>  |  113<H>  |  25<H>  ----------------------------<  149<H>  3.9   |  28  |  0.77    Ca    8.6      14 Aug 2020 04:28  Phos  3.5       Mg     2.6         TPro  6.3  /  Alb  3.3  /  TBili  0.4  /  DBili  <0.2  /  AST  26  /  ALT  60<H>  /  AlkPhos  54      CULTURES:  Culture Results:   Moderate Enterobacter cloacae complex  Moderate Klebsiella pneumoniae / variicola  Accompanied by normal respiratory aliza ( @ 17:29)  Culture Results:   No growth at 2 days. ( @ 15:24)        MEDICATIONS  (STANDING):  acetaminophen    Suspension .. 650 milliGRAM(s) Oral every 6 hours  amLODIPine   Tablet 10 milliGRAM(s) Oral daily  artificial tears (preservative free) Ophthalmic Solution 1 Drop(s) Both EYES three times a day  atorvastatin 40 milliGRAM(s) Oral at bedtime  cefepime   IVPB 2000 milliGRAM(s) IV Intermittent every 8 hours  chlorhexidine 4% Liquid 1 Application(s) Topical <User Schedule>  cloNIDine 0.1 milliGRAM(s) Oral two times a day  dextrose 5%. 1000 milliLiter(s) (50 mL/Hr) IV Continuous <Continuous>  dextrose 50% Injectable 12.5 Gram(s) IV Push once  dextrose 50% Injectable 25 Gram(s) IV Push once  doxazosin 4 milliGRAM(s) Oral at bedtime  enoxaparin Injectable 40 milliGRAM(s) SubCutaneous every 24 hours  finasteride 5 milliGRAM(s) Oral daily  ibuprofen  Tablet. 400 milliGRAM(s) Oral every 8 hours  insulin glargine Injectable (LANTUS) 15 Unit(s) SubCutaneous every morning  insulin regular  human corrective regimen sliding scale.   SubCutaneous every 6 hours  insulin regular  human recombinant 4 Unit(s) SubCutaneous every 6 hours  labetalol 200 milliGRAM(s) Enteral Tube two times a day  levETIRAcetam  Solution 1000 milliGRAM(s) Oral two times a day  losartan 100 milliGRAM(s) Oral daily  psyllium Powder 1 Packet(s) Oral daily  tenofovir disoproxil fumarate (VIREAD) 300 milliGRAM(s) Oral every 24 hours  vancomycin  IVPB 1000 milliGRAM(s) IV Intermittent every 12 hours    RADIOLOGY & ADDITIONAL TESTS:      ASSESSMENT:  59y Male with ICH/IVH s/p SOC crani POD #10 (20), s/p EVD placement    PLAN:  NEURO:  - neuro checks  - vitals checks  - pain control   - cont Amantadine  - cont Keppra    CARDIOVASCULAR:  - -140  - cont Losartan, Amlodipine     PULMONARY:  - on room air, no issues    RENAL:  - IVL  - cont Proscar, Cardura     GI:  - TF via NGT  - rectal tube    HEME:  - H/H stable    ID:  - appreciate ID input, will resume antibiotic and f/u work up   - f/u pan cx     ENDO:  - ISS, Lantus, Lispro     DVT PROPHYLAXIS:  [x] Venodynes                                [x] Heparin/Lovenox    DISPOSITION: ICU status, full code, dispo pending

## 2020-08-16 NOTE — PROGRESS NOTE ADULT - SUBJECTIVE AND OBJECTIVE BOX
===============================================   NEUROCRITICAL CARE ATTENDING NOTE (Saturday, August 15, 2020)  ===============================================    VEDA WARREN   MRN-[id number]  HPI: 59M with dyslipidemia, h/o Hep B infection, presented with acute onset severe HA, dizziness, vomiting - brought to [facility] where CT showed L cerebellar hemorrhage.  Intubated for airway protection.  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  R EVD inserted, transferred to [facility] for further management.  Given mannitol 25G in [facility].    COURSE IN THE HOSPITAL:   bleed   admitted to [facility], 23.4 given x1; OR for SOC evacuation of ICH   remained intubated overnight   tachycardia overnight, Tmax 38.4, ?PNA, ABx    Tmax 38.1 diuresed overnight 1.8L.     No significant events overnight.  CPAP this morning   Tmax 39.7 (enterobacter sputum, on zosyn)  08/10 Trial of CPAP   Neurologically stable, for extubation.   Extubated.  Neurologically stable.   Neurologically stable.   Febrile overnight.  Suctioned for secretions.  Continued neurological improvement.  08/15 Hypotensive overnight, resolved this morning.  Neurologically stable.  Continued diarrhea.    Past Medical History: Hepatitis B HLD (hyperlipidemia)  Allergies:  No Known Allergies  Home meds:   ·	Omega-3 350 mg oral capsule: 1 cap(s) orally once a day  ·	tenofovir disoproxil fumarate 300 mg oral tablet: 1 tab(s) orally once a day    Current Meds:  MEDICATIONS  (STANDING):  acetaminophen    Suspension .. 650 milliGRAM(s) Oral every 6 hours  artificial tears (preservative free) Ophthalmic Solution 1 Drop(s) Both EYES three times a day  atorvastatin 40 milliGRAM(s) Oral at bedtime  cefepime   IVPB 2000 milliGRAM(s) IV Intermittent every 8 hours  doxazosin 4 milliGRAM(s) Oral at bedtime  enoxaparin Injectable 40 milliGRAM(s) SubCutaneous every 24 hours  finasteride 5 milliGRAM(s) Oral daily  ibuprofen  Tablet. 400 milliGRAM(s) Oral every 8 hours  insulin glargine Injectable (LANTUS) 15 Unit(s) SubCutaneous every morning  insulin regular  human corrective regimen sliding scale.   SubCutaneous every 6 hours  insulin regular  human recombinant 4 Unit(s) SubCutaneous every 6 hours  levETIRAcetam  Solution 1000 milliGRAM(s) Oral two times a day  norepinephrine Infusion 0.05 MICROgram(s)/kG/Min (5.59 mL/Hr) IV Continuous <Continuous>  psyllium Powder 1 Packet(s) Oral daily  sodium chloride 0.9%. 1000 milliLiter(s) (100 mL/Hr) IV Continuous <Continuous>  tenofovir disoproxil fumarate (VIREAD) 300 milliGRAM(s) Oral every 24 hours  vancomycin  IVPB 1000 milliGRAM(s) IV Intermittent every 12 hours    MEDICATIONS  (PRN):  hydrALAZINE Injectable 10 milliGRAM(s) IV Push every 2 hours PRN SBP > 140    PHYSICAL EXAMINATION    ICU Vital Signs Last 24 Hrs  T(C): 37.1 (15 Aug 2020 21:00), Max: 37.1 (15 Aug 2020 21:00)  T(F): 98.8 (15 Aug 2020 21:00), Max: 98.8 (15 Aug 2020 21:00)  HR: 82 (15 Aug 2020 23:00) (54 - 82)  BP: 135/71 (15 Aug 2020 22:00) (78/45 - 135/71)  BP(mean): 97 (15 Aug 2020 22:00) (57 - 97)  RR: 23 (15 Aug 2020 23:00) (11 - 28)  SpO2: 94% (15 Aug 2020 23:00) (92% - 99%)    NEUROLOGIC EXAMINATION:    -opens eyes spontaneously today, pupils 3 mm reactive bilaterally, improving [id number] (L > R), hypophonic, cerebellar dysarthria, tongue midline, obeys second order commands UE/LE, sits up with assistance for physiotherapy  EENT:  anicteric  CARDIOVASCULAR: (+) S1 S2, normal rate and regular rhythm  PULMONARY: clear to auscultation bilaterally  ABDOMEN: soft, nontender with normoactive bowel sounds  EXTREMITIES: no edema  SKIN: no rash    I/O's    08-14-20 @ 07:  -  08-15-20 @ 07:00  --------------------------------------------------------  IN: 2012.9 mL / OUT: 1340 mL / NET: 672.9 mL    08-15-20 @ 07:  -  20 @ 00:19  --------------------------------------------------------  IN: 2248.3 mL / OUT: 685 mL / NET: 1563.3 mL    LABS:                9.3    29.93 )-----------( 192      ( 15 Aug 2020 05:12 )             29.3     08-15    152<H>  |  117<H>  |  30<H>  ----------------------------<  142<H>  3.4<L>   |  24  |  0.90    Ca    8.0<L>      15 Aug 2020 05:12  Phos  3.8     08-15  Mg     2.6     08-15    lactate 1.1, procalcitonin 0.24  TPro  6.3  /  Alb  3.3  /  TBili  0.4  /  DBili  <0.2  /  AST  26  /  ALT  60<H>  /  AlkPhos  54  08-14    Urinalysis Basic - ( 14 Aug 2020 13:16 )    Color: Yellow / Appearance: Clear / S.025 / pH: x  Gluc: x / Ketone: NEGATIVE  / Bili: Negative / Urobili: 0.2 E.U./dL   Blood: x / Protein: Trace mg/dL / Nitrite: NEGATIVE   Leuk Esterase: NEGATIVE / RBC: < 5 /HPF / WBC < 5 /HPF   Sq Epi: x / Non Sq Epi: x / Bacteria: Present /HPF    CAPILLARY BLOOD GLUCOSE    POCT Blood Glucose.: 104 mg/dL (15 Aug 2020 23:44)  POCT Blood Glucose.: 109 mg/dL (15 Aug 2020 18:16)  POCT Blood Glucose.: 122 mg/dL (15 Aug 2020 10:50)  POCT Blood Glucose.: 150 mg/dL (15 Aug 2020 07:11)  POCT Blood Glucose.: 190 mg/dL (15 Aug 2020 05:22)    HbA1C = 5.6 (08-)  LDL = 185 (08-04)   HDL = 36 (08-04) TG = 252 ()  TSH = 0.460 ()    C diff negative  Stool cultures and PCR pending    Culture - Sputum . (20 @ 14:14)    Gram Stain:   Few epithelial cells  Numerous white blood cells  Numerous Gram Positive Rods  Moderate Gram Negative Diplococci  Few Gram positive cocci in pairs  Few Gram Negative Rods  Rare Yeast    Specimen Source: .Sputum Sputum    Culture Results:   Moderate Klebsiella variicola Susceptibility to follow.  Moderate Enterobacter cloacae Susceptibility to follow.  Moderate Staphylococcus aureus Presumptive Methicillin susceptible  Confirmation to follow within 24 hrs. Susceptibility to follow.  Accompanied with Normal Respiratory Aliza    Culture - CSF with Gram Stain (collected 20 @ 15:58)  Source: .CSF CSF  Gram Stain (20 @ 16:20):    No organisms seen    No WBC's seen.  CSF lactate 6.3  CSF WBC:RBC = 0.002    Culture - Blood (collected 20 @ 15:50)  Source: .Blood Blood  Preliminary Report (20 @ 04:01):    No growth at 12 hours    Culture - Blood (collected 20 @ 15:50)  Source: .Blood Blood  Preliminary Report (20 @ 04:01):    No growth at 12 hours    Culture - Sputum . (20 @ 17:29)    -  Trimethoprim/Sulfamethoxazole: S <=0.5/9.5    -  Trimethoprim/Sulfamethoxazole: S <=0.5/9.5    Gram Stain:   Rare epithelial cells  Rare WBC's  Few Gram Negative Rods    -  Ampicillin: R >16 These ampicillin results predict results for amoxicillin    -  Ampicillin: S <=8 These ampicillin results predict results for amoxicillin    -  Ampicillin/Sulbactam: S <=4/2 Enterobacter, Citrobacter, and Serratia may develop resistance during prolonged therapy (3-4 days)    -  Ampicillin/Sulbactam: R >16/8 Enterobacter, Citrobacter, and Serratia may develop resistance during prolonged therapy (3-4 days)    -  Cefazolin: R >16 Enterobacter, Citrobacter, and Serratia may develop resistance during prolonged therapy (3-4 days)    -  Cefazolin: S <=2 Enterobacter, Citrobacter, and Serratia may develop resistance during prolonged therapy (3-4 days)    -  Cefepime: S <=2    -  Ceftriaxone: R 32 Enterobacter, Citrobacter, and Serratia may develop resistance during prolonged therapy    -  Ceftriaxone: S <=1 Enterobacter, Citrobacter, and Serratia may develop resistance during prolonged therapy    -  Ciprofloxacin: S <=0.25    -  Ciprofloxacin: S <=0.25    -  Gentamicin: S <=2    -  Gentamicin: S <=2    -  Meropenem: S <=1    -  Piperacillin/Tazobactam: S 16    -  Piperacillin/Tazobactam: S <=8    -  Tobramycin: S <=2    -  Tobramycin: S <=2    Specimen Source: .Sputum Sputum    Culture Results:   Moderate Enterobacter cloacae complex  Moderate Klebsiella pneumoniae / variicola  Accompanied by normal respiratory aliza    Organism Identification: Enterobacter cloacae complex  Klebsiella species    Organism: Enterobacter cloacae complex    Organism: Klebsiella species    Method Type: MARKELL    Method Type: MARKELL    Bacteriology:   sputum: enterobacter cloacae, klebsiella pneumoniae    CSF no organisms   sputum CS: enterobacter cloacae S: piperacillin/tazobactam    CSF NGTD   Blood CS NG5D x2   UA NEG    CSF studies:    L   *** [id number] *** %N31 %L5     Neuroimagin/11 CT head:  Right frontal EVD catheter as above. The ventricles are stable in size from prior examination. No new or increasing intracranial hemorrhage or mass effect.  08/10 CT head:  Compared to 2020, there is improving intraventricular hemorrhage, resolution of intraventricular gas, and mild decrease in ventricular size. Postsurgical change and edema in the posterior fossa is approximately stable.   CT head:  post-surgical changes, stable HCP  Other imagin/04 LE doppler: NEG   CXR:  clear   CXR:  mild atelectasis L lung base  08/10 CXR:  Small left pleural effusion, unchanged.    IV FLUIDS: IV NS at 100 mL/hour  DRIPS:  DIET: NG feeds held (ENT left vocal cord complete immobility)     EVD at 7cm H20  35cc/hr   EVD at 5cm H20 ICP 2-5 68cc/24h   EVD at 5cm H20    EVD at 5cm H20 ICP 1-3 202cc/24h     EVD at 5 cm H20 ICP 1-4 output 194cc/24h   EVD at 5 cm H20 ICP 0 to 5 output 280cc/24h  08/10 EVD at 5 cm H20 ICP 5 to 10 output 5-20cc/h   EVD at 5 cm H20 ICP < 10 output 167cc/24h   EVD at 0 cm H20 ICP 1-6 output 5-20 cc/h   EVD at 0 cm H20 ICP < 6 (flattened waveform occasionally) output 210 cc/24 h   EVD removed    CODE STATUS:  Full Code                       GOALS OF CARE:  aggressive                      DISPOSITION:  ICU  ICH Score on admission  = 4, NIHSS

## 2020-08-16 NOTE — PROGRESS NOTE ADULT - ASSESSMENT
Chronic HBV, cerebellar hemorrhage s/p craniotomy, course c/b Enterobacter VAP s/p treatment, now with recurrent fever as well as ongoing diarrhea. ?central fevers ?leukemoid reaction 2/2 cerebellar hemorrhage; sputum cx with Klebsiella, Enterobacter, MSSA ?early pneumonia on CXR-- should all be covered by cefepime; while abdomen is benign, can consider CT AP for further evaluation of diarrhea and leukocytosis; continue IV vancomycin and cefepime.  Dr. Hendricks/ID Team 1 to resume care Monday 8/17

## 2020-08-16 NOTE — PROGRESS NOTE ADULT - ASSESSMENT
59y/M with  1.	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	Hepatitis B  3.	dyslipidemia     PLAN:   NEURO: continued neurological improvement  seizure prophylaxis: levetiracetam as per NS ___  REHAB:  physical therapy evaluation and management    EARLY MOB:  HOB up    PULM:  on room air, good oxygen saturations  CARDIO:  SBP goal 100-140mm Hg - restarting metoprolol 12.5 mg BID  ENDO:  Blood sugar goals 140-180 mg/dL, continue insulin sliding, continue statins; insulin glargine 15 nutritional 4-4-4-4  GI:  PPI for GI prophylaxis  DIET: to restart NG feeds  RENAL:  net balance  HEM/ONC: no coagulopathy, no h/o antiplatelet / anticoagulant use  VTE Prophylaxis: SCDs, SQL   ID: afebrile, slowly improving leukocytosis; ID recommendations:  vancomycin, cefepime; pending GI workup (abdominal CT, stool cultures neg)  Social: updated wife yesterday; son Perry updated ([phone]).  2 daughters Shantell  and Tammie ; wife name is Julián Sahu  *according to wife,  would not want PEG/J, would prefer NG tube for now, and re-assessment    CORE MEASURES:  1.  NIHSS =   2.  ICH Score = 4  3.  VTE prophylaxis: [ ] administered within 24h of admission OR [x] reason not done - fresh bleed  4.  Dysphagia screening: [X] performed before any oral meds / liquids / food 59y/M with  1.	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	Hepatitis B  3.	dyslipidemia     PLAN:   NEURO: continued neurological improvement  seizure prophylaxis: levetiracetam as per NS ___  REHAB:  physical therapy evaluation and management    EARLY MOB:  HOB up    PULM:  on room air, good oxygen saturations  CARDIO:  SBP goal 100-140mm Hg - restarting metoprolol 12.5 mg BID  ENDO:  Blood sugar goals 140-180 mg/dL, continue insulin sliding, continue statins; insulin glargine 15 nutritional 4-4-4-4  GI:  PPI for GI prophylaxis  DIET: to restart NG feeds  RENAL:  net balance  HEM/ONC: no coagulopathy, no h/o antiplatelet / anticoagulant use  VTE Prophylaxis: SCDs, SQL   ID: afebrile, slowly improving leukocytosis; ID recommendations:  vancomycin, cefepime; pending GI workup (abdominal CT, stool cultures neg)  ID (Colette Hendricks & Michelle):  chronic HBV, cerebellar hemorrhage s/p craniotomy, course c/b Enterobacter VAP s/p treatment, now with recurrent fever as well as ongoing diarrhea. ?central fevers ?leukemoid reaction 2/2 cerebellar hemorrhage; sputum cx with Klebsiella, Enterobacter, MSSA ?early pneumonia on CXR-- should all be covered by cefepime; while abdomen is benign, can consider CT AP for further evaluation of diarrhea and leukocytosis; continue IV vancomycin and cefepime.  Dr. Hendricks/ID Team 1 to resume care Monday 8/17  Social: updated wife yesterday; son Perry updated ([phone]).  2 daughters Shantell  and Tammie ; wife name is Julián Sahu  *according to wife,  would not want PEG/J, would prefer NG tube for now, and re-assessment    CORE MEASURES:  1.  NIHSS =   2.  ICH Score = 4  3.  VTE prophylaxis: [ ] administered within 24h of admission OR [x] reason not done - fresh bleed  4.  Dysphagia screening: [X] performed before any oral meds / liquids / food

## 2020-08-16 NOTE — PROGRESS NOTE ADULT - ASSESSMENT
59y/M with  1.	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	Hepatitis B  3.	dyslipidemia     PLAN:   NEURO: neurologically stable  seizure prophylaxis: levetiracetam as per NS  REHAB:  physical therapy evaluation and management    EARLY MOB:  HOB up    PULM:  on room air, good oxygen saturations  CARDIO:  SBP goal 100-140mm Hg, norepinephrine infusion stopped  ENDO:  Blood sugar goals 140-180 mg/dL, continue insulin sliding, continue statins; insulin glargine 15 nutritional 4-4-4-4  GI:  PPI for GI prophylaxis  DIET: held, consult for PEG/J tube  RENAL:  net positive (resuscitation for sepsis)  HEM/ONC: no coagulopathy, no h/o antiplatelet / anticoagulant use  VTE Prophylaxis: SCDs, SQL   ID: defeversing, leukocytosis; ID recommendations:  vancomycin, cefepime; pending GI workup (abdominal CT, stool cultures)  Social: updated wife yesterday; son Perry updated ([phone]).  2 daughters Shantell  and Tammie ; wife name is Julián Sahu    CORE MEASURES:  1.  NIHSS =   2.  ICH Score = 4  3.  VTE prophylaxis: [ ] administered within 24h of admission OR [x] reason not done - fresh bleed  4.  Dysphagia screening: [X] performed before any oral meds / liquids / food

## 2020-08-16 NOTE — PROGRESS NOTE ADULT - SUBJECTIVE AND OBJECTIVE BOX
INTERVAL HPI/OVERNIGHT EVENTS: No fevers. Ongoing diarrhea.     ROS: UTO      ANTIBIOTICS/RELEVANT:    MEDICATIONS  (STANDING):  artificial tears (preservative free) Ophthalmic Solution 1 Drop(s) Both EYES three times a day  atorvastatin 40 milliGRAM(s) Oral at bedtime  cefepime   IVPB 2000 milliGRAM(s) IV Intermittent every 8 hours  chlorhexidine 4% Liquid 1 Application(s) Topical <User Schedule>  dextrose 5%. 1000 milliLiter(s) (50 mL/Hr) IV Continuous <Continuous>  dextrose 50% Injectable 12.5 Gram(s) IV Push once  dextrose 50% Injectable 25 Gram(s) IV Push once  doxazosin 4 milliGRAM(s) Oral at bedtime  enoxaparin Injectable 40 milliGRAM(s) SubCutaneous every 24 hours  finasteride 5 milliGRAM(s) Oral daily  insulin glargine Injectable (LANTUS) 15 Unit(s) SubCutaneous every morning  insulin regular  human corrective regimen sliding scale.   SubCutaneous every 6 hours  insulin regular  human recombinant 4 Unit(s) SubCutaneous every 6 hours  levETIRAcetam  Solution 1000 milliGRAM(s) Oral two times a day  metoprolol tartrate 12.5 milliGRAM(s) Oral every 12 hours  psyllium Powder 1 Packet(s) Oral daily  sodium chloride 0.9%. 1000 milliLiter(s) (50 mL/Hr) IV Continuous <Continuous>  tenofovir disoproxil fumarate (VIREAD) 300 milliGRAM(s) Oral every 24 hours  vancomycin  IVPB 1000 milliGRAM(s) IV Intermittent every 12 hours    MEDICATIONS  (PRN):  acetaminophen    Suspension .. 650 milliGRAM(s) Oral every 6 hours PRN Temp greater or equal to 38C (100.4F), Mild Pain (1 - 3)  dextrose 40% Gel 15 Gram(s) Oral once PRN Blood Glucose LESS THAN 70 milliGRAM(s)/deciliter  glucagon  Injectable 1 milliGRAM(s) IntraMuscular once PRN Glucose LESS THAN 70 milligrams/deciliter  glucagon  Injectable 1 milliGRAM(s) IntraMuscular once PRN Glucose LESS THAN 70 milligrams/deciliter  hydrALAZINE Injectable 10 milliGRAM(s) IV Push every 2 hours PRN SBP > 140  sodium chloride 0.9% lock flush 10 milliLiter(s) IV Push every 1 hour PRN Pre/post blood products, medications, blood draw, and to maintain line patency        Vital Signs Last 24 Hrs  T(C): 36.7 (16 Aug 2020 09:16), Max: 37.2 (16 Aug 2020 00:32)  T(F): 98 (16 Aug 2020 09:16), Max: 98.9 (16 Aug 2020 00:32)  HR: 83 (16 Aug 2020 10:20) (65 - 88)  BP: 145/68 (16 Aug 2020 10:20) (111/59 - 148/70)  BP(mean): 98 (16 Aug 2020 10:20) (80 - 100)  RR: 11 (16 Aug 2020 10:20) (10 - 28)  SpO2: 96% (16 Aug 2020 10:20) (92% - 98%)    PHYSICAL EXAM:  Constitutional:Well-developed, well nourished  Eyes:HARLEY, EOMI  Ear/Nose/Throat: no oral lesion, no sinus tenderness on percussion	  Neck:no JVD, no lymphadenopathy, supple  Respiratory: CTA lew  Cardiovascular: S1S2 RRR, no murmurs  Gastrointestinal:soft, (+) BS, no HSM  Extremities:no e/e/c  Vascular: DP Pulse:	right normal; left normal      LABS:                        8.8    26.68 )-----------( 156      ( 16 Aug 2020 05:18 )             27.5     08-16    152<H>  |  122<H>  |  21  ----------------------------<  110<H>  3.5   |  21<L>  |  0.70    Ca    8.0<L>      16 Aug 2020 05:24  Phos  2.4     08-16  Mg     2.8     08-16            MICROBIOLOGY: GI PCR Panel, Stool (08.16.20 @ 04:12)    Specimen Source: .Stool Feces    Culture Results:   GI PCR Results: NOT detected  *******Please Note:*******  GI panel PCR evaluates for:  Campylobacter, Plesiomonas shigelloides, Salmonella,  Vibrio, Yersinia enterocolitica, Enteroaggregative  Escherichia coli (EAEC), Enteropathogenic E.coli (EPEC),  Enterotoxigenic E. coli (ETEC) lt/st, Shiga-like  toxin-producing E. coli (STEC) stx1/stx2,  Shigella/ Enteroinvasive E. coli (EIEC), Cryptosporidium,  Cyclospora cayetanensis, Entamoeba histolytica,  Giardia lamblia, Adenovirus F 40/41, Astrovirus,  Norovirus GI/GII, Rotavirus A, Sapovirus        RADIOLOGY & ADDITIONAL STUDIES: reviewed

## 2020-08-17 ENCOUNTER — FORM ENCOUNTER (OUTPATIENT)
Age: 60
End: 2020-08-17

## 2020-08-17 ENCOUNTER — TRANSCRIPTION ENCOUNTER (OUTPATIENT)
Age: 60
End: 2020-08-17

## 2020-08-17 LAB
ANION GAP SERPL CALC-SCNC: 12 MMOL/L — SIGNIFICANT CHANGE UP (ref 5–17)
BUN SERPL-MCNC: 15 MG/DL — SIGNIFICANT CHANGE UP (ref 7–23)
CALCIUM SERPL-MCNC: 7.9 MG/DL — LOW (ref 8.4–10.5)
CHLORIDE SERPL-SCNC: 115 MMOL/L — HIGH (ref 96–108)
CO2 SERPL-SCNC: 19 MMOL/L — LOW (ref 22–31)
CREAT SERPL-MCNC: 0.67 MG/DL — SIGNIFICANT CHANGE UP (ref 0.5–1.3)
GLUCOSE BLDC GLUCOMTR-MCNC: 104 MG/DL — HIGH (ref 70–99)
GLUCOSE BLDC GLUCOMTR-MCNC: 131 MG/DL — HIGH (ref 70–99)
GLUCOSE BLDC GLUCOMTR-MCNC: 132 MG/DL — HIGH (ref 70–99)
GLUCOSE SERPL-MCNC: 127 MG/DL — HIGH (ref 70–99)
HCT VFR BLD CALC: 27.8 % — LOW (ref 39–50)
HGB BLD-MCNC: 9.1 G/DL — LOW (ref 13–17)
MAGNESIUM SERPL-MCNC: 2.4 MG/DL — SIGNIFICANT CHANGE UP (ref 1.6–2.6)
MCHC RBC-ENTMCNC: 28.6 PG — SIGNIFICANT CHANGE UP (ref 27–34)
MCHC RBC-ENTMCNC: 32.7 GM/DL — SIGNIFICANT CHANGE UP (ref 32–36)
MCV RBC AUTO: 87.4 FL — SIGNIFICANT CHANGE UP (ref 80–100)
NRBC # BLD: 0 /100 WBCS — SIGNIFICANT CHANGE UP (ref 0–0)
PHOSPHATE SERPL-MCNC: 2.1 MG/DL — LOW (ref 2.5–4.5)
PLATELET # BLD AUTO: 183 K/UL — SIGNIFICANT CHANGE UP (ref 150–400)
POTASSIUM SERPL-MCNC: 3.1 MMOL/L — LOW (ref 3.5–5.3)
POTASSIUM SERPL-SCNC: 3.1 MMOL/L — LOW (ref 3.5–5.3)
RBC # BLD: 3.18 M/UL — LOW (ref 4.2–5.8)
RBC # FLD: 14.8 % — HIGH (ref 10.3–14.5)
SODIUM SERPL-SCNC: 146 MMOL/L — HIGH (ref 135–145)
VANCOMYCIN TROUGH SERPL-MCNC: 5.9 UG/ML — LOW (ref 10–20)
WBC # BLD: 17.03 K/UL — HIGH (ref 3.8–10.5)
WBC # FLD AUTO: 17.03 K/UL — HIGH (ref 3.8–10.5)

## 2020-08-17 PROCEDURE — 71045 X-RAY EXAM CHEST 1 VIEW: CPT | Mod: 26,76

## 2020-08-17 PROCEDURE — 99233 SBSQ HOSP IP/OBS HIGH 50: CPT

## 2020-08-17 PROCEDURE — 71250 CT THORAX DX C-: CPT | Mod: 26

## 2020-08-17 PROCEDURE — 43752 NASAL/OROGASTRIC W/TUBE PLMT: CPT

## 2020-08-17 PROCEDURE — 71045 X-RAY EXAM CHEST 1 VIEW: CPT | Mod: 26,77

## 2020-08-17 PROCEDURE — 99232 SBSQ HOSP IP/OBS MODERATE 35: CPT | Mod: GC

## 2020-08-17 RX ORDER — POTASSIUM CHLORIDE 20 MEQ
40 PACKET (EA) ORAL
Refills: 0 | Status: DISCONTINUED | OUTPATIENT
Start: 2020-08-17 | End: 2020-08-17

## 2020-08-17 RX ORDER — VANCOMYCIN HCL 1 G
VIAL (EA) INTRAVENOUS
Refills: 0 | Status: DISCONTINUED | OUTPATIENT
Start: 2020-08-17 | End: 2020-08-17

## 2020-08-17 RX ORDER — HYDROCORTISONE 1 %
1 OINTMENT (GRAM) TOPICAL
Refills: 0 | Status: DISCONTINUED | OUTPATIENT
Start: 2020-08-17 | End: 2020-08-20

## 2020-08-17 RX ORDER — POTASSIUM CHLORIDE 20 MEQ
40 PACKET (EA) ORAL
Refills: 0 | Status: COMPLETED | OUTPATIENT
Start: 2020-08-17 | End: 2020-08-17

## 2020-08-17 RX ADMIN — SODIUM CHLORIDE 50 MILLILITER(S): 9 INJECTION INTRAMUSCULAR; INTRAVENOUS; SUBCUTANEOUS at 17:35

## 2020-08-17 RX ADMIN — CEFEPIME 100 MILLIGRAM(S): 1 INJECTION, POWDER, FOR SOLUTION INTRAMUSCULAR; INTRAVENOUS at 05:40

## 2020-08-17 RX ADMIN — Medication 12.5 MILLIGRAM(S): at 07:44

## 2020-08-17 RX ADMIN — SODIUM CHLORIDE 50 MILLILITER(S): 9 INJECTION INTRAMUSCULAR; INTRAVENOUS; SUBCUTANEOUS at 22:04

## 2020-08-17 RX ADMIN — Medication 1 DROP(S): at 05:40

## 2020-08-17 RX ADMIN — INSULIN GLARGINE 15 UNIT(S): 100 INJECTION, SOLUTION SUBCUTANEOUS at 11:06

## 2020-08-17 RX ADMIN — Medication 10 MILLIGRAM(S): at 04:29

## 2020-08-17 RX ADMIN — Medication 4 MILLIGRAM(S): at 22:09

## 2020-08-17 RX ADMIN — ENOXAPARIN SODIUM 40 MILLIGRAM(S): 100 INJECTION SUBCUTANEOUS at 22:08

## 2020-08-17 RX ADMIN — Medication 40 MILLIEQUIVALENT(S): at 18:35

## 2020-08-17 RX ADMIN — CEFEPIME 100 MILLIGRAM(S): 1 INJECTION, POWDER, FOR SOLUTION INTRAMUSCULAR; INTRAVENOUS at 22:10

## 2020-08-17 RX ADMIN — Medication 40 MILLIEQUIVALENT(S): at 11:11

## 2020-08-17 RX ADMIN — Medication 250 MILLIGRAM(S): at 03:00

## 2020-08-17 RX ADMIN — Medication 12.5 MILLIGRAM(S): at 18:34

## 2020-08-17 RX ADMIN — TENOFOVIR DISOPROXIL FUMARATE 300 MILLIGRAM(S): 300 TABLET, FILM COATED ORAL at 22:09

## 2020-08-17 RX ADMIN — CHLORHEXIDINE GLUCONATE 1 APPLICATION(S): 213 SOLUTION TOPICAL at 05:41

## 2020-08-17 RX ADMIN — Medication 1 DROP(S): at 22:13

## 2020-08-17 RX ADMIN — INSULIN HUMAN 4 UNIT(S): 100 INJECTION, SOLUTION SUBCUTANEOUS at 18:33

## 2020-08-17 RX ADMIN — Medication 10 MILLIGRAM(S): at 01:30

## 2020-08-17 RX ADMIN — Medication 1 DROP(S): at 15:20

## 2020-08-17 RX ADMIN — CEFEPIME 100 MILLIGRAM(S): 1 INJECTION, POWDER, FOR SOLUTION INTRAMUSCULAR; INTRAVENOUS at 15:20

## 2020-08-17 RX ADMIN — FINASTERIDE 5 MILLIGRAM(S): 5 TABLET, FILM COATED ORAL at 12:10

## 2020-08-17 NOTE — PROGRESS NOTE ADULT - ATTENDING COMMENTS
I have reviewed the medical record, including laboratory and radiographic studies, interviewed and examined the patient and discussed the plan with Dr. Colvin, the ID Resident.  Agree with above. His mental status and communication are gradually improving.  Had Tm 100.7 on 8/16, no temps over 100 yet today, WBC trending down.  Pneumonia seen on CT A/P.  Sputum with many WBCs grew MSSA, Klebsiella variicola and Enterobacter cloaceae.  Would obtain CT chest, continue cefepime.  Will continue to follow with you – ID Team 1.

## 2020-08-17 NOTE — PROGRESS NOTE ADULT - ASSESSMENT
59M PMH of HLD and Hep B had acute onset of severe headache, dizziness and vomiting x1 today, was brought into [facility] with continued symptoms, as well as hypertension and multiple episodes of vomiting. Patient was intubated for airway protection with CT Head performed demonstrating a large left cerebellar hemorrhage. CTA Head/Neck performed is suggestive of underlying vascular pathology such as AVM. RIght frontal ventriculostomy placed at [facility] and patient transferred to [facility] for further work-up and s/p cerebral angio, negative for AVM and craniectomy evacuation of cerebellar hematoma on 8/4. On 8/6, patient had rising leukocytosis and was started on vanc and zosyn. On 8/10, he was febrile overnight and pancultured with sputum growing enterobacter cloacae and Klebsiella pneumonia. Zosyn was continue for 7 days today and discontinued yesterday. Now ID consulted for persistent high fevers starting on 8/13. Patient is extubated, not requiring pressors. Febrile to 100.7F last night with decreasing leukocytosis.     - 8/11 sputum culture growing enterobacter cloacae and Klebsiella pneumonia  - 8/14 blood cultures NGTD  - CXR clear  - UA negative  - reported diarrhea, GI PCR negative  - 8/16 CT A/P with probable pneumonia right lower lobe and trace right pneumonic effusion and mild noncalcified and calcified aortic atheromatous plaque.  - Fevers may be central in origin and related to EVD but given increase WBC upon discontinuation of antibiotics, now concerning for bacterial source.    - continue vancomycin 1g q12hrs, vanc tough before 4th dose, goal 13-17  - continue cefepime 2g q8hrs  - daily cbc with diff  - obtain HIV test    ID recs discussed with Dr. Hendricks. ID Team 1 will continue to follow. 59M PMH of HLD and Hep B had acute onset of severe headache, dizziness and vomiting x1 today, was brought into [facility] with continued symptoms, as well as hypertension and multiple episodes of vomiting. Patient was intubated for airway protection with CT Head performed demonstrating a large left cerebellar hemorrhage. CTA Head/Neck performed is suggestive of underlying vascular pathology such as AVM. RIght frontal ventriculostomy placed at [facility] and patient transferred to [facility] for further work-up and s/p cerebral angio, negative for AVM and craniectomy evacuation of cerebellar hematoma on 8/4. On 8/6, patient had rising leukocytosis and was started on vanc and zosyn. On 8/10, he was febrile overnight and pancultured with sputum growing enterobacter cloacae and Klebsiella pneumonia. Zosyn was continue for 7 days today and discontinued yesterday. Now ID consulted for persistent high fevers starting on 8/13. Patient is extubated, not requiring pressors. Febrile to 100.7F last night with decreasing leukocytosis.     - 8/11 sputum culture growing enterobacter cloacae and Klebsiella pneumonia  - 8/14 blood cultures NGTD  - CXR clear  - UA negative  - reported diarrhea, GI PCR negative  - 8/16 CT A/P with probable pneumonia right lower lobe and trace right pneumonic effusion and mild noncalcified and calcified aortic atheromatous plaque.  - recommend Chest CT  - Fevers may be central in origin and related to EVD but given increase WBC upon discontinuation of antibiotics, now concerning for bacterial source.    - discontinue vancomycin  - continue cefepime 2g q8hrs  - daily cbc with diff  - obtain HIV test    ID recs discussed with Dr. Hendricks. ID Team 1 will continue to follow. 59M PMH of HLD and Hep B had acute onset of severe headache, dizziness and vomiting x1 today, was brought into [facility] with continued symptoms, as well as hypertension and multiple episodes of vomiting. Patient was intubated for airway protection with CT Head performed demonstrating a large left cerebellar hemorrhage. CTA Head/Neck performed is suggestive of underlying vascular pathology such as AVM. RIght frontal ventriculostomy placed at [facility] and patient transferred to [facility] for further work-up and s/p cerebral angio, negative for AVM and craniectomy evacuation of cerebellar hematoma on 8/4. On 8/6, patient had rising leukocytosis and was started on vanc and zosyn. On 8/10, he was febrile overnight and pancultured with sputum growing enterobacter cloacae and Klebsiella pneumonia. Zosyn was continue for 7 days today and discontinued yesterday. Now ID consulted for persistent high fevers starting on 8/13. Patient is extubated, not requiring pressors. Febrile to 100.7F last night with decreasing leukocytosis.     - 8/11 sputum culture growing enterobacter cloacae and Klebsiella pneumonia  - 8/14 blood cultures NGTD  - CXR clear  - UA negative  - reported diarrhea, GI PCR negative  - 8/16 CT A/P with probable pneumonia right lower lobe and trace right pneumonic effusion and mild noncalcified and calcified aortic atheromatous plaque.  - recommend Chest CT  - Fevers may be central in origin and related to EVD but given increase WBC and high fever upon discontinuation of antibiotics, now better on vanc and cefepime -  concerning for bacterial source.    - discontinue vancomycin  - continue cefepime 2g q8hrs  - daily cbc with diff  - obtain HIV test    ID recs discussed with Dr. Hendricks. ID Team 1 will continue to follow.

## 2020-08-17 NOTE — PROGRESS NOTE ADULT - ASSESSMENT
59y/M with  1.	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	Hepatitis B  3.	dyslipidemia     PLAN:   NEURO: continued neurological improvement  seizure prophylaxis: s/p 1 week lopez ppx, gilberto santanappra  Goal normonatremia  REHAB:  physical therapy evaluation and management    EARLY MOB:  HOB up    PULM:  on room air, good oxygen saturations  CARDIO:  1)afib with RVR prior SBP goal 100-140mm Hg   -on metoprolol 12.5 mg BID --> bradycardic when in sinus, but asymptomatic, continue    ENDO:  Blood sugar goals 140-180 mg/dL, continue insulin sliding, continue statins; insulin glargine 15 nutritional 4-4-4-4    GI: diebetic tube feeds  -family agreed to PEG, OR on thursday    RENAL:  MURIEL    HEM/ONC: no coagulopathy, no h/o antiplatelet / anticoagulant use  VTE Prophylaxis: SCDs, SQL     ID: febrile, slowly improving leukocytosis; ID recommendations:  vancomycin, cefepime  -Cdiff negative  -Abd CT negative   -enterobacter from sputum  -continue vanc and cefepime per ID --> trough today  -chronic HBV    STable for SDU

## 2020-08-17 NOTE — DISCHARGE NOTE PROVIDER - NSDCMRMEDTOKEN_GEN_ALL_CORE_FT
Omega-3 350 mg oral capsule: 1 cap(s) orally once a day  tenofovir disoproxil fumarate 300 mg oral tablet: 1 tab(s) orally once a day acetaminophen 160 mg/5 mL oral suspension: 20 milliliter(s) orally every 6 hours, As Needed - 3)  acetylcysteine 20% inhalation solution: 4 milliliter(s) inhaled every 6 hours  amantadine 100 mg oral capsule: 1 cap(s) orally 2 times a day  chlorhexidine 2% topical pad: 1 application topically   doxazosin 4 mg oral tablet: 1 tab(s) orally once a day (at bedtime)  Elocon 0.1% topical ointment: Apply topically to affected area 2 times a day, As Needed  -for dry skin - for itching   enoxaparin: 40 milligram(s) subcutaneously once a day (at bedtime)  finasteride 5 mg oral tablet: 1 tab(s) orally once a day  ipratropium-albuterol 0.5 mg-2.5 mg/3 mLinhalation solution: 3 milliliter(s) inhaled every 6 hours  metoprolol: 12.5 milligram(s) by gastrostomy tube every 12 hours  ocular lubricant ophthalmic solution: 1 drop(s) to each affected eye 3 times a day  psyllium 3.4 g/7 g oral powder for reconstitution: 1 packet(s) by gastrostomy tube once a day  scopolamine: 1 milligram(s) transdermally every 72 hours  Sodium Chloride, Inhalation 0.9% inhalation solution: 3 milliliter(s) inhaled 3 times a day, As needed, for congestion/secretions  tenofovir disoproxil fumarate 300 mg oral tablet: 1 tab(s) orally every 24 hours acetaminophen 160 mg/5 mL oral suspension: 20 milliliter(s) orally every 6 hours, As Needed - 3)  acetylcysteine 20% inhalation solution: 4 milliliter(s) inhaled every 6 hours  chlorhexidine 2% topical pad: 1 application topically   doxazosin 4 mg oral tablet: 1 tab(s) orally once a day (at bedtime)  Elocon 0.1% topical ointment: Apply topically to affected area 2 times a day, As Needed  -for dry skin - for itching   enoxaparin: 40 milligram(s) subcutaneously once a day (at bedtime)  finasteride 5 mg oral tablet: 1 tab(s) orally once a day  ipratropium-albuterol 0.5 mg-2.5 mg/3 mLinhalation solution: 3 milliliter(s) inhaled every 6 hours  metoprolol: 12.5 milligram(s) by gastrostomy tube every 12 hours  ocular lubricant ophthalmic solution: 1 drop(s) to each affected eye 3 times a day  psyllium 3.4 g/7 g oral powder for reconstitution: 1 packet(s) by gastrostomy tube once a day  scopolamine: 1 milligram(s) transdermally every 72 hours  Sodium Chloride, Inhalation 0.9% inhalation solution: 3 milliliter(s) inhaled 3 times a day, As needed, for congestion/secretions  tenofovir disoproxil fumarate 300 mg oral tablet: 1 tab(s) orally every 24 hours

## 2020-08-17 NOTE — MEDICAL STUDENT PROGRESS NOTE(EDUCATION) - SUBJECTIVE AND OBJECTIVE BOX
S/Overnight events:      HPI:  59 year old male with PMH of HLD and unclear liver disease had acute onset of severe headache, dizziness and vomiting x1 today, was brought into [facility] with continued symptoms, as well as hypertension and multiple episodes of vomiting. Patient was intubated for airway protection with CT Head performed demonstrating a large left cerebellar hemorrhage. CTA Head/Neck performed is suggestive of underlying vascular pathology such as AVM. RIght frontal ventriculostomy placed at [facility] and patient transferred to [facility] for further work-up. Patient arrives to [facility] intubated, sedated and on Nicardipine drip. Patient's son provided history and denies any Aspirin or other anticoagulant use. Denies any prior history of hypertension, smoking, or ETOH abuse. (04 Aug 2020 05:01)    Hospital Course:   POD# 0 S/P cerebral angio, negative for AVM. s/p SOC craniectomy evacuation of cerebellar hematoma.   POD # 1:   overnight tachycardic, ekg without significant changes, lactate wnl, low grade fever, s/p tyl. bolus x 1L , NS increased to 100cc/hr; neuro improving.  EVD at 5 cmH2O   8/6 POD#3, BD#4, Tmax 100.6, Pan Cx 7/5 NGTD, Vanc/Zosyn until 8/10, Vanc trough due today @ 6pm, EEG = no seizure, Lasix overnight for Pulm congest and tachycard, ruff placed for strict I&Os, pending anemia w/u, EVD @ 5, CPAP trials, Card GTT for SBP<140, Started Norvasc for BP optimization  8/7:  8/8:  S/P cerebral angio, negative for AVM. s/p SOC craniectomy evacuation of cerebellar hematoma. No significant events overnight. On CPAP this morning   8/10: fevers overnight, resolved this morning, Pancultured, (+) Enterbacter, on CPAP this AM  8/11 POD#7 SOC, BD # 8, EVD replaced (hard pass), Neuro exam stable, EVD @ 5cm H2O, Zosyn for sputum + enterobact, Plan to extubated today, HCT in AM  8/12 POD#8 s/p SOC, BD#9, s/p EVD replacement (hard pass on 8/10/20), neuro exam stable, EVD @5cm H2O. Zosyn for enterobacter in sputum. O/n EVD stopped draining, flushed distally and proximally, waveform poor, neuro exam remained stable throughout, ICPs <8 prior.    8/13 EVD soft pass yesterday, spiked temp today and re cultured o/w neuro exam stable  8/14: EMIL overnight, neuro stable. Mucomyst for secretions overnight   8/15 EVD dc/d, temp spike to 104, pan culture, ID consulted, vanco and cefepime started   8/15 levo started, presumed sepsis from aspiration, continues to have diarrhea   8/17 failed speech and swallow eval; left vocal cord paralysis. OR Thursday for PEG. Neurologically stable.      Vital Signs Last 24 Hrs  T(C): 37 (17 Aug 2020 12:30), Max: 38.2 (16 Aug 2020 22:00)  T(F): 98.6 (17 Aug 2020 12:30), Max: 100.7 (16 Aug 2020 22:00)  HR: 77 (17 Aug 2020 12:30) (68 - 90)  BP: 124/62 (17 Aug 2020 12:30) (108/62 - 209/98)  BP(mean): 87 (17 Aug 2020 12:30) (77 - 141)  RR: 16 (17 Aug 2020 12:30) (8 - 20)  SpO2: 98% (17 Aug 2020 12:30) (95% - 99%)    I&O's Detail    16 Aug 2020 07:01  -  17 Aug 2020 07:00  --------------------------------------------------------  IN:    Enteral Tube Flush: 90 mL    IV PiggyBack: 350 mL    sodium chloride 0.9%: 100 mL    sodium chloride 0.9%.: 700 mL  Total IN: 1240 mL    OUT:    Incontinent per Condom Catheter: 2200 mL    Intermittent Catheterization - Urethral: 1700 mL    Stool: 200 mL  Total OUT: 4100 mL    Total NET: -2860 mL      17 Aug 2020 07:01  -  17 Aug 2020 13:17  --------------------------------------------------------  IN:    sodium chloride 0.9%.: 250 mL  Total IN: 250 mL    OUT:    Incontinent per Condom Catheter: 600 mL  Total OUT: 600 mL    Total NET: -350 mL        I&O's Summary    16 Aug 2020 07:01  -  17 Aug 2020 07:00  --------------------------------------------------------  IN: 1240 mL / OUT: 4100 mL / NET: -2860 mL    17 Aug 2020 07:01  -  17 Aug 2020 13:17  --------------------------------------------------------  IN: 250 mL / OUT: 600 mL / NET: -350 mL        PHYSICAL EXAM:  General: laying in bed, in NAD  Neurological: Awake and alert, opens eye spontaneously. Follows commands: shows 2 fingers and thumbs up. CN II-XII intact. MUNOZ x4   Cardiovascular: Regular rate and rhythm. (+) S1 S2  Respiratory: breathing comfortably on room air. clear to auscultation b/l  Gastrointestinal: nondistended. Soft, nontender  Extremities: nonedematous    Incision/Wound:    DEVICE/DRAIN DRESSING:    TUBES/LINES:  [] CVC  [] A-line  [] Lumbar Drain  [] Ventriculostomy  [] Other    DIET:  [] NPO  [] Mechanical  [x] Tube feeds    LABS:                        9.1    17.03 )-----------( 183      ( 17 Aug 2020 05:34 )             27.8     08-17    146<H>  |  115<H>  |  15  ----------------------------<  127<H>  3.1<L>   |  19<L>  |  0.67    Ca    7.9<L>      17 Aug 2020 05:34  Phos  2.1     08-17  Mg     2.4     08-17              CAPILLARY BLOOD GLUCOSE      POCT Blood Glucose.: 104 mg/dL (17 Aug 2020 10:39)  POCT Blood Glucose.: 131 mg/dL (17 Aug 2020 05:21)  POCT Blood Glucose.: 102 mg/dL (16 Aug 2020 23:51)  POCT Blood Glucose.: 91 mg/dL (16 Aug 2020 19:26)      Drug Levels: [] N/A    CSF Analysis: [] N/A      Allergies    No Known Allergies    Intolerances      MEDICATIONS:  Antibiotics:  cefepime   IVPB 2000 milliGRAM(s) IV Intermittent every 8 hours  tenofovir disoproxil fumarate (VIREAD) 300 milliGRAM(s) Oral every 24 hours  vancomycin  IVPB 1000 milliGRAM(s) IV Intermittent every 12 hours    Neuro:  acetaminophen    Suspension .. 650 milliGRAM(s) Oral every 6 hours PRN    Anticoagulation:  enoxaparin Injectable 40 milliGRAM(s) SubCutaneous every 24 hours    OTHER:  artificial tears (preservative free) Ophthalmic Solution 1 Drop(s) Both EYES three times a day  chlorhexidine 4% Liquid 1 Application(s) Topical <User Schedule>  dextrose 40% Gel 15 Gram(s) Oral once PRN  dextrose 50% Injectable 12.5 Gram(s) IV Push once  dextrose 50% Injectable 25 Gram(s) IV Push once  doxazosin 4 milliGRAM(s) Oral at bedtime  finasteride 5 milliGRAM(s) Oral daily  glucagon  Injectable 1 milliGRAM(s) IntraMuscular once PRN  glucagon  Injectable 1 milliGRAM(s) IntraMuscular once PRN  hydrALAZINE Injectable 10 milliGRAM(s) IV Push every 2 hours PRN  insulin glargine Injectable (LANTUS) 15 Unit(s) SubCutaneous every morning  insulin regular  human corrective regimen sliding scale.   SubCutaneous every 6 hours  insulin regular  human recombinant 4 Unit(s) SubCutaneous every 6 hours  metoprolol tartrate 12.5 milliGRAM(s) Oral every 12 hours  psyllium Powder 1 Packet(s) Oral daily    IVF:  dextrose 5%. 1000 milliLiter(s) IV Continuous <Continuous>  potassium chloride   Solution 40 milliEquivalent(s) Oral two times a day  sodium chloride 0.9%. 1000 milliLiter(s) IV Continuous <Continuous>    CULTURES:  Culture Results:   GI PCR Results: NOT detected  *******Please Note:*******  GI panel PCR evaluates for:  Campylobacter, Plesiomonas shigelloides, Salmonella,  Vibrio, Yersinia enterocolitica, Enteroaggregative  Escherichia coli (EAEC), Enteropathogenic E.coli (EPEC),  Enterotoxigenic E. coli (ETEC) lt/st, Shiga-like  toxin-producing E. coli (STEC) stx1/stx2,  Shigella/ Enteroinvasive E. coli (EIEC), Cryptosporidium,  Cyclospora cayetanensis, Entamoeba histolytica,  Giardia lamblia, Adenovirus F 40/41, Astrovirus,  Norovirus GI/GII, Rotavirus A, Sapovirus (08-16 @ 04:12)  Culture Results:   No enteric pathogens recovered. Specimen screened for  Salmonella, Shigella, Campylobacter, E.Coli 0157:H7 and Shiga-like  producing organisms. (08-15 @ 23:46)    RADIOLOGY & ADDITIONAL TESTS:      ASSESSMENT:  59y Male ICH/IVH s/p SOC crani POD #10 (8/4/20), s/p EVD placement, now removed.     INTRACRANIAL BLEED  No pertinent family history in first degree relatives  Handoff  Hepatitis B  HLD (hyperlipidemia)  ICH (intracerebral hemorrhage)  ICH (intracerebral hemorrhage)  Hepatitis B  HLD (hyperlipidemia)  HIV (human immunodeficiency virus infection)  Cerebellar hemorrhage  Craniectomy, subocciptal, with cervical laminectomy for medulla and spinal cord decompression  Percutaneous insertion of arterial line  Angiogram, carotid and cerebral vessels, bilateral  Foot fracture, left      PLAN:  NEURO:  - continue neuro and vital checks   - pain control  - d/c keppra    CARDIOVASCULAR:  - SBP goal 100-140 mmHg  - continue metoprolol     PULMONARY:  - no active issues, breathing comfortably on RA    RENAL:  - no active issues  - IVL  - Na goal 135-145    GI:  - continue tube feeds  - PEG Thursday    HEME:  - H/H stable     ID:  - febrile, leukocytosis improving  - Continue Cefepime and Vanco per ID    ENDO:  - Blood sugar goal 140-180 mg/dL  - ISS    DVT PROPHYLAXIS:  [x] Venodynes                                [x] Heparin/Lovenox    FALL RISK:  [] Low Risk                                    [] Impulsive    DISPOSITION:   - ready for step-down  - Full code  - d/w Dr. Blank & Dr. Carolina    Assessment:  Present when checked    []  GCS  E   V  M     Heart Failure: []Acute, [] acute on chronic , []chronic  Heart Failure:  [] Diastolic (HFpEF), [] Systolic (HFrEF), []Combined (HFpEF and HFrEF), [] RHF, [] Pulm HTN, [] Other    [] SARAH, [] ATN, [] AIN, [] other  [] CKD1, [] CKD2, [] CKD 3, [] CKD 4, [] CKD 5, []ESRD    Encephalopathy: [] Metabolic, [] Hepatic, [] toxic, [] Neurological, [] Other    Abnormal Nurtitional Status: [] malnurtition (see nutrition note), [ ]underweight: BMI < 19, [] morbid obesity: BMI >40, [] Cachexia    [] Sepsis  [] hypovolemic shock,[] cardiogenic shock, [] hemorrhagic shock, [] neuogenic shock  [] Acute Respiratory Failure  []Cerebral edema, [] Brain compression/ herniation,   [] Functional quadriplegia  [] Acute blood loss anemia

## 2020-08-17 NOTE — DISCHARGE NOTE PROVIDER - NSDCFUADDINST_GEN_ALL_CORE_FT
- You may wash normally with soap and water.  - Return to ER immediately for high fevers, severe headache, vomiting, lethargy or weakness  - Please call your neurosurgeon following discharge to make follow up appointment in 1 week after discharge unless otherwise specified. See contact information.  - Prescription post operative medication has been sent to [facility]. [facility] is located in the [facility]. All post operative prescriptions should be picked up before departing the hospital.  - Ambulate as tolerate. Continue with all "activities of daily living." Avoid strenuous activity or lifting more than 10 pounds until cleared for additional activity at your follow up appointment.  - Do not return to work or school until cleared by your neurosurgeon at your follow up visit unless specified to you during your hospital stay - You may wash normally with soap and water.  - Return to ER immediately for high fevers, severe headache, vomiting, lethargy or weakness  - Please call your neurosurgeon for any questions. See contact information.  - Prescription post operative medication has been sent to [facility]. [facility] is located in the [facility]. All post operative prescriptions should be picked up before departing the hospital unless going to rehab.  - Ambulate as tolerated. Continue with all "activities of daily living." Avoid strenuous activity or lifting more than 10 pounds until cleared for additional activity at your follow up appointment.  - Do not return to work or school until cleared by your neurosurgeon at your follow up visit unless specified to you during your hospital stay

## 2020-08-17 NOTE — DISCHARGE NOTE PROVIDER - PROVIDER TOKENS
PROVIDER:[TOKEN:[[id number]],FOLLOWUP:[1 month]],PROVIDER:[TOKEN:[[id number]],FOLLOWUP:[1 month]]

## 2020-08-17 NOTE — DISCHARGE NOTE PROVIDER - CARE PROVIDER_API CALL
Bryan Blank)  Neurosurgery  [address], [address] [place] [address]  Phone: [phone]  Fax: [phone]  Follow Up Time: 1 month    Rachelle Musa  OTOLARYNGOLOGY  [address] [place] [address]  Phone: [phone]  Fax: [phone]  Follow Up Time: 1 month

## 2020-08-17 NOTE — DISCHARGE NOTE PROVIDER - NSDCFUADDAPPT_GEN_ALL_CORE_FT
Needs to follow up with ENT s/p Vocal Chord Injection 8/26. Needs to follow up with ENT s/p Vocal Chord Injection 8/26.    The patient will follow-up on 9/15/2020 at 3PM with Sabrina. The patient needs to call with insurance information. Needs to follow up with ENT s/p Vocal Chord Injection done on 8/26.    The patient will follow-up on 9/15/2020 at 3PM with Sabrina. The patient needs to call with insurance information.

## 2020-08-17 NOTE — DISCHARGE NOTE PROVIDER - NSDCFUSCHEDAPPT_GEN_ALL_CORE_FT
VEDA WARREN ; 09/15/2020 ; [facility] [address] [place] [address] VEDA WARREN ; 09/15/2020 ; [facility] [address] [place] [address] VEDA WARREN ; 09/15/2020 ; [facility] [address] [place] [address] VEDA WARREN ; 09/15/2020 ; [facility] [address] [place] [address]

## 2020-08-17 NOTE — CHART NOTE - NSCHARTNOTEFT_GEN_A_CORE
Admitting Diagnosis:   Patient is a 59y old  Male who presents with a chief complaint of Left cerebellar intraparenchymal hemorrhage (17 Aug 2020 10:47)    PAST MEDICAL & SURGICAL HISTORY:  Hepatitis B  HLD (hyperlipidemia)  Foot fracture, left: s/p repair 2019    Current Nutrition Order: NPO    PO Intake: Good (%) [   ]  Fair (50-75%) [   ] Poor (<25%) [   ]-N/A    GI Issues: no vomiting noted, unable to assess for nausea 2/2 clinical status, +rectal tube (200mL output x24h), metamucil added on 8/12, rectal tube output improving    Pain: none apparent at this time    Skin Integrity: Zachariah score 14, erythema to R buttock    Labs:   08-17    146<H>  |  115<H>  |  15  ----------------------------<  127<H>  3.1<L>   |  19<L>  |  0.67    Ca    7.9<L>      17 Aug 2020 05:34  Phos  2.1     08-17  Mg     2.4     08-17    CAPILLARY BLOOD GLUCOSE  POCT Blood Glucose.: 104 mg/dL (17 Aug 2020 10:39)  POCT Blood Glucose.: 131 mg/dL (17 Aug 2020 05:21)  POCT Blood Glucose.: 102 mg/dL (16 Aug 2020 23:51)  POCT Blood Glucose.: 91 mg/dL (16 Aug 2020 19:26)  POCT Blood Glucose.: 88 mg/dL (16 Aug 2020 11:40)    Medications:  MEDICATIONS  (STANDING):  artificial tears (preservative free) Ophthalmic Solution 1 Drop(s) Both EYES three times a day  cefepime   IVPB 2000 milliGRAM(s) IV Intermittent every 8 hours  chlorhexidine 4% Liquid 1 Application(s) Topical <User Schedule>  dextrose 5%. 1000 milliLiter(s) (50 mL/Hr) IV Continuous <Continuous>  dextrose 50% Injectable 12.5 Gram(s) IV Push once  dextrose 50% Injectable 25 Gram(s) IV Push once  doxazosin 4 milliGRAM(s) Oral at bedtime  enoxaparin Injectable 40 milliGRAM(s) SubCutaneous every 24 hours  finasteride 5 milliGRAM(s) Oral daily  insulin glargine Injectable (LANTUS) 15 Unit(s) SubCutaneous every morning  insulin regular  human corrective regimen sliding scale.   SubCutaneous every 6 hours  insulin regular  human recombinant 4 Unit(s) SubCutaneous every 6 hours  metoprolol tartrate 12.5 milliGRAM(s) Oral every 12 hours  potassium chloride   Solution 40 milliEquivalent(s) Oral two times a day  psyllium Powder 1 Packet(s) Oral daily  sodium chloride 0.9%. 1000 milliLiter(s) (50 mL/Hr) IV Continuous <Continuous>  tenofovir disoproxil fumarate (VIREAD) 300 milliGRAM(s) Oral every 24 hours  vancomycin  IVPB 1000 milliGRAM(s) IV Intermittent every 12 hours    MEDICATIONS  (PRN):  acetaminophen    Suspension .. 650 milliGRAM(s) Oral every 6 hours PRN Temp greater or equal to 38C (100.4F), Mild Pain (1 - 3)  dextrose 40% Gel 15 Gram(s) Oral once PRN Blood Glucose LESS THAN 70 milliGRAM(s)/deciliter  glucagon  Injectable 1 milliGRAM(s) IntraMuscular once PRN Glucose LESS THAN 70 milligrams/deciliter  glucagon  Injectable 1 milliGRAM(s) IntraMuscular once PRN Glucose LESS THAN 70 milligrams/deciliter  hydrALAZINE Injectable 10 milliGRAM(s) IV Push every 2 hours PRN SBP > 140  sodium chloride 0.9% lock flush 10 milliLiter(s) IV Push every 1 hour PRN Pre/post blood products, medications, blood draw, and to maintain line patency    Weight:  59.6kg (8/4)    Weight Change: No updated weights since 8/4. Recommend obtain current weight and trend bi-weekly weights for further assessment.    Admitted Anthropometrics:  Ht (8/6 per RN): 157.48cm, Wt (8/4): 59.6kg, IBW: 50kg+/-10%, %IBW: 119%, BMI: 24.0    Nutrition Focused Physical Exam: Completed [   ]  Unable to complete [   ]-N/A    Estimated energy needs:  ABW (59.6kg) used to calculate energy needs due to pt's current body weight within % IBW.  Needs adjusted for post-op, hypermetabolic state. Aim for higher end of kcal range.    5755-4445 kcal (25-30 kcal/kg IBW)  72-83gm protein (1.2-1.4gm/kg IBW)  Fluid needs per team    Subjective: 59 year old male with PMH of HLD and unclear liver disease had acute onset of severe headache, dizziness and vomiting x1 today, was brought into [facility] with continued symptoms, as well as hypertension and multiple episodes of vomiting. Pt was intubated for airway protection with CT Head performed demonstrating a large left cerebellar hemorrhage. CTA Head/Neck performed is suggestive of underlying vascular pathology such as AVM. RIght frontal ventriculostomy placed at [facility] and patient transferred to [facility] for further work-up. S/P cerebral angio, negative for AVM and s/p SOC craniectomy evacuation of cerebellar hematoma on 8/4, s/p EVD placement. Pt assessed this AM, extubated on 8/11. Per RN, pt following some commands but not conversive at this time. Pt remains on TF as he has failed multiple SLP evals. Per chart, family hesitant to PEG placement but team plans to speak with family regarding PEG as pt continues to fail SLP evals. Feeds off this AM as pt pulled out NGT last night, new NGT placed with placement pending. Please see below for full nutritional recommendations- d/w team. RD to monitor and f/u per protocol.    Nutrition Diagnosis:  Increased nutrient need RT increased protein demand AEB post-op, vented-N/A    PES: Increased nutrient need RT increased protein demand AEB post-op    Goal: Meet >/=75%EER via most appropriate route with good tolerance    Recommendations:  1. As medically appropriate, recommend Jevity 1.2 start at 20mL/hr increase 10-20mL q4h to goal 60mL/hr x24h via NGT to best meet pt's needs at this time (1440mL TV, 1728 kcal, 79gm protein, 1162mL free water, 140% RDI, ~29 kcal/kg IBW, ~1.3gm protein/kg IBW)  >>order for volume based feeding protocol, monitor tolerance, maintain aspiration precautions  >>water flushes per team  2. Recommend repeat SLP eval as medically appropriate  3. Monitor labs (BMP, lytes, [id number]); replete lytes prn  4. Obtain current weight and trend bi-weekly weights  5. Continue Metamucil as needed    Education: N/A 2/2 pt's clinical status    Risk Level: High [ X ]  Moderate [   ] Low [   ]

## 2020-08-17 NOTE — DISCHARGE NOTE PROVIDER - NSDCCPTREATMENT_GEN_ALL_CORE_FT
PRINCIPAL PROCEDURE  Procedure: Craniectomy, suboccipital, with cervical laminectomy  Findings and Treatment: with evacuation of hemorrhage on 8/4/2020

## 2020-08-17 NOTE — PROGRESS NOTE ADULT - SUBJECTIVE AND OBJECTIVE BOX
INTERVAL HPI/OVERNIGHT EVENTS:    OVERNIGHT: No overnight events.    SUBJECTIVE: Patient seen and examined at bedside. Appears alert but disorient and unable to participate in ROS.      Allergies    No Known Allergies    Intolerances        ANTIBIOTICS/RELEVANT:  antimicrobials  cefepime   IVPB 2000 milliGRAM(s) IV Intermittent every 8 hours  tenofovir disoproxil fumarate (VIREAD) 300 milliGRAM(s) Oral every 24 hours  vancomycin  IVPB 1000 milliGRAM(s) IV Intermittent every 12 hours    immunologic:    OTHER:  acetaminophen    Suspension .. 650 milliGRAM(s) Oral every 6 hours PRN  artificial tears (preservative free) Ophthalmic Solution 1 Drop(s) Both EYES three times a day  chlorhexidine 4% Liquid 1 Application(s) Topical <User Schedule>  dextrose 40% Gel 15 Gram(s) Oral once PRN  dextrose 5%. 1000 milliLiter(s) IV Continuous <Continuous>  dextrose 50% Injectable 12.5 Gram(s) IV Push once  dextrose 50% Injectable 25 Gram(s) IV Push once  doxazosin 4 milliGRAM(s) Oral at bedtime  enoxaparin Injectable 40 milliGRAM(s) SubCutaneous every 24 hours  finasteride 5 milliGRAM(s) Oral daily  glucagon  Injectable 1 milliGRAM(s) IntraMuscular once PRN  glucagon  Injectable 1 milliGRAM(s) IntraMuscular once PRN  hydrALAZINE Injectable 10 milliGRAM(s) IV Push every 2 hours PRN  insulin glargine Injectable (LANTUS) 15 Unit(s) SubCutaneous every morning  insulin regular  human corrective regimen sliding scale.   SubCutaneous every 6 hours  insulin regular  human recombinant 4 Unit(s) SubCutaneous every 6 hours  metoprolol tartrate 12.5 milliGRAM(s) Oral every 12 hours  potassium chloride   Solution 40 milliEquivalent(s) Oral two times a day  psyllium Powder 1 Packet(s) Oral daily  sodium chloride 0.9% lock flush 10 milliLiter(s) IV Push every 1 hour PRN  sodium chloride 0.9%. 1000 milliLiter(s) IV Continuous <Continuous>      Objective:  Vital Signs Last 24 Hrs  T(C): 37.3 (17 Aug 2020 14:23), Max: 38.2 (16 Aug 2020 22:00)  T(F): 99.1 (17 Aug 2020 14:23), Max: 100.7 (16 Aug 2020 22:00)  HR: 76 (17 Aug 2020 14:00) (65 - 90)  BP: 122/58 (17 Aug 2020 14:00) (108/62 - 209/98)  BP(mean): 84 (17 Aug 2020 14:00) (77 - 141)  RR: 19 (17 Aug 2020 14:00) (8 - 20)  SpO2: 98% (17 Aug 2020 14:00) (95% - 99%)    PHYSICAL EXAM:    Constitutional: alert, minimally interactive, tracks, appears confused, NAD, comfortable in bed,   HEENT: stapales on head with site c/d/i, drain with serosanguinous fluid, PERRLA, EOMI, no conjunctival pallor or scleral icterus, MMM  Neck: Supple, no JVD  Respiratory: CTA B/L. No w/r/r.   Cardiovascular: RRR, normal S1 and S2, no m/r/g.   Gastrointestinal: +BS, soft NTND, no guarding or rebound tenderness, no palpable masses   Extremities: wwp; no cyanosis, clubbing or edema.   Vascular: Pulses equal and strong throughout.   Neurological: AAOx0, minimally interactive, reacts to name, tracks, nonverbal  Skin: No gross skin abnormalities or rashes          LABS:                        9.1    17.03 )-----------( 183      ( 17 Aug 2020 05:34 )             27.8     08-17    146<H>  |  115<H>  |  15  ----------------------------<  127<H>  3.1<L>   |  19<L>  |  0.67    Ca    7.9<L>      17 Aug 2020 05:34  Phos  2.1     08-17  Mg     2.4     08-17            MICROBIOLOGY:            RECENT CULTURES:  08-16 @ 04:12  .Stool Feces  --  --  --    GI PCR Results: NOT detected  *******Please Note:*******  GI panel PCR evaluates for:  Campylobacter, Plesiomonas shigelloides, Salmonella,  Vibrio, Yersinia enterocolitica, Enteroaggregative  Escherichia coli (EAEC), Enteropathogenic E.coli (EPEC),  Enterotoxigenic E. coli (ETEC) lt/st, Shiga-like  toxin-producing E. coli (STEC) stx1/stx2,  Shigella/ Enteroinvasive E. coli (EIEC), Cryptosporidium,  Cyclospora cayetanensis, Entamoeba histolytica,  Giardia lamblia, Adenovirus F 40/41, Astrovirus,  Norovirus GI/GII, Rotavirus A, Sapovirus  --  08-15 @ 23:46  .Stool Feces  --  --  --    No enteric pathogens recovered. Specimen screened for  Salmonella, Shigella, Campylobacter, E.Coli 0157:H7 and Shiga-like  producing organisms.  --  08-14 @ 14:14  .Sputum Sputum  Klebsiella variicola  Enterobacter cloacae  Staphylococcus aureus  Klebsiella variicola  MARKELL    Moderate Klebsiella variicola  Moderate Enterobacter cloacae  Moderate Staphylococcus aureus  Accompanied with Normal Respiratory Aliza  --  08-14 @ 13:25  .Blood Blood  --  --  --    No growth at 3 days.  --  08-14 @ 13:18  .CSF CSF  --  --  --    No growth to date  --  08-13 @ 15:58  .CSF CSF  --  --  --    No growth to date  --  08-13 @ 15:50  .Blood Blood  --  --  --    No growth at 3 days.  --  08-11 @ 17:29  .Sputum Sputum  Enterobacter cloacae complex  Klebsiella species  Enterobacter cloacae complex  MARKELL    Moderate Enterobacter cloacae complex  Moderate Klebsiella pneumoniae / variicola  Accompanied by normal respiratory aliza  --  08-11 @ 15:24  .Blood Blood-Peripheral  --  --  --    No growth at 5 days.  --  08-11 @ 09:11  .CSF CSF  --  --  --    No growth to date  --      RADIOLOGY & ADDITIONAL STUDIES:

## 2020-08-17 NOTE — PROCEDURE NOTE - GENERAL INDICATIONS
fever
fever
EVD malfunction
no longer required
NGT replacement
clotted off External Ventricular Drain
Fevers

## 2020-08-17 NOTE — PROGRESS NOTE ADULT - SUBJECTIVE AND OBJECTIVE BOX
=============================================   NEUROCRITICAL CARE ATTENDING NOTE (2020)  =============================================    VEDA WARREN   MRN-[id number]  HPI: 59M with dyslipidemia, h/o Hep B infection, presented with acute onset severe HA, dizziness, vomiting - brought to [facility] where CT showed L cerebellar hemorrhage.  Intubated for airway protection.  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  R EVD inserted, transferred to [facility] for further management.  Given mannitol 25G in [facility].    Past Medical History: Hepatitis B HLD (hyperlipidemia)  Allergies:  No Known Allergies  Home meds:   ·	Omega-3 350 mg oral capsule: 1 cap(s) orally once a day  ·	tenofovir disoproxil fumarate 300 mg oral tablet: 1 tab(s) orally once a day      COURSE IN THE HOSPITAL:   bleed   admitted to [facility], 23.4 given x1; OR for SOC evacuation of ICH   remained intubated overnight   tachycardia overnight, Tmax 38.4, ?PNA, ABx    Tmax 38.1 diuresed overnight 1.8L.     No significant events overnight.  CPAP this morning   Tmax 39.7 (enterobacter sputum, on zosyn)  08/10 Trial of CPAP   Neurologically stable, for extubation.   Extubated.  Neurologically stable.   Neurologically stable.   Febrile overnight.  Suctioned for secretions.  Continued neurological improvement.  08/15 Hypotensive overnight, resolved this morning.  Neurologically stable.  Continued diarrhea.   No significant events.    24h events: febrile overnight.  failed repeat swallow eval this AM.       NEUROLOGIC EXAMINATION:    MSE: opens eyes spontaneously, follows, appropriate  CN: pupils 3 mm reactive bilaterally, modest dysarthria, weak cough, tongue midline    EENT:  anicteric  CARDIOVASCULAR: (+) S1 S2, normal rate and regular rhythm  PULMONARY: clear to auscultation bilaterally  ABDOMEN: soft, nontender with normoactive bowel sounds  EXTREMITIES: no edema  SKIN: no rash    Allergies: No Known Allergies      EXAM:  VITALS:  T(C): 36.9 (20 @ 10:30), Max: 38.2 (20 @ 22:00)  HR: 73 (20 @ 09:00) (68 - 90)  BP: 108/62 (20 @ 09:00) (108/62 - 209/98)  RR: 16 (20 @ 09:00) (8 - 20)  SpO2: 98% (20 @ 09:00) (95% - 99%)    MEDICATIONS:  acetaminophen    Suspension .. 650 milliGRAM(s) Oral every 6 hours PRN  artificial tears (preservative free) Ophthalmic Solution 1 Drop(s) Both EYES three times a day  atorvastatin 40 milliGRAM(s) Oral at bedtime  cefepime   IVPB 2000 milliGRAM(s) IV Intermittent every 8 hours  chlorhexidine 4% Liquid 1 Application(s) Topical <User Schedule>  dextrose 40% Gel 15 Gram(s) Oral once PRN  dextrose 5%. 1000 milliLiter(s) IV Continuous <Continuous>  dextrose 50% Injectable 12.5 Gram(s) IV Push once  dextrose 50% Injectable 25 Gram(s) IV Push once  doxazosin 4 milliGRAM(s) Oral at bedtime  enoxaparin Injectable 40 milliGRAM(s) SubCutaneous every 24 hours  finasteride 5 milliGRAM(s) Oral daily  glucagon  Injectable 1 milliGRAM(s) IntraMuscular once PRN  glucagon  Injectable 1 milliGRAM(s) IntraMuscular once PRN  hydrALAZINE Injectable 10 milliGRAM(s) IV Push every 2 hours PRN  insulin glargine Injectable (LANTUS) 15 Unit(s) SubCutaneous every morning  insulin regular  human corrective regimen sliding scale.   SubCutaneous every 6 hours  insulin regular  human recombinant 4 Unit(s) SubCutaneous every 6 hours  levETIRAcetam  Solution 1000 milliGRAM(s) Oral two times a day  metoprolol tartrate 12.5 milliGRAM(s) Oral every 12 hours  potassium chloride   Solution 40 milliEquivalent(s) Oral two times a day  psyllium Powder 1 Packet(s) Oral daily  sodium chloride 0.9% lock flush 10 milliLiter(s) IV Push every 1 hour PRN  sodium chloride 0.9%. 1000 milliLiter(s) IV Continuous <Continuous>  tenofovir disoproxil fumarate (VIREAD) 300 milliGRAM(s) Oral every 24 hours  vancomycin  IVPB 1000 milliGRAM(s) IV Intermittent every 12 hours      I/O's    20 @ 07:01  -  20 @ 07:00  --------------------------------------------------------  IN: 1240 mL / OUT: 4100 mL / NET: -2860 mL    20 @ 07:01  -  20 @ 10:49  --------------------------------------------------------  IN: 50 mL / OUT: 0 mL / NET: 50 mL          LABS:                          9.1    17.03 )-----------( 183      ( 17 Aug 2020 05:34 )             27.8     08-17    146<H>  |  115<H>  |  15  ----------------------------<  127<H>  3.1<L>   |  19<L>  |  0.67    Ca    7.9<L>      17 Aug 2020 05:34  Phos  2.1     -  Mg     2.4     08-17              CAPILLARY BLOOD GLUCOSE      POCT Blood Glucose.: 104 mg/dL (17 Aug 2020 10:39)  POCT Blood Glucose.: 131 mg/dL (17 Aug 2020 05:21)  POCT Blood Glucose.: 102 mg/dL (16 Aug 2020 23:51)  POCT Blood Glucose.: 91 mg/dL (16 Aug 2020 19:26)  POCT Blood Glucose.: 88 mg/dL (16 Aug 2020 11:40)            Culture - Sputum . (20 @ 14:14)   Specimen Source: .Sputum Sputum   Culture Results:   Moderate Klebsiella variicola  Moderate Enterobacter cloacae  Moderate Staphylococcus aureus  Rare Yeast    -  Amikacin: S <=16    -  Ampicillin: I 16 These ampicillin results predict results for amoxicillin    -  Ampicillin: R >16 These ampicillin results predict results for amoxicillin    -  Ampicillin/Sulbactam: S <=4/2 Enterobacter, Citrobacter, and Serratia may develop resistance during prolonged therapy (3-4 days)    -  Ampicillin/Sulbactam: R >16/8 Enterobacter, Citrobacter, and Serratia may develop resistance during prolonged therapy (3-4 days)    -  Aztreonam: S <=4    -  Cefazolin: S <=2 Enterobacter, Citrobacter, and Serratia may develop resistance during prolonged therapy (3-4 days)    -  Cefazolin: R >16 Enterobacter, Citrobacter, and Serratia may develop resistance during prolonged therapy (3-4 days)    -  Cefazolin: S <=4    -  Cefepime: S <=2    -  Cefepime: S <=2    -  Cefotaxime: S <=2    -  Cefoxitin: S <=8    -  Ceftazidime: S <=1    -  Trimethoprim/Sulfamethoxazole: S <=0.5/9.5    -  Trimethoprim/Sulfamethoxazole: S <=0.5/9.5    -  Trimethoprim/Sulfamethoxazole: S <=0.5/9.5    -  Vancomycin: S 2    -  Ceftriaxone: S <=1 Enterobacter, Citrobacter, and Serratia may develop resistance during prolonged therapy    -  Ceftriaxone: R 32 Enterobacter, Citrobacter, and Serratia may develop resistance during prolonged therapy    -  Cefuroxime: S <=4    -  Ciprofloxacin: S <=0.25    -  Clindamycin: S <=0.25    -  Ertapenem: S <=0.5    -  Erythromycin: S <=0.25    -  Gentamicin: S <=2    -  Gentamicin: S <=2    -  Levofloxacin: S <=0.5    -  Linezolid: S 2    -  Meropenem: S <=1    -  Meropenem: S <=1    -  Oxacillin: S <=0.25    -  Piperacillin/Tazobactam: S <=8    -  Piperacillin/Tazobactam: I 32    -  RIF- Rifampin: S <=1 Should not be used as monotherapy    -  Tigecycline: S <=2    -  Tobramycin: S <=2    -  Tobramycin: S <=2    Culture - CSF with Gram Stain (collected 20 @ 15:58)  Source: .CSF CSF  Gram Stain (20 @ 16:20):    No organisms seen    No WBC's seen.  CSF lactate 6.3  CSF WBC:RBC = 0.002    Culture - Blood (collected 20 @ 15:50)  Source: .Blood Blood  Preliminary Report (20 @ 04:01):    No growth at 12 hours    Culture - Blood (collected 20 @ 15:50)  Source: .Blood Blood  Preliminary Report (20 @ 04:01):    No growth at 12 hours    Bacteriology:   sputum: enterobacter cloacae, klebsiella pneumoniae    CSF no organisms   sputum CS: enterobacter cloacae S: piperacillin/tazobactam    CSF NGTD   Blood CS NG5D x2   UA NEG    CSF studies:    L   *** [id number] *** %N31 %L5     Neuroimagin/11 CT head:  Right frontal EVD catheter as above. The ventricles are stable in size from prior examination. No new or increasing intracranial hemorrhage or mass effect.  08/10 CT head:  Compared to 2020, there is improving intraventricular hemorrhage, resolution of intraventricular gas, and mild decrease in ventricular size. Postsurgical change and edema in the posterior fossa is approximately stable.   CT head:  post-surgical changes, stable HCP  Other imagin/04 LE doppler: NEG   CXR:  clear   CXR:  mild atelectasis L lung base  08/10 CXR:  Small left pleural effusion, unchanged.    IV FLUIDS: IV NS at 50 mL/hour  DRIPS:  DIET: NG feeds held (ENT left vocal cord complete immobility)     EVD at 7cm H20  35cc/hr   EVD at 5cm H20 ICP 2-5 68cc/24h   EVD at 5cm H20    EVD at 5cm H20 ICP 1-3 202cc/24h     EVD at 5 cm H20 ICP 1-4 output 194cc/24h   EVD at 5 cm H20 ICP 0 to 5 output 280cc/24h  08/10 EVD at 5 cm H20 ICP 5 to 10 output 5-20cc/h   EVD at 5 cm H20 ICP < 10 output 167cc/24h   EVD at 0 cm H20 ICP 1-6 output 5-20 cc/h   EVD at 0 cm H20 ICP < 6 (flattened waveform occasionally) output 210 cc/24 h   EVD removed    CODE STATUS:  Full Code                       GOALS OF CARE:  aggressive                      DISPOSITION:  ICU  ICH Score on admission  = 4, NIHSS

## 2020-08-17 NOTE — DISCHARGE NOTE PROVIDER - HOSPITAL COURSE
HPI:    59 year old male with PMH of HLD and unclear liver disease had acute onset of severe headache, dizziness and vomiting x1 today, was brought into [facility] with continued symptoms, as well as hypertension and multiple episodes of vomiting. Patient was intubated for airway protection with CT Head performed demonstrating a large left cerebellar hemorrhage. CTA Head/Neck performed is suggestive of underlying vascular pathology such as AVM. RIght frontal ventriculostomy placed at [facility] and patient transferred to [facility] for further work-up. Patient arrives to [facility] intubated, sedated and on Nicardipine drip. Patient's son provided history and denies any Aspirin or other anticoagulant use. Denies any prior history of hypertension, smoking, or ETOH abuse. (04 Aug 2020 05:01)    Hospital Course:     POD# 0 S/P cerebral angio, negative for AVM. s/p SOC craniectomy evacuation of cerebellar hematoma.     POD # 1:   overnight tachycardic, ekg without significant changes, lactate wnl, low grade fever, s/p tyl. bolus x 1L , NS increased to 100cc/hr; neuro improving.  EVD at 5 cmH2O     8/6 POD#3, BD#4, Tmax 100.6, Pan Cx 7/5 NGTD, Vanc/Zosyn until 8/10, Vanc trough due today @ 6pm, EEG = no seizure, Lasix overnight for Pulm congest and tachycard, ruff placed for strict I&Os, pending anemia w/u, EVD @ 5, CPAP trials, Card GTT for SBP<140, Started Norvasc for BP optimization    8/7:    8/8:  S/P cerebral angio, negative for AVM. s/p SOC craniectomy evacuation of cerebellar hematoma. No significant events overnight. On CPAP this morning     8/10: fevers overnight, resolved this morning, Pancultured, (+) Enterbacter, on CPAP this AM    8/11 POD#7 SOC, BD # 8, EVD replaced (hard pass), Neuro exam stable, EVD @ 5cm H2O, Zosyn for sputum + enterobact, Plan to extubated today, HCT in AM    8/12 POD#8 s/p SOC, BD#9, s/p EVD replacement (hard pass on 8/10/20), neuro exam stable, EVD @5cm H2O. Zosyn for enterobacter in sputum. O/n EVD stopped draining, flushed distally and proximally, waveform poor, neuro exam remained stable throughout, ICPs <8 prior.      8/13 EVD soft pass yesterday, spiked temp today and re cultured o/w neuro exam stable    8/14: EMIL overnight, neuro stable. Mucomyst for secretions overnight     8/15 EVD dc/d, temp spike to 104, pan culture, ID consulted, vanco and cefepime started     8/15 levo started, presumed sepsis from aspiration, continues to have diarrhea    8/16: EMIL    8/17: EMIL. Neuro exam stable    8/18: Increased secretions overnight. Chest xray obtained. NGT replaced. Neuro exam stable. Patient transferred to ICU for monitoring due to congestion and possible L effusion on CXR along with desaturation and frequent suctioning.     8/19: POD16, on EEG, EMIL o/n, transferred to 5E, plan for PEG tomorrow    8/20 POD#17 overnight given Keppra 1g IV x 1 dose for blank stare and EEG findings: pending final read on EEG, Cefep for PNA, PEG today, NPO, Amonia Serum level today w/u for enceph, vEEG,     8/21 POD#18, EMIL overnight, PEG placed, start TF 9 AM today, Neuro exam stable    8/22 POD #19 EMIL overnight, repeat CT head stable.    8/23: POD#20 EMIL overnight     8/24: POD21. EMIL o/n, neuro stable. on vEEG. vanc/cefepime for possible PNA. requiring frequent suctioning     8/25: POD22. EMIL o/n, neuro stable. off vEEG (was negative). on vanc/cefepime PNA coverage. pre op for vocal cord injection w/ ENT today    8/26: POD#23. OR yesterday with ENT for left vocal cord injection. Cranial sutures removed yesterday. Remains on vanco (dose increased to 1500 bid) and cefepime for empiric PNA coverage.    8/27: POD#24 EMIL overnight. Oral secretions improving. Remains on PNA antibiotic coverage    8/28: POD#25 transferred to SDU. No acute events pending rehab    8/29: POD#26 Off of voice rest today. Tolerates PEG feeds. NO fevers overnight    8/30: POD#27 EMIL overnight. Neuro exam stable. Afebrile o/n    8/31 POD#28 EMIL overnight, Ancef BLAINE Sputum until 9/1 as per ID, AR placement pending    9/1: POD#29 EMIL o/n. Ancef completed.  Afebrile.        Vital Signs Last 24 Hrs    T(C): 36.8 (31 Aug 2020 22:00), Max: 37.7 (31 Aug 2020 05:00)    T(F): 98.2 (31 Aug 2020 22:00), Max: 99.9 (31 Aug 2020 05:00)    HR: 84 (01 Sep 2020 02:30) (78 - 86)    BP: 85/59 (01 Sep 2020 02:30) (85/59 - 136/65)    BP(mean): 69 (01 Sep 2020 02:30) (69 - 100)    RR: 15 (01 Sep 2020 02:30) (15 - 16)    SpO2: 92% (01 Sep 2020 02:30) (92% - 99%)        I&O's Summary        30 Aug 2020 07:01  -  31 Aug 2020 07:00    --------------------------------------------------------    IN: 1468 mL / OUT: 1804 mL / NET: -336 mL        31 Aug 2020 07:01  -  01 Sep 2020 03:50    --------------------------------------------------------    IN: 770 mL / OUT: 1100 mL / NET: -330 mL        PHYSICAL EXAM:        GEN: laying in bed, appears well, NAD    NEURO: alert, no tracking. not FC, OE spont, verbal (hypophonic voice,) face symmetric. CNII-XII intact. PERRL, EOMI. MAEx4 with good strength    CARD: RRR +S1/S2    PULM: +rhonchi    GI: Abd soft, NT/ND    EXT: ext warm, dry, nontender    WOUND: SOC site c/d/i        TUBES/LINES:    pivs        DIET:    [] NPO    [] Mechanical    [x Tube feeds            LABS:                               9.0      10.32 )-----------( 437      ( 01 Sep 2020 05:58 )               29.3         09-01        139  |  100  |  15    ----------------------------<  127<H>    4.2   |  28  |  0.62        Ca    9.0      01 Sep 2020 05:57    Phos  4.1     09-01    Mg     2.5     09-01            **Needs a moderate insulin sliding scale for diabetes, check blood sugar [id number] while on tube feeds.

## 2020-08-17 NOTE — DISCHARGE NOTE PROVIDER - NSDCCPCAREPLAN_GEN_ALL_CORE_FT
PRINCIPAL DISCHARGE DIAGNOSIS  Diagnosis: Cerebellar hemorrhage  Assessment and Plan of Treatment: s/p suboccipital craniectomy and evacuation of hemorrhage on 8/4/2020

## 2020-08-17 NOTE — DISCHARGE NOTE PROVIDER - CARE PROVIDERS DIRECT ADDRESSES
[email or web address]

## 2020-08-18 LAB
ANION GAP SERPL CALC-SCNC: 11 MMOL/L — SIGNIFICANT CHANGE UP (ref 5–17)
ANION GAP SERPL CALC-SCNC: 13 MMOL/L — SIGNIFICANT CHANGE UP (ref 5–17)
BUN SERPL-MCNC: 14 MG/DL — SIGNIFICANT CHANGE UP (ref 7–23)
BUN SERPL-MCNC: 15 MG/DL — SIGNIFICANT CHANGE UP (ref 7–23)
CALCIUM SERPL-MCNC: 8 MG/DL — LOW (ref 8.4–10.5)
CALCIUM SERPL-MCNC: 8.2 MG/DL — LOW (ref 8.4–10.5)
CHLORIDE SERPL-SCNC: 111 MMOL/L — HIGH (ref 96–108)
CHLORIDE SERPL-SCNC: 112 MMOL/L — HIGH (ref 96–108)
CO2 SERPL-SCNC: 20 MMOL/L — LOW (ref 22–31)
CO2 SERPL-SCNC: 24 MMOL/L — SIGNIFICANT CHANGE UP (ref 22–31)
CREAT SERPL-MCNC: 0.58 MG/DL — SIGNIFICANT CHANGE UP (ref 0.5–1.3)
CREAT SERPL-MCNC: 0.68 MG/DL — SIGNIFICANT CHANGE UP (ref 0.5–1.3)
CULTURE RESULTS: SIGNIFICANT CHANGE UP
GLUCOSE BLDC GLUCOMTR-MCNC: 101 MG/DL — HIGH (ref 70–99)
GLUCOSE BLDC GLUCOMTR-MCNC: 103 MG/DL — HIGH (ref 70–99)
GLUCOSE BLDC GLUCOMTR-MCNC: 119 MG/DL — HIGH (ref 70–99)
GLUCOSE BLDC GLUCOMTR-MCNC: 123 MG/DL — HIGH (ref 70–99)
GLUCOSE BLDC GLUCOMTR-MCNC: 87 MG/DL — SIGNIFICANT CHANGE UP (ref 70–99)
GLUCOSE BLDC GLUCOMTR-MCNC: 90 MG/DL — SIGNIFICANT CHANGE UP (ref 70–99)
GLUCOSE SERPL-MCNC: 87 MG/DL — SIGNIFICANT CHANGE UP (ref 70–99)
GLUCOSE SERPL-MCNC: 97 MG/DL — SIGNIFICANT CHANGE UP (ref 70–99)
HCT VFR BLD CALC: 26.9 % — LOW (ref 39–50)
HGB BLD-MCNC: 8.4 G/DL — LOW (ref 13–17)
HIV 1+2 AB+HIV1 P24 AG SERPL QL IA: SIGNIFICANT CHANGE UP
MAGNESIUM SERPL-MCNC: 2.4 MG/DL — SIGNIFICANT CHANGE UP (ref 1.6–2.6)
MCHC RBC-ENTMCNC: 28.4 PG — SIGNIFICANT CHANGE UP (ref 27–34)
MCHC RBC-ENTMCNC: 31.2 GM/DL — LOW (ref 32–36)
MCV RBC AUTO: 90.9 FL — SIGNIFICANT CHANGE UP (ref 80–100)
NRBC # BLD: 0 /100 WBCS — SIGNIFICANT CHANGE UP (ref 0–0)
PHOSPHATE SERPL-MCNC: 2.4 MG/DL — LOW (ref 2.5–4.5)
PLATELET # BLD AUTO: 138 K/UL — LOW (ref 150–400)
POTASSIUM SERPL-MCNC: 3.4 MMOL/L — LOW (ref 3.5–5.3)
POTASSIUM SERPL-MCNC: 3.8 MMOL/L — SIGNIFICANT CHANGE UP (ref 3.5–5.3)
POTASSIUM SERPL-SCNC: 3.4 MMOL/L — LOW (ref 3.5–5.3)
POTASSIUM SERPL-SCNC: 3.8 MMOL/L — SIGNIFICANT CHANGE UP (ref 3.5–5.3)
RBC # BLD: 2.96 M/UL — LOW (ref 4.2–5.8)
RBC # FLD: 15.6 % — HIGH (ref 10.3–14.5)
SODIUM SERPL-SCNC: 145 MMOL/L — SIGNIFICANT CHANGE UP (ref 135–145)
SODIUM SERPL-SCNC: 146 MMOL/L — HIGH (ref 135–145)
SPECIMEN SOURCE: SIGNIFICANT CHANGE UP
WBC # BLD: 14.93 K/UL — HIGH (ref 3.8–10.5)
WBC # FLD AUTO: 14.93 K/UL — HIGH (ref 3.8–10.5)

## 2020-08-18 PROCEDURE — 99232 SBSQ HOSP IP/OBS MODERATE 35: CPT

## 2020-08-18 PROCEDURE — 70450 CT HEAD/BRAIN W/O DYE: CPT | Mod: 26

## 2020-08-18 PROCEDURE — 99024 POSTOP FOLLOW-UP VISIT: CPT

## 2020-08-18 PROCEDURE — 71045 X-RAY EXAM CHEST 1 VIEW: CPT | Mod: 26

## 2020-08-18 RX ORDER — MOMETASONE FUROATE 1 MG/G
1 CREAM TOPICAL
Qty: 1 | Refills: 0
Start: 2020-08-18 | End: 2020-08-24

## 2020-08-18 RX ORDER — SODIUM,POTASSIUM PHOSPHATES 278-250MG
1 POWDER IN PACKET (EA) ORAL EVERY 6 HOURS
Refills: 0 | Status: COMPLETED | OUTPATIENT
Start: 2020-08-18 | End: 2020-08-18

## 2020-08-18 RX ORDER — SODIUM CHLORIDE 9 MG/ML
3 INJECTION INTRAMUSCULAR; INTRAVENOUS; SUBCUTANEOUS THREE TIMES A DAY
Refills: 0 | Status: DISCONTINUED | OUTPATIENT
Start: 2020-08-18 | End: 2020-09-02

## 2020-08-18 RX ORDER — SCOPALAMINE 1 MG/3D
1 PATCH, EXTENDED RELEASE TRANSDERMAL ONCE
Refills: 0 | Status: COMPLETED | OUTPATIENT
Start: 2020-08-18 | End: 2020-08-18

## 2020-08-18 RX ORDER — FUROSEMIDE 40 MG
20 TABLET ORAL ONCE
Refills: 0 | Status: COMPLETED | OUTPATIENT
Start: 2020-08-18 | End: 2020-08-18

## 2020-08-18 RX ORDER — ACETYLCYSTEINE 200 MG/ML
4 VIAL (ML) MISCELLANEOUS THREE TIMES A DAY
Refills: 0 | Status: DISCONTINUED | OUTPATIENT
Start: 2020-08-18 | End: 2020-08-20

## 2020-08-18 RX ORDER — FUROSEMIDE 40 MG
20 TABLET ORAL ONCE
Refills: 0 | Status: DISCONTINUED | OUTPATIENT
Start: 2020-08-18 | End: 2020-08-18

## 2020-08-18 RX ADMIN — Medication 1 PACKET(S): at 11:53

## 2020-08-18 RX ADMIN — Medication 12.5 MILLIGRAM(S): at 05:44

## 2020-08-18 RX ADMIN — SCOPALAMINE 1 PATCH: 1 PATCH, EXTENDED RELEASE TRANSDERMAL at 10:36

## 2020-08-18 RX ADMIN — Medication 1 APPLICATION(S): at 19:57

## 2020-08-18 RX ADMIN — Medication 1 DROP(S): at 22:40

## 2020-08-18 RX ADMIN — TENOFOVIR DISOPROXIL FUMARATE 300 MILLIGRAM(S): 300 TABLET, FILM COATED ORAL at 22:36

## 2020-08-18 RX ADMIN — CEFEPIME 100 MILLIGRAM(S): 1 INJECTION, POWDER, FOR SOLUTION INTRAMUSCULAR; INTRAVENOUS at 22:37

## 2020-08-18 RX ADMIN — Medication 4 MILLIGRAM(S): at 22:37

## 2020-08-18 RX ADMIN — Medication 1 APPLICATION(S): at 05:49

## 2020-08-18 RX ADMIN — FINASTERIDE 5 MILLIGRAM(S): 5 TABLET, FILM COATED ORAL at 11:53

## 2020-08-18 RX ADMIN — Medication 1 DROP(S): at 05:44

## 2020-08-18 RX ADMIN — CEFEPIME 100 MILLIGRAM(S): 1 INJECTION, POWDER, FOR SOLUTION INTRAMUSCULAR; INTRAVENOUS at 05:42

## 2020-08-18 RX ADMIN — Medication 20 MILLIGRAM(S): at 10:36

## 2020-08-18 RX ADMIN — Medication 1 PACKET(S): at 19:57

## 2020-08-18 RX ADMIN — INSULIN GLARGINE 15 UNIT(S): 100 INJECTION, SOLUTION SUBCUTANEOUS at 10:31

## 2020-08-18 RX ADMIN — SCOPALAMINE 1 PATCH: 1 PATCH, EXTENDED RELEASE TRANSDERMAL at 19:07

## 2020-08-18 RX ADMIN — ENOXAPARIN SODIUM 40 MILLIGRAM(S): 100 INJECTION SUBCUTANEOUS at 22:37

## 2020-08-18 RX ADMIN — INSULIN HUMAN 4 UNIT(S): 100 INJECTION, SOLUTION SUBCUTANEOUS at 11:53

## 2020-08-18 RX ADMIN — Medication 1 DROP(S): at 14:15

## 2020-08-18 RX ADMIN — Medication 4 MILLILITER(S): at 11:53

## 2020-08-18 RX ADMIN — CHLORHEXIDINE GLUCONATE 1 APPLICATION(S): 213 SOLUTION TOPICAL at 05:40

## 2020-08-18 RX ADMIN — Medication 12.5 MILLIGRAM(S): at 19:58

## 2020-08-18 NOTE — PROGRESS NOTE ADULT - SUBJECTIVE AND OBJECTIVE BOX
Consulted for PEG tube placement.  Patient's family had questions regarding the procedure.  I spoke with the patient's son Perry via telephone ([phone]) for 10 minutes to discuss the procedure, risks, benefits, and alternatives to the EGD and PEG placement. Consulted for PEG tube placement.  Patient's family had questions regarding the procedure.  I spoke with the patient's son Perry via telephone ([phone]) for 10 minutes to discuss the procedure, risks, benefits, and alternatives to the EGD and PEG placement.  Will plan for EGD, PEG placement on Thursday 8/20 either bedside (if patient is still in SICU) or in the OR (if he has been transferred to telemetry).

## 2020-08-18 NOTE — PROVIDER CONTACT NOTE (OTHER) - ASSESSMENT
Patient has a low grade temp of 99.9 sweating from face/nose.  Silk tape and dobhoff appears lower than it was initially at start of shift.
EVD patent and draining. ICP WDL. Neuro status remains at baseline.
Neuro status remains at baseline.
Pt req more noxious/painful stimuli to arouse, but continues to follow commands. Pupils, equal and reactive. Moves all extremities.
vss, oral and nose suction done

## 2020-08-18 NOTE — PROGRESS NOTE ADULT - ATTENDING COMMENTS
I have reviewed the medical record, including laboratory and radiographic studies, examined the patient and discussed the plan with Dr. Laurent, the ID Resident.  Agree with above. Please recall if further ID input is desired – ID Team 1.

## 2020-08-18 NOTE — PROVIDER CONTACT NOTE (CHANGE IN STATUS NOTIFICATION) - ACTION/TREATMENT ORDERED:
4 pt restraints prn for safety.
Awaiting neurosurgery PA to assess patient
Straight cathx1. 1025cc. HR 80 post straight cath. AMBROSIO Pearl at bedside
Straight cathx1. 1025cc. HR 80 post straight cath. AMBROSIO Pearl at bedside

## 2020-08-18 NOTE — PROVIDER CONTACT NOTE (OTHER) - ACTION/TREATMENT ORDERED:
To be resecured/taped and CXRAY to follow.
Cooling blanket and ice packs placed. Tylenol IVPB given. Pan cx to be completed.
EVD flushed by AMBROSIO Sun at bedside. Pt to receive HCT.
No interventions at present. Will continue to monitor.
Hold tube feeds for two hours and I will come to assess the patient.

## 2020-08-18 NOTE — PROGRESS NOTE ADULT - SUBJECTIVE AND OBJECTIVE BOX
INTERVAL HPI/OVERNIGHT EVENTS: Purulent secretions noted requiring suctioning.    OVERNIGHT: No overnight events.  SUBJECTIVE: Patient seen and examined at bedside. Pt awake, able to move all extremities on command otherwise unable to elicit ROS.    REVIEW OF SYSTEMS:  Unable to elicit.      Allergies    No Known Allergies    Intolerances        ANTIBIOTICS/RELEVANT:  antimicrobials  cefepime   IVPB 2000 milliGRAM(s) IV Intermittent every 8 hours  tenofovir disoproxil fumarate (VIREAD) 300 milliGRAM(s) Oral every 24 hours    immunologic:    OTHER:  acetaminophen    Suspension .. 650 milliGRAM(s) Oral every 6 hours PRN  acetylcysteine 20%  Inhalation 4 milliLiter(s) Inhalation three times a day PRN  artificial tears (preservative free) Ophthalmic Solution 1 Drop(s) Both EYES three times a day  chlorhexidine 4% Liquid 1 Application(s) Topical <User Schedule>  dextrose 40% Gel 15 Gram(s) Oral once PRN  dextrose 5%. 1000 milliLiter(s) IV Continuous <Continuous>  dextrose 50% Injectable 12.5 Gram(s) IV Push once  dextrose 50% Injectable 25 Gram(s) IV Push once  doxazosin 4 milliGRAM(s) Oral at bedtime  enoxaparin Injectable 40 milliGRAM(s) SubCutaneous every 24 hours  finasteride 5 milliGRAM(s) Oral daily  glucagon  Injectable 1 milliGRAM(s) IntraMuscular once PRN  glucagon  Injectable 1 milliGRAM(s) IntraMuscular once PRN  hydrALAZINE Injectable 10 milliGRAM(s) IV Push every 2 hours PRN  hydrocortisone 1% Cream 1 Application(s) Topical two times a day  insulin glargine Injectable (LANTUS) 15 Unit(s) SubCutaneous every morning  insulin regular  human corrective regimen sliding scale.   SubCutaneous every 6 hours  insulin regular  human recombinant 4 Unit(s) SubCutaneous every 6 hours  metoprolol tartrate 12.5 milliGRAM(s) Oral every 12 hours  Mometasone Furoate 0.1% Ointment 1 Application(s) 1 Application(s) Topical two times a day PRN  potassium phosphate / sodium phosphate Powder (PHOS-NaK) 1 Packet(s) Oral every 6 hours  psyllium Powder 1 Packet(s) Oral daily  sodium chloride 0.9% for Nebulization 3 milliLiter(s) Nebulizer three times a day PRN  sodium chloride 0.9% lock flush 10 milliLiter(s) IV Push every 1 hour PRN      Objective:  Vital Signs Last 24 Hrs  T(C): 37.6 (18 Aug 2020 15:00), Max: 37.6 (18 Aug 2020 15:00)  T(F): 99.7 (18 Aug 2020 15:00), Max: 99.7 (18 Aug 2020 15:00)  HR: 107 (18 Aug 2020 16:00) (66 - 113)  BP: 153/73 (18 Aug 2020 16:00) (102/56 - 153/73)  BP(mean): 104 (18 Aug 2020 16:00) (71 - 104)  RR: 28 (18 Aug 2020 16:00) (12 - 33)  SpO2: 98% (18 Aug 2020 16:00) (87% - 98%)      PHYSICAL EXAM:    Constitutional: WDWN laying in bed; NAD  Head: Anterior scalp sutures in place  Eyes: PERRLA, clear conjunctiva  ENT: no nasal discharge  Neck: supple; no JVD or thyromegaly  Respiratory: CTA B/L; no W/R/R, no retractions  Cardiac: +S1/S2; RRR; no M/R/G; PMI non-displaced  Gastrointestinal: soft, NT/ND; no rebound or guarding; +BS, no hepatosplenomegaly  Extremities: WWP, no clubbing or cyanosis; no peripheral edema  Vascular: 2+ radial, DP/PT pulses B/L  Skin: no rash  Neurologic: Awake, moves all 4 extremities on command        LABS:                        8.4    14.93 )-----------( 138      ( 18 Aug 2020 05:51 )             26.9     08-18    145  |  112<H>  |  14  ----------------------------<  97  3.8   |  20<L>  |  0.58    Ca    8.0<L>      18 Aug 2020 05:51  Phos  2.4     08-18  Mg     2.4     08-18            MICROBIOLOGY:            RECENT CULTURES:  08-16 @ 04:12  .Stool Feces  --  --  --    GI PCR Results: NOT detected  *******Please Note:*******  GI panel PCR evaluates for:  Campylobacter, Plesiomonas shigelloides, Salmonella,  Vibrio, Yersinia enterocolitica, Enteroaggregative  Escherichia coli (EAEC), Enteropathogenic E.coli (EPEC),  Enterotoxigenic E. coli (ETEC) lt/st, Shiga-like  toxin-producing E. coli (STEC) stx1/stx2,  Shigella/ Enteroinvasive E. coli (EIEC), Cryptosporidium,  Cyclospora cayetanensis, Entamoeba histolytica,  Giardia lamblia, Adenovirus F 40/41, Astrovirus,  Norovirus GI/GII, Rotavirus A, Sapovirus  --  08-15 @ 23:46  .Stool Feces  --  --  --    No enteric pathogens recovered. Specimen screened for  Salmonella, Shigella, Campylobacter, E.Coli 0157:H7 and Shiga-like  producing organisms.  --  08-14 @ 14:14  .Sputum Sputum  Klebsiella variicola  Enterobacter cloacae  Staphylococcus aureus  Klebsiella variicola  MARKELL    Moderate Klebsiella variicola  Moderate Enterobacter cloacae  Moderate Staphylococcus aureus  Accompanied with Normal Respiratory Lynne  --  08-14 @ 13:25  .Blood Blood  --  --  --    No growth at 4 days.  --  08-14 @ 13:18  .CSF CSF  --  --  --    No growth to date  --  08-13 @ 15:58  .CSF CSF  --  --  --    No growth to date  --  08-13 @ 15:50  .Blood Blood  --  --  --    No growth at 5 days.  --      RADIOLOGY & ADDITIONAL STUDIES:

## 2020-08-18 NOTE — PROGRESS NOTE ADULT - SUBJECTIVE AND OBJECTIVE BOX
NP Note Neurosurgery Addendum    On AM rounds 8AM pt arousable to noxious stimuli  NO verbalization following simple commands  Sent to head CT WNL  CXR revealedLLE effusion  Non productive cought  O2 sats WNL Nasal suctioning frequently  Added mucomist, and Lasix 20mg IVX1 and will follow up with PM Electrolytes NP Note Neurosurgery Addendum    On AM rounds 8AM pt arousable to noxious stimuli  NO verbalization following simple commands  Sent to head CT WNL  CXR revealedLLE effusion  Non productive cought  O2 sats WNL Nasal suctioning frequently  Added mucomist, and Lasix 20mg IVX1 and will follow up with PM Electrolytes    Throught out the day and now at 12:30pm Desaturation O2 placing hime on face tent, Chest PT and q30-60 mins suctioning   Pt will be transferred to ICU for further managment  Attending aware

## 2020-08-18 NOTE — PROGRESS NOTE ADULT - SUBJECTIVE AND OBJECTIVE BOX
HPI:  59 year old male with PMH of HLD and unclear liver disease had acute onset of severe headache, dizziness and vomiting x1 today, was brought into [facility] with continued symptoms, as well as hypertension and multiple episodes of vomiting. Patient was intubated for airway protection with CT Head performed demonstrating a large left cerebellar hemorrhage. CTA Head/Neck performed is suggestive of underlying vascular pathology such as AVM. RIght frontal ventriculostomy placed at [facility] and patient transferred to [facility] for further work-up. Patient arrives to [facility] intubated, sedated and on Nicardipine drip. Patient's son provided history and denies any Aspirin or other anticoagulant use. Denies any prior history of hypertension, smoking, or ETOH abuse. (04 Aug 2020 05:01)    Hospital Course:   POD# 0 S/P cerebral angio, negative for AVM. s/p SOC craniectomy evacuation of cerebellar hematoma.   POD # 1:   overnight tachycardic, ekg without significant changes, lactate wnl, low grade fever, s/p tyl. bolus x 1L , NS increased to 100cc/hr; neuro improving.  EVD at 5 cmH2O   8/6 POD#3, BD#4, Tmax 100.6, Pan Cx 7/5 NGTD, Vanc/Zosyn until 8/10, Vanc trough due today @ 6pm, EEG = no seizure, Lasix overnight for Pulm congest and tachycard, ruff placed for strict I&Os, pending anemia w/u, EVD @ 5, CPAP trials, Card GTT for SBP<140, Started Norvasc for BP optimization  8/7:  8/8:  S/P cerebral angio, negative for AVM. s/p SOC craniectomy evacuation of cerebellar hematoma. No significant events overnight. On CPAP this morning   8/10: fevers overnight, resolved this morning, Pancultured, (+) Enterbacter, on CPAP this AM  8/11 POD#7 SOC, BD # 8, EVD replaced (hard pass), Neuro exam stable, EVD @ 5cm H2O, Zosyn for sputum + enterobact, Plan to extubated today, HCT in AM  8/12 POD#8 s/p SOC, BD#9, s/p EVD replacement (hard pass on 8/10/20), neuro exam stable, EVD @5cm H2O. Zosyn for enterobacter in sputum. O/n EVD stopped draining, flushed distally and proximally, waveform poor, neuro exam remained stable throughout, ICPs <8 prior.    8/13 EVD soft pass yesterday, spiked temp today and re cultured o/w neuro exam stable  8/14: EMIL overnight, neuro stable. Mucomyst for secretions overnight   8/15 EVD dc/d, temp spike to 104, pan culture, ID consulted, vanco and cefepime started   8/15 levo started, presumed sepsis from aspiration, continues to have diarrhea  8/16: EMIL  8/17: EMIL. Neuro exam stable  8/18: Increased secretions overnight. Chest xray obtained. NGT replaced. Neuro exam stable.     Vital Signs Last 24 Hrs  T(C): 36.5 (17 Aug 2020 22:00), Max: 37.4 (17 Aug 2020 17:17)  T(F): 97.7 (17 Aug 2020 22:00), Max: 99.3 (17 Aug 2020 17:17)  HR: 76 (18 Aug 2020 00:10) (65 - 90)  BP: 131/64 (18 Aug 2020 00:10) (108/62 - 168/70)  BP(mean): 90 (18 Aug 2020 00:10) (77 - 103)  RR: 19 (18 Aug 2020 00:10) (8 - 24)  SpO2: 97% (18 Aug 2020 00:10) (96% - 99%)    I&O's Summary    16 Aug 2020 07:01  -  17 Aug 2020 07:00  --------------------------------------------------------  IN: 1240 mL / OUT: 4100 mL / NET: -2860 mL    17 Aug 2020 07:01  -  18 Aug 2020 00:51  --------------------------------------------------------  IN: 640 mL / OUT: 1050 mL / NET: -410 mL      PHYSICAL EXAM:  Gen: laying in hospital bed, NAD  Neuro: Awake and alert, nonverbal, OE spont, FC (shows 2 fingers, thumbs up, wiggles toes)  CN II-XII: PERRL, EOMI  Motor: MAEx4 with good strength  Crani site C/D/I    TUBES/LINES:  [] CVC  [] A-line  [] Lumbar Drain  [] Ventriculostomy  [] Other    DIET:  [] NPO  [] Mechanical  [x] Tube feeds: NGT    LABS:                        9.1    17.03 )-----------( 183      ( 17 Aug 2020 05:34 )             27.8     08-17    146<H>  |  115<H>  |  15  ----------------------------<  127<H>  3.1<L>   |  19<L>  |  0.67    Ca    7.9<L>      17 Aug 2020 05:34  Phos  2.1     08-17  Mg     2.4     08-17              CAPILLARY BLOOD GLUCOSE      POCT Blood Glucose.: 132 mg/dL (17 Aug 2020 18:23)  POCT Blood Glucose.: 104 mg/dL (17 Aug 2020 10:39)  POCT Blood Glucose.: 131 mg/dL (17 Aug 2020 05:21)      Drug Levels: [] N/A  Vancomycin Level, Trough: 5.9 ug/mL (08-17 @ 13:25)    CSF Analysis: [] N/A      Allergies    No Known Allergies    Intolerances      MEDICATIONS:  Antibiotics:  cefepime   IVPB 2000 milliGRAM(s) IV Intermittent every 8 hours  tenofovir disoproxil fumarate (VIREAD) 300 milliGRAM(s) Oral every 24 hours    Neuro:  acetaminophen    Suspension .. 650 milliGRAM(s) Oral every 6 hours PRN    Anticoagulation:  enoxaparin Injectable 40 milliGRAM(s) SubCutaneous every 24 hours    OTHER:  artificial tears (preservative free) Ophthalmic Solution 1 Drop(s) Both EYES three times a day  chlorhexidine 4% Liquid 1 Application(s) Topical <User Schedule>  dextrose 40% Gel 15 Gram(s) Oral once PRN  dextrose 50% Injectable 12.5 Gram(s) IV Push once  dextrose 50% Injectable 25 Gram(s) IV Push once  doxazosin 4 milliGRAM(s) Oral at bedtime  finasteride 5 milliGRAM(s) Oral daily  glucagon  Injectable 1 milliGRAM(s) IntraMuscular once PRN  glucagon  Injectable 1 milliGRAM(s) IntraMuscular once PRN  hydrALAZINE Injectable 10 milliGRAM(s) IV Push every 2 hours PRN  hydrocortisone 1% Cream 1 Application(s) Topical two times a day  insulin glargine Injectable (LANTUS) 15 Unit(s) SubCutaneous every morning  insulin regular  human corrective regimen sliding scale.   SubCutaneous every 6 hours  insulin regular  human recombinant 4 Unit(s) SubCutaneous every 6 hours  metoprolol tartrate 12.5 milliGRAM(s) Oral every 12 hours  psyllium Powder 1 Packet(s) Oral daily    IVF:  dextrose 5%. 1000 milliLiter(s) IV Continuous <Continuous>  sodium chloride 0.9%. 1000 milliLiter(s) IV Continuous <Continuous>    CULTURES:  Culture Results:   GI PCR Results: NOT detected  *******Please Note:*******  GI panel PCR evaluates for:  Campylobacter, Plesiomonas shigelloides, Salmonella,  Vibrio, Yersinia enterocolitica, Enteroaggregative  Escherichia coli (EAEC), Enteropathogenic E.coli (EPEC),  Enterotoxigenic E. coli (ETEC) lt/st, Shiga-like  toxin-producing E. coli (STEC) stx1/stx2,  Shigella/ Enteroinvasive E. coli (EIEC), Cryptosporidium,  Cyclospora cayetanensis, Entamoeba histolytica,  Giardia lamblia, Adenovirus F 40/41, Astrovirus,  Norovirus GI/GII, Rotavirus A, Sapovirus (08-16 @ 04:12)  Culture Results:   No enteric pathogens recovered. Specimen screened for  Salmonella, Shigella, Campylobacter, E.Coli 0157:H7 and Shiga-like  producing organisms. (08-15 @ 23:46)    RADIOLOGY & ADDITIONAL TESTS:      ASSESSMENT:  59y Male with ICH/IVH s/p SOC crani POD #14 (8/4/20), s/p EVD placement, removed (8/14/20)    INTRACRANIAL BLEED  No pertinent family history in first degree relatives  Handoff  MEWS Score  Hepatitis B  HLD (hyperlipidemia)  ICH (intracerebral hemorrhage)  ICH (intracerebral hemorrhage)  Hepatitis B  HLD (hyperlipidemia)  HIV (human immunodeficiency virus infection)  Cerebellar hemorrhage  Craniectomy, subocciptal, with cervical laminectomy for medulla and spinal cord decompression  Percutaneous insertion of arterial line  Angiogram, carotid and cerebral vessels, bilateral  Foot fracture, left      PLAN:  NEURO:  - neuro checks  - vitals checks  - pain control   - cont Amantadine    CARDIOVASCULAR:  - -140  - cont Losartan, Amlodipine     PULMONARY:  - on room air, no issues    RENAL:  - IVL  - cont Proscar, Cardura     GI:  - TF via NGT,  - rectal tube  - pending peg placement Thursday?    HEME:  - H/H stable    ID:  - appreciate ID input, continue cefepime, and f/u work up   - f/u pan cx 8/14    ENDO:  - ISS, Lantus, Lispro     DVT PROPHYLAXIS:  [x] Venodynes                                [x] Heparin/Lovenox    DISPOSITION: ICU status, full code, dispo pending

## 2020-08-18 NOTE — PROGRESS NOTE ADULT - ASSESSMENT
59M PMH of HLD and Hep B had acute onset of severe headache, dizziness and vomiting x1 today, was brought into [facility] with continued symptoms, as well as hypertension and multiple episodes of vomiting. Patient was intubated for airway protection with CT Head performed demonstrating a large left cerebellar hemorrhage. CTA Head/Neck performed is suggestive of underlying vascular pathology such as AVM. RIght frontal ventriculostomy placed at [facility] and patient transferred to [facility] for further work-up and s/p cerebral angio, negative for AVM and craniectomy evacuation of cerebellar hematoma on 8/4. On 8/6, patient had rising leukocytosis and was started on vanc and zosyn. On 8/10, he was febrile overnight and pancultured with sputum growing enterobacter cloacae and Klebsiella pneumonia. Zosyn was continue for 7 days today and discontinued yesterday. Now ID consulted for persistent high fevers starting on 8/13. Patient is extubated, not requiring pressors. Afebrile overnight with decreasing leukocytosis. CT chest 8/17 showing RLL consolidation.    - 8/11 sputum culture growing enterobacter cloacae and Klebsiella pneumonia  - 8/14 blood cultures NGTD  - reported diarrhea, GI PCR negative  - HIV negative  - 8/17 CT chest with patchy consolidation within the superior segment of the right lower lobe, trace bilateral pleural effusions, and underlying bibasilar atelectasis  - Continue cefepime 2g q8hrs for a total of 7 days (8/14 - 8/20) for RLL pneumonia    ID recs discussed with Dr. Hendricks. ID Team 1 to sign off, please call back as needed.

## 2020-08-19 ENCOUNTER — TRANSCRIPTION ENCOUNTER (OUTPATIENT)
Age: 60
End: 2020-08-19

## 2020-08-19 LAB
ANION GAP SERPL CALC-SCNC: 14 MMOL/L — SIGNIFICANT CHANGE UP (ref 5–17)
BASOPHILS # BLD AUTO: 0.04 K/UL — SIGNIFICANT CHANGE UP (ref 0–0.2)
BASOPHILS NFR BLD AUTO: 0.2 % — SIGNIFICANT CHANGE UP (ref 0–2)
BUN SERPL-MCNC: 19 MG/DL — SIGNIFICANT CHANGE UP (ref 7–23)
CALCIUM SERPL-MCNC: 8.8 MG/DL — SIGNIFICANT CHANGE UP (ref 8.4–10.5)
CHLORIDE SERPL-SCNC: 110 MMOL/L — HIGH (ref 96–108)
CO2 SERPL-SCNC: 22 MMOL/L — SIGNIFICANT CHANGE UP (ref 22–31)
CREAT SERPL-MCNC: 0.66 MG/DL — SIGNIFICANT CHANGE UP (ref 0.5–1.3)
CULTURE RESULTS: SIGNIFICANT CHANGE UP
EOSINOPHIL # BLD AUTO: 0.03 K/UL — SIGNIFICANT CHANGE UP (ref 0–0.5)
EOSINOPHIL NFR BLD AUTO: 0.1 % — SIGNIFICANT CHANGE UP (ref 0–6)
GLUCOSE BLDC GLUCOMTR-MCNC: 110 MG/DL — HIGH (ref 70–99)
GLUCOSE BLDC GLUCOMTR-MCNC: 88 MG/DL — SIGNIFICANT CHANGE UP (ref 70–99)
GLUCOSE BLDC GLUCOMTR-MCNC: 95 MG/DL — SIGNIFICANT CHANGE UP (ref 70–99)
GLUCOSE SERPL-MCNC: 80 MG/DL — SIGNIFICANT CHANGE UP (ref 70–99)
HCT VFR BLD CALC: 31 % — LOW (ref 39–50)
HGB BLD-MCNC: 10 G/DL — LOW (ref 13–17)
IMM GRANULOCYTES NFR BLD AUTO: 1 % — SIGNIFICANT CHANGE UP (ref 0–1.5)
LYMPHOCYTES # BLD AUTO: 1.64 K/UL — SIGNIFICANT CHANGE UP (ref 1–3.3)
LYMPHOCYTES # BLD AUTO: 7.6 % — LOW (ref 13–44)
MAGNESIUM SERPL-MCNC: 2.7 MG/DL — HIGH (ref 1.6–2.6)
MCHC RBC-ENTMCNC: 28.5 PG — SIGNIFICANT CHANGE UP (ref 27–34)
MCHC RBC-ENTMCNC: 32.3 GM/DL — SIGNIFICANT CHANGE UP (ref 32–36)
MCV RBC AUTO: 88.3 FL — SIGNIFICANT CHANGE UP (ref 80–100)
MONOCYTES # BLD AUTO: 0.83 K/UL — SIGNIFICANT CHANGE UP (ref 0–0.9)
MONOCYTES NFR BLD AUTO: 3.9 % — SIGNIFICANT CHANGE UP (ref 2–14)
NEUTROPHILS # BLD AUTO: 18.79 K/UL — HIGH (ref 1.8–7.4)
NEUTROPHILS NFR BLD AUTO: 87.2 % — HIGH (ref 43–77)
NRBC # BLD: 0 /100 WBCS — SIGNIFICANT CHANGE UP (ref 0–0)
PHOSPHATE SERPL-MCNC: 2.6 MG/DL — SIGNIFICANT CHANGE UP (ref 2.5–4.5)
PLATELET # BLD AUTO: 183 K/UL — SIGNIFICANT CHANGE UP (ref 150–400)
POTASSIUM SERPL-MCNC: 4 MMOL/L — SIGNIFICANT CHANGE UP (ref 3.5–5.3)
POTASSIUM SERPL-SCNC: 4 MMOL/L — SIGNIFICANT CHANGE UP (ref 3.5–5.3)
RBC # BLD: 3.51 M/UL — LOW (ref 4.2–5.8)
RBC # FLD: 15.7 % — HIGH (ref 10.3–14.5)
SODIUM SERPL-SCNC: 146 MMOL/L — HIGH (ref 135–145)
SPECIMEN SOURCE: SIGNIFICANT CHANGE UP
WBC # BLD: 21.54 K/UL — HIGH (ref 3.8–10.5)
WBC # FLD AUTO: 21.54 K/UL — HIGH (ref 3.8–10.5)

## 2020-08-19 PROCEDURE — 93010 ELECTROCARDIOGRAM REPORT: CPT

## 2020-08-19 PROCEDURE — 99291 CRITICAL CARE FIRST HOUR: CPT

## 2020-08-19 PROCEDURE — 99233 SBSQ HOSP IP/OBS HIGH 50: CPT

## 2020-08-19 PROCEDURE — 99024 POSTOP FOLLOW-UP VISIT: CPT

## 2020-08-19 PROCEDURE — 95720 EEG PHY/QHP EA INCR W/VEEG: CPT

## 2020-08-19 RX ORDER — INSULIN GLARGINE 100 [IU]/ML
7 INJECTION, SOLUTION SUBCUTANEOUS EVERY MORNING
Refills: 0 | Status: DISCONTINUED | OUTPATIENT
Start: 2020-08-20 | End: 2020-08-24

## 2020-08-19 RX ORDER — INSULIN HUMAN 100 [IU]/ML
4 INJECTION, SOLUTION SUBCUTANEOUS EVERY 6 HOURS
Refills: 0 | Status: DISCONTINUED | OUTPATIENT
Start: 2020-08-19 | End: 2020-09-02

## 2020-08-19 RX ORDER — CEFEPIME 1 G/1
2000 INJECTION, POWDER, FOR SOLUTION INTRAMUSCULAR; INTRAVENOUS EVERY 8 HOURS
Refills: 0 | Status: COMPLETED | OUTPATIENT
Start: 2020-08-19 | End: 2020-08-20

## 2020-08-19 RX ORDER — SODIUM,POTASSIUM PHOSPHATES 278-250MG
1 POWDER IN PACKET (EA) ORAL EVERY 4 HOURS
Refills: 0 | Status: COMPLETED | OUTPATIENT
Start: 2020-08-19 | End: 2020-08-19

## 2020-08-19 RX ORDER — LEVETIRACETAM 250 MG/1
1000 TABLET, FILM COATED ORAL ONCE
Refills: 0 | Status: COMPLETED | OUTPATIENT
Start: 2020-08-19 | End: 2020-08-19

## 2020-08-19 RX ORDER — SODIUM CHLORIDE 9 MG/ML
1000 INJECTION INTRAMUSCULAR; INTRAVENOUS; SUBCUTANEOUS
Refills: 0 | Status: DISCONTINUED | OUTPATIENT
Start: 2020-08-20 | End: 2020-08-20

## 2020-08-19 RX ADMIN — Medication 4 MILLILITER(S): at 16:38

## 2020-08-19 RX ADMIN — FINASTERIDE 5 MILLIGRAM(S): 5 TABLET, FILM COATED ORAL at 12:21

## 2020-08-19 RX ADMIN — Medication 1 DROP(S): at 05:29

## 2020-08-19 RX ADMIN — SCOPALAMINE 1 PATCH: 1 PATCH, EXTENDED RELEASE TRANSDERMAL at 07:12

## 2020-08-19 RX ADMIN — Medication 650 MILLIGRAM(S): at 21:52

## 2020-08-19 RX ADMIN — Medication 1 APPLICATION(S): at 05:29

## 2020-08-19 RX ADMIN — LEVETIRACETAM 400 MILLIGRAM(S): 250 TABLET, FILM COATED ORAL at 23:56

## 2020-08-19 RX ADMIN — CEFEPIME 100 MILLIGRAM(S): 1 INJECTION, POWDER, FOR SOLUTION INTRAMUSCULAR; INTRAVENOUS at 21:51

## 2020-08-19 RX ADMIN — CEFEPIME 100 MILLIGRAM(S): 1 INJECTION, POWDER, FOR SOLUTION INTRAMUSCULAR; INTRAVENOUS at 15:52

## 2020-08-19 RX ADMIN — Medication 12.5 MILLIGRAM(S): at 05:29

## 2020-08-19 RX ADMIN — CEFEPIME 100 MILLIGRAM(S): 1 INJECTION, POWDER, FOR SOLUTION INTRAMUSCULAR; INTRAVENOUS at 05:29

## 2020-08-19 RX ADMIN — SCOPALAMINE 1 PATCH: 1 PATCH, EXTENDED RELEASE TRANSDERMAL at 20:28

## 2020-08-19 RX ADMIN — Medication 1 DROP(S): at 21:51

## 2020-08-19 RX ADMIN — CHLORHEXIDINE GLUCONATE 1 APPLICATION(S): 213 SOLUTION TOPICAL at 05:30

## 2020-08-19 RX ADMIN — Medication 1 APPLICATION(S): at 17:24

## 2020-08-19 RX ADMIN — Medication 1 DROP(S): at 15:51

## 2020-08-19 RX ADMIN — Medication 1 PACKET(S): at 17:24

## 2020-08-19 RX ADMIN — Medication 12.5 MILLIGRAM(S): at 17:23

## 2020-08-19 RX ADMIN — TENOFOVIR DISOPROXIL FUMARATE 300 MILLIGRAM(S): 300 TABLET, FILM COATED ORAL at 21:53

## 2020-08-19 RX ADMIN — Medication 4 MILLIGRAM(S): at 21:52

## 2020-08-19 NOTE — SWALLOW BEDSIDE ASSESSMENT ADULT - COMMENTS
Laryngoscopy, completed by ENT, significant for left vocal fold paralysis.    Pt known to our services during current admission. He has been followed since 8/13 for swallow evaluations with continued recommendations for NPO with NGT. Laryngoscopy, completed by ENT, significant for incomplete glottic closure with left vocal fold paralysis in the paramedian position.     Pt known to our services during current admission. He has been followed since 8/13 for swallow evaluations with continued recommendations for NPO with NGT d/t poor secretion management and overt s/s of aspiration.

## 2020-08-19 NOTE — SWALLOW FEES ASSESSMENT ADULT - COMMENTS
-Left vocal fold paralyzed in the paramedian position resulting in incomplete glottic closure (c/w ENT findings)   -Interarytenoid and arytenoid edema  -mild diffuse pooling of secretions Flexible endoscope was passed transnasally to evaluate the anatomy and physiology. Pt tolerated the passing of the scope and its presence.

## 2020-08-19 NOTE — SWALLOW BEDSIDE ASSESSMENT ADULT - SPECIFY REASON(S)
s/p decompression craniotomy for left cerebellar intraparenchymal hemorrhagic stroke + prolonged intubation (8/5-8/12) Family requesting swallow reassessment prior to finalizing decision regarding PEG placement

## 2020-08-19 NOTE — SWALLOW BEDSIDE ASSESSMENT ADULT - SWALLOW EVAL: DIAGNOSIS
Suspect oropharyngeal dysphagia in setting of recent L cerebellar hemorrhage and prolonged intubated. Pt appears to be at high risk for aspiration based on clinical presentation and severe dysphonia/aphonia. Recommend continued NPO + NGT.
Clinical signs of pharyngeal dysphagia, likely acute, related to cerebellar hemorrhage, 7 day endotracheal intubation, and known left vocal fold paralysis.. Given pt's acuity of illness, poor secretion management, and minimal mobility, pt is not safe for an oral diet prior to completion of instrumental assessment.

## 2020-08-19 NOTE — PROGRESS NOTE ADULT - SUBJECTIVE AND OBJECTIVE BOX
=============================================   NEUROCRITICAL CARE ATTENDING NOTE (2020)  =============================================    VEDA WARREN   MRN-[id number]  HPI: 59M with dyslipidemia, h/o Hep B infection, presented with acute onset severe HA, dizziness, vomiting - brought to [facility] where CT showed L cerebellar hemorrhage.  Intubated for airway protection.  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  R EVD inserted, transferred to [facility] for further management.  Given mannitol 25G in [facility].    Past Medical History: Hepatitis B HLD (hyperlipidemia)  Allergies:  No Known Allergies  Home meds:   ·	Omega-3 350 mg oral capsule: 1 cap(s) orally once a day  ·	tenofovir disoproxil fumarate 300 mg oral tablet: 1 tab(s) orally once a day      COURSE IN THE HOSPITAL:   bleed   admitted to [facility], 23.4 given x1; OR for SOC evacuation of ICH   remained intubated overnight   tachycardia overnight, Tmax 38.4, ?PNA, ABx    Tmax 38.1 diuresed overnight 1.8L.     No significant events overnight.  CPAP this morning   Tmax 39.7 (enterobacter sputum, on zosyn)  08/10 Trial of CPAP   Neurologically stable, for extubation.   Extubated.  Neurologically stable.   Neurologically stable.   Febrile overnight.  Suctioned for secretions.  Continued neurological improvement.  08/15 Hypotensive overnight, resolved this morning.  Neurologically stable.  Continued diarrhea.   No significant events.  : febrile overnight.  failed repeat swallow eval this AM.     24h events: bounced ack to ICU for thick secretions high sucitoning requirment    23:00:  staring w/ eyes wide open, not following commands, some blink to threat.   EEG reviewed      NEUROLOGIC EXAMINATION:    MSE: opens eyes spontaneously, followss commands b/l arms, flat affect  CN: pupils 3 mm reactive bilaterally, modest dysarthria, weak cough, tongue midline    EENT:  anicteric  CARDIOVASCULAR: (+) S1 S2, normal rate and regular rhythm  PULMONARY: coarse breath sounds  ABDOMEN: soft, nontender with normoactive bowel sounds  EXTREMITIES: no edema  SKIN: no rash      Allergies: No Known Allergies      EXAM:  VITALS:  T(C): 36.5 (20 @ 09:22), Max: 37.9 (20 @ 19:23)  HR: 68 (20 @ 11:00) (62 - 113)  BP: 116/61 (20 @ 11:00) (102/56 - 153/73)  RR: 20 (20 @ 11:00) (13 - 33)  SpO2: 95% (20 @ 11:00) (87% - 100%)    MEDICATIONS:  acetaminophen    Suspension .. 650 milliGRAM(s) Oral every 6 hours PRN  acetylcysteine 20%  Inhalation 4 milliLiter(s) Inhalation three times a day PRN  artificial tears (preservative free) Ophthalmic Solution 1 Drop(s) Both EYES three times a day  cefepime   IVPB 2000 milliGRAM(s) IV Intermittent every 8 hours  chlorhexidine 4% Liquid 1 Application(s) Topical <User Schedule>  dextrose 40% Gel 15 Gram(s) Oral once PRN  dextrose 5%. 1000 milliLiter(s) IV Continuous <Continuous>  dextrose 50% Injectable 12.5 Gram(s) IV Push once  dextrose 50% Injectable 25 Gram(s) IV Push once  doxazosin 4 milliGRAM(s) Oral at bedtime  enoxaparin Injectable 40 milliGRAM(s) SubCutaneous every 24 hours  finasteride 5 milliGRAM(s) Oral daily  glucagon  Injectable 1 milliGRAM(s) IntraMuscular once PRN  glucagon  Injectable 1 milliGRAM(s) IntraMuscular once PRN  hydrALAZINE Injectable 10 milliGRAM(s) IV Push every 2 hours PRN  hydrocortisone 1% Cream 1 Application(s) Topical two times a day  insulin glargine Injectable (LANTUS) 15 Unit(s) SubCutaneous every morning  insulin regular  human corrective regimen sliding scale.   SubCutaneous every 6 hours  insulin regular  human recombinant 4 Unit(s) SubCutaneous every 6 hours  metoprolol tartrate 12.5 milliGRAM(s) Oral every 12 hours  Mometasone Furoate 0.1% Ointment 1 Application(s) 1 Application(s) Topical two times a day PRN  psyllium Powder 1 Packet(s) Oral daily  sodium chloride 0.9% for Nebulization 3 milliLiter(s) Nebulizer three times a day PRN  sodium chloride 0.9% lock flush 10 milliLiter(s) IV Push every 1 hour PRN  tenofovir disoproxil fumarate (VIREAD) 300 milliGRAM(s) Oral every 24 hours      I/O's    20 @ 07:01  -  20 @ 07:00  --------------------------------------------------------  IN: 670 mL / OUT: 2200 mL / NET: -1530 mL    20 @ 07:01  -  20 @ 12:06  --------------------------------------------------------  IN: 0 mL / OUT: 500 mL / NET: -500 mL          LABS:                          10.0   21.54 )-----------( 183      ( 19 Aug 2020 06:19 )             31.0     08-19    146<H>  |  110<H>  |  19  ----------------------------<  80  4.0   |  22  |  0.66    Ca    8.8      19 Aug 2020 06:19  Phos  2.6     08-19  Mg     2.7     -19              CAPILLARY BLOOD GLUCOSE      POCT Blood Glucose.: 95 mg/dL (19 Aug 2020 11:05)  POCT Blood Glucose.: 88 mg/dL (19 Aug 2020 06:12)  POCT Blood Glucose.: 90 mg/dL (18 Aug 2020 23:31)  POCT Blood Glucose.: 87 mg/dL (18 Aug 2020 16:31)              Culture - Sputum . (20 @ 14:14)   Specimen Source: .Sputum Sputum   Culture Results:   Moderate Klebsiella variicola  Moderate Enterobacter cloacae  Moderate Staphylococcus aureus  Rare Yeast    -  Amikacin: S <=16    -  Ampicillin: I 16 These ampicillin results predict results for amoxicillin    -  Ampicillin: R >16 These ampicillin results predict results for amoxicillin    -  Ampicillin/Sulbactam: S <=4/2 Enterobacter, Citrobacter, and Serratia may develop resistance during prolonged therapy (3-4 days)    -  Ampicillin/Sulbactam: R >16/8 Enterobacter, Citrobacter, and Serratia may develop resistance during prolonged therapy (3-4 days)    -  Aztreonam: S <=4    -  Cefazolin: S <=2 Enterobacter, Citrobacter, and Serratia may develop resistance during prolonged therapy (3-4 days)    -  Cefazolin: R >16 Enterobacter, Citrobacter, and Serratia may develop resistance during prolonged therapy (3-4 days)    -  Cefazolin: S <=4    -  Cefepime: S <=2    -  Cefepime: S <=2    -  Cefotaxime: S <=2    -  Cefoxitin: S <=8    -  Ceftazidime: S <=1    -  Trimethoprim/Sulfamethoxazole: S <=0.5/9.5    -  Trimethoprim/Sulfamethoxazole: S <=0.5/9.5    -  Trimethoprim/Sulfamethoxazole: S <=0.5/9.5    -  Vancomycin: S 2    -  Ceftriaxone: S <=1 Enterobacter, Citrobacter, and Serratia may develop resistance during prolonged therapy    -  Ceftriaxone: R 32 Enterobacter, Citrobacter, and Serratia may develop resistance during prolonged therapy    -  Cefuroxime: S <=4    -  Ciprofloxacin: S <=0.25    -  Clindamycin: S <=0.25    -  Ertapenem: S <=0.5    -  Erythromycin: S <=0.25    -  Gentamicin: S <=2    -  Gentamicin: S <=2    -  Levofloxacin: S <=0.5    -  Linezolid: S 2    -  Meropenem: S <=1    -  Meropenem: S <=1    -  Oxacillin: S <=0.25    -  Piperacillin/Tazobactam: S <=8    -  Piperacillin/Tazobactam: I 32    -  RIF- Rifampin: S <=1 Should not be used as monotherapy    -  Tigecycline: S <=2    -  Tobramycin: S <=2    -  Tobramycin: S <=2    Culture - CSF with Gram Stain (collected 20 @ 15:58)  Source: .CSF CSF  Gram Stain (20 @ 16:20):    No organisms seen    No WBC's seen.  CSF lactate 6.3  CSF WBC:RBC = 0.002    Culture - Blood (collected 20 @ 15:50)  Source: .Blood Blood  Preliminary Report (20 @ 04:01):    No growth at 12 hours    Culture - Blood (collected 20 @ 15:50)  Source: .Blood Blood  Preliminary Report (20 @ 04:01):    No growth at 12 hours    Bacteriology:   sputum: enterobacter cloacae, klebsiella pneumoniae    CSF no organisms   sputum CS: enterobacter cloacae S: piperacillin/tazobactam    CSF NGTD   Blood CS NG5D x2   UA NEG    CSF studies:    L   *** [id number] *** %N31 %L5     Neuroimagin/11 CT head:  Right frontal EVD catheter as above. The ventricles are stable in size from prior examination. No new or increasing intracranial hemorrhage or mass effect.  08/10 CT head:  Compared to 2020, there is improving intraventricular hemorrhage, resolution of intraventricular gas, and mild decrease in ventricular size. Postsurgical change and edema in the posterior fossa is approximately stable.   CT head:  post-surgical changes, stable HCP  Other imagin/04 LE doppler: NEG   CXR:  clear   CXR:  mild atelectasis L lung base  08/10 CXR:  Small left pleural effusion, unchanged.    IV FLUIDS: IV NS at 50 mL/hour  DRIPS:  DIET: NG feeds held (ENT left vocal cord complete immobility)     EVD at 7cm H20  35cc/hr   EVD at 5cm H20 ICP 2-5 68cc/24h   EVD at 5cm H20    EVD at 5cm H20 ICP 1-3 202cc/24h     EVD at 5 cm H20 ICP 1-4 output 194cc/24h   EVD at 5 cm H20 ICP 0 to 5 output 280cc/24h  08/10 EVD at 5 cm H20 ICP 5 to 10 output 5-20cc/h   EVD at 5 cm H20 ICP < 10 output 167cc/24h   EVD at 0 cm H20 ICP 1-6 output 5-20 cc/h   EVD at 0 cm H20 ICP < 6 (flattened waveform occasionally) output 210 cc/24 h   EVD removed    CODE STATUS:  Full Code                       GOALS OF CARE:  aggressive                      DISPOSITION:  ICU  ICH Score on admission  = 4, NIHSS

## 2020-08-19 NOTE — CONSULT NOTE ADULT - ASSESSMENT
59 year old male with PMH of HLD and unclear liver disease managed for left cerebellar hemorrhage, now s/p Craniectomy w/ evacuation of hematoma. Surgery consulted for PEG placement. May be performed at the bedside. Per son, decision to proceed with PEG pending swallow study, reluctant to consent until after study.      NPO/hold feeds overnight  Pre op labs (CBC. BMP, Mg, Phos, Type and Screen, Coags)  AM CXR  Consent from family  Bedside PEG 8/20
59M PMH of HLD and Hep B had acute onset of severe headache, dizziness and vomiting x1 today, was brought into [facility] with continued symptoms, as well as hypertension and multiple episodes of vomiting. Patient was intubated for airway protection with CT Head performed demonstrating a large left cerebellar hemorrhage. CTA Head/Neck performed is suggestive of underlying vascular pathology such as AVM. RIght frontal ventriculostomy placed [place] [facility] and patient transferred to [facility] for further work-up and s/p cerebral angio, negative for AVM and craniectomy evacuation of cerebellar hematoma on 8/4. On 8/6, patient had rising leukocytosis and was started on vanc and zosyn. On 8/10, he was febrile overnight and pancultured with sputum growing enterobacter cloacae and Klebsiella pneumonia. Zosyn was continue for 7 days today and discontinued yesterday. Now ID consulted for persistent high fevers starting on 8/13. Patient is extubated, not requiring pressors. Febrile [id number] today with increasing leukocytosis.     - 8/11 sputum culture growing enterobacter cloacae and Klebsiella pneumonia  - blood cultures NGTD  - f/u 8/14 blood cultures after fever  - CXR clear  - UA negative  - start vancomycin 1g q12hrs, vanc tough before 4th dose, goal 13-17  - start cefepime 2g q8hrs  - daily cbc with diff  - obtain HIV test    ID recs discussed with Dr. Hendricks. ID Team 1 will continue to follow.

## 2020-08-19 NOTE — CONSULT NOTE ADULT - SUBJECTIVE AND OBJECTIVE BOX
CONSULT NOTE    HPI:  59 year old male with PMH of HLD and unclear liver disease had acute onset of severe headache, dizziness and vomiting x1 today, was brought into [facility] with continued symptoms, as well as hypertension and multiple episodes of vomiting. Patient was intubated for airway protection with CT Head performed demonstrating a large left cerebellar hemorrhage. CTA Head/Neck performed is suggestive of underlying vascular pathology such as AVM. RIght frontal ventriculostomy placed at [facility] and patient transferred to [facility] for further work-up. Patient arrives to [facility] intubated, sedated and on Nicardipine drip. Patient's son provided history and denies any Aspirin or other anticoagulant use. Denies any prior history of hypertension, smoking, or ETOH abuse. (04 Aug 2020 05:01). Now status post craniectomy w/ evacuation of cerebellar Hematoma. Surgery consulted for PEG placement. Son denies history of intraabdominal surgeries        PAST MEDICAL & SURGICAL HISTORY:  Hepatitis B  HLD (hyperlipidemia)  Foot fracture, left: s/p repair 2019      PAST SURGICAL HISTORY:     REVIEW OF SYSTEMS:   Pertinent positives/negatives noted in HPI.     HOME MEDICATIONS:    ALLERGIES:  Allergies    No Known Allergies    Intolerances        SOCIAL HISTORY:    FAMILY HISTORY:  No pertinent family history in first degree relatives      Vital Signs Last 24 Hrs  T(C): 36.3 (19 Aug 2020 13:05), Max: 37.9 (18 Aug 2020 19:23)  T(F): 97.4 (19 Aug 2020 13:05), Max: 100.3 (18 Aug 2020 19:23)  HR: 67 (19 Aug 2020 13:21) (62 - 113)  BP: 143/67 (19 Aug 2020 13:21) (105/56 - 153/73)  BP(mean): 97 (19 Aug 2020 13:21) (76 - 104)  RR: 13 (19 Aug 2020 12:00) (13 - 29)  SpO2: 98% (19 Aug 2020 13:21) (94% - 100%)    PHYSICAL EXAM:  General: NAD, resting comfortably in bed  C/V: NSR  Pulm: Nonlabored breathing, no respiratory distress  Abd: soft, non-tender, non-distended.  Extrem: WWP, no edema,  Neuro: A/O x 0   Pulses: palpable distal pulses     LABS:                        10.0   21.54 )-----------( 183      ( 19 Aug 2020 06:19 )             31.0     08-19    146<H>  |  110<H>  |  19  ----------------------------<  80  4.0   |  22  |  0.66    Ca    8.8      19 Aug 2020 06:19  Phos  2.6     08-19  Mg     2.7     08-19            RADIOLOGY AND ADDITIONAL STUDIES  < from: CT Head No Cont (08.18.20 @ 09:59) >  FINDINGS:  The patient is status post suboccipital craniectomy. No change of surgical cavity in the left cerebellar hemisphere. No change of gliosis/encephalomalacia the left cerebellum. No significant change of right frontal EVD tract from right frontal nica hole to right lateral ventricle frontal horn with small amount of air, hemorrhage, and small amount of edema. There is a decrease in the intraventricular hemorrhage, now with a tiny amount in the right occipital horn and a slightly greater amount in the left occipital horn layering dependently. The ventricles measure about 2 mm larger at the level of the third ventricle and the frontal horns. Tiny dot of air in left frontal horn on prior study has resolved in the interim. Persistent tiny extra-axial hemorrhage is noted along the posterior interhemispheric fissure    The ventricles, cisternal spaces, and cortical sulci to be within normal limits. There is no midline shift or extra axial collections. The gray white differentiation appears withinnormal limits. There is no acute transcortical infarct. There is diminished attenuation within the periventricular and subcortical white matter consistent with chronic microangiopathic disease.    The bony windows demonstrates no acute fractures. Small mucosal thickening left maxillary sinus. Mild opacification of the left mastoid air cells, unchanged. The right mastoid air cells are clear. Nasoenteric tube in place    IMPRESSION:    1. Since 8/14/2020, no change in the appearance of a prior right ventricular catheter tract demonstrating edema and hemorrhage with less intraventricular hemorrhage, but slight interval increase in size of ventricles on the order of 2 mm.  2. No change in the postoperative appearance of left suboccipital craniectomy for cerebellar hematoma evacuation.  3. No new hemorrhage.        < end of copied text >

## 2020-08-19 NOTE — SWALLOW FEES ASSESSMENT ADULT - SLP PERTINENT HISTORY OF CURRENT PROBLEM
s/p decompression craniotomy for left cerebellar intraparenchymal hemorrhagic stroke + prolonged intubation (8/5-8/12)

## 2020-08-19 NOTE — EEG REPORT - NS EEG TEXT BOX
[facility] Department of Neurology  Inpatient Continuous video-Electroencephalography Report    Patient Name:	Krystal Vizcarra    :	1960  MRN:	[id number]    Study Start Date/Time:  2020, 9:50:48 PM  Study End Date/Time:	    Referred by: Bryan Blank MD    Brief Clinical History:  Krystal Vizcarra is a 59 year old Male admitted with left  cerebellar hemorrhage      Diagnosis Code:   R56.9 convulsions/seizure  CPT: 11001 EEG with video 12-26h    Pertinent Medications on Initiation  none    Acquisition Details:  Electroencephalography was acquired using a minimum of 21 channels on an [employer] system v 8.5.1 with electrode placement according to the standard International 10-20 system following ACNS (American Clinical Neurophysiology Society) guidelines for Long-Term Video EEG monitoring.  Anterior temporal T1 and T2 electrodes were utilized whenever possible.   The [employer] automated spike & seizure detections were all reviewed in detail, in addition to extensive portions of raw EEG.    Day 1: 2020 @ 9:50:48 PM to next morning @ 07:00 am  Background:  continuous, with predominantly theta/delta.frequencies.  Symmetry:  Higher amplitude/faster frequencies over left   Posterior Dominant Rhythm:  Reaches up to 7 Hz  Organization: Rudimentary.  Voltage:  Normal (20+ uV)  Variability: Yes. 		Reactivity: Yes.  N2 sleep: Rudimentary but likely N2 transients present.  Spontaneous Activity:  No epileptiform discharges.  Periodic/rhythmic activity:  Occasional generalized periodic discharges, brief, triphasic morphology, 1.5-2 Hz, fluctuating  Events:  No electrographic seizures or significant clinical events.  Provocations:  Hyperventilation and Photic stimulation: was not performed.  Daily Summary:    1.	 Moderate generalized  slowing suggestive of a similar degree of diffuse or multifocal  dysfunction.  2.	Breach rhythm over left suggestive of local skull defect  3.	Generalized periodic discharges with triphasic morphology, which is a nonspecific finding often seen in the setting of a diffuse or multifocal pathologic process, but may also indicate underlying hyperexcitability. Clinical correlation is advised.     Read by:  Tiffanie Strong MD

## 2020-08-19 NOTE — CONSULT NOTE ADULT - ATTENDING COMMENTS
Patient seen and examined at bedside in [facility] on 8/20/2020.  Agree with above.  For EGD, PEG placement on 8/20/2020.
I have reviewed the medical record, including laboratory and radiographic studies, examined the patient on 8/14 and discussed the plan with Dr. Colvin, the ID Resident.  Agree with above. Briefly, 58 yo M with HBV (on tenofovir) transferred from [facility] on 8/4 with large acute cerebellar hemorrhage requiring intubation for airway protection/emergent suboccipital craniectomy with cervical laminectomy for evacuation of hematoma/medulla and spinal cord decompression.  He had masood tachycardic with low grade temp increases – was treated with vanc and pip-tazo from 8/6-8/13 for Enterobacter in sputum.  His EVD was replaced 8/10, 8/12. He was extubated on 8/12. He had had Tm in the 101 range, some days, Tm<100.  Late on 8/13 with Tm 101.8, then T 104 today with increased WBC to 21.6 (had been 12-16K).  CXR shows no infiltrate, UA without pyuria or nitrite, abd exam benign.  Fevers may be central in origin and related to EVD manipulations but given marked increase with increase WBC in relation to timing of d/cing of antibiotics is concerning for bacterial source.  Would f/u blood, CSF and sputum cultures, treat with vancomycin and cefepime for now.  Will continue to follow with you – ID Team 1.
Patient seen and examined with PA.  Agree with above.  Patient with cerebellar hemorrhage with IVH spread s/p craniotomy.  Course complicated by seizures.  Still altered as exam above.  Main treatment includes blood pressure control, agree with sbp less than 140.    Note: already received hypertonic bullet upon arrival at [facility]  No antiplatelet or anticoagulation secondary to ICH/IVH  Keppra 1000mg BID for seizures.  Continue vEEG.  Rest of care as per NSICU team.    Discussed case with Dr. Sneed and patient's son and daughter

## 2020-08-19 NOTE — SWALLOW BEDSIDE ASSESSMENT ADULT - SLP PERTINENT HISTORY OF CURRENT PROBLEM
Left cerebellar intraparenchymal hemorrhage s/p SOC craniectomy evacuation of cerebellar hematoma; intubated 8/5-8/12
s/p decompression craniotomy for left cerebellar intraparenchymal hemorrhagic stroke + prolonged intubation (8/5-8/12)

## 2020-08-19 NOTE — SWALLOW FEES ASSESSMENT ADULT - PHARYNGEAL PHASE COMMENTS
Liquid bolus spilled to the pyriforms prior to triggering a swallow. Immediate cough response followed by pooling of COPIOUS thick secretions in the post-cricoid region with to-and-fro of secretions into the airway during inhalation & exhalation. Due to poor visualization (white-out period during the swallow), it is unclear if secretions were regurgitated from the esophagus or expectorated from the trachea as a result of ?aspiration event triggering a productive cough.

## 2020-08-19 NOTE — PROGRESS NOTE ADULT - ASSESSMENT
59y/M with  1.	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	Hepatitis B  3.	dyslipidemia     PLAN:   NEURO: continued neurological improvement  seizure prophylaxis: s/p 1 week sz ppx  -dc eeg, no seizures  Goal normonatremia  REHAB:  physical therapy evaluation and management    EARLY MOB:  HOB up    PULM: mild aspiration, high sucitoning requirements,   continue aggressive pulmomnary toileting, chest PT  continue saline nebs and mucomyst    CARDIO:  1) afib with RVR prior SBP goal 100-140mm Hg   -on metoprolol 12.5 mg BID --> bradycardic when in sinus, but asymptomatic, continue    ENDO:  Blood sugar goals 140-180 mg/dL, continue insulin sliding, continue statins; insulin glargine 15 nutritional 4-4-4-4  --> decrae insulin lantus to 8  -hold premeal insulin when NPO    GI: diebetic tube feeds  -family agreed to PEG, OR on thursday    RENAL:  MURIEL    HEM/ONC: no coagulopathy, no h/o antiplatelet / anticoagulant use  VTE Prophylaxis: SCDs, SQL     ID: febrile, slowly improving leukocytosis; ID recommendations:  cefepime --> continue through 8/20  -Cdiff negative  -Abd CT negative   -enterobacter from sputum  -continue vanc and cefepime per ID --> trough today  -chronic HBV    continue in ICU for high suctioing frequency (<q1h)

## 2020-08-19 NOTE — SWALLOW BEDSIDE ASSESSMENT ADULT - CONSISTENCIES ADMINISTERED
thin liquid/nectar thick/2 tsp thin + 2 tsp NTL
thin liquid/1 ice chips + 1/2 tsp thin + 1/2 tsp NTL

## 2020-08-19 NOTE — SWALLOW BEDSIDE ASSESSMENT ADULT - NS SPL SWALLOW CLINIC TRIAL FT
Pt opened his mouth to accept bolus across trials. Delayed mouth closure + reduced labial seal resulted in anterior loss of ~1/3 bolus. Laryngeal movement appreciated upon palpation. Pt with multiple (~2) secondary swallow per single bolus suggestive of pharyngeal stasis/weakness. Immediate non-productive wet coughing noted with all trials suggestive of aspiration. Deep suctioning with yankauer used with minimal retrieval of liquids.
Mild anterior bolus loss of liquids (R>L) d/t reduced labial seal. Laryngeal movement appreciated upon palpation. Pt with immediate WET non-productive coughing with all trials requiring suctioning. Single episode of desaturation to 87%. Recovery to 92% following oral and nasal suctioning with RR catheter. Wet/gurgly breath sounds persisted after suctioning was completed. RN aware. Pt remained in upright position to reduce risk of aspiration of own secretions.

## 2020-08-19 NOTE — PROGRESS NOTE ADULT - ASSESSMENT
59y/M with  1.	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	Hepatitis B  3.	dyslipidemia     PLAN:   NEURO: continued neurological improvement  seizure prophylaxis: s/p 1 week sz ppx  PLEDS on EEG, poss clinical correlating, Keppra 1 g IV now, check if exam has improved    -dc eeg, no seizures  Goal normonatremia  REHAB:  physical therapy evaluation and management    EARLY MOB:  HOB up    PULM: mild aspiration, high sucitoning requirements,   continue aggressive pulmomnary toileting, chest PT  continue saline nebs and mucomyst    CARDIO:  1) afib with RVR prior SBP goal 100-140mm Hg   -on metoprolol 12.5 mg BID --> bradycardic when in sinus, but asymptomatic, continue    ENDO:  Blood sugar goals 140-180 mg/dL, continue insulin sliding, continue statins; insulin glargine 15 nutritional 4-4-4-4  --> decrae insulin lantus to 8  -hold premeal insulin when NPO    GI: diebetic tube feeds  -family agreed to PEG, OR on thursday    RENAL:  MURIEL    HEM/ONC: no coagulopathy, no h/o antiplatelet / anticoagulant use  VTE Prophylaxis: SCDs, SQL     ID: febrile, slowly improving leukocytosis; ID recommendations:  cefepime --> continue through 8/20  -Cdiff negative  -Abd CT negative   -enterobacter from sputum  -continue vanc and cefepime per ID --> trough today  -chronic HBV    continue in ICU for high suctioing frequency (<q1h)

## 2020-08-19 NOTE — SWALLOW BEDSIDE ASSESSMENT ADULT - SLP GENERAL OBSERVATIONS
Pt received awake in bed, in b/l wrist restraints, poor eye contact and attention. Voice is severely dysphonic/aphonic at times. Minimal verbal output.
Pt received awake in bed. Rare attempts to mouth words but voice was aphonic. Following commands. Son at bedside.

## 2020-08-19 NOTE — PROGRESS NOTE ADULT - SUBJECTIVE AND OBJECTIVE BOX
=============================================   NEUROCRITICAL CARE ATTENDING NOTE (2020)  =============================================    VEDA WARREN   MRN-[id number]  HPI: 59M with dyslipidemia, h/o Hep B infection, presented with acute onset severe HA, dizziness, vomiting - brought to [facility] where CT showed L cerebellar hemorrhage.  Intubated for airway protection.  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  R EVD inserted, transferred to [facility] for further management.  Given mannitol 25G in [facility].    Past Medical History: Hepatitis B HLD (hyperlipidemia)  Allergies:  No Known Allergies  Home meds:   ·	Omega-3 350 mg oral capsule: 1 cap(s) orally once a day  ·	tenofovir disoproxil fumarate 300 mg oral tablet: 1 tab(s) orally once a day      COURSE IN THE HOSPITAL:   bleed   admitted to [facility], 23.4 given x1; OR for SOC evacuation of ICH   remained intubated overnight   tachycardia overnight, Tmax 38.4, ?PNA, ABx    Tmax 38.1 diuresed overnight 1.8L.     No significant events overnight.  CPAP this morning   Tmax 39.7 (enterobacter sputum, on zosyn)  08/10 Trial of CPAP   Neurologically stable, for extubation.   Extubated.  Neurologically stable.   Neurologically stable.   Febrile overnight.  Suctioned for secretions.  Continued neurological improvement.  08/15 Hypotensive overnight, resolved this morning.  Neurologically stable.  Continued diarrhea.   No significant events.  : febrile overnight.  failed repeat swallow eval this AM.     24h events: bounced ack to ICU for thick secretions high sucitoning requirment      NEUROLOGIC EXAMINATION:    MSE: opens eyes spontaneously, followss commands b/l arms, flat affect  CN: pupils 3 mm reactive bilaterally, modest dysarthria, weak cough, tongue midline    EENT:  anicteric  CARDIOVASCULAR: (+) S1 S2, normal rate and regular rhythm  PULMONARY: coarse breath sounds  ABDOMEN: soft, nontender with normoactive bowel sounds  EXTREMITIES: no edema  SKIN: no rash      Allergies: No Known Allergies      EXAM:  VITALS:  T(C): 36.5 (20 @ 09:22), Max: 37.9 (20 @ 19:23)  HR: 68 (20 @ 11:00) (62 - 113)  BP: 116/61 (20 @ 11:00) (102/56 - 153/73)  RR: 20 (20 @ 11:00) (13 - 33)  SpO2: 95% (20 @ 11:00) (87% - 100%)    MEDICATIONS:  acetaminophen    Suspension .. 650 milliGRAM(s) Oral every 6 hours PRN  acetylcysteine 20%  Inhalation 4 milliLiter(s) Inhalation three times a day PRN  artificial tears (preservative free) Ophthalmic Solution 1 Drop(s) Both EYES three times a day  cefepime   IVPB 2000 milliGRAM(s) IV Intermittent every 8 hours  chlorhexidine 4% Liquid 1 Application(s) Topical <User Schedule>  dextrose 40% Gel 15 Gram(s) Oral once PRN  dextrose 5%. 1000 milliLiter(s) IV Continuous <Continuous>  dextrose 50% Injectable 12.5 Gram(s) IV Push once  dextrose 50% Injectable 25 Gram(s) IV Push once  doxazosin 4 milliGRAM(s) Oral at bedtime  enoxaparin Injectable 40 milliGRAM(s) SubCutaneous every 24 hours  finasteride 5 milliGRAM(s) Oral daily  glucagon  Injectable 1 milliGRAM(s) IntraMuscular once PRN  glucagon  Injectable 1 milliGRAM(s) IntraMuscular once PRN  hydrALAZINE Injectable 10 milliGRAM(s) IV Push every 2 hours PRN  hydrocortisone 1% Cream 1 Application(s) Topical two times a day  insulin glargine Injectable (LANTUS) 15 Unit(s) SubCutaneous every morning  insulin regular  human corrective regimen sliding scale.   SubCutaneous every 6 hours  insulin regular  human recombinant 4 Unit(s) SubCutaneous every 6 hours  metoprolol tartrate 12.5 milliGRAM(s) Oral every 12 hours  Mometasone Furoate 0.1% Ointment 1 Application(s) 1 Application(s) Topical two times a day PRN  psyllium Powder 1 Packet(s) Oral daily  sodium chloride 0.9% for Nebulization 3 milliLiter(s) Nebulizer three times a day PRN  sodium chloride 0.9% lock flush 10 milliLiter(s) IV Push every 1 hour PRN  tenofovir disoproxil fumarate (VIREAD) 300 milliGRAM(s) Oral every 24 hours      I/O's    20 @ 07:01  -  20 @ 07:00  --------------------------------------------------------  IN: 670 mL / OUT: 2200 mL / NET: -1530 mL    20 @ 07:01  -  20 @ 12:06  --------------------------------------------------------  IN: 0 mL / OUT: 500 mL / NET: -500 mL          LABS:                          10.0   21.54 )-----------( 183      ( 19 Aug 2020 06:19 )             31.0     08-19    146<H>  |  110<H>  |  19  ----------------------------<  80  4.0   |  22  |  0.66    Ca    8.8      19 Aug 2020 06:19  Phos  2.6     19  Mg     2.7     19              CAPILLARY BLOOD GLUCOSE      POCT Blood Glucose.: 95 mg/dL (19 Aug 2020 11:05)  POCT Blood Glucose.: 88 mg/dL (19 Aug 2020 06:12)  POCT Blood Glucose.: 90 mg/dL (18 Aug 2020 23:31)  POCT Blood Glucose.: 87 mg/dL (18 Aug 2020 16:31)              Culture - Sputum . (20 @ 14:14)   Specimen Source: .Sputum Sputum   Culture Results:   Moderate Klebsiella variicola  Moderate Enterobacter cloacae  Moderate Staphylococcus aureus  Rare Yeast    -  Amikacin: S <=16    -  Ampicillin: I 16 These ampicillin results predict results for amoxicillin    -  Ampicillin: R >16 These ampicillin results predict results for amoxicillin    -  Ampicillin/Sulbactam: S <=4/2 Enterobacter, Citrobacter, and Serratia may develop resistance during prolonged therapy (3-4 days)    -  Ampicillin/Sulbactam: R >16/8 Enterobacter, Citrobacter, and Serratia may develop resistance during prolonged therapy (3-4 days)    -  Aztreonam: S <=4    -  Cefazolin: S <=2 Enterobacter, Citrobacter, and Serratia may develop resistance during prolonged therapy (3-4 days)    -  Cefazolin: R >16 Enterobacter, Citrobacter, and Serratia may develop resistance during prolonged therapy (3-4 days)    -  Cefazolin: S <=4    -  Cefepime: S <=2    -  Cefepime: S <=2    -  Cefotaxime: S <=2    -  Cefoxitin: S <=8    -  Ceftazidime: S <=1    -  Trimethoprim/Sulfamethoxazole: S <=0.5/9.5    -  Trimethoprim/Sulfamethoxazole: S <=0.5/9.5    -  Trimethoprim/Sulfamethoxazole: S <=0.5/9.5    -  Vancomycin: S 2    -  Ceftriaxone: S <=1 Enterobacter, Citrobacter, and Serratia may develop resistance during prolonged therapy    -  Ceftriaxone: R 32 Enterobacter, Citrobacter, and Serratia may develop resistance during prolonged therapy    -  Cefuroxime: S <=4    -  Ciprofloxacin: S <=0.25    -  Clindamycin: S <=0.25    -  Ertapenem: S <=0.5    -  Erythromycin: S <=0.25    -  Gentamicin: S <=2    -  Gentamicin: S <=2    -  Levofloxacin: S <=0.5    -  Linezolid: S 2    -  Meropenem: S <=1    -  Meropenem: S <=1    -  Oxacillin: S <=0.25    -  Piperacillin/Tazobactam: S <=8    -  Piperacillin/Tazobactam: I 32    -  RIF- Rifampin: S <=1 Should not be used as monotherapy    -  Tigecycline: S <=2    -  Tobramycin: S <=2    -  Tobramycin: S <=2    Culture - CSF with Gram Stain (collected 20 @ 15:58)  Source: .CSF CSF  Gram Stain (20 @ 16:20):    No organisms seen    No WBC's seen.  CSF lactate 6.3  CSF WBC:RBC = 0.002    Culture - Blood (collected 20 @ 15:50)  Source: .Blood Blood  Preliminary Report (20 @ 04:01):    No growth at 12 hours    Culture - Blood (collected 20 @ 15:50)  Source: .Blood Blood  Preliminary Report (20 @ 04:01):    No growth at 12 hours    Bacteriology:   sputum: enterobacter cloacae, klebsiella pneumoniae    CSF no organisms   sputum CS: enterobacter cloacae S: piperacillin/tazobactam    CSF NGTD   Blood CS NG5D x2   UA NEG    CSF studies:    L   *** [id number] *** %N31 %L5     Neuroimagin/11 CT head:  Right frontal EVD catheter as above. The ventricles are stable in size from prior examination. No new or increasing intracranial hemorrhage or mass effect.  08/10 CT head:  Compared to 2020, there is improving intraventricular hemorrhage, resolution of intraventricular gas, and mild decrease in ventricular size. Postsurgical change and edema in the posterior fossa is approximately stable.   CT head:  post-surgical changes, stable HCP  Other imagin/04 LE doppler: NEG   CXR:  clear   CXR:  mild atelectasis L lung base  08/10 CXR:  Small left pleural effusion, unchanged.    IV FLUIDS: IV NS at 50 mL/hour  DRIPS:  DIET: NG feeds held (ENT left vocal cord complete immobility)     EVD at 7cm H20  35cc/hr   EVD at 5cm H20 ICP 2-5 68cc/24h   EVD at 5cm H20    EVD at 5cm H20 ICP 1-3 202cc/24h     EVD at 5 cm H20 ICP 1-4 output 194cc/24h   EVD at 5 cm H20 ICP 0 to 5 output 280cc/24h  08/10 EVD at 5 cm H20 ICP 5 to 10 output 5-20cc/h   EVD at 5 cm H20 ICP < 10 output 167cc/24h   EVD at 0 cm H20 ICP 1-6 output 5-20 cc/h   EVD at 0 cm H20 ICP < 6 (flattened waveform occasionally) output 210 cc/24 h   EVD removed    CODE STATUS:  Full Code                       GOALS OF CARE:  aggressive                      DISPOSITION:  ICU  ICH Score on admission  = 4, NIHSS

## 2020-08-19 NOTE — SWALLOW FEES ASSESSMENT ADULT - ADDITIONAL RECOMMENDATIONS
-Outpatient vocal cord augmentation recommended in ENT consult note. This intervention may improve airway protection and, ultimately, pt's dysphagia prognosis.

## 2020-08-19 NOTE — PROGRESS NOTE ADULT - SUBJECTIVE AND OBJECTIVE BOX
HPI:  59 year old male with PMH of HLD and unclear liver disease had acute onset of severe headache, dizziness and vomiting x1 today, was brought into [facility] with continued symptoms, as well as hypertension and multiple episodes of vomiting. Patient was intubated for airway protection with CT Head performed demonstrating a large left cerebellar hemorrhage. CTA Head/Neck performed is suggestive of underlying vascular pathology such as AVM. [place] [facility] [place] transferred to [facility] for further work-up. Patient arrives to [facility] intubated, sedated and on Nicardipine drip. Patient's son provided history and denies any Aspirin or other anticoagulant use. Denies any prior history of hypertension, smoking, or ETOH abuse. (04 Aug 2020 05:01)    Hospital Course:   POD# 0 S/P cerebral angio, negative for AVM. s/p SOC craniectomy evacuation of cerebellar hematoma.   POD # 1:   overnight tachycardic, ekg without significant changes, lactate wnl, low grade fever, s/p tyl. bolus x 1L , NS increased to 100cc/hr; neuro improving.  EVD at 5 cmH2O   8/6 POD#3, BD#4, Tmax 100.6, Pan Cx 7/5 NGTD, Vanc/Zosyn until 8/10, Vanc trough due today @ 6pm, EEG = no seizure, Lasix overnight for Pulm congest and tachycard, ruff placed for strict I&Os, pending anemia w/u, EVD @ 5, CPAP trials, Card GTT for SBP<140, Started Norvasc for BP optimization  8/7:  8/8:  S/P cerebral angio, negative for AVM. s/p SOC craniectomy evacuation of cerebellar hematoma. No significant events overnight. On CPAP this morning   8/10: fevers overnight, resolved this morning, Pancultured, (+) Enterbacter, on CPAP this AM  8/11 POD#7 SOC, BD # 8, EVD replaced (hard pass), Neuro exam stable, EVD @ 5cm H2O, Zosyn for sputum + enterobact, Plan to extubated today, HCT in AM  8/12 POD#8 s/p SOC, BD#9, s/p EVD replacement (hard pass on 8/10/20), neuro exam stable, EVD @5cm H2O. Zosyn for enterobacter in sputum. O/n EVD stopped draining, flushed distally and proximally, waveform poor, neuro exam remained stable throughout, ICPs <8 prior.    8/13 EVD soft pass yesterday, spiked temp today and re cultured o/w neuro exam stable  8/14: EMIL overnight, neuro stable. Mucomyst for secretions overnight   8/15 EVD dc/d, temp spike to 104, pan culture, ID consulted, vanco and cefepime started   8/15 levo started, presumed sepsis from aspiration, continues to have diarrhea  8/16: EMIL  8/17: EMIL. Neuro exam stable  8/18: Increased secretions overnight. Chest xray obtained. NGT replaced. Neuro exam stable. Patient transferred to ICU for monitoring due to congestion and possible L effusion on CXR along with desaturation and frequent suctioning.   8/19: POD16, on EEG, EMIL o/n, transferred to 5E, plan for PEG tomorrow      Vital Signs Last 24 Hrs  T(C): 36.3 (19 Aug 2020 13:05), Max: 37.9 (18 Aug 2020 19:23)  T(F): 97.4 (19 Aug 2020 13:05), Max: 100.3 (18 Aug 2020 19:23)  HR: 69 (19 Aug 2020 16:00) (61 - 111)  BP: 128/59 (19 Aug 2020 16:00) (105/56 - 146/74)  BP(mean): 84 (19 Aug 2020 16:00) (76 - 103)  RR: 24 (19 Aug 2020 16:00) (13 - 27)  SpO2: 94% (19 Aug 2020 16:00) (91% - 100%)    I&O's Detail    18 Aug 2020 07:01  -  19 Aug 2020 07:00  --------------------------------------------------------  IN:    Enteral Tube Flush: 80 mL    IV PiggyBack: 150 mL    ns in tub fed  [id number]: 140 mL    sodium chloride 0.9%: 300 mL  Total IN: 670 mL    OUT:    Incontinent per Condom Catheter: 825 mL    Intermittent Catheterization - Urethral: 1375 mL  Total OUT: 2200 mL    Total NET: -1530 mL      19 Aug 2020 07:01  -  19 Aug 2020 17:24  --------------------------------------------------------  IN:    IV PiggyBack: 100 mL  Total IN: 100 mL    OUT:    Intermittent Catheterization - Urethral: 500 mL  Total OUT: 500 mL    Total NET: -400 mL        I&O's Summary    18 Aug 2020 07:01  -  19 Aug 2020 07:00  --------------------------------------------------------  IN: 670 mL / OUT: 2200 mL / NET: -1530 mL    19 Aug 2020 07:01  -  19 Aug 2020 17:24  --------------------------------------------------------  IN: 100 mL / OUT: 500 mL / NET: -400 mL        PHYSICAL EXAM:  General: patient seen laying supine in bed in NAD  Neuro: OE spontaneously, nonverbal, MUNOZ spont x 4, FC b/l UE  HEENT: PERRL  Neck: supple  Cardiac: RRR, S1S2  Pulmonary: chest rise symmetric  Abdomen: soft, nontender, nondistended  Ext: warm, perfusing well          DEVICE/DRAIN DRESSING:    TUBES/LINES:  [] CVC  [] A-line  [] Lumbar Drain  [] Ventriculostomy  [] Other    DIET:  [] NPO  [] Mechanical  [x] Tube feeds    LABS:                        10.0   21.54 )-----------( 183      ( 19 Aug 2020 06:19 )             31.0     08-19    146<H>  |  110<H>  |  19  ----------------------------<  80  4.0   |  22  |  0.66    Ca    8.8      19 Aug 2020 06:19  Phos  2.6     08-19  Mg     2.7     08-19              CAPILLARY BLOOD GLUCOSE      POCT Blood Glucose.: 95 mg/dL (19 Aug 2020 11:05)  POCT Blood Glucose.: 88 mg/dL (19 Aug 2020 06:12)  POCT Blood Glucose.: 90 mg/dL (18 Aug 2020 23:31)      Drug Levels: [] N/A  Vancomycin Level, Trough: 5.9 ug/mL (08-17 @ 13:25)    CSF Analysis: [] N/A      Allergies    No Known Allergies    Intolerances      MEDICATIONS:  Antibiotics:  cefepime   IVPB 2000 milliGRAM(s) IV Intermittent every 8 hours  tenofovir disoproxil fumarate (VIREAD) 300 milliGRAM(s) Oral every 24 hours    Neuro:  acetaminophen    Suspension .. 650 milliGRAM(s) Oral every 6 hours PRN    Anticoagulation:    OTHER:  acetylcysteine 20%  Inhalation 4 milliLiter(s) Inhalation three times a day PRN  artificial tears (preservative free) Ophthalmic Solution 1 Drop(s) Both EYES three times a day  chlorhexidine 4% Liquid 1 Application(s) Topical <User Schedule>  dextrose 40% Gel 15 Gram(s) Oral once PRN  dextrose 50% Injectable 12.5 Gram(s) IV Push once  dextrose 50% Injectable 25 Gram(s) IV Push once  doxazosin 4 milliGRAM(s) Oral at bedtime  finasteride 5 milliGRAM(s) Oral daily  glucagon  Injectable 1 milliGRAM(s) IntraMuscular once PRN  glucagon  Injectable 1 milliGRAM(s) IntraMuscular once PRN  hydrALAZINE Injectable 10 milliGRAM(s) IV Push every 2 hours PRN  hydrocortisone 1% Cream 1 Application(s) Topical two times a day  insulin glargine Injectable (LANTUS) 7 Unit(s) SubCutaneous every morning  insulin regular  human corrective regimen sliding scale.   SubCutaneous every 6 hours  insulin regular  human recombinant 4 Unit(s) SubCutaneous every 6 hours  metoprolol tartrate 12.5 milliGRAM(s) Oral every 12 hours  Mometasone Furoate 0.1% Ointment 1 Application(s) 1 Application(s) Topical two times a day PRN  psyllium Powder 1 Packet(s) Oral daily  sodium chloride 0.9% for Nebulization 3 milliLiter(s) Nebulizer three times a day PRN    IVF:  dextrose 5%. 1000 milliLiter(s) IV Continuous <Continuous>    CULTURES:  Culture Results:   GI PCR Results: NOT detected  *******Please Note:*******  GI panel PCR evaluates for:  Campylobacter, Plesiomonas shigelloides, Salmonella,  Vibrio, Yersinia enterocolitica, Enteroaggregative  Escherichia coli (EAEC), Enteropathogenic E.coli (EPEC),  Enterotoxigenic E. coli (ETEC) lt/st, Shiga-like  toxin-producing E. coli (STEC) stx1/stx2,  Shigella/ Enteroinvasive E. coli (EIEC), Cryptosporidium,  Cyclospora cayetanensis, Entamoeba histolytica,  Giardia lamblia, Adenovirus F 40/41, Astrovirus,  Norovirus GI/GII, Rotavirus A, Sapovirus (08-16 @ 04:12)  Culture Results:   No enteric pathogens recovered. Specimen screened for  Salmonella, Shigella, Campylobacter, E.Coli 0157:H7 and Shiga-like  producing organisms. (08-15 @ 23:46)    RADIOLOGY & ADDITIONAL TESTS:      ASSESSMENT:  59y Male with ICH/IVH s/p SOC crani POD #16 (8/4/20), s/p EVD placement, removed (8/14/20).    INTRACRANIAL BLEED  No pertinent family history in first degree relatives  Handoff  MEWS Score  Hepatitis B  HLD (hyperlipidemia)  ICH (intracerebral hemorrhage)  ICH (intracerebral hemorrhage)  Hepatitis B  HLD (hyperlipidemia)  HIV (human immunodeficiency virus infection)  Cerebellar hemorrhage  Craniectomy, subocciptal, with cervical laminectomy for medulla and spinal cord decompression  Percutaneous insertion of arterial line  Angiogram, carotid and cerebral vessels, bilateral  Foot fracture, left      PLAN:  NEURO:  - neuro checks  - vitals checks  - pain control   - cont Amantadine    CARDIOVASCULAR:  - -140  - cont Losartan, Amlodipine     PULMONARY:  - on room air, no issues    RENAL:  - IVL  - cont Proscar, Cardura     GI:  - TF via NGT,  - rectal tube  - pending peg placement Thursday?    HEME:  - H/H stable    ID:  - appreciate ID input, continue cefepime, and f/u work up   - f/u pan cx 8/14    ENDO:  - ISS, Lantus, Lispro     DVT PROPHYLAXIS:  [x] Venodynes                                [] Heparin/Lovenox - SQL held for PEG tomorrow    FALL RISK:  [] Low Risk                                    [] Impulsive    DISPOSITION: ICU status, full code, dispo pending      Assessment:  Present when checked    []  GCS  E   V  M     Heart Failure: []Acute, [] acute on chronic , []chronic  Heart Failure:  [] Diastolic (HFpEF), [] Systolic (HFrEF), []Combined (HFpEF and HFrEF), [] RHF, [] Pulm HTN, [] Other    [] SARAH, [] ATN, [] AIN, [] other  [] CKD1, [] CKD2, [] CKD 3, [] CKD 4, [] CKD 5, []ESRD    Encephalopathy: [] Metabolic, [] Hepatic, [] toxic, [x] Neurological, [] Other    Abnormal Nurtitional Status: [] malnurtition (see nutrition note), [ ]underweight: BMI < 19, [] morbid obesity: BMI >40, [] Cachexia    [] Sepsis  [] hypovolemic shock,[] cardiogenic shock, [] hemorrhagic shock, [] neuogenic shock  [] Acute Respiratory Failure  []Cerebral edema, [] Brain compression/ herniation,   [] Functional quadriplegia  [] Acute blood loss anemia HPI:  59 year old male with PMH of HLD and unclear liver disease had acute onset of severe headache, dizziness and vomiting x1 today, was brought into [facility] with continued symptoms, as well as hypertension and multiple episodes of vomiting. Patient was intubated for airway protection with CT Head performed demonstrating a large left cerebellar hemorrhage. CTA Head/Neck performed is suggestive of underlying vascular pathology such as AVM. [place] [facility] [place] [facility] [place] [facility] for further work-up. Patient arrives to [facility] intubated, sedated and on Nicardipine drip. Patient's son provided history and denies any Aspirin or other anticoagulant use. Denies any prior history of hypertension, smoking, or ETOH abuse. (04 Aug 2020 05:01)    Hospital Course:   POD# 0 S/P cerebral angio, negative for AVM. s/p SOC craniectomy evacuation of cerebellar hematoma.   POD # 1:   overnight tachycardic, ekg without significant changes, lactate wnl, low grade fever, s/p tyl. bolus x 1L , NS increased to 100cc/hr; neuro improving.  EVD at 5 cmH2O   8/6 POD#3, BD#4, Tmax 100.6, Pan Cx 7/5 NGTD, Vanc/Zosyn until 8/10, Vanc trough due today @ 6pm, EEG = no seizure, Lasix overnight for Pulm congest and tachycard, ruff placed for strict I&Os, pending anemia w/u, EVD @ 5, CPAP trials, Card GTT for SBP<140, Started Norvasc for BP optimization  8/7:  8/8:  S/P cerebral angio, negative for AVM. s/p SOC craniectomy evacuation of cerebellar hematoma. No significant events overnight. On CPAP this morning   8/10: fevers overnight, resolved this morning, Pancultured, (+) Enterbacter, on CPAP this AM  8/11 POD#7 SOC, BD # 8, EVD replaced (hard pass), Neuro exam stable, EVD @ 5cm H2O, Zosyn for sputum + enterobact, Plan to extubated today, HCT in AM  8/12 POD#8 s/p SOC, BD#9, s/p EVD replacement (hard pass on 8/10/20), neuro exam stable, EVD @5cm H2O. Zosyn for enterobacter in sputum. O/n EVD stopped draining, flushed distally and proximally, waveform poor, neuro exam remained stable throughout, ICPs <8 prior.    8/13 EVD soft pass yesterday, spiked temp today and re cultured o/w neuro exam stable  8/14: EMIL overnight, neuro stable. Mucomyst for secretions overnight   8/15 EVD dc/d, temp spike to 104, pan culture, ID consulted, vanco and cefepime started   8/15 levo started, presumed sepsis from aspiration, continues to have diarrhea  8/16: EMIL  8/17: EMIL. Neuro exam stable  8/18: Increased secretions overnight. Chest xray obtained. NGT replaced. Neuro exam stable. Patient transferred to ICU for monitoring due to congestion and possible L effusion on CXR along with desaturation and frequent suctioning.   8/19: POD16, on EEG, EMIL o/n, transferred to 5E, plan for PEG tomorrow      Vital Signs Last 24 Hrs  T(C): 36.3 (19 Aug 2020 13:05), Max: 37.9 (18 Aug 2020 19:23)  T(F): 97.4 (19 Aug 2020 13:05), Max: 100.3 (18 Aug 2020 19:23)  HR: 69 (19 Aug 2020 16:00) (61 - 111)  BP: 128/59 (19 Aug 2020 16:00) (105/56 - 146/74)  BP(mean): 84 (19 Aug 2020 16:00) (76 - 103)  RR: 24 (19 Aug 2020 16:00) (13 - 27)  SpO2: 94% (19 Aug 2020 16:00) (91% - 100%)    I&O's Detail    18 Aug 2020 07:01  -  19 Aug 2020 07:00  --------------------------------------------------------  IN:    Enteral Tube Flush: 80 mL    IV PiggyBack: 150 mL    ns in tub fed  [id number]: 140 mL    sodium chloride 0.9%: 300 mL  Total IN: 670 mL    OUT:    Incontinent per Condom Catheter: 825 mL    Intermittent Catheterization - Urethral: 1375 mL  Total OUT: 2200 mL    Total NET: -1530 mL      19 Aug 2020 07:01  -  19 Aug 2020 17:24  --------------------------------------------------------  IN:    IV PiggyBack: 100 mL  Total IN: 100 mL    OUT:    Intermittent Catheterization - Urethral: 500 mL  Total OUT: 500 mL    Total NET: -400 mL        I&O's Summary    18 Aug 2020 07:01  -  19 Aug 2020 07:00  --------------------------------------------------------  IN: 670 mL / OUT: 2200 mL / NET: -1530 mL    19 Aug 2020 07:01  -  19 Aug 2020 17:24  --------------------------------------------------------  IN: 100 mL / OUT: 500 mL / NET: -400 mL        PHYSICAL EXAM:  General: patient seen laying supine in bed in NAD  Neuro: OE spontaneously, nonverbal, MUNOZ spont x 4, FC b/l UE  HEENT: PERRL  Neck: supple  Cardiac: RRR, S1S2  Pulmonary: chest rise symmetric  Abdomen: soft, nontender, nondistended  Ext: warm, perfusing well          DEVICE/DRAIN DRESSING:    TUBES/LINES:  [] CVC  [] A-line  [] Lumbar Drain  [] Ventriculostomy  [] Other    DIET:  [] NPO  [] Mechanical  [x] Tube feeds    LABS:                        10.0   21.54 )-----------( 183      ( 19 Aug 2020 06:19 )             31.0     08-19    146<H>  |  110<H>  |  19  ----------------------------<  80  4.0   |  22  |  0.66    Ca    8.8      19 Aug 2020 06:19  Phos  2.6     08-19  Mg     2.7     08-19              CAPILLARY BLOOD GLUCOSE      POCT Blood Glucose.: 95 mg/dL (19 Aug 2020 11:05)  POCT Blood Glucose.: 88 mg/dL (19 Aug 2020 06:12)  POCT Blood Glucose.: 90 mg/dL (18 Aug 2020 23:31)      Drug Levels: [] N/A  Vancomycin Level, Trough: 5.9 ug/mL (08-17 @ 13:25)    CSF Analysis: [] N/A      Allergies    No Known Allergies    Intolerances      MEDICATIONS:  Antibiotics:  cefepime   IVPB 2000 milliGRAM(s) IV Intermittent every 8 hours  tenofovir disoproxil fumarate (VIREAD) 300 milliGRAM(s) Oral every 24 hours    Neuro:  acetaminophen    Suspension .. 650 milliGRAM(s) Oral every 6 hours PRN    Anticoagulation:    OTHER:  acetylcysteine 20%  Inhalation 4 milliLiter(s) Inhalation three times a day PRN  artificial tears (preservative free) Ophthalmic Solution 1 Drop(s) Both EYES three times a day  chlorhexidine 4% Liquid 1 Application(s) Topical <User Schedule>  dextrose 40% Gel 15 Gram(s) Oral once PRN  dextrose 50% Injectable 12.5 Gram(s) IV Push once  dextrose 50% Injectable 25 Gram(s) IV Push once  doxazosin 4 milliGRAM(s) Oral at bedtime  finasteride 5 milliGRAM(s) Oral daily  glucagon  Injectable 1 milliGRAM(s) IntraMuscular once PRN  glucagon  Injectable 1 milliGRAM(s) IntraMuscular once PRN  hydrALAZINE Injectable 10 milliGRAM(s) IV Push every 2 hours PRN  hydrocortisone 1% Cream 1 Application(s) Topical two times a day  insulin glargine Injectable (LANTUS) 7 Unit(s) SubCutaneous every morning  insulin regular  human corrective regimen sliding scale.   SubCutaneous every 6 hours  insulin regular  human recombinant 4 Unit(s) SubCutaneous every 6 hours  metoprolol tartrate 12.5 milliGRAM(s) Oral every 12 hours  Mometasone Furoate 0.1% Ointment 1 Application(s) 1 Application(s) Topical two times a day PRN  psyllium Powder 1 Packet(s) Oral daily  sodium chloride 0.9% for Nebulization 3 milliLiter(s) Nebulizer three times a day PRN    IVF:  dextrose 5%. 1000 milliLiter(s) IV Continuous <Continuous>    CULTURES:  Culture Results:   GI PCR Results: NOT detected  *******Please Note:*******  GI panel PCR evaluates for:  Campylobacter, Plesiomonas shigelloides, Salmonella,  Vibrio, Yersinia enterocolitica, Enteroaggregative  Escherichia coli (EAEC), Enteropathogenic E.coli (EPEC),  Enterotoxigenic E. coli (ETEC) lt/st, Shiga-like  toxin-producing E. coli (STEC) stx1/stx2,  Shigella/ Enteroinvasive E. coli (EIEC), Cryptosporidium,  Cyclospora cayetanensis, Entamoeba histolytica,  Giardia lamblia, Adenovirus F 40/41, Astrovirus,  Norovirus GI/GII, Rotavirus A, Sapovirus (08-16 @ 04:12)  Culture Results:   No enteric pathogens recovered. Specimen screened for  Salmonella, Shigella, Campylobacter, E.Coli 0157:H7 and Shiga-like  producing organisms. (08-15 @ 23:46)    RADIOLOGY & ADDITIONAL TESTS:      ASSESSMENT:  59y Male with ICH/IVH s/p SOC crani POD #16 (8/4/20), s/p EVD placement, removed (8/14/20).    INTRACRANIAL BLEED  No pertinent family history in first degree relatives  Handoff  MEWS Score  Hepatitis B  HLD (hyperlipidemia)  ICH (intracerebral hemorrhage)  ICH (intracerebral hemorrhage)  Hepatitis B  HLD (hyperlipidemia)  HIV (human immunodeficiency virus infection)  Cerebellar hemorrhage  Craniectomy, subocciptal, with cervical laminectomy for medulla and spinal cord decompression  Percutaneous insertion of arterial line  Angiogram, carotid and cerebral vessels, bilateral  Foot fracture, left      PLAN:  NEURO:  - neuro checks  - vitals checks  - pain control   - on EEG      CARDIOVASCULAR:  - -140  - cont metoprolol    PULMONARY:  - on nasal cannula 4L    RENAL:  - IVL  - cont Proscar, Cardura   - straight cath q6 for retention    GI:  - TF via NGT  - FEES today  - pending peg placement Thursday with Dr. Purdy    HEME:  - H/H stable    ID:  - appreciate ID input, cefepime until 8/20  - f/u pan cx 8/14    ENDO:  - ISS, Lantus, Lispro     DVT PROPHYLAXIS:  [x] Venodynes                                [] Heparin/Lovenox - SQL held for PEG tomorrow      FALL RISK:  [] Low Risk                                    [] Impulsive    DISPOSITION: ICU status, full code, dispo pending    d/w Dr. Blank    Assessment:  Present when checked    []  GCS  E   V  M     Heart Failure: []Acute, [] acute on chronic , []chronic  Heart Failure:  [] Diastolic (HFpEF), [] Systolic (HFrEF), []Combined (HFpEF and HFrEF), [] RHF, [] Pulm HTN, [] Other    [] SARAH, [] ATN, [] AIN, [] other  [] CKD1, [] CKD2, [] CKD 3, [] CKD 4, [] CKD 5, []ESRD    Encephalopathy: [] Metabolic, [] Hepatic, [] toxic, [x] Neurological, [] Other    Abnormal Nurtitional Status: [] malnurtition (see nutrition note), [ ]underweight: BMI < 19, [] morbid obesity: BMI >40, [] Cachexia    [] Sepsis  [] hypovolemic shock,[] cardiogenic shock, [] hemorrhagic shock, [] neuogenic shock  [] Acute Respiratory Failure  []Cerebral edema, [] Brain compression/ herniation,   [] Functional quadriplegia  [] Acute blood loss anemia

## 2020-08-19 NOTE — CONSULT NOTE ADULT - REASON FOR ADMISSION
Left cerebellar intraparenchymal hemorrhage

## 2020-08-20 LAB
ALBUMIN SERPL ELPH-MCNC: 2.9 G/DL — LOW (ref 3.3–5)
ALBUMIN SERPL ELPH-MCNC: 3.2 G/DL — LOW (ref 3.3–5)
ALP SERPL-CCNC: 62 U/L — SIGNIFICANT CHANGE UP (ref 40–120)
ALP SERPL-CCNC: 74 U/L — SIGNIFICANT CHANGE UP (ref 40–120)
ALT FLD-CCNC: 29 U/L — SIGNIFICANT CHANGE UP (ref 10–45)
ALT FLD-CCNC: 30 U/L — SIGNIFICANT CHANGE UP (ref 10–45)
AMMONIA BLD-MCNC: 32 UMOL/L — SIGNIFICANT CHANGE UP (ref 11–55)
ANION GAP SERPL CALC-SCNC: 12 MMOL/L — SIGNIFICANT CHANGE UP (ref 5–17)
ANION GAP SERPL CALC-SCNC: 12 MMOL/L — SIGNIFICANT CHANGE UP (ref 5–17)
APPEARANCE UR: CLEAR — SIGNIFICANT CHANGE UP
APTT BLD: 34 SEC — SIGNIFICANT CHANGE UP (ref 27.5–35.5)
AST SERPL-CCNC: 25 U/L — SIGNIFICANT CHANGE UP (ref 10–40)
AST SERPL-CCNC: 27 U/L — SIGNIFICANT CHANGE UP (ref 10–40)
BILIRUB SERPL-MCNC: 0.4 MG/DL — SIGNIFICANT CHANGE UP (ref 0.2–1.2)
BILIRUB SERPL-MCNC: 0.5 MG/DL — SIGNIFICANT CHANGE UP (ref 0.2–1.2)
BILIRUB UR-MCNC: NEGATIVE — SIGNIFICANT CHANGE UP
BLD GP AB SCN SERPL QL: NEGATIVE — SIGNIFICANT CHANGE UP
BUN SERPL-MCNC: 20 MG/DL — SIGNIFICANT CHANGE UP (ref 7–23)
BUN SERPL-MCNC: 20 MG/DL — SIGNIFICANT CHANGE UP (ref 7–23)
CALCIUM SERPL-MCNC: 8.2 MG/DL — LOW (ref 8.4–10.5)
CALCIUM SERPL-MCNC: 8.4 MG/DL — SIGNIFICANT CHANGE UP (ref 8.4–10.5)
CHLORIDE SERPL-SCNC: 114 MMOL/L — HIGH (ref 96–108)
CHLORIDE SERPL-SCNC: 114 MMOL/L — HIGH (ref 96–108)
CO2 SERPL-SCNC: 23 MMOL/L — SIGNIFICANT CHANGE UP (ref 22–31)
CO2 SERPL-SCNC: 24 MMOL/L — SIGNIFICANT CHANGE UP (ref 22–31)
COLOR SPEC: YELLOW — SIGNIFICANT CHANGE UP
CREAT SERPL-MCNC: 0.62 MG/DL — SIGNIFICANT CHANGE UP (ref 0.5–1.3)
CREAT SERPL-MCNC: 0.62 MG/DL — SIGNIFICANT CHANGE UP (ref 0.5–1.3)
CULTURE RESULTS: NO GROWTH — SIGNIFICANT CHANGE UP
CULTURE RESULTS: SIGNIFICANT CHANGE UP
DIFF PNL FLD: ABNORMAL
GLUCOSE BLDC GLUCOMTR-MCNC: 109 MG/DL — HIGH (ref 70–99)
GLUCOSE BLDC GLUCOMTR-MCNC: 89 MG/DL — SIGNIFICANT CHANGE UP (ref 70–99)
GLUCOSE BLDC GLUCOMTR-MCNC: 95 MG/DL — SIGNIFICANT CHANGE UP (ref 70–99)
GLUCOSE BLDC GLUCOMTR-MCNC: 99 MG/DL — SIGNIFICANT CHANGE UP (ref 70–99)
GLUCOSE SERPL-MCNC: 104 MG/DL — HIGH (ref 70–99)
GLUCOSE SERPL-MCNC: 108 MG/DL — HIGH (ref 70–99)
GLUCOSE UR QL: NEGATIVE — SIGNIFICANT CHANGE UP
GRAM STN FLD: SIGNIFICANT CHANGE UP
HCT VFR BLD CALC: 28.2 % — LOW (ref 39–50)
HGB BLD-MCNC: 8.6 G/DL — LOW (ref 13–17)
INR BLD: 1.3 — HIGH (ref 0.88–1.16)
KETONES UR-MCNC: 15 MG/DL
LEUKOCYTE ESTERASE UR-ACNC: NEGATIVE — SIGNIFICANT CHANGE UP
MAGNESIUM SERPL-MCNC: 2.6 MG/DL — SIGNIFICANT CHANGE UP (ref 1.6–2.6)
MAGNESIUM SERPL-MCNC: 2.7 MG/DL — HIGH (ref 1.6–2.6)
MCHC RBC-ENTMCNC: 27.8 PG — SIGNIFICANT CHANGE UP (ref 27–34)
MCHC RBC-ENTMCNC: 30.5 GM/DL — LOW (ref 32–36)
MCV RBC AUTO: 91.3 FL — SIGNIFICANT CHANGE UP (ref 80–100)
NITRITE UR-MCNC: NEGATIVE — SIGNIFICANT CHANGE UP
NRBC # BLD: 0 /100 WBCS — SIGNIFICANT CHANGE UP (ref 0–0)
PH UR: 6 — SIGNIFICANT CHANGE UP (ref 5–8)
PHOSPHATE SERPL-MCNC: 2.4 MG/DL — LOW (ref 2.5–4.5)
PHOSPHATE SERPL-MCNC: 2.6 MG/DL — SIGNIFICANT CHANGE UP (ref 2.5–4.5)
PLATELET # BLD AUTO: 199 K/UL — SIGNIFICANT CHANGE UP (ref 150–400)
POTASSIUM SERPL-MCNC: 3.4 MMOL/L — LOW (ref 3.5–5.3)
POTASSIUM SERPL-MCNC: 3.5 MMOL/L — SIGNIFICANT CHANGE UP (ref 3.5–5.3)
POTASSIUM SERPL-SCNC: 3.4 MMOL/L — LOW (ref 3.5–5.3)
POTASSIUM SERPL-SCNC: 3.5 MMOL/L — SIGNIFICANT CHANGE UP (ref 3.5–5.3)
PROT SERPL-MCNC: 6.6 G/DL — SIGNIFICANT CHANGE UP (ref 6–8.3)
PROT SERPL-MCNC: 6.9 G/DL — SIGNIFICANT CHANGE UP (ref 6–8.3)
PROT UR-MCNC: ABNORMAL MG/DL
PROTHROM AB SERPL-ACNC: 15.4 SEC — HIGH (ref 10.6–13.6)
RBC # BLD: 3.09 M/UL — LOW (ref 4.2–5.8)
RBC # FLD: 15.7 % — HIGH (ref 10.3–14.5)
RH IG SCN BLD-IMP: POSITIVE — SIGNIFICANT CHANGE UP
SODIUM SERPL-SCNC: 149 MMOL/L — HIGH (ref 135–145)
SODIUM SERPL-SCNC: 150 MMOL/L — HIGH (ref 135–145)
SP GR SPEC: >=1.03 — SIGNIFICANT CHANGE UP (ref 1–1.03)
SPECIMEN SOURCE: SIGNIFICANT CHANGE UP
SPECIMEN SOURCE: SIGNIFICANT CHANGE UP
UROBILINOGEN FLD QL: 0.2 E.U./DL — SIGNIFICANT CHANGE UP
WBC # BLD: 21.39 K/UL — HIGH (ref 3.8–10.5)
WBC # FLD AUTO: 21.39 K/UL — HIGH (ref 3.8–10.5)

## 2020-08-20 PROCEDURE — 99291 CRITICAL CARE FIRST HOUR: CPT | Mod: 24

## 2020-08-20 PROCEDURE — 95720 EEG PHY/QHP EA INCR W/VEEG: CPT

## 2020-08-20 PROCEDURE — 99253 IP/OBS CNSLTJ NEW/EST LOW 45: CPT | Mod: GC,25

## 2020-08-20 PROCEDURE — 99233 SBSQ HOSP IP/OBS HIGH 50: CPT

## 2020-08-20 PROCEDURE — 93970 EXTREMITY STUDY: CPT | Mod: 26

## 2020-08-20 PROCEDURE — 43246 EGD PLACE GASTROSTOMY TUBE: CPT | Mod: GC

## 2020-08-20 PROCEDURE — 71045 X-RAY EXAM CHEST 1 VIEW: CPT | Mod: 26

## 2020-08-20 PROCEDURE — 99024 POSTOP FOLLOW-UP VISIT: CPT

## 2020-08-20 RX ORDER — SODIUM CHLORIDE 9 MG/ML
1000 INJECTION INTRAMUSCULAR; INTRAVENOUS; SUBCUTANEOUS
Refills: 0 | Status: DISCONTINUED | OUTPATIENT
Start: 2020-08-20 | End: 2020-08-22

## 2020-08-20 RX ORDER — ENOXAPARIN SODIUM 100 MG/ML
40 INJECTION SUBCUTANEOUS EVERY 24 HOURS
Refills: 0 | Status: DISCONTINUED | OUTPATIENT
Start: 2020-08-20 | End: 2020-08-24

## 2020-08-20 RX ORDER — IPRATROPIUM/ALBUTEROL SULFATE 18-103MCG
3 AEROSOL WITH ADAPTER (GRAM) INHALATION EVERY 6 HOURS
Refills: 0 | Status: DISCONTINUED | OUTPATIENT
Start: 2020-08-20 | End: 2020-09-02

## 2020-08-20 RX ORDER — POTASSIUM PHOSPHATE, MONOBASIC POTASSIUM PHOSPHATE, DIBASIC 236; 224 MG/ML; MG/ML
30 INJECTION, SOLUTION INTRAVENOUS ONCE
Refills: 0 | Status: COMPLETED | OUTPATIENT
Start: 2020-08-20 | End: 2020-08-20

## 2020-08-20 RX ORDER — FENTANYL CITRATE 50 UG/ML
25 INJECTION INTRAVENOUS ONCE
Refills: 0 | Status: DISCONTINUED | OUTPATIENT
Start: 2020-08-20 | End: 2020-08-20

## 2020-08-20 RX ORDER — ACETYLCYSTEINE 200 MG/ML
4 VIAL (ML) MISCELLANEOUS EVERY 6 HOURS
Refills: 0 | Status: DISCONTINUED | OUTPATIENT
Start: 2020-08-20 | End: 2020-09-02

## 2020-08-20 RX ADMIN — Medication 3 MILLILITER(S): at 16:14

## 2020-08-20 RX ADMIN — Medication 1 DROP(S): at 14:16

## 2020-08-20 RX ADMIN — CEFEPIME 100 MILLIGRAM(S): 1 INJECTION, POWDER, FOR SOLUTION INTRAMUSCULAR; INTRAVENOUS at 06:32

## 2020-08-20 RX ADMIN — FINASTERIDE 5 MILLIGRAM(S): 5 TABLET, FILM COATED ORAL at 13:11

## 2020-08-20 RX ADMIN — CEFEPIME 100 MILLIGRAM(S): 1 INJECTION, POWDER, FOR SOLUTION INTRAMUSCULAR; INTRAVENOUS at 21:38

## 2020-08-20 RX ADMIN — FENTANYL CITRATE 25 MICROGRAM(S): 50 INJECTION INTRAVENOUS at 09:51

## 2020-08-20 RX ADMIN — ENOXAPARIN SODIUM 40 MILLIGRAM(S): 100 INJECTION SUBCUTANEOUS at 21:38

## 2020-08-20 RX ADMIN — SCOPALAMINE 1 PATCH: 1 PATCH, EXTENDED RELEASE TRANSDERMAL at 20:03

## 2020-08-20 RX ADMIN — Medication 12.5 MILLIGRAM(S): at 18:36

## 2020-08-20 RX ADMIN — Medication 4 MILLILITER(S): at 16:14

## 2020-08-20 RX ADMIN — SODIUM CHLORIDE 50 MILLILITER(S): 9 INJECTION INTRAMUSCULAR; INTRAVENOUS; SUBCUTANEOUS at 18:36

## 2020-08-20 RX ADMIN — Medication 4 MILLIGRAM(S): at 21:38

## 2020-08-20 RX ADMIN — Medication 3 MILLILITER(S): at 21:37

## 2020-08-20 RX ADMIN — POTASSIUM PHOSPHATE, MONOBASIC POTASSIUM PHOSPHATE, DIBASIC 83.33 MILLIMOLE(S): 236; 224 INJECTION, SOLUTION INTRAVENOUS at 10:32

## 2020-08-20 RX ADMIN — Medication 12.5 MILLIGRAM(S): at 06:35

## 2020-08-20 RX ADMIN — Medication 4 MILLILITER(S): at 21:37

## 2020-08-20 RX ADMIN — Medication 1 APPLICATION(S): at 06:34

## 2020-08-20 RX ADMIN — Medication 1 PACKET(S): at 13:11

## 2020-08-20 RX ADMIN — Medication 650 MILLIGRAM(S): at 00:30

## 2020-08-20 RX ADMIN — TENOFOVIR DISOPROXIL FUMARATE 300 MILLIGRAM(S): 300 TABLET, FILM COATED ORAL at 21:50

## 2020-08-20 RX ADMIN — CEFEPIME 100 MILLIGRAM(S): 1 INJECTION, POWDER, FOR SOLUTION INTRAMUSCULAR; INTRAVENOUS at 14:16

## 2020-08-20 RX ADMIN — FENTANYL CITRATE 25 MICROGRAM(S): 50 INJECTION INTRAVENOUS at 10:32

## 2020-08-20 RX ADMIN — SCOPALAMINE 1 PATCH: 1 PATCH, EXTENDED RELEASE TRANSDERMAL at 07:29

## 2020-08-20 RX ADMIN — Medication 1 DROP(S): at 06:32

## 2020-08-20 RX ADMIN — INSULIN GLARGINE 7 UNIT(S): 100 INJECTION, SOLUTION SUBCUTANEOUS at 07:02

## 2020-08-20 RX ADMIN — Medication 1 DROP(S): at 21:37

## 2020-08-20 NOTE — EEG REPORT - NS EEG TEXT BOX
Daily Updates (from 07:00 am until 07:00 am):  Day 2 8/19-8/20/2020: (disconnected from 12:50 PM to 1:29 PM)  Pertinent medications: LEV given  Background:  continuous, with predominantly theta/delta.frequencies.  Symmetry:  Higher amplitude/faster frequencies over left   Posterior Dominant Rhythm:  Reaches up to 7 Hz  Organization: Rudimentary.  Voltage:  Normal (20+ uV)  Variability: Yes. 		Reactivity: Yes.  N2 sleep: Rudimentary but likely N2 transients present.  Spontaneous Activity:  No epileptiform discharges.  Periodic/rhythmic activity:  Occasional generalized periodic discharges, brief, triphasic morphology, 1.5-2 Hz, fluctuating  Events:  No electrographic seizures or significant clinical events.  Provocations:  Hyperventilation and Photic stimulation: was not performed.  Daily Summary:    1.	 Moderate generalized  slowing suggestive of a similar degree of diffuse or multifocal  dysfunction.  2.	Breach rhythm over left suggestive of local skull defect  3.	Generalized periodic discharges with triphasic morphology, which is a nonspecific finding often seen in the setting of a diffuse or multifocal pathologic process, but may also indicate underlying hyperexcitability. Clinical correlation is advised.   No significant change from previous day    Read by:  Tiffanie Strong MD

## 2020-08-20 NOTE — PROGRESS NOTE ADULT - SUBJECTIVE AND OBJECTIVE BOX
** Note reflects assessment in AM, prior to PEG placement**    SUBJECTIVE: Examined at the bedside resting comfortably. Per nurse passing flatus and having BMs. Straight cathed intermittenly    cefepime   IVPB 2000 milliGRAM(s) IV Intermittent every 8 hours  doxazosin 4 milliGRAM(s) Oral at bedtime  enoxaparin Injectable 40 milliGRAM(s) SubCutaneous every 24 hours  hydrALAZINE Injectable 10 milliGRAM(s) IV Push every 2 hours PRN  metoprolol tartrate 12.5 milliGRAM(s) Oral every 12 hours  tenofovir disoproxil fumarate (VIREAD) 300 milliGRAM(s) Oral every 24 hours      Vital Signs Last 24 Hrs  T(C): 36.9 (20 Aug 2020 12:00), Max: 38.3 (19 Aug 2020 21:35)  T(F): 98.5 (20 Aug 2020 12:00), Max: 101 (19 Aug 2020 21:35)  HR: 66 (20 Aug 2020 14:00) (59 - 112)  BP: 140/75 (20 Aug 2020 14:00) (105/55 - 190/83)  BP(mean): 102 (20 Aug 2020 14:00) (76 - 119)  RR: 15 (20 Aug 2020 14:00) (9 - 48)  SpO2: 100% (20 Aug 2020 14:00) (91% - 100%)  I&O's Detail    19 Aug 2020 07:01  -  20 Aug 2020 07:00  --------------------------------------------------------  IN:    Enteral Tube Flush: 120 mL    IV PiggyBack: 300 mL    sodium chloride 0.9%.: 1125 mL  Total IN: 1545 mL    OUT:    Intermittent Catheterization - Urethral: 2100 mL  Total OUT: 2100 mL    Total NET: -555 mL      20 Aug 2020 07:01  -  20 Aug 2020 14:42  --------------------------------------------------------  IN:    sodium chloride 0.9%.: 450 mL    Solution: 249.9 mL  Total IN: 699.9 mL    OUT:    Intermittent Catheterization - Urethral: 300 mL  Total OUT: 300 mL    Total NET: 399.9 mL          Physical Exam  General: NAD, resting comfortably in bed  HEENT: NGT for feeds in place  C/V: NSR  Pulm: Nonlabored breathing, no respiratory distress  Abd: soft, non-tender, non-distended.  Extrem: WWP, no edema,      LABS:                        8.6    21.39 )-----------( 199      ( 20 Aug 2020 05:57 )             28.2     08-20    150<H>  |  114<H>  |  20  ----------------------------<  108<H>  3.4<L>   |  24  |  0.62    Ca    8.4      20 Aug 2020 06:29  Phos  2.4     08-20  Mg     2.7     08-20    TPro  6.6  /  Alb  2.9<L>  /  TBili  0.4  /  DBili  x   /  AST  27  /  ALT  29  /  AlkPhos  74  08-20    PT/INR - ( 20 Aug 2020 06:31 )   PT: 15.4 sec;   INR: 1.30          PTT - ( 20 Aug 2020 06:31 )  PTT:34.0 sec  Urinalysis Basic - ( 20 Aug 2020 09:51 )    Color: Yellow / Appearance: Clear / SG: >=1.030 / pH: x  Gluc: x / Ketone: 15 mg/dL  / Bili: Negative / Urobili: 0.2 E.U./dL   Blood: x / Protein: Trace mg/dL / Nitrite: NEGATIVE   Leuk Esterase: NEGATIVE / RBC: 5-10 /HPF / WBC < 5 /HPF   Sq Epi: x / Non Sq Epi: 0-5 /HPF / Bacteria: Present /HPF        RADIOLOGY & ADDITIONAL STUDIES:

## 2020-08-20 NOTE — PROGRESS NOTE ADULT - ASSESSMENT
59y/M with  1.	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	Hepatitis B  3.	dyslipidemia     PLAN:   NEURO: neurologically stable  EEG with infrequent GPDs, no seizures --> continue off AEDs, can dc EEG  CT head follow up as per NSx  Goal normonatremia  REHAB:  physical therapy evaluation and management    EARLY MOB:  HOB up    PULM: mild aspiration, high suctioning requirements, q1h  continue aggressive pulmomnary toileting, chest PT  continue saline nebs and mucomyst    CARDIO:  1) afib with RVR prior SBP goal 100-140mm Hg   -on metoprolol 12.5 mg BID    ENDO:  Blood sugar goals 140-180 mg/dL, continue insulin sliding, continue statins; insulin glargine 15 nutritional 4-4-4-4  -->lantus 8 units QHS  -hold premeal insulin when NPO    GI: diebetic tube feeds  -s/p PEG, resume TF when ok per surgery    RENAL:  MURIEL    HEM/ONC: no coagulopathy, no h/o antiplatelet / anticoagulant use  VTE Prophylaxis: SCDs, SQL     ID: febrile, slowly improving leukocytosis; ID recommendations:  cefepime --> continue through 8/20  -Cdiff negative  -Abd CT negative   -enterobacter from sputum  -chronic HBV, on home antiviral    Dispo: will continue to monitor in ICU

## 2020-08-20 NOTE — PROGRESS NOTE ADULT - SUBJECTIVE AND OBJECTIVE BOX
HPI:  59 year old male with PMH of HLD and unclear liver disease had acute onset of severe headache, dizziness and vomiting x1 today, was brought into [facility] with continued symptoms, as well as hypertension and multiple episodes of vomiting. Patient was intubated for airway protection with CT Head performed demonstrating a large left cerebellar hemorrhage. CTA Head/Neck performed is suggestive of underlying vascular pathology such as AVM. RIght frontal ventriculostomy placed at [facility] and patient transferred to [facility] for further work-up. Patient arrives to [facility] intubated, sedated and on Nicardipine drip. Patient's son provided history and denies any Aspirin or other anticoagulant use. Denies any prior history of hypertension, smoking, or ETOH abuse. (04 Aug 2020 05:01)    Hospital Course:   POD# 0 S/P cerebral angio, negative for AVM. s/p SOC craniectomy evacuation of cerebellar hematoma.   POD # 1:   overnight tachycardic, ekg without significant changes, lactate wnl, low grade fever, s/p tyl. bolus x 1L , NS increased to 100cc/hr; neuro improving.  EVD at 5 cmH2O   8/6 POD#3, BD#4, Tmax 100.6, Pan Cx 7/5 NGTD, Vanc/Zosyn until 8/10, Vanc trough due today @ 6pm, EEG = no seizure, Lasix overnight for Pulm congest and tachycard, ruff placed for strict I&Os, pending anemia w/u, EVD @ 5, CPAP trials, Card GTT for SBP<140, Started Norvasc for BP optimization  8/7:  8/8:  S/P cerebral angio, negative for AVM. s/p SOC craniectomy evacuation of cerebellar hematoma. No significant events overnight. On CPAP this morning   8/10: fevers overnight, resolved this morning, Pancultured, (+) Enterbacter, on CPAP this AM  8/11 POD#7 SOC, BD # 8, EVD replaced (hard pass), Neuro exam stable, EVD @ 5cm H2O, Zosyn for sputum + enterobact, Plan to extubated today, HCT in AM  8/12 POD#8 s/p SOC, BD#9, s/p EVD replacement (hard pass on 8/10/20), neuro exam stable, EVD @5cm H2O. Zosyn for enterobacter in sputum. O/n EVD stopped draining, flushed distally and proximally, waveform poor, neuro exam remained stable throughout, ICPs <8 prior.    8/13 EVD soft pass yesterday, spiked temp today and re cultured o/w neuro exam stable  8/14: EMIL overnight, neuro stable. Mucomyst for secretions overnight   8/15 EVD dc/d, temp spike to 104, pan culture, ID consulted, vanco and cefepime started   8/15 levo started, presumed sepsis from aspiration, continues to have diarrhea  8/16: EMIL  8/17: EMIL. Neuro exam stable  8/18: Increased secretions overnight. Chest xray obtained. NGT replaced. Neuro exam stable. Patient transferred to ICU for monitoring due to congestion and possible L effusion on CXR along with desaturation and frequent suctioning.   8/19: POD16, on EEG, EMIL o/n, transferred to 5E, plan for PEG tomorrow  8/20 POD#17 overnight given Keppra 1g IV x 1 dose for blank stare and EEG findings: pending final read on EEG, Cefep for PNA, PEG today, NPO, Amonia Serum level today w/u for enceph, vEEG,       ICU Vital Signs Last 24 Hrs  T(C): 37.2 (20 Aug 2020 02:04), Max: 38.3 (19 Aug 2020 21:35)  T(F): 98.9 (20 Aug 2020 02:04), Max: 101 (19 Aug 2020 21:35)  HR: 74 (20 Aug 2020 04:00) (61 - 95)  BP: 138/67 (20 Aug 2020 04:00) (105/55 - 150/67)  BP(mean): 96 (20 Aug 2020 04:00) (76 - 103)  ABP: --  ABP(mean): --  RR: 16 (20 Aug 2020 04:00) (13 - 27)  SpO2: 97% (20 Aug 2020 04:00) (91% - 100%)        PHYSICAL EXAM:  General: patient seen laying supine in bed in NAD  Neuro: OE spontaneously, nonverbal, MUNOZ spont x 4, FC b/l UE  HEENT: PERRL  Neck: supple  Cardiac: RRR, S1S2  Pulmonary: chest rise symmetric  Abdomen: soft, nontender, nondistended  Ext: warm, perfusing well          DEVICE/DRAIN DRESSING:    TUBES/LINES:  [] CVC  [] A-line  [] Lumbar Drain  [] Ventriculostomy  [] Other    DIET:  [] NPO  [] Mechanical  [x] Tube feeds    LABS:                  Pending AM Collections            CAPILLARY BLOOD GLUCOSE      POCT Blood Glucose.: 95 mg/dL (19 Aug 2020 11:05)  POCT Blood Glucose.: 88 mg/dL (19 Aug 2020 06:12)  POCT Blood Glucose.: 90 mg/dL (18 Aug 2020 23:31)      Drug Levels: [] N/A  Vancomycin Level, Trough: 5.9 ug/mL (08-17 @ 13:25)    CSF Analysis: [] N/A      Allergies    No Known Allergies    Intolerances      MEDICATIONS:  Antibiotics:  cefepime   IVPB 2000 milliGRAM(s) IV Intermittent every 8 hours  tenofovir disoproxil fumarate (VIREAD) 300 milliGRAM(s) Oral every 24 hours    Neuro:  acetaminophen    Suspension .. 650 milliGRAM(s) Oral every 6 hours PRN    Anticoagulation:    OTHER:  acetylcysteine 20%  Inhalation 4 milliLiter(s) Inhalation three times a day PRN  artificial tears (preservative free) Ophthalmic Solution 1 Drop(s) Both EYES three times a day  chlorhexidine 4% Liquid 1 Application(s) Topical <User Schedule>  dextrose 40% Gel 15 Gram(s) Oral once PRN  dextrose 50% Injectable 12.5 Gram(s) IV Push once  dextrose 50% Injectable 25 Gram(s) IV Push once  doxazosin 4 milliGRAM(s) Oral at bedtime  finasteride 5 milliGRAM(s) Oral daily  glucagon  Injectable 1 milliGRAM(s) IntraMuscular once PRN  glucagon  Injectable 1 milliGRAM(s) IntraMuscular once PRN  hydrALAZINE Injectable 10 milliGRAM(s) IV Push every 2 hours PRN  hydrocortisone 1% Cream 1 Application(s) Topical two times a day  insulin glargine Injectable (LANTUS) 7 Unit(s) SubCutaneous every morning  insulin regular  human corrective regimen sliding scale.   SubCutaneous every 6 hours  insulin regular  human recombinant 4 Unit(s) SubCutaneous every 6 hours  metoprolol tartrate 12.5 milliGRAM(s) Oral every 12 hours  Mometasone Furoate 0.1% Ointment 1 Application(s) 1 Application(s) Topical two times a day PRN  psyllium Powder 1 Packet(s) Oral daily  sodium chloride 0.9% for Nebulization 3 milliLiter(s) Nebulizer three times a day PRN    IVF:  dextrose 5%. 1000 milliLiter(s) IV Continuous <Continuous>    CULTURES:  Culture Results:   GI PCR Results: NOT detected  *******Please Note:*******  GI panel PCR evaluates for:  Campylobacter, Plesiomonas shigelloides, Salmonella,  Vibrio, Yersinia enterocolitica, Enteroaggregative  Escherichia coli (EAEC), Enteropathogenic E.coli (EPEC),  Enterotoxigenic E. coli (ETEC) lt/st, Shiga-like  toxin-producing E. coli (STEC) stx1/stx2,  Shigella/ Enteroinvasive E. coli (EIEC), Cryptosporidium,  Cyclospora cayetanensis, Entamoeba histolytica,  Giardia lamblia, Adenovirus F 40/41, Astrovirus,  Norovirus GI/GII, Rotavirus A, Sapovirus (08-16 @ 04:12)  Culture Results:   No enteric pathogens recovered. Specimen screened for  Salmonella, Shigella, Campylobacter, E.Coli 0157:H7 and Shiga-like  producing organisms. (08-15 @ 23:46)    RADIOLOGY & ADDITIONAL TESTS:      ASSESSMENT:  59y Male with ICH/IVH s/p SOC crani POD #17 (8/4/20), s/p EVD placement, removed (8/14/20).    INTRACRANIAL BLEED  No pertinent family history in first degree relatives  Handoff  MEWS Score  Hepatitis B  HLD (hyperlipidemia)  ICH (intracerebral hemorrhage)  ICH (intracerebral hemorrhage)  Hepatitis B  HLD (hyperlipidemia)  HIV (human immunodeficiency virus infection)  Cerebellar hemorrhage  Craniectomy, subocciptal, with cervical laminectomy for medulla and spinal cord decompression  Percutaneous insertion of arterial line  Angiogram, carotid and cerebral vessels, bilateral  Foot fracture, left      PLAN:  NEURO:  - neuro checks  - vitals checks  - pain control   - on EEG      CARDIOVASCULAR:  - -140  - cont metoprolol    PULMONARY:  - on nasal cannula 4L    RENAL:  - IVL  - cont Proscar, Cardura   - straight cath q6 for retention    GI:  - NPO for PEG today Dr. Purdy  - FEES 8/19 failed      HEME:  - H/H stable    ID:  - appreciate ID input, cefepime until 8/20      ENDO:  - ISS, Lantus, Lispro     DVT PROPHYLAXIS:  [x] Venodynes                                [] Heparin/Lovenox - SQL held for PEG today      FALL RISK:  [] Low Risk                                    [] Impulsive    DISPOSITION: ICU status, full code, dispo pending    d/w Dr. Blank    Assessment:  Present when checked    []  GCS  E   V  M     Heart Failure: []Acute, [] acute on chronic , []chronic  Heart Failure:  [] Diastolic (HFpEF), [] Systolic (HFrEF), []Combined (HFpEF and HFrEF), [] RHF, [] Pulm HTN, [] Other    [] SARAH, [] ATN, [] AIN, [] other  [] CKD1, [] CKD2, [] CKD 3, [] CKD 4, [] CKD 5, []ESRD    Encephalopathy: [] Metabolic, [] Hepatic, [] toxic, [x] Neurological, [] Other    Abnormal Nurtitional Status: [] malnurtition (see nutrition note), [ ]underweight: BMI < 19, [] morbid obesity: BMI >40, [] Cachexia    [] Sepsis  [] hypovolemic shock,[] cardiogenic shock, [] hemorrhagic shock, [] neuogenic shock  [] Acute Respiratory Failure  []Cerebral edema, [] Brain compression/ herniation,   [] Functional quadriplegia  [] Acute blood loss anemia

## 2020-08-20 NOTE — BRIEF OPERATIVE NOTE - OPERATION/FINDINGS
Endoscope advance through bite guard, down esophagus into stomach. Body of stomach inspected, revealed mild gastritis most likely 2/2 dobhoff irritation. Endoscope advance into duodenum to ensure patency, no obstructions noted. Scope then retroflexed, 1 to 1 confirmed, local anesthetic administered, 11 blade used to make small 2cm transverse midline incision. PEG tube then placed in standard fashion, placement confirmed endoscopically. Stomach then decompressed and endoscope was withdrawn. Endoscope advance through bite guard, down esophagus into stomach. Body of stomach inspected, revealed mild gastritis most likely 2/2 dobhoff irritation. Endoscope advance into duodenum to ensure patency, no obstructions noted. Scope then retroflexed, 1 to 1 confirmed, local anesthetic administered, 11 blade used to make small 1cm transverse midline incision. 20F PEG tube then placed in standard pull-PEG fashion, placement confirmed endoscopically. Stomach then decompressed and endoscope was withdrawn.

## 2020-08-20 NOTE — PROGRESS NOTE ADULT - ASSESSMENT
59y/M with  1.	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	Hepatitis B  3.	dyslipidemia     PLAN:   NEURO: continued neurological improvement  EEG with infrequent GPDs, no seizures --> continue off AEDs, can dc EEG  Goal normonatremia  REHAB:  physical therapy evaluation and management    EARLY MOB:  HOB up    PULM: mild aspiration, high suctioning requirements, q1h  continue aggressive pulmomnary toileting, chest PT  continue saline nebs and mucomyst    CARDIO:  1) afib with RVR prior SBP goal 100-140mm Hg   -on metoprolol 12.5 mg BID    ENDO:  Blood sugar goals 140-180 mg/dL, continue insulin sliding, continue statins; insulin glargine 15 nutritional 4-4-4-4  -->lantus 8 units QHS  -hold premeal insulin when NPO    GI: diebetic tube feeds  -s/p PEG, resume TF when ok per surgery    RENAL:  MURIEL    HEM/ONC: no coagulopathy, no h/o antiplatelet / anticoagulant use  VTE Prophylaxis: SCDs, SQL     ID: febrile, slowly improving leukocytosis; ID recommendations:  cefepime --> continue through 8/20  -Cdiff negative  -Abd CT negative   -enterobacter from sputum  -chronic HBV, on home antiviral    Dispo: continue in ICU for high suctioing frequency (<q1h), once improved will be raedy for KELY

## 2020-08-20 NOTE — PROGRESS NOTE ADULT - SUBJECTIVE AND OBJECTIVE BOX
=============================================   NEUROCRITICAL CARE ATTENDING NOTE (2020)  =============================================    VEDA WARREN   MRN-[id number]  HPI: 59M with dyslipidemia, h/o Hep B infection, presented with acute onset severe HA, dizziness, vomiting - brought to [facility] where CT showed L cerebellar hemorrhage.  Intubated for airway protection.  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  R EVD inserted, transferred to [facility] for further management.  Given mannitol 25G in [facility].    Past Medical History: Hepatitis B HLD (hyperlipidemia)  Allergies:  No Known Allergies  Home meds:   ·	Omega-3 350 mg oral capsule: 1 cap(s) orally once a day  ·	tenofovir disoproxil fumarate 300 mg oral tablet: 1 tab(s) orally once a day      COURSE IN THE HOSPITAL:   bleed   admitted to [facility], 23.4 given x1; OR for SOC evacuation of ICH   remained intubated overnight   tachycardia overnight, Tmax 38.4, ?PNA, ABx    Tmax 38.1 diuresed overnight 1.8L.     No significant events overnight.  CPAP this morning   Tmax 39.7 (enterobacter sputum, on zosyn)  08/10 Trial of CPAP   Neurologically stable, for extubation.   Extubated.  Neurologically stable.   Neurologically stable.   Febrile overnight.  Suctioned for secretions.  Continued neurological improvement.  08/15 Hypotensive overnight, resolved this morning.  Neurologically stable.  Continued diarrhea.   No significant events.  : febrile overnight.  failed repeat swallow eval this AM.   : bounced ack to ICU for thick secretions high sucitoning requirment    24h events: sucitoning reuqirement remains high. s/p PEg this morning.  Staring overnight at times, given 1g keppra.    NEUROLOGIC EXAMINATION:    MSE: flat affect, working with PT, follows commands most of the time, no speech output when prompted, not cooperative with much of exam  CN: pupils 3 mm reactive bilaterally, modest dysarthria, weak cough, tongue midline  Motor: sitting on edge of bed, all extremities antigravity, LLE stronger than RLE in spontaneous movements  PULMONARY: coarse breath sounds  ABDOMEN: soft, nontender with normoactive bowel sounds      Allergies: No Known Allergies      EXAM:  VITALS:  T(C): 36.9 (20 @ 12:00), Max: 38.3 (20 @ 21:35)  HR: 70 (20 @ 12:00) (59 - 112)  BP: 160/77 (20 @ 12:00) (105/55 - 190/83)  RR: 13 (20 @ 12:00) (9 - 48)  SpO2: 98% (20 @ 12:00) (91% - 100%)    MEDICATIONS:  acetaminophen    Suspension .. 650 milliGRAM(s) Oral every 6 hours PRN  acetylcysteine 20%  Inhalation 4 milliLiter(s) Inhalation every 6 hours  albuterol/ipratropium for Nebulization. 3 milliLiter(s) Nebulizer every 6 hours PRN  artificial tears (preservative free) Ophthalmic Solution 1 Drop(s) Both EYES three times a day  cefepime   IVPB 2000 milliGRAM(s) IV Intermittent every 8 hours  chlorhexidine 4% Liquid 1 Application(s) Topical <User Schedule>  dextrose 40% Gel 15 Gram(s) Oral once PRN  dextrose 5%. 1000 milliLiter(s) IV Continuous <Continuous>  dextrose 50% Injectable 12.5 Gram(s) IV Push once  dextrose 50% Injectable 25 Gram(s) IV Push once  doxazosin 4 milliGRAM(s) Oral at bedtime  finasteride 5 milliGRAM(s) Oral daily  glucagon  Injectable 1 milliGRAM(s) IntraMuscular once PRN  glucagon  Injectable 1 milliGRAM(s) IntraMuscular once PRN  hydrALAZINE Injectable 10 milliGRAM(s) IV Push every 2 hours PRN  insulin glargine Injectable (LANTUS) 7 Unit(s) SubCutaneous every morning  insulin regular  human corrective regimen sliding scale.   SubCutaneous every 6 hours  insulin regular  human recombinant 4 Unit(s) SubCutaneous every 6 hours  metoprolol tartrate 12.5 milliGRAM(s) Oral every 12 hours  Mometasone Furoate 0.1% Ointment 1 Application(s) 1 Application(s) Topical two times a day PRN  psyllium Powder 1 Packet(s) Oral daily  sodium chloride 0.9% for Nebulization 3 milliLiter(s) Nebulizer three times a day PRN  sodium chloride 0.9% lock flush 10 milliLiter(s) IV Push every 1 hour PRN  sodium chloride 0.9%. 1000 milliLiter(s) IV Continuous <Continuous>  tenofovir disoproxil fumarate (VIREAD) 300 milliGRAM(s) Oral every 24 hours      I/O's    20 @ 07:01  -  20 @ 07:00  --------------------------------------------------------  IN: 1545 mL / OUT: 2100 mL / NET: -555 mL    20 @ 07:  -  20 @ 12:51  --------------------------------------------------------  IN: 541.6 mL / OUT: 300 mL / NET: 241.6 mL        Ventilator data:      LABS:                          8.6    21.39 )-----------( 199      ( 20 Aug 2020 05:57 )             28.2     08-20    150<H>  |  114<H>  |  20  ----------------------------<  108<H>  3.4<L>   |  24  |  0.62    Ca    8.4      20 Aug 2020 06:29  Phos  2.4     08-20  Mg     2.7     08-20    TPro  6.6  /  Alb  2.9<L>  /  TBili  0.4  /  DBili  x   /  AST  27  /  ALT  29  /  AlkPhos  74  08-20    PT/INR - ( 20 Aug 2020 06:31 )   PT: 15.4 sec;   INR: 1.30          PTT - ( 20 Aug 2020 06:31 )  PTT:34.0 sec  Urinalysis Basic - ( 20 Aug 2020 09:51 )    Color: Yellow / Appearance: Clear / SG: >=1.030 / pH: x  Gluc: x / Ketone: 15 mg/dL  / Bili: Negative / Urobili: 0.2 E.U./dL   Blood: x / Protein: Trace mg/dL / Nitrite: NEGATIVE   Leuk Esterase: NEGATIVE / RBC: 5-10 /HPF / WBC < 5 /HPF   Sq Epi: x / Non Sq Epi: 0-5 /HPF / Bacteria: Present /HPF          CAPILLARY BLOOD GLUCOSE      POCT Blood Glucose.: 99 mg/dL (20 Aug 2020 12:47)  POCT Blood Glucose.: 109 mg/dL (20 Aug 2020 07:01)  POCT Blood Glucose.: 110 mg/dL (19 Aug 2020 23:34)  POCT Blood Glucose.: 95 mg/dL (19 Aug 2020 17:29)            Neuroimagin/11 CT head:  Right frontal EVD catheter as above. The ventricles are stable in size from prior examination. No new or increasing intracranial hemorrhage or mass effect.  08/10 CT head:  Compared to 2020, there is improving intraventricular hemorrhage, resolution of intraventricular gas, and mild decrease in ventricular size. Postsurgical change and edema in the posterior fossa is approximately stable.   CT head:  post-surgical changes, stable HCP  Other imagin/04 LE doppler: NEG   CXR:  clear   CXR:  mild atelectasis L lung base  08/10 CXR:  Small left pleural effusion, unchanged.

## 2020-08-20 NOTE — PROGRESS NOTE ADULT - SUBJECTIVE AND OBJECTIVE BOX
=============================================   NEUROCRITICAL CARE ATTENDING NOTE (2020)  =============================================    VEDA WARREN   MRN-[id number]  HPI: 59M with dyslipidemia, h/o Hep B infection, presented with acute onset severe HA, dizziness, vomiting - brought to [facility] where CT showed L cerebellar hemorrhage.  Intubated for airway protection.  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  R EVD inserted, transferred to [facility] for further management.  Given mannitol 25G in [facility].    COURSE IN THE HOSPITAL:   bleed   admitted to [facility], 23.4 given x1; OR for SOC evacuation of ICH   remained intubated overnight   tachycardia overnight, Tmax 38.4, ?PNA, ABx    Tmax 38.1 diuresed overnight 1.8L.     No significant events overnight.  CPAP this morning   Tmax 39.7 (enterobacter sputum, on zosyn)  08/10 Trial of CPAP   Neurologically stable, for extubation.   Extubated.  Neurologically stable.   Neurologically stable.   Febrile overnight.  Suctioned for secretions.  Continued neurological improvement.  08/15 Hypotensive overnight, resolved this morning.  Neurologically stable.  Continued diarrhea.   No significant events.  : febrile overnight.  failed repeat swallow eval this AM.   : bounced ack to ICU for thick secretions high sucitoning requirment  24h events: sucitoning reuqirement remains high. s/p PEg this morning.  Staring overnight at times, given 1g keppra.    Past Medical History: Hepatitis B HLD (hyperlipidemia)  Allergies:  No Known Allergies  Home meds:   ·	Omega-3 350 mg oral capsule: 1 cap(s) orally once a day  ·	tenofovir disoproxil fumarate 300 mg oral tablet: 1 tab(s) orally once a day    Current Meds:  MEDICATIONS  (STANDING):  acetylcysteine 20%  Inhalation 4 milliLiter(s) Inhalation every 6 hours  albuterol/ipratropium for Nebulization. 3 milliLiter(s) Nebulizer every 6 hours  artificial tears (preservative free) Ophthalmic Solution 1 Drop(s) Both EYES three times a day  cefepime   IVPB 2000 milliGRAM(s) IV Intermittent every 8 hours  doxazosin 4 milliGRAM(s) Oral at bedtime  enoxaparin Injectable 40 milliGRAM(s) SubCutaneous every 24 hours  finasteride 5 milliGRAM(s) Oral daily  insulin glargine Injectable (LANTUS) 7 Unit(s) SubCutaneous every morning  insulin regular  human corrective regimen sliding scale.   SubCutaneous every 6 hours  insulin regular  human recombinant 4 Unit(s) SubCutaneous every 6 hours  metoprolol tartrate 12.5 milliGRAM(s) Oral every 12 hours  psyllium Powder 1 Packet(s) Oral daily  sodium chloride 0.9%. 1000 milliLiter(s) (50 mL/Hr) IV Continuous <Continuous>  tenofovir disoproxil fumarate (VIREAD) 300 milliGRAM(s) Oral every 24 hours    MEDICATIONS  (PRN):  acetaminophen    Suspension .. 650 milliGRAM(s) Oral every 6 hours PRN Temp greater or equal to 38C (100.4F), Mild Pain (1 - 3)  hydrALAZINE Injectable 10 milliGRAM(s) IV Push every 2 hours PRN SBP > 140  Mometasone Furoate 0.1% Ointment 1 Application(s) 1 Application(s) Topical two times a day PRN dry/itchy skin  sodium chloride 0.9% for Nebulization 3 milliLiter(s) Nebulizer three times a day PRN for congestion/secretions    PHYSICAL EXAMINATION    ICU Vital Signs Last 24 Hrs  T(C): 36.6 (20 Aug 2020 17:15), Max: 38.3 (19 Aug 2020 21:35)  T(F): 97.8 (20 Aug 2020 17:15), Max: 101 (19 Aug 2020 21:35)  HR: 69 (20 Aug 2020 20:00) (59 - 112)  BP: 159/77 (20 Aug 2020 20:00) (105/55 - 190/83)  BP(mean): 110 (20 Aug 2020 20:00) (76 - 119)  RR: 11 (20 Aug 2020 20:00) (9 - 48)  SpO2: 100% (20 Aug 2020 20:00) (92% - 100%)    NEUROLOGIC EXAMINATION:    MSE: flat affect, no speech output when prompted, intermittent obeying only  CN: pupils 3 mm reactive bilaterally, decreased EOM, weak cough, tongue midline  Motor: sitting on edge of bed, all extremities antigravity, LLE stronger than RLE in spontaneous movements  PULMONARY: coarse breath sounds, decreased bases  ABDOMEN: soft, nontender with normoactive bowel sounds, PEG in place    I/O's    20 @ 07:01  -  20 @ 07:00  --------------------------------------------------------  IN: 1545 mL / OUT: 2100 mL / NET: -555 mL    20 @ 07:01  -  20 @ 20:24  --------------------------------------------------------  IN: 1324.8 mL / OUT: 1600 mL / NET: -275.2 mL    LABS:               8.6    21.39 )-----------( 199      ( 20 Aug 2020 05:57 )             28.2     08-20    150<H>  |  114<H>  |  20  ----------------------------<  108<H>  3.4<L>   |  24  |  0.62    Ca    8.4      20 Aug 2020 06:29  Phos  2.4     08-20  Mg     2.7     08-20    TPro  6.6  /  Alb  2.9<L>  /  TBili  0.4  /  DBili  x   /  AST  27  /  ALT  29  /  AlkPhos  74  08-20    PT/INR - ( 20 Aug 2020 06:31 )   PT: 15.4 sec;   INR: 1.30     PTT - ( 20 Aug 2020 06:31 )  PTT:34.0 sec    Urinalysis Basic - ( 20 Aug 2020 09:51 )    Color: Yellow / Appearance: Clear / SG: >=1.030 / pH: x  Gluc: x / Ketone: 15 mg/dL  / Bili: Negative / Urobili: 0.2 E.U./dL   Blood: x / Protein: Trace mg/dL / Nitrite: NEGATIVE   Leuk Esterase: NEGATIVE / RBC: 5-10 /HPF / WBC < 5 /HPF   Sq Epi: x / Non Sq Epi: 0-5 /HPF / Bacteria: Present /HPF    CAPILLARY BLOOD GLUCOSE    POCT Blood Glucose.: 89 mg/dL (20 Aug 2020 18:08)  POCT Blood Glucose.: 99 mg/dL (20 Aug 2020 12:47)  POCT Blood Glucose.: 109 mg/dL (20 Aug 2020 07:01)  POCT Blood Glucose.: 110 mg/dL (19 Aug 2020 23:34)      Culture - Sputum (collected 20 @ 08:53)  Source: .Sputum Sputum  Gram Stain (20 @ 20:07):    Moderate epithelial cells    Numerous white blood cells    Numerous Gram Negative Rods    Moderate Gram positive cocci in pairs    Moderate Gram Positive Rods    Few Yeast  Final Report (20 @ 20:07):    Sputum specimen rejected.  Microscopic examination indicates    oropharyngeal contamination.  Please repeat.    Neuroimaging:  Follow CT head pending ___   CT head:  Right frontal EVD catheter as above. The ventricles are stable in size from prior examination. No new or increasing intracranial hemorrhage or mass effect.  08/10 CT head:  Compared to 2020, there is improving intraventricular hemorrhage, resolution of intraventricular gas, and mild decrease in ventricular size. Postsurgical change and edema in the posterior fossa is approximately stable.   CT head:  post-surgical changes, stable HCP  Other imagin/20 LE doppler: NEG   LE doppler: NEG     CXR:  clear   CXR:  clear   CXR:  mild atelectasis L lung base  08/10 CXR:  Small left pleural effusion, unchanged.    Code:  Full  Goals:  Aggressive  Disposition:  ICU =====================================================   NEUROCRITICAL CARE ATTENDING NOTE (Thursday, 2020 EVENING)  =====================================================    VEDA WARREN   MRN-[id number]  HPI: 59M with dyslipidemia, h/o Hep B infection, presented with acute onset severe HA, dizziness, vomiting - brought to [facility] where CT showed L cerebellar hemorrhage.  Intubated for airway protection.  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  R EVD inserted, transferred to [facility] for further management.  Given mannitol 25G in [facility].    COURSE IN THE HOSPITAL:   bleed   admitted to [facility], 23.4 given x1; OR for SOC evacuation of ICH   remained intubated overnight   tachycardia overnight, Tmax 38.4, ?PNA, ABx    Tmax 38.1 diuresed overnight 1.8L.     No significant events overnight.  CPAP this morning   Tmax 39.7 (enterobacter sputum, on zosyn)  08/10 Trial of CPAP   Neurologically stable, for extubation.   Extubated.  Neurologically stable.   Neurologically stable.   Febrile overnight.  Suctioned for secretions.  Continued neurological improvement.  08/15 Hypotensive overnight, resolved this morning.  Neurologically stable.  Continued diarrhea.   No significant events.  : febrile overnight.  failed repeat swallow eval this AM.   : bounced ack to ICU for thick secretions high sucitoning requirment  24h events: sucitoning reuqirement remains high. s/p PEg this morning.  Staring overnight at times, given 1g keppra.    Past Medical History: Hepatitis B HLD (hyperlipidemia)  Allergies:  No Known Allergies  Home meds:   ·	Omega-3 350 mg oral capsule: 1 cap(s) orally once a day  ·	tenofovir disoproxil fumarate 300 mg oral tablet: 1 tab(s) orally once a day    Current Meds:  MEDICATIONS  (STANDING):  acetylcysteine 20%  Inhalation 4 milliLiter(s) Inhalation every 6 hours  albuterol/ipratropium for Nebulization. 3 milliLiter(s) Nebulizer every 6 hours  artificial tears (preservative free) Ophthalmic Solution 1 Drop(s) Both EYES three times a day  cefepime   IVPB 2000 milliGRAM(s) IV Intermittent every 8 hours  doxazosin 4 milliGRAM(s) Oral at bedtime  enoxaparin Injectable 40 milliGRAM(s) SubCutaneous every 24 hours  finasteride 5 milliGRAM(s) Oral daily  insulin glargine Injectable (LANTUS) 7 Unit(s) SubCutaneous every morning  insulin regular  human corrective regimen sliding scale.   SubCutaneous every 6 hours  insulin regular  human recombinant 4 Unit(s) SubCutaneous every 6 hours  metoprolol tartrate 12.5 milliGRAM(s) Oral every 12 hours  psyllium Powder 1 Packet(s) Oral daily  sodium chloride 0.9%. 1000 milliLiter(s) (50 mL/Hr) IV Continuous <Continuous>  tenofovir disoproxil fumarate (VIREAD) 300 milliGRAM(s) Oral every 24 hours    MEDICATIONS  (PRN):  acetaminophen    Suspension .. 650 milliGRAM(s) Oral every 6 hours PRN Temp greater or equal to 38C (100.4F), Mild Pain (1 - 3)  hydrALAZINE Injectable 10 milliGRAM(s) IV Push every 2 hours PRN SBP > 140  Mometasone Furoate 0.1% Ointment 1 Application(s) 1 Application(s) Topical two times a day PRN dry/itchy skin  sodium chloride 0.9% for Nebulization 3 milliLiter(s) Nebulizer three times a day PRN for congestion/secretions    PHYSICAL EXAMINATION    ICU Vital Signs Last 24 Hrs  T(C): 36.6 (20 Aug 2020 17:15), Max: 38.3 (19 Aug 2020 21:35)  T(F): 97.8 (20 Aug 2020 17:15), Max: 101 (19 Aug 2020 21:35)  HR: 69 (20 Aug 2020 20:00) (59 - 112)  BP: 159/77 (20 Aug 2020 20:00) (105/55 - 190/83)  BP(mean): 110 (20 Aug 2020 20:00) (76 - 119)  RR: 11 (20 Aug 2020 20:00) (9 - 48)  SpO2: 100% (20 Aug 2020 20:00) (92% - 100%)    NEUROLOGIC EXAMINATION:    MSE: flat affect, no speech output when prompted, intermittent obeying only  CN: pupils 3 mm reactive bilaterally, decreased EOM, weak cough, tongue midline  Motor: sitting on edge of bed, all extremities antigravity, LLE stronger than RLE in spontaneous movements  PULMONARY: coarse breath sounds, decreased bases  ABDOMEN: soft, nontender with normoactive bowel sounds, PEG in place    I/O's    20 @ 07:01  -  20 @ 07:00  --------------------------------------------------------  IN: 1545 mL / OUT: 2100 mL / NET: -555 mL    20 @ 07:01  -  20 @ 20:24  --------------------------------------------------------  IN: 1324.8 mL / OUT: 1600 mL / NET: -275.2 mL    LABS:               8.6    21.39 )-----------( 199      ( 20 Aug 2020 05:57 )             28.2     08-20    150<H>  |  114<H>  |  20  ----------------------------<  108<H>  3.4<L>   |  24  |  0.62    Ca    8.4      20 Aug 2020 06:29  Phos  2.4     08-20  Mg     2.7     08-20    TPro  6.6  /  Alb  2.9<L>  /  TBili  0.4  /  DBili  x   /  AST  27  /  ALT  29  /  AlkPhos  74  08-20    PT/INR - ( 20 Aug 2020 06:31 )   PT: 15.4 sec;   INR: 1.30     PTT - ( 20 Aug 2020 06:31 )  PTT:34.0 sec    Urinalysis Basic - ( 20 Aug 2020 09:51 )    Color: Yellow / Appearance: Clear / SG: >=1.030 / pH: x  Gluc: x / Ketone: 15 mg/dL  / Bili: Negative / Urobili: 0.2 E.U./dL   Blood: x / Protein: Trace mg/dL / Nitrite: NEGATIVE   Leuk Esterase: NEGATIVE / RBC: 5-10 /HPF / WBC < 5 /HPF   Sq Epi: x / Non Sq Epi: 0-5 /HPF / Bacteria: Present /HPF    CAPILLARY BLOOD GLUCOSE    POCT Blood Glucose.: 89 mg/dL (20 Aug 2020 18:08)  POCT Blood Glucose.: 99 mg/dL (20 Aug 2020 12:47)  POCT Blood Glucose.: 109 mg/dL (20 Aug 2020 07:01)  POCT Blood Glucose.: 110 mg/dL (19 Aug 2020 23:34)      Culture - Sputum (collected 20 @ 08:53)  Source: .Sputum Sputum  Gram Stain (20 @ 20:07):    Moderate epithelial cells    Numerous white blood cells    Numerous Gram Negative Rods    Moderate Gram positive cocci in pairs    Moderate Gram Positive Rods    Few Yeast  Final Report (20 @ 20:07):    Sputum specimen rejected.  Microscopic examination indicates    oropharyngeal contamination.  Please repeat.    Neuroimaging:  Follow CT head pending ___   CT head:  Right frontal EVD catheter as above. The ventricles are stable in size from prior examination. No new or increasing intracranial hemorrhage or mass effect.  08/10 CT head:  Compared to 2020, there is improving intraventricular hemorrhage, resolution of intraventricular gas, and mild decrease in ventricular size. Postsurgical change and edema in the posterior fossa is approximately stable.   CT head:  post-surgical changes, stable HCP  Other imagin/20 LE doppler: NEG   LE doppler: NEG     CXR:  clear   CXR:  clear   CXR:  mild atelectasis L lung base  08/10 CXR:  Small left pleural effusion, unchanged.    Code:  Full  Goals:  Aggressive  Disposition:  ICU

## 2020-08-20 NOTE — PROGRESS NOTE ADULT - ATTENDING COMMENTS
ATTENDING ATTESTATION:    I was physically present for the key portions of the evaluation and management (E/M) service provided.  I agree with the above history, physical and plan, which I have reviewed and edited where appropriate.    Patient at high risk for neurological deterioration or death due to: infections, hydrocephalus.    Critical care time:  I have personally provided 30 minutes of critical care time, excluding time spent on separately-billable procedures.    Plan discussed with multidisciplinary staff and attendings. ATTENDING ATTESTATION:    I was physically present for the key portions of the evaluation and management (E/M) service provided.  I agree with the above history, physical and plan, which I have reviewed and edited where appropriate.    Patient at high risk for neurological deterioration or death due to: infections, hydrocephalus.    Critical care time:  I have personally provided 60 minutes of critical care time, excluding time spent on separately-billable procedures.    Plan discussed with multidisciplinary staff and attendings.

## 2020-08-20 NOTE — PROGRESS NOTE ADULT - ASSESSMENT
59 year old male with PMH of HLD and unclear liver disease managed for left cerebellar hemorrhage, now s/p Craniectomy w/ evacuation of hematoma. Surgery consulted for PEG placement now s/p PEG placement.     POST PEG CARE  -Keep abdominal binder in place to secure PEG tube. Be sure that clamp and loose end of tube are not compressed against the skin.   -Okay to administer medications through PEG tube on the day of PEG placement. Be sure to crush meds and flush tube to prevent clogging.   -Initiate PEG tube feeds @9AM tomorrow @ 20cc/hr. May advance to goal rate as tolerated

## 2020-08-21 LAB
ANION GAP SERPL CALC-SCNC: 12 MMOL/L — SIGNIFICANT CHANGE UP (ref 5–17)
BUN SERPL-MCNC: 17 MG/DL — SIGNIFICANT CHANGE UP (ref 7–23)
CALCIUM SERPL-MCNC: 8.4 MG/DL — SIGNIFICANT CHANGE UP (ref 8.4–10.5)
CHLORIDE SERPL-SCNC: 114 MMOL/L — HIGH (ref 96–108)
CO2 SERPL-SCNC: 23 MMOL/L — SIGNIFICANT CHANGE UP (ref 22–31)
CREAT SERPL-MCNC: 0.51 MG/DL — SIGNIFICANT CHANGE UP (ref 0.5–1.3)
CULTURE RESULTS: SIGNIFICANT CHANGE UP
CULTURE RESULTS: SIGNIFICANT CHANGE UP
GLUCOSE BLDC GLUCOMTR-MCNC: 126 MG/DL — HIGH (ref 70–99)
GLUCOSE BLDC GLUCOMTR-MCNC: 92 MG/DL — SIGNIFICANT CHANGE UP (ref 70–99)
GLUCOSE BLDC GLUCOMTR-MCNC: 95 MG/DL — SIGNIFICANT CHANGE UP (ref 70–99)
GLUCOSE SERPL-MCNC: 87 MG/DL — SIGNIFICANT CHANGE UP (ref 70–99)
HCT VFR BLD CALC: 26.4 % — LOW (ref 39–50)
HGB BLD-MCNC: 8.2 G/DL — LOW (ref 13–17)
MAGNESIUM SERPL-MCNC: 2.6 MG/DL — SIGNIFICANT CHANGE UP (ref 1.6–2.6)
MCHC RBC-ENTMCNC: 28.4 PG — SIGNIFICANT CHANGE UP (ref 27–34)
MCHC RBC-ENTMCNC: 31.1 GM/DL — LOW (ref 32–36)
MCV RBC AUTO: 91.3 FL — SIGNIFICANT CHANGE UP (ref 80–100)
NRBC # BLD: 0 /100 WBCS — SIGNIFICANT CHANGE UP (ref 0–0)
PHOSPHATE SERPL-MCNC: 2.4 MG/DL — LOW (ref 2.5–4.5)
PLATELET # BLD AUTO: 182 K/UL — SIGNIFICANT CHANGE UP (ref 150–400)
POTASSIUM SERPL-MCNC: 3.6 MMOL/L — SIGNIFICANT CHANGE UP (ref 3.5–5.3)
POTASSIUM SERPL-SCNC: 3.6 MMOL/L — SIGNIFICANT CHANGE UP (ref 3.5–5.3)
RBC # BLD: 2.89 M/UL — LOW (ref 4.2–5.8)
RBC # FLD: 15.8 % — HIGH (ref 10.3–14.5)
SODIUM SERPL-SCNC: 149 MMOL/L — HIGH (ref 135–145)
WBC # BLD: 12.72 K/UL — HIGH (ref 3.8–10.5)
WBC # FLD AUTO: 12.72 K/UL — HIGH (ref 3.8–10.5)

## 2020-08-21 PROCEDURE — 99024 POSTOP FOLLOW-UP VISIT: CPT

## 2020-08-21 PROCEDURE — 99233 SBSQ HOSP IP/OBS HIGH 50: CPT

## 2020-08-21 PROCEDURE — 99291 CRITICAL CARE FIRST HOUR: CPT | Mod: 24

## 2020-08-21 PROCEDURE — 70450 CT HEAD/BRAIN W/O DYE: CPT | Mod: 26

## 2020-08-21 RX ORDER — CHLORHEXIDINE GLUCONATE 213 G/1000ML
1 SOLUTION TOPICAL
Refills: 0 | Status: DISCONTINUED | OUTPATIENT
Start: 2020-08-21 | End: 2020-09-02

## 2020-08-21 RX ORDER — POTASSIUM CHLORIDE 20 MEQ
10 PACKET (EA) ORAL
Refills: 0 | Status: COMPLETED | OUTPATIENT
Start: 2020-08-21 | End: 2020-08-21

## 2020-08-21 RX ORDER — POTASSIUM PHOSPHATE, MONOBASIC POTASSIUM PHOSPHATE, DIBASIC 236; 224 MG/ML; MG/ML
30 INJECTION, SOLUTION INTRAVENOUS ONCE
Refills: 0 | Status: COMPLETED | OUTPATIENT
Start: 2020-08-21 | End: 2020-08-21

## 2020-08-21 RX ADMIN — Medication 100 MILLIEQUIVALENT(S): at 07:35

## 2020-08-21 RX ADMIN — Medication 100 MILLIEQUIVALENT(S): at 10:17

## 2020-08-21 RX ADMIN — FINASTERIDE 5 MILLIGRAM(S): 5 TABLET, FILM COATED ORAL at 12:41

## 2020-08-21 RX ADMIN — INSULIN GLARGINE 7 UNIT(S): 100 INJECTION, SOLUTION SUBCUTANEOUS at 07:33

## 2020-08-21 RX ADMIN — Medication 12.5 MILLIGRAM(S): at 19:17

## 2020-08-21 RX ADMIN — SCOPALAMINE 1 PATCH: 1 PATCH, EXTENDED RELEASE TRANSDERMAL at 10:17

## 2020-08-21 RX ADMIN — Medication 1 DROP(S): at 23:06

## 2020-08-21 RX ADMIN — Medication 4 MILLIGRAM(S): at 23:06

## 2020-08-21 RX ADMIN — Medication 1 DROP(S): at 13:45

## 2020-08-21 RX ADMIN — ENOXAPARIN SODIUM 40 MILLIGRAM(S): 100 INJECTION SUBCUTANEOUS at 23:06

## 2020-08-21 RX ADMIN — Medication 4 MILLILITER(S): at 04:57

## 2020-08-21 RX ADMIN — Medication 100 MILLIEQUIVALENT(S): at 09:00

## 2020-08-21 RX ADMIN — Medication 1 DROP(S): at 05:02

## 2020-08-21 RX ADMIN — Medication 12.5 MILLIGRAM(S): at 05:02

## 2020-08-21 RX ADMIN — Medication 3 MILLILITER(S): at 17:03

## 2020-08-21 RX ADMIN — POTASSIUM PHOSPHATE, MONOBASIC POTASSIUM PHOSPHATE, DIBASIC 83.33 MILLIMOLE(S): 236; 224 INJECTION, SOLUTION INTRAVENOUS at 10:58

## 2020-08-21 RX ADMIN — Medication 4 MILLILITER(S): at 17:32

## 2020-08-21 RX ADMIN — TENOFOVIR DISOPROXIL FUMARATE 300 MILLIGRAM(S): 300 TABLET, FILM COATED ORAL at 23:06

## 2020-08-21 RX ADMIN — Medication 3 MILLILITER(S): at 10:38

## 2020-08-21 RX ADMIN — SCOPALAMINE 1 PATCH: 1 PATCH, EXTENDED RELEASE TRANSDERMAL at 09:00

## 2020-08-21 RX ADMIN — Medication 3 MILLILITER(S): at 04:57

## 2020-08-21 RX ADMIN — Medication 4 MILLILITER(S): at 10:39

## 2020-08-21 NOTE — PROGRESS NOTE ADULT - ASSESSMENT
59y/M with  1.	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	Hepatitis B  3.	dyslipidemia     PLAN:   NEURO: continued neurological improvement  EEG with infrequent GPDs, no seizures --> continue off AEDs, can dc EEG  Goal normonatremia  REHAB:  physical therapy evaluation and management    EARLY MOB:  HOB up    PULM: mild aspiration, high suctioning requirements, q1h  continue aggressive pulmomnary toileting, chest PT  continue saline nebs and mucomyst    CARDIO:  1) afib with RVR prior SBP goal 100-140mm Hg   -on metoprolol 12.5 mg BID    ENDO:  Blood sugar goals 140-180 mg/dL, continue insulin sliding, continue statins; insulin glargine 15 nutritional 4-4-4-4  -->lantus 8 units QHS  -hold premeal insulin when NPO    GI: diebetic tube feeds started today  -s/p PEG    RENAL:  MURIEL    HEM/ONC: no coagulopathy, no h/o antiplatelet / anticoagulant use  VTE Prophylaxis: SCDs, SQL     ID: febrile, slowly improving leukocytosis; ID recommendations:  s/p course of cefepime  -Cdiff negative, Abd CT negative   -enterobacter from sputum  -chronic HBV, on home antiviral    Dispo: continue in ICU for high suctioing frequency (<q1h), once improved will be ready for acute rehab

## 2020-08-21 NOTE — CHART NOTE - NSCHARTNOTEFT_GEN_A_CORE
Admitting Diagnosis:   Patient is a 59y old  Male who presents with a chief complaint of Left cerebellar intraparenchymal hemorrhage (21 Aug 2020 12:43)      PAST MEDICAL & SURGICAL HISTORY:  Hepatitis B  HLD (hyperlipidemia)  Foot fracture, left: s/p repair 2019    Current Nutrition Order:  Jevity 1.2 x24hrs goal rate 60ml/hr 8/21     PO Intake: Good (%) [   ]  Fair (50-75%) [   ] Poor (<25%) [   ]  NA NPO    GI Issues:   Small dark brown BM 8/21  no gastrointestinal signs/symptoms per flow sheets  rectal tube d/c     Pain:   per flow sheets non-verbal indicators of pain/discomfort absent    Skin Integrity:   No edema or pressure ulcers  SX site noted  IAD d/t semi-formed stool noted       Labs:   08-21    149<H>  |  114<H>  |  17  ----------------------------<  87  3.6   |  23  |  0.51    Ca    8.4      21 Aug 2020 04:41  Phos  2.4     08-21  Mg     2.6     08-21    TPro  6.6  /  Alb  2.9<L>  /  TBili  0.4  /  DBili  x   /  AST  27  /  ALT  29  /  AlkPhos  74  08-20    CAPILLARY BLOOD GLUCOSE      POCT Blood Glucose.: 92 mg/dL (21 Aug 2020 12:16)  POCT Blood Glucose.: 95 mg/dL (21 Aug 2020 05:16)  POCT Blood Glucose.: 95 mg/dL (20 Aug 2020 23:37)  POCT Blood Glucose.: 89 mg/dL (20 Aug 2020 18:08)      Medications:  MEDICATIONS  (STANDING):  acetylcysteine 20%  Inhalation 4 milliLiter(s) Inhalation every 6 hours  albuterol/ipratropium for Nebulization. 3 milliLiter(s) Nebulizer every 6 hours  artificial tears (preservative free) Ophthalmic Solution 1 Drop(s) Both EYES three times a day  chlorhexidine 4% Liquid 1 Application(s) Topical <User Schedule>  dextrose 5%. 1000 milliLiter(s) (50 mL/Hr) IV Continuous <Continuous>  dextrose 50% Injectable 12.5 Gram(s) IV Push once  dextrose 50% Injectable 25 Gram(s) IV Push once  doxazosin 4 milliGRAM(s) Oral at bedtime  enoxaparin Injectable 40 milliGRAM(s) SubCutaneous every 24 hours  finasteride 5 milliGRAM(s) Oral daily  insulin glargine Injectable (LANTUS) 7 Unit(s) SubCutaneous every morning  insulin regular  human corrective regimen sliding scale.   SubCutaneous every 6 hours  insulin regular  human recombinant 4 Unit(s) SubCutaneous every 6 hours  metoprolol tartrate 12.5 milliGRAM(s) Oral every 12 hours  psyllium Powder 1 Packet(s) Oral daily  sodium chloride 0.9%. 1000 milliLiter(s) (50 mL/Hr) IV Continuous <Continuous>  tenofovir disoproxil fumarate (VIREAD) 300 milliGRAM(s) Oral every 24 hours    MEDICATIONS  (PRN):  acetaminophen    Suspension .. 650 milliGRAM(s) Oral every 6 hours PRN Temp greater or equal to 38C (100.4F), Mild Pain (1 - 3)  dextrose 40% Gel 15 Gram(s) Oral once PRN Blood Glucose LESS THAN 70 milliGRAM(s)/deciliter  glucagon  Injectable 1 milliGRAM(s) IntraMuscular once PRN Glucose LESS THAN 70 milligrams/deciliter  glucagon  Injectable 1 milliGRAM(s) IntraMuscular once PRN Glucose LESS THAN 70 milligrams/deciliter  hydrALAZINE Injectable 10 milliGRAM(s) IV Push every 2 hours PRN SBP > 140  Mometasone Furoate 0.1% Ointment 1 Application(s) 1 Application(s) Topical two times a day PRN dry/itchy skin  sodium chloride 0.9% for Nebulization 3 milliLiter(s) Nebulizer three times a day PRN for congestion/secretions  sodium chloride 0.9% lock flush 10 milliLiter(s) IV Push every 1 hour PRN Pre/post blood products, medications, blood draw, and to maintain line patency      Admitted Anthropometrics:  Height 5'3''  pounds +-10%  Wt (8/4): 131 pounds  %IBW: 105%, BMI: 24.0    Weight:  131 pounds (8/4)    Weight Change: No updated weights since 8/4. Recommend obtain current weight and trend bi-weekly weights for further assessment.    Nutrition Focused Physical Exam: Completed [   ]  Unable to complete [   ]-N/A    Estimated energy needs:  ABW (59.6kg) used to calculate energy needs due to pt's current body weight within % IBW.  Needs adjusted for post-op, hypermetabolic state.  Aim for higher end of kcal range.  8602-0410 kcal (25-30 kcal/kg IBW)  72-83gm protein (1.2-1.4gm/kg IBW)  Fluid needs per team (Na elevated)     Subjective:   59 year old male with PMH of HLD and unclear liver disease had acute onset of severe headache, dizziness and vomiting x1 today, was brought into [facility] with continued symptoms, as well as hypertension and multiple episodes of vomiting. Pt was intubated for airway protection with CT Head performed demonstrating a large left cerebellar hemorrhage. CTA Head/Neck performed is suggestive of underlying vascular pathology such as AVM. RIght frontal ventriculostomy placed at [facility] and patient transferred to [facility] for further work-up. S/P cerebral angio, negative for AVM and s/p SOC craniectomy evacuation of cerebellar hematoma on 8/4, s/p EVD placement. Pt Extubated 8/12. Pt is s/p various swallow studies during admission, Recs for NPO with NGT pending FEES results 8/18. Most recent FEESS recommendations NPO for continued alternate means of nutrition, hydration, medication 8/19. Pt now s/p PEG 8/20. Noted pt suctioning requirement remains high and under ICU care as result. RN reports feeds just resumed this morning, Jevity 1.2 running at 20ml/hr with goal of 60ml/hr; x24hrs provides ~1440ml, 1728kcal, 80gm prot, 1162ml. Noted low phos, Na elevated, K/Mg WDL (K low prior). Per RN rectal tube d/c, no diarrhea reported today.   Please see Below for Recs.       Nutrition Diagnosis: Increased nutrient need RT increased protein demand AEB post-op  ON GOING AT THIS TIME   Goal: Meet >/=75%EER via most appropriate route with good tolerance    Recommendations:  1. As medically appropriate, Consider use of Jevity 1.2 @ goal 60mL/hr x24hr. Provides ~1440mL TV, 1728 kcal, 80gm protein, 1162mL free water.  >> Feeds of running at 20ml/hr. Replete lytes prior to increasing rate. Increase as medically feasible and tolerated to goal rate by 10ml q10hrs. Monitor for tolerance.   2. Maintain ASP Precautions. Monitor GI distress.   3. Monitor Labs: monitor BMP, CBC, glucose, lytes, trend renal indices, LFTs, Ammonia.  4. Monitor Skin, Wts, GOC.  5. Consider cont SLP to assess for ability for PO.   6. RD to remain available for additional nutrition interventions as needed.     Education: N/A  Risk Level: High [ X ]  Moderate [   ] Low [   ].

## 2020-08-21 NOTE — PROGRESS NOTE ADULT - SUBJECTIVE AND OBJECTIVE BOX
=============================================   NEUROCRITICAL CARE ATTENDING NOTE (2020)  =============================================    VEDA WARREN   MRN-[id number]  HPI: 59M with dyslipidemia, h/o Hep B infection, presented with acute onset severe HA, dizziness, vomiting - brought to [facility] where CT showed L cerebellar hemorrhage.  Intubated for airway protection.  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  R EVD inserted, transferred to [facility] for further management.  Given mannitol 25G in [facility].    Past Medical History: Hepatitis B HLD (hyperlipidemia)  Allergies:  No Known Allergies  Home meds:   ·	Omega-3 350 mg oral capsule: 1 cap(s) orally once a day  ·	tenofovir disoproxil fumarate 300 mg oral tablet: 1 tab(s) orally once a day      COURSE IN THE HOSPITAL:   bleed   admitted to [facility], 23.4 given x1; OR for SOC evacuation of ICH   remained intubated overnight   tachycardia overnight, Tmax 38.4, ?PNA, ABx    Tmax 38.1 diuresed overnight 1.8L.     No significant events overnight.  CPAP this morning   Tmax 39.7 (enterobacter sputum, on zosyn)  08/10 Trial of CPAP   Neurologically stable, for extubation.   Extubated.  Neurologically stable.   Neurologically stable.   Febrile overnight.  Suctioned for secretions.  Continued neurological improvement.  08/15 Hypotensive overnight, resolved this morning.  Neurologically stable.  Continued diarrhea.   No significant events.  : febrile overnight.  failed repeat swallow eval this AM.   : bounced ack to ICU for thick secretions high sucitoning requirment  : sucitoning reuqirement remains high. s/p PEg this morning.  Staring overnight at times, given 1g keppra.    24h events: sucitoning q1-2h. PEG yesterday, feeds started today.    NEUROLOGIC EXAMINATION:    MSE: flat affect, sitting with PT follows commands intermittantly., no speech output when prompted, not cooperative with much of exam  CN: pupils 3 mm reactive bilaterally, modest dysarthria, weak cough  Motor: sitting on edge of bed, all extremities antigravity, LLE stronger than RLE in spontaneous movements  PULMONARY: coarse breath sounds b/l, wet  ABDOMEN: soft, nontender with normoactive bowel sounds    Allergies: No Known Allergies      EXAM:  VITALS:  T(C): 36.6 (20 @ 09:12), Max: 37.1 (20 @ 00:00)  HR: 59 (20 @ 12:00) (59 - 96)  BP: 139/67 (20 @ 12:00) (139/67 - 172/81)  RR: 10 (20 @ 12:00) (10 - 37)  SpO2: 97% (20 @ 12:00) (97% - 100%)    MEDICATIONS:  acetaminophen    Suspension .. 650 milliGRAM(s) Oral every 6 hours PRN  acetylcysteine 20%  Inhalation 4 milliLiter(s) Inhalation every 6 hours  albuterol/ipratropium for Nebulization. 3 milliLiter(s) Nebulizer every 6 hours  artificial tears (preservative free) Ophthalmic Solution 1 Drop(s) Both EYES three times a day  chlorhexidine 4% Liquid 1 Application(s) Topical <User Schedule>  dextrose 40% Gel 15 Gram(s) Oral once PRN  dextrose 5%. 1000 milliLiter(s) IV Continuous <Continuous>  dextrose 50% Injectable 12.5 Gram(s) IV Push once  dextrose 50% Injectable 25 Gram(s) IV Push once  doxazosin 4 milliGRAM(s) Oral at bedtime  enoxaparin Injectable 40 milliGRAM(s) SubCutaneous every 24 hours  finasteride 5 milliGRAM(s) Oral daily  glucagon  Injectable 1 milliGRAM(s) IntraMuscular once PRN  glucagon  Injectable 1 milliGRAM(s) IntraMuscular once PRN  hydrALAZINE Injectable 10 milliGRAM(s) IV Push every 2 hours PRN  insulin glargine Injectable (LANTUS) 7 Unit(s) SubCutaneous every morning  insulin regular  human corrective regimen sliding scale.   SubCutaneous every 6 hours  insulin regular  human recombinant 4 Unit(s) SubCutaneous every 6 hours  metoprolol tartrate 12.5 milliGRAM(s) Oral every 12 hours  Mometasone Furoate 0.1% Ointment 1 Application(s) 1 Application(s) Topical two times a day PRN  psyllium Powder 1 Packet(s) Oral daily  sodium chloride 0.9% for Nebulization 3 milliLiter(s) Nebulizer three times a day PRN  sodium chloride 0.9% lock flush 10 milliLiter(s) IV Push every 1 hour PRN  sodium chloride 0.9%. 1000 milliLiter(s) IV Continuous <Continuous>  tenofovir disoproxil fumarate (VIREAD) 300 milliGRAM(s) Oral every 24 hours      I/O's    20 @ 07:01  -  20 @ 07:00  --------------------------------------------------------  IN: 2024.8 mL / OUT: 2800 mL / NET: -775.2 mL    20 @ 07:01  -  20 @ 12:45  --------------------------------------------------------  IN: 676.6 mL / OUT: 800 mL / NET: -123.4 mL        Ventilator data:      LABS:                          8.2     )-----------( 182      ( 21 Aug 2020 04:41 )             26.4         149<H>  |  114<H>  |  17  ----------------------------<  87  3.6   |  23  |  0.51    Ca    8.4      21 Aug 2020 04:41  Phos  2.4       Mg     2.6         TPro  6.6  /  Alb  2.9<L>  /  TBili  0.4  /  DBili  x   /  AST  27  /  ALT  29  /  AlkPhos  74      PT/INR - ( 20 Aug 2020 06:31 )   PT: 15.4 sec;   INR: 1.30     PTT - ( 20 Aug 2020 06:31 )  PTT:34.0 sec  Urinalysis Basic - ( 20 Aug 2020 09:51 )    Color: Yellow / Appearance: Clear / SG: >=1.030 / pH: x  Gluc: x / Ketone: 15 mg/dL  / Bili: Negative / Urobili: 0.2 E.U./dL   Blood: x / Protein: Trace mg/dL / Nitrite: NEGATIVE   Leuk Esterase: NEGATIVE / RBC: 5-10 /HPF / WBC < 5 /HPF   Sq Epi: x / Non Sq Epi: 0-5 /HPF / Bacteria: Present /HPF      CAPILLARY BLOOD GLUCOSE  POCT Blood Glucose.: 92 mg/dL (21 Aug 2020 12:16)  POCT Blood Glucose.: 95 mg/dL (21 Aug 2020 05:16)  POCT Blood Glucose.: 95 mg/dL (20 Aug 2020 23:37)  POCT Blood Glucose.: 89 mg/dL (20 Aug 2020 18:08)  POCT Blood Glucose.: 99 mg/dL (20 Aug 2020 12:47)        Neuroimagin/11 CT head:  Right frontal EVD catheter as above. The ventricles are stable in size from prior examination. No new or increasing intracranial hemorrhage or mass effect.  08/10 CT head:  Compared to 2020, there is improving intraventricular hemorrhage, resolution of intraventricular gas, and mild decrease in ventricular size. Postsurgical change and edema in the posterior fossa is approximately stable.   CT head:  post-surgical changes, stable HCP  Other imagin/04 LE doppler: NEG   CXR:  clear   CXR:  mild atelectasis L lung base  08/10 CXR:  Small left pleural effusion, unchanged.

## 2020-08-21 NOTE — PROGRESS NOTE ADULT - ASSESSMENT
59 year old male with PMH of HLD and unclear liver disease managed for left cerebellar hemorrhage, now s/p Craniectomy w/ evacuation of hematoma. Surgery consulted for PEG placement now PPD # 1 s/p PEG placement. Clinically stable      -Keep abdominal binder in place to secure PEG tube. Be sure that bumper and loose end of tube are not compressed against the skin.    -Initiate PEG tube feeds @9AM @ 20cc/hr. May advance to goal rate as tolerated  -Surgery team 4c will continue to follow  -Plan discussed with attending

## 2020-08-21 NOTE — PROGRESS NOTE ADULT - SUBJECTIVE AND OBJECTIVE BOX
HPI:  59 year old male with PMH of HLD and unclear liver disease had acute onset of severe headache, dizziness and vomiting x1 today, was brought into [facility] with continued symptoms, as well as hypertension and multiple episodes of vomiting. Patient was intubated for airway protection with CT Head performed demonstrating a large left cerebellar hemorrhage. CTA Head/Neck performed is suggestive of underlying vascular pathology such as AVM. RIght frontal ventriculostomy placed at [facility] and patient transferred to [facility] for further work-up. Patient arrives to [facility] intubated, sedated and on Nicardipine drip. Patient's son provided history and denies any Aspirin or other anticoagulant use. Denies any prior history of hypertension, smoking, or ETOH abuse. (04 Aug 2020 05:01)    Hospital Course:   POD# 0 S/P cerebral angio, negative for AVM. s/p SOC craniectomy evacuation of cerebellar hematoma.   POD # 1:   overnight tachycardic, ekg without significant changes, lactate wnl, low grade fever, s/p tyl. bolus x 1L , NS increased to 100cc/hr; neuro improving.  EVD at 5 cmH2O   8/6 POD#3, BD#4, Tmax 100.6, Pan Cx 7/5 NGTD, Vanc/Zosyn until 8/10, Vanc trough due today @ 6pm, EEG = no seizure, Lasix overnight for Pulm congest and tachycard, ruff placed for strict I&Os, pending anemia w/u, EVD @ 5, CPAP trials, Card GTT for SBP<140, Started Norvasc for BP optimization  8/7:  8/8:  S/P cerebral angio, negative for AVM. s/p SOC craniectomy evacuation of cerebellar hematoma. No significant events overnight. On CPAP this morning   8/10: fevers overnight, resolved this morning, Pancultured, (+) Enterbacter, on CPAP this AM  8/11 POD#7 SOC, BD # 8, EVD replaced (hard pass), Neuro exam stable, EVD @ 5cm H2O, Zosyn for sputum + enterobact, Plan to extubated today, HCT in AM  8/12 POD#8 s/p SOC, BD#9, s/p EVD replacement (hard pass on 8/10/20), neuro exam stable, EVD @5cm H2O. Zosyn for enterobacter in sputum. O/n EVD stopped draining, flushed distally and proximally, waveform poor, neuro exam remained stable throughout, ICPs <8 prior.    8/13 EVD soft pass yesterday, spiked temp today and re cultured o/w neuro exam stable  8/14: EMIL overnight, neuro stable. Mucomyst for secretions overnight   8/15 EVD dc/d, temp spike to 104, pan culture, ID consulted, vanco and cefepime started   8/15 levo started, presumed sepsis from aspiration, continues to have diarrhea  8/16: EMIL  8/17: EMIL. Neuro exam stable  8/18: Increased secretions overnight. Chest xray obtained. NGT replaced. Neuro exam stable. Patient transferred to ICU for monitoring due to congestion and possible L effusion on CXR along with desaturation and frequent suctioning.   8/19: POD16, on EEG, EMIL o/n, transferred to 5E, plan for PEG tomorrow  8/20 POD#17 overnight given Keppra 1g IV x 1 dose for blank stare and EEG findings: pending final read on EEG, Cefep for PNA, PEG today, NPO, Amonia Serum level today w/u for enceph, vEEG,   8/21 POD#18, EMIL overnight, PEG placed, start TF 9 AM today, Neuro exam stable,     ICU Vital Signs Last 24 Hrs  T(C): 37.1 (21 Aug 2020 00:00), Max: 37.1 (20 Aug 2020 09:00)  T(F): 98.8 (21 Aug 2020 00:00), Max: 98.8 (21 Aug 2020 00:00)  HR: 74 (21 Aug 2020 02:00) (59 - 112)  BP: 157/81 (21 Aug 2020 02:00) (116/59 - 190/83)  BP(mean): 112 (21 Aug 2020 02:00) (77 - 119)  ABP: --  ABP(mean): --  RR: 19 (21 Aug 2020 02:00) (9 - 48)  SpO2: 100% (21 Aug 2020 02:00) (92% - 100%)      PHYSICAL EXAM:  General: patient seen laying supine in bed in NAD  Neuro: OE spontaneously, nonverbal, MUNOZ spont x 4, FC b/l UE  HEENT: PERRL  Neck: supple  Cardiac: RRR, S1S2  Pulmonary: chest rise symmetric  Abdomen: soft, nontender, nondistended  Ext: warm, perfusing well          DEVICE/DRAIN DRESSING:    TUBES/LINES:  [] CVC  [] A-line  [] Lumbar Drain  [] Ventriculostomy  [] Other    DIET:  [] NPO  [] Mechanical  [x] Tube feeds    LABS:                  Pending AM Collections            CAPILLARY BLOOD GLUCOSE      POCT Blood Glucose.: 95 mg/dL (19 Aug 2020 11:05)  POCT Blood Glucose.: 88 mg/dL (19 Aug 2020 06:12)  POCT Blood Glucose.: 90 mg/dL (18 Aug 2020 23:31)      Drug Levels: [] N/A  Vancomycin Level, Trough: 5.9 ug/mL (08-17 @ 13:25)    CSF Analysis: [] N/A      Allergies    No Known Allergies    Intolerances      MEDICATIONS:  Antibiotics:  cefepime   IVPB 2000 milliGRAM(s) IV Intermittent every 8 hours  tenofovir disoproxil fumarate (VIREAD) 300 milliGRAM(s) Oral every 24 hours    Neuro:  acetaminophen    Suspension .. 650 milliGRAM(s) Oral every 6 hours PRN    Anticoagulation:    OTHER:  acetylcysteine 20%  Inhalation 4 milliLiter(s) Inhalation three times a day PRN  artificial tears (preservative free) Ophthalmic Solution 1 Drop(s) Both EYES three times a day  chlorhexidine 4% Liquid 1 Application(s) Topical <User Schedule>  dextrose 40% Gel 15 Gram(s) Oral once PRN  dextrose 50% Injectable 12.5 Gram(s) IV Push once  dextrose 50% Injectable 25 Gram(s) IV Push once  doxazosin 4 milliGRAM(s) Oral at bedtime  finasteride 5 milliGRAM(s) Oral daily  glucagon  Injectable 1 milliGRAM(s) IntraMuscular once PRN  glucagon  Injectable 1 milliGRAM(s) IntraMuscular once PRN  hydrALAZINE Injectable 10 milliGRAM(s) IV Push every 2 hours PRN  hydrocortisone 1% Cream 1 Application(s) Topical two times a day  insulin glargine Injectable (LANTUS) 7 Unit(s) SubCutaneous every morning  insulin regular  human corrective regimen sliding scale.   SubCutaneous every 6 hours  insulin regular  human recombinant 4 Unit(s) SubCutaneous every 6 hours  metoprolol tartrate 12.5 milliGRAM(s) Oral every 12 hours  Mometasone Furoate 0.1% Ointment 1 Application(s) 1 Application(s) Topical two times a day PRN  psyllium Powder 1 Packet(s) Oral daily  sodium chloride 0.9% for Nebulization 3 milliLiter(s) Nebulizer three times a day PRN    IVF:  dextrose 5%. 1000 milliLiter(s) IV Continuous <Continuous>    CULTURES:  Culture Results:   GI PCR Results: NOT detected  *******Please Note:*******  GI panel PCR evaluates for:  Campylobacter, Plesiomonas shigelloides, Salmonella,  Vibrio, Yersinia enterocolitica, Enteroaggregative  Escherichia coli (EAEC), Enteropathogenic E.coli (EPEC),  Enterotoxigenic E. coli (ETEC) lt/st, Shiga-like  toxin-producing E. coli (STEC) stx1/stx2,  Shigella/ Enteroinvasive E. coli (EIEC), Cryptosporidium,  Cyclospora cayetanensis, Entamoeba histolytica,  Giardia lamblia, Adenovirus F 40/41, Astrovirus,  Norovirus GI/GII, Rotavirus A, Sapovirus (08-16 @ 04:12)  Culture Results:   No enteric pathogens recovered. Specimen screened for  Salmonella, Shigella, Campylobacter, E.Coli 0157:H7 and Shiga-like  producing organisms. (08-15 @ 23:46)    RADIOLOGY & ADDITIONAL TESTS:      ASSESSMENT:  59y Male with ICH/IVH s/p SOC crani POD #17 (8/4/20), s/p EVD placement, removed (8/14/20).    INTRACRANIAL BLEED  No pertinent family history in first degree relatives  Handoff  MEWS Score  Hepatitis B  HLD (hyperlipidemia)  ICH (intracerebral hemorrhage)  ICH (intracerebral hemorrhage)  Hepatitis B  HLD (hyperlipidemia)  HIV (human immunodeficiency virus infection)  Cerebellar hemorrhage  Craniectomy, subocciptal, with cervical laminectomy for medulla and spinal cord decompression  Percutaneous insertion of arterial line  Angiogram, carotid and cerebral vessels, bilateral  Foot fracture, left      PLAN:  NEURO:  - neuro checks  - vitals checks  - pain control   - on EEG      CARDIOVASCULAR:  - -140  - cont metoprolol    PULMONARY:  - on nasal cannula 4L    RENAL:  - IVL  - cont Proscar, Cardura   - straight cath q6 for retention    GI:  - restart TF today 9 AM  - FEES 8/19 failed      HEME:  - H/H stable    ID:  - appreciate ID input, cefepime until 8/20      ENDO:  - ISS, Lantus, Lispro     DVT PROPHYLAXIS:  [x] Venodynes                                [] Heparin/Lovenox - SQL held for PEG today      FALL RISK:  [] Low Risk                                    [x] Impulsive    DISPOSITION: ICU status, full code, dispo pending    d/w Dr. Blank    Assessment:  Present when checked    []  GCS  E   V  M     Heart Failure: []Acute, [] acute on chronic , []chronic  Heart Failure:  [] Diastolic (HFpEF), [] Systolic (HFrEF), []Combined (HFpEF and HFrEF), [] RHF, [] Pulm HTN, [] Other    [] SARAH, [] ATN, [] AIN, [] other  [] CKD1, [] CKD2, [] CKD 3, [] CKD 4, [] CKD 5, []ESRD    Encephalopathy: [] Metabolic, [] Hepatic, [] toxic, [x] Neurological, [] Other    Abnormal Nurtitional Status: [] malnurtition (see nutrition note), [ ]underweight: BMI < 19, [] morbid obesity: BMI >40, [] Cachexia    [] Sepsis  [] hypovolemic shock,[] cardiogenic shock, [] hemorrhagic shock, [] neuogenic shock  [] Acute Respiratory Failure  []Cerebral edema, [] Brain compression/ herniation,   [] Functional quadriplegia  [] Acute blood loss anemia

## 2020-08-21 NOTE — PROGRESS NOTE ADULT - SUBJECTIVE AND OBJECTIVE BOX
===================================================   NEUROCRITICAL CARE ATTENDING NOTE (Friday, 2020 EVENING)  ===================================================    VEDA WARREN   MRN-[id number]  HPI: 59M with dyslipidemia, h/o Hep B infection, presented with acute onset severe HA, dizziness, vomiting - brought to [facility] where CT showed L cerebellar hemorrhage.  Intubated for airway protection.  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  R EVD inserted, transferred to [facility] for further management.  Given mannitol 25G in [facility].    COURSE IN THE HOSPITAL:   bleed   admitted to [facility], 23.4 given x1; OR for SOC evacuation of ICH   remained intubated overnight   tachycardia overnight, Tmax 38.4, ?PNA, ABx    Tmax 38.1 diuresed overnight 1.8L.     No significant events overnight.  CPAP this morning   Tmax 39.7 (enterobacter sputum, on zosyn)  08/10 Trial of CPAP   Neurologically stable, for extubation.   Extubated.  Neurologically stable.   Neurologically stable.   Febrile overnight.  Suctioned for secretions.  Continued neurological improvement.  08/15 Hypotensive overnight, resolved this morning.  Neurologically stable.  Continued diarrhea.   No significant events.  : febrile overnight.  failed repeat swallow eval this AM.   : bounced ack to ICU for thick secretions high sucitoning requirment  : sucitoning reuqirement remains high. s/p PEg this morning.  Staring overnight at times, given 1g keppra.    24h events: sucitoning q1-2h. PEG yesterday, feeds started today.      Past Medical History: Hepatitis B HLD (hyperlipidemia)  Allergies:  No Known Allergies  Home meds:   ·	Omega-3 350 mg oral capsule: 1 cap(s) orally once a day  ·	tenofovir disoproxil fumarate 300 mg oral tablet: 1 tab(s) orally once a day    Current Meds:  MEDICATIONS  (STANDING):  acetylcysteine 20%  Inhalation 4 milliLiter(s) Inhalation every 6 hours  albuterol/ipratropium for Nebulization. 3 milliLiter(s) Nebulizer every 6 hours  doxazosin 4 milliGRAM(s) Oral at bedtime  enoxaparin Injectable 40 milliGRAM(s) SubCutaneous every 24 hours  finasteride 5 milliGRAM(s) Oral daily  insulin glargine Injectable (LANTUS) 7 Unit(s) SubCutaneous every morning  insulin regular  human corrective regimen sliding scale.   SubCutaneous every 6 hours  insulin regular  human recombinant 4 Unit(s) SubCutaneous every 6 hours  metoprolol tartrate 12.5 milliGRAM(s) Oral every 12 hours  psyllium Powder 1 Packet(s) Oral daily  sodium chloride 0.9%. 1000 milliLiter(s) (50 mL/Hr) IV Continuous <Continuous>  tenofovir disoproxil fumarate (VIREAD) 300 milliGRAM(s) Oral every 24 hours    MEDICATIONS  (PRN):  acetaminophen    Suspension .. 650 milliGRAM(s) Oral every 6 hours PRN Temp greater or equal to 38C (100.4F), Mild Pain (1 - 3)  hydrALAZINE Injectable 10 milliGRAM(s) IV Push every 2 hours PRN SBP > 140  Mometasone Furoate 0.1% Ointment 1 Application(s) 1 Application(s) Topical two times a day PRN dry/itchy skin    PHYSICAL EXAMINATION    ICU Vital Signs Last 24 Hrs  T(C): 36.6 (21 Aug 2020 17:15), Max: 37.1 (21 Aug 2020 00:00)  T(F): 97.9 (21 Aug 2020 17:15), Max: 98.8 (21 Aug 2020 00:00)  HR: 86 (21 Aug 2020 18:00) (59 - 96)  BP: 147/71 (21 Aug 2020 18:00) (139/67 - 172/81)  BP(mean): 102 (21 Aug 2020 18:00) (93 - 117)  RR: 23 (21 Aug 2020 18:00) (10 - 37)  SpO2: 99% (21 Aug 2020 18:00) (97% - 100%)    NEUROLOGIC EXAMINATION:    MSE: flat affect, no speech output when prompted, intermittent obeying only  CN: pupils 3 mm reactive bilaterally, decreased EOM, weak cough, tongue midline  Motor: sitting on edge of bed, all extremities antigravity, LLE stronger than RLE in spontaneous movements  PULMONARY: coarse breath sounds, decreased bases  ABDOMEN: soft, nontender with normoactive bowel sounds, PEG in place    Allergies: No Known Allergies      EXAM:  VITALS:  T(C): 36.6 (20 @ 17:15), Max: 37.1 (20 @ 00:00)  HR: 86 (20 @ 18:00) (59 - 96)  BP: 147/71 (20 @ 18:00) (139/67 - 172/81)  RR: 23 (20 @ 18:00) (10 - 37)  SpO2: 99% (20 @ 18:00) (97% - 100%)    MEDICATIONS:  acetaminophen    Suspension .. 650 milliGRAM(s) Oral every 6 hours PRN  acetylcysteine 20%  Inhalation 4 milliLiter(s) Inhalation every 6 hours  albuterol/ipratropium for Nebulization. 3 milliLiter(s) Nebulizer every 6 hours  artificial tears (preservative free) Ophthalmic Solution 1 Drop(s) Both EYES three times a day  chlorhexidine 4% Liquid 1 Application(s) Topical <User Schedule>  dextrose 40% Gel 15 Gram(s) Oral once PRN  dextrose 5%. 1000 milliLiter(s) IV Continuous <Continuous>  dextrose 50% Injectable 12.5 Gram(s) IV Push once  dextrose 50% Injectable 25 Gram(s) IV Push once  doxazosin 4 milliGRAM(s) Oral at bedtime  enoxaparin Injectable 40 milliGRAM(s) SubCutaneous every 24 hours  finasteride 5 milliGRAM(s) Oral daily  glucagon  Injectable 1 milliGRAM(s) IntraMuscular once PRN  glucagon  Injectable 1 milliGRAM(s) IntraMuscular once PRN  hydrALAZINE Injectable 10 milliGRAM(s) IV Push every 2 hours PRN  insulin glargine Injectable (LANTUS) 7 Unit(s) SubCutaneous every morning  insulin regular  human corrective regimen sliding scale.   SubCutaneous every 6 hours  insulin regular  human recombinant 4 Unit(s) SubCutaneous every 6 hours  metoprolol tartrate 12.5 milliGRAM(s) Oral every 12 hours  Mometasone Furoate 0.1% Ointment 1 Application(s) 1 Application(s) Topical two times a day PRN  psyllium Powder 1 Packet(s) Oral daily  sodium chloride 0.9% for Nebulization 3 milliLiter(s) Nebulizer three times a day PRN  sodium chloride 0.9% lock flush 10 milliLiter(s) IV Push every 1 hour PRN  sodium chloride 0.9%. 1000 milliLiter(s) IV Continuous <Continuous>  tenofovir disoproxil fumarate (VIREAD) 300 milliGRAM(s) Oral every 24 hours    I/O's    20 @ 07:01  -  20 @ 07:00  --------------------------------------------------------  IN: 2024.8 mL / OUT: 2800 mL / NET: -775.2 mL    20 @ 07:01  -  20 @ 18:58  --------------------------------------------------------  IN: 1599.8 mL / OUT: 800 mL / NET: 799.8 mL    LABS:                        8.2    12.72 )-----------( 182      ( 21 Aug 2020 04:41 )             26.4     08-21    149<H>  |  114<H>  |  17  ----------------------------<  87  3.6   |  23  |  0.51    Ca    8.4      21 Aug 2020 04:41  Phos  2.4       Mg     2.6         TPro  6.6  /  Alb  2.9<L>  /  TBili  0.4  /  DBili  x   /  AST  27  /  ALT  29  /  AlkPhos  74  08-20    PT/INR - ( 20 Aug 2020 06:31 )   PT: 15.4 sec;   INR: 1.30     PTT - ( 20 Aug 2020 06:31 )  PTT:34.0 sec    Urinalysis Basic - ( 20 Aug 2020 09:51 )    Color: Yellow / Appearance: Clear / SG: >=1.030 / pH: x  Gluc: x / Ketone: 15 mg/dL  / Bili: Negative / Urobili: 0.2 E.U./dL   Blood: x / Protein: Trace mg/dL / Nitrite: NEGATIVE   Leuk Esterase: NEGATIVE / RBC: 5-10 /HPF / WBC < 5 /HPF   Sq Epi: x / Non Sq Epi: 0-5 /HPF / Bacteria: Present /HPF    CAPILLARY BLOOD GLUCOSE    POCT Blood Glucose.: 126 mg/dL (21 Aug 2020 18:30)  POCT Blood Glucose.: 92 mg/dL (21 Aug 2020 12:16)  POCT Blood Glucose.: 95 mg/dL (21 Aug 2020 05:16)  POCT Blood Glucose.: 95 mg/dL (20 Aug 2020 23:37)    Culture - Sputum (collected 20 @ 08:53)  Source: .Sputum Sputum  Gram Stain (20 @ 20:07):    Moderate epithelial cells    Numerous white blood cells    Numerous Gram Negative Rods    Moderate Gram positive cocci in pairs    Moderate Gram Positive Rods    Few Yeast  Final Report (20 @ 20:07):    Sputum specimen rejected.  Microscopic examination indicates    oropharyngeal contamination.  Please repeat.    Neuroimaging:  Follow CT head pending ___   CT head:  Right frontal EVD catheter as above. The ventricles are stable in size from prior examination. No new or increasing intracranial hemorrhage or mass effect.  08/10 CT head:  Compared to 2020, there is improving intraventricular hemorrhage, resolution of intraventricular gas, and mild decrease in ventricular size. Postsurgical change and edema in the posterior fossa is approximately stable.   CT head:  post-surgical changes, stable HCP  Other imagin/20 LE doppler: NEG   LE doppler: NEG     CXR:  clear   CXR:  clear   CXR:  mild atelectasis L lung base  08/10 CXR:  Small left pleural effusion, unchanged.    Code:  Full  Goals:  Aggressive  Disposition:  ICU ===================================================   NEUROCRITICAL CARE ATTENDING NOTE (Friday, 2020 EVENING)  ===================================================    VEDA WARREN   MRN-[id number]  HPI: 59M with dyslipidemia, h/o Hep B infection, presented with acute onset severe HA, dizziness, vomiting - brought to [facility] where CT showed L cerebellar hemorrhage.  Intubated for airway protection.  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  R EVD inserted, transferred to [facility] for further management.  Given mannitol 25G in [facility].    COURSE IN THE HOSPITAL:   bleed   admitted to [facility], 23.4 given x1; OR for SOC evacuation of ICH   remained intubated overnight   tachycardia overnight, Tmax 38.4, ?PNA, ABx    Tmax 38.1 diuresed overnight 1.8L.     No significant events overnight.  CPAP this morning   Tmax 39.7 (enterobacter sputum, on zosyn)  08/10 Trial of CPAP   Neurologically stable, for extubation.   Extubated.  Neurologically stable.   Neurologically stable.   Febrile overnight.  Suctioned for secretions.  Continued neurological improvement.  08/15 Hypotensive overnight, resolved this morning.  Neurologically stable.  Continued diarrhea.   No significant events.  : febrile overnight.  failed repeat swallow eval this AM.   : bounced ack to ICU for thick secretions high sucitoning requirment  : sucitoning reuqirement remains high. s/p PEg this morning.  Staring overnight at times, given 1g keppra.    24h events: sucitoning q1-2h. PEG yesterday, feeds started today.      Past Medical History: Hepatitis B HLD (hyperlipidemia)  Allergies:  No Known Allergies  Home meds:   ·	Omega-3 350 mg oral capsule: 1 cap(s) orally once a day  ·	tenofovir disoproxil fumarate 300 mg oral tablet: 1 tab(s) orally once a day    Current Meds:  MEDICATIONS  (STANDING):  acetylcysteine 20%  Inhalation 4 milliLiter(s) Inhalation every 6 hours  albuterol/ipratropium for Nebulization. 3 milliLiter(s) Nebulizer every 6 hours  doxazosin 4 milliGRAM(s) Oral at bedtime  enoxaparin Injectable 40 milliGRAM(s) SubCutaneous every 24 hours  finasteride 5 milliGRAM(s) Oral daily  insulin glargine Injectable (LANTUS) 7 Unit(s) SubCutaneous every morning  insulin regular  human corrective regimen sliding scale.   SubCutaneous every 6 hours  insulin regular  human recombinant 4 Unit(s) SubCutaneous every 6 hours  metoprolol tartrate 12.5 milliGRAM(s) Oral every 12 hours  psyllium Powder 1 Packet(s) Oral daily  sodium chloride 0.9%. 1000 milliLiter(s) (50 mL/Hr) IV Continuous <Continuous>  tenofovir disoproxil fumarate (VIREAD) 300 milliGRAM(s) Oral every 24 hours    MEDICATIONS  (PRN):  acetaminophen    Suspension .. 650 milliGRAM(s) Oral every 6 hours PRN Temp greater or equal to 38C (100.4F), Mild Pain (1 - 3)  hydrALAZINE Injectable 10 milliGRAM(s) IV Push every 2 hours PRN SBP > 140  Mometasone Furoate 0.1% Ointment 1 Application(s) 1 Application(s) Topical two times a day PRN dry/itchy skin    PHYSICAL EXAMINATION    ICU Vital Signs Last 24 Hrs  T(C): 36.6 (21 Aug 2020 17:15), Max: 37.1 (21 Aug 2020 00:00)  T(F): 97.9 (21 Aug 2020 17:15), Max: 98.8 (21 Aug 2020 00:00)  HR: 86 (21 Aug 2020 18:00) (59 - 96)  BP: 147/71 (21 Aug 2020 18:00) (139/67 - 172/81)  BP(mean): 102 (21 Aug 2020 18:00) (93 - 117)  RR: 23 (21 Aug 2020 18:00) (10 - 37)  SpO2: 99% (21 Aug 2020 18:00) (97% - 100%)    NEUROLOGIC EXAMINATION:    MSE: flat affect, no speech output when prompted, obeying first order commands  CN: pupils 3 mm reactive bilaterally, decreased EOM, weak cough, tongue midline  Motor: sitting on edge of bed, all extremities antigravity, LLE stronger than RLE in spontaneous movements  PULMONARY: coarse breath sounds, decreased bases  ABDOMEN: soft, nontender with normoactive bowel sounds, PEG in place    EXAM:  VITALS:  T(C): 36.6 (20 @ 17:15), Max: 37.1 (20 @ 00:00)  HR: 86 (20 @ 18:00) (59 - 96)  BP: 147/71 (20 @ 18:00) (139/67 - 172/81)  RR: 23 (20 @ 18:00) (10 - 37)  SpO2: 99% (20 @ 18:00) (97% - 100%)    MEDICATIONS:  acetaminophen    Suspension .. 650 milliGRAM(s) Oral every 6 hours PRN  acetylcysteine 20%  Inhalation 4 milliLiter(s) Inhalation every 6 hours  albuterol/ipratropium for Nebulization. 3 milliLiter(s) Nebulizer every 6 hours  artificial tears (preservative free) Ophthalmic Solution 1 Drop(s) Both EYES three times a day  doxazosin 4 milliGRAM(s) Oral at bedtime  enoxaparin Injectable 40 milliGRAM(s) SubCutaneous every 24 hours  finasteride 5 milliGRAM(s) Oral daily  glucagon  Injectable 1 milliGRAM(s) IntraMuscular once PRN  glucagon  Injectable 1 milliGRAM(s) IntraMuscular once PRN  hydrALAZINE Injectable 10 milliGRAM(s) IV Push every 2 hours PRN  insulin glargine Injectable (LANTUS) 7 Unit(s) SubCutaneous every morning  insulin regular  human corrective regimen sliding scale.   SubCutaneous every 6 hours  insulin regular  human recombinant 4 Unit(s) SubCutaneous every 6 hours  metoprolol tartrate 12.5 milliGRAM(s) Oral every 12 hours  Mometasone Furoate 0.1% Ointment 1 Application(s) 1 Application(s) Topical two times a day PRN  psyllium Powder 1 Packet(s) Oral daily  sodium chloride 0.9% for Nebulization 3 milliLiter(s) Nebulizer three times a day PRN  tenofovir disoproxil fumarate (VIREAD) 300 milliGRAM(s) Oral every 24 hours    I/O's    20 @ 07:01  -  20 @ 07:00  --------------------------------------------------------  IN: 2024.8 mL / OUT: 2800 mL / NET: -775.2 mL    20 @ 07:01  -  20 @ 18:58  --------------------------------------------------------  IN: 1599.8 mL / OUT: 800 mL / NET: 799.8 mL    LABS:                        8.2    12.72 )-----------( 182      ( 21 Aug 2020 04:41 )             26.4     08-    149<H>  |  114<H>  |  17  ----------------------------<  87  3.6   |  23  |  0.51    Ca    8.4      21 Aug 2020 04:41  Phos  2.4       Mg     2.6         TPro  6.6  /  Alb  2.9<L>  /  TBili  0.4  /  DBili  x   /  AST  27  /  ALT  29  /  AlkPhos  74  08-20    PT/INR - ( 20 Aug 2020 06:31 )   PT: 15.4 sec;   INR: 1.30     PTT - ( 20 Aug 2020 06:31 )  PTT:34.0 sec    Urinalysis Basic - ( 20 Aug 2020 09:51 )    Color: Yellow / Appearance: Clear / SG: >=1.030 / pH: x  Gluc: x / Ketone: 15 mg/dL  / Bili: Negative / Urobili: 0.2 E.U./dL   Blood: x / Protein: Trace mg/dL / Nitrite: NEGATIVE   Leuk Esterase: NEGATIVE / RBC: 5-10 /HPF / WBC < 5 /HPF   Sq Epi: x / Non Sq Epi: 0-5 /HPF / Bacteria: Present /HPF    CAPILLARY BLOOD GLUCOSE    POCT Blood Glucose.: 126 mg/dL (21 Aug 2020 18:30)  POCT Blood Glucose.: 92 mg/dL (21 Aug 2020 12:16)  POCT Blood Glucose.: 95 mg/dL (21 Aug 2020 05:16)  POCT Blood Glucose.: 95 mg/dL (20 Aug 2020 23:37)    Culture - Sputum (collected 20 @ 08:53)  Source: .Sputum Sputum  Gram Stain (20 @ 20:07):    Moderate epithelial cells    Numerous white blood cells    Numerous Gram Negative Rods    Moderate Gram positive cocci in pairs    Moderate Gram Positive Rods    Few Yeast  Final Report (20 @ 20:07):    Sputum specimen rejected.  Microscopic examination indicates    oropharyngeal contamination.  Please repeat.    Neuroimagin/21 CT head:  Stable   CT head:  Right frontal EVD catheter as above. The ventricles are stable in size from prior examination. No new or increasing intracranial hemorrhage or mass effect.  08/10 CT head:  Compared to 2020, there is improving intraventricular hemorrhage, resolution of intraventricular gas, and mild decrease in ventricular size. Postsurgical change and edema in the posterior fossa is approximately stable.   CT head:  post-surgical changes, stable HCP  Other imagin/20 LE doppler: NEG   LE doppler: NEG     CXR:  clear   CXR:  clear   CXR:  mild atelectasis L lung base  08/10 CXR:  Small left pleural effusion, unchanged.    Code:  Full  Goals:  Aggressive  Disposition:  ICU

## 2020-08-21 NOTE — PROGRESS NOTE ADULT - ATTENDING COMMENTS
ATTENDING ATTESTATION:    I was physically present for the key portions of the evaluation and management (E/M) service provided.  I agree with the above history, physical and plan, which I have reviewed and edited where appropriate.    Patient at high risk for neurological deterioration or death due to: infections, hydrocephalus.    Critical care time:  I have personally provided 60 minutes of critical care time, excluding time spent on separately-billable procedures.    Plan discussed with multidisciplinary staff and attendings.

## 2020-08-21 NOTE — PROGRESS NOTE ADULT - SUBJECTIVE AND OBJECTIVE BOX
STATUS POST:  PPD # 1 s/p PEG placement     SUBJECTIVE: Examined at the bedside resting comfortably. Per nurse no episodes of emesis. Large BM in am, non bloody.    doxazosin 4 milliGRAM(s) Oral at bedtime  enoxaparin Injectable 40 milliGRAM(s) SubCutaneous every 24 hours  hydrALAZINE Injectable 10 milliGRAM(s) IV Push every 2 hours PRN  metoprolol tartrate 12.5 milliGRAM(s) Oral every 12 hours  tenofovir disoproxil fumarate (VIREAD) 300 milliGRAM(s) Oral every 24 hours      Vital Signs Last 24 Hrs  T(C): 37.1 (21 Aug 2020 00:00), Max: 37.1 (20 Aug 2020 09:00)  T(F): 98.8 (21 Aug 2020 00:00), Max: 98.8 (21 Aug 2020 00:00)  HR: 73 (21 Aug 2020 08:00) (59 - 112)  BP: 158/90 (21 Aug 2020 08:00) (116/59 - 190/83)  BP(mean): 111 (21 Aug 2020 08:00) (77 - 119)  RR: 13 (21 Aug 2020 08:00) (9 - 48)  SpO2: 100% (21 Aug 2020 08:00) (92% - 100%)  I&O's Detail    20 Aug 2020 07:01  -  21 Aug 2020 07:00  --------------------------------------------------------  IN:    sodium chloride 0.9%: 525 mL    sodium chloride 0.9%.: 850 mL    Solution: 499.8 mL    Solution: 150 mL  Total IN: 2024.8 mL    OUT:    Intermittent Catheterization - Urethral: 2800 mL  Total OUT: 2800 mL    Total NET: -775.2 mL          Physical Exam  General: NAD, resting comfortably in bed  C/V: NSR  Pulm: Nonlabored breathing, no respiratory distress  Abd: soft, non-tender, non-distended, PEG secured to skin w/o erythema or drainage at site  Extrem: WWP, no edema,    LABS:                        8.2    12.72 )-----------( 182      ( 21 Aug 2020 04:41 )             26.4     08-21    149<H>  |  114<H>  |  17  ----------------------------<  87  3.6   |  23  |  0.51    Ca    8.4      21 Aug 2020 04:41  Phos  2.4     08-21  Mg     2.6     08-21    TPro  6.6  /  Alb  2.9<L>  /  TBili  0.4  /  DBili  x   /  AST  27  /  ALT  29  /  AlkPhos  74  08-20    PT/INR - ( 20 Aug 2020 06:31 )   PT: 15.4 sec;   INR: 1.30          PTT - ( 20 Aug 2020 06:31 )  PTT:34.0 sec  Urinalysis Basic - ( 20 Aug 2020 09:51 )    Color: Yellow / Appearance: Clear / SG: >=1.030 / pH: x  Gluc: x / Ketone: 15 mg/dL  / Bili: Negative / Urobili: 0.2 E.U./dL   Blood: x / Protein: Trace mg/dL / Nitrite: NEGATIVE   Leuk Esterase: NEGATIVE / RBC: 5-10 /HPF / WBC < 5 /HPF   Sq Epi: x / Non Sq Epi: 0-5 /HPF / Bacteria: Present /HPF        RADIOLOGY & ADDITIONAL STUDIES:

## 2020-08-21 NOTE — PROGRESS NOTE ADULT - ASSESSMENT
Assessment and Plan:   · Assessment		  59y/M with  1.	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	Hepatitis B  3.	dyslipidemia     PLAN:   NEURO: continued neurological improvement  EEG with infrequent GPDs, no seizures --> continue off AEDs, can dc EEG  Goal normonatremia  REHAB:  physical therapy evaluation and management    EARLY MOB:  HOB up    PULM: mild aspiration, high suctioning requirements, q1h  continue aggressive pulmomnary toileting, chest PT  continue saline nebs and mucomyst    CARDIO:  1) afib with RVR prior SBP goal 100-140mm Hg   -on metoprolol 12.5 mg BID    ENDO:  Blood sugar goals 140-180 mg/dL, continue insulin sliding, continue statins; insulin glargine 15 nutritional 4-4-4-4  -->lantus 8 units QHS  -hold premeal insulin when NPO    GI: diebetic tube feeds started today  -s/p PEG    RENAL:  MURIEL    HEM/ONC: no coagulopathy, no h/o antiplatelet / anticoagulant use  VTE Prophylaxis: SCDs, SQL     ID: febrile, slowly improving leukocytosis; ID recommendations:  s/p course of cefepime  -Cdiff negative, Abd CT negative   -enterobacter from sputum  -chronic HBV, on home antiviral    Dispo: continue in ICU for high suctioing frequency (<q1h), once improved will be ready for acute rehab Assessment and Plan:   · Assessment		  59y/M with  1.	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	Hepatitis B  3.	dyslipidemia     PLAN:   NEURO: continued neurological improvement  EEG with infrequent GPDs, no seizures --> continue off AEDs, can dc EEG  Goal normonatremia  REHAB:  physical therapy evaluation and management    EARLY MOB:  HOB up    PULM: mild aspiration, high suctioning requirements, q1h  continue aggressive pulmomnary toileting, chest PT  continue saline nebs and mucomyst    CARDIO:  1) afib with RVR prior SBP goal 100-140mm Hg   -on metoprolol 12.5 mg BID    ENDO:  Blood sugar goals 140-180 mg/dL, continue insulin sliding, continue statins; insulin glargine 15 nutritional 4-4-4-4  -->lantus 8 units QHS  -hold premeal insulin when NPO    GI: diabetic tube feeds started today  -s/p PEG    RENAL:  MURIEL    HEM/ONC: no coagulopathy, no h/o antiplatelet / anticoagulant use  VTE Prophylaxis: SCDs, SQL     ID: febrile, slowly improving leukocytosis; ID recommendations:  s/p course of cefepime  -Cdiff negative, Abd CT negative   -enterobacter from sputum  -chronic HBV, on home antiviral    Dispo: continue in ICU for high suctioing frequency (<q1h), once improved will be ready for acute rehab

## 2020-08-22 ENCOUNTER — FORM ENCOUNTER (OUTPATIENT)
Age: 60
End: 2020-08-22

## 2020-08-22 LAB
ANION GAP SERPL CALC-SCNC: 9 MMOL/L — SIGNIFICANT CHANGE UP (ref 5–17)
BUN SERPL-MCNC: 15 MG/DL — SIGNIFICANT CHANGE UP (ref 7–23)
CALCIUM SERPL-MCNC: 8.7 MG/DL — SIGNIFICANT CHANGE UP (ref 8.4–10.5)
CHLORIDE SERPL-SCNC: 107 MMOL/L — SIGNIFICANT CHANGE UP (ref 96–108)
CO2 SERPL-SCNC: 31 MMOL/L — SIGNIFICANT CHANGE UP (ref 22–31)
CREAT SERPL-MCNC: 0.6 MG/DL — SIGNIFICANT CHANGE UP (ref 0.5–1.3)
GLUCOSE BLDC GLUCOMTR-MCNC: 123 MG/DL — HIGH (ref 70–99)
GLUCOSE BLDC GLUCOMTR-MCNC: 129 MG/DL — HIGH (ref 70–99)
GLUCOSE BLDC GLUCOMTR-MCNC: 138 MG/DL — HIGH (ref 70–99)
GLUCOSE SERPL-MCNC: 127 MG/DL — HIGH (ref 70–99)
HCT VFR BLD CALC: 27.1 % — LOW (ref 39–50)
HGB BLD-MCNC: 8.5 G/DL — LOW (ref 13–17)
MAGNESIUM SERPL-MCNC: 2.5 MG/DL — SIGNIFICANT CHANGE UP (ref 1.6–2.6)
MCHC RBC-ENTMCNC: 28 PG — SIGNIFICANT CHANGE UP (ref 27–34)
MCHC RBC-ENTMCNC: 31.4 GM/DL — LOW (ref 32–36)
MCV RBC AUTO: 89.1 FL — SIGNIFICANT CHANGE UP (ref 80–100)
NRBC # BLD: 0 /100 WBCS — SIGNIFICANT CHANGE UP (ref 0–0)
PHOSPHATE SERPL-MCNC: 2.7 MG/DL — SIGNIFICANT CHANGE UP (ref 2.5–4.5)
PLATELET # BLD AUTO: 207 K/UL — SIGNIFICANT CHANGE UP (ref 150–400)
POTASSIUM SERPL-MCNC: 3.5 MMOL/L — SIGNIFICANT CHANGE UP (ref 3.5–5.3)
POTASSIUM SERPL-SCNC: 3.5 MMOL/L — SIGNIFICANT CHANGE UP (ref 3.5–5.3)
RBC # BLD: 3.04 M/UL — LOW (ref 4.2–5.8)
RBC # FLD: 15.6 % — HIGH (ref 10.3–14.5)
SODIUM SERPL-SCNC: 147 MMOL/L — HIGH (ref 135–145)
WBC # BLD: 9.72 K/UL — SIGNIFICANT CHANGE UP (ref 3.8–10.5)
WBC # FLD AUTO: 9.72 K/UL — SIGNIFICANT CHANGE UP (ref 3.8–10.5)

## 2020-08-22 PROCEDURE — 99233 SBSQ HOSP IP/OBS HIGH 50: CPT

## 2020-08-22 PROCEDURE — 99291 CRITICAL CARE FIRST HOUR: CPT | Mod: 24

## 2020-08-22 PROCEDURE — 71045 X-RAY EXAM CHEST 1 VIEW: CPT | Mod: 26

## 2020-08-22 RX ORDER — POTASSIUM CHLORIDE 20 MEQ
20 PACKET (EA) ORAL ONCE
Refills: 0 | Status: COMPLETED | OUTPATIENT
Start: 2020-08-22 | End: 2020-08-22

## 2020-08-22 RX ORDER — POTASSIUM PHOSPHATE, MONOBASIC POTASSIUM PHOSPHATE, DIBASIC 236; 224 MG/ML; MG/ML
30 INJECTION, SOLUTION INTRAVENOUS ONCE
Refills: 0 | Status: COMPLETED | OUTPATIENT
Start: 2020-08-22 | End: 2020-08-22

## 2020-08-22 RX ADMIN — Medication 1 DROP(S): at 07:11

## 2020-08-22 RX ADMIN — Medication 3 MILLILITER(S): at 16:40

## 2020-08-22 RX ADMIN — SODIUM CHLORIDE 50 MILLILITER(S): 9 INJECTION INTRAMUSCULAR; INTRAVENOUS; SUBCUTANEOUS at 16:52

## 2020-08-22 RX ADMIN — Medication 3 MILLILITER(S): at 00:44

## 2020-08-22 RX ADMIN — INSULIN HUMAN 4 UNIT(S): 100 INJECTION, SOLUTION SUBCUTANEOUS at 00:40

## 2020-08-22 RX ADMIN — Medication 3 MILLILITER(S): at 21:32

## 2020-08-22 RX ADMIN — POTASSIUM PHOSPHATE, MONOBASIC POTASSIUM PHOSPHATE, DIBASIC 83.33 MILLIMOLE(S): 236; 224 INJECTION, SOLUTION INTRAVENOUS at 10:21

## 2020-08-22 RX ADMIN — Medication 12.5 MILLIGRAM(S): at 18:34

## 2020-08-22 RX ADMIN — INSULIN GLARGINE 7 UNIT(S): 100 INJECTION, SOLUTION SUBCUTANEOUS at 07:11

## 2020-08-22 RX ADMIN — ENOXAPARIN SODIUM 40 MILLIGRAM(S): 100 INJECTION SUBCUTANEOUS at 21:57

## 2020-08-22 RX ADMIN — Medication 12.5 MILLIGRAM(S): at 07:11

## 2020-08-22 RX ADMIN — Medication 1 DROP(S): at 21:57

## 2020-08-22 RX ADMIN — Medication 4 MILLIGRAM(S): at 21:57

## 2020-08-22 RX ADMIN — CHLORHEXIDINE GLUCONATE 1 APPLICATION(S): 213 SOLUTION TOPICAL at 06:25

## 2020-08-22 RX ADMIN — Medication 4 MILLILITER(S): at 21:32

## 2020-08-22 RX ADMIN — Medication 4 MILLILITER(S): at 00:44

## 2020-08-22 RX ADMIN — TENOFOVIR DISOPROXIL FUMARATE 300 MILLIGRAM(S): 300 TABLET, FILM COATED ORAL at 21:57

## 2020-08-22 RX ADMIN — Medication 4 MILLILITER(S): at 16:43

## 2020-08-22 RX ADMIN — Medication 20 MILLIEQUIVALENT(S): at 10:21

## 2020-08-22 RX ADMIN — INSULIN HUMAN 4 UNIT(S): 100 INJECTION, SOLUTION SUBCUTANEOUS at 18:35

## 2020-08-22 RX ADMIN — FINASTERIDE 5 MILLIGRAM(S): 5 TABLET, FILM COATED ORAL at 12:31

## 2020-08-22 RX ADMIN — INSULIN HUMAN 4 UNIT(S): 100 INJECTION, SOLUTION SUBCUTANEOUS at 07:11

## 2020-08-22 RX ADMIN — Medication 3 MILLILITER(S): at 05:32

## 2020-08-22 RX ADMIN — Medication 1 DROP(S): at 14:38

## 2020-08-22 RX ADMIN — Medication 4 MILLILITER(S): at 09:38

## 2020-08-22 RX ADMIN — Medication 4 MILLILITER(S): at 05:32

## 2020-08-22 RX ADMIN — Medication 1 PACKET(S): at 12:31

## 2020-08-22 RX ADMIN — Medication 3 MILLILITER(S): at 09:37

## 2020-08-22 RX ADMIN — INSULIN HUMAN 4 UNIT(S): 100 INJECTION, SOLUTION SUBCUTANEOUS at 12:32

## 2020-08-22 NOTE — PROGRESS NOTE ADULT - ASSESSMENT
59y/M with  1.	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	Hepatitis B  3.	dyslipidemia     PLAN:   NEURO: continued neurological improvement  EEG with infrequent GPDs, no seizures --> continue off AEDs, can dc EEG  Goal normonatremia  REHAB:  physical therapy evaluation and management    EARLY MOB:  HOB up    PULM: mild aspiration, high suctioning requirements, q1h  continue aggressive pulmomnary toileting, chest PT  continue saline nebs and mucomyst  -re-consult ENT regarding value of inpatient vocal cord augmentation, can facilitate stronger cough and airway protection  -rpeat CXR    CARDIO:  1) afib with RVR prior SBP goal 100-140mm Hg   -on metoprolol 12.5 mg BID    ENDO:  Blood sugar goals 140-180 mg/dL, continue insulin sliding, continue statins; insulin glargine 15 nutritional 4-4-4-4  -->lantus 8 units QHS  -hold premeal insulin when NPO    GI: diebetic tube feeds  -s/p PEG    RENAL:  MURIEL    HEM/ONC: no coagulopathy, no h/o antiplatelet / anticoagulant use  VTE Prophylaxis: SCDs, SQL     ID: febrile, slowly improving leukocytosis; ID recommendations:  s/p course of cefepime  -Cdiff negative, Abd CT negative   -enterobacter from sputum  -chronic HBV, on home antiviral    Dispo: continue in ICU for high suctioing frequency (<q1h), once improved will be ready for acute rehab

## 2020-08-22 NOTE — PROGRESS NOTE ADULT - SUBJECTIVE AND OBJECTIVE BOX
S/Overnight events:  EMIL overnight    59 year old male with PMH of HLD and unclear liver disease had acute onset of severe headache, dizziness and vomiting x1 today, was brought into [facility] with continued symptoms, as well as hypertension and multiple episodes of vomiting. Patient was intubated for airway protection with CT Head performed demonstrating a large left cerebellar hemorrhage. CTA Head/Neck performed is suggestive of underlying vascular pathology such as AVM. RIght frontal [place] [facility] and [place] transferred to [facility] for further work-up. Patient arrives to [facility] intubated, sedated and on Nicardipine drip. Patient's son provided history and denies any Aspirin or other anticoagulant use. Denies any prior history of hypertension, smoking, or ETOH abuse. (04 Aug 2020 05:01)    Hospital Course:   POD# 0 S/P cerebral angio, negative for AVM. s/p SOC craniectomy evacuation of cerebellar hematoma.   POD # 1:   overnight tachycardic, ekg without significant changes, lactate wnl, low grade fever, s/p tyl. bolus x 1L , NS increased to 100cc/hr; neuro improving.  EVD at 5 cmH2O   8/6 POD#3, BD#4, Tmax 100.6, Pan Cx 7/5 NGTD, Vanc/Zosyn until 8/10, Vanc trough due today @ 6pm, EEG = no seizure, Lasix overnight for Pulm congest and tachycard, ruff placed for strict I&Os, pending anemia w/u, EVD @ 5, CPAP trials, Card GTT for SBP<140, Started Norvasc for BP optimization  8/7:  8/8:  S/P cerebral angio, negative for AVM. s/p SOC craniectomy evacuation of cerebellar hematoma. No significant events overnight. On CPAP this morning   8/10: fevers overnight, resolved this morning, Pancultured, (+) Enterbacter, on CPAP this AM  8/11 POD#7 SOC, BD # 8, EVD replaced (hard pass), Neuro exam stable, EVD @ 5cm H2O, Zosyn for sputum + enterobact, Plan to extubated today, HCT in AM  8/12 POD#8 s/p SOC, BD#9, s/p EVD replacement (hard pass on 8/10/20), neuro exam stable, EVD @5cm H2O. Zosyn for enterobacter in sputum. O/n EVD stopped draining, flushed distally and proximally, waveform poor, neuro exam remained stable throughout, ICPs <8 prior.    8/13 EVD soft pass yesterday, spiked temp today and re cultured o/w neuro exam stable  8/14: EMIL overnight, neuro stable. Mucomyst for secretions overnight   8/15 EVD dc/d, temp spike to 104, pan culture, ID consulted, vanco and cefepime started   8/15 levo started, presumed sepsis from aspiration, continues to have diarrhea  8/16: EMIL  8/17: EMIL. Neuro exam stable  8/18: Increased secretions overnight. Chest xray obtained. NGT replaced. Neuro exam stable. Patient transferred to ICU for monitoring due to congestion and possible L effusion on CXR along with desaturation and frequent suctioning.   8/19: POD16, on EEG, EMIL o/n, transferred to 5E, plan for PEG tomorrow  8/20 POD#17 overnight given Keppra 1g IV x 1 dose for blank stare and EEG findings: pending final read on EEG, Cefep for PNA, PEG today, NPO, Amonia Serum level today w/u for enceph, vEEG,   8/21 POD#18, EMIL overnight, PEG placed, start TF 9 AM today, Neuro exam stable  8/22 POD #19 EMIL overnight, repeat CT head stable.        Vital Signs Last 24 Hrs  T(C): 36.9 (21 Aug 2020 21:22), Max: 36.9 (21 Aug 2020 21:00)  T(F): 98.4 (21 Aug 2020 21:22), Max: 98.4 (21 Aug 2020 21:00)  HR: 78 (21 Aug 2020 21:00) (59 - 86)  BP: 158/90 (21 Aug 2020 21:00) (139/67 - 172/81)  BP(mean): 117 (21 Aug 2020 21:00) (93 - 117)  RR: 34 (21 Aug 2020 21:00) (10 - 34)  SpO2: 100% (21 Aug 2020 21:00) (97% - 100%)    I&O's Detail    20 Aug 2020 07:01  -  21 Aug 2020 07:00  --------------------------------------------------------  IN:    sodium chloride 0.9%: 525 mL    sodium chloride 0.9%.: 850 mL    Solution: 499.8 mL    Solution: 150 mL  Total IN: 2024.8 mL    OUT:    Intermittent Catheterization - Urethral: 2800 mL  Total OUT: 2800 mL    Total NET: -775.2 mL      21 Aug 2020 07:01  -  22 Aug 2020 00:27  --------------------------------------------------------  IN:    Enteral Tube Flush: 40 mL    ns in tub fed  [id number]: 580 mL    sodium chloride 0.9%.: 600 mL    Solution: 300 mL    Solution: 499.8 mL  Total IN: 2019.8 mL    OUT:    Intermittent Catheterization - Urethral: 1500 mL  Total OUT: 1500 mL    Total NET: 519.8 mL        I&O's Summary    20 Aug 2020 07:01  -  21 Aug 2020 07:00  --------------------------------------------------------  IN: 2024.8 mL / OUT: 2800 mL / NET: -775.2 mL    21 Aug 2020 07:01  -  22 Aug 2020 00:27  --------------------------------------------------------  IN: 2019.8 mL / OUT: 1500 mL / NET: 519.8 mL        PHYSICAL EXAM:  General: patient laying in bed in NAD  Neuro: OE spontaneously, PERRL. Nonverbal. Follows commands with upper extremities b/l. MUNOZ x 4.  Neck: supple  Cardio: (+) S1 S2  RRR  Pulm: on 3L NC. Coughing with course breath sounds  Abd: Nondistended. soft, nontender. +BS. PEG in place  Ext: warm, nonedematous.      TUBES/LINES:  [] CVC  [] A-line  [] Lumbar Drain  [] Ventriculostomy  [] Other    DIET:  [] NPO  [] Mechanical  [x] Tube feeds    LABS:                        8.2    12.72 )-----------( 182      ( 21 Aug 2020 04:41 )             26.4     08-21    149<H>  |  114<H>  |  17  ----------------------------<  87  3.6   |  23  |  0.51    Ca    8.4      21 Aug 2020 04:41  Phos  2.4     08-21  Mg     2.6     08-21    TPro  6.6  /  Alb  2.9<L>  /  TBili  0.4  /  DBili  x   /  AST  27  /  ALT  29  /  AlkPhos  74  08-20    PT/INR - ( 20 Aug 2020 06:31 )   PT: 15.4 sec;   INR: 1.30          PTT - ( 20 Aug 2020 06:31 )  PTT:34.0 sec  Urinalysis Basic - ( 20 Aug 2020 09:51 )    Color: Yellow / Appearance: Clear / SG: >=1.030 / pH: x  Gluc: x / Ketone: 15 mg/dL  / Bili: Negative / Urobili: 0.2 E.U./dL   Blood: x / Protein: Trace mg/dL / Nitrite: NEGATIVE   Leuk Esterase: NEGATIVE / RBC: 5-10 /HPF / WBC < 5 /HPF   Sq Epi: x / Non Sq Epi: 0-5 /HPF / Bacteria: Present /HPF          CAPILLARY BLOOD GLUCOSE      POCT Blood Glucose.: 126 mg/dL (21 Aug 2020 18:30)  POCT Blood Glucose.: 92 mg/dL (21 Aug 2020 12:16)  POCT Blood Glucose.: 95 mg/dL (21 Aug 2020 05:16)      Drug Levels: [] N/A  Vancomycin Level, Trough: 5.9 ug/mL (08-17 @ 13:25)    CSF Analysis: [] N/A      Allergies    No Known Allergies    Intolerances      MEDICATIONS:  Antibiotics:  tenofovir disoproxil fumarate (VIREAD) 300 milliGRAM(s) Oral every 24 hours    Neuro:  acetaminophen    Suspension .. 650 milliGRAM(s) Oral every 6 hours PRN    Anticoagulation:  enoxaparin Injectable 40 milliGRAM(s) SubCutaneous every 24 hours    OTHER:  acetylcysteine 20%  Inhalation 4 milliLiter(s) Inhalation every 6 hours  albuterol/ipratropium for Nebulization. 3 milliLiter(s) Nebulizer every 6 hours  artificial tears (preservative free) Ophthalmic Solution 1 Drop(s) Both EYES three times a day  chlorhexidine 2% Cloths 1 Application(s) Topical <User Schedule>  dextrose 40% Gel 15 Gram(s) Oral once PRN  dextrose 50% Injectable 12.5 Gram(s) IV Push once  dextrose 50% Injectable 25 Gram(s) IV Push once  doxazosin 4 milliGRAM(s) Oral at bedtime  finasteride 5 milliGRAM(s) Oral daily  glucagon  Injectable 1 milliGRAM(s) IntraMuscular once PRN  glucagon  Injectable 1 milliGRAM(s) IntraMuscular once PRN  hydrALAZINE Injectable 10 milliGRAM(s) IV Push every 2 hours PRN  insulin glargine Injectable (LANTUS) 7 Unit(s) SubCutaneous every morning  insulin regular  human corrective regimen sliding scale.   SubCutaneous every 6 hours  insulin regular  human recombinant 4 Unit(s) SubCutaneous every 6 hours  metoprolol tartrate 12.5 milliGRAM(s) Oral every 12 hours  Mometasone Furoate 0.1% Ointment 1 Application(s) 1 Application(s) Topical two times a day PRN  psyllium Powder 1 Packet(s) Oral daily  sodium chloride 0.9% for Nebulization 3 milliLiter(s) Nebulizer three times a day PRN    IVF:  dextrose 5%. 1000 milliLiter(s) IV Continuous <Continuous>  sodium chloride 0.9%. 1000 milliLiter(s) IV Continuous <Continuous>    CULTURES:  Culture Results:   Sputum specimen rejected.  Microscopic examination indicates  oropharyngeal contamination.  Please repeat. (08-20 @ 08:53)  Culture Results:   GI PCR Results: NOT detected  *******Please Note:*******  GI panel PCR evaluates for:  Campylobacter, Plesiomonas shigelloides, Salmonella,  Vibrio, Yersinia enterocolitica, Enteroaggregative  Escherichia coli (EAEC), Enteropathogenic E.coli (EPEC),  Enterotoxigenic E. coli (ETEC) lt/st, Shiga-like  toxin-producing E. coli (STEC) stx1/stx2,  Shigella/ Enteroinvasive E. coli (EIEC), Cryptosporidium,  Cyclospora cayetanensis, Entamoeba histolytica,  Giardia lamblia, Adenovirus F 40/41, Astrovirus,  Norovirus GI/GII, Rotavirus A, Sapovirus (08-16 @ 04:12)    RADIOLOGY & ADDITIONAL TESTS:      ASSESSMENT:  59y Male  Male with ICH/IVH s/p SOC crani POD #17 (8/4/20), s/p EVD placement, removed (8/14/20).    INTRACRANIAL BLEED  No pertinent family history in first degree relatives  Handoff  MEWS Score  Hepatitis B  HLD (hyperlipidemia)  ICH (intracerebral hemorrhage)  ICH (intracerebral hemorrhage)  Hepatitis B  HLD (hyperlipidemia)  HIV (human immunodeficiency virus infection)  Cerebellar hemorrhage  EGD, with PEG  Craniectomy, subocciptal, with cervical laminectomy for medulla and spinal cord decompression  Percutaneous insertion of arterial line  Angiogram, carotid and cerebral vessels, bilateral  Foot fracture, left      PLAN:  NEURO:  - neuro checks  - vitals checks  - pain control    CARDIOVASCULAR:  - -140  - cont metoprolol    PULMONARY:  - on nasal cannula 3L  - continue routine suctioning   - chest PT  - saline nebs and mucomyst     RENAL:  - IVL  - cont Proscar, Cardura   - straight cath q6 for retention    GI:  - continue tube feeds    HEME:  - H/H stable    ID:  - leukocytosis improving      ENDO:  - ISS, Lantus, Lispro     DVT PROPHYLAXIS:  [x] Venodynes                                [x] Heparin/Lovenox       FALL RISK:  [] Low Risk                                    [x] Impulsive    DISPOSITION: ICU status, full code, dispo pending    d/w Dr. Blank    []  GCS  E   V  M     Heart Failure: []Acute, [] acute on chronic , []chronic  Heart Failure:  [] Diastolic (HFpEF), [] Systolic (HFrEF), []Combined (HFpEF and HFrEF), [] RHF, [] Pulm HTN, [] Other    [] SARAH, [] ATN, [] AIN, [] other  [] CKD1, [] CKD2, [] CKD 3, [] CKD 4, [] CKD 5, []ESRD    Encephalopathy: [] Metabolic, [] Hepatic, [] toxic, [] Neurological, [] Other    Abnormal Nurtitional Status: [] malnurtition (see nutrition note), [ ]underweight: BMI < 19, [] morbid obesity: BMI >40, [] Cachexia    [] Sepsis  [] hypovolemic shock,[] cardiogenic shock, [] hemorrhagic shock, [] neuogenic shock  [] Acute Respiratory Failure  []Cerebral edema, [] Brain compression/ herniation,   [] Functional quadriplegia  [] Acute blood loss anemia NEUROSURGERY PROGRESS NOTE    S/Overnight events:  EMIL overnight    59 year old male with PMH of HLD and unclear liver disease had acute onset of severe headache, dizziness and vomiting x1 today, was brought into [facility] with continued symptoms, as well as hypertension and multiple episodes of vomiting. Patient was intubated for airway protection with CT Head performed demonstrating a large left cerebellar hemorrhage. CTA Head/Neck performed is suggestive of underlying vascular pathology such as AVM. RIght frontal ventriculostomy placed at [facility] and patient transferred to [facility] for further work-up. Patient arrives to [facility] intubated, sedated and on Nicardipine drip. Patient's son provided history and denies any Aspirin or other anticoagulant use. Denies any prior history of hypertension, smoking, or ETOH abuse. (04 Aug 2020 05:01)    Hospital Course:   POD# 0 S/P cerebral angio, negative for AVM. s/p SOC craniectomy evacuation of cerebellar hematoma.   POD # 1:   overnight tachycardic, ekg without significant changes, lactate wnl, low grade fever, s/p tyl. bolus x 1L , NS increased to 100cc/hr; neuro improving.  EVD at 5 cmH2O   8/6 POD#3, BD#4, Tmax 100.6, Pan Cx 7/5 NGTD, Vanc/Zosyn until 8/10, Vanc trough due today @ 6pm, EEG = no seizure, Lasix overnight for Pulm congest and tachycard, ruff placed for strict I&Os, pending anemia w/u, EVD @ 5, CPAP trials, Card GTT for SBP<140, Started Norvasc for BP optimization  8/7:  8/8:  S/P cerebral angio, negative for AVM. s/p SOC craniectomy evacuation of cerebellar hematoma. No significant events overnight. On CPAP this morning   8/10: fevers overnight, resolved this morning, Pancultured, (+) Enterbacter, on CPAP this AM  8/11 POD#7 SOC, BD # 8, EVD replaced (hard pass), Neuro exam stable, EVD @ 5cm H2O, Zosyn for sputum + enterobact, Plan to extubated today, HCT in AM  8/12 POD#8 s/p SOC, BD#9, s/p EVD replacement (hard pass on 8/10/20), neuro exam stable, EVD @5cm H2O. Zosyn for enterobacter in sputum. O/n EVD stopped draining, flushed distally and proximally, waveform poor, neuro exam remained stable throughout, ICPs <8 prior.    8/13 EVD soft pass yesterday, spiked temp today and re cultured o/w neuro exam stable  8/14: EMIL overnight, neuro stable. Mucomyst for secretions overnight   8/15 EVD dc/d, temp spike to 104, pan culture, ID consulted, vanco and cefepime started   8/15 levo started, presumed sepsis from aspiration, continues to have diarrhea  8/16: EMIL  8/17: EMIL. Neuro exam stable  8/18: Increased secretions overnight. Chest xray obtained. NGT replaced. Neuro exam stable. Patient transferred to ICU for monitoring due to congestion and possible L effusion on CXR along with desaturation and frequent suctioning.   8/19: POD16, on EEG, EMIL o/n, transferred to 5E, plan for PEG tomorrow  8/20 POD#17 overnight given Keppra 1g IV x 1 dose for blank stare and EEG findings: pending final read on EEG, Cefep for PNA, PEG today, NPO, Amonia Serum level today w/u for enceph, vEEG,   8/21 POD#18, EMIL overnight, PEG placed, start TF 9 AM today, Neuro exam stable  8/22 POD #19 EMIL overnight, repeat CT head stable.        Vital Signs Last 24 Hrs  T(C): 36.9 (21 Aug 2020 21:22), Max: 36.9 (21 Aug 2020 21:00)  T(F): 98.4 (21 Aug 2020 21:22), Max: 98.4 (21 Aug 2020 21:00)  HR: 78 (21 Aug 2020 21:00) (59 - 86)  BP: 158/90 (21 Aug 2020 21:00) (139/67 - 172/81)  BP(mean): 117 (21 Aug 2020 21:00) (93 - 117)  RR: 34 (21 Aug 2020 21:00) (10 - 34)  SpO2: 100% (21 Aug 2020 21:00) (97% - 100%)    I&O's Detail    20 Aug 2020 07:01  -  21 Aug 2020 07:00  --------------------------------------------------------  IN:    sodium chloride 0.9%: 525 mL    sodium chloride 0.9%.: 850 mL    Solution: 499.8 mL    Solution: 150 mL  Total IN: 2024.8 mL    OUT:    Intermittent Catheterization - Urethral: 2800 mL  Total OUT: 2800 mL    Total NET: -775.2 mL      21 Aug 2020 07:01  -  22 Aug 2020 00:27  --------------------------------------------------------  IN:    Enteral Tube Flush: 40 mL    ns in tub fed  [id number]: 580 mL    sodium chloride 0.9%.: 600 mL    Solution: 300 mL    Solution: 499.8 mL  Total IN: 2019.8 mL    OUT:    Intermittent Catheterization - Urethral: 1500 mL  Total OUT: 1500 mL    Total NET: 519.8 mL        I&O's Summary    20 Aug 2020 07:01  -  21 Aug 2020 07:00  --------------------------------------------------------  IN: 2024.8 mL / OUT: 2800 mL / NET: -775.2 mL    21 Aug 2020 07:01  -  22 Aug 2020 00:27  --------------------------------------------------------  IN: 2019.8 mL / OUT: 1500 mL / NET: 519.8 mL        PHYSICAL EXAM:  General: patient laying in bed in NAD  Neuro: OE spontaneously, PERRL. Nonverbal. Follows commands with upper extremities b/l. MUNOZ x 4.  Neck: supple  Cardio: (+) S1 S2  RRR  Pulm: on 3L NC. Coughing with course breath sounds  Abd: Nondistended. soft, nontender. +BS. PEG in place  Ext: warm, nonedematous.      TUBES/LINES:  [] CVC  [] A-line  [] Lumbar Drain  [] Ventriculostomy  [] Other    DIET:  [] NPO  [] Mechanical  [x] Tube feeds    LABS:                        8.2    12.72 )-----------( 182      ( 21 Aug 2020 04:41 )             26.4     08-21    149<H>  |  114<H>  |  17  ----------------------------<  87  3.6   |  23  |  0.51    Ca    8.4      21 Aug 2020 04:41  Phos  2.4     08-21  Mg     2.6     08-21    TPro  6.6  /  Alb  2.9<L>  /  TBili  0.4  /  DBili  x   /  AST  27  /  ALT  29  /  AlkPhos  74  08-20    PT/INR - ( 20 Aug 2020 06:31 )   PT: 15.4 sec;   INR: 1.30          PTT - ( 20 Aug 2020 06:31 )  PTT:34.0 sec  Urinalysis Basic - ( 20 Aug 2020 09:51 )    Color: Yellow / Appearance: Clear / SG: >=1.030 / pH: x  Gluc: x / Ketone: 15 mg/dL  / Bili: Negative / Urobili: 0.2 E.U./dL   Blood: x / Protein: Trace mg/dL / Nitrite: NEGATIVE   Leuk Esterase: NEGATIVE / RBC: 5-10 /HPF / WBC < 5 /HPF   Sq Epi: x / Non Sq Epi: 0-5 /HPF / Bacteria: Present /HPF          CAPILLARY BLOOD GLUCOSE      POCT Blood Glucose.: 126 mg/dL (21 Aug 2020 18:30)  POCT Blood Glucose.: 92 mg/dL (21 Aug 2020 12:16)  POCT Blood Glucose.: 95 mg/dL (21 Aug 2020 05:16)      Drug Levels: [] N/A  Vancomycin Level, Trough: 5.9 ug/mL (08-17 @ 13:25)    CSF Analysis: [] N/A      Allergies    No Known Allergies    Intolerances      MEDICATIONS:  Antibiotics:  tenofovir disoproxil fumarate (VIREAD) 300 milliGRAM(s) Oral every 24 hours    Neuro:  acetaminophen    Suspension .. 650 milliGRAM(s) Oral every 6 hours PRN    Anticoagulation:  enoxaparin Injectable 40 milliGRAM(s) SubCutaneous every 24 hours    OTHER:  acetylcysteine 20%  Inhalation 4 milliLiter(s) Inhalation every 6 hours  albuterol/ipratropium for Nebulization. 3 milliLiter(s) Nebulizer every 6 hours  artificial tears (preservative free) Ophthalmic Solution 1 Drop(s) Both EYES three times a day  chlorhexidine 2% Cloths 1 Application(s) Topical <User Schedule>  dextrose 40% Gel 15 Gram(s) Oral once PRN  dextrose 50% Injectable 12.5 Gram(s) IV Push once  dextrose 50% Injectable 25 Gram(s) IV Push once  doxazosin 4 milliGRAM(s) Oral at bedtime  finasteride 5 milliGRAM(s) Oral daily  glucagon  Injectable 1 milliGRAM(s) IntraMuscular once PRN  glucagon  Injectable 1 milliGRAM(s) IntraMuscular once PRN  hydrALAZINE Injectable 10 milliGRAM(s) IV Push every 2 hours PRN  insulin glargine Injectable (LANTUS) 7 Unit(s) SubCutaneous every morning  insulin regular  human corrective regimen sliding scale.   SubCutaneous every 6 hours  insulin regular  human recombinant 4 Unit(s) SubCutaneous every 6 hours  metoprolol tartrate 12.5 milliGRAM(s) Oral every 12 hours  Mometasone Furoate 0.1% Ointment 1 Application(s) 1 Application(s) Topical two times a day PRN  psyllium Powder 1 Packet(s) Oral daily  sodium chloride 0.9% for Nebulization 3 milliLiter(s) Nebulizer three times a day PRN    IVF:  dextrose 5%. 1000 milliLiter(s) IV Continuous <Continuous>  sodium chloride 0.9%. 1000 milliLiter(s) IV Continuous <Continuous>    CULTURES:  Culture Results:   Sputum specimen rejected.  Microscopic examination indicates  oropharyngeal contamination.  Please repeat. (08-20 @ 08:53)  Culture Results:   GI PCR Results: NOT detected  *******Please Note:*******  GI panel PCR evaluates for:  Campylobacter, Plesiomonas shigelloides, Salmonella,  Vibrio, Yersinia enterocolitica, Enteroaggregative  Escherichia coli (EAEC), Enteropathogenic E.coli (EPEC),  Enterotoxigenic E. coli (ETEC) lt/st, Shiga-like  toxin-producing E. coli (STEC) stx1/stx2,  Shigella/ Enteroinvasive E. coli (EIEC), Cryptosporidium,  Cyclospora cayetanensis, Entamoeba histolytica,  Giardia lamblia, Adenovirus F 40/41, Astrovirus,  Norovirus GI/GII, Rotavirus A, Sapovirus (08-16 @ 04:12)    RADIOLOGY & ADDITIONAL TESTS:      ASSESSMENT:  59y Male  Male with ICH/IVH s/p SOC crani POD #17 (8/4/20), s/p EVD placement, removed (8/14/20).    INTRACRANIAL BLEED  No pertinent family history in first degree relatives  Handoff  MEWS Score  Hepatitis B  HLD (hyperlipidemia)  ICH (intracerebral hemorrhage)  ICH (intracerebral hemorrhage)  Hepatitis B  HLD (hyperlipidemia)  HIV (human immunodeficiency virus infection)  Cerebellar hemorrhage  EGD, with PEG  Craniectomy, subocciptal, with cervical laminectomy for medulla and spinal cord decompression  Percutaneous insertion of arterial line  Angiogram, carotid and cerebral vessels, bilateral  Foot fracture, left      PLAN:  NEURO:  - neuro checks  - vitals checks  - pain control    CARDIOVASCULAR:  - -140  - cont metoprolol    PULMONARY:  - on nasal cannula 3L  - continue routine suctioning   - chest PT  - saline nebs and mucomyst     RENAL:  - IVL  - cont Proscar, Cardura   - straight cath q6 for retention    GI:  - continue tube feeds    HEME:  - H/H stable    ID:  - leukocytosis improving      ENDO:  - ISS, Lantus, Lispro     DVT PROPHYLAXIS:  [x] Venodynes                                [x] Heparin/Lovenox       FALL RISK:  [] Low Risk                                    [x] Impulsive    DISPOSITION: ICU status, full code, dispo pending    d/w Dr. Blank    []  GCS  E   V  M     Heart Failure: []Acute, [] acute on chronic , []chronic  Heart Failure:  [] Diastolic (HFpEF), [] Systolic (HFrEF), []Combined (HFpEF and HFrEF), [] RHF, [] Pulm HTN, [] Other    [] SARAH, [] ATN, [] AIN, [] other  [] CKD1, [] CKD2, [] CKD 3, [] CKD 4, [] CKD 5, []ESRD    Encephalopathy: [] Metabolic, [] Hepatic, [] toxic, [] Neurological, [] Other    Abnormal Nurtitional Status: [] malnurtition (see nutrition note), [ ]underweight: BMI < 19, [] morbid obesity: BMI >40, [] Cachexia    [] Sepsis  [] hypovolemic shock,[] cardiogenic shock, [] hemorrhagic shock, [] neuogenic shock  [] Acute Respiratory Failure  []Cerebral edema, [] Brain compression/ herniation,   [] Functional quadriplegia  [] Acute blood loss anemia

## 2020-08-22 NOTE — PROGRESS NOTE ADULT - SUBJECTIVE AND OBJECTIVE BOX
=====================================================   NEUROCRITICAL CARE ATTENDING NOTE (Saturday, 2020 EVENING)  =====================================================    VEDA WARREN   MRN-[id number]  HPI: 59M with dyslipidemia, h/o Hep B infection, presented with acute onset severe HA, dizziness, vomiting - brought to [facility] where CT showed L cerebellar hemorrhage.  Intubated for airway protection.  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  R EVD inserted, transferred to [facility] for further management.  Given mannitol 25G in [facility].    COURSE IN THE HOSPITAL:   bleed   admitted to [facility], 23.4 given x1; OR for SOC evacuation of ICH   remained intubated overnight   tachycardia overnight, Tmax 38.4, ?PNA, ABx    Tmax 38.1 diuresed overnight 1.8L.     No significant events overnight.  CPAP this morning   Tmax 39.7 (enterobacter sputum, on zosyn)  08/10 Trial of CPAP   Neurologically stable, for extubation.   Extubated.  Neurologically stable.   Neurologically stable.   Febrile overnight.  Suctioned for secretions.  Continued neurological improvement.  08/15 Hypotensive overnight, resolved this morning.  Neurologically stable.  Continued diarrhea.   No significant events.  : febrile overnight.  failed repeat swallow eval this AM.   : bounced ack to ICU for thick secretions high sucitoning requirment  : suctioning reuqirement remains high. s/p PEg this morning.  Staring overnight at times, given 1g keppra.  : suctioning q1-2h. PEG yesterday, feeds started today.  24h events: very weak cough, still high suctioning requirements    Past Medical History: Hepatitis B HLD (hyperlipidemia)  Allergies:  No Known Allergies  Home meds:   ·	Omega-3 350 mg oral capsule: 1 cap(s) orally once a day  ·	tenofovir disoproxil fumarate 300 mg oral tablet: 1 tab(s) orally once a day    Current Meds:  MEDICATIONS  (STANDING):  acetylcysteine 20%  Inhalation 4 milliLiter(s) Inhalation every 6 hours  albuterol/ipratropium for Nebulization. 3 milliLiter(s) Nebulizer every 6 hours  doxazosin 4 milliGRAM(s) Oral at bedtime  enoxaparin Injectable 40 milliGRAM(s) SubCutaneous every 24 hours  finasteride 5 milliGRAM(s) Oral daily  insulin glargine Injectable (LANTUS) 7 Unit(s) SubCutaneous every morning  insulin regular  human corrective regimen sliding scale.   SubCutaneous every 6 hours  insulin regular  human recombinant 4 Unit(s) SubCutaneous every 6 hours  metoprolol tartrate 12.5 milliGRAM(s) Oral every 12 hours  psyllium Powder 1 Packet(s) Oral daily  sodium chloride 0.9%. 1000 milliLiter(s) (50 mL/Hr) IV Continuous <Continuous>  tenofovir disoproxil fumarate (VIREAD) 300 milliGRAM(s) Oral every 24 hours    MEDICATIONS  (PRN):  acetaminophen    Suspension .. 650 milliGRAM(s) Oral every 6 hours PRN Temp greater or equal to 38C (100.4F), Mild Pain (1 - 3)  hydrALAZINE Injectable 10 milliGRAM(s) IV Push every 2 hours PRN SBP > 140  Mometasone Furoate 0.1% Ointment 1 Application(s) 1 Application(s) Topical two times a day PRN dry/itchy skin  sodium chloride 0.9% for Nebulization 3 milliLiter(s) Nebulizer three times a day PRN for congestion/secretions    PHYSICAL EXAMINATION    ICU Vital Signs Last 24 Hrs  T(C): 36.6 (22 Aug 2020 12:00), Max: 37 (22 Aug 2020 00:30)  T(F): 97.8 (22 Aug 2020 12:00), Max: 98.6 (22 Aug 2020 00:30)  HR: 80 (22 Aug 2020 14:00) (69 - 90)  BP: 161/90 (22 Aug 2020 14:00) (132/70 - 172/86)  BP(mean): 118 (22 Aug 2020 14:00) (93 - 121)  RR: 31 (22 Aug 2020 14:00) (10 - 35)  SpO2: 99% (22 Aug 2020 14:00) (93% - 100%)    NEUROLOGIC EXAMINATION:    MSE: flat affect, no speech output when prompted, obeying first order commands  CN: pupils 3 mm reactive bilaterally, decreased EOM, weak cough, tongue midline  Motor: sitting on edge of bed, all extremities antigravity, LLE stronger than RLE in spontaneous movements  PULMONARY: coarse breath sounds, decreased bases  ABDOMEN: soft, nontender with normoactive bowel sounds, PEG in place    NEUROLOGIC EXAMINATION:    MSE: flat affect, folows most commands intermittantly, mouths his name, attempts to answer yes/no orientation questions but often incorrect.  CN: pupils 3 mm reactive bilaterally, severe dysarthria, Very weak airy cough cough  Motor: all extremities antigravity, LLE stronger than RLE in spontaneous movements  PULMONARY: coarse breath sounds b/l, wet  ABDOMEN: soft, nontender with normoactive bowel sounds    I/O's    20 @ 07:  -  20 @ 07:00  --------------------------------------------------------  IN: 3009.8 mL / OUT: 2600 mL / NET: 409.8 mL    20 @ 07:01  -  20 @ 14:54  --------------------------------------------------------  IN: 330 mL / OUT: 0 mL / NET: 330 mL    LABS:                        8.5    9.72  )-----------( 207      ( 22 Aug 2020 05:29 )             27.1     08-22    147<H>  |  107  |  15  ----------------------------<  127<H>  3.5   |  31  |  0.60    Ca    8.7      22 Aug 2020 05:29  Phos  2.7     08-22  Mg     2.5     08    CAPILLARY BLOOD GLUCOSE    POCT Blood Glucose.: 129 mg/dL (22 Aug 2020 07:04)  POCT Blood Glucose.: 123 mg/dL (22 Aug 2020 00:29)  POCT Blood Glucose.: 126 mg/dL (21 Aug 2020 18:30)    Culture - Sputum (collected 20 @ 08:53)  Source: .Sputum Sputum  Gram Stain (20 @ 20:07):    Moderate epithelial cells    Numerous white blood cells    Numerous Gram Negative Rods    Moderate Gram positive cocci in pairs    Moderate Gram Positive Rods    Few Yeast  Final Report (20 @ 20:07):    Sputum specimen rejected.  Microscopic examination indicates    oropharyngeal contamination.  Please repeat.    Neuroimagin/21 CT head:  Stable  1. Less edema and hemorrhage noted along prior right frontal ventricular catheter tract with near complete resolution of small amount of IVH seen previously. Ventricles are stable in size.  2. No change in the postoperative appearance of left suboccipital craniectomy for cerebellar hematoma evacuation. No new hemorrhage.   CT head:  Right frontal EVD catheter as above. The ventricles are stable in size from prior examination. No new or increasing intracranial hemorrhage or mass effect.  08/10 CT head:  Compared to 2020, there is improving intraventricular hemorrhage, resolution of intraventricular gas, and mild decrease in ventricular size. Postsurgical change and edema in the posterior fossa is approximately stable.   CT head:  post-surgical changes, stable HCP  Other imagin/20 LE doppler: NEG   LE doppler: NEG   CXR:  clear   CXR:  clear   CXR:  mild atelectasis L lung base  08/10 CXR:  Small left pleural effusion, unchanged.    Code:  Full  Goals:  Aggressive  Disposition:  ICU

## 2020-08-22 NOTE — PROGRESS NOTE ADULT - SUBJECTIVE AND OBJECTIVE BOX
=============================================   NEUROCRITICAL CARE ATTENDING NOTE (2020)  =============================================    VEDA WARREN   MRN-[id number]  HPI: 59M with dyslipidemia, h/o Hep B infection, presented with acute onset severe HA, dizziness, vomiting - brought to [facility] where CT showed L cerebellar hemorrhage.  Intubated for airway protection.  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  R EVD inserted, transferred to [facility] for further management.  Given mannitol 25G in [facility].    Past Medical History: Hepatitis B HLD (hyperlipidemia)  Allergies:  No Known Allergies  Home meds:   ·	Omega-3 350 mg oral capsule: 1 cap(s) orally once a day  ·	tenofovir disoproxil fumarate 300 mg oral tablet: 1 tab(s) orally once a day      COURSE IN THE HOSPITAL:   bleed   admitted to [facility], 23.4 given x1; OR for SOC evacuation of ICH   remained intubated overnight   tachycardia overnight, Tmax 38.4, ?PNA, ABx    Tmax 38.1 diuresed overnight 1.8L.     No significant events overnight.  CPAP this morning   Tmax 39.7 (enterobacter sputum, on zosyn)  08/10 Trial of CPAP   Neurologically stable, for extubation.   Extubated.  Neurologically stable.   Neurologically stable.   Febrile overnight.  Suctioned for secretions.  Continued neurological improvement.  08/15 Hypotensive overnight, resolved this morning.  Neurologically stable.  Continued diarrhea.   No significant events.  : febrile overnight.  failed repeat swallow eval this AM.   : bounced ack to ICU for thick secretions high sucitoning requirment  : sucitoning reuqirement remains high. s/p PEg this morning.  Staring overnight at times, given 1g keppra.  : suctioning q1-2h. PEG yesterday, feeds started today.    24h events: very weak cough, still high suctioning requirements    NEUROLOGIC EXAMINATION:    MSE: flat affect, folows most commands intermittantly, mouths his name, attempts to answer yes/no orientation questions but often incorrect.  CN: pupils 3 mm reactive bilaterally, severe dysarthria, Very weak airy cough cough  Motor: all extremities antigravity, LLE stronger than RLE in spontaneous movements  PULMONARY: coarse breath sounds b/l, wet  ABDOMEN: soft, nontender with normoactive bowel sounds      Allergies: No Known Allergies      EXAM:  VITALS:  T(C): 36.3 (20 @ 09:00), Max: 37 (20 @ 00:30)  HR: 72 (20 @ 09:00) (59 - 86)  BP: 142/83 (20 @ 09:00) (132/70 - 172/86)  RR: 15 (20 @ 09:00) (10 - 35)  SpO2: 97% (20 @ 09:00) (93% - 100%)    MEDICATIONS:  acetaminophen    Suspension .. 650 milliGRAM(s) Oral every 6 hours PRN  acetylcysteine 20%  Inhalation 4 milliLiter(s) Inhalation every 6 hours  albuterol/ipratropium for Nebulization. 3 milliLiter(s) Nebulizer every 6 hours  artificial tears (preservative free) Ophthalmic Solution 1 Drop(s) Both EYES three times a day  chlorhexidine 2% Cloths 1 Application(s) Topical <User Schedule>  dextrose 40% Gel 15 Gram(s) Oral once PRN  dextrose 5%. 1000 milliLiter(s) IV Continuous <Continuous>  dextrose 50% Injectable 12.5 Gram(s) IV Push once  dextrose 50% Injectable 25 Gram(s) IV Push once  doxazosin 4 milliGRAM(s) Oral at bedtime  enoxaparin Injectable 40 milliGRAM(s) SubCutaneous every 24 hours  finasteride 5 milliGRAM(s) Oral daily  glucagon  Injectable 1 milliGRAM(s) IntraMuscular once PRN  glucagon  Injectable 1 milliGRAM(s) IntraMuscular once PRN  hydrALAZINE Injectable 10 milliGRAM(s) IV Push every 2 hours PRN  insulin glargine Injectable (LANTUS) 7 Unit(s) SubCutaneous every morning  insulin regular  human corrective regimen sliding scale.   SubCutaneous every 6 hours  insulin regular  human recombinant 4 Unit(s) SubCutaneous every 6 hours  metoprolol tartrate 12.5 milliGRAM(s) Oral every 12 hours  Mometasone Furoate 0.1% Ointment 1 Application(s) 1 Application(s) Topical two times a day PRN  psyllium Powder 1 Packet(s) Oral daily  sodium chloride 0.9% for Nebulization 3 milliLiter(s) Nebulizer three times a day PRN  sodium chloride 0.9% lock flush 10 milliLiter(s) IV Push every 1 hour PRN  sodium chloride 0.9%. 1000 milliLiter(s) IV Continuous <Continuous>  tenofovir disoproxil fumarate (VIREAD) 300 milliGRAM(s) Oral every 24 hours      I/O's    20 @ 07:  -  20 @ 07:00  --------------------------------------------------------  IN: 3009.8 mL / OUT: 2600 mL / NET: 409.8 mL    20 @ 07:  -  20 @ 10:52  --------------------------------------------------------  IN: 330 mL / OUT: 0 mL / NET: 330 mL        Ventilator data:      LABS:                          8.5    9.72  )-----------( 207      ( 22 Aug 2020 05:29 )             27.1     08-22    147<H>  |  107  |  15  ----------------------------<  127<H>  3.5   |  31  |  0.60    Ca    8.7      22 Aug 2020 05:29  Phos  2.7     08-22  Mg     2.5     08-22              CAPILLARY BLOOD GLUCOSE      POCT Blood Glucose.: 129 mg/dL (22 Aug 2020 07:04)  POCT Blood Glucose.: 123 mg/dL (22 Aug 2020 00:29)  POCT Blood Glucose.: 126 mg/dL (21 Aug 2020 18:30)  POCT Blood Glucose.: 92 mg/dL (21 Aug 2020 12:16)        Neuroimagin/11 CT head:  Right frontal EVD catheter as above. The ventricles are stable in size from prior examination. No new or increasing intracranial hemorrhage or mass effect.  08/10 CT head:  Compared to 2020, there is improving intraventricular hemorrhage, resolution of intraventricular gas, and mild decrease in ventricular size. Postsurgical change and edema in the posterior fossa is approximately stable.   CT head:  post-surgical changes, stable HCP  Other imagin/04 LE doppler: NEG   CXR:  clear   CXR:  mild atelectasis L lung base  08/10 CXR:  Small left pleural effusion, unchanged.

## 2020-08-22 NOTE — PROGRESS NOTE ADULT - ASSESSMENT
Assessment and Plan:   · Assessment		  59y/M with  1.	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	Hepatitis B  3.	dyslipidemia     PLAN:   NEURO: continued neurological improvement  EEG with infrequent GPDs, no seizures --> continue off AEDs, can dc EEG  Goal normonatremia  REHAB:  physical therapy evaluation and management    EARLY MOB:  HOB up    PULM: mild aspiration, high suctioning requirements, q1h  continue aggressive pulmomnary toileting, chest PT  continue saline nebs and mucomyst  -re-consult ENT regarding value of inpatient vocal cord augmentation, can facilitate stronger cough and airway protection    CARDIO:  1) afib with RVR prior SBP goal 100-140mm Hg   -on metoprolol 12.5 mg BID    ENDO:  Blood sugar goals 140-180 mg/dL, continue insulin sliding, continue statins; insulin glargine 15 nutritional 4-4-4-4  -->lantus 8 units QHS  -hold premeal insulin when NPO    GI: diebetic tube feeds  -s/p PEG    RENAL:  MURIEL    HEM/ONC: no coagulopathy, no h/o antiplatelet / anticoagulant use  VTE Prophylaxis: SCDs, SQL     ID: febrile, slowly improving leukocytosis; ID recommendations:  s/p course of cefepime  -Cdiff negative, Abd CT negative   -enterobacter from sputum  -chronic HBV, on home antiviral    Dispo: continue in ICU for high suctioning frequency (<q1h), once improved will be ready for acute rehab

## 2020-08-23 LAB
ANION GAP SERPL CALC-SCNC: 10 MMOL/L — SIGNIFICANT CHANGE UP (ref 5–17)
APPEARANCE UR: CLEAR — SIGNIFICANT CHANGE UP
BILIRUB UR-MCNC: NEGATIVE — SIGNIFICANT CHANGE UP
BUN SERPL-MCNC: 11 MG/DL — SIGNIFICANT CHANGE UP (ref 7–23)
CALCIUM SERPL-MCNC: 8.6 MG/DL — SIGNIFICANT CHANGE UP (ref 8.4–10.5)
CHLORIDE SERPL-SCNC: 102 MMOL/L — SIGNIFICANT CHANGE UP (ref 96–108)
CO2 SERPL-SCNC: 31 MMOL/L — SIGNIFICANT CHANGE UP (ref 22–31)
COLOR SPEC: YELLOW — SIGNIFICANT CHANGE UP
CREAT SERPL-MCNC: 0.54 MG/DL — SIGNIFICANT CHANGE UP (ref 0.5–1.3)
DIFF PNL FLD: ABNORMAL
GLUCOSE BLDC GLUCOMTR-MCNC: 113 MG/DL — HIGH (ref 70–99)
GLUCOSE BLDC GLUCOMTR-MCNC: 116 MG/DL — HIGH (ref 70–99)
GLUCOSE BLDC GLUCOMTR-MCNC: 117 MG/DL — HIGH (ref 70–99)
GLUCOSE BLDC GLUCOMTR-MCNC: 125 MG/DL — HIGH (ref 70–99)
GLUCOSE BLDC GLUCOMTR-MCNC: 127 MG/DL — HIGH (ref 70–99)
GLUCOSE BLDC GLUCOMTR-MCNC: 154 MG/DL — HIGH (ref 70–99)
GLUCOSE SERPL-MCNC: 120 MG/DL — HIGH (ref 70–99)
GLUCOSE UR QL: NEGATIVE — SIGNIFICANT CHANGE UP
HCT VFR BLD CALC: 26 % — LOW (ref 39–50)
HGB BLD-MCNC: 8.3 G/DL — LOW (ref 13–17)
KETONES UR-MCNC: NEGATIVE — SIGNIFICANT CHANGE UP
LACTATE SERPL-SCNC: 1.2 MMOL/L — SIGNIFICANT CHANGE UP (ref 0.5–2)
LEUKOCYTE ESTERASE UR-ACNC: NEGATIVE — SIGNIFICANT CHANGE UP
MAGNESIUM SERPL-MCNC: 2.3 MG/DL — SIGNIFICANT CHANGE UP (ref 1.6–2.6)
MCHC RBC-ENTMCNC: 28.1 PG — SIGNIFICANT CHANGE UP (ref 27–34)
MCHC RBC-ENTMCNC: 31.9 GM/DL — LOW (ref 32–36)
MCV RBC AUTO: 88.1 FL — SIGNIFICANT CHANGE UP (ref 80–100)
NITRITE UR-MCNC: NEGATIVE — SIGNIFICANT CHANGE UP
NRBC # BLD: 0 /100 WBCS — SIGNIFICANT CHANGE UP (ref 0–0)
PH UR: 7 — SIGNIFICANT CHANGE UP (ref 5–8)
PHOSPHATE SERPL-MCNC: 3.3 MG/DL — SIGNIFICANT CHANGE UP (ref 2.5–4.5)
PLATELET # BLD AUTO: 232 K/UL — SIGNIFICANT CHANGE UP (ref 150–400)
POTASSIUM SERPL-MCNC: 3.7 MMOL/L — SIGNIFICANT CHANGE UP (ref 3.5–5.3)
POTASSIUM SERPL-SCNC: 3.7 MMOL/L — SIGNIFICANT CHANGE UP (ref 3.5–5.3)
PROT UR-MCNC: NEGATIVE MG/DL — SIGNIFICANT CHANGE UP
RBC # BLD: 2.95 M/UL — LOW (ref 4.2–5.8)
RBC # FLD: 15.4 % — HIGH (ref 10.3–14.5)
SODIUM SERPL-SCNC: 143 MMOL/L — SIGNIFICANT CHANGE UP (ref 135–145)
SP GR SPEC: 1.01 — SIGNIFICANT CHANGE UP (ref 1–1.03)
UROBILINOGEN FLD QL: 0.2 E.U./DL — SIGNIFICANT CHANGE UP
WBC # BLD: 13.11 K/UL — HIGH (ref 3.8–10.5)
WBC # FLD AUTO: 13.11 K/UL — HIGH (ref 3.8–10.5)

## 2020-08-23 PROCEDURE — 36415 COLL VENOUS BLD VENIPUNCTURE: CPT

## 2020-08-23 PROCEDURE — 99291 CRITICAL CARE FIRST HOUR: CPT | Mod: 24

## 2020-08-23 PROCEDURE — 71045 X-RAY EXAM CHEST 1 VIEW: CPT | Mod: 26

## 2020-08-23 PROCEDURE — 99233 SBSQ HOSP IP/OBS HIGH 50: CPT

## 2020-08-23 RX ORDER — POTASSIUM CHLORIDE 20 MEQ
30 PACKET (EA) ORAL ONCE
Refills: 0 | Status: COMPLETED | OUTPATIENT
Start: 2020-08-23 | End: 2020-08-23

## 2020-08-23 RX ORDER — CEFEPIME 1 G/1
2000 INJECTION, POWDER, FOR SOLUTION INTRAMUSCULAR; INTRAVENOUS EVERY 8 HOURS
Refills: 0 | Status: COMPLETED | OUTPATIENT
Start: 2020-08-23 | End: 2020-08-24

## 2020-08-23 RX ORDER — ACETAMINOPHEN 500 MG
450 TABLET ORAL ONCE
Refills: 0 | Status: COMPLETED | OUTPATIENT
Start: 2020-08-23 | End: 2020-08-23

## 2020-08-23 RX ORDER — CEFEPIME 1 G/1
2000 INJECTION, POWDER, FOR SOLUTION INTRAMUSCULAR; INTRAVENOUS EVERY 8 HOURS
Refills: 0 | Status: DISCONTINUED | OUTPATIENT
Start: 2020-08-24 | End: 2020-08-26

## 2020-08-23 RX ORDER — SODIUM CHLORIDE 9 MG/ML
500 INJECTION INTRAMUSCULAR; INTRAVENOUS; SUBCUTANEOUS ONCE
Refills: 0 | Status: COMPLETED | OUTPATIENT
Start: 2020-08-23 | End: 2020-08-23

## 2020-08-23 RX ORDER — HALOPERIDOL DECANOATE 100 MG/ML
1 INJECTION INTRAMUSCULAR ONCE
Refills: 0 | Status: COMPLETED | OUTPATIENT
Start: 2020-08-23 | End: 2020-08-23

## 2020-08-23 RX ORDER — CEFEPIME 1 G/1
INJECTION, POWDER, FOR SOLUTION INTRAMUSCULAR; INTRAVENOUS
Refills: 0 | Status: DISCONTINUED | OUTPATIENT
Start: 2020-08-23 | End: 2020-08-23

## 2020-08-23 RX ORDER — SODIUM CHLORIDE 9 MG/ML
1000 INJECTION INTRAMUSCULAR; INTRAVENOUS; SUBCUTANEOUS
Refills: 0 | Status: DISCONTINUED | OUTPATIENT
Start: 2020-08-23 | End: 2020-08-24

## 2020-08-23 RX ORDER — VANCOMYCIN HCL 1 G
1000 VIAL (EA) INTRAVENOUS EVERY 12 HOURS
Refills: 0 | Status: DISCONTINUED | OUTPATIENT
Start: 2020-08-23 | End: 2020-08-25

## 2020-08-23 RX ADMIN — Medication 30 MILLIEQUIVALENT(S): at 10:08

## 2020-08-23 RX ADMIN — Medication 4 MILLILITER(S): at 09:10

## 2020-08-23 RX ADMIN — Medication 4 MILLILITER(S): at 22:33

## 2020-08-23 RX ADMIN — HALOPERIDOL DECANOATE 1 MILLIGRAM(S): 100 INJECTION INTRAMUSCULAR at 10:17

## 2020-08-23 RX ADMIN — Medication 4 MILLIGRAM(S): at 22:52

## 2020-08-23 RX ADMIN — Medication 1 DROP(S): at 14:44

## 2020-08-23 RX ADMIN — SODIUM CHLORIDE 75 MILLILITER(S): 9 INJECTION INTRAMUSCULAR; INTRAVENOUS; SUBCUTANEOUS at 16:30

## 2020-08-23 RX ADMIN — Medication 450 MILLIGRAM(S): at 16:24

## 2020-08-23 RX ADMIN — Medication 1 PACKET(S): at 12:56

## 2020-08-23 RX ADMIN — Medication 12.5 MILLIGRAM(S): at 06:38

## 2020-08-23 RX ADMIN — ENOXAPARIN SODIUM 40 MILLIGRAM(S): 100 INJECTION SUBCUTANEOUS at 22:48

## 2020-08-23 RX ADMIN — Medication 3 MILLILITER(S): at 22:33

## 2020-08-23 RX ADMIN — Medication 3 MILLILITER(S): at 09:10

## 2020-08-23 RX ADMIN — TENOFOVIR DISOPROXIL FUMARATE 300 MILLIGRAM(S): 300 TABLET, FILM COATED ORAL at 22:52

## 2020-08-23 RX ADMIN — Medication 3 MILLILITER(S): at 05:36

## 2020-08-23 RX ADMIN — CEFEPIME 100 MILLIGRAM(S): 1 INJECTION, POWDER, FOR SOLUTION INTRAMUSCULAR; INTRAVENOUS at 17:00

## 2020-08-23 RX ADMIN — Medication 250 MILLIGRAM(S): at 22:52

## 2020-08-23 RX ADMIN — SODIUM CHLORIDE 1000 MILLILITER(S): 9 INJECTION INTRAMUSCULAR; INTRAVENOUS; SUBCUTANEOUS at 12:49

## 2020-08-23 RX ADMIN — Medication 1 DROP(S): at 22:48

## 2020-08-23 RX ADMIN — FINASTERIDE 5 MILLIGRAM(S): 5 TABLET, FILM COATED ORAL at 12:49

## 2020-08-23 RX ADMIN — SODIUM CHLORIDE 1000 MILLILITER(S): 9 INJECTION INTRAMUSCULAR; INTRAVENOUS; SUBCUTANEOUS at 14:30

## 2020-08-23 RX ADMIN — Medication 3 MILLILITER(S): at 16:01

## 2020-08-23 RX ADMIN — Medication 12.5 MILLIGRAM(S): at 18:48

## 2020-08-23 RX ADMIN — Medication 450 MILLIGRAM(S): at 16:31

## 2020-08-23 RX ADMIN — Medication 650 MILLIGRAM(S): at 13:36

## 2020-08-23 RX ADMIN — INSULIN GLARGINE 7 UNIT(S): 100 INJECTION, SOLUTION SUBCUTANEOUS at 06:41

## 2020-08-23 RX ADMIN — Medication 4 MILLILITER(S): at 05:36

## 2020-08-23 RX ADMIN — INSULIN HUMAN 2: 100 INJECTION, SOLUTION SUBCUTANEOUS at 18:44

## 2020-08-23 RX ADMIN — CEFEPIME 100 MILLIGRAM(S): 1 INJECTION, POWDER, FOR SOLUTION INTRAMUSCULAR; INTRAVENOUS at 22:47

## 2020-08-23 RX ADMIN — Medication 4 MILLILITER(S): at 16:01

## 2020-08-23 RX ADMIN — CHLORHEXIDINE GLUCONATE 1 APPLICATION(S): 213 SOLUTION TOPICAL at 06:40

## 2020-08-23 RX ADMIN — INSULIN HUMAN 4 UNIT(S): 100 INJECTION, SOLUTION SUBCUTANEOUS at 00:40

## 2020-08-23 RX ADMIN — Medication 1 DROP(S): at 06:37

## 2020-08-23 NOTE — PROGRESS NOTE ADULT - SUBJECTIVE AND OBJECTIVE BOX
=============================================   NEUROCRITICAL CARE ATTENDING NOTE (2020)  =============================================    VEDA WARREN   MRN-[id number]  HPI: 59M with dyslipidemia, h/o Hep B infection, presented with acute onset severe HA, dizziness, vomiting - brought to [facility] where CT showed L cerebellar hemorrhage.  Intubated for airway protection.  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  R EVD inserted, transferred to [facility] for further management.  Given mannitol 25G in [facility].    Past Medical History: Hepatitis B HLD (hyperlipidemia)  Allergies:  No Known Allergies  Home meds:   ·	Omega-3 350 mg oral capsule: 1 cap(s) orally once a day  ·	tenofovir disoproxil fumarate 300 mg oral tablet: 1 tab(s) orally once a day      COURSE IN THE HOSPITAL:   bleed   admitted to [facility], 23.4 given x1; OR for SOC evacuation of ICH   remained intubated overnight   tachycardia overnight, Tmax 38.4, ?PNA, ABx    Tmax 38.1 diuresed overnight 1.8L.     No significant events overnight.  CPAP this morning   Tmax 39.7 (enterobacter sputum, on zosyn)  08/10 Trial of CPAP   Neurologically stable, for extubation.   Extubated.  Neurologically stable.   Neurologically stable.   Febrile overnight.  Suctioned for secretions.  Continued neurological improvement.  08/15 Hypotensive overnight, resolved this morning.  Neurologically stable.  Continued diarrhea.   No significant events.  : febrile overnight.  failed repeat swallow eval this AM.   : bounced ack to ICU for thick secretions high sucitoning requirment  : sucitoning reuqirement remains high. s/p PEg this morning.  Staring overnight at times, given 1g keppra.  : suctioning q1-2h. PEG yesterday, feeds started today.  : very weak cough, still high suctioning requirements    24h events: stable overnight, this morning with fluctuating mental status, apparently staring ahead at times and others awake, talking and responsive.  At the time, tachycardic to 120-130, febrile to 102F, and BP SBP in 100's.      NEUROLOGIC EXAMINATION:    MSE: flat affect, follows most commands, mouths his name, attempts to answer yes/no orientation questions but often incorrect.  CN: pupils 3 mm reactive bilaterally, severe dysarthria, Very weak airy cough   Motor: all extremities antigravity, LLE stronger than RLE in spontaneous movements  PULMONARY: coarse breath sounds b/l, on NC  ABDOMEN: soft, nontender with normoactive bowel sounds    Allergies: No Known Allergies      EXAM:  VITALS:  T(C): 39.1 (20 @ 12:00), Max: 39.1 (20 @ 12:00)  HR: 100 (20 @ 15:00) (73 - 124)  BP: 117/65 (20 @ 15:00) (99/56 - 157/89)  RR: 25 (20 @ 15:00) (16 - 40)  SpO2: 100% (20 @ 15:00) (92% - 100%)    MEDICATIONS:  acetaminophen    Suspension .. 650 milliGRAM(s) Oral every 6 hours PRN  acetylcysteine 20%  Inhalation 4 milliLiter(s) Inhalation every 6 hours  albuterol/ipratropium for Nebulization. 3 milliLiter(s) Nebulizer every 6 hours  artificial tears (preservative free) Ophthalmic Solution 1 Drop(s) Both EYES three times a day  chlorhexidine 2% Cloths 1 Application(s) Topical <User Schedule>  dextrose 40% Gel 15 Gram(s) Oral once PRN  dextrose 5%. 1000 milliLiter(s) IV Continuous <Continuous>  dextrose 50% Injectable 12.5 Gram(s) IV Push once  dextrose 50% Injectable 25 Gram(s) IV Push once  doxazosin 4 milliGRAM(s) Oral at bedtime  enoxaparin Injectable 40 milliGRAM(s) SubCutaneous every 24 hours  finasteride 5 milliGRAM(s) Oral daily  glucagon  Injectable 1 milliGRAM(s) IntraMuscular once PRN  glucagon  Injectable 1 milliGRAM(s) IntraMuscular once PRN  hydrALAZINE Injectable 10 milliGRAM(s) IV Push every 2 hours PRN  insulin glargine Injectable (LANTUS) 7 Unit(s) SubCutaneous every morning  insulin regular  human corrective regimen sliding scale.   SubCutaneous every 6 hours  insulin regular  human recombinant 4 Unit(s) SubCutaneous every 6 hours  metoprolol tartrate 12.5 milliGRAM(s) Oral every 12 hours  Mometasone Furoate 0.1% Ointment 1 Application(s) 1 Application(s) Topical two times a day PRN  psyllium Powder 1 Packet(s) Oral daily  sodium chloride 0.9% Bolus 500 milliLiter(s) IV Bolus once  sodium chloride 0.9% for Nebulization 3 milliLiter(s) Nebulizer three times a day PRN  sodium chloride 0.9% lock flush 10 milliLiter(s) IV Push every 1 hour PRN  tenofovir disoproxil fumarate (VIREAD) 300 milliGRAM(s) Oral every 24 hours      I/O's    20 @ 07:  -  20 @ 07:00  --------------------------------------------------------  IN: 1910 mL / OUT: 2920 mL / NET: -1010 mL    20 @ 07:01  -  20 @ 15:57  --------------------------------------------------------  IN: 500 mL / OUT: 400 mL / NET: 100 mL        LABS:                          8.3    13.11 )-----------( 232      ( 23 Aug 2020 05:37 )             26.0         143  |  102  |  11  ----------------------------<  120<H>  3.7   |  31  |  0.54    Ca    8.6      23 Aug 2020 05:37  Phos  3.3       Mg     2.3                   CAPILLARY BLOOD GLUCOSE      POCT Blood Glucose.: 116 mg/dL (23 Aug 2020 12:41)  POCT Blood Glucose.: 125 mg/dL (23 Aug 2020 05:16)  POCT Blood Glucose.: 127 mg/dL (23 Aug 2020 00:34)  POCT Blood Glucose.: 138 mg/dL (22 Aug 2020 18:29)          Neuroimagin/11 CT head:  Right frontal EVD catheter as above. The ventricles are stable in size from prior examination. No new or increasing intracranial hemorrhage or mass effect.  08/10 CT head:  Compared to 2020, there is improving intraventricular hemorrhage, resolution of intraventricular gas, and mild decrease in ventricular size. Postsurgical change and edema in the posterior fossa is approximately stable.   CT head:  post-surgical changes, stable HCP  Other imagin/04 LE doppler: NEG   CXR:  clear   CXR:  mild atelectasis L lung base  08/10 CXR:  Small left pleural effusion, unchanged.

## 2020-08-23 NOTE — PROGRESS NOTE ADULT - SUBJECTIVE AND OBJECTIVE BOX
SUBJECTIVE: Patient seen and examined bedside. Spoken with nurse taking are of the patient, that reported good PEg function, no extravasation of food, residual of 20 ml, feeds goal achieved, well tolerated by the patient. No erythema reported.     cefepime   IVPB 2000 milliGRAM(s) IV Intermittent every 8 hours  doxazosin 4 milliGRAM(s) Oral at bedtime  enoxaparin Injectable 40 milliGRAM(s) SubCutaneous every 24 hours  hydrALAZINE Injectable 10 milliGRAM(s) IV Push every 2 hours PRN  metoprolol tartrate 12.5 milliGRAM(s) Oral every 12 hours  tenofovir disoproxil fumarate (VIREAD) 300 milliGRAM(s) Oral every 24 hours  vancomycin  IVPB 1000 milliGRAM(s) IV Intermittent every 12 hours      Vital Signs Last 24 Hrs  T(C): 37.5 (23 Aug 2020 18:00), Max: 39.1 (23 Aug 2020 12:00)  T(F): 99.5 (23 Aug 2020 18:00), Max: 102.4 (23 Aug 2020 12:00)  HR: 78 (23 Aug 2020 20:00) (74 - 124)  BP: 121/71 (23 Aug 2020 20:00) (99/56 - 157/81)  BP(mean): 91 (23 Aug 2020 20:00) (70 - 114)  RR: 21 (23 Aug 2020 20:00) (17 - 39)  SpO2: 100% (23 Aug 2020 20:00) (92% - 100%)  I&O's Detail    22 Aug 2020 07:01  -  23 Aug 2020 07:00  --------------------------------------------------------  IN:    Enteral Tube Flush: 220 mL    ns in tub fed  [id number]: 1140 mL    sodium chloride 0.9%: 550 mL  Total IN: 1910 mL    OUT:    Incontinent per Condom Catheter: 520 mL    Intermittent Catheterization - Urethral: 2400 mL  Total OUT: 2920 mL    Total NET: -1010 mL      23 Aug 2020 07:01  -  23 Aug 2020 20:44  --------------------------------------------------------  IN:    Sodium Chloride 0.9% IV Bolus: 1000 mL    sodium chloride 0.9%.: 300 mL    Solution: 50 mL    Solution: 250 mL  Total IN: 1600 mL    OUT:    Incontinent per Condom Catheter: 400 mL    Intermittent Catheterization - Urethral: 500 mL  Total OUT: 900 mL    Total NET: 700 mL          General: NAD, resting comfortably in bed  C/V: NSR  Pulm: Nonlabored breathing, no respiratory distress  Abd: soft, NT/ND, no rebound or guarding, PEG tube well placed, no erythema, edema or drainage.   Extrem: WWP, no edema,       LABS:                        8.3    13.11 )-----------( 232      ( 23 Aug 2020 05:37 )             26.0     08-23    143  |  102  |  11  ----------------------------<  120<H>  3.7   |  31  |  0.54    Ca    8.6      23 Aug 2020 05:37  Phos  3.3     08-23  Mg     2.3     08-        Urinalysis Basic - ( 23 Aug 2020 17:48 )    Color: Yellow / Appearance: Clear / S.015 / pH: x  Gluc: x / Ketone: NEGATIVE  / Bili: Negative / Urobili: 0.2 E.U./dL   Blood: x / Protein: NEGATIVE mg/dL / Nitrite: NEGATIVE   Leuk Esterase: NEGATIVE / RBC: > 10 /HPF / WBC < 5 /HPF   Sq Epi: x / Non Sq Epi: 0-5 /HPF / Bacteria: Present /HPF        RADIOLOGY & ADDITIONAL STUDIES:

## 2020-08-23 NOTE — PROGRESS NOTE ADULT - SUBJECTIVE AND OBJECTIVE BOX
====================================================   NEUROCRITICAL CARE ATTENDING NOTE (2020 EVENING)  ====================================================    VEDA WARREN   MRN-[id number]  HPI: 59M with dyslipidemia, h/o Hep B infection, presented with acute onset severe HA, dizziness, vomiting - brought to [facility] where CT showed L cerebellar hemorrhage.  Intubated for airway protection.  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  R EVD inserted, transferred to [facility] for further management.  Given mannitol 25G in [facility].    COURSE IN THE HOSPITAL:   bleed   admitted to [facility], 23.4 given x1; OR for SOC evacuation of ICH   remained intubated overnight   tachycardia overnight, Tmax 38.4, ?PNA, ABx    Tmax 38.1 diuresed overnight 1.8L.     No significant events overnight.  CPAP this morning   Tmax 39.7 (enterobacter sputum, on zosyn)  08/10 Trial of CPAP   Neurologically stable, for extubation.   Extubated.  Neurologically stable.   Neurologically stable.   Febrile overnight.  Suctioned for secretions.  Continued neurological improvement.  08/15 Hypotensive overnight, resolved this morning.  Neurologically stable.  Continued diarrhea.   No significant events.  : febrile overnight.  failed repeat swallow eval this AM.   : bounced ack to ICU for thick secretions high sucitoning requirment  : suctioning reuqirement remains high. s/p PEg this morning.  Staring overnight at times, given 1g keppra.  : suctioning q1-2h. PEG yesterday, feeds started today.  : very weak cough, still high suctioning requirements  24h events: stable overnight, this morning with fluctuating mental status, apparently staring ahead at times and others awake, talking and responsive.  At the time, tachycardic to 120-130, febrile to 102F, and BP SBP in 100's.      Past Medical History: Hepatitis B HLD (hyperlipidemia)  Allergies:  No Known Allergies  Home meds:   ·	Omega-3 350 mg oral capsule: 1 cap(s) orally once a day  ·	tenofovir disoproxil fumarate 300 mg oral tablet: 1 tab(s) orally once a day    Current Meds:  MEDICATIONS  (STANDING):  acetylcysteine 20%  Inhalation 4 milliLiter(s) Inhalation every 6 hours  albuterol/ipratropium for Nebulization. 3 milliLiter(s) Nebulizer every 6 hours  artificial tears (preservative free) Ophthalmic Solution 1 Drop(s) Both EYES three times a day  cefepime   IVPB 2000 milliGRAM(s) IV Intermittent every 8 hours  doxazosin 4 milliGRAM(s) Oral at bedtime  enoxaparin Injectable 40 milliGRAM(s) SubCutaneous every 24 hours  finasteride 5 milliGRAM(s) Oral daily  insulin glargine Injectable (LANTUS) 7 Unit(s) SubCutaneous every morning  insulin regular  human corrective regimen sliding scale.   SubCutaneous every 6 hours  insulin regular  human recombinant 4 Unit(s) SubCutaneous every 6 hours  metoprolol tartrate 12.5 milliGRAM(s) Oral every 12 hours  psyllium Powder 1 Packet(s) Oral daily  sodium chloride 0.9%. 1000 milliLiter(s) (75 mL/Hr) IV Continuous <Continuous>  tenofovir disoproxil fumarate (VIREAD) 300 milliGRAM(s) Oral every 24 hours  vancomycin  IVPB 1000 milliGRAM(s) IV Intermittent every 12 hours    MEDICATIONS  (PRN):  acetaminophen    Suspension .. 650 milliGRAM(s) Oral every 6 hours PRN Temp greater or equal to 38C (100.4F), Mild Pain (1 - 3)  hydrALAZINE Injectable 10 milliGRAM(s) IV Push every 2 hours PRN SBP > 140  Mometasone Furoate 0.1% Ointment 1 Application(s) 1 Application(s) Topical two times a day PRN dry/itchy skin  sodium chloride 0.9% for Nebulization 3 milliLiter(s) Nebulizer three times a day PRN for congestion/secretions  sodium chloride 0.9% lock flush 10 milliLiter(s) IV Push every 1 hour PRN Pre/post blood products, medications, blood draw, and to maintain line patency    PHYSICAL EXAMINATION    ICU Vital Signs Last 24 Hrs  T(C): 37.8 (23 Aug 2020 16:00), Max: 39.1 (23 Aug 2020 12:00)  T(F): 100 (23 Aug 2020 16:00), Max: 102.4 (23 Aug 2020 12:00)  HR: 87 (23 Aug 2020 18:00) (73 - 124)  BP: 132/75 (23 Aug 2020 18:00) (99/56 - 157/89)  BP(mean): 98 (23 Aug 2020 18:00) (70 - 118)  RR: 25 (23 Aug 2020 18:00) (17 - 40)  SpO2: 100% (23 Aug 2020 18:00) (92% - 100%)  ICU Vital Signs Last 24 Hrs  T(C): 36.6 (22 Aug 2020 12:00), Max: 37 (22 Aug 2020 00:30)  T(F): 97.8 (22 Aug 2020 12:00), Max: 98.6 (22 Aug 2020 00:30)  HR: 80 (22 Aug 2020 14:00) (69 - 90)  BP: 161/90 (22 Aug 2020 14:00) (132/70 - 172/86)  BP(mean): 118 (22 Aug 2020 14:00) (93 - 121)  RR: 31 (22 Aug 2020 14:00) (10 - 35)  SpO2: 99% (22 Aug 2020 14:00) (93% - 100%)    NEUROLOGIC EXAMINATION:    MSE: flat affect, no speech output when prompted, obeying first order commands  CN: pupils 3 mm reactive bilaterally, decreased EOM, weak cough, tongue midline  Motor: sitting on edge of bed, all extremities antigravity, LLE stronger than RLE in spontaneous movements  PULMONARY: coarse breath sounds, decreased bases  ABDOMEN: soft, nontender with normoactive bowel sounds, PEG in place    NEUROLOGIC EXAMINATION:    MSE: flat affect, folows most commands intermittantly, mouths his name, attempts to answer yes/no orientation questions but often incorrect.  CN: pupils 3 mm reactive bilaterally, severe dysarthria, Very weak cough  Motor: all extremities antigravity, LLE stronger than RLE in spontaneous movements  PULMONARY: coarse breath sounds b/l, wet  ABDOMEN: soft, nontender with normoactive bowel sounds    I/O's    20 @ 07:01  -  20 @ 07:00  --------------------------------------------------------  IN: 1910 mL / OUT: 2920 mL / NET: -1010 mL    20 @ 07:01  -  20 @ 18:43  --------------------------------------------------------  IN: 1300 mL / OUT: 900 mL / NET: 400 mL    LABS:                        8.3    13.11 )-----------( 232      ( 23 Aug 2020 05:37 )             26.0         143  |  102  |  11  ----------------------------<  120<H>  3.7   |  31  |  0.54    Ca    8.6      23 Aug 2020 05:37  Phos  3.3       Mg     2.3         Urinalysis Basic - ( 23 Aug 2020 17:48 )    Color: Yellow / Appearance: Clear / S.015 / pH: x  Gluc: x / Ketone: NEGATIVE  / Bili: Negative / Urobili: 0.2 E.U./dL   Blood: x / Protein: NEGATIVE mg/dL / Nitrite: NEGATIVE   Leuk Esterase: NEGATIVE / RBC: > 10 /HPF / WBC < 5 /HPF   Sq Epi: x / Non Sq Epi: 0-5 /HPF / Bacteria: Present /HPF    CAPILLARY BLOOD GLUCOSE    POCT Blood Glucose.: 154 mg/dL (23 Aug 2020 18:30)  POCT Blood Glucose.: 116 mg/dL (23 Aug 2020 12:41)  POCT Blood Glucose.: 125 mg/dL (23 Aug 2020 05:16)  POCT Blood Glucose.: 127 mg/dL (23 Aug 2020 00:34)    Culture - Sputum (collected 20 @ 08:53)  Source: .Sputum Sputum  Gram Stain (20 @ 20:07):    Moderate epithelial cells    Numerous white blood cells    Numerous Gram Negative Rods    Moderate Gram positive cocci in pairs    Moderate Gram Positive Rods    Few Yeast  Final Report (20 @ 20:07):    Sputum specimen rejected.  Microscopic examination indicates    oropharyngeal contamination.  Please repeat.    Neuroimagin/21 CT head:  Stable  1. Less edema and hemorrhage noted along prior right frontal ventricular catheter tract with near complete resolution of small amount of IVH seen previously. Ventricles are stable in size.  2. No change in the postoperative appearance of left suboccipital craniectomy for cerebellar hematoma evacuation. No new hemorrhage.   CT head:  Right frontal EVD catheter as above. The ventricles are stable in size from prior examination. No new or increasing intracranial hemorrhage or mass effect.  08/10 CT head:  Compared to 2020, there is improving intraventricular hemorrhage, resolution of intraventricular gas, and mild decrease in ventricular size. Postsurgical change and edema in the posterior fossa is approximately stable.   CT head:  post-surgical changes, stable HCP  Other imagin/20 LE doppler: NEG   LE doppler: NEG   CXR:  clear   CXR:  clear   CXR:  mild atelectasis L lung base  08/10 CXR:  Small left pleural effusion, unchanged.   CXR:  Discoid change right lung field. Right basilar infiltrate cannot be excluded    Code:  Full  Goals:  Aggressive  Disposition:  ICU

## 2020-08-23 NOTE — PROGRESS NOTE ADULT - SUBJECTIVE AND OBJECTIVE BOX
HPI:  59 year old male with PMH of HLD and unclear liver disease had acute onset of severe headache, dizziness and vomiting x1 today, was brought into [facility] with continued symptoms, as well as hypertension and multiple episodes of vomiting. Patient was intubated for airway protection with CT Head performed demonstrating a large left cerebellar hemorrhage. CTA Head/Neck performed is suggestive of underlying vascular pathology such as AVM. RIght frontal ventriculostomy placed [place] [facility] and [place] transferred to [facility] for further work-up. Patient arrives to [facility] intubated, sedated and on Nicardipine drip. Patient's son provided history and denies any Aspirin or other anticoagulant use. Denies any prior history of hypertension, smoking, or ETOH abuse. (04 Aug 2020 05:01)      Hospital Course:   POD# 0 S/P cerebral angio, negative for AVM. s/p SOC craniectomy evacuation of cerebellar hematoma.   POD # 1:   overnight tachycardic, ekg without significant changes, lactate wnl, low grade fever, s/p tyl. bolus x 1L , NS increased to 100cc/hr; neuro improving.  EVD at 5 cmH2O   8/6 POD#3, BD#4, Tmax 100.6, Pan Cx 7/5 NGTD, Vanc/Zosyn until 8/10, Vanc trough due today @ 6pm, EEG = no seizure, Lasix overnight for Pulm congest and tachycard, ruff placed for strict I&Os, pending anemia w/u, EVD @ 5, CPAP trials, Card GTT for SBP<140, Started Norvasc for BP optimization  8/7:  8/8:  S/P cerebral angio, negative for AVM. s/p SOC craniectomy evacuation of cerebellar hematoma. No significant events overnight. On CPAP this morning   8/10: fevers overnight, resolved this morning, Pancultured, (+) Enterbacter, on CPAP this AM  8/11 POD#7 SOC, BD # 8, EVD replaced (hard pass), Neuro exam stable, EVD @ 5cm H2O, Zosyn for sputum + enterobact, Plan to extubated today, HCT in AM  8/12 POD#8 s/p SOC, BD#9, s/p EVD replacement (hard pass on 8/10/20), neuro exam stable, EVD @5cm H2O. Zosyn for enterobacter in sputum. O/n EVD stopped draining, flushed distally and proximally, waveform poor, neuro exam remained stable throughout, ICPs <8 prior.    8/13 EVD soft pass yesterday, spiked temp today and re cultured o/w neuro exam stable  8/14: EMIL overnight, neuro stable. Mucomyst for secretions overnight   8/15 EVD dc/d, temp spike to 104, pan culture, ID consulted, vanco and cefepime started   8/15 levo started, presumed sepsis from aspiration, continues to have diarrhea  8/16: EMIL  8/17: EMIL. Neuro exam stable  8/18: Increased secretions overnight. Chest xray obtained. NGT replaced. Neuro exam stable. Patient transferred to ICU for monitoring due to congestion and possible L effusion on CXR along with desaturation and frequent suctioning.   8/19: POD16, on EEG, EMIL o/n, transferred to 5E, plan for PEG tomorrow  8/20 POD#17 overnight given Keppra 1g IV x 1 dose for blank stare and EEG findings: pending final read on EEG, Cefep for PNA, PEG today, NPO, Amonia Serum level today w/u for enceph, vEEG,   8/21 POD#18, EMIL overnight, PEG placed, start TF 9 AM today, Neuro exam stable  8/22 POD #19 EMIL overnight, repeat CT head stable.  8/23: POD#20 EMIL overnight       Vital Signs Last 24 Hrs  T(C): 37.6 (23 Aug 2020 03:59), Max: 37.6 (23 Aug 2020 03:59)  T(F): 99.7 (23 Aug 2020 03:59), Max: 99.7 (23 Aug 2020 03:59)  HR: 88 (23 Aug 2020 04:00) (71 - 95)  BP: 139/78 (23 Aug 2020 04:00) (122/72 - 162/78)  BP(mean): 102 (23 Aug 2020 04:00) (92 - 118)  RR: 20 (23 Aug 2020 03:00) (15 - 40)  SpO2: 98% (23 Aug 2020 04:00) (95% - 100%)    I&O's Detail    21 Aug 2020 07:01  -  22 Aug 2020 07:00  --------------------------------------------------------  IN:    Enteral Tube Flush: 70 mL    ns in tub fed  [id number]: 1090 mL    sodium chloride 0.9%: 1050 mL    Solution: 300 mL    Solution: 499.8 mL  Total IN: 3009.8 mL    OUT:    Intermittent Catheterization - Urethral: 2600 mL  Total OUT: 2600 mL    Total NET: 409.8 mL      22 Aug 2020 07:01  -  23 Aug 2020 04:44  --------------------------------------------------------  IN:    Enteral Tube Flush: 220 mL    ns in tub fed  [id number]: 780 mL    sodium chloride 0.9%: 550 mL  Total IN: 1550 mL    OUT:    Intermittent Catheterization - Urethral: 2400 mL  Total OUT: 2400 mL    Total NET: -850 mL        I&O's Summary    21 Aug 2020 07:01  -  22 Aug 2020 07:00  --------------------------------------------------------  IN: 3009.8 mL / OUT: 2600 mL / NET: 409.8 mL    22 Aug 2020 07:01  -  23 Aug 2020 04:44  --------------------------------------------------------  IN: 1550 mL / OUT: 2400 mL / NET: -850 mL        PHYSICAL EXAM:  General: patient laying in bed in NAD  Neuro: OE spontaneously, PERRL. Nonverbal. Follows commands with upper extremities b/l. MUNOZ x 4.  Neck: supple  Cardio: (+) S1 S2  RRR  Pulm: on 3L NC. Coughing with course breath sounds  Abd: Nondistended. soft, nontender. +BS. PEG in place  Ext: warm, nonedematous.      TUBES/LINES:  [] CVC  [] A-line  [] Lumbar Drain  [] Ventriculostomy  [] DIPESH  [] Ruff  [] NGT   [] Other    DIET:  [] NPO  [] Mechanical  [x] Tube feeds    LABS:                        8.5    9.72  )-----------( 207      ( 22 Aug 2020 05:29 )             27.1     08-22    147<H>  |  107  |  15  ----------------------------<  127<H>  3.5   |  31  |  0.60    Ca    8.7      22 Aug 2020 05:29  Phos  2.7     08-22  Mg     2.5     08-22              CAPILLARY BLOOD GLUCOSE      POCT Blood Glucose.: 127 mg/dL (23 Aug 2020 00:34)  POCT Blood Glucose.: 138 mg/dL (22 Aug 2020 18:29)  POCT Blood Glucose.: 129 mg/dL (22 Aug 2020 07:04)      Drug Levels: [] N/A  Vancomycin Level, Trough: 5.9 ug/mL (08-17 @ 13:25)    CSF Analysis: [] N/A      Allergies    No Known Allergies    Intolerances        Home Medications:  Omega-3 350 mg oral capsule: 1 cap(s) orally once a day (04 Aug 2020 06:07)  tenofovir disoproxil fumarate 300 mg oral tablet: 1 tab(s) orally once a day (04 Aug 2020 06:07)      MEDICATIONS:  Antibiotics:  tenofovir disoproxil fumarate (VIREAD) 300 milliGRAM(s) Oral every 24 hours    Neuro:  acetaminophen    Suspension .. 650 milliGRAM(s) Oral every 6 hours PRN    Anticoagulation:  enoxaparin Injectable 40 milliGRAM(s) SubCutaneous every 24 hours    OTHER:  acetylcysteine 20%  Inhalation 4 milliLiter(s) Inhalation every 6 hours  albuterol/ipratropium for Nebulization. 3 milliLiter(s) Nebulizer every 6 hours  artificial tears (preservative free) Ophthalmic Solution 1 Drop(s) Both EYES three times a day  chlorhexidine 2% Cloths 1 Application(s) Topical <User Schedule>  dextrose 40% Gel 15 Gram(s) Oral once PRN  dextrose 50% Injectable 12.5 Gram(s) IV Push once  dextrose 50% Injectable 25 Gram(s) IV Push once  doxazosin 4 milliGRAM(s) Oral at bedtime  finasteride 5 milliGRAM(s) Oral daily  glucagon  Injectable 1 milliGRAM(s) IntraMuscular once PRN  glucagon  Injectable 1 milliGRAM(s) IntraMuscular once PRN  hydrALAZINE Injectable 10 milliGRAM(s) IV Push every 2 hours PRN  insulin glargine Injectable (LANTUS) 7 Unit(s) SubCutaneous every morning  insulin regular  human corrective regimen sliding scale.   SubCutaneous every 6 hours  insulin regular  human recombinant 4 Unit(s) SubCutaneous every 6 hours  metoprolol tartrate 12.5 milliGRAM(s) Oral every 12 hours  Mometasone Furoate 0.1% Ointment 1 Application(s) 1 Application(s) Topical two times a day PRN  psyllium Powder 1 Packet(s) Oral daily  sodium chloride 0.9% for Nebulization 3 milliLiter(s) Nebulizer three times a day PRN    IVF:  dextrose 5%. 1000 milliLiter(s) IV Continuous <Continuous>    CULTURES:  Culture Results:   Sputum specimen rejected.  Microscopic examination indicates  oropharyngeal contamination.  Please repeat. (08-20 @ 08:53)  Culture Results:   GI PCR Results: NOT detected  *******Please Note:*******  GI panel PCR evaluates for:  Campylobacter, Plesiomonas shigelloides, Salmonella,  Vibrio, Yersinia enterocolitica, Enteroaggregative  Escherichia coli (EAEC), Enteropathogenic E.coli (EPEC),  Enterotoxigenic E. coli (ETEC) lt/st, Shiga-like  toxin-producing E. coli (STEC) stx1/stx2,  Shigella/ Enteroinvasive E. coli (EIEC), Cryptosporidium,  Cyclospora cayetanensis, Entamoeba histolytica,  Giardia lamblia, Adenovirus F 40/41, Astrovirus,  Norovirus GI/GII, Rotavirus A, Sapovirus (08-16 @ 04:12)    RADIOLOGY & ADDITIONAL TESTS:      ASSESSMENT:  59y Male  Male with ICH/IVH s/p SOC crani POD #20 (8/4/20), s/p EVD placement, removed (8/14/20).      INTRACRANIAL BLEED  No pertinent family history in first degree relatives  Handoff  MEWS Score  Hepatitis B  HLD (hyperlipidemia)  ICH (intracerebral hemorrhage)  ICH (intracerebral hemorrhage)  Hepatitis B  HLD (hyperlipidemia)  HIV (human immunodeficiency virus infection)  Cerebellar hemorrhage  EGD, with PEG  Craniectomy, subocciptal, with cervical laminectomy for medulla and spinal cord decompression  Percutaneous insertion of arterial line  Angiogram, carotid and cerebral vessels, bilateral  Foot fracture, left      PLAN:  NEURO:  - neuro checks  - vitals checks  - pain control    CARDIOVASCULAR:  - -140  - cont metoprolol    PULMONARY:  - on nasal cannula 3L  - continue routine suctioning   - chest PT  - saline nebs and mucomyst     RENAL:  - IVL  - cont Proscar, Cardura   - straight cath q6 for retention    GI:  - continue tube feeds    HEME:  - H/H stable    ID:  - leukocytosis improving      ENDO:  - ISS, Lantus, Lispro     DVT PROPHYLAXIS:  [x] Venodynes                                [x] Heparin/Lovenox       FALL RISK:  [] Low Risk                                    [x] Impulsive    DISPOSITION: ICU status, full code, dispo pending    d/w Dr. Blank and Dr. Whiting      []  GCS  E   V  M     Heart Failure: []Acute, [] acute on chronic , []chronic  Heart Failure:  [] Diastolic (HFpEF), [] Systolic (HFrEF), []Combined (HFpEF and HFrEF), [] RHF, [] Pulm HTN, [] Other    [] SARAH, [] ATN, [] AIN, [] other  [] CKD1, [] CKD2, [] CKD 3, [] CKD 4, [] CKD 5, []ESRD    Encephalopathy: [] Metabolic, [] Hepatic, [] toxic, [] Neurological, [] Other    Abnormal Nurtitional Status: [] malnurtition (see nutrition note), [ ]underweight: BMI < 19, [] morbid obesity: BMI >40, [] Cachexia    [] Sepsis  [] hypovolemic shock,[] cardiogenic shock, [] hemorrhagic shock, [] neuogenic shock  [] Acute Respiratory Failure  []Cerebral edema, [] Brain compression/ herniation,   [] Functional quadriplegia  [] Acute blood loss anemia

## 2020-08-23 NOTE — PROCEDURE NOTE - NSANESTHESIA_GEN_A_CORE
no anesthesia administered
with EPI/1% lidocaine/10cc
no anesthesia administered
no anesthesia administered
2% lidocaine
no anesthesia administered

## 2020-08-23 NOTE — CHART NOTE - NSCHARTNOTEFT_GEN_A_CORE
Infectious Diseases Anti-infective Approval Note    Medication:  Cefepime   Dose:  2 grams   Route:  IV  Frequency:  q8hrs  Duration:  3 days    Dose may be adjusted as needed for alterations in renal function.    *THIS IS NOT AN INFECTIOUS DISEASES CONSULTATION*

## 2020-08-23 NOTE — PROCEDURE NOTE - GENERAL PROCEDURE DETAILS
EVD clamped. Buretrol port cleaned with chlorhexidine swab. 5 cc of CSF drawn from buretrol in sterile manner and placed into sterile collection containers. EVD opened. Samples brought to laboratory.
LUE venous and RUE arterial access used to draw 2 sets of blood cultures. Samples brought to laboratory. Manual pressure held and clean dressings applied to puncture sites.
In sterile fashion 4cc CSF was drawn for cell count, protein, glucose, culture
Venipuncture performed to b/l lower extremities and 2 sets of blood cultures collected and sent to laboratory. Clean dressing applied to puncture sites.

## 2020-08-23 NOTE — PROGRESS NOTE ADULT - ASSESSMENT
59y/M with  L cerebellar hemorrhage, consider AVM, brain compression, cerebellar edema  Hepatitis B  dyslipidemia   PO PEG tube    Surgery will sign off from the case, for any concerns or changes, please contact surgery.   Continue tube feeds as tolerated

## 2020-08-23 NOTE — PROGRESS NOTE ADULT - ASSESSMENT
59y/M with  1.	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	Hepatitis B  3.	dyslipidemia     PLAN:   NEURO: continued neurological improvement  EEG with infrequent GPDs, no seizures --> continue off AEDs  -recurrent fluctuating exam - connect to CEEG, consider CTH tonight if not improved.  -If not improved and panculture otherwise negative then should obtain LP  Goal normonatremia  REHAB:  physical therapy evaluation and management    EARLY MOB:  HOB up    ID: febrile, worsening leukocytosis --> restart vanco+cefepime, consider LP if not clearly improved or remainder of paiculture negative.  -Cdiff negative, Abd CT negative   -enterobacter from sputum  -chronic HBV, on home antiviral    PULM: mild aspiration, high suctioning requirements, q1h  continue aggressive pulmomnary toileting, chest PT  continue saline nebs and mucomyst  -re-consult ENT regarding value of inpatient vocal cord augmentation, can facilitate stronger cough and airway protection --> will likely perform this week    CARDIO:  1) afib with RVR prior SBP goal 100-140mm Hg   -on metoprolol 12.5 mg BID    ENDO:  Blood sugar goals 140-180 mg/dL, continue insulin sliding, continue statins  -insulin nutritional 4-4-4-4  -lantus 8 units QHS    GI: diebetic tube feeds  -s/p PEG    RENAL:  MURIEL    HEM/ONC: no coagulopathy, no h/o antiplatelet / anticoagulant use  VTE Prophylaxis: SCDs, SQL     Dispo: continue in ICU for high suctioing frequency  -q1h

## 2020-08-23 NOTE — PROGRESS NOTE ADULT - ASSESSMENT
Assessment and Plan:   · Assessment		  59y/M with  1.	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	Hepatitis B  3.	dyslipidemia     PLAN:   NEURO: continued neurological improvement  EEG with infrequent GPDs, no seizures --> continue off AEDs  -recurrent fluctuating exam - connect to CEEG, consider CTH tonight if not improved.  -If not improved and panculture otherwise negative then should obtain LP  Goal normonatremia  REHAB:  physical therapy evaluation and management    EARLY MOB:  HOB up    ID: febrile, worsening leukocytosis --> restart vanco+cefepime, consider LP if not clearly improved or remainder of paiculture negative.  -Cdiff negative, Abd CT negative   -enterobacter from sputum  -chronic HBV, on home antiviral    PULM: mild aspiration, high suctioning requirements, q1h  continue aggressive pulmomnary toileting, chest PT  continue saline nebs and mucomyst  -re-consult ENT regarding value of inpatient vocal cord augmentation, can facilitate stronger cough and airway protection --> will likely perform this week    CARDIO:  1) afib with RVR prior SBP goal 100-140mm Hg   -on metoprolol 12.5 mg BID    ENDO:  Blood sugar goals 140-180 mg/dL, continue insulin sliding, continue statins  -insulin nutritional 4-4-4-4  -lantus 8 units QHS    GI: diebetic tube feeds  -s/p PEG    RENAL:  MURIEL    HEM/ONC: no coagulopathy, no h/o antiplatelet / anticoagulant use  VTE Prophylaxis: SCDs, SQL     Dispo: continue in ICU for high suctioing frequency  -q1h

## 2020-08-24 LAB
ANION GAP SERPL CALC-SCNC: 11 MMOL/L — SIGNIFICANT CHANGE UP (ref 5–17)
BASOPHILS # BLD AUTO: 0.02 K/UL — SIGNIFICANT CHANGE UP (ref 0–0.2)
BASOPHILS NFR BLD AUTO: 0.1 % — SIGNIFICANT CHANGE UP (ref 0–2)
BUN SERPL-MCNC: 11 MG/DL — SIGNIFICANT CHANGE UP (ref 7–23)
CALCIUM SERPL-MCNC: 8.3 MG/DL — LOW (ref 8.4–10.5)
CHLORIDE SERPL-SCNC: 102 MMOL/L — SIGNIFICANT CHANGE UP (ref 96–108)
CO2 SERPL-SCNC: 23 MMOL/L — SIGNIFICANT CHANGE UP (ref 22–31)
CREAT SERPL-MCNC: 0.52 MG/DL — SIGNIFICANT CHANGE UP (ref 0.5–1.3)
CULTURE RESULTS: NO GROWTH — SIGNIFICANT CHANGE UP
EOSINOPHIL # BLD AUTO: 0.01 K/UL — SIGNIFICANT CHANGE UP (ref 0–0.5)
EOSINOPHIL NFR BLD AUTO: 0.1 % — SIGNIFICANT CHANGE UP (ref 0–6)
GLUCOSE BLDC GLUCOMTR-MCNC: 113 MG/DL — HIGH (ref 70–99)
GLUCOSE BLDC GLUCOMTR-MCNC: 118 MG/DL — HIGH (ref 70–99)
GLUCOSE BLDC GLUCOMTR-MCNC: 160 MG/DL — HIGH (ref 70–99)
GLUCOSE BLDC GLUCOMTR-MCNC: 186 MG/DL — HIGH (ref 70–99)
GLUCOSE SERPL-MCNC: 108 MG/DL — HIGH (ref 70–99)
HCT VFR BLD CALC: 25.1 % — LOW (ref 39–50)
HGB BLD-MCNC: 8.1 G/DL — LOW (ref 13–17)
IMM GRANULOCYTES NFR BLD AUTO: 0.7 % — SIGNIFICANT CHANGE UP (ref 0–1.5)
LYMPHOCYTES # BLD AUTO: 0.58 K/UL — LOW (ref 1–3.3)
LYMPHOCYTES # BLD AUTO: 3.8 % — LOW (ref 13–44)
MAGNESIUM SERPL-MCNC: 2.1 MG/DL — SIGNIFICANT CHANGE UP (ref 1.6–2.6)
MCHC RBC-ENTMCNC: 28.5 PG — SIGNIFICANT CHANGE UP (ref 27–34)
MCHC RBC-ENTMCNC: 32.3 GM/DL — SIGNIFICANT CHANGE UP (ref 32–36)
MCV RBC AUTO: 88.4 FL — SIGNIFICANT CHANGE UP (ref 80–100)
MONOCYTES # BLD AUTO: 0.57 K/UL — SIGNIFICANT CHANGE UP (ref 0–0.9)
MONOCYTES NFR BLD AUTO: 3.7 % — SIGNIFICANT CHANGE UP (ref 2–14)
NEUTROPHILS # BLD AUTO: 14 K/UL — HIGH (ref 1.8–7.4)
NEUTROPHILS NFR BLD AUTO: 91.6 % — HIGH (ref 43–77)
NRBC # BLD: 0 /100 WBCS — SIGNIFICANT CHANGE UP (ref 0–0)
PHOSPHATE SERPL-MCNC: 1.7 MG/DL — LOW (ref 2.5–4.5)
PLATELET # BLD AUTO: 217 K/UL — SIGNIFICANT CHANGE UP (ref 150–400)
POTASSIUM SERPL-MCNC: 3.8 MMOL/L — SIGNIFICANT CHANGE UP (ref 3.5–5.3)
POTASSIUM SERPL-SCNC: 3.8 MMOL/L — SIGNIFICANT CHANGE UP (ref 3.5–5.3)
PROCALCITONIN SERPL-MCNC: 0.76 NG/ML — HIGH (ref 0.02–0.1)
RBC # BLD: 2.84 M/UL — LOW (ref 4.2–5.8)
RBC # FLD: 14.9 % — HIGH (ref 10.3–14.5)
SODIUM SERPL-SCNC: 136 MMOL/L — SIGNIFICANT CHANGE UP (ref 135–145)
SPECIMEN SOURCE: SIGNIFICANT CHANGE UP
WBC # BLD: 15.43 K/UL — HIGH (ref 3.8–10.5)
WBC # FLD AUTO: 15.43 K/UL — HIGH (ref 3.8–10.5)

## 2020-08-24 PROCEDURE — 71045 X-RAY EXAM CHEST 1 VIEW: CPT | Mod: 26

## 2020-08-24 PROCEDURE — 99292 CRITICAL CARE ADDL 30 MIN: CPT

## 2020-08-24 PROCEDURE — 99291 CRITICAL CARE FIRST HOUR: CPT

## 2020-08-24 RX ORDER — LIDOCAINE 4 G/100G
4 CREAM TOPICAL ONCE
Refills: 0 | Status: DISCONTINUED | OUTPATIENT
Start: 2020-08-24 | End: 2020-09-02

## 2020-08-24 RX ORDER — SODIUM CHLORIDE 9 MG/ML
1000 INJECTION INTRAMUSCULAR; INTRAVENOUS; SUBCUTANEOUS
Refills: 0 | Status: DISCONTINUED | OUTPATIENT
Start: 2020-08-24 | End: 2020-08-26

## 2020-08-24 RX ORDER — QUETIAPINE FUMARATE 200 MG/1
25 TABLET, FILM COATED ORAL AT BEDTIME
Refills: 0 | Status: DISCONTINUED | OUTPATIENT
Start: 2020-08-24 | End: 2020-08-26

## 2020-08-24 RX ORDER — POTASSIUM CHLORIDE 20 MEQ
20 PACKET (EA) ORAL ONCE
Refills: 0 | Status: COMPLETED | OUTPATIENT
Start: 2020-08-24 | End: 2020-08-24

## 2020-08-24 RX ORDER — SODIUM,POTASSIUM PHOSPHATES 278-250MG
1 POWDER IN PACKET (EA) ORAL
Refills: 0 | Status: DISCONTINUED | OUTPATIENT
Start: 2020-08-24 | End: 2020-08-24

## 2020-08-24 RX ADMIN — CEFEPIME 100 MILLIGRAM(S): 1 INJECTION, POWDER, FOR SOLUTION INTRAMUSCULAR; INTRAVENOUS at 15:04

## 2020-08-24 RX ADMIN — Medication 10 MILLIGRAM(S): at 11:23

## 2020-08-24 RX ADMIN — INSULIN HUMAN 4 UNIT(S): 100 INJECTION, SOLUTION SUBCUTANEOUS at 11:22

## 2020-08-24 RX ADMIN — Medication 4 MILLIGRAM(S): at 21:30

## 2020-08-24 RX ADMIN — Medication 3 MILLILITER(S): at 21:15

## 2020-08-24 RX ADMIN — INSULIN HUMAN 4 UNIT(S): 100 INJECTION, SOLUTION SUBCUTANEOUS at 23:12

## 2020-08-24 RX ADMIN — Medication 650 MILLIGRAM(S): at 12:18

## 2020-08-24 RX ADMIN — Medication 4 MILLILITER(S): at 06:21

## 2020-08-24 RX ADMIN — CHLORHEXIDINE GLUCONATE 1 APPLICATION(S): 213 SOLUTION TOPICAL at 06:42

## 2020-08-24 RX ADMIN — CEFEPIME 100 MILLIGRAM(S): 1 INJECTION, POWDER, FOR SOLUTION INTRAMUSCULAR; INTRAVENOUS at 23:38

## 2020-08-24 RX ADMIN — Medication 3 MILLILITER(S): at 06:21

## 2020-08-24 RX ADMIN — Medication 1 DROP(S): at 12:22

## 2020-08-24 RX ADMIN — Medication 85 MILLIMOLE(S): at 08:51

## 2020-08-24 RX ADMIN — Medication 3 MILLILITER(S): at 09:31

## 2020-08-24 RX ADMIN — Medication 650 MILLIGRAM(S): at 11:21

## 2020-08-24 RX ADMIN — Medication 12.5 MILLIGRAM(S): at 06:02

## 2020-08-24 RX ADMIN — Medication 250 MILLIGRAM(S): at 21:30

## 2020-08-24 RX ADMIN — INSULIN HUMAN 4 UNIT(S): 100 INJECTION, SOLUTION SUBCUTANEOUS at 18:12

## 2020-08-24 RX ADMIN — Medication 12.5 MILLIGRAM(S): at 17:47

## 2020-08-24 RX ADMIN — Medication 3 MILLILITER(S): at 16:35

## 2020-08-24 RX ADMIN — QUETIAPINE FUMARATE 25 MILLIGRAM(S): 200 TABLET, FILM COATED ORAL at 21:30

## 2020-08-24 RX ADMIN — INSULIN HUMAN 2: 100 INJECTION, SOLUTION SUBCUTANEOUS at 11:22

## 2020-08-24 RX ADMIN — Medication 4 MILLILITER(S): at 09:30

## 2020-08-24 RX ADMIN — Medication 250 MILLIGRAM(S): at 09:28

## 2020-08-24 RX ADMIN — Medication 1 PACKET(S): at 11:22

## 2020-08-24 RX ADMIN — TENOFOVIR DISOPROXIL FUMARATE 300 MILLIGRAM(S): 300 TABLET, FILM COATED ORAL at 21:42

## 2020-08-24 RX ADMIN — Medication 10 MILLIGRAM(S): at 03:06

## 2020-08-24 RX ADMIN — Medication 4 MILLILITER(S): at 16:34

## 2020-08-24 RX ADMIN — Medication 1 DROP(S): at 06:02

## 2020-08-24 RX ADMIN — Medication 1 DROP(S): at 21:19

## 2020-08-24 RX ADMIN — INSULIN GLARGINE 7 UNIT(S): 100 INJECTION, SOLUTION SUBCUTANEOUS at 08:52

## 2020-08-24 RX ADMIN — INSULIN HUMAN 2: 100 INJECTION, SOLUTION SUBCUTANEOUS at 23:12

## 2020-08-24 RX ADMIN — Medication 4 MILLILITER(S): at 21:14

## 2020-08-24 RX ADMIN — Medication 20 MILLIEQUIVALENT(S): at 08:44

## 2020-08-24 RX ADMIN — CEFEPIME 100 MILLIGRAM(S): 1 INJECTION, POWDER, FOR SOLUTION INTRAMUSCULAR; INTRAVENOUS at 06:07

## 2020-08-24 RX ADMIN — FINASTERIDE 5 MILLIGRAM(S): 5 TABLET, FILM COATED ORAL at 11:21

## 2020-08-24 NOTE — PROGRESS NOTE ADULT - ASSESSMENT
59y/M with  1.	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	Hepatitis B  3.	dyslipidemia     PLAN:   NEURO: neurochecks q1h,   EVD at 5 cm H20  open to drain   decadron - taper quickly if ok with NS  neurostimulant: inc amantadine 200mg PO BID   seizure prophylaxis: levetiracetam  as per NS  REHAB:  physical therapy evaluation and management    EARLY MOB:  HOB up    PULM:  CPAP 8/5 40%, furosemide 20mg IV x1; 1L net negative   CARDIO:  SBP goal 100-140mm Hg start metoprolol 25mg PO BID, continue amlodipine, taper off cardene  ENDO:  Blood sugar goals 140-180 mg/dL, continue insulin sliding, continue statins; insulin glargine 14, nutritional 3-3-3-3  GI:  PPI for GI prophylaxis; d/c bowel regimen   DIET: jevity   RENAL:  IVL, start free water 250 q6h, cont cardura, TOV tomorrow  HEM/ONC: no coagulopathy, no h/o antiplatelet / anticoagulant use  VTE Prophylaxis: SCDs, SQL   ID: afebrile, leukocytosis, piperacillin/tazobactam vanc, vanc trough prior to 4th dose (last day 08/10)  Social: updated Tammie yesterday; son Perry updated ([phone]).  2 daughters Shantell  and Tammie ; wife name is Julián Sahu    CORE MEASURES:  1.  NIHSS =   2.  ICH Score = 4  3.  VTE prophylaxis: [ ] administered within 24h of admission OR [x] reason not done - fresh bleed  4.  Dysphagia screening: [X] performed before any oral meds / liquids / food    ATTENDING ATTESTATION:  I was physically present for the key portions of the evaluation and management (E/M) service provided.  I agree with the above history, physical and plan, which I have reviewed and edited where appropriate.    Patient at high risk for neurological deterioration or death due to:  ICU delirium, aspiration PNA, DVT / PE.  Critical care time:  I have personally provided 30 minutes of critical care time, excluding time spent on separate procedures.      Plan discussed with RN, house staff. 59y/M with  1.	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	Hepatitis B  3.	dyslipidemia     PLAN:   NEURO: neurochecks q4h  continue quetiapine  REHAB:  physical therapy evaluation and management    EARLY MOB:  HOB up    PULM:  PRN O2 support to keep sats >/=92%, continue nebs, acetylcysteine, pulmonary toilette; for vocal cord injection tomorrow per ENT  CARDIO:  SBP goal 100-140mm Hg start metoprolol 25mg PO BID, continue amlodipine, taper off cardene  ENDO:  Blood sugar goals 140-180 mg/dL, continue insulin sliding, continue statins; insulin glargine 14, nutritional 3-3-3-3  GI:  bowel regimen  DIET: jevity., NPO after midnight  RENAL:  IVF once NPO  HEM/ONC: no coagulopathy, no h/o antiplatelet / anticoagulant use  VTE Prophylaxis: SCDs, no DVT chemoprophylaxis for now as patient is high risk for bleed (OR tomorrow)  ID: afebrile, rising WBC, continue cefepime, vancomycin, tenofovir  Social: will update family, son Perry updated ([phone]).  2 daughters Shantell  and Tammie ; wife name is Julián Sahu    CORE MEASURES:  1.  NIHSS =   2.  ICH Score = 4  3.  VTE prophylaxis: [ ] administered within 24h of admission OR [x] reason not done - fresh bleed  4.  Dysphagia screening: [X] performed before any oral meds / liquids / food    ATTENDING ATTESTATION:  I was physically present for the key portions of the evaluation and management (E/M) service provided.  I agree with the above history, physical and plan, which I have reviewed and edited where appropriate.    Patient at high risk for neurological deterioration or death due to:  ICU delirium, aspiration PNA, DVT / PE.  Critical care time:  I have personally provided 30 minutes of critical care time, excluding time spent on separate procedures.      Plan discussed with RN, house staff.

## 2020-08-24 NOTE — PROGRESS NOTE ADULT - ASSESSMENT
Assessment and Plan:   		    59y/M with  1.	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	Hepatitis B  3.	dyslipidemia     PLAN:   NEURO: continued neurological improvement  EEG with infrequent GPDs, no seizures --> continue off AEDs  -recurrent fluctuating exam - connect to CEEG, consider CTH tonight if not improved.  -If not improved and panculture otherwise negative then should obtain LP  Goal normonatremia  REHAB:  physical therapy evaluation and management    EARLY MOB:  HOB up    ID: febrile, worsening leukocytosis --> restart vanco+cefepime, consider LP if not clearly improved or remainder of paiculture negative.  -Cdiff negative, Abd CT negative   -enterobacter from sputum  -chronic HBV, on home antiviral    PULM: mild aspiration, high suctioning requirements, q1h  continue aggressive pulmomnary toileting, chest PT  continue saline nebs and mucomyst  -re-consult ENT regarding value of inpatient vocal cord augmentation, can facilitate stronger cough and airway protection --> will likely perform this week    CARDIO:  1) afib with RVR prior SBP goal 100-140mm Hg   -on metoprolol 12.5 mg BID    ENDO:  Blood sugar goals 140-180 mg/dL, continue insulin sliding, continue statins  -insulin nutritional 4-4-4-4  -lantus 8 units QHS    GI: diebetic tube feeds  -s/p PEG    RENAL:  MURIEL    HEM/ONC: no coagulopathy, no h/o antiplatelet / anticoagulant use  VTE Prophylaxis: SCDs, SQL     Dispo: continue in ICU for high suctioing frequency  -q1h Assessment and Plan:   		    59y/M with  1.	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	Hepatitis B  3.	dyslipidemia     PLAN:   NEURO: continued neurological improvement  EEG with infrequent GPDs, no seizures --> continue off AEDs  -recurrent fluctuating exam - connect to CEEG, consider CTH tonight if not improved.  -If not improved and panculture otherwise negative then should obtain LP  Goal normonatremia  ENT to augment vocal cords to min aspiration, to OR tomorrow  REHAB:  physical therapy evaluation and management    EARLY MOB:  HOB up    ID: febrile, worsening leukocytosis --> restart vanco+cefepime, consider LP if not clearly improved or remainder of paiculture negative.  -Cdiff negative, Abd CT negative   -enterobacter from sputum  -chronic HBV, on home antiviral    PULM: mild aspiration, high suctioning requirements, q1h  continue aggressive pulmomnary toileting, chest PT  continue saline nebs and mucomyst  -re-consult ENT regarding value of inpatient vocal cord augmentation, can facilitate stronger cough and airway protection --> will likely perform this week    CARDIO:  1) afib with RVR prior SBP goal 100-140mm Hg   -on metoprolol 12.5 mg BID    ENDO:  Blood sugar goals 140-180 mg/dL, continue insulin sliding, continue statins  -insulin nutritional 4-4-4-4  -lantus 8 units QHS      GI: diebetic tube feeds  -s/p PEG  restart TF, NPO p MN, watch glu    RENAL:  MURIEL    HEM/ONC: no coagulopathy, no h/o antiplatelet / anticoagulant use  VTE Prophylaxis: SCDs, SQL     Dispo: continue in ICU for high suctioing frequency  -q1h

## 2020-08-24 NOTE — PROGRESS NOTE ADULT - SUBJECTIVE AND OBJECTIVE BOX
STATUS POST:      INTERVAL HPI/OVERNIGHT EVENTS:      SUBJECTIVE:     cefepime   IVPB 2000 milliGRAM(s) IV Intermittent every 8 hours  doxazosin 4 milliGRAM(s) Oral at bedtime  enoxaparin Injectable 40 milliGRAM(s) SubCutaneous every 24 hours  hydrALAZINE Injectable 10 milliGRAM(s) IV Push every 2 hours PRN  metoprolol tartrate 12.5 milliGRAM(s) Oral every 12 hours  tenofovir disoproxil fumarate (VIREAD) 300 milliGRAM(s) Oral every 24 hours  vancomycin  IVPB 1000 milliGRAM(s) IV Intermittent every 12 hours      Vital Signs Last 24 Hrs  T(C): 38.1 (24 Aug 2020 11:00), Max: 39.1 (23 Aug 2020 12:00)  T(F): 100.6 (24 Aug 2020 11:00), Max: 102.4 (23 Aug 2020 12:00)  HR: 97 (24 Aug 2020 11:00) (76 - 124)  BP: 153/69 (24 Aug 2020 11:00) (106/56 - 185/90)  BP(mean): 99 (24 Aug 2020 11:00) (73 - 125)  RR: 20 (24 Aug 2020 11:00) (20 - 39)  SpO2: 100% (24 Aug 2020 11:00) (96% - 100%)  I&O's Detail    23 Aug 2020 07:01  -  24 Aug 2020 07:00  --------------------------------------------------------  IN:    ns in tub fed  [id number]: 300 mL    sodium chloride 0.9%: 1125 mL    Sodium Chloride 0.9% IV Bolus: 1000 mL    Solution: 400 mL    Solution: 250 mL  Total IN: 3075 mL    OUT:    Incontinent per Condom Catheter: 400 mL    Intermittent Catheterization - Urethral: 1850 mL  Total OUT: 2250 mL    Total NET: 825 mL      24 Aug 2020 07:01  -  24 Aug 2020 11:56  --------------------------------------------------------  IN:    ns in tub fed  [id number]: 60 mL    sodium chloride 0.9%: 75 mL    Solution: 250 mL    Solution: 332 mL  Total IN: 717 mL    OUT:  Total OUT: 0 mL    Total NET: 717 mL          Physical Exam    LABS:                        8.1    15.43 )-----------( 217      ( 24 Aug 2020 05:18 )             25.1     08-24    136  |  102  |  11  ----------------------------<  108<H>  3.8   |  23  |  0.52    Ca    8.3<L>      24 Aug 2020 05:18  Phos  1.7     -  Mg     2.1     24        Urinalysis Basic - ( 23 Aug 2020 17:48 )    Color: Yellow / Appearance: Clear / S.015 / pH: x  Gluc: x / Ketone: NEGATIVE  / Bili: Negative / Urobili: 0.2 E.U./dL   Blood: x / Protein: NEGATIVE mg/dL / Nitrite: NEGATIVE   Leuk Esterase: NEGATIVE / RBC: > 10 /HPF / WBC < 5 /HPF   Sq Epi: x / Non Sq Epi: 0-5 /HPF / Bacteria: Present /HPF        RADIOLOGY & ADDITIONAL STUDIES:

## 2020-08-24 NOTE — PROGRESS NOTE ADULT - SUBJECTIVE AND OBJECTIVE BOX
====================================================   NEUROCRITICAL CARE ATTENDING NOTE   ====================================================    VEDA WARREN   MRN-[id number]  HPI: 59M with dyslipidemia, h/o Hep B infection, presented with acute onset severe HA, dizziness, vomiting - brought to [facility] where CT showed L cerebellar hemorrhage.  Intubated for airway protection.  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  R EVD inserted, transferred to [facility] for further management.  Given mannitol 25G in [facility].    COURSE IN THE HOSPITAL:   bleed   admitted to [facility], 23.4 given x1; OR for SOC evacuation of ICH   remained intubated overnight   tachycardia overnight, Tmax 38.4, ?PNA, ABx    Tmax 38.1 diuresed overnight 1.8L.     No significant events overnight.  CPAP this morning   Tmax 39.7 (enterobacter sputum, on zosyn)  08/10 Trial of CPAP   Neurologically stable, for extubation.   Extubated.  Neurologically stable.   Neurologically stable.   Febrile overnight.  Suctioned for secretions.  Continued neurological improvement.  08/15 Hypotensive overnight, resolved this morning.  Neurologically stable.  Continued diarrhea.   No significant events.  : febrile overnight.  failed repeat swallow eval this AM.   : bounced ack to ICU for thick secretions high sucitoning requirment  : suctioning reuqirement remains high. s/p PEg this morning.  Staring overnight at times, given 1g keppra.  : suctioning q1-2h. PEG yesterday, feeds started today.  : very weak cough, still high suctioning requirements  : stable overnight, this morning with fluctuating mental status, apparently staring ahead at times and others awake, talking and responsive.  At the time, tachycardic to 120-130, febrile to 102F, and BP SBP in 100's.      Overnight Events:       PHYSICAL EXAMINATION  T(C): 37.1 ( @ 05:00), Max: 39.1 ( @ 12:00)  HR: 112 ( @ 07:00) (76 - 124)  BP: 123/57 ( @ 07:00) (99/56 - 185/90)  RR: 55 ( @ 07:00) (20 - 55)  SpO2: 100% ( @ :00) (92% - 100%)  CVP(mm Hg): --    LABS:  CAPILLARY BLOOD GLUCOSE      POCT Blood Glucose.: 113 mg/dL (24 Aug 2020 06:48)  POCT Blood Glucose.: 113 mg/dL (23 Aug 2020 22:42)  POCT Blood Glucose.: 154 mg/dL (23 Aug 2020 18:30)  POCT Blood Glucose.: 116 mg/dL (23 Aug 2020 12:41)                          8.1    15.43 )-----------( 217      ( 24 Aug 2020 05:18 )             25.1         136  |  102  |  11  ----------------------------<  108<H>  3.8   |  23  |  0.52    Ca    8.3<L>      24 Aug 2020 05:18  Phos  1.7       Mg     2.1      @ 07:01  -   @ 07:00  --------------------------------------------------------  IN: 2955 mL / OUT: 2250 mL / NET: 705 mL     @ 07:01  -   @ 08:04  --------------------------------------------------------  IN: 75 mL / OUT: 0 mL / NET: 75 mL        MEDICATIONS:  MEDICATIONS  (STANDING):  acetylcysteine 20%  Inhalation 4 milliLiter(s) Inhalation every 6 hours  albuterol/ipratropium for Nebulization. 3 milliLiter(s) Nebulizer every 6 hours  artificial tears (preservative free) Ophthalmic Solution 1 Drop(s) Both EYES three times a day  cefepime   IVPB 2000 milliGRAM(s) IV Intermittent every 8 hours  chlorhexidine 2% Cloths 1 Application(s) Topical <User Schedule>  dextrose 5%. 1000 milliLiter(s) (50 mL/Hr) IV Continuous <Continuous>  dextrose 50% Injectable 12.5 Gram(s) IV Push once  dextrose 50% Injectable 25 Gram(s) IV Push once  doxazosin 4 milliGRAM(s) Oral at bedtime  enoxaparin Injectable 40 milliGRAM(s) SubCutaneous every 24 hours  finasteride 5 milliGRAM(s) Oral daily  insulin glargine Injectable (LANTUS) 7 Unit(s) SubCutaneous every morning  insulin regular  human corrective regimen sliding scale.   SubCutaneous every 6 hours  insulin regular  human recombinant 4 Unit(s) SubCutaneous every 6 hours  metoprolol tartrate 12.5 milliGRAM(s) Oral every 12 hours  potassium chloride   Powder 20 milliEquivalent(s) Oral once  psyllium Powder 1 Packet(s) Oral daily  sodium chloride 0.9%. 1000 milliLiter(s) (75 mL/Hr) IV Continuous <Continuous>  sodium phosphate IVPB 30 milliMole(s) IV Intermittent once  tenofovir disoproxil fumarate (VIREAD) 300 milliGRAM(s) Oral every 24 hours  vancomycin  IVPB 1000 milliGRAM(s) IV Intermittent every 12 hours    MEDICATIONS  (PRN):  acetaminophen    Suspension .. 650 milliGRAM(s) Oral every 6 hours PRN Temp greater or equal to 38C (100.4F), Mild Pain (1 - 3)  dextrose 40% Gel 15 Gram(s) Oral once PRN Blood Glucose LESS THAN 70 milliGRAM(s)/deciliter  glucagon  Injectable 1 milliGRAM(s) IntraMuscular once PRN Glucose LESS THAN 70 milligrams/deciliter  glucagon  Injectable 1 milliGRAM(s) IntraMuscular once PRN Glucose LESS THAN 70 milligrams/deciliter  hydrALAZINE Injectable 10 milliGRAM(s) IV Push every 2 hours PRN SBP > 140  Mometasone Furoate 0.1% Ointment 1 Application(s) 1 Application(s) Topical two times a day PRN dry/itchy skin  sodium chloride 0.9% for Nebulization 3 milliLiter(s) Nebulizer three times a day PRN for congestion/secretions  sodium chloride 0.9% lock flush 10 milliLiter(s) IV Push every 1 hour PRN Pre/post blood products, medications, blood draw, and to maintain line patency              Past Medical History: Hepatitis B HLD (hyperlipidemia)  Allergies:  No Known Allergies  Home meds:   ·	Omega-3 350 mg oral capsule: 1 cap(s) orally once a day  ·	tenofovir disoproxil fumarate 300 mg oral tablet: 1 tab(s) orally once a day      PHYSICAL EXAMINATION    NEUROLOGIC EXAMINATION:    MSE: flat affect, no speech output when prompted, obeying first order commands  CN: pupils 3 mm reactive bilaterally, decreased EOM, weak cough, tongue midline  Motor: sitting on edge of bed, all extremities antigravity, LLE stronger than RLE in spontaneous movements  PULMONARY: coarse breath sounds, decreased bases  ABDOMEN: soft, nontender with normoactive bowel sounds, PEG in place      Neuroimagin/21 CT head:  Stable  1. Less edema and hemorrhage noted along prior right frontal ventricular catheter tract with near complete resolution of small amount of IVH seen previously. Ventricles are stable in size.  2. No change in the postoperative appearance of left suboccipital craniectomy for cerebellar hematoma evacuation. No new hemorrhage.   CT head:  Right frontal EVD catheter as above. The ventricles are stable in size from prior examination. No new or increasing intracranial hemorrhage or mass effect.  08/10 CT head:  Compared to 2020, there is improving intraventricular hemorrhage, resolution of intraventricular gas, and mild decrease in ventricular size. Postsurgical change and edema in the posterior fossa is approximately stable.   CT head:  post-surgical changes, stable HCP  Other imagin/20 LE doppler: NEG   LE doppler: NEG   CXR:  clear   CXR:  clear   CXR:  mild atelectasis L lung base  08/10 CXR:  Small left pleural effusion, unchanged.   CXR:  Discoid change right lung field. Right basilar infiltrate cannot be excluded ====================================================   NEUROCRITICAL CARE ATTENDING NOTE   ====================================================    VEDA WARREN   MRN-[id number]  HPI: 59M with dyslipidemia, h/o Hep B infection, presented with acute onset severe HA, dizziness, vomiting - [facility] to [facility] where CT showed L cerebellar hemorrhage.  Intubated for airway protection.  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  R EVD inserted, transferred to [facility] for further management.  Given mannitol 25G in [facility].    COURSE IN THE HOSPITAL:   bleed   admitted to [facility], 23.4 given x1; OR for SOC evacuation of ICH   remained intubated overnight   tachycardia overnight, Tmax 38.4, ?PNA, ABx    Tmax 38.1 diuresed overnight 1.8L.     No significant events overnight.  CPAP this morning   Tmax 39.7 (enterobacter sputum, on zosyn)  08/10 Trial of CPAP   Neurologically stable, for extubation.   Extubated.  Neurologically stable.   Neurologically stable.   Febrile overnight.  Suctioned for secretions.  Continued neurological improvement.  08/15 Hypotensive overnight, resolved this morning.  Neurologically stable.  Continued diarrhea.   No significant events.  : febrile overnight.  failed repeat swallow eval this AM.   : bounced ack to ICU for thick secretions high sucitoning requirment  : suctioning reuqirement remains high. s/p PEg this morning.  Staring overnight at times, given 1g keppra.  : suctioning q1-2h. PEG yesterday, feeds started today.  : very weak cough, still high suctioning requirements  : stable overnight, this morning with fluctuating mental status, apparently staring ahead at times and others awake, talking and responsive.  At the time, tachycardic to 120-130, febrile to 102F, and BP SBP in 100's.      Overnight Events:   concern for aspiration of tube feeds given his restlessness and uncooperative state, TF held by RNs.     PHYSICAL EXAMINATION  T(C): 37.1 ( @ 05:00), Max: 39.1 ( @ 12:00)  HR: 112 ( @ 07:00) (76 - 124)  BP: 123/57 ( @ 07:00) (99/56 - 185/90)  RR: 55 ( @ 07:00) (20 - 55)  SpO2: 100% ( @ :00) (92% - 100%)  CVP(mm Hg): --    LABS:  CAPILLARY BLOOD GLUCOSE      POCT Blood Glucose.: 113 mg/dL (24 Aug 2020 06:48)  POCT Blood Glucose.: 113 mg/dL (23 Aug 2020 22:42)  POCT Blood Glucose.: 154 mg/dL (23 Aug 2020 18:30)  POCT Blood Glucose.: 116 mg/dL (23 Aug 2020 12:41)                          8.1    15.43 )-----------( 217      ( 24 Aug 2020 05:18 )             25.1         136  |  102  |  11  ----------------------------<  108<H>  3.8   |  23  |  0.52    Ca    8.3<L>      24 Aug 2020 05:18  Phos  1.7       Mg     2.1      @ 07:01  -   @ 07:00  --------------------------------------------------------  IN: 2955 mL / OUT: 2250 mL / NET: 705 mL     @ 07:01  -   @ 08:04  --------------------------------------------------------  IN: 75 mL / OUT: 0 mL / NET: 75 mL        MEDICATIONS:  MEDICATIONS  (STANDING):  acetylcysteine 20%  Inhalation 4 milliLiter(s) Inhalation every 6 hours  albuterol/ipratropium for Nebulization. 3 milliLiter(s) Nebulizer every 6 hours  artificial tears (preservative free) Ophthalmic Solution 1 Drop(s) Both EYES three times a day  cefepime   IVPB 2000 milliGRAM(s) IV Intermittent every 8 hours  chlorhexidine 2% Cloths 1 Application(s) Topical <User Schedule>  dextrose 5%. 1000 milliLiter(s) (50 mL/Hr) IV Continuous <Continuous>  dextrose 50% Injectable 12.5 Gram(s) IV Push once  dextrose 50% Injectable 25 Gram(s) IV Push once  doxazosin 4 milliGRAM(s) Oral at bedtime  enoxaparin Injectable 40 milliGRAM(s) SubCutaneous every 24 hours  finasteride 5 milliGRAM(s) Oral daily  insulin glargine Injectable (LANTUS) 7 Unit(s) SubCutaneous every morning  insulin regular  human corrective regimen sliding scale.   SubCutaneous every 6 hours  insulin regular  human recombinant 4 Unit(s) SubCutaneous every 6 hours  metoprolol tartrate 12.5 milliGRAM(s) Oral every 12 hours  potassium chloride   Powder 20 milliEquivalent(s) Oral once  psyllium Powder 1 Packet(s) Oral daily  sodium chloride 0.9%. 1000 milliLiter(s) (75 mL/Hr) IV Continuous <Continuous>  sodium phosphate IVPB 30 milliMole(s) IV Intermittent once  tenofovir disoproxil fumarate (VIREAD) 300 milliGRAM(s) Oral every 24 hours  vancomycin  IVPB 1000 milliGRAM(s) IV Intermittent every 12 hours    MEDICATIONS  (PRN):  acetaminophen    Suspension .. 650 milliGRAM(s) Oral every 6 hours PRN Temp greater or equal to 38C (100.4F), Mild Pain (1 - 3)  dextrose 40% Gel 15 Gram(s) Oral once PRN Blood Glucose LESS THAN 70 milliGRAM(s)/deciliter  glucagon  Injectable 1 milliGRAM(s) IntraMuscular once PRN Glucose LESS THAN 70 milligrams/deciliter  glucagon  Injectable 1 milliGRAM(s) IntraMuscular once PRN Glucose LESS THAN 70 milligrams/deciliter  hydrALAZINE Injectable 10 milliGRAM(s) IV Push every 2 hours PRN SBP > 140  Mometasone Furoate 0.1% Ointment 1 Application(s) 1 Application(s) Topical two times a day PRN dry/itchy skin  sodium chloride 0.9% for Nebulization 3 milliLiter(s) Nebulizer three times a day PRN for congestion/secretions  sodium chloride 0.9% lock flush 10 milliLiter(s) IV Push every 1 hour PRN Pre/post blood products, medications, blood draw, and to maintain line patency              Past Medical History: Hepatitis B HLD (hyperlipidemia)  Allergies:  No Known Allergies  Home meds:   ·	Omega-3 350 mg oral capsule: 1 cap(s) orally once a day  ·	tenofovir disoproxil fumarate 300 mg oral tablet: 1 tab(s) orally once a day      PHYSICAL EXAMINATION    NEUROLOGIC EXAMINATION:    MSE: flat affect, no speech output when prompted, obeying first order commands  CN: pupils 3 mm reactive bilaterally, decreased EOM, weak cough, tongue midline  Motor: sitting on edge of bed, all extremities antigravity, LLE stronger than RLE in spontaneous movements  PULMONARY: coarse breath sounds, decreased bases  ABDOMEN: soft, nontender with normoactive bowel sounds, PEG in place      Neuroimagin/21 CT head:  Stable  1. Less edema and hemorrhage noted along prior right frontal ventricular catheter tract with near complete resolution of small amount of IVH seen previously. Ventricles are stable in size.  2. No change in the postoperative appearance of left suboccipital craniectomy for cerebellar hematoma evacuation. No new hemorrhage.   CT head:  Right frontal EVD catheter as above. The ventricles are stable in size from prior examination. No new or increasing intracranial hemorrhage or mass effect.  08/10 CT head:  Compared to 2020, there is improving intraventricular hemorrhage, resolution of intraventricular gas, and mild decrease in ventricular size. Postsurgical change and edema in the posterior fossa is approximately stable.   CT head:  post-surgical changes, stable HCP  Other imagin/20 LE doppler: NEG   LE doppler: NEG   CXR:  clear   CXR:  clear   CXR:  mild atelectasis L lung base  08/10 CXR:  Small left pleural effusion, unchanged.   CXR:  Discoid change right lung field. Right basilar infiltrate cannot be excluded

## 2020-08-24 NOTE — PROGRESS NOTE ADULT - SUBJECTIVE AND OBJECTIVE BOX
HPI:  59 year old male with PMH of HLD and unclear liver disease had acute onset of severe headache, dizziness and vomiting x1 today, was brought into [facility] with continued symptoms, as well as hypertension and multiple episodes of vomiting. Patient was intubated for airway protection with CT Head performed demonstrating a large left cerebellar hemorrhage. CTA Head/Neck performed is suggestive of underlying vascular pathology such as AVM. RIght frontal ventriculostomy placed at [facility] [place] [place] and patient transferred to [facility] for further work-up. Patient arrives to [facility] intubated, sedated and on Nicardipine drip. Patient's son provided history and denies any Aspirin or other anticoagulant use. Denies any prior history of hypertension, smoking, or ETOH abuse. (04 Aug 2020 05:01)    OVERNIGHT EVENTS: EMIL o/n, neuro stable    Hospital Course:   POD# 0 S/P cerebral angio, negative for AVM. s/p SOC craniectomy evacuation of cerebellar hematoma.   POD # 1:   overnight tachycardic, ekg without significant changes, lactate wnl, low grade fever, s/p tyl. bolus x 1L , NS increased to 100cc/hr; neuro improving.  EVD at 5 cmH2O   8/6 POD#3, BD#4, Tmax 100.6, Pan Cx 7/5 NGTD, Vanc/Zosyn until 8/10, Vanc trough due today @ 6pm, EEG = no seizure, Lasix overnight for Pulm congest and tachycard, ruff placed for strict I&Os, pending anemia w/u, EVD @ 5, CPAP trials, Card GTT for SBP<140, Started Norvasc for BP optimization  :  :  S/P cerebral angio, negative for AVM. s/p SOC craniectomy evacuation of cerebellar hematoma. No significant events overnight. On CPAP this morning   8/10: fevers overnight, resolved this morning, Pancultured, (+) Enterbacter, on CPAP this AM   POD#7 SOC, BD # 8, EVD replaced (hard pass), Neuro exam stable, EVD @ 5cm H2O, Zosyn for sputum + enterobact, Plan to extubated today, HCT in AM   POD#8 s/p SOC, BD#9, s/p EVD replacement (hard pass on 8/10/20), neuro exam stable, EVD @5cm H2O. Zosyn for enterobacter in sputum. O/n EVD stopped draining, flushed distally and proximally, waveform poor, neuro exam remained stable throughout, ICPs <8 prior.     EVD soft pass yesterday, spiked temp today and re cultured o/w neuro exam stable  : EMIL overnight, neuro stable. Mucomyst for secretions overnight   8/15 EVD dc/d, temp spike to 104, pan culture, ID consulted, vanco and cefepime started   8/15 levo started, presumed sepsis from aspiration, continues to have diarrhea  : EMIL  : EMIL. Neuro exam stable  : Increased secretions overnight. Chest xray obtained. NGT replaced. Neuro exam stable. Patient transferred to ICU for monitoring due to congestion and possible L effusion on CXR along with desaturation and frequent suctioning.   : POD16, on EEG, EMIL o/n, transferred to , plan for PEG tomorrow   POD#17 overnight given Keppra 1g IV x 1 dose for blank stare and EEG findings: pending final read on EEG, Cefep for PNA, PEG today, NPO, Amonia Serum level today w/u for enceph, vEEG,    POD#18, EMIL overnight, PEG placed, start TF 9 AM today, Neuro exam stable   POD #19 EMIL overnight, repeat CT head stable.  : POD#20 EMIL overnight   : POD21. EMIL o/n, neuro stable. on vEEG. vanc/cefepime for possible PNA. requiring frequent suctioning     Vital Signs Last 24 Hrs  T(C): 37.4 (23 Aug 2020 21:54), Max: 39.1 (23 Aug 2020 12:00)  T(F): 99.3 (23 Aug 2020 21:54), Max: 102.4 (23 Aug 2020 12:00)  HR: 85 (23 Aug 2020 23:00) (74 - 124)  BP: 150/79 (23 Aug 2020 23:00) (99/56 - 165/79)  BP(mean): 108 (23 Aug 2020 23:00) (70 - 116)  RR: 23 (23 Aug 2020 23:00) (17 - 39)  SpO2: 100% (23 Aug 2020 23:00) (92% - 100%)    I&O's Summary    22 Aug 2020 07:01  -  23 Aug 2020 07:00  --------------------------------------------------------  IN: 1910 mL / OUT: 2920 mL / NET: -1010 mL    23 Aug 2020 07:01  -  24 Aug 2020 00:11  --------------------------------------------------------  IN: 1600 mL / OUT: 900 mL / NET: 700 mL        PHYSICAL EXAM:  GEN: laying in bed, appears well, NAD  NEURO: alert, no tracking. FC (better in cantonese), OE spont, nonverbal, face symmetric. CNII-XII intact. PERRL, EOMI. MAEx4 with good strength  CARD: RRR +S1/S2  PULM: +rhonchi  GI: Abd soft, NT/ND  EXT: ext warm, dry, nontender  WOUND: SOC site c/d/i    TUBES/LINES:  [] Ruff  [] Lumbar Drain  [] Wound Drains  [x] peg      DIET:  [] NPO  [] Mechanical  [x] Tube feeds    LABS:                        8.3    13.11 )-----------( 232      ( 23 Aug 2020 05:37 )             26.0     08-23    143  |  102  |  11  ----------------------------<  120<H>  3.7   |  31  |  0.54    Ca    8.6      23 Aug 2020 05:37  Phos  3.3     08-  Mg     2.3     08        Urinalysis Basic - ( 23 Aug 2020 17:48 )    Color: Yellow / Appearance: Clear / S.015 / pH: x  Gluc: x / Ketone: NEGATIVE  / Bili: Negative / Urobili: 0.2 E.U./dL   Blood: x / Protein: NEGATIVE mg/dL / Nitrite: NEGATIVE   Leuk Esterase: NEGATIVE / RBC: > 10 /HPF / WBC < 5 /HPF   Sq Epi: x / Non Sq Epi: 0-5 /HPF / Bacteria: Present /HPF          CAPILLARY BLOOD GLUCOSE      POCT Blood Glucose.: 113 mg/dL (23 Aug 2020 22:42)  POCT Blood Glucose.: 154 mg/dL (23 Aug 2020 18:30)  POCT Blood Glucose.: 116 mg/dL (23 Aug 2020 12:41)  POCT Blood Glucose.: 125 mg/dL (23 Aug 2020 05:16)  POCT Blood Glucose.: 127 mg/dL (23 Aug 2020 00:34)      Drug Levels: [] N/A  Vancomycin Level, Trough: 5.9 ug/mL ( @ 13:25)    CSF Analysis: [] N/A      Allergies    No Known Allergies    Intolerances      MEDICATIONS:  Antibiotics:  cefepime   IVPB 2000 milliGRAM(s) IV Intermittent every 8 hours  cefepime   IVPB 2000 milliGRAM(s) IV Intermittent every 8 hours  tenofovir disoproxil fumarate (VIREAD) 300 milliGRAM(s) Oral every 24 hours  vancomycin  IVPB 1000 milliGRAM(s) IV Intermittent every 12 hours    Neuro:  acetaminophen    Suspension .. 650 milliGRAM(s) Oral every 6 hours PRN    Anticoagulation:  enoxaparin Injectable 40 milliGRAM(s) SubCutaneous every 24 hours    OTHER:  acetylcysteine 20%  Inhalation 4 milliLiter(s) Inhalation every 6 hours  albuterol/ipratropium for Nebulization. 3 milliLiter(s) Nebulizer every 6 hours  artificial tears (preservative free) Ophthalmic Solution 1 Drop(s) Both EYES three times a day  chlorhexidine 2% Cloths 1 Application(s) Topical <User Schedule>  dextrose 40% Gel 15 Gram(s) Oral once PRN  dextrose 50% Injectable 12.5 Gram(s) IV Push once  dextrose 50% Injectable 25 Gram(s) IV Push once  doxazosin 4 milliGRAM(s) Oral at bedtime  finasteride 5 milliGRAM(s) Oral daily  glucagon  Injectable 1 milliGRAM(s) IntraMuscular once PRN  glucagon  Injectable 1 milliGRAM(s) IntraMuscular once PRN  hydrALAZINE Injectable 10 milliGRAM(s) IV Push every 2 hours PRN  insulin glargine Injectable (LANTUS) 7 Unit(s) SubCutaneous every morning  insulin regular  human corrective regimen sliding scale.   SubCutaneous every 6 hours  insulin regular  human recombinant 4 Unit(s) SubCutaneous every 6 hours  metoprolol tartrate 12.5 milliGRAM(s) Oral every 12 hours  Mometasone Furoate 0.1% Ointment 1 Application(s) 1 Application(s) Topical two times a day PRN  psyllium Powder 1 Packet(s) Oral daily  sodium chloride 0.9% for Nebulization 3 milliLiter(s) Nebulizer three times a day PRN    IVF:  dextrose 5%. 1000 milliLiter(s) IV Continuous <Continuous>  sodium chloride 0.9%. 1000 milliLiter(s) IV Continuous <Continuous>    CULTURES:  Culture Results:   Sputum specimen rejected.  Microscopic examination indicates  oropharyngeal contamination.  Please repeat. ( @ 08:53)  Culture Results:   GI PCR Results: NOT detected  *******Please Note:*******  GI panel PCR evaluates for:  Campylobacter, Plesiomonas shigelloides, Salmonella,  Vibrio, Yersinia enterocolitica, Enteroaggregative  Escherichia coli (EAEC), Enteropathogenic E.coli (EPEC),  Enterotoxigenic E. coli (ETEC) lt/st, Shiga-like  toxin-producing E. coli (STEC) stx1/stx2,  Shigella/ Enteroinvasive E. coli (EIEC), Cryptosporidium,  Cyclospora cayetanensis, Entamoeba histolytica,  Giardia lamblia, Adenovirus F 40/41, Astrovirus,  Norovirus GI/GII, Rotavirus A, Sapovirus ( @ 04:12)    RADIOLOGY & ADDITIONAL TESTS:      ASSESSMENT:  59y Male  Male with ICH/IVH s/p SOC crani POD #21 (20), s/p EVD placement, removed (20).    INTRACRANIAL BLEED  No pertinent family history in first degree relatives  Handoff  MEWS Score  Hepatitis B  HLD (hyperlipidemia)  ICH (intracerebral hemorrhage)  ICH (intracerebral hemorrhage)  Hepatitis B  HLD (hyperlipidemia)  HIV (human immunodeficiency virus infection)  Cerebellar hemorrhage  EGD, with PEG  Craniectomy, subocciptal, with cervical laminectomy for medulla and spinal cord decompression  Percutaneous insertion of arterial line  Angiogram, carotid and cerebral vessels, bilateral  Foot fracture, left      PLAN:  NEURO:  - neuro/vitals checks  - vEEG, f/u read  - pain control    CARDIOVASCULAR:  - -140  - cont metoprolol     PULMONARY:  - +secretions, requiring frequent suctioning  - satting well RA, NC prn  - chest PT  - cont saline nebs, duonebs, mucomyst     RENAL:  - IVL  - daily labs, repletions prn  - cont proscar, cardura  - SC prn    GI:  - TF via PEG  - cont metamucil    HEME:  - h/h stable  - SCDs/SQL    ID:  - febrile, pan cx   - cont vanc/cefepime for empiric PNA/CSF coverage    ENDO:    DVT PROPHYLAXIS:  [] Venodynes                                [] Heparin/Lovenox    DISPOSITION:    Assessment:  Present when checked    []  GCS  E   V  M     Heart Failure: []Acute, [] acute on chronic , []chronic  Heart Failure:  [] Diastolic (HFpEF), [] Systolic (HFrEF), []Combined (HFpEF and HFrEF), [] RHF, [] Pulm HTN, [] Other    [] SARAH, [] ATN, [] AIN, [] other  [] CKD1, [] CKD2, [] CKD 3, [] CKD 4, [] CKD 5, []ESRD    Encephalopathy: [] Metabolic, [] Hepatic, [] toxic, [] Neurological, [] Other    Abnormal Nurtitional Status: [] malnurtition (see nutrition note), [ ]underweight: BMI < 19, [] morbid obesity: BMI >40, [] Cachexia    [] Sepsis  [] hypovolemic shock,[] cardiogenic shock, [] hemorrhagic shock, [] neuogenic shock  [] Acute Respiratory Failure  []Cerebral edema, [] Brain compression/ herniation,   [] Functional quadriplegia  [] Acute blood loss anemia HPI:  59 year old male with PMH of HLD and unclear liver disease had acute onset of severe headache, dizziness and vomiting x1 today, was brought into [facility] with continued symptoms, as well as hypertension and multiple episodes of vomiting. Patient was intubated for airway protection with CT Head performed demonstrating a large left cerebellar hemorrhage. CTA Head/Neck performed is suggestive of underlying vascular pathology such as AVM. [place] [facility] [place] to [facility] for further work-up. Patient arrives to [facility] intubated, sedated and on Nicardipine drip. Patient's son provided history and denies any Aspirin or other anticoagulant use. Denies any prior history of hypertension, smoking, or ETOH abuse. (04 Aug 2020 05:01)    OVERNIGHT EVENTS: EMIL o/n, neuro stable    Hospital Course:   POD# 0 S/P cerebral angio, negative for AVM. s/p SOC craniectomy evacuation of cerebellar hematoma.   POD # 1:   overnight tachycardic, ekg without significant changes, lactate wnl, low grade fever, s/p tyl. bolus x 1L , NS increased to 100cc/hr; neuro improving.  EVD at 5 cmH2O   8/6 POD#3, BD#4, Tmax 100.6, Pan Cx 7/5 NGTD, Vanc/Zosyn until 8/10, Vanc trough due today @ 6pm, EEG = no seizure, Lasix overnight for Pulm congest and tachycard, ruff placed for strict I&Os, pending anemia w/u, EVD @ 5, CPAP trials, Card GTT for SBP<140, Started Norvasc for BP optimization  :  :  S/P cerebral angio, negative for AVM. s/p SOC craniectomy evacuation of cerebellar hematoma. No significant events overnight. On CPAP this morning   8/10: fevers overnight, resolved this morning, Pancultured, (+) Enterbacter, on CPAP this AM   POD#7 SOC, BD # 8, EVD replaced (hard pass), Neuro exam stable, EVD @ 5cm H2O, Zosyn for sputum + enterobact, Plan to extubated today, HCT in AM   POD#8 s/p SOC, BD#9, s/p EVD replacement (hard pass on 8/10/20), neuro exam stable, EVD @5cm H2O. Zosyn for enterobacter in sputum. O/n EVD stopped draining, flushed distally and proximally, waveform poor, neuro exam remained stable throughout, ICPs <8 prior.     EVD soft pass yesterday, spiked temp today and re cultured o/w neuro exam stable  : EMIL overnight, neuro stable. Mucomyst for secretions overnight   8/15 EVD dc/d, temp spike to 104, pan culture, ID consulted, vanco and cefepime started   8/15 levo started, presumed sepsis from aspiration, continues to have diarrhea  : EMIL  : EMIL. Neuro exam stable  : Increased secretions overnight. Chest xray obtained. NGT replaced. Neuro exam stable. Patient transferred to ICU for monitoring due to congestion and possible L effusion on CXR along with desaturation and frequent suctioning.   : POD16, on EEG, EMIL o/n, transferred to , plan for PEG tomorrow   POD#17 overnight given Keppra 1g IV x 1 dose for blank stare and EEG findings: pending final read on EEG, Cefep for PNA, PEG today, NPO, Amonia Serum level today w/u for enceph, vEEG,    POD#18, EMIL overnight, PEG placed, start TF 9 AM today, Neuro exam stable   POD #19 EMIL overnight, repeat CT head stable.  : POD#20 EMIL overnight   : POD21. EMIL o/n, neuro stable. on vEEG. vanc/cefepime for possible PNA. requiring frequent suctioning     Vital Signs Last 24 Hrs  T(C): 37.4 (23 Aug 2020 21:54), Max: 39.1 (23 Aug 2020 12:00)  T(F): 99.3 (23 Aug 2020 21:54), Max: 102.4 (23 Aug 2020 12:00)  HR: 85 (23 Aug 2020 23:00) (74 - 124)  BP: 150/79 (23 Aug 2020 23:00) (99/56 - 165/79)  BP(mean): 108 (23 Aug 2020 23:00) (70 - 116)  RR: 23 (23 Aug 2020 23:00) (17 - 39)  SpO2: 100% (23 Aug 2020 23:00) (92% - 100%)    I&O's Summary    22 Aug 2020 07:01  -  23 Aug 2020 07:00  --------------------------------------------------------  IN: 1910 mL / OUT: 2920 mL / NET: -1010 mL    23 Aug 2020 07:01  -  24 Aug 2020 00:11  --------------------------------------------------------  IN: 1600 mL / OUT: 900 mL / NET: 700 mL        PHYSICAL EXAM:  GEN: laying in bed, appears well, NAD  NEURO: alert, no tracking. FC (better in cantonese), OE spont, nonverbal, face symmetric. CNII-XII intact. PERRL, EOMI. MAEx4 with good strength  CARD: RRR +S1/S2  PULM: +rhonchi  GI: Abd soft, NT/ND  EXT: ext warm, dry, nontender  WOUND: SOC site c/d/i    TUBES/LINES:  [] Ruff  [] Lumbar Drain  [] Wound Drains  [x] peg      DIET:  [] NPO  [] Mechanical  [x] Tube feeds    LABS:                        8.3    13.11 )-----------( 232      ( 23 Aug 2020 05:37 )             26.0     08-23    143  |  102  |  11  ----------------------------<  120<H>  3.7   |  31  |  0.54    Ca    8.6      23 Aug 2020 05:37  Phos  3.3     08-  Mg     2.3     08        Urinalysis Basic - ( 23 Aug 2020 17:48 )    Color: Yellow / Appearance: Clear / S.015 / pH: x  Gluc: x / Ketone: NEGATIVE  / Bili: Negative / Urobili: 0.2 E.U./dL   Blood: x / Protein: NEGATIVE mg/dL / Nitrite: NEGATIVE   Leuk Esterase: NEGATIVE / RBC: > 10 /HPF / WBC < 5 /HPF   Sq Epi: x / Non Sq Epi: 0-5 /HPF / Bacteria: Present /HPF          CAPILLARY BLOOD GLUCOSE      POCT Blood Glucose.: 113 mg/dL (23 Aug 2020 22:42)  POCT Blood Glucose.: 154 mg/dL (23 Aug 2020 18:30)  POCT Blood Glucose.: 116 mg/dL (23 Aug 2020 12:41)  POCT Blood Glucose.: 125 mg/dL (23 Aug 2020 05:16)  POCT Blood Glucose.: 127 mg/dL (23 Aug 2020 00:34)      Drug Levels: [] N/A  Vancomycin Level, Trough: 5.9 ug/mL ( @ 13:25)    CSF Analysis: [] N/A      Allergies    No Known Allergies    Intolerances      MEDICATIONS:  Antibiotics:  cefepime   IVPB 2000 milliGRAM(s) IV Intermittent every 8 hours  cefepime   IVPB 2000 milliGRAM(s) IV Intermittent every 8 hours  tenofovir disoproxil fumarate (VIREAD) 300 milliGRAM(s) Oral every 24 hours  vancomycin  IVPB 1000 milliGRAM(s) IV Intermittent every 12 hours    Neuro:  acetaminophen    Suspension .. 650 milliGRAM(s) Oral every 6 hours PRN    Anticoagulation:  enoxaparin Injectable 40 milliGRAM(s) SubCutaneous every 24 hours    OTHER:  acetylcysteine 20%  Inhalation 4 milliLiter(s) Inhalation every 6 hours  albuterol/ipratropium for Nebulization. 3 milliLiter(s) Nebulizer every 6 hours  artificial tears (preservative free) Ophthalmic Solution 1 Drop(s) Both EYES three times a day  chlorhexidine 2% Cloths 1 Application(s) Topical <User Schedule>  dextrose 40% Gel 15 Gram(s) Oral once PRN  dextrose 50% Injectable 12.5 Gram(s) IV Push once  dextrose 50% Injectable 25 Gram(s) IV Push once  doxazosin 4 milliGRAM(s) Oral at bedtime  finasteride 5 milliGRAM(s) Oral daily  glucagon  Injectable 1 milliGRAM(s) IntraMuscular once PRN  glucagon  Injectable 1 milliGRAM(s) IntraMuscular once PRN  hydrALAZINE Injectable 10 milliGRAM(s) IV Push every 2 hours PRN  insulin glargine Injectable (LANTUS) 7 Unit(s) SubCutaneous every morning  insulin regular  human corrective regimen sliding scale.   SubCutaneous every 6 hours  insulin regular  human recombinant 4 Unit(s) SubCutaneous every 6 hours  metoprolol tartrate 12.5 milliGRAM(s) Oral every 12 hours  Mometasone Furoate 0.1% Ointment 1 Application(s) 1 Application(s) Topical two times a day PRN  psyllium Powder 1 Packet(s) Oral daily  sodium chloride 0.9% for Nebulization 3 milliLiter(s) Nebulizer three times a day PRN    IVF:  dextrose 5%. 1000 milliLiter(s) IV Continuous <Continuous>  sodium chloride 0.9%. 1000 milliLiter(s) IV Continuous <Continuous>    CULTURES:  Culture Results:   Sputum specimen rejected.  Microscopic examination indicates  oropharyngeal contamination.  Please repeat. ( @ 08:53)  Culture Results:   GI PCR Results: NOT detected  *******Please Note:*******  GI panel PCR evaluates for:  Campylobacter, Plesiomonas shigelloides, Salmonella,  Vibrio, Yersinia enterocolitica, Enteroaggregative  Escherichia coli (EAEC), Enteropathogenic E.coli (EPEC),  Enterotoxigenic E. coli (ETEC) lt/st, Shiga-like  toxin-producing E. coli (STEC) stx1/stx2,  Shigella/ Enteroinvasive E. coli (EIEC), Cryptosporidium,  Cyclospora cayetanensis, Entamoeba histolytica,  Giardia lamblia, Adenovirus F 40/41, Astrovirus,  Norovirus GI/GII, Rotavirus A, Sapovirus ( @ 04:12)    RADIOLOGY & ADDITIONAL TESTS:      ASSESSMENT:  59y Male  Male with ICH/IVH s/p SOC crani POD #21 (20), s/p EVD placement, removed (20).    INTRACRANIAL BLEED  No pertinent family history in first degree relatives  Handoff  MEWS Score  Hepatitis B  HLD (hyperlipidemia)  ICH (intracerebral hemorrhage)  ICH (intracerebral hemorrhage)  Hepatitis B  HLD (hyperlipidemia)  HIV (human immunodeficiency virus infection)  Cerebellar hemorrhage  EGD, with PEG  Craniectomy, subocciptal, with cervical laminectomy for medulla and spinal cord decompression  Percutaneous insertion of arterial line  Angiogram, carotid and cerebral vessels, bilateral  Foot fracture, left      PLAN:  NEURO:  - neuro/vitals checks  - vEEG, f/u read  - pain control    CARDIOVASCULAR:  - -140  - cont metoprolol     PULMONARY:  - +secretions, requiring frequent suctioning  - satting well RA, NC prn  - chest PT  - cont saline nebs, duonebs, mucomyst     RENAL:  - IVL  - daily labs, repletions prn  - cont proscar, cardura  - SC prn    GI:  - TF via PEG  - cont metamucil    HEME:  - h/h stable  - SCDs/SQL    ID:  - febrile, pan cx   - cont vanc/cefepime for empiric PNA/CSF coverage    ENDO:  - glucose goal 140-180    DVT PROPHYLAXIS:  [x] Venodynes                                [x] Heparin/Lovenox    DISPOSITION: ICU status, full code, stable. Dispo pending    D/w Dr. Blank     Assessment:  Present when checked    []  GCS  E   V  M     Heart Failure: []Acute, [] acute on chronic , []chronic  Heart Failure:  [] Diastolic (HFpEF), [] Systolic (HFrEF), []Combined (HFpEF and HFrEF), [] RHF, [] Pulm HTN, [] Other    [] SARAH, [] ATN, [] AIN, [] other  [] CKD1, [] CKD2, [] CKD 3, [] CKD 4, [] CKD 5, []ESRD    Encephalopathy: [] Metabolic, [] Hepatic, [] toxic, [] Neurological, [] Other    Abnormal Nurtitional Status: [] malnurtition (see nutrition note), [ ]underweight: BMI < 19, [] morbid obesity: BMI >40, [] Cachexia    [] Sepsis  [] hypovolemic shock,[] cardiogenic shock, [] hemorrhagic shock, [] neuogenic shock  [] Acute Respiratory Failure  []Cerebral edema, [] Brain compression/ herniation,   [] Functional quadriplegia  [] Acute blood loss anemia

## 2020-08-24 NOTE — PROGRESS NOTE ADULT - SUBJECTIVE AND OBJECTIVE BOX
=================================  NEUROCRITICAL CARE ATTENDING NOTE  =================================    VEDA WARREN   MRN-[id number]  HPI: 59M with dyslipidemia, h/o Hep B infection, presented with acute onset severe HA, dizziness, vomiting - brought to [facility] where CT showed L cerebellar hemorrhage.  Intubated for airway protection.  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  R EVD inserted, transferred to [facility] for further management.  given mannitol 25G in [facility],     COURSE IN THE HOSPITAL:   bleed   admitted to [facility], 23.4 given x1; OR for SOC evacuation of ICH   remained intubated overnight   tachycardia overnight, Tmax 38.4, ?PNA, ABx    Tmax 38.1 diuresed overnight 1.8L.     No significant events overnight.  CPAP this morning    Past Medical History: Hepatitis B HLD (hyperlipidemia)  Allergies:  No Known Allergies  Home meds:   ·	Omega-3 350 mg oral capsule: 1 cap(s) orally once a day  ·	tenofovir disoproxil fumarate 300 mg oral tablet: 1 tab(s) orally once a day    PHYSICAL EXAMINATION  T(C): 37.1 ( @ 05:46), Max: 37.1 ( @ 17:30) HR: 81 ( @ 07:00) (72 - 105) BP: 117/59 ( @ 01:00) (116/59 - 145/67) RR: 20 ( @ 07:00) (12 - 20) SpO2: 99% ( @ 07:00) (96% - 100%)  NEUROLOGIC EXAMINATION:  Patient opens eyes to voice, follows commands, pupils 3mm sluggish, does not follow commands, moving all 4s  GENERAL: CPAP 10/5 40%  EENT:  anicteric  CARDIOVASCULAR: (+) S1 S2, normal rate and regular rhythm  PULMONARY: clear to auscultation bilaterally  ABDOMEN: soft, nontender with normoactive bowel sounds  EXTREMITIES: no edema  SKIN: no rash    LABS:   CAPILLARY BLOOD GLUCOSE 204 243 199 203  - insulin lispro 14 units over 24h     (14.26)   9.4  (9.3)  12.93 )-----------( 151      ( 08 Aug 2020 05:28 )             29.3   (155)  154<H>  |  118<H>  |  27<H>  ----------------------------<  209<H>  3.8   |  28  |  0.76    Ca    7.8<L>      08 Aug 2020 05:28  Phos  2.3       Mg     3.0         TPro  5.6<L>  /  Alb  3.2<L>  /  TBili  0.3  /  DBili  x   /  AST  26  /  ALT  17  /  AlkPhos  42   @ 07:01  -   @ 07:00  IN: 3110 mL / OUT: 2805 mL / NET: 305 mL    HbA1C = 5.6 ()  LDL = 185 ()   HDL = 36 () TG = 252 ()  TSH = 0.460 ()    Bacteriology:    UA NEG   sputum few GNR   CSF NGTD   Blood CS NG2D x2    CSF studies:    L   *** [id number] WBC36 *** %N31 %L5   EEG:  Neuroimagin/06 CT head:     CT head:  post-surgical changes, stable HCP  Other imagin/04 LE doppler: NEG   CXR:  mild atelectasis L lung base    MEDICATIONS:  SQL 40 daily piperacillin/tazobactam 3.375 IV q6h tenofovir 300mg PO q24h vancomycin 1250 IV q12h amantadine 100mg PO BID levetiracetam 1G IV q12h amlodipine 10mg PO daily doxazosin 4mg PO HS metoprolol 50mg PO q12h pantoprazole 40 IV daily atorvastatin 80mg PO HS dexamethasone 2mg PO q8hh mod ISS Peridex senna PRN      IV FLUIDS: IVL  DRIPS: cardene @ 5  DIET: Jevity   Lines:  Drains:  Vernell Lewis   EVD at 7cm H20  35cc/hr   EVD at 5cm H20 ICP 2-5 68cc/24h   EVD at 5cm H20    EVD at 5cm H20 ICP 1-3 202cc/24h     EVD at 5 cm H20 ICP 1-4 output 194cc/24h  Wounds:    CODE STATUS:  Full Code                       GOALS OF CARE:  aggressive                      DISPOSITION:  ICU  ICH Score on admission  = 4, NIHSS =================================  NEUROCRITICAL CARE ATTENDING NOTE  =================================    VEDA WARREN   MRN-[id number]  HPI: 59M with dyslipidemia, h/o Hep B infection, presented with acute onset severe HA, dizziness, vomiting - brought to [facility] where CT showed L cerebellar hemorrhage.  Intubated for airway protection.  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  R EVD inserted, transferred to [facility] for further management.  given mannitol 25G in [facility],     COURSE IN THE HOSPITAL:   bleed   admitted to [facility], 23.4 given x1; OR for SOC evacuation of ICH   remained intubated overnight   tachycardia overnight, Tmax 38.4, ?PNA, ABx    Tmax 38.1 diuresed overnight 1.8L.     s/p angio, no AVM, s/p SOC craniectomy, evacuation of hematoma   extubated   EVD replaced  08/15 EVD discontinued, fever, cefepime/vancomycin, septic shock, on levophed, diarrhea   transferred back to ICU   ammy barahona, levetiracetam given, PEG   Tmax 38.1    Past Medical History: Hepatitis B HLD (hyperlipidemia)  Allergies:  No Known Allergies  Home meds:   ·	Omega-3 350 mg oral capsule: 1 cap(s) orally once a day  ·	tenofovir disoproxil fumarate 300 mg oral tablet: 1 tab(s) orally once a day    PHYSICAL EXAMINATION  T(C): 36.9 ( @ 18:00), Max: 38.1 ( @ 11:00) HR: 104 ( @ 22:00) (81 - 112) BP: 139/82 ( @ 22:00) (103/54 - 185/90) RR: 20 ( @ 22:00) (16 - 33) SpO2: 100% ( @ 22:00) (90% - 100%)  NEUROLOGIC EXAMINATION:  Patient opens eyes spontaneously, follows commands (Mandarin), no verbal output, pupils equal and reactive, EOM intact, moves all 4s    GENERAL: not intubated  EENT:  anicteric  CARDIOVASCULAR: (+) S1 S2, tachycardic, rate and regular rhythm  PULMONARY: clear to auscultation bilaterally  ABDOMEN: soft, nontender with normoactive bowel sounds  EXTREMITIES: no edema  SKIN: no rash    LABS:   CAPILLARY BLOOD GLUCOSE 118 186 113 113    (13)      8.1  (8.3)  15.43 )-----------( 217      ( 24 Aug 2020 05:18 )             25.1     136  |  102  |  11  ----------------------------<  108<H>  3.8   |  23  |  0.52    Ca    8.3<L>      24 Aug 2020 05:18  Phos  1.7       Mg     2.1      @ 07:01  -   @ 07:00  IN: 3075 mL / OUT: 2250 mL / NET: 825 mL    HbA1C = 5.6 ()  LDL = 185 ()   HDL = 36 () TG = 252 ()  TSH = 0.460 ()    Bacteriology:    UA NEG   Blood CS: NG1D x2   sputum culture:  rejected, oropharyngeal contamination   sputum few GNR   CSF NGTD   Blood CS NG2D x2    CSF studies:    L   *** [id number] WBC36 *** %N31 %L5     EEG:  Neuroimagin/06 CT head:     CT head:  post-surgical changes, stable HCP  Other imagin/23 CXR:  discoid change R lung, R basilar infiltrate cannot be excluded   LE doppler: NEG   CXR:  mild atelectasis L lung base    MEDICATIONS:   cefepime 2G IV q8h tenofovir 300 PO q24h vancomycin 1G IV q12h quetiapine 25mg PO HS doxazosin 4mg PO HS metoprolol 12.5 q12h acetylcysteinw INH q6h ipratropium / albuterol q6h psyllium daily finasteride 5mg PO daily mod ISS HR 4 q6h artificial tears Peridex mometasone topical PRN      IV FLUIDS: NS@70cc/hr  DRIPS:   DIET: Jevity @60  Lines:  Drains:    Wounds:    CODE STATUS:  Full Code                       GOALS OF CARE:  aggressive                      DISPOSITION:  ICU  ICH Score on admission  = 4, NIHSS

## 2020-08-24 NOTE — PROCEDURAL SAFETY CHECKLIST WITH OR WITHOUT SEDATION - NSPOSTCOMMENTFT_GEN_ALL_CORE
Dr. Musa was unable to perform procedure at bedside. Pt was too agitated. Pt NPO past midnight for OR

## 2020-08-25 LAB
ANION GAP SERPL CALC-SCNC: 10 MMOL/L — SIGNIFICANT CHANGE UP (ref 5–17)
ANION GAP SERPL CALC-SCNC: 11 MMOL/L — SIGNIFICANT CHANGE UP (ref 5–17)
APTT BLD: 35.6 SEC — HIGH (ref 27.5–35.5)
BASOPHILS # BLD AUTO: 0.01 K/UL — SIGNIFICANT CHANGE UP (ref 0–0.2)
BASOPHILS NFR BLD AUTO: 0.1 % — SIGNIFICANT CHANGE UP (ref 0–2)
BLD GP AB SCN SERPL QL: NEGATIVE — SIGNIFICANT CHANGE UP
BUN SERPL-MCNC: 8 MG/DL — SIGNIFICANT CHANGE UP (ref 7–23)
BUN SERPL-MCNC: 9 MG/DL — SIGNIFICANT CHANGE UP (ref 7–23)
CALCIUM SERPL-MCNC: 6.8 MG/DL — LOW (ref 8.4–10.5)
CALCIUM SERPL-MCNC: 8.4 MG/DL — SIGNIFICANT CHANGE UP (ref 8.4–10.5)
CHLORIDE SERPL-SCNC: 104 MMOL/L — SIGNIFICANT CHANGE UP (ref 96–108)
CHLORIDE SERPL-SCNC: 112 MMOL/L — HIGH (ref 96–108)
CO2 SERPL-SCNC: 20 MMOL/L — LOW (ref 22–31)
CO2 SERPL-SCNC: 26 MMOL/L — SIGNIFICANT CHANGE UP (ref 22–31)
CREAT SERPL-MCNC: 0.47 MG/DL — LOW (ref 0.5–1.3)
CREAT SERPL-MCNC: 0.54 MG/DL — SIGNIFICANT CHANGE UP (ref 0.5–1.3)
EOSINOPHIL # BLD AUTO: 0.02 K/UL — SIGNIFICANT CHANGE UP (ref 0–0.5)
EOSINOPHIL NFR BLD AUTO: 0.2 % — SIGNIFICANT CHANGE UP (ref 0–6)
GLUCOSE BLDC GLUCOMTR-MCNC: 107 MG/DL — HIGH (ref 70–99)
GLUCOSE BLDC GLUCOMTR-MCNC: 135 MG/DL — HIGH (ref 70–99)
GLUCOSE BLDC GLUCOMTR-MCNC: 161 MG/DL — HIGH (ref 70–99)
GLUCOSE BLDC GLUCOMTR-MCNC: 217 MG/DL — HIGH (ref 70–99)
GLUCOSE SERPL-MCNC: 136 MG/DL — HIGH (ref 70–99)
GLUCOSE SERPL-MCNC: 83 MG/DL — SIGNIFICANT CHANGE UP (ref 70–99)
GRAM STN FLD: SIGNIFICANT CHANGE UP
HCT VFR BLD CALC: 23 % — LOW (ref 39–50)
HCT VFR BLD CALC: 27.3 % — LOW (ref 39–50)
HGB BLD-MCNC: 7 G/DL — CRITICAL LOW (ref 13–17)
HGB BLD-MCNC: 8.5 G/DL — LOW (ref 13–17)
IMM GRANULOCYTES NFR BLD AUTO: 0.7 % — SIGNIFICANT CHANGE UP (ref 0–1.5)
INR BLD: 1.2 — HIGH (ref 0.88–1.16)
LYMPHOCYTES # BLD AUTO: 0.58 K/UL — LOW (ref 1–3.3)
LYMPHOCYTES # BLD AUTO: 5.6 % — LOW (ref 13–44)
MAGNESIUM SERPL-MCNC: 1.8 MG/DL — SIGNIFICANT CHANGE UP (ref 1.6–2.6)
MCHC RBC-ENTMCNC: 27.8 PG — SIGNIFICANT CHANGE UP (ref 27–34)
MCHC RBC-ENTMCNC: 28.1 PG — SIGNIFICANT CHANGE UP (ref 27–34)
MCHC RBC-ENTMCNC: 30.4 GM/DL — LOW (ref 32–36)
MCHC RBC-ENTMCNC: 31.1 GM/DL — LOW (ref 32–36)
MCV RBC AUTO: 90.1 FL — SIGNIFICANT CHANGE UP (ref 80–100)
MCV RBC AUTO: 91.3 FL — SIGNIFICANT CHANGE UP (ref 80–100)
MONOCYTES # BLD AUTO: 0.58 K/UL — SIGNIFICANT CHANGE UP (ref 0–0.9)
MONOCYTES NFR BLD AUTO: 5.6 % — SIGNIFICANT CHANGE UP (ref 2–14)
NEUTROPHILS # BLD AUTO: 9.16 K/UL — HIGH (ref 1.8–7.4)
NEUTROPHILS NFR BLD AUTO: 87.8 % — HIGH (ref 43–77)
NRBC # BLD: 0 /100 WBCS — SIGNIFICANT CHANGE UP (ref 0–0)
NRBC # BLD: 0 /100 WBCS — SIGNIFICANT CHANGE UP (ref 0–0)
PHOSPHATE SERPL-MCNC: 2.3 MG/DL — LOW (ref 2.5–4.5)
PLATELET # BLD AUTO: 209 K/UL — SIGNIFICANT CHANGE UP (ref 150–400)
PLATELET # BLD AUTO: 239 K/UL — SIGNIFICANT CHANGE UP (ref 150–400)
POTASSIUM SERPL-MCNC: 3 MMOL/L — LOW (ref 3.5–5.3)
POTASSIUM SERPL-MCNC: 3.6 MMOL/L — SIGNIFICANT CHANGE UP (ref 3.5–5.3)
POTASSIUM SERPL-SCNC: 3 MMOL/L — LOW (ref 3.5–5.3)
POTASSIUM SERPL-SCNC: 3.6 MMOL/L — SIGNIFICANT CHANGE UP (ref 3.5–5.3)
PROCALCITONIN SERPL-MCNC: 0.42 NG/ML — HIGH (ref 0.02–0.1)
PROTHROM AB SERPL-ACNC: 14.3 SEC — HIGH (ref 10.6–13.6)
RBC # BLD: 2.52 M/UL — LOW (ref 4.2–5.8)
RBC # BLD: 3.03 M/UL — LOW (ref 4.2–5.8)
RBC # FLD: 15.5 % — HIGH (ref 10.3–14.5)
RBC # FLD: 15.5 % — HIGH (ref 10.3–14.5)
RH IG SCN BLD-IMP: POSITIVE — SIGNIFICANT CHANGE UP
SODIUM SERPL-SCNC: 140 MMOL/L — SIGNIFICANT CHANGE UP (ref 135–145)
SODIUM SERPL-SCNC: 143 MMOL/L — SIGNIFICANT CHANGE UP (ref 135–145)
SPECIMEN SOURCE: SIGNIFICANT CHANGE UP
VANCOMYCIN TROUGH SERPL-MCNC: 6.4 UG/ML — LOW (ref 10–20)
WBC # BLD: 10.42 K/UL — SIGNIFICANT CHANGE UP (ref 3.8–10.5)
WBC # BLD: 12.76 K/UL — HIGH (ref 3.8–10.5)
WBC # FLD AUTO: 10.42 K/UL — SIGNIFICANT CHANGE UP (ref 3.8–10.5)
WBC # FLD AUTO: 12.76 K/UL — HIGH (ref 3.8–10.5)

## 2020-08-25 PROCEDURE — 99233 SBSQ HOSP IP/OBS HIGH 50: CPT

## 2020-08-25 PROCEDURE — 95720 EEG PHY/QHP EA INCR W/VEEG: CPT

## 2020-08-25 PROCEDURE — 31571 LARYNGOSCOP W/VC INJ + SCOPE: CPT | Mod: GC

## 2020-08-25 RX ORDER — VANCOMYCIN HCL 1 G
1500 VIAL (EA) INTRAVENOUS EVERY 12 HOURS
Refills: 0 | Status: DISCONTINUED | OUTPATIENT
Start: 2020-08-25 | End: 2020-08-26

## 2020-08-25 RX ORDER — POTASSIUM PHOSPHATE, MONOBASIC POTASSIUM PHOSPHATE, DIBASIC 236; 224 MG/ML; MG/ML
15 INJECTION, SOLUTION INTRAVENOUS ONCE
Refills: 0 | Status: COMPLETED | OUTPATIENT
Start: 2020-08-25 | End: 2020-08-25

## 2020-08-25 RX ORDER — AMANTADINE HCL 100 MG
100 CAPSULE ORAL
Refills: 0 | Status: DISCONTINUED | OUTPATIENT
Start: 2020-08-25 | End: 2020-09-02

## 2020-08-25 RX ORDER — MAGNESIUM SULFATE 500 MG/ML
1 VIAL (ML) INJECTION ONCE
Refills: 0 | Status: COMPLETED | OUTPATIENT
Start: 2020-08-25 | End: 2020-08-25

## 2020-08-25 RX ORDER — POTASSIUM CHLORIDE 20 MEQ
40 PACKET (EA) ORAL EVERY 4 HOURS
Refills: 0 | Status: COMPLETED | OUTPATIENT
Start: 2020-08-25 | End: 2020-08-25

## 2020-08-25 RX ORDER — SODIUM,POTASSIUM PHOSPHATES 278-250MG
1 POWDER IN PACKET (EA) ORAL ONCE
Refills: 0 | Status: COMPLETED | OUTPATIENT
Start: 2020-08-25 | End: 2020-08-25

## 2020-08-25 RX ADMIN — INSULIN HUMAN 4: 100 INJECTION, SOLUTION SUBCUTANEOUS at 23:58

## 2020-08-25 RX ADMIN — Medication 4 MILLILITER(S): at 11:57

## 2020-08-25 RX ADMIN — CHLORHEXIDINE GLUCONATE 1 APPLICATION(S): 213 SOLUTION TOPICAL at 06:20

## 2020-08-25 RX ADMIN — CEFEPIME 100 MILLIGRAM(S): 1 INJECTION, POWDER, FOR SOLUTION INTRAMUSCULAR; INTRAVENOUS at 07:43

## 2020-08-25 RX ADMIN — INSULIN HUMAN 2: 100 INJECTION, SOLUTION SUBCUTANEOUS at 18:23

## 2020-08-25 RX ADMIN — Medication 12.5 MILLIGRAM(S): at 18:17

## 2020-08-25 RX ADMIN — SODIUM CHLORIDE 70 MILLILITER(S): 9 INJECTION INTRAMUSCULAR; INTRAVENOUS; SUBCUTANEOUS at 00:00

## 2020-08-25 RX ADMIN — Medication 250 MILLIGRAM(S): at 09:14

## 2020-08-25 RX ADMIN — Medication 40 MILLIEQUIVALENT(S): at 06:02

## 2020-08-25 RX ADMIN — Medication 1 PACKET(S): at 12:04

## 2020-08-25 RX ADMIN — Medication 1 DROP(S): at 06:02

## 2020-08-25 RX ADMIN — Medication 1 PACKET(S): at 10:05

## 2020-08-25 RX ADMIN — Medication 300 MILLIGRAM(S): at 23:01

## 2020-08-25 RX ADMIN — Medication 1 DROP(S): at 22:32

## 2020-08-25 RX ADMIN — Medication 4 MILLILITER(S): at 05:32

## 2020-08-25 RX ADMIN — CEFEPIME 100 MILLIGRAM(S): 1 INJECTION, POWDER, FOR SOLUTION INTRAMUSCULAR; INTRAVENOUS at 15:02

## 2020-08-25 RX ADMIN — Medication 3 MILLILITER(S): at 05:31

## 2020-08-25 RX ADMIN — TENOFOVIR DISOPROXIL FUMARATE 300 MILLIGRAM(S): 300 TABLET, FILM COATED ORAL at 22:32

## 2020-08-25 RX ADMIN — FINASTERIDE 5 MILLIGRAM(S): 5 TABLET, FILM COATED ORAL at 11:57

## 2020-08-25 RX ADMIN — Medication 12.5 MILLIGRAM(S): at 06:08

## 2020-08-25 RX ADMIN — POTASSIUM PHOSPHATE, MONOBASIC POTASSIUM PHOSPHATE, DIBASIC 63.75 MILLIMOLE(S): 236; 224 INJECTION, SOLUTION INTRAVENOUS at 13:09

## 2020-08-25 RX ADMIN — Medication 40 MILLIEQUIVALENT(S): at 09:14

## 2020-08-25 RX ADMIN — Medication 4 MILLILITER(S): at 16:49

## 2020-08-25 RX ADMIN — Medication 4 MILLILITER(S): at 23:59

## 2020-08-25 RX ADMIN — Medication 4 MILLIGRAM(S): at 22:32

## 2020-08-25 RX ADMIN — Medication 100 MILLIGRAM(S): at 18:17

## 2020-08-25 RX ADMIN — Medication 3 MILLILITER(S): at 23:30

## 2020-08-25 RX ADMIN — Medication 650 MILLIGRAM(S): at 08:34

## 2020-08-25 RX ADMIN — Medication 100 GRAM(S): at 06:02

## 2020-08-25 RX ADMIN — Medication 3 MILLILITER(S): at 16:49

## 2020-08-25 RX ADMIN — Medication 3 MILLILITER(S): at 11:56

## 2020-08-25 RX ADMIN — Medication 650 MILLIGRAM(S): at 09:36

## 2020-08-25 RX ADMIN — Medication 1 DROP(S): at 11:55

## 2020-08-25 NOTE — CHART NOTE - NSCHARTNOTEFT_GEN_A_CORE
Infectious Diseases Anti-infective Approval Note    Medication: cefepime   Dose: 2g  Route: IV  Frequency: q8h  Duration: 5 days    Dose may be adjusted as needed for alterations in renal function.    *THIS IS NOT AN INFECTIOUS DISEASES CONSULTATION*

## 2020-08-25 NOTE — PROGRESS NOTE ADULT - ASSESSMENT
59y/M with  1.	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	Hepatitis B  3.	dyslipidemia     PLAN:   NEURO: neurochecks q4h  continue quetiapine  REHAB:  physical therapy evaluation and management    EARLY MOB:  HOB up    PULM:  PRN O2 support to keep sats >/=92%, continue nebs, acetylcysteine, pulmonary toilette; for vocal cord injection tomorrow per ENT  CARDIO:  SBP goal 100-140mm Hg start metoprolol 25mg PO BID, continue amlodipine, taper off cardene  ENDO:  Blood sugar goals 140-180 mg/dL, continue insulin sliding, continue statins; insulin glargine 14, nutritional 3-3-3-3  GI:  bowel regimen  DIET: jevity., NPO after midnight  RENAL:  IVF once NPO  HEM/ONC: no coagulopathy, no h/o antiplatelet / anticoagulant use  VTE Prophylaxis: SCDs, no DVT chemoprophylaxis for now as patient is high risk for bleed (OR tomorrow)  ID: afebrile, rising WBC, continue cefepime, vancomycin, tenofovir  Social: will update family, son Perry updated ([phone]).  2 daughters Shantell  and Tammie ; wife name is Julián Sahu    CORE MEASURES:  1.  NIHSS =   2.  ICH Score = 4  3.  VTE prophylaxis: [ ] administered within 24h of admission OR [x] reason not done - fresh bleed  4.  Dysphagia screening: [X] performed before any oral meds / liquids / food    ATTENDING ATTESTATION:  I was physically present for the key portions of the evaluation and management (E/M) service provided.  I agree with the above history, physical and plan which I have reviewed and edited where appropriate.     Patient not at high risk for neurologic deterioration / death.  Time spent on this noncritically ill patient: 45 minutes spent on total encounter, more than 50% of the visit was spent counseling and/or coordinating care by the attending physician.    Plan discussed with RN, house staff.    REVIEW OF SYSTEMS:  No headaches, no nausea or vomiting; 14 -point review of systems otherwise unremarkable.

## 2020-08-25 NOTE — PROCEDURE NOTE - NSICDXPROCEDURE_GEN_ALL_CORE_FT
PROCEDURES:  Ultrasound guided venous access 25-Aug-2020 17:46:29  Alejandro Castillo  Insertion, catheter, intravenous 25-Aug-2020 17:46:01  Alejandro Castillo
PROCEDURES:  Percutaneous insertion of arterial line 04-Aug-2020 08:47:26  Davon Gonzalez  Angiogram, carotid and cerebral vessels, bilateral 04-Aug-2020 08:18:53  Davon Gonzalez

## 2020-08-25 NOTE — PROCEDURE NOTE - NSPROCDETAILS_GEN_ALL_CORE
secured in place/blood seen on insertion/flushes easily/sterile technique, catheter placed/ultrasound utilization/dressing applied/location identified, draped/prepped, sterile technique used
all materials/supplies accounted for at end of procedure/location identified, draped/prepped, sterile technique used, needle inserted/introduced/positive blood return obtained via catheter/sutured in place/connected to a pressurized flush line

## 2020-08-25 NOTE — PROGRESS NOTE ADULT - SUBJECTIVE AND OBJECTIVE BOX
HPI:  59 year old male with PMH of HLD and unclear liver disease had acute onset of severe headache, dizziness and vomiting x1 today, was brought into [facility] with continued symptoms, as well as hypertension and multiple episodes of vomiting. Patient was intubated for airway protection with CT Head performed demonstrating a large left cerebellar hemorrhage. CTA Head/Neck performed is suggestive of underlying vascular pathology such as AVM. RIght frontal ventriculostomy placed at [facility] and patient transferred to [facility] for further work-up. Patient arrives to [facility] intubated, sedated and on Nicardipine drip. Patient's son provided history and denies any Aspirin or other anticoagulant use. Denies any prior history of hypertension, smoking, or ETOH abuse. (04 Aug 2020 05:01)    OVERNIGHT EVENTS: EMIL o/n, neuro stable    Hospital Course:   POD# 0 S/P cerebral angio, negative for AVM. s/p SOC craniectomy evacuation of cerebellar hematoma.   POD # 1:   overnight tachycardic, ekg without significant changes, lactate wnl, low grade fever, s/p tyl. bolus x 1L , NS increased to 100cc/hr; neuro improving.  EVD at 5 cmH2O   8/6 POD#3, BD#4, Tmax 100.6, Pan Cx 7/5 NGTD, Vanc/Zosyn until 8/10, Vanc trough due today @ 6pm, EEG = no seizure, Lasix overnight for Pulm congest and tachycard, ruff placed for strict I&Os, pending anemia w/u, EVD @ 5, CPAP trials, Card GTT for SBP<140, Started Norvasc for BP optimization  :  :  S/P cerebral angio, negative for AVM. s/p SOC craniectomy evacuation of cerebellar hematoma. No significant events overnight. On CPAP this morning   8/10: fevers overnight, resolved this morning, Pancultured, (+) Enterbacter, on CPAP this AM   POD#7 SOC, BD # 8, EVD replaced (hard pass), Neuro exam stable, EVD @ 5cm H2O, Zosyn for sputum + enterobact, Plan to extubated today, HCT in AM   POD#8 s/p SOC, BD#9, s/p EVD replacement (hard pass on 8/10/20), neuro exam stable, EVD @5cm H2O. Zosyn for enterobacter in sputum. O/n EVD stopped draining, flushed distally and proximally, waveform poor, neuro exam remained stable throughout, ICPs <8 prior.     EVD soft pass yesterday, spiked temp today and re cultured o/w neuro exam stable  : EMIL overnight, neuro stable. Mucomyst for secretions overnight   8/15 EVD dc/d, temp spike to 104, pan culture, ID consulted, vanco and cefepime started   8/15 levo started, presumed sepsis from aspiration, continues to have diarrhea  : EMIL  : EMIL. Neuro exam stable  : Increased secretions overnight. Chest xray obtained. NGT replaced. Neuro exam stable. Patient transferred to ICU for monitoring due to congestion and possible L effusion on CXR along with desaturation and frequent suctioning.   : POD16, on EEG, EMIL o/n, transferred to , plan for PEG tomorrow   POD#17 overnight given Keppra 1g IV x 1 dose for blank stare and EEG findings: pending final read on EEG, Cefep for PNA, PEG today, NPO, Amonia Serum level today w/u for enceph, vEEG,    POD#18, EMIL overnight, PEG placed, start TF 9 AM today, Neuro exam stable   POD #19 EMIL overnight, repeat CT head stable.  : POD#20 EMIL overnight   : POD21. EMIL o/n, neuro stable. on vEEG. vanc/cefepime for possible PNA. requiring frequent suctioning   : POD22. EMIL o/n, neuro stable. off vEEG (was negative). on vanc/cefepime PNA coverage. pre op for vocal cord injection w/ ENT today    Vital Signs Last 24 Hrs  T(C): 36.9 (24 Aug 2020 18:00), Max: 38.1 (24 Aug 2020 11:00)  T(F): 98.4 (24 Aug 2020 18:00), Max: 100.6 (24 Aug 2020 11:00)  HR: 101 (24 Aug 2020 23:00) (82 - 112)  BP: 118/62 (24 Aug 2020 23:00) (103/54 - 185/90)  BP(mean): 84 (24 Aug 2020 23:00) (73 - 125)  RR: 17 (24 Aug 2020 23:00) (16 - 33)  SpO2: 100% (24 Aug 2020 23:00) (90% - 100%)    I&O's Summary    23 Aug 2020 07:01  -  24 Aug 2020 07:00  --------------------------------------------------------  IN: 3075 mL / OUT: 2250 mL / NET: 825 mL    24 Aug 2020 07:  -  25 Aug 2020 00:01  --------------------------------------------------------  IN: 1383 mL / OUT: 1300 mL / NET: 83 mL        PHYSICAL EXAM:  GEN: laying in bed, appears well, NAD  NEURO: alert, no tracking. FC (better in cantonese), OE spont, nonverbal, face symmetric. CNII-XII intact. PERRL, EOMI. MAEx4 with good strength  CARD: RRR +S1/S2  PULM: +rhonchi  GI: Abd soft, NT/ND  EXT: ext warm, dry, nontender  WOUND: SOC site c/d/i    TUBES/LINES:  [] Ruff  [] Lumbar Drain  [] Wound Drains  [x] peg      DIET:  [x] NPO  [] Mechanical  [] Tube feeds    LABS:                        8.1    15.43 )-----------( 217      ( 24 Aug 2020 05:18 )             25.1     08-24    136  |  102  |  11  ----------------------------<  108<H>  3.8   |  23  |  0.52    Ca    8.3<L>      24 Aug 2020 05:18  Phos  1.7     08-24  Mg     2.1     08-24        Urinalysis Basic - ( 23 Aug 2020 17:48 )    Color: Yellow / Appearance: Clear / S.015 / pH: x  Gluc: x / Ketone: NEGATIVE  / Bili: Negative / Urobili: 0.2 E.U./dL   Blood: x / Protein: NEGATIVE mg/dL / Nitrite: NEGATIVE   Leuk Esterase: NEGATIVE / RBC: > 10 /HPF / WBC < 5 /HPF   Sq Epi: x / Non Sq Epi: 0-5 /HPF / Bacteria: Present /HPF          CAPILLARY BLOOD GLUCOSE      POCT Blood Glucose.: 160 mg/dL (24 Aug 2020 23:00)  POCT Blood Glucose.: 118 mg/dL (24 Aug 2020 17:43)  POCT Blood Glucose.: 186 mg/dL (24 Aug 2020 11:16)  POCT Blood Glucose.: 113 mg/dL (24 Aug 2020 06:48)      Drug Levels: [] N/A    CSF Analysis: [] N/A      Allergies    No Known Allergies    Intolerances      MEDICATIONS:  Antibiotics:  cefepime   IVPB 2000 milliGRAM(s) IV Intermittent every 8 hours  tenofovir disoproxil fumarate (VIREAD) 300 milliGRAM(s) Oral every 24 hours  vancomycin  IVPB 1000 milliGRAM(s) IV Intermittent every 12 hours    Neuro:  acetaminophen    Suspension .. 650 milliGRAM(s) Oral every 6 hours PRN  QUEtiapine 25 milliGRAM(s) Oral at bedtime    Anticoagulation:    OTHER:  acetylcysteine 20%  Inhalation 4 milliLiter(s) Inhalation every 6 hours  albuterol/ipratropium for Nebulization. 3 milliLiter(s) Nebulizer every 6 hours  artificial tears (preservative free) Ophthalmic Solution 1 Drop(s) Both EYES three times a day  chlorhexidine 2% Cloths 1 Application(s) Topical <User Schedule>  dextrose 40% Gel 15 Gram(s) Oral once PRN  dextrose 50% Injectable 12.5 Gram(s) IV Push once  dextrose 50% Injectable 25 Gram(s) IV Push once  doxazosin 4 milliGRAM(s) Oral at bedtime  finasteride 5 milliGRAM(s) Oral daily  glucagon  Injectable 1 milliGRAM(s) IntraMuscular once PRN  glucagon  Injectable 1 milliGRAM(s) IntraMuscular once PRN  hydrALAZINE Injectable 10 milliGRAM(s) IV Push every 2 hours PRN  insulin regular  human corrective regimen sliding scale.   SubCutaneous every 6 hours  insulin regular  human recombinant 4 Unit(s) SubCutaneous every 6 hours  lidocaine 4% Laryngotracheal Topical Anesthesia Kit 4 milliLiter(s) Oral Mucosa once  metoprolol tartrate 12.5 milliGRAM(s) Oral every 12 hours  Mometasone Furoate 0.1% Ointment 1 Application(s) 1 Application(s) Topical two times a day PRN  psyllium Powder 1 Packet(s) Oral daily  sodium chloride 0.9% for Nebulization 3 milliLiter(s) Nebulizer three times a day PRN    IVF:  dextrose 5%. 1000 milliLiter(s) IV Continuous <Continuous>  sodium chloride 0.9%. 1000 milliLiter(s) IV Continuous <Continuous>    CULTURES:  Culture Results:   No growth at 1 day. ( @ 14:32)  Culture Results:   No growth at 1 day. ( @ 14:32)    RADIOLOGY & ADDITIONAL TESTS:      ASSESSMENT:  59y Male  Male with ICH/IVH s/p SOC crani POD #22 (20), s/p EVD placement, removed (20).    INTRACRANIAL BLEED  No pertinent family history in first degree relatives  Handoff  MEWS Score  Hepatitis B  HLD (hyperlipidemia)  ICH (intracerebral hemorrhage)  ICH (intracerebral hemorrhage)  Hepatitis B  HLD (hyperlipidemia)  HIV (human immunodeficiency virus infection)  Cerebellar hemorrhage  EGD, with PEG  Craniectomy, subocciptal, with cervical laminectomy for medulla and spinal cord decompression  Percutaneous insertion of arterial line  Angiogram, carotid and cerebral vessels, bilateral  Foot fracture, left      PLAN:  NEURO:  - neuro/vitals checks  - vEEG negative, now off  - pain control  - pre op today for vocal cord injection w/ ENT    CARDIOVASCULAR:  - -140  - cont metoprolol     PULMONARY:  - +secretions, requiring frequent suctioning  - satting well RA, NC prn  - chest PT  - cont saline nebs, duonebs, mucomyst     RENAL:  - IVF  - daily labs, repletions prn  - cont proscar, cardura  - SC prn    GI:  - TF held for OR  - cont metamucil    HEME:  - h/h stable  - SCDs/SQL    ID:  - febrile, pan cx   - cont vanc/cefepime for empiric PNA/CSF coverage    ENDO:  - glucose goal 140-180    DVT PROPHYLAXIS:  [x] Venodynes                                [] Heparin/Lovenox    DISPOSITION: ICU status, full code, dispo pending    D/w Dr. Blank    Assessment:  Present when checked    []  GCS  E   V  M     Heart Failure: []Acute, [] acute on chronic , []chronic  Heart Failure:  [] Diastolic (HFpEF), [] Systolic (HFrEF), []Combined (HFpEF and HFrEF), [] RHF, [] Pulm HTN, [] Other    [] SARAH, [] ATN, [] AIN, [] other  [] CKD1, [] CKD2, [] CKD 3, [] CKD 4, [] CKD 5, []ESRD    Encephalopathy: [] Metabolic, [] Hepatic, [] toxic, [] Neurological, [] Other    Abnormal Nurtitional Status: [] malnurtition (see nutrition note), [ ]underweight: BMI < 19, [] morbid obesity: BMI >40, [] Cachexia    [] Sepsis  [] hypovolemic shock,[] cardiogenic shock, [] hemorrhagic shock, [] neuogenic shock  [] Acute Respiratory Failure  []Cerebral edema, [] Brain compression/ herniation,   [] Functional quadriplegia  [] Acute blood loss anemia

## 2020-08-25 NOTE — BRIEF OPERATIVE NOTE - OPERATION/FINDINGS
microdirect suspension laryngoscopy with left true vocal fold injection with prolaryn (carboxymethylcellulose/glycerin)

## 2020-08-25 NOTE — PROGRESS NOTE ADULT - SUBJECTIVE AND OBJECTIVE BOX
=================================  NEUROCRITICAL CARE ATTENDING NOTE  =================================    VEDA WARREN   MRN-[id number]  HPI: 59M with dyslipidemia, h/o Hep B infection, presented with acute onset severe HA, dizziness, vomiting - brought to [facility] where CT showed L cerebellar hemorrhage.  Intubated for airway protection.  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  R EVD inserted, transferred to [facility] for further management.  given mannitol 25G in [facility],     COURSE IN THE HOSPITAL:   bleed   admitted to [facility], 23.4 given x1; OR for SOC evacuation of ICH   remained intubated overnight   tachycardia overnight, Tmax 38.4, ?PNA, ABx    Tmax 38.1 diuresed overnight 1.8L.     s/p angio, no AVM, s/p SOC craniectomy, evacuation of hematoma   extubated   EVD replaced  08/15 EVD discontinued, fever, cefepime/vancomycin, septic shock, on levophed, diarrhea   transferred back to ICU   ammy barahona, levetiracetam given, PEG   Tmax 38.1   Tmax 39.2  s/p microdirect suspension laryngoscopy with L true VC fold injection with prolaryn (2020, Dr. Musa)    Past Medical History: Hepatitis B HLD (hyperlipidemia)  Allergies:  No Known Allergies  Home meds:   ·	Omega-3 350 mg oral capsule: 1 cap(s) orally once a day  ·	tenofovir disoproxil fumarate 300 mg oral tablet: 1 tab(s) orally once a day    PHYSICAL EXAMINATION  T(C): 36.1 ( @ 21:00), Max: 39.2 ( @ 08:33) HR: 84 ( @ 21:30) (78 - 118) BP: 138/81 ( @ 21:30) (107/64 - 157/68) RR: 17 ( @ 21:30) (12 - 35) SpO2: 100% ( @ 21:30) (92% - 100%)  NEUROLOGIC EXAMINATION:  Patient opens eyes spontaneously, does not follow commands; no verbal output, pupils equal and reactive, EOM intact, moves all 4s    GENERAL: not intubated  EENT:  anicteric  CARDIOVASCULAR: (+) S1 S2, tachycardic, rate and regular rhythm  PULMONARY: clear to auscultation bilaterally  ABDOMEN: soft, nontender with normoactive bowel sounds  EXTREMITIES: no edema  SKIN: no rash    LABS:   CAPILLARY BLOOD GLUCOSE 161 135 107 160     (10.42)  8.5   (7)  12.76 )-----------( 209      ( 25 Aug 2020 05:59 )             27.3     143  |  112<H>  |  8   ----------------------------<  83  3.0<L>   |  20<L>  |  0.47<L>    Ca    6.8<L>      25 Aug 2020 05:08  Phos  2.3       Mg     1.8      @ 07:01  -   @ 07:00  IN:  mL / OUT: 1850 mL / NET: 153 mL    HbA1C = 5.6 ()  LDL = 185 ()   HDL = 36 () TG = 252 ()  TSH = 0.460 ()    Bacteriology:    UA NEG   sputum GS:  rare GPCIpairs chains clusters   Blood CS: NG2D x2   sputum culture:  rejected, oropharyngeal contamination   sputum few GNR   CSF NGTD   Blood CS NG2D x2    CSF studies:    L   *** [id number] *** %N31 %L5     EEG:  Neuroimagin/06 CT head:     CT head:  post-surgical changes, stable HCP  Other imagin/23 CXR:  discoid change R lung, R basilar infiltrate cannot be excluded   LE doppler: NEG   CXR:  mild atelectasis L lung base    MEDICATIONS:   cefepime 2G IV q8h tenofovir 300 PO q24h vancomycin 1500 IV q12h amantadine 100mg PO BID quetiapine 25mg PO HS doxazosin 4mg PO HS metoprolol 12.5mg PO q12h acetylcysteine q6h ipratropium / albuterol q6h psyllium daily finasteride 5mg PO daily mod ISS insulin lispro 4 units q6h artificial tears Peridex mometasone PRN      IV FLUIDS: NS@70cc/hr  DRIPS:   DIET: Jevity @60  Lines:  Drains:    Wounds:    CODE STATUS:  Full Code                       GOALS OF CARE:  aggressive                      DISPOSITION:  ICU  ICH Score on admission  = 4, NIHSS

## 2020-08-25 NOTE — BRIEF OPERATIVE NOTE - NSICDXBRIEFPROCEDURE_GEN_ALL_CORE_FT
PROCEDURES:  Laryngoscopy, direct, adult 25-Aug-2020 18:21:52  Cristobal Brantley
PROCEDURES:  Angiogram, carotid and cerebral vessels, bilateral 04-Aug-2020 08:18:53  Davon Gonzalez
PROCEDURES:  Craniectomy, subocciptal, with cervical laminectomy for medulla and spinal cord decompression 04-Aug-2020 15:13:59 for evacuation of cerebellar hematoma Nancy Ruff E
PROCEDURES:  EGD, with PEG 20-Aug-2020 09:42:04  Jaret Duffy

## 2020-08-25 NOTE — PROGRESS NOTE ADULT - ASSESSMENT
Assessment and Plan:   59y/M with  1.	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	Hepatitis B  3.	dyslipidemia     NEURO: continued neurological improvement    EEG with infrequent GPDs, no seizures --> continue off AEDs  recurrent fluctuating exam - connect to CEEG, consider CTH tonight if not improved.  If not improved and panculture otherwise negative then should obtain LP  ENT to augment vocal cords to min aspiration, to OR tomorrow    REHAB:  physical therapy evaluation and management    EARLY MOB:  HOB up    ID: febrile, worsening leukocytosis --> restart vanco+cefepime, consider LP if not clearly improved or remainder of panculture negative.  Cdiff negative, Abd CT negative   enterobacter from sputum  chronic HBV, on home antiviral    PULM: mild aspiration, high suctioning requirements, q1h  continue aggressive pulmomnary toileting, chest PT  continue saline nebs and mucomyst  ENT for vocal cord augmentation, can facilitate stronger cough and airway protection--> to OR today    CARDIO:  1) afib with RVR prior SBP goal 100-140mm Hg   -on metoprolol 12.5 mg BID    ENDO:  Blood sugar goals 140-180 mg/dL, continue insulin sliding, continue statins  -insulin nutritional 4-4-4-4  -lantus 8 units QHS      GI: diebetic tube feeds  -s/p PEG  restart TF, NPO p MN, watch glu    RENAL:  MURIEL    HEM/ONC: no coagulopathy, no h/o antiplatelet / anticoagulant use  VTE Prophylaxis: SCDs, SQL     Dispo: continue in ICU for high suctioing frequency  -q1h Assessment and Plan:   59y/M with  1.	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	Hepatitis B  3.	dyslipidemia     NEURO: continued neurological improvement  EEG with infrequent GPDs, no seizures --> continue off AEDs    ENT to augment vocal cords to min aspiration, to OR tomorrow    REHAB:  physical therapy evaluation and management    EARLY MOB:  HOB up    ID: febrile, worsening leukocytosis --> restart vanco+cefepime x 7 days  chronic HBV, on home antiviral    PULM: mild aspiration, high suctioning requirements, q1h  continue aggressive pulmomnary toileting, chest PT  continue saline nebs and mucomyst  ENT for vocal cord augmentation, can facilitate stronger cough and airway protection--> to OR today    CARDIO:  1) afib with RVR prior SBP goal 100-140mm Hg   -on metoprolol 12.5 mg BID    ENDO:  Blood sugar goals 140-180 mg/dL, continue insulin sliding, continue statins  -insulin nutritional 4-4-4-4  -lantus 8 units QHS    GI: diebetic tube feeds  -s/p PEG  restart TF after surgery, NPO p MN, watch glu    RENAL:  MURIEL    HEM/ONC: no coagulopathy, no h/o antiplatelet / anticoagulant use  VTE Prophylaxis: SCDs, SQL     Dispo: continue in ICU for frequent suctioning from RNs Assessment and Plan:   59y/M with  1.	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	Hepatitis B  3.	dyslipidemia     NEURO: continued neurological improvement  EEG with infrequent GPDs, no seizures --> continue off AEDs    ENT to augment vocal cords to min aspiration, to OR tomorrow    REHAB:  physical therapy evaluation and management    EARLY MOB:  HOB up    ID: febrile, worsening leukocytosis --> restart vanco+cefepime x 7 days  chronic HBV, on home antiviral    PULM: mild aspiration, high suctioning requirements, q1h  continue aggressive pulmonary toileting, chest PT  continue saline nebs and mucomyst  ENT for vocal cord augmentation, can facilitate stronger cough and airway protection--> to OR today    CARDIO:  1) afib with RVR prior SBP goal 100-140mm Hg   -on metoprolol 12.5 mg BID    ENDO:  Blood sugar goals 140-180 mg/dL, continue insulin sliding, continue statins  -insulin nutritional 4-4-4-4  -lantus 8 units QHS    GI: diebetic tube feeds  -s/p PEG  restart TF after surgery, NPO p MN, watch glu    RENAL:  MURIEL    HEM/ONC: no coagulopathy, no h/o antiplatelet / anticoagulant use  VTE Prophylaxis: SCDs, SQL     Dispo: continue in ICU for frequent suctioning from RNs  Social:  update sisters

## 2020-08-25 NOTE — PROCEDURE NOTE - NSPOSTCAREGUIDE_GEN_A_CORE
Verbal/written post procedure instructions were given to patient/caregiver
Keep the cast/splint/dressing clean and dry/Verbal/written post procedure instructions were given to patient/caregiver/Instructed patient/caregiver regarding signs and symptoms of infection/Instructed patient/caregiver to follow-up with primary care physician/Care for catheter as per unit/ICU protocols
Care for catheter as per unit/ICU protocols

## 2020-08-26 LAB
ANION GAP SERPL CALC-SCNC: 10 MMOL/L — SIGNIFICANT CHANGE UP (ref 5–17)
BUN SERPL-MCNC: 9 MG/DL — SIGNIFICANT CHANGE UP (ref 7–23)
CALCIUM SERPL-MCNC: 8.3 MG/DL — LOW (ref 8.4–10.5)
CHLORIDE SERPL-SCNC: 101 MMOL/L — SIGNIFICANT CHANGE UP (ref 96–108)
CO2 SERPL-SCNC: 25 MMOL/L — SIGNIFICANT CHANGE UP (ref 22–31)
CREAT SERPL-MCNC: 0.49 MG/DL — LOW (ref 0.5–1.3)
CULTURE RESULTS: NO GROWTH — SIGNIFICANT CHANGE UP
GLUCOSE BLDC GLUCOMTR-MCNC: 110 MG/DL — HIGH (ref 70–99)
GLUCOSE BLDC GLUCOMTR-MCNC: 136 MG/DL — HIGH (ref 70–99)
GLUCOSE BLDC GLUCOMTR-MCNC: 143 MG/DL — HIGH (ref 70–99)
GLUCOSE SERPL-MCNC: 126 MG/DL — HIGH (ref 70–99)
HCT VFR BLD CALC: 26.9 % — LOW (ref 39–50)
HGB BLD-MCNC: 8.2 G/DL — LOW (ref 13–17)
MAGNESIUM SERPL-MCNC: 2.5 MG/DL — SIGNIFICANT CHANGE UP (ref 1.6–2.6)
MCHC RBC-ENTMCNC: 27.4 PG — SIGNIFICANT CHANGE UP (ref 27–34)
MCHC RBC-ENTMCNC: 30.5 GM/DL — LOW (ref 32–36)
MCV RBC AUTO: 90 FL — SIGNIFICANT CHANGE UP (ref 80–100)
NRBC # BLD: 0 /100 WBCS — SIGNIFICANT CHANGE UP (ref 0–0)
PHOSPHATE SERPL-MCNC: 3.1 MG/DL — SIGNIFICANT CHANGE UP (ref 2.5–4.5)
PLATELET # BLD AUTO: 315 K/UL — SIGNIFICANT CHANGE UP (ref 150–400)
POTASSIUM SERPL-MCNC: 4.2 MMOL/L — SIGNIFICANT CHANGE UP (ref 3.5–5.3)
POTASSIUM SERPL-SCNC: 4.2 MMOL/L — SIGNIFICANT CHANGE UP (ref 3.5–5.3)
RBC # BLD: 2.99 M/UL — LOW (ref 4.2–5.8)
RBC # FLD: 15.8 % — HIGH (ref 10.3–14.5)
SODIUM SERPL-SCNC: 136 MMOL/L — SIGNIFICANT CHANGE UP (ref 135–145)
SPECIMEN SOURCE: SIGNIFICANT CHANGE UP
WBC # BLD: 8.53 K/UL — SIGNIFICANT CHANGE UP (ref 3.8–10.5)
WBC # FLD AUTO: 8.53 K/UL — SIGNIFICANT CHANGE UP (ref 3.8–10.5)

## 2020-08-26 PROCEDURE — 71045 X-RAY EXAM CHEST 1 VIEW: CPT | Mod: 26

## 2020-08-26 RX ORDER — CEFAZOLIN SODIUM 1 G
2000 VIAL (EA) INJECTION ONCE
Refills: 0 | Status: DISCONTINUED | OUTPATIENT
Start: 2020-08-26 | End: 2020-08-26

## 2020-08-26 RX ORDER — CEFAZOLIN SODIUM 1 G
2000 VIAL (EA) INJECTION EVERY 8 HOURS
Refills: 0 | Status: DISCONTINUED | OUTPATIENT
Start: 2020-08-26 | End: 2020-08-26

## 2020-08-26 RX ORDER — CEFAZOLIN SODIUM 1 G
VIAL (EA) INJECTION
Refills: 0 | Status: DISCONTINUED | OUTPATIENT
Start: 2020-08-26 | End: 2020-08-26

## 2020-08-26 RX ORDER — CEFAZOLIN SODIUM 1 G
2000 VIAL (EA) INJECTION EVERY 8 HOURS
Refills: 0 | Status: DISCONTINUED | OUTPATIENT
Start: 2020-08-27 | End: 2020-09-01

## 2020-08-26 RX ADMIN — Medication 300 MILLIGRAM(S): at 11:34

## 2020-08-26 RX ADMIN — Medication 12.5 MILLIGRAM(S): at 18:18

## 2020-08-26 RX ADMIN — INSULIN HUMAN 4 UNIT(S): 100 INJECTION, SOLUTION SUBCUTANEOUS at 18:18

## 2020-08-26 RX ADMIN — Medication 1 PACKET(S): at 11:34

## 2020-08-26 RX ADMIN — Medication 1 DROP(S): at 05:34

## 2020-08-26 RX ADMIN — CEFEPIME 100 MILLIGRAM(S): 1 INJECTION, POWDER, FOR SOLUTION INTRAMUSCULAR; INTRAVENOUS at 07:38

## 2020-08-26 RX ADMIN — Medication 100 MILLIGRAM(S): at 05:31

## 2020-08-26 RX ADMIN — Medication 4 MILLILITER(S): at 16:39

## 2020-08-26 RX ADMIN — Medication 3 MILLILITER(S): at 16:39

## 2020-08-26 RX ADMIN — Medication 3 MILLILITER(S): at 03:15

## 2020-08-26 RX ADMIN — CEFEPIME 100 MILLIGRAM(S): 1 INJECTION, POWDER, FOR SOLUTION INTRAMUSCULAR; INTRAVENOUS at 00:24

## 2020-08-26 RX ADMIN — Medication 3 MILLILITER(S): at 10:04

## 2020-08-26 RX ADMIN — Medication 4 MILLILITER(S): at 10:05

## 2020-08-26 RX ADMIN — CHLORHEXIDINE GLUCONATE 1 APPLICATION(S): 213 SOLUTION TOPICAL at 05:34

## 2020-08-26 RX ADMIN — SODIUM CHLORIDE 70 MILLILITER(S): 9 INJECTION INTRAMUSCULAR; INTRAVENOUS; SUBCUTANEOUS at 00:46

## 2020-08-26 RX ADMIN — Medication 4 MILLILITER(S): at 03:16

## 2020-08-26 RX ADMIN — Medication 3 MILLILITER(S): at 22:44

## 2020-08-26 RX ADMIN — Medication 1 DROP(S): at 16:30

## 2020-08-26 RX ADMIN — INSULIN HUMAN 4 UNIT(S): 100 INJECTION, SOLUTION SUBCUTANEOUS at 11:34

## 2020-08-26 RX ADMIN — CEFEPIME 100 MILLIGRAM(S): 1 INJECTION, POWDER, FOR SOLUTION INTRAMUSCULAR; INTRAVENOUS at 16:29

## 2020-08-26 RX ADMIN — Medication 12.5 MILLIGRAM(S): at 05:31

## 2020-08-26 RX ADMIN — Medication 100 MILLIGRAM(S): at 18:18

## 2020-08-26 RX ADMIN — FINASTERIDE 5 MILLIGRAM(S): 5 TABLET, FILM COATED ORAL at 11:35

## 2020-08-26 NOTE — PROGRESS NOTE ADULT - SUBJECTIVE AND OBJECTIVE BOX
=================================  NEUROCRITICAL CARE ATTENDING NOTE  =================================    VEDA WARREN   MRN-[id number]  HPI: 59M with dyslipidemia, h/o Hep B infection, presented with acute onset severe HA, dizziness, vomiting - brought to [facility] where CT showed L cerebellar hemorrhage.  Intubated for airway protection.  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  R EVD inserted, transferred to [facility] for further management.  given mannitol 25G in [facility],     COURSE IN THE HOSPITAL:   bleed   admitted to [facility], 23.4 given x1; OR for SOC evacuation of ICH   remained intubated overnight   tachycardia overnight, Tmax 38.4, ?PNA, ABx    Tmax 38.1 diuresed overnight 1.8L.     s/p angio, no AVM, s/p SOC craniectomy, evacuation of hematoma   extubated   EVD replaced  08/15 EVD discontinued, fever, cefepime/vancomycin, septic shock, on levophed, diarrhea   transferred back to ICU   ammy barahona, levetiracetam given, PEG   Tmax 38.1   Tmax 39.2  s/p microdirect suspension laryngoscopy with L true VC fold injection with prolaryn (2020, Dr. Musa)    Past Medical History: Hepatitis B HLD (hyperlipidemia)  Allergies:  No Known Allergies  Home meds:   ·	Omega-3 350 mg oral capsule: 1 cap(s) orally once a day  ·	tenofovir disoproxil fumarate 300 mg oral tablet: 1 tab(s) orally once a day    PHYSICAL EXAMINATION  T(C): 36.1 ( @ 21:00), Max: 39.2 ( @ 08:33) HR: 84 ( @ 21:30) (78 - 118) BP: 138/81 ( @ 21:30) (107/64 - 157/68) RR: 17 ( @ 21:30) (12 - 35) SpO2: 100% ( @ 21:30) (92% - 100%)  NEUROLOGIC EXAMINATION:  Patient opens eyes spontaneously, does not follow commands; no verbal output, pupils equal and reactive, EOM intact, moves all 4s    GENERAL: not intubated  EENT:  anicteric  CARDIOVASCULAR: (+) S1 S2, tachycardic, rate and regular rhythm  PULMONARY: clear to auscultation bilaterally  ABDOMEN: soft, nontender with normoactive bowel sounds  EXTREMITIES: no edema  SKIN: no rash    LABS:   CAPILLARY BLOOD GLUCOSE 161 135 107 160     (10.42)  8.5   (7)  12.76 )-----------( 209      ( 25 Aug 2020 05:59 )             27.3     143  |  112<H>  |  8   ----------------------------<  83  3.0<L>   |  20<L>  |  0.47<L>    Ca    6.8<L>      25 Aug 2020 05:08  Phos  2.3       Mg     1.8      @ 07:01  -   @ 07:00  IN:  mL / OUT: 1850 mL / NET: 153 mL    HbA1C = 5.6 ()  LDL = 185 ()   HDL = 36 () TG = 252 ()  TSH = 0.460 ()    Bacteriology:    UA NEG   sputum GS:  rare GPCIpairs chains clusters   Blood CS: NG2D x2   sputum culture:  rejected, oropharyngeal contamination   sputum few GNR   CSF NGTD   Blood CS NG2D x2    CSF studies:    L   *** [id number] *** %N31 %L5     EEG:  Neuroimagin/06 CT head:     CT head:  post-surgical changes, stable HCP  Other imagin/23 CXR:  discoid change R lung, R basilar infiltrate cannot be excluded   LE doppler: NEG   CXR:  mild atelectasis L lung base    MEDICATIONS:   cefepime 2G IV q8h tenofovir 300 PO q24h vancomycin 1500 IV q12h amantadine 100mg PO BID quetiapine 25mg PO HS doxazosin 4mg PO HS metoprolol 12.5mg PO q12h acetylcysteine q6h ipratropium / albuterol q6h psyllium daily finasteride 5mg PO daily mod ISS insulin lispro 4 units q6h artificial tears Peridex mometasone PRN      IV FLUIDS: NS@70cc/hr  DRIPS:   DIET: Jevity @60  Lines:  Drains:    Wounds:    CODE STATUS:  Full Code                       GOALS OF CARE:  aggressive                      DISPOSITION:  ICU  ICH Score on admission  = 4, NIHSS

## 2020-08-26 NOTE — PROGRESS NOTE ADULT - ASSESSMENT
59y/M with  1.	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	Hepatitis B  3.	dyslipidemia     PLAN:   NEURO: neurochecks q4h  continue quetiapine  REHAB:  physical therapy evaluation and management    EARLY MOB:  HOB up    PULM:  PRN O2 support to keep sats >/=92%, continue nebs, acetylcysteine, pulmonary toilette; for vocal cord injection tomorrow per ENT  CARDIO:  SBP goal 100-140mm Hg start metoprolol 25mg PO BID, continue amlodipine, taper off cardene  ENDO:  Blood sugar goals 140-180 mg/dL, continue insulin sliding, continue statins; insulin glargine 14, nutritional 3-3-3-3  GI:  bowel regimen  DIET: jevity., NPO after midnight  RENAL:  IVF once NPO  HEM/ONC: no coagulopathy, no h/o antiplatelet / anticoagulant use  VTE Prophylaxis: SCDs, no DVT chemoprophylaxis for now as patient is high risk for bleed (OR tomorrow)  ID: afebrile, rising WBC, continue cefepime, vancomycin, tenofovir  Social: will update family, son Perry updated ([phone]).  2 daughters Shantell  and Tammie ; wife name is Julián Sahu    CORE MEASURES:  1.  NIHSS =   2.  ICH Score = 4  3.  VTE prophylaxis: [ ] administered within 24h of admission OR [x] reason not done - fresh bleed  4.  Dysphagia screening: [X] performed before any oral meds / liquids / food    ATTENDING ATTESTATION:  I was physically present for the key portions of the evaluation and management (E/M) service provided.  I agree with the above history, physical and plan which I have reviewed and edited where appropriate.     Patient not at high risk for neurologic deterioration / death.  Time spent on this noncritically ill patient: 45 minutes spent on total encounter, more than 50% of the visit was spent counseling and/or coordinating care by the attending physician.    Plan discussed with RN, house staff.    REVIEW OF SYSTEMS:  No headaches, no nausea or vomiting; 14 -point review of systems otherwise unremarkable.

## 2020-08-26 NOTE — PROGRESS NOTE ADULT - SUBJECTIVE AND OBJECTIVE BOX
HPI:  59 year old male with PMH of HLD and unclear liver disease had acute onset of severe headache, dizziness and vomiting x1 today, was brought into [facility] with continued symptoms, as well as hypertension and multiple episodes of vomiting. Patient was intubated for airway protection with CT Head performed demonstrating a large left cerebellar hemorrhage. CTA Head/Neck performed is suggestive of underlying vascular pathology such as AVM. [place]Ig[place] frontal [place] [facility] [place] to [facility] for further work-up. Patient arrives to [facility] intubated, sedated and on Nicardipine drip. Patient's son provided history and denies any Aspirin or other anticoagulant use. Denies any prior history of hypertension, smoking, or ETOH abuse. (04 Aug 2020 05:01)        Hospital Course:   POD# 0 S/P cerebral angio, negative for AVM. s/p SOC craniectomy evacuation of cerebellar hematoma.   POD # 1:   overnight tachycardic, ekg without significant changes, lactate wnl, low grade fever, s/p tyl. bolus x 1L , NS increased to 100cc/hr; neuro improving.  EVD at 5 cmH2O   8/6 POD#3, BD#4, Tmax 100.6, Pan Cx 7/5 NGTD, Vanc/Zosyn until 8/10, Vanc trough due today @ 6pm, EEG = no seizure, Lasix overnight for Pulm congest and tachycard, ruff placed for strict I&Os, pending anemia w/u, EVD @ 5, CPAP trials, Card GTT for SBP<140, Started Norvasc for BP optimization  8/7:  8/8:  S/P cerebral angio, negative for AVM. s/p SOC craniectomy evacuation of cerebellar hematoma. No significant events overnight. On CPAP this morning   8/10: fevers overnight, resolved this morning, Pancultured, (+) Enterbacter, on CPAP this AM  8/11 POD#7 SOC, BD # 8, EVD replaced (hard pass), Neuro exam stable, EVD @ 5cm H2O, Zosyn for sputum + enterobact, Plan to extubated today, HCT in AM  8/12 POD#8 s/p SOC, BD#9, s/p EVD replacement (hard pass on 8/10/20), neuro exam stable, EVD @5cm H2O. Zosyn for enterobacter in sputum. O/n EVD stopped draining, flushed distally and proximally, waveform poor, neuro exam remained stable throughout, ICPs <8 prior.    8/13 EVD soft pass yesterday, spiked temp today and re cultured o/w neuro exam stable  8/14: EMIL overnight, neuro stable. Mucomyst for secretions overnight   8/15 EVD dc/d, temp spike to 104, pan culture, ID consulted, vanco and cefepime started   8/15 levo started, presumed sepsis from aspiration, continues to have diarrhea  8/16: EMIL  8/17: EMIL. Neuro exam stable  8/18: Increased secretions overnight. Chest xray obtained. NGT replaced. Neuro exam stable. Patient transferred to ICU for monitoring due to congestion and possible L effusion on CXR along with desaturation and frequent suctioning.   8/19: POD16, on EEG, EMIL o/n, transferred to 5E, plan for PEG tomorrow  8/20 POD#17 overnight given Keppra 1g IV x 1 dose for blank stare and EEG findings: pending final read on EEG, Cefep for PNA, PEG today, NPO, Amonia Serum level today w/u for enceph, vEEG,   8/21 POD#18, EMIL overnight, PEG placed, start TF 9 AM today, Neuro exam stable  8/22 POD #19 EMIL overnight, repeat CT head stable.  8/23: POD#20 EMIL overnight   8/24: POD21. EMIL o/n, neuro stable. on vEEG. vanc/cefepime for possible PNA. requiring frequent suctioning   8/25: POD22. EMIL o/n, neuro stable. off vEEG (was negative). on vanc/cefepime PNA coverage. pre op for vocal cord injection w/ ENT today  8/26: POD#23. OR yesterday with ENT for left vocal cord injection. Cranial sutures removed yesterday. Remains on vanco (dose increased to 1500 bid) and cefepime for empiric PNA coverage.      Vital Signs Last 24 Hrs  T(C): 36.9 (26 Aug 2020 01:00), Max: 39.2 (25 Aug 2020 08:33)  T(F): 98.4 (26 Aug 2020 01:00), Max: 102.5 (25 Aug 2020 08:33)  HR: 70 (26 Aug 2020 01:00) (70 - 118)  BP: 96/61 (26 Aug 2020 01:00) (96/61 - 157/68)  BP(mean): 75 (26 Aug 2020 01:00) (75 - 106)  RR: 12 (26 Aug 2020 01:00) (12 - 35)  SpO2: 95% (26 Aug 2020 01:00) (92% - 100%)    I&O's Detail    24 Aug 2020 07:01  -  25 Aug 2020 07:00  --------------------------------------------------------  IN:    ns in tub fed  [id number]: 570 mL    sodium chloride 0.9%: 75 mL    sodium chloride 0.9%.: 560 mL    Solution: 250 mL    Solution: 498 mL    Solution: 50 mL  Total IN: 2003 mL    OUT:    Intermittent Catheterization - Urethral: 1850 mL  Total OUT: 1850 mL    Total NET: 153 mL      25 Aug 2020 07:01  -  26 Aug 2020 01:06  --------------------------------------------------------  IN:    sodium chloride 0.9%.: 840 mL    Solution: 252 mL    Solution: 550 mL    Solution: 100 mL  Total IN: 1742 mL    OUT:    Indwelling Catheter - Urethral: 2815 mL    Intermittent Catheterization - Urethral: 850 mL  Total OUT: 3665 mL    Total NET: -1923 mL        I&O's Summary    24 Aug 2020 07:01  -  25 Aug 2020 07:00  --------------------------------------------------------  IN: 2003 mL / OUT: 1850 mL / NET: 153 mL    25 Aug 2020 07:01  -  26 Aug 2020 01:06  --------------------------------------------------------  IN: 1742 mL / OUT: 3665 mL / NET: -1923 mL        PHYSICAL EXAM:  GEN: laying in bed, appears well, NAD  NEURO: alert, no tracking. FC (better in cantonese), OE spont, nonverbal, face symmetric. CNII-XII intact. PERRL, EOMI. MAEx4 with good strength  CARD: RRR +S1/S2  PULM: +rhonchi  GI: Abd soft, NT/ND  EXT: ext warm, dry, nontender  WOUND: SOC site c/d/i      TUBES/LINES:  [] CVC  [] A-line  [] Lumbar Drain  [] Ventriculostomy  [] DIPESH  [] Ruff  [] NGT   [] Other    DIET:  [x] NPO  [] Mechanical  [] Tube feeds    LABS:                        8.5    12.76 )-----------( 209      ( 25 Aug 2020 05:59 )             27.3     08-25    143  |  112<H>  |  8   ----------------------------<  83  3.0<L>   |  20<L>  |  0.47<L>    Ca    6.8<L>      25 Aug 2020 05:08  Phos  2.3     08-25  Mg     1.8     08-25      PT/INR - ( 25 Aug 2020 05:08 )   PT: 14.3 sec;   INR: 1.20          PTT - ( 25 Aug 2020 05:08 )  PTT:35.6 sec        CAPILLARY BLOOD GLUCOSE      POCT Blood Glucose.: 217 mg/dL (25 Aug 2020 23:50)  POCT Blood Glucose.: 161 mg/dL (25 Aug 2020 18:19)  POCT Blood Glucose.: 135 mg/dL (25 Aug 2020 11:06)  POCT Blood Glucose.: 107 mg/dL (25 Aug 2020 06:17)      Drug Levels: [] N/A  Vancomycin Level, Trough: 6.4 ug/mL (08-25 @ 19:45)    CSF Analysis: [] N/A      Allergies    No Known Allergies    Intolerances        Home Medications:  Omega-3 350 mg oral capsule: 1 cap(s) orally once a day (04 Aug 2020 06:07)  tenofovir disoproxil fumarate 300 mg oral tablet: 1 tab(s) orally once a day (04 Aug 2020 06:07)      MEDICATIONS:  Antibiotics:  cefepime   IVPB 2000 milliGRAM(s) IV Intermittent every 8 hours  tenofovir disoproxil fumarate (VIREAD) 300 milliGRAM(s) Oral every 24 hours  vancomycin  IVPB 1500 milliGRAM(s) IV Intermittent every 12 hours    Neuro:  acetaminophen    Suspension .. 650 milliGRAM(s) Oral every 6 hours PRN  amantadine 100 milliGRAM(s) Oral two times a day  QUEtiapine 25 milliGRAM(s) Oral at bedtime    Anticoagulation:    OTHER:  acetylcysteine 20%  Inhalation 4 milliLiter(s) Inhalation every 6 hours  albuterol/ipratropium for Nebulization. 3 milliLiter(s) Nebulizer every 6 hours  artificial tears (preservative free) Ophthalmic Solution 1 Drop(s) Both EYES three times a day  chlorhexidine 2% Cloths 1 Application(s) Topical <User Schedule>  dextrose 40% Gel 15 Gram(s) Oral once PRN  dextrose 50% Injectable 12.5 Gram(s) IV Push once  dextrose 50% Injectable 25 Gram(s) IV Push once  doxazosin 4 milliGRAM(s) Oral at bedtime  finasteride 5 milliGRAM(s) Oral daily  glucagon  Injectable 1 milliGRAM(s) IntraMuscular once PRN  glucagon  Injectable 1 milliGRAM(s) IntraMuscular once PRN  hydrALAZINE Injectable 10 milliGRAM(s) IV Push every 2 hours PRN  insulin regular  human corrective regimen sliding scale.   SubCutaneous every 6 hours  insulin regular  human recombinant 4 Unit(s) SubCutaneous every 6 hours  lidocaine 4% Laryngotracheal Topical Anesthesia Kit 4 milliLiter(s) Oral Mucosa once  metoprolol tartrate 12.5 milliGRAM(s) Oral every 12 hours  Mometasone Furoate 0.1% Ointment 1 Application(s) 1 Application(s) Topical two times a day PRN  psyllium Powder 1 Packet(s) Oral daily  sodium chloride 0.9% for Nebulization 3 milliLiter(s) Nebulizer three times a day PRN    IVF:  dextrose 5%. 1000 milliLiter(s) IV Continuous <Continuous>  sodium chloride 0.9%. 1000 milliLiter(s) IV Continuous <Continuous>    CULTURES:  Culture Results:   No growth at 2 days. (08-23 @ 14:32)  Culture Results:   No growth at 2 days. (08-23 @ 14:32)    RADIOLOGY & ADDITIONAL TESTS:      ASSESSMENT:  59y Male with ICH/IVH s/p SOC crani POD #23 (8/4/20), s/p EVD placement, removed (8/14/20).    INTRACRANIAL BLEED  No pertinent family history in first degree relatives  Handoff  MEWS Score  Hepatitis B  HLD (hyperlipidemia)  ICH (intracerebral hemorrhage)  ICH (intracerebral hemorrhage)  Hepatitis B  HLD (hyperlipidemia)  HIV (human immunodeficiency virus infection)  Cerebellar hemorrhage  Laryngoscopy, direct, adult  Ultrasound guided venous access  Insertion, catheter, intravenous  EGD, with PEG  Craniectomy, subocciptal, with cervical laminectomy for medulla and spinal cord decompression  Percutaneous insertion of arterial line  Angiogram, carotid and cerebral vessels, bilateral  Foot fracture, left      PLAN:  NEURO:  - neuro/vitals checks  - pain control  - cranial sutures removed   - ENT following s/p left vocal cord injection     CARDIOVASCULAR:  - -140  - cont metoprolol     PULMONARY:  - +secretions, requiring frequent suctioning  - satting well RA, NC prn  - chest PT  - cont saline nebs, duonebs, mucomyst     RENAL:  - IVF  - daily labs, repletions prn  - cont proscar, cardura  - SC prn    GI:  - TF held for OR  - cont metamucil    HEME:  - h/h stable  - SCDs/SQL    ID:  - cont vanc/cefepime for empiric PNA coverage  - vanco increased to 1500mg bid last night- f/u trough    ENDO:  - glucose goal 140-180    DVT PROPHYLAXIS:  [x] Venodynes                                [] Heparin/Lovenox    DISPOSITION: ICU status, full code, dispo pending    D/w Dr. Blank and Dr. Rea       Assessment:  Present when checked    []  GCS  E   V  M     Heart Failure: []Acute, [] acute on chronic , []chronic  Heart Failure:  [] Diastolic (HFpEF), [] Systolic (HFrEF), []Combined (HFpEF and HFrEF), [] RHF, [] Pulm HTN, [] Other    [] SARAH, [] ATN, [] AIN, [] other  [] CKD1, [] CKD2, [] CKD 3, [] CKD 4, [] CKD 5, []ESRD    Encephalopathy: [] Metabolic, [] Hepatic, [] toxic, [] Neurological, [] Other    Abnormal Nurtitional Status: [] malnurtition (see nutrition note), [ ]underweight: BMI < 19, [] morbid obesity: BMI >40, [] Cachexia    [] Sepsis  [] hypovolemic shock,[] cardiogenic shock, [] hemorrhagic shock, [] neuogenic shock  [] Acute Respiratory Failure  []Cerebral edema, [] Brain compression/ herniation,   [] Functional quadriplegia  [] Acute blood loss anemia

## 2020-08-26 NOTE — PROGRESS NOTE ADULT - SUBJECTIVE AND OBJECTIVE BOX
====================================================   NEUROCRITICAL CARE ATTENDING NOTE   ====================================================    VEDA WARREN   MRN-[id number]  HPI: 59M with dyslipidemia, h/o Hep B infection, presented with acute onset severe HA, dizziness, vomiting - brought to [facility] where CT showed L cerebellar hemorrhage.  Intubated for airway protection.  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  R EVD inserted, transferred to [facility] for further management.  Given mannitol 25G in [facility].    COURSE IN THE HOSPITAL:   bleed   admitted to [facility], 23.4 given x1; OR for SOC evacuation of ICH   remained intubated overnight   tachycardia overnight, Tmax 38.4, ?PNA, ABx    Tmax 38.1 diuresed overnight 1.8L.     No significant events overnight.  CPAP this morning   Tmax 39.7 (enterobacter sputum, on zosyn)  08/10 Trial of CPAP   Neurologically stable, for extubation.   Extubated.  Neurologically stable.   Neurologically stable.   Febrile overnight.  Suctioned for secretions.  Continued neurological improvement.  08/15 Hypotensive overnight, resolved this morning.  Neurologically stable.  Continued diarrhea.   No significant events.  : febrile overnight.  failed repeat swallow eval this AM.   : bounced ack to ICU for thick secretions high sucitoning requirment  : suctioning reuqirement remains high. s/p PEg this morning.  Staring overnight at times, given 1g keppra.  : suctioning q1-2h. PEG yesterday, feeds started today.  : very weak cough, still high suctioning requirements  : stable overnight, this morning with fluctuating mental status, apparently staring ahead at times and others awake, talking and responsive.  At the time, tachycardic to 120-130, febrile to 102F, and BP SBP in 100's.     concern for aspiration of tube feeds given his restlessness and uncooperative state, TF held by RNs.    more awake, in mittens, NPO for OR     Overnight Events:   s/p augmentation of vocal cords, no suctioning per RN    PHYSICAL EXAMINATION  T(C): 36.8 ( @ 08:00), Max: 39.2 ( @ 08:33)  HR: 79 ( @ 08:00) (70 - 111)  BP: 133/76 ( @ 08:00) (96/61 - 148/71)  RR: 20 ( @ 08:00) (12 - 35)  SpO2: 93% ( @ 08:00) (93% - 100%)  CVP(mm Hg): --    LABS:  CAPILLARY BLOOD GLUCOSE      POCT Blood Glucose.: 136 mg/dL (26 Aug 2020 06:13)  POCT Blood Glucose.: 217 mg/dL (25 Aug 2020 23:50)  POCT Blood Glucose.: 161 mg/dL (25 Aug 2020 18:19)  POCT Blood Glucose.: 135 mg/dL (25 Aug 2020 11:06)                          8.2    8.53  )-----------( 315      ( 26 Aug 2020 05:26 )             26.9         136  |  101  |  9   ----------------------------<  126<H>  4.2   |  25  |  0.49<L>    Ca    8.3<L>      26 Aug 2020 05:26  Phos  3.1       Mg     2.5      @ 07:01  -   @ 07:00  --------------------------------------------------------  IN: 2362 mL / OUT: 4670 mL / NET: -2308 mL     @ 07:01  -   @ 08:20  --------------------------------------------------------  IN: 50 mL / OUT: 100 mL / NET: -50 mL        MEDICATIONS:  MEDICATIONS  (STANDING):  acetylcysteine 20%  Inhalation 4 milliLiter(s) Inhalation every 6 hours  albuterol/ipratropium for Nebulization. 3 milliLiter(s) Nebulizer every 6 hours  amantadine 100 milliGRAM(s) Oral two times a day  artificial tears (preservative free) Ophthalmic Solution 1 Drop(s) Both EYES three times a day  cefepime   IVPB 2000 milliGRAM(s) IV Intermittent every 8 hours  chlorhexidine 2% Cloths 1 Application(s) Topical <User Schedule>  dextrose 5%. 1000 milliLiter(s) (50 mL/Hr) IV Continuous <Continuous>  dextrose 50% Injectable 12.5 Gram(s) IV Push once  dextrose 50% Injectable 25 Gram(s) IV Push once  doxazosin 4 milliGRAM(s) Oral at bedtime  finasteride 5 milliGRAM(s) Oral daily  insulin regular  human corrective regimen sliding scale.   SubCutaneous every 6 hours  insulin regular  human recombinant 4 Unit(s) SubCutaneous every 6 hours  lidocaine 4% Laryngotracheal Topical Anesthesia Kit 4 milliLiter(s) Oral Mucosa once  metoprolol tartrate 12.5 milliGRAM(s) Oral every 12 hours  psyllium Powder 1 Packet(s) Oral daily  QUEtiapine 25 milliGRAM(s) Oral at bedtime  sodium chloride 0.9%. 1000 milliLiter(s) (70 mL/Hr) IV Continuous <Continuous>  tenofovir disoproxil fumarate (VIREAD) 300 milliGRAM(s) Oral every 24 hours  vancomycin  IVPB 1500 milliGRAM(s) IV Intermittent every 12 hours    MEDICATIONS  (PRN):  acetaminophen    Suspension .. 650 milliGRAM(s) Oral every 6 hours PRN Temp greater or equal to 38C (100.4F), Mild Pain (1 - 3)  dextrose 40% Gel 15 Gram(s) Oral once PRN Blood Glucose LESS THAN 70 milliGRAM(s)/deciliter  glucagon  Injectable 1 milliGRAM(s) IntraMuscular once PRN Glucose LESS THAN 70 milligrams/deciliter  glucagon  Injectable 1 milliGRAM(s) IntraMuscular once PRN Glucose LESS THAN 70 milligrams/deciliter  hydrALAZINE Injectable 10 milliGRAM(s) IV Push every 2 hours PRN SBP > 140  Mometasone Furoate 0.1% Ointment 1 Application(s) 1 Application(s) Topical two times a day PRN dry/itchy skin  sodium chloride 0.9% for Nebulization 3 milliLiter(s) Nebulizer three times a day PRN for congestion/secretions  sodium chloride 0.9% lock flush 10 milliLiter(s) IV Push every 1 hour PRN Pre/post blood products, medications, blood draw, and to maintain line patency            Past Medical History: Hepatitis B HLD (hyperlipidemia)  Allergies:  No Known Allergies  Home meds:   ·	Omega-3 350 mg oral capsule: 1 cap(s) orally once a day  ·	tenofovir disoproxil fumarate 300 mg oral tablet: 1 tab(s) orally once a day      PHYSICAL EXAMINATION    NEUROLOGIC EXAMINATION:    MSE: flat affect, no speech output when prompted, obeying first order commands  CN: pupils 3 mm reactive bilaterally, decreased EOM, weak cough, tongue midline  Motor: sitting on edge of bed, all extremities antigravity, LLE stronger than RLE in spontaneous movements  PULMONARY: coarse breath sounds, decreased bases  ABDOMEN: soft, nontender with normoactive bowel sounds, PEG in place    Neuroimagin/21 CT head:  Stable  1. Less edema and hemorrhage noted along prior right frontal ventricular catheter tract with near complete resolution of small amount of IVH seen previously. Ventricles are stable in size.  2. No change in the postoperative appearance of left suboccipital craniectomy for cerebellar hematoma evacuation. No new hemorrhage.   CT head:  Right frontal EVD catheter as above. The ventricles are stable in size from prior examination. No new or increasing intracranial hemorrhage or mass effect.  08/10 CT head:  Compared to 2020, there is improving intraventricular hemorrhage, resolution of intraventricular gas, and mild decrease in ventricular size. Postsurgical change and edema in the posterior fossa is approximately stable.   CT head:  post-surgical changes, stable HCP  Other imagin/20 LE doppler: NEG   LE doppler: NEG   CXR:  clear   CXR:  clear   CXR:  mild atelectasis L lung base  08/10 CXR:  Small left pleural effusion, unchanged.   CXR:  Discoid change right lung field. Right basilar infiltrate cannot be excluded

## 2020-08-26 NOTE — PROGRESS NOTE ADULT - ASSESSMENT
Assessment and Plan:   59y/M with  1.	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	Hepatitis B  3.	dyslipidemia     NEURO: continued neurological improvement  EEG with infrequent GPDs, no seizures --> continue off AEDs    ENT to augment vocal cords to min aspiration, to OR tomorrow    REHAB:  physical therapy evaluation and management    EARLY MOB:  HOB up    ID: febrile, worsening leukocytosis --> vanco+cefepime x 7 days total  chronic HBV, on home antiviral    PULM: mild aspiration, high suctioning requirements, q1h  continue aggressive pulmonary toileting, chest PT  continue saline nebs and mucomyst    CARDIO:  1) afib with RVR prior SBP goal 100-140mm Hg   -on metoprolol 12.5 mg BID    ENDO:  Blood sugar goals 140-180 mg/dL, continue insulin sliding, continue statins  -insulin nutritional 4-4-4-4  -lantus 8 units QHS    GI: diebetic tube feeds  -s/p PEG  restart TF after surgery, NPO p MN, watch glu    RENAL:  MURIEL    HEM/ONC: no coagulopathy, no h/o antiplatelet / anticoagulant use  VTE Prophylaxis: SCDs, SQL     Dispo: continue in ICU for frequent suctioning from RNs  Social:  update sisters

## 2020-08-26 NOTE — CHART NOTE - NSCHARTNOTEFT_GEN_A_CORE
Admitting Diagnosis:   Patient is a 59y old  Male who presents with a chief complaint of Left cerebellar intraparenchymal hemorrhage (26 Aug 2020 08:15)      PAST MEDICAL & SURGICAL HISTORY:  Hepatitis B  HLD (hyperlipidemia)  Foot fracture, left: s/p repair 2019    Current Nutrition Order:  Jevity 1.2 x24hrs goal rate 60ml/hr 8/24  NPO@12 8/24     PO Intake: Good (%) [   ]  Fair (50-75%) [   ] Poor (<25%) [   ]  NA TF     GI Issues:   Per flow sheets: +BM 8/26 x1, +BM 8/25 x8     Pain:   None per flow sheets     Skin Integrity:   No edema or pressure ulcers  SX site noted  IAD d/t semi-formed stool noted     Labs:   08-26    136  |  101  |  9   ----------------------------<  126<H>  4.2   |  25  |  0.49<L>    Ca    8.3<L>      26 Aug 2020 05:26  Phos  3.1     08-26  Mg     2.5     08-26      CAPILLARY BLOOD GLUCOSE      POCT Blood Glucose.: 143 mg/dL (26 Aug 2020 11:21)  POCT Blood Glucose.: 136 mg/dL (26 Aug 2020 06:13)  POCT Blood Glucose.: 217 mg/dL (25 Aug 2020 23:50)  POCT Blood Glucose.: 161 mg/dL (25 Aug 2020 18:19)      Medications:  MEDICATIONS  (STANDING):  acetylcysteine 20%  Inhalation 4 milliLiter(s) Inhalation every 6 hours  albuterol/ipratropium for Nebulization. 3 milliLiter(s) Nebulizer every 6 hours  amantadine 100 milliGRAM(s) Oral two times a day  artificial tears (preservative free) Ophthalmic Solution 1 Drop(s) Both EYES three times a day  cefepime   IVPB 2000 milliGRAM(s) IV Intermittent every 8 hours  chlorhexidine 2% Cloths 1 Application(s) Topical <User Schedule>  dextrose 5%. 1000 milliLiter(s) (50 mL/Hr) IV Continuous <Continuous>  dextrose 50% Injectable 12.5 Gram(s) IV Push once  dextrose 50% Injectable 25 Gram(s) IV Push once  doxazosin 4 milliGRAM(s) Oral at bedtime  finasteride 5 milliGRAM(s) Oral daily  insulin regular  human corrective regimen sliding scale.   SubCutaneous every 6 hours  insulin regular  human recombinant 4 Unit(s) SubCutaneous every 6 hours  lidocaine 4% Laryngotracheal Topical Anesthesia Kit 4 milliLiter(s) Oral Mucosa once  metoprolol tartrate 12.5 milliGRAM(s) Oral every 12 hours  psyllium Powder 1 Packet(s) Oral daily  tenofovir disoproxil fumarate (VIREAD) 300 milliGRAM(s) Oral every 24 hours  vancomycin  IVPB 1500 milliGRAM(s) IV Intermittent every 12 hours    MEDICATIONS  (PRN):  acetaminophen    Suspension .. 650 milliGRAM(s) Oral every 6 hours PRN Temp greater or equal to 38C (100.4F), Mild Pain (1 - 3)  dextrose 40% Gel 15 Gram(s) Oral once PRN Blood Glucose LESS THAN 70 milliGRAM(s)/deciliter  glucagon  Injectable 1 milliGRAM(s) IntraMuscular once PRN Glucose LESS THAN 70 milligrams/deciliter  glucagon  Injectable 1 milliGRAM(s) IntraMuscular once PRN Glucose LESS THAN 70 milligrams/deciliter  hydrALAZINE Injectable 10 milliGRAM(s) IV Push every 2 hours PRN SBP > 140  Mometasone Furoate 0.1% Ointment 1 Application(s) 1 Application(s) Topical two times a day PRN dry/itchy skin  sodium chloride 0.9% for Nebulization 3 milliLiter(s) Nebulizer three times a day PRN for congestion/secretions  sodium chloride 0.9% lock flush 10 milliLiter(s) IV Push every 1 hour PRN Pre/post blood products, medications, blood draw, and to maintain line patency      Admitted Anthropometrics:  Height 5'3''  pounds +-10%  Wt (8/4): 131 pounds  %IBW: 105%, BMI: 24.0    Weight:  131 pounds (8/4)  218.6 pounds (/25) - ? Accuracy     Nutrition Focused Physical Exam: Completed [   ]  Unable to complete [   ]-N/A    Estimated energy needs:  ABW (59.6kg) used to calculate energy needs due to pt's current body weight within % IBW.  Needs adjusted for post-op, hypermetabolic state.  Aim for higher end of kcal range.  4458-7242 kcal (25-30 kcal/kg IBW)  72-83gm protein (1.2-1.4gm/kg IBW)  Fluid needs per team     Subjective:   59 year old male with PMH of HLD and unclear liver disease had acute onset of severe headache, dizziness and vomiting x1 today, was brought into [facility] with continued symptoms, as well as hypertension and multiple episodes of vomiting. Pt was intubated for airway protection with CT Head performed demonstrating a large left cerebellar hemorrhage. CTA Head/Neck performed is suggestive of underlying vascular pathology such as AVM. RIght frontal ventriculostomy placed at [facility] and patient transferred to [facility] for further work-up. S/P cerebral angio, negative for AVM and s/p SOC craniectomy evacuation of cerebellar hematoma on 8/4, s/p EVD placement. Pt Extubated 8/12. Pt is s/p various swallow studies during admission, Recs for NPO with NGT pending FEES results 8/18. Most recent FEESS recommendations NPO for continued alternate means of nutrition, hydration, medication 8/19. vEEG had been running, negative findingings. Pt now s/p PEG 8/20 - pt had been wihtout feeds for microdirect suspension laryngoscopy with left true vocal fold injection with prolaryn 8/25. Pt visited today for F/U feeds just resumed, running at 20ml/hr. Pt ordered for cont feeds Jevity 1.2 x24hrs goal rate 60ml/hr which provides ~1440ml, 1728kcal, 80gm prot, 1162mL free water. Of note, prior team asking for bolus feed Recs d/t concern for ASP. Cont feeds is preferred in pt at risk for ASP. However concern for aspiration of tube feeds given pt restlessness and uncooperative state. RN reports pt remains restless at this time, reports pt just as restless over night as well.   Please see Below for Recs. Spoke with team.       Nutrition Diagnosis: Increased nutrient need RT increased protein demand AEB post-op  ON GOING AT THIS TIME   Goal: Meet >/=75%EER via most appropriate route with good tolerance    Recommendations:  1. If cont feeds are to be used, cont with Jevity 1.2 @ goal 60mL/hr x24hr. Provides ~1440mL TV, 1728 kcal, 80gm protein, 1162mL free water.  2. However if bolus feeds are to be used, consider use of TwoCal x3cans/day +1PS/day, will provide ~711ml, 1522kcal, 74gm prot, 118ml water.  >>> As medically feasible, consider use of Reglan.  3. Maintain ASP Precautions AT ALL TIMES. Monitor for tolernace. Monitor GI distress.   4. Monitor Labs: monitor BMP, CBC, glucose, lytes, trend renal indices, LFTs, Ammonia.  5. Monitor Skin, Wts, GOC.  6. Consider cont SLP to assess for ability for PO.   7. RD to remain available for additional nutrition interventions as needed.     Education: N/A  Risk Level: High [ X ]  Moderate [   ] Low [   ].

## 2020-08-27 LAB
ANION GAP SERPL CALC-SCNC: 10 MMOL/L — SIGNIFICANT CHANGE UP (ref 5–17)
BUN SERPL-MCNC: 17 MG/DL — SIGNIFICANT CHANGE UP (ref 7–23)
CALCIUM SERPL-MCNC: 8.4 MG/DL — SIGNIFICANT CHANGE UP (ref 8.4–10.5)
CHLORIDE SERPL-SCNC: 102 MMOL/L — SIGNIFICANT CHANGE UP (ref 96–108)
CO2 SERPL-SCNC: 26 MMOL/L — SIGNIFICANT CHANGE UP (ref 22–31)
CREAT SERPL-MCNC: 0.62 MG/DL — SIGNIFICANT CHANGE UP (ref 0.5–1.3)
CULTURE RESULTS: SIGNIFICANT CHANGE UP
GLUCOSE BLDC GLUCOMTR-MCNC: 108 MG/DL — HIGH (ref 70–99)
GLUCOSE BLDC GLUCOMTR-MCNC: 111 MG/DL — HIGH (ref 70–99)
GLUCOSE BLDC GLUCOMTR-MCNC: 116 MG/DL — HIGH (ref 70–99)
GLUCOSE BLDC GLUCOMTR-MCNC: 121 MG/DL — HIGH (ref 70–99)
GLUCOSE SERPL-MCNC: 103 MG/DL — HIGH (ref 70–99)
HCT VFR BLD CALC: 26.4 % — LOW (ref 39–50)
HGB BLD-MCNC: 8.1 G/DL — LOW (ref 13–17)
MAGNESIUM SERPL-MCNC: 2.5 MG/DL — SIGNIFICANT CHANGE UP (ref 1.6–2.6)
MCHC RBC-ENTMCNC: 28.4 PG — SIGNIFICANT CHANGE UP (ref 27–34)
MCHC RBC-ENTMCNC: 30.7 GM/DL — LOW (ref 32–36)
MCV RBC AUTO: 92.6 FL — SIGNIFICANT CHANGE UP (ref 80–100)
NRBC # BLD: 0 /100 WBCS — SIGNIFICANT CHANGE UP (ref 0–0)
PHOSPHATE SERPL-MCNC: 1.8 MG/DL — LOW (ref 2.5–4.5)
PLATELET # BLD AUTO: 305 K/UL — SIGNIFICANT CHANGE UP (ref 150–400)
POTASSIUM SERPL-MCNC: 3.9 MMOL/L — SIGNIFICANT CHANGE UP (ref 3.5–5.3)
POTASSIUM SERPL-SCNC: 3.9 MMOL/L — SIGNIFICANT CHANGE UP (ref 3.5–5.3)
RBC # BLD: 2.85 M/UL — LOW (ref 4.2–5.8)
RBC # FLD: 15.9 % — HIGH (ref 10.3–14.5)
SODIUM SERPL-SCNC: 138 MMOL/L — SIGNIFICANT CHANGE UP (ref 135–145)
SPECIMEN SOURCE: SIGNIFICANT CHANGE UP
WBC # BLD: 9.21 K/UL — SIGNIFICANT CHANGE UP (ref 3.8–10.5)
WBC # FLD AUTO: 9.21 K/UL — SIGNIFICANT CHANGE UP (ref 3.8–10.5)

## 2020-08-27 PROCEDURE — 99254 IP/OBS CNSLTJ NEW/EST MOD 60: CPT

## 2020-08-27 RX ADMIN — Medication 3 MILLILITER(S): at 23:00

## 2020-08-27 RX ADMIN — Medication 3 MILLILITER(S): at 17:38

## 2020-08-27 RX ADMIN — Medication 4 MILLILITER(S): at 14:02

## 2020-08-27 RX ADMIN — INSULIN HUMAN 4 UNIT(S): 100 INJECTION, SOLUTION SUBCUTANEOUS at 07:33

## 2020-08-27 RX ADMIN — Medication 100 MILLIGRAM(S): at 14:01

## 2020-08-27 RX ADMIN — Medication 3 MILLILITER(S): at 11:11

## 2020-08-27 RX ADMIN — INSULIN HUMAN 4 UNIT(S): 100 INJECTION, SOLUTION SUBCUTANEOUS at 02:03

## 2020-08-27 RX ADMIN — Medication 1 DROP(S): at 14:00

## 2020-08-27 RX ADMIN — Medication 1 PACKET(S): at 14:01

## 2020-08-27 RX ADMIN — Medication 1 DROP(S): at 09:32

## 2020-08-27 RX ADMIN — Medication 4 MILLILITER(S): at 01:30

## 2020-08-27 RX ADMIN — Medication 4 MILLILITER(S): at 06:13

## 2020-08-27 RX ADMIN — Medication 85 MILLIMOLE(S): at 11:58

## 2020-08-27 RX ADMIN — Medication 12.5 MILLIGRAM(S): at 05:55

## 2020-08-27 RX ADMIN — Medication 3 MILLILITER(S): at 04:27

## 2020-08-27 RX ADMIN — INSULIN HUMAN 4 UNIT(S): 100 INJECTION, SOLUTION SUBCUTANEOUS at 18:50

## 2020-08-27 RX ADMIN — Medication 12.5 MILLIGRAM(S): at 18:55

## 2020-08-27 RX ADMIN — Medication 100 MILLIGRAM(S): at 18:54

## 2020-08-27 RX ADMIN — FINASTERIDE 5 MILLIGRAM(S): 5 TABLET, FILM COATED ORAL at 14:00

## 2020-08-27 RX ADMIN — Medication 1 DROP(S): at 23:04

## 2020-08-27 RX ADMIN — INSULIN HUMAN 4 UNIT(S): 100 INJECTION, SOLUTION SUBCUTANEOUS at 12:40

## 2020-08-27 RX ADMIN — TENOFOVIR DISOPROXIL FUMARATE 300 MILLIGRAM(S): 300 TABLET, FILM COATED ORAL at 01:31

## 2020-08-27 RX ADMIN — Medication 100 MILLIGRAM(S): at 05:55

## 2020-08-27 RX ADMIN — Medication 1 DROP(S): at 01:50

## 2020-08-27 RX ADMIN — CHLORHEXIDINE GLUCONATE 1 APPLICATION(S): 213 SOLUTION TOPICAL at 05:55

## 2020-08-27 RX ADMIN — Medication 100 MILLIGRAM(S): at 01:32

## 2020-08-27 RX ADMIN — Medication 100 MILLIGRAM(S): at 23:00

## 2020-08-27 RX ADMIN — Medication 4 MILLIGRAM(S): at 01:30

## 2020-08-27 NOTE — PROGRESS NOTE ADULT - SUBJECTIVE AND OBJECTIVE BOX
HPI:  59 year old male with PMH of HLD and unclear liver disease had acute onset of severe headache, dizziness and vomiting x1 today, was brought into [facility] with continued symptoms, as well as hypertension and multiple episodes of vomiting. Patient was intubated for airway protection with CT Head performed demonstrating a large left cerebellar hemorrhage. CTA Head/Neck performed is suggestive of underlying vascular pathology such as AVM. RIght frontal ventriculostomy placed at [facility] and patient transferred to [facility] for further work-up. Patient arrives to [facility] intubated, sedated and on Nicardipine drip. Patient's son provided history and denies any Aspirin or other anticoagulant use. Denies any prior history of hypertension, smoking, or ETOH abuse. (04 Aug 2020 05:01)    Hospital Course:   POD# 0 S/P cerebral angio, negative for AVM. s/p SOC craniectomy evacuation of cerebellar hematoma.   POD # 1:   overnight tachycardic, ekg without significant changes, lactate wnl, low grade fever, s/p tyl. bolus x 1L , NS increased to 100cc/hr; neuro improving.  EVD at 5 cmH2O   8/6 POD#3, BD#4, Tmax 100.6, Pan Cx 7/5 NGTD, Vanc/Zosyn until 8/10, Vanc trough due today @ 6pm, EEG = no seizure, Lasix overnight for Pulm congest and tachycard, ruff placed for strict I&Os, pending anemia w/u, EVD @ 5, CPAP trials, Card GTT for SBP<140, Started Norvasc for BP optimization  8/7:  8/8:  S/P cerebral angio, negative for AVM. s/p SOC craniectomy evacuation of cerebellar hematoma. No significant events overnight. On CPAP this morning   8/10: fevers overnight, resolved this morning, Pancultured, (+) Enterbacter, on CPAP this AM  8/11 POD#7 SOC, BD # 8, EVD replaced (hard pass), Neuro exam stable, EVD @ 5cm H2O, Zosyn for sputum + enterobact, Plan to extubated today, HCT in AM  8/12 POD#8 s/p SOC, BD#9, s/p EVD replacement (hard pass on 8/10/20), neuro exam stable, EVD @5cm H2O. Zosyn for enterobacter in sputum. O/n EVD stopped draining, flushed distally and proximally, waveform poor, neuro exam remained stable throughout, ICPs <8 prior.    8/13 EVD soft pass yesterday, spiked temp today and re cultured o/w neuro exam stable  8/14: EMIL overnight, neuro stable. Mucomyst for secretions overnight   8/15 EVD dc/d, temp spike to 104, pan culture, ID consulted, vanco and cefepime started   8/15 levo started, presumed sepsis from aspiration, continues to have diarrhea  8/16: EMIL  8/17: EMIL. Neuro exam stable  8/18: Increased secretions overnight. Chest xray obtained. NGT replaced. Neuro exam stable. Patient transferred to ICU for monitoring due to congestion and possible L effusion on CXR along with desaturation and frequent suctioning.   8/19: POD16, on EEG, EMIL o/n, transferred to 5E, plan for PEG tomorrow  8/20 POD#17 overnight given Keppra 1g IV x 1 dose for blank stare and EEG findings: pending final read on EEG, Cefep for PNA, PEG today, NPO, Amonia Serum level today w/u for enceph, vEEG,   8/21 POD#18, EMIL overnight, PEG placed, start TF 9 AM today, Neuro exam stable  8/22 POD #19 EMIL overnight, repeat CT head stable.  8/23: POD#20 EMIL overnight   8/24: POD21. EMIL o/n, neuro stable. on vEEG. vanc/cefepime for possible PNA. requiring frequent suctioning   8/25: POD22. EMIL o/n, neuro stable. off vEEG (was negative). on vanc/cefepime PNA coverage. pre op for vocal cord injection w/ ENT today  8/26: POD#23. OR yesterday with ENT for left vocal cord injection. Cranial sutures removed yesterday. Remains on vanco (dose increased to 1500 bid) and cefepime for empiric PNA coverage.  8/27: POD#24 EMIL overnight. Oral secretions improving. Remains on PNA antibiotic coverage    Vital Signs Last 24 Hrs  T(C): 37.5 (27 Aug 2020 05:00), Max: 37.5 (27 Aug 2020 05:00)  T(F): 99.5 (27 Aug 2020 05:00), Max: 99.5 (27 Aug 2020 05:00)  HR: 94 (27 Aug 2020 04:35) (72 - 103)  BP: 109/62 (27 Aug 2020 04:35) (86/55 - 138/66)  BP(mean): 81 (27 Aug 2020 04:35) (66 - 99)  RR: 21 (27 Aug 2020 04:35) (15 - 34)  SpO2: 100% (27 Aug 2020 04:35) (91% - 100%)    I&O's Summary    25 Aug 2020 07:01  -  26 Aug 2020 07:00  --------------------------------------------------------  IN: 2362 mL / OUT: 4670 mL / NET: -2308 mL    26 Aug 2020 07:01  -  27 Aug 2020 06:17  --------------------------------------------------------  IN: 1148 mL / OUT: 2035 mL / NET: -887 mL      PHYSICAL EXAM:  GEN: laying in bed, appears well, NAD  NEURO: alert, no tracking. FC (better in cantonese), OE spont, nonverbal, face symmetric. CNII-XII intact. PERRL, EOMI. MAEx4 with good strength  CARD: RRR +S1/S2  PULM: +rhonchi  GI: Abd soft, NT/ND  EXT: ext warm, dry, nontender  WOUND: SOC site c/d/i    TUBES/LINES:  [] CVC  [] A-line  [] Lumbar Drain  [] Ventriculostomy  [] DIPESH  [] Ruff  [] NGT   [] Other    DIET:  [] NPO  [] Mechanical  [x] Tube feeds    LABS:                        8.1    9.21  )-----------( 305      ( 27 Aug 2020 05:50 )             26.4     08-26    136  |  101  |  9   ----------------------------<  126<H>  4.2   |  25  |  0.49<L>    Ca    8.3<L>      26 Aug 2020 05:26  Phos  3.1     08-26  Mg     2.5     08-26              CAPILLARY BLOOD GLUCOSE      POCT Blood Glucose.: 111 mg/dL (27 Aug 2020 01:31)  POCT Blood Glucose.: 110 mg/dL (26 Aug 2020 17:48)  POCT Blood Glucose.: 143 mg/dL (26 Aug 2020 11:21)      Drug Levels: [] N/A  Vancomycin Level, Trough: 6.4 ug/mL (08-25 @ 19:45)    CSF Analysis: [] N/A      Allergies    No Known Allergies    Intolerances      MEDICATIONS:  Antibiotics:  ceFAZolin   IVPB 2000 milliGRAM(s) IV Intermittent every 8 hours  tenofovir disoproxil fumarate (VIREAD) 300 milliGRAM(s) Oral every 24 hours    Neuro:  acetaminophen    Suspension .. 650 milliGRAM(s) Oral every 6 hours PRN  amantadine 100 milliGRAM(s) Oral two times a day    Anticoagulation:    OTHER:  acetylcysteine 20%  Inhalation 4 milliLiter(s) Inhalation every 6 hours  albuterol/ipratropium for Nebulization. 3 milliLiter(s) Nebulizer every 6 hours  artificial tears (preservative free) Ophthalmic Solution 1 Drop(s) Both EYES three times a day  chlorhexidine 2% Cloths 1 Application(s) Topical <User Schedule>  dextrose 40% Gel 15 Gram(s) Oral once PRN  dextrose 50% Injectable 12.5 Gram(s) IV Push once  dextrose 50% Injectable 25 Gram(s) IV Push once  doxazosin 4 milliGRAM(s) Oral at bedtime  finasteride 5 milliGRAM(s) Oral daily  glucagon  Injectable 1 milliGRAM(s) IntraMuscular once PRN  glucagon  Injectable 1 milliGRAM(s) IntraMuscular once PRN  hydrALAZINE Injectable 10 milliGRAM(s) IV Push every 2 hours PRN  insulin regular  human corrective regimen sliding scale.   SubCutaneous every 6 hours  insulin regular  human recombinant 4 Unit(s) SubCutaneous every 6 hours  lidocaine 4% Laryngotracheal Topical Anesthesia Kit 4 milliLiter(s) Oral Mucosa once  metoprolol tartrate 12.5 milliGRAM(s) Oral every 12 hours  Mometasone Furoate 0.1% Ointment 1 Application(s) 1 Application(s) Topical two times a day PRN  psyllium Powder 1 Packet(s) Oral daily  sodium chloride 0.9% for Nebulization 3 milliLiter(s) Nebulizer three times a day PRN    IVF:  dextrose 5%. 1000 milliLiter(s) IV Continuous <Continuous>    CULTURES:  Culture Results:   Moderate Staphylococcus aureus Methicillin Sensitive Staph aureus  Susceptibility to follow.  Accompanied by normal respiratory aliza (08-25 @ 16:38)  Culture Results:   No growth at 3 days. (08-23 @ 14:32)    RADIOLOGY & ADDITIONAL TESTS:      ASSESSMENT:  59y Male with ICH/IVH s/p SOC crani POD #24 (8/4/20), s/p EVD placement, removed (8/14/20).        INTRACRANIAL BLEED  No pertinent family history in first degree relatives  Handoff  MEWS Score  Hepatitis B  HLD (hyperlipidemia)  ICH (intracerebral hemorrhage)  ICH (intracerebral hemorrhage)  Hepatitis B  HLD (hyperlipidemia)  HIV (human immunodeficiency virus infection)  Cerebellar hemorrhage  Laryngoscopy, direct, adult  Ultrasound guided venous access  Insertion, catheter, intravenous  EGD, with PEG  Craniectomy, subocciptal, with cervical laminectomy for medulla and spinal cord decompression  Percutaneous insertion of arterial line  Angiogram, carotid and cerebral vessels, bilateral  Foot fracture, left    PLAN:  NEURO:  - neuro/vitals checks  - pain control  - cranial sutures removed   - ENT following s/p left vocal cord injection     CARDIOVASCULAR:  - -140  - cont metoprolol     PULMONARY:  - +secretions, prn suctioning  - satting well RA, NC prn  - chest PT, pulmonary toilette  - cont saline nebs, duonebs, mucomyst     RENAL:  - IVF  - daily labs, repletions prn  - cont proscar, cardura  - SC prn    GI:  - TF   - cont metamucil    HEME:  - h/h stable  - SCDs/SQL    ID:  - completed vanc/cefepime for empiric PNA coverage  - ancef for MSSA PNA    ENDO:  - glucose goal 140-180    DVT PROPHYLAXIS:  [x] Venodynes                                [] Heparin/Lovenox    DISPOSITION: ICU status, full code, dispo pending    D/w Dr. Blank and Dr. Rea       Assessment:  Present when checked    []  GCS  E   V  M     Heart Failure: []Acute, [] acute on chronic , []chronic  Heart Failure:  [] Diastolic (HFpEF), [] Systolic (HFrEF), []Combined (HFpEF and HFrEF), [] RHF, [] Pulm HTN, [] Other    [] SARAH, [] ATN, [] AIN, [] other  [] CKD1, [] CKD2, [] CKD 3, [] CKD 4, [] CKD 5, []ESRD    Encephalopathy: [] Metabolic, [] Hepatic, [] toxic, [] Neurological, [] Other    Abnormal Nurtitional Status: [] malnurtition (see nutrition note), [ ]underweight: BMI < 19, [] morbid obesity: BMI >40, [] Cachexia    [] Sepsis  [] hypovolemic shock,[] cardiogenic shock, [] hemorrhagic shock, [] neuogenic shock  [] Acute Respiratory Failure  []Cerebral edema, [] Brain compression/ herniation,   [] Functional quadriplegia  [] Acute blood loss anemia

## 2020-08-28 LAB
ANION GAP SERPL CALC-SCNC: 9 MMOL/L — SIGNIFICANT CHANGE UP (ref 5–17)
APPEARANCE UR: CLEAR — SIGNIFICANT CHANGE UP
BILIRUB UR-MCNC: NEGATIVE — SIGNIFICANT CHANGE UP
BUN SERPL-MCNC: 12 MG/DL — SIGNIFICANT CHANGE UP (ref 7–23)
CALCIUM SERPL-MCNC: 8.3 MG/DL — LOW (ref 8.4–10.5)
CHLORIDE SERPL-SCNC: 101 MMOL/L — SIGNIFICANT CHANGE UP (ref 96–108)
CO2 SERPL-SCNC: 28 MMOL/L — SIGNIFICANT CHANGE UP (ref 22–31)
COLOR SPEC: YELLOW — SIGNIFICANT CHANGE UP
CREAT SERPL-MCNC: 0.54 MG/DL — SIGNIFICANT CHANGE UP (ref 0.5–1.3)
CULTURE RESULTS: NO GROWTH — SIGNIFICANT CHANGE UP
CULTURE RESULTS: SIGNIFICANT CHANGE UP
CULTURE RESULTS: SIGNIFICANT CHANGE UP
DIFF PNL FLD: ABNORMAL
GLUCOSE BLDC GLUCOMTR-MCNC: 107 MG/DL — HIGH (ref 70–99)
GLUCOSE BLDC GLUCOMTR-MCNC: 113 MG/DL — HIGH (ref 70–99)
GLUCOSE BLDC GLUCOMTR-MCNC: 118 MG/DL — HIGH (ref 70–99)
GLUCOSE BLDC GLUCOMTR-MCNC: 121 MG/DL — HIGH (ref 70–99)
GLUCOSE BLDC GLUCOMTR-MCNC: 121 MG/DL — HIGH (ref 70–99)
GLUCOSE SERPL-MCNC: 117 MG/DL — HIGH (ref 70–99)
GLUCOSE UR QL: NEGATIVE — SIGNIFICANT CHANGE UP
HCT VFR BLD CALC: 26.2 % — LOW (ref 39–50)
HGB BLD-MCNC: 8.3 G/DL — LOW (ref 13–17)
KETONES UR-MCNC: NEGATIVE — SIGNIFICANT CHANGE UP
LEUKOCYTE ESTERASE UR-ACNC: NEGATIVE — SIGNIFICANT CHANGE UP
MAGNESIUM SERPL-MCNC: 2.2 MG/DL — SIGNIFICANT CHANGE UP (ref 1.6–2.6)
MCHC RBC-ENTMCNC: 28.1 PG — SIGNIFICANT CHANGE UP (ref 27–34)
MCHC RBC-ENTMCNC: 31.7 GM/DL — LOW (ref 32–36)
MCV RBC AUTO: 88.8 FL — SIGNIFICANT CHANGE UP (ref 80–100)
NITRITE UR-MCNC: NEGATIVE — SIGNIFICANT CHANGE UP
NRBC # BLD: 0 /100 WBCS — SIGNIFICANT CHANGE UP (ref 0–0)
PH UR: 7 — SIGNIFICANT CHANGE UP (ref 5–8)
PHOSPHATE SERPL-MCNC: 3.1 MG/DL — SIGNIFICANT CHANGE UP (ref 2.5–4.5)
PLATELET # BLD AUTO: 415 K/UL — HIGH (ref 150–400)
POTASSIUM SERPL-MCNC: 3.5 MMOL/L — SIGNIFICANT CHANGE UP (ref 3.5–5.3)
POTASSIUM SERPL-SCNC: 3.5 MMOL/L — SIGNIFICANT CHANGE UP (ref 3.5–5.3)
PROT UR-MCNC: 30 MG/DL
RBC # BLD: 2.95 M/UL — LOW (ref 4.2–5.8)
RBC # FLD: 15.7 % — HIGH (ref 10.3–14.5)
SODIUM SERPL-SCNC: 138 MMOL/L — SIGNIFICANT CHANGE UP (ref 135–145)
SP GR SPEC: 1.02 — SIGNIFICANT CHANGE UP (ref 1–1.03)
SPECIMEN SOURCE: SIGNIFICANT CHANGE UP
UROBILINOGEN FLD QL: 0.2 E.U./DL — SIGNIFICANT CHANGE UP
VANCOMYCIN TROUGH SERPL-MCNC: 4.2 UG/ML — LOW (ref 10–20)
WBC # BLD: 10.63 K/UL — HIGH (ref 3.8–10.5)
WBC # FLD AUTO: 10.63 K/UL — HIGH (ref 3.8–10.5)

## 2020-08-28 PROCEDURE — 71045 X-RAY EXAM CHEST 1 VIEW: CPT | Mod: 26

## 2020-08-28 RX ORDER — VANCOMYCIN HCL 1 G
1500 VIAL (EA) INTRAVENOUS EVERY 12 HOURS
Refills: 0 | Status: DISCONTINUED | OUTPATIENT
Start: 2020-08-28 | End: 2020-08-29

## 2020-08-28 RX ADMIN — Medication 4 MILLIGRAM(S): at 21:22

## 2020-08-28 RX ADMIN — Medication 100 MILLIGRAM(S): at 06:48

## 2020-08-28 RX ADMIN — Medication 1 PACKET(S): at 11:42

## 2020-08-28 RX ADMIN — Medication 300 MILLIGRAM(S): at 23:30

## 2020-08-28 RX ADMIN — Medication 12.5 MILLIGRAM(S): at 06:46

## 2020-08-28 RX ADMIN — Medication 4 MILLILITER(S): at 21:17

## 2020-08-28 RX ADMIN — Medication 100 MILLIGRAM(S): at 07:27

## 2020-08-28 RX ADMIN — Medication 100 MILLIGRAM(S): at 18:16

## 2020-08-28 RX ADMIN — Medication 100 MILLIGRAM(S): at 16:29

## 2020-08-28 RX ADMIN — Medication 4 MILLILITER(S): at 03:53

## 2020-08-28 RX ADMIN — TENOFOVIR DISOPROXIL FUMARATE 300 MILLIGRAM(S): 300 TABLET, FILM COATED ORAL at 21:23

## 2020-08-28 RX ADMIN — CHLORHEXIDINE GLUCONATE 1 APPLICATION(S): 213 SOLUTION TOPICAL at 06:52

## 2020-08-28 RX ADMIN — INSULIN HUMAN 4 UNIT(S): 100 INJECTION, SOLUTION SUBCUTANEOUS at 18:39

## 2020-08-28 RX ADMIN — Medication 300 MILLIGRAM(S): at 11:42

## 2020-08-28 RX ADMIN — FINASTERIDE 5 MILLIGRAM(S): 5 TABLET, FILM COATED ORAL at 11:42

## 2020-08-28 RX ADMIN — INSULIN HUMAN 4 UNIT(S): 100 INJECTION, SOLUTION SUBCUTANEOUS at 00:36

## 2020-08-28 RX ADMIN — Medication 4 MILLIGRAM(S): at 00:37

## 2020-08-28 RX ADMIN — Medication 100 MILLIGRAM(S): at 21:13

## 2020-08-28 RX ADMIN — INSULIN HUMAN 4 UNIT(S): 100 INJECTION, SOLUTION SUBCUTANEOUS at 11:43

## 2020-08-28 RX ADMIN — Medication 1 DROP(S): at 21:21

## 2020-08-28 RX ADMIN — Medication 3 MILLILITER(S): at 21:16

## 2020-08-28 RX ADMIN — Medication 650 MILLIGRAM(S): at 18:38

## 2020-08-28 RX ADMIN — Medication 1 DROP(S): at 06:48

## 2020-08-28 RX ADMIN — Medication 1 DROP(S): at 16:28

## 2020-08-28 RX ADMIN — TENOFOVIR DISOPROXIL FUMARATE 300 MILLIGRAM(S): 300 TABLET, FILM COATED ORAL at 00:37

## 2020-08-28 RX ADMIN — Medication 650 MILLIGRAM(S): at 18:16

## 2020-08-28 RX ADMIN — Medication 12.5 MILLIGRAM(S): at 18:17

## 2020-08-28 RX ADMIN — INSULIN HUMAN 4 UNIT(S): 100 INJECTION, SOLUTION SUBCUTANEOUS at 06:49

## 2020-08-28 RX ADMIN — Medication 4 MILLILITER(S): at 11:22

## 2020-08-28 RX ADMIN — Medication 3 MILLILITER(S): at 03:58

## 2020-08-28 RX ADMIN — Medication 3 MILLILITER(S): at 11:38

## 2020-08-28 RX ADMIN — Medication 4 MILLILITER(S): at 16:29

## 2020-08-28 RX ADMIN — Medication 300 MILLIGRAM(S): at 03:53

## 2020-08-28 RX ADMIN — Medication 3 MILLILITER(S): at 16:30

## 2020-08-28 NOTE — PROCEDURE NOTE - NSPROCNAME_GEN_A_CORE
Arterial Puncture/Cannulation
Central Line Insertion
General
Peripheral Line Insertion
General

## 2020-08-28 NOTE — PROGRESS NOTE ADULT - SUBJECTIVE AND OBJECTIVE BOX
HPI:  59 year old male with PMH of HLD and unclear liver disease had acute onset of severe headache, dizziness and vomiting x1 today, was brought into [facility] with continued symptoms, as well as hypertension and multiple episodes of vomiting. Patient was intubated for airway protection with CT Head performed demonstrating a large left cerebellar hemorrhage. CTA Head/Neck performed is suggestive of underlying vascular pathology such as AVM. RIght frontal ventriculostomy placed at [facility] and patient transferred to [facility] for further work-up. Patient arrives to [facility] intubated, sedated and on Nicardipine drip. Patient's son provided history and denies any Aspirin or other anticoagulant use. Denies any prior history of hypertension, smoking, or ETOH abuse. (04 Aug 2020 05:01)      Hospital Course:   POD# 0 S/P cerebral angio, negative for AVM. s/p SOC craniectomy evacuation of cerebellar hematoma.   POD # 1:   overnight tachycardic, ekg without significant changes, lactate wnl, low grade fever, s/p tyl. bolus x 1L , NS increased to 100cc/hr; neuro improving.  EVD at 5 cmH2O   8/6 POD#3, BD#4, Tmax 100.6, Pan Cx 7/5 NGTD, Vanc/Zosyn until 8/10, Vanc trough due today @ 6pm, EEG = no seizure, Lasix overnight for Pulm congest and tachycard, ruff placed for strict I&Os, pending anemia w/u, EVD @ 5, CPAP trials, Card GTT for SBP<140, Started Norvasc for BP optimization  8/7:  8/8:  S/P cerebral angio, negative for AVM. s/p SOC craniectomy evacuation of cerebellar hematoma. No significant events overnight. On CPAP this morning   8/10: fevers overnight, resolved this morning, Pancultured, (+) Enterbacter, on CPAP this AM  8/11 POD#7 SOC, BD # 8, EVD replaced (hard pass), Neuro exam stable, EVD @ 5cm H2O, Zosyn for sputum + enterobact, Plan to extubated today, HCT in AM  8/12 POD#8 s/p SOC, BD#9, s/p EVD replacement (hard pass on 8/10/20), neuro exam stable, EVD @5cm H2O. Zosyn for enterobacter in sputum. O/n EVD stopped draining, flushed distally and proximally, waveform poor, neuro exam remained stable throughout, ICPs <8 prior.    8/13 EVD soft pass yesterday, spiked temp today and re cultured o/w neuro exam stable  8/14: EMIL overnight, neuro stable. Mucomyst for secretions overnight   8/15 EVD dc/d, temp spike to 104, pan culture, ID consulted, vanco and cefepime started   8/15 levo started, presumed sepsis from aspiration, continues to have diarrhea  8/16: EMIL  8/17: EMIL. Neuro exam stable  8/18: Increased secretions overnight. Chest xray obtained. NGT replaced. Neuro exam stable. Patient transferred to ICU for monitoring due to congestion and possible L effusion on CXR along with desaturation and frequent suctioning.   8/19: POD16, on EEG, EMIL o/n, transferred to 5E, plan for PEG tomorrow  8/20 POD#17 overnight given Keppra 1g IV x 1 dose for blank stare and EEG findings: pending final read on EEG, Cefep for PNA, PEG today, NPO, Amonia Serum level today w/u for enceph, vEEG,   8/21 POD#18, EMIL overnight, PEG placed, start TF 9 AM today, Neuro exam stable  8/22 POD #19 EMIL overnight, repeat CT head stable.  8/23: POD#20 EMIL overnight   8/24: POD21. EMIL o/n, neuro stable. on vEEG. vanc/cefepime for possible PNA. requiring frequent suctioning   8/25: POD22. EMIL o/n, neuro stable. off vEEG (was negative). on vanc/cefepime PNA coverage. pre op for vocal cord injection w/ ENT today  8/26: POD#23. OR yesterday with ENT for left vocal cord injection. Cranial sutures removed yesterday. Remains on vanco (dose increased to 1500 bid) and cefepime for empiric PNA coverage.  8/27: POD#24 EMIL overnight. Oral secretions improving. Remains on PNA antibiotic coverage  8/28: POD#25 transferred to SDU. No acute events pending rehab    OVERNIGHT EVENTS:  Vital Signs Last 24 Hrs  T(C): 36.7 (27 Aug 2020 22:45), Max: 37.5 (27 Aug 2020 05:00)  T(F): 98.1 (27 Aug 2020 22:45), Max: 99.5 (27 Aug 2020 05:00)  HR: 76 (27 Aug 2020 20:22) (76 - 104)  BP: 138/75 (27 Aug 2020 20:22) (109/62 - 140/76)  BP(mean): 101 (27 Aug 2020 20:22) (81 - 101)  RR: 18 (27 Aug 2020 20:22) (18 - 21)  SpO2: 100% (27 Aug 2020 20:22) (87% - 100%)    I&O's Summary    26 Aug 2020 07:01  -  27 Aug 2020 07:00  --------------------------------------------------------  IN: 1340 mL / OUT: 2035 mL / NET: -695 mL    27 Aug 2020 07:01  -  28 Aug 2020 00:15  --------------------------------------------------------  IN: 578 mL / OUT: 601 mL / NET: -23 mL        PHYSICAL EXAM:  GEN: laying in bed, appears well, NAD  NEURO: alert, no tracking. FC (better in cantonese), OE spont, nonverbal, face symmetric. CNII-XII intact. PERRL, EOMI. MAEx4 with good strength  CARD: RRR +S1/S2  PULM: +rhonchi  GI: Abd soft, NT/ND  EXT: ext warm, dry, nontender  WOUND: SOC site c/d/i    TUBES/LINES:  pivs      DIET:  [] NPO  [] Mechanical  [x Tube feeds    LABS:                        8.1    9.21  )-----------( 305      ( 27 Aug 2020 05:50 )             26.4     08-27    138  |  102  |  17  ----------------------------<  103<H>  3.9   |  26  |  0.62    Ca    8.4      27 Aug 2020 05:50  Phos  1.8     08-27  Mg     2.5     08-27              CAPILLARY BLOOD GLUCOSE      POCT Blood Glucose.: 121 mg/dL (27 Aug 2020 17:58)  POCT Blood Glucose.: 116 mg/dL (27 Aug 2020 11:26)  POCT Blood Glucose.: 108 mg/dL (27 Aug 2020 07:04)  POCT Blood Glucose.: 111 mg/dL (27 Aug 2020 01:31)      Drug Levels: [] N/A  Vancomycin Level, Trough: 6.4 ug/mL (08-25 @ 19:45)    CSF Analysis: [] N/A      Allergies    No Known Allergies    Intolerances      MEDICATIONS:  Antibiotics:  ceFAZolin   IVPB 2000 milliGRAM(s) IV Intermittent every 8 hours  tenofovir disoproxil fumarate (VIREAD) 300 milliGRAM(s) Oral every 24 hours    Neuro:  acetaminophen    Suspension .. 650 milliGRAM(s) Oral every 6 hours PRN  amantadine 100 milliGRAM(s) Oral two times a day    Anticoagulation:    OTHER:  acetylcysteine 20%  Inhalation 4 milliLiter(s) Inhalation every 6 hours  albuterol/ipratropium for Nebulization. 3 milliLiter(s) Nebulizer every 6 hours  artificial tears (preservative free) Ophthalmic Solution 1 Drop(s) Both EYES three times a day  chlorhexidine 2% Cloths 1 Application(s) Topical <User Schedule>  dextrose 40% Gel 15 Gram(s) Oral once PRN  dextrose 50% Injectable 12.5 Gram(s) IV Push once  dextrose 50% Injectable 25 Gram(s) IV Push once  doxazosin 4 milliGRAM(s) Oral at bedtime  finasteride 5 milliGRAM(s) Oral daily  glucagon  Injectable 1 milliGRAM(s) IntraMuscular once PRN  glucagon  Injectable 1 milliGRAM(s) IntraMuscular once PRN  hydrALAZINE Injectable 10 milliGRAM(s) IV Push every 2 hours PRN  insulin regular  human corrective regimen sliding scale.   SubCutaneous every 6 hours  insulin regular  human recombinant 4 Unit(s) SubCutaneous every 6 hours  lidocaine 4% Laryngotracheal Topical Anesthesia Kit 4 milliLiter(s) Oral Mucosa once  metoprolol tartrate 12.5 milliGRAM(s) Oral every 12 hours  Mometasone Furoate 0.1% Ointment 1 Application(s) 1 Application(s) Topical two times a day PRN  psyllium Powder 1 Packet(s) Oral daily  sodium chloride 0.9% for Nebulization 3 milliLiter(s) Nebulizer three times a day PRN    IVF:  dextrose 5%. 1000 milliLiter(s) IV Continuous <Continuous>    CULTURES:  Culture Results:   Moderate Staphylococcus aureus  Accompanied by normal respiratory aliza (08-25 @ 16:38)  Culture Results:   No growth at 4 days. (08-23 @ 14:32)    RADIOLOGY & ADDITIONAL TESTS:      ASSESSMENT:  59y Male with ICH/IVH s/p SOC crani POD #25 (8/4/20), s/p EVD placement, removed (8/14/20).        INTRACRANIAL BLEED  No pertinent family history in first degree relatives  Handoff  MEWS Score  Hepatitis B  HLD (hyperlipidemia)  ICH (intracerebral hemorrhage)  ICH (intracerebral hemorrhage)  Hepatitis B  HLD (hyperlipidemia)  HIV (human immunodeficiency virus infection)  Cerebellar hemorrhage  Laryngoscopy, direct, adult  Ultrasound guided venous access  Insertion, catheter, intravenous  EGD, with PEG  Craniectomy, subocciptal, with cervical laminectomy for medulla and spinal cord decompression  Percutaneous insertion of arterial line  Angiogram, carotid and cerebral vessels, bilateral  Foot fracture, left    PLAN:  NEURO:  - neuro/vitals checks  - pain control  - cranial sutures removed   - ENT following s/p left vocal cord injection   - cont suctioning prn  - cont abx x 3 more days  - lovenox ppx  - d/w Dr. Blank

## 2020-08-28 NOTE — PROCEDURE NOTE - PROCEDURE DATE TIME, MLM
12-Aug-2020 14:50
13-Aug-2020 15:00
13-Aug-2020 15:30
14-Aug-2020 15:18
23-Aug-2020 15:00
28-Aug-2020 17:20
04-Aug-2020
05-Aug-2020 11:31
25-Aug-2020 17:00
11-Aug-2020 15:12

## 2020-08-28 NOTE — PROCEDURE NOTE - NSSITEPREP_SKIN_A_CORE
chlorhexidine
chlorhexidine
Adherence to aseptic technique: hand hygiene prior to donning barriers (gown, gloves), don cap and mask, sterile drape over patient/chlorhexidine
chlorhexidine
chlorhexidine/povidone-iodine ( under 2 weeks of age or 1500 grams)
chlorhexidine

## 2020-08-28 NOTE — PROCEDURE NOTE - ATTENDING PROVIDER
Dr. Blank
Abhay
Abhay
Dr. Blank
Dr. Blank
Jonathan, NP
Dr. Blank

## 2020-08-28 NOTE — PROCEDURE NOTE - NSINFORMCONSENT_GEN_A_CORE

## 2020-08-29 LAB
ANION GAP SERPL CALC-SCNC: 10 MMOL/L — SIGNIFICANT CHANGE UP (ref 5–17)
BUN SERPL-MCNC: 10 MG/DL — SIGNIFICANT CHANGE UP (ref 7–23)
CALCIUM SERPL-MCNC: 8.7 MG/DL — SIGNIFICANT CHANGE UP (ref 8.4–10.5)
CHLORIDE SERPL-SCNC: 103 MMOL/L — SIGNIFICANT CHANGE UP (ref 96–108)
CO2 SERPL-SCNC: 27 MMOL/L — SIGNIFICANT CHANGE UP (ref 22–31)
CREAT SERPL-MCNC: 0.53 MG/DL — SIGNIFICANT CHANGE UP (ref 0.5–1.3)
GLUCOSE BLDC GLUCOMTR-MCNC: 115 MG/DL — HIGH (ref 70–99)
GLUCOSE BLDC GLUCOMTR-MCNC: 122 MG/DL — HIGH (ref 70–99)
GLUCOSE BLDC GLUCOMTR-MCNC: 128 MG/DL — HIGH (ref 70–99)
GLUCOSE BLDC GLUCOMTR-MCNC: 130 MG/DL — HIGH (ref 70–99)
GLUCOSE BLDC GLUCOMTR-MCNC: 135 MG/DL — HIGH (ref 70–99)
GLUCOSE BLDC GLUCOMTR-MCNC: 142 MG/DL — HIGH (ref 70–99)
GLUCOSE SERPL-MCNC: 134 MG/DL — HIGH (ref 70–99)
GRAM STN FLD: SIGNIFICANT CHANGE UP
HCT VFR BLD CALC: 26.5 % — LOW (ref 39–50)
HGB BLD-MCNC: 8.2 G/DL — LOW (ref 13–17)
MCHC RBC-ENTMCNC: 27.6 PG — SIGNIFICANT CHANGE UP (ref 27–34)
MCHC RBC-ENTMCNC: 30.9 GM/DL — LOW (ref 32–36)
MCV RBC AUTO: 89.2 FL — SIGNIFICANT CHANGE UP (ref 80–100)
NRBC # BLD: 0 /100 WBCS — SIGNIFICANT CHANGE UP (ref 0–0)
PLATELET # BLD AUTO: 420 K/UL — HIGH (ref 150–400)
POTASSIUM SERPL-MCNC: 3.9 MMOL/L — SIGNIFICANT CHANGE UP (ref 3.5–5.3)
POTASSIUM SERPL-SCNC: 3.9 MMOL/L — SIGNIFICANT CHANGE UP (ref 3.5–5.3)
RBC # BLD: 2.97 M/UL — LOW (ref 4.2–5.8)
RBC # FLD: 15.6 % — HIGH (ref 10.3–14.5)
SODIUM SERPL-SCNC: 140 MMOL/L — SIGNIFICANT CHANGE UP (ref 135–145)
SPECIMEN SOURCE: SIGNIFICANT CHANGE UP
VANCOMYCIN TROUGH SERPL-MCNC: 13 UG/ML — SIGNIFICANT CHANGE UP (ref 10–20)
WBC # BLD: 11.54 K/UL — HIGH (ref 3.8–10.5)
WBC # FLD AUTO: 11.54 K/UL — HIGH (ref 3.8–10.5)

## 2020-08-29 PROCEDURE — 99233 SBSQ HOSP IP/OBS HIGH 50: CPT

## 2020-08-29 RX ORDER — VANCOMYCIN HCL 1 G
1750 VIAL (EA) INTRAVENOUS EVERY 12 HOURS
Refills: 0 | Status: DISCONTINUED | OUTPATIENT
Start: 2020-08-30 | End: 2020-08-29

## 2020-08-29 RX ADMIN — Medication 4 MILLILITER(S): at 14:29

## 2020-08-29 RX ADMIN — Medication 1 DROP(S): at 05:25

## 2020-08-29 RX ADMIN — Medication 100 MILLIGRAM(S): at 05:25

## 2020-08-29 RX ADMIN — INSULIN HUMAN 4 UNIT(S): 100 INJECTION, SOLUTION SUBCUTANEOUS at 23:26

## 2020-08-29 RX ADMIN — Medication 12.5 MILLIGRAM(S): at 18:56

## 2020-08-29 RX ADMIN — Medication 3 MILLILITER(S): at 05:26

## 2020-08-29 RX ADMIN — FINASTERIDE 5 MILLIGRAM(S): 5 TABLET, FILM COATED ORAL at 12:06

## 2020-08-29 RX ADMIN — Medication 4 MILLIGRAM(S): at 21:21

## 2020-08-29 RX ADMIN — Medication 4 MILLILITER(S): at 21:22

## 2020-08-29 RX ADMIN — Medication 3 MILLILITER(S): at 09:41

## 2020-08-29 RX ADMIN — INSULIN HUMAN 4 UNIT(S): 100 INJECTION, SOLUTION SUBCUTANEOUS at 18:55

## 2020-08-29 RX ADMIN — Medication 4 MILLILITER(S): at 04:00

## 2020-08-29 RX ADMIN — Medication 100 MILLIGRAM(S): at 18:56

## 2020-08-29 RX ADMIN — INSULIN HUMAN 4 UNIT(S): 100 INJECTION, SOLUTION SUBCUTANEOUS at 00:19

## 2020-08-29 RX ADMIN — Medication 4 MILLILITER(S): at 09:44

## 2020-08-29 RX ADMIN — Medication 1 DROP(S): at 14:01

## 2020-08-29 RX ADMIN — CHLORHEXIDINE GLUCONATE 1 APPLICATION(S): 213 SOLUTION TOPICAL at 05:26

## 2020-08-29 RX ADMIN — Medication 100 MILLIGRAM(S): at 05:22

## 2020-08-29 RX ADMIN — Medication 12.5 MILLIGRAM(S): at 05:24

## 2020-08-29 RX ADMIN — Medication 1 DROP(S): at 21:23

## 2020-08-29 RX ADMIN — Medication 3 MILLILITER(S): at 16:23

## 2020-08-29 RX ADMIN — TENOFOVIR DISOPROXIL FUMARATE 300 MILLIGRAM(S): 300 TABLET, FILM COATED ORAL at 21:23

## 2020-08-29 RX ADMIN — INSULIN HUMAN 4 UNIT(S): 100 INJECTION, SOLUTION SUBCUTANEOUS at 07:11

## 2020-08-29 RX ADMIN — Medication 1 PACKET(S): at 12:06

## 2020-08-29 RX ADMIN — Medication 300 MILLIGRAM(S): at 14:28

## 2020-08-29 RX ADMIN — Medication 100 MILLIGRAM(S): at 14:06

## 2020-08-29 RX ADMIN — Medication 3 MILLILITER(S): at 22:00

## 2020-08-29 RX ADMIN — INSULIN HUMAN 4 UNIT(S): 100 INJECTION, SOLUTION SUBCUTANEOUS at 11:26

## 2020-08-29 RX ADMIN — Medication 100 MILLIGRAM(S): at 21:17

## 2020-08-29 NOTE — PROGRESS NOTE ADULT - ASSESSMENT
Cerebellar hemorrhage, vocal cord paralysis, course c/b recurrent fever and aspiration. Most recent sputum cx MSSA for which he has been on cefazolin--resume cefazolin 2g IV q8h; can repeat sputum cx if c/f new pneumonia; aspiration precautions; ask nsgy d/w ENT role for repeat vocal cord injection or role for scopolamine for anticholinergic effect. Unfortunately, antibiotics will not prevent aspiration and he will likely have fever from pneumonitis in setting of aspiration regardless of antibiotics.    ID Team 1

## 2020-08-29 NOTE — PROGRESS NOTE ADULT - SUBJECTIVE AND OBJECTIVE BOX
INTERVAL HPI/OVERNIGHT EVENTS: Significant oropharyngeal secretions; fever overnight possible aspiration.    ROS: UTO      ANTIBIOTICS/RELEVANT:    MEDICATIONS  (STANDING):  acetylcysteine 20%  Inhalation 4 milliLiter(s) Inhalation every 6 hours  albuterol/ipratropium for Nebulization. 3 milliLiter(s) Nebulizer every 6 hours  amantadine 100 milliGRAM(s) Oral two times a day  artificial tears (preservative free) Ophthalmic Solution 1 Drop(s) Both EYES three times a day  ceFAZolin   IVPB 2000 milliGRAM(s) IV Intermittent every 8 hours  chlorhexidine 2% Cloths 1 Application(s) Topical <User Schedule>  dextrose 5%. 1000 milliLiter(s) (50 mL/Hr) IV Continuous <Continuous>  dextrose 50% Injectable 12.5 Gram(s) IV Push once  dextrose 50% Injectable 25 Gram(s) IV Push once  doxazosin 4 milliGRAM(s) Oral at bedtime  finasteride 5 milliGRAM(s) Oral daily  insulin regular  human corrective regimen sliding scale.   SubCutaneous every 6 hours  insulin regular  human recombinant 4 Unit(s) SubCutaneous every 6 hours  lidocaine 4% Laryngotracheal Topical Anesthesia Kit 4 milliLiter(s) Oral Mucosa once  metoprolol tartrate 12.5 milliGRAM(s) Oral every 12 hours  psyllium Powder 1 Packet(s) Oral daily  tenofovir disoproxil fumarate (VIREAD) 300 milliGRAM(s) Oral every 24 hours    MEDICATIONS  (PRN):  acetaminophen    Suspension .. 650 milliGRAM(s) Oral every 6 hours PRN Temp greater or equal to 38C (100.4F), Mild Pain (1 - 3)  dextrose 40% Gel 15 Gram(s) Oral once PRN Blood Glucose LESS THAN 70 milliGRAM(s)/deciliter  glucagon  Injectable 1 milliGRAM(s) IntraMuscular once PRN Glucose LESS THAN 70 milligrams/deciliter  glucagon  Injectable 1 milliGRAM(s) IntraMuscular once PRN Glucose LESS THAN 70 milligrams/deciliter  hydrALAZINE Injectable 10 milliGRAM(s) IV Push every 2 hours PRN SBP > 140  Mometasone Furoate 0.1% Ointment 1 Application(s) 1 Application(s) Topical two times a day PRN dry/itchy skin  sodium chloride 0.9% for Nebulization 3 milliLiter(s) Nebulizer three times a day PRN for congestion/secretions  sodium chloride 0.9% lock flush 10 milliLiter(s) IV Push every 1 hour PRN Pre/post blood products, medications, blood draw, and to maintain line patency        Vital Signs Last 24 Hrs  T(C): 36.1 (29 Aug 2020 13:34), Max: 38.3 (28 Aug 2020 17:00)  T(F): 97 (29 Aug 2020 13:34), Max: 101 (28 Aug 2020 17:00)  HR: 84 (29 Aug 2020 15:55) (74 - 86)  BP: 136/70 (29 Aug 2020 15:55) (107/62 - 145/79)  BP(mean): 97 (29 Aug 2020 15:55) (80 - 106)  RR: 18 (29 Aug 2020 15:30) (17 - 18)  SpO2: 92% (29 Aug 2020 15:55) (92% - 97%)    PHYSICAL EXAM:  Constitutional:Well-developed, well nourished  Eyes:HARLEY, EOMI  Ear/Nose/Throat: no oral lesion, no sinus tenderness on percussion	  Neck:no JVD, no lymphadenopathy, supple  Respiratory: CTA lew  Cardiovascular: S1S2 RRR, no murmurs  Gastrointestinal:soft, (+) BS, no HSM  Extremities:no e/e/c  Vascular: DP Pulse:	right normal; left normal      LABS:                        8.2    11.54 )-----------( 420      ( 29 Aug 2020 07:10 )             26.5     08-29    140  |  103  |  10  ----------------------------<  134<H>  3.9   |  27  |  0.53    Ca    8.7      29 Aug 2020 07:10  Phos  3.1     08-28  Mg     2.2     08-28        Urinalysis Basic - ( 28 Aug 2020 18:51 )    Color: Yellow / Appearance: Clear / S.025 / pH: x  Gluc: x / Ketone: NEGATIVE  / Bili: Negative / Urobili: 0.2 E.U./dL   Blood: x / Protein: 30 mg/dL / Nitrite: NEGATIVE   Leuk Esterase: NEGATIVE / RBC: 5-10 /HPF / WBC < 5 /HPF   Sq Epi: x / Non Sq Epi: 0-5 /HPF / Bacteria: Present /HPF        MICROBIOLOGY: Culture - Sputum . (20 @ 16:38)    Gram Stain:   Rare epithelial cells  Few WBC's  Rare Gram positive cocci in pairs, chains and clusters    Specimen Source: .Sputum Sputum    Culture Results:   Moderate Staphylococcus aureus  Accompanied by normal respiratory aliza        RADIOLOGY & ADDITIONAL STUDIES: reviewed CXR R base opacity

## 2020-08-29 NOTE — PROGRESS NOTE ADULT - SUBJECTIVE AND OBJECTIVE BOX
HPI:  59 year old male with PMH of HLD and unclear liver disease had acute onset of severe headache, dizziness and vomiting x1 today, was brought into [facility] with continued symptoms, as well as hypertension and multiple episodes of vomiting. Patient was intubated for airway protection with CT Head performed demonstrating a large left cerebellar hemorrhage. CTA Head/Neck performed is suggestive of underlying vascular pathology such as AVM. RIght frontal ventriculostomy placed at [facility] and patient transferred to [facility] for further work-up. Patient arrives to [facility] intubated, sedated and on Nicardipine drip. Patient's son provided history and denies any Aspirin or other anticoagulant use. Denies any prior history of hypertension, smoking, or ETOH abuse. (04 Aug 2020 05:01)    Hospital Course:   POD# 0 S/P cerebral angio, negative for AVM. s/p SOC craniectomy evacuation of cerebellar hematoma.   POD # 1:   overnight tachycardic, ekg without significant changes, lactate wnl, low grade fever, s/p tyl. bolus x 1L , NS increased to 100cc/hr; neuro improving.  EVD at 5 cmH2O   8/6 POD#3, BD#4, Tmax 100.6, Pan Cx 7/5 NGTD, Vanc/Zosyn until 8/10, Vanc trough due today @ 6pm, EEG = no seizure, Lasix overnight for Pulm congest and tachycard, ruff placed for strict I&Os, pending anemia w/u, EVD @ 5, CPAP trials, Card GTT for SBP<140, Started Norvasc for BP optimization  :  :  S/P cerebral angio, negative for AVM. s/p SOC craniectomy evacuation of cerebellar hematoma. No significant events overnight. On CPAP this morning   8/10: fevers overnight, resolved this morning, Pancultured, (+) Enterbacter, on CPAP this AM  8/11 POD#7 SOC, BD # 8, EVD replaced (hard pass), Neuro exam stable, EVD @ 5cm H2O, Zosyn for sputum + enterobact, Plan to extubated today, HCT in AM  8/12 POD#8 s/p SOC, BD#9, s/p EVD replacement (hard pass on 8/10/20), neuro exam stable, EVD @5cm H2O. Zosyn for enterobacter in sputum. O/n EVD stopped draining, flushed distally and proximally, waveform poor, neuro exam remained stable throughout, ICPs <8 prior.     EVD soft pass yesterday, spiked temp today and re cultured o/w neuro exam stable  : EMIL overnight, neuro stable. Mucomyst for secretions overnight   8/15 EVD dc/d, temp spike to 104, pan culture, ID consulted, vanco and cefepime started   8/15 levo started, presumed sepsis from aspiration, continues to have diarrhea  : EMIL  8: EMIL. Neuro exam stable  : Increased secretions overnight. Chest xray obtained. NGT replaced. Neuro exam stable. Patient transferred to ICU for monitoring due to congestion and possible L effusion on CXR along with desaturation and frequent suctioning.   : POD16, on EEG, EMIL o/n, transferred to 5E, plan for PEG tomorrow   POD#17 overnight given Keppra 1g IV x 1 dose for blank stare and EEG findings: pending final read on EEG, Cefep for PNA, PEG today, NPO, Amonia Serum level today w/u for enceph, vEEG,    POD#18, EMIL overnight, PEG placed, start TF 9 AM today, Neuro exam stable   POD #19 EMIL overnight, repeat CT head stable.  : POD#20 EMIL overnight   : POD21. EMIL o/n, neuro stable. on vEEG. vanc/cefepime for possible PNA. requiring frequent suctioning   : POD22. EMIL o/n, neuro stable. off vEEG (was negative). on vanc/cefepime PNA coverage. pre op for vocal cord injection w/ ENT today  : POD#23. OR yesterday with ENT for left vocal cord injection. Cranial sutures removed yesterday. Remains on vanco (dose increased to 1500 bid) and cefepime for empiric PNA coverage.  : POD#24 EMIL overnight. Oral secretions improving. Remains on PNA antibiotic coverage  : POD#25 transferred to SDU. No acute events pending rehab  : Off of voice rest today. Tolerates PEG feeds. NO fevers overnight    OVERNIGHT EVENTS: No major events.   Vital Signs Last 24 Hrs  T(C): 37.1 (28 Aug 2020 21:50), Max: 38.3 (28 Aug 2020 17:00)  T(F): 98.8 (28 Aug 2020 21:50), Max: 101 (28 Aug 2020 17:00)  HR: 74 (28 Aug 2020 23:46) (74 - 94)  BP: 107/62 (28 Aug 2020 23:46) (107/62 - 133/74)  BP(mean): 80 (28 Aug 2020 23:46) (80 - 97)  RR: 18 (28 Aug 2020 23:46) (17 - 18)  SpO2: 95% (28 Aug 2020 23:46) (94% - 99%)    I&O's Summary    27 Aug 2020 07:01  -  28 Aug 2020 07:00  --------------------------------------------------------  IN: 1466 mL / OUT: 901 mL / NET: 565 mL    28 Aug 2020 07:01  -  29 Aug 2020 03:57  --------------------------------------------------------  IN: 850 mL / OUT: 1603 mL / NET: -753 mL        PHYSICAL EXAM:  GEN: laying in bed, appears well, NAD  NEURO: alert, no tracking. FC (better in cantonese), OE spont, nonverbal, face symmetric. CNII-XII intact. PERRL, EOMI. MAEx4 with good strength  CARD: RRR +S1/S2  PULM: +rhonchi  GI: Abd soft, NT/ND  EXT: ext warm, dry, nontender      DIET: PEG feeds      LABS:                        8.3    10.63 )-----------( 415      ( 28 Aug 2020 07:04 )             26.2     08-28    138  |  101  |  12  ----------------------------<  117<H>  3.5   |  28  |  0.54    Ca    8.3<L>      28 Aug 2020 07:02  Phos  3.1       Mg     2.2             Urinalysis Basic - ( 28 Aug 2020 18:51 )    Color: Yellow / Appearance: Clear / S.025 / pH: x  Gluc: x / Ketone: NEGATIVE  / Bili: Negative / Urobili: 0.2 E.U./dL   Blood: x / Protein: 30 mg/dL / Nitrite: NEGATIVE   Leuk Esterase: NEGATIVE / RBC: 5-10 /HPF / WBC < 5 /HPF   Sq Epi: x / Non Sq Epi: 0-5 /HPF / Bacteria: Present /HPF          CAPILLARY BLOOD GLUCOSE      POCT Blood Glucose.: 122 mg/dL (29 Aug 2020 00:14)  POCT Blood Glucose.: 107 mg/dL (28 Aug 2020 21:41)  POCT Blood Glucose.: 118 mg/dL (28 Aug 2020 16:13)  POCT Blood Glucose.: 121 mg/dL (28 Aug 2020 11:23)  POCT Blood Glucose.: 121 mg/dL (28 Aug 2020 06:40)      Drug Levels: [] N/A  Vancomycin Level, Trough: 4.2 ug/mL ( @ 00:14)  Vancomycin Level, Trough: 6.4 ug/mL ( @ 19:45)    CSF Analysis: [] N/A      Allergies    No Known Allergies    Intolerances      MEDICATIONS:  Antibiotics:  ceFAZolin   IVPB 2000 milliGRAM(s) IV Intermittent every 8 hours  tenofovir disoproxil fumarate (VIREAD) 300 milliGRAM(s) Oral every 24 hours  vancomycin  IVPB 1500 milliGRAM(s) IV Intermittent every 12 hours    Neuro:  acetaminophen    Suspension .. 650 milliGRAM(s) Oral every 6 hours PRN  amantadine 100 milliGRAM(s) Oral two times a day    Anticoagulation:    OTHER:  acetylcysteine 20%  Inhalation 4 milliLiter(s) Inhalation every 6 hours  albuterol/ipratropium for Nebulization. 3 milliLiter(s) Nebulizer every 6 hours  artificial tears (preservative free) Ophthalmic Solution 1 Drop(s) Both EYES three times a day  chlorhexidine 2% Cloths 1 Application(s) Topical <User Schedule>  dextrose 40% Gel 15 Gram(s) Oral once PRN  dextrose 50% Injectable 12.5 Gram(s) IV Push once  dextrose 50% Injectable 25 Gram(s) IV Push once  doxazosin 4 milliGRAM(s) Oral at bedtime  finasteride 5 milliGRAM(s) Oral daily  glucagon  Injectable 1 milliGRAM(s) IntraMuscular once PRN  glucagon  Injectable 1 milliGRAM(s) IntraMuscular once PRN  hydrALAZINE Injectable 10 milliGRAM(s) IV Push every 2 hours PRN  insulin regular  human corrective regimen sliding scale.   SubCutaneous every 6 hours  insulin regular  human recombinant 4 Unit(s) SubCutaneous every 6 hours  lidocaine 4% Laryngotracheal Topical Anesthesia Kit 4 milliLiter(s) Oral Mucosa once  metoprolol tartrate 12.5 milliGRAM(s) Oral every 12 hours  Mometasone Furoate 0.1% Ointment 1 Application(s) 1 Application(s) Topical two times a day PRN  psyllium Powder 1 Packet(s) Oral daily  sodium chloride 0.9% for Nebulization 3 milliLiter(s) Nebulizer three times a day PRN    IVF:  dextrose 5%. 1000 milliLiter(s) IV Continuous <Continuous>    CULTURES:  Culture Results:   Moderate Staphylococcus aureus  Accompanied by normal respiratory aliza ( @ 16:38)  Culture Results:   No growth at 5 days. ( @ 14:32)    RADIOLOGY & ADDITIONAL TESTS:      ASSESSMENT:  60y Male with ICH/IVH s/p SOC crani POD #25 (20), s/p EVD placement, removed (20).      INTRACRANIAL BLEED  No pertinent family history in first degree relatives  Handoff  MEWS Score  Hepatitis B  HLD (hyperlipidemia)  ICH (intracerebral hemorrhage)  ICH (intracerebral hemorrhage)  Hepatitis B  HLD (hyperlipidemia)  HIV (human immunodeficiency virus infection)  Cerebellar hemorrhage  Laryngoscopy, direct, adult  Ultrasound guided venous access  Insertion, catheter, intravenous  EGD, with PEG  Craniectomy, subocciptal, with cervical laminectomy for medulla and spinal cord decompression  Percutaneous insertion of arterial line  Angiogram, carotid and cerebral vessels, bilateral  Foot fracture, left      PLANEURO:  - neuro/vitals checks  - pain control  - cranial sutures removed   - ENT following s/p left vocal cord injection   - cont suctioning prn  - cont abx x 3 more days  - lovenox ppx  - d/w Dr. Blank    DVT PROPHYLAXIS:  [x] Venodynes                                [x] Heparin/Lovenox    DISPOSITION: Full code  KELY    Assessment:  Present when checked    []  GCS  E   V  M     Heart Failure: []Acute, [] acute on chronic , []chronic  Heart Failure:  [] Diastolic (HFpEF), [] Systolic (HFrEF), []Combined (HFpEF and HFrEF), [] RHF, [] Pulm HTN, [] Other    [] SARAH, [] ATN, [] AIN, [] other  [] CKD1, [] CKD2, [] CKD 3, [] CKD 4, [] CKD 5, []ESRD    Encephalopathy: [] Metabolic, [] Hepatic, [] toxic, [] Neurological, [] Other    Abnormal Nurtitional Status: [] malnurtition (see nutrition note), [ ]underweight: BMI < 19, [] morbid obesity: BMI >40, [] Cachexia    [] Sepsis  [] hypovolemic shock,[] cardiogenic shock, [] hemorrhagic shock, [] neuogenic shock  [] Acute Respiratory Failure  []Cerebral edema, [] Brain compression/ herniation,   [] Functional quadriplegia  [] Acute blood loss anemia

## 2020-08-30 LAB
ANION GAP SERPL CALC-SCNC: 12 MMOL/L — SIGNIFICANT CHANGE UP (ref 5–17)
BUN SERPL-MCNC: 9 MG/DL — SIGNIFICANT CHANGE UP (ref 7–23)
CALCIUM SERPL-MCNC: 8.8 MG/DL — SIGNIFICANT CHANGE UP (ref 8.4–10.5)
CHLORIDE SERPL-SCNC: 100 MMOL/L — SIGNIFICANT CHANGE UP (ref 96–108)
CO2 SERPL-SCNC: 27 MMOL/L — SIGNIFICANT CHANGE UP (ref 22–31)
CREAT SERPL-MCNC: 0.47 MG/DL — LOW (ref 0.5–1.3)
CULTURE RESULTS: NO GROWTH — SIGNIFICANT CHANGE UP
GLUCOSE BLDC GLUCOMTR-MCNC: 122 MG/DL — HIGH (ref 70–99)
GLUCOSE BLDC GLUCOMTR-MCNC: 128 MG/DL — HIGH (ref 70–99)
GLUCOSE BLDC GLUCOMTR-MCNC: 130 MG/DL — HIGH (ref 70–99)
GLUCOSE SERPL-MCNC: 134 MG/DL — HIGH (ref 70–99)
HCT VFR BLD CALC: 28.5 % — LOW (ref 39–50)
HGB BLD-MCNC: 8.8 G/DL — LOW (ref 13–17)
MAGNESIUM SERPL-MCNC: 2.5 MG/DL — SIGNIFICANT CHANGE UP (ref 1.6–2.6)
MCHC RBC-ENTMCNC: 27.4 PG — SIGNIFICANT CHANGE UP (ref 27–34)
MCHC RBC-ENTMCNC: 30.9 GM/DL — LOW (ref 32–36)
MCV RBC AUTO: 88.8 FL — SIGNIFICANT CHANGE UP (ref 80–100)
NRBC # BLD: 0 /100 WBCS — SIGNIFICANT CHANGE UP (ref 0–0)
PHOSPHATE SERPL-MCNC: 3.6 MG/DL — SIGNIFICANT CHANGE UP (ref 2.5–4.5)
PLATELET # BLD AUTO: 455 K/UL — HIGH (ref 150–400)
POTASSIUM SERPL-MCNC: 3.8 MMOL/L — SIGNIFICANT CHANGE UP (ref 3.5–5.3)
POTASSIUM SERPL-SCNC: 3.8 MMOL/L — SIGNIFICANT CHANGE UP (ref 3.5–5.3)
RBC # BLD: 3.21 M/UL — LOW (ref 4.2–5.8)
RBC # FLD: 15.3 % — HIGH (ref 10.3–14.5)
SODIUM SERPL-SCNC: 139 MMOL/L — SIGNIFICANT CHANGE UP (ref 135–145)
SPECIMEN SOURCE: SIGNIFICANT CHANGE UP
WBC # BLD: 9.48 K/UL — SIGNIFICANT CHANGE UP (ref 3.8–10.5)
WBC # FLD AUTO: 9.48 K/UL — SIGNIFICANT CHANGE UP (ref 3.8–10.5)

## 2020-08-30 PROCEDURE — 99233 SBSQ HOSP IP/OBS HIGH 50: CPT

## 2020-08-30 PROCEDURE — 99024 POSTOP FOLLOW-UP VISIT: CPT

## 2020-08-30 PROCEDURE — 71045 X-RAY EXAM CHEST 1 VIEW: CPT | Mod: 26

## 2020-08-30 PROCEDURE — 99232 SBSQ HOSP IP/OBS MODERATE 35: CPT

## 2020-08-30 RX ORDER — ENOXAPARIN SODIUM 100 MG/ML
40 INJECTION SUBCUTANEOUS AT BEDTIME
Refills: 0 | Status: DISCONTINUED | OUTPATIENT
Start: 2020-08-30 | End: 2020-09-02

## 2020-08-30 RX ORDER — POTASSIUM CHLORIDE 20 MEQ
40 PACKET (EA) ORAL ONCE
Refills: 0 | Status: COMPLETED | OUTPATIENT
Start: 2020-08-30 | End: 2020-08-30

## 2020-08-30 RX ORDER — SCOPALAMINE 1 MG/3D
1 PATCH, EXTENDED RELEASE TRANSDERMAL
Refills: 0 | Status: DISCONTINUED | OUTPATIENT
Start: 2020-08-30 | End: 2020-09-02

## 2020-08-30 RX ADMIN — Medication 12.5 MILLIGRAM(S): at 05:33

## 2020-08-30 RX ADMIN — Medication 1 DROP(S): at 21:24

## 2020-08-30 RX ADMIN — Medication 4 MILLILITER(S): at 03:00

## 2020-08-30 RX ADMIN — Medication 4 MILLIGRAM(S): at 21:24

## 2020-08-30 RX ADMIN — Medication 100 MILLIGRAM(S): at 18:15

## 2020-08-30 RX ADMIN — Medication 100 MILLIGRAM(S): at 21:18

## 2020-08-30 RX ADMIN — Medication 3 MILLILITER(S): at 22:00

## 2020-08-30 RX ADMIN — Medication 4 MILLILITER(S): at 14:16

## 2020-08-30 RX ADMIN — FINASTERIDE 5 MILLIGRAM(S): 5 TABLET, FILM COATED ORAL at 11:15

## 2020-08-30 RX ADMIN — Medication 3 MILLILITER(S): at 04:00

## 2020-08-30 RX ADMIN — Medication 40 MILLIEQUIVALENT(S): at 07:32

## 2020-08-30 RX ADMIN — SCOPALAMINE 1 PATCH: 1 PATCH, EXTENDED RELEASE TRANSDERMAL at 15:59

## 2020-08-30 RX ADMIN — TENOFOVIR DISOPROXIL FUMARATE 300 MILLIGRAM(S): 300 TABLET, FILM COATED ORAL at 21:25

## 2020-08-30 RX ADMIN — Medication 1 DROP(S): at 13:21

## 2020-08-30 RX ADMIN — Medication 4 MILLILITER(S): at 08:54

## 2020-08-30 RX ADMIN — Medication 100 MILLIGRAM(S): at 05:33

## 2020-08-30 RX ADMIN — INSULIN HUMAN 4 UNIT(S): 100 INJECTION, SOLUTION SUBCUTANEOUS at 06:38

## 2020-08-30 RX ADMIN — Medication 100 MILLIGRAM(S): at 05:30

## 2020-08-30 RX ADMIN — CHLORHEXIDINE GLUCONATE 1 APPLICATION(S): 213 SOLUTION TOPICAL at 05:34

## 2020-08-30 RX ADMIN — ENOXAPARIN SODIUM 40 MILLIGRAM(S): 100 INJECTION SUBCUTANEOUS at 21:23

## 2020-08-30 RX ADMIN — INSULIN HUMAN 4 UNIT(S): 100 INJECTION, SOLUTION SUBCUTANEOUS at 11:22

## 2020-08-30 RX ADMIN — INSULIN HUMAN 4 UNIT(S): 100 INJECTION, SOLUTION SUBCUTANEOUS at 16:32

## 2020-08-30 RX ADMIN — Medication 100 MILLIGRAM(S): at 13:21

## 2020-08-30 RX ADMIN — Medication 3 MILLILITER(S): at 15:18

## 2020-08-30 RX ADMIN — Medication 1 PACKET(S): at 11:14

## 2020-08-30 RX ADMIN — Medication 3 MILLILITER(S): at 09:11

## 2020-08-30 RX ADMIN — SCOPALAMINE 1 PATCH: 1 PATCH, EXTENDED RELEASE TRANSDERMAL at 18:33

## 2020-08-30 RX ADMIN — Medication 1 DROP(S): at 05:34

## 2020-08-30 RX ADMIN — Medication 4 MILLILITER(S): at 21:21

## 2020-08-30 RX ADMIN — Medication 12.5 MILLIGRAM(S): at 18:14

## 2020-08-30 NOTE — PROGRESS NOTE ADULT - SUBJECTIVE AND OBJECTIVE BOX
INTERVAL HPI/OVERNIGHT EVENTS: No new fevers.     ROS: UTO      ANTIBIOTICS/RELEVANT:    MEDICATIONS  (STANDING):  acetylcysteine 20%  Inhalation 4 milliLiter(s) Inhalation every 6 hours  albuterol/ipratropium for Nebulization. 3 milliLiter(s) Nebulizer every 6 hours  amantadine 100 milliGRAM(s) Oral two times a day  artificial tears (preservative free) Ophthalmic Solution 1 Drop(s) Both EYES three times a day  ceFAZolin   IVPB 2000 milliGRAM(s) IV Intermittent every 8 hours  chlorhexidine 2% Cloths 1 Application(s) Topical <User Schedule>  dextrose 5%. 1000 milliLiter(s) (50 mL/Hr) IV Continuous <Continuous>  dextrose 50% Injectable 12.5 Gram(s) IV Push once  dextrose 50% Injectable 25 Gram(s) IV Push once  doxazosin 4 milliGRAM(s) Oral at bedtime  enoxaparin Injectable 40 milliGRAM(s) SubCutaneous at bedtime  finasteride 5 milliGRAM(s) Oral daily  insulin regular  human corrective regimen sliding scale.   SubCutaneous every 6 hours  insulin regular  human recombinant 4 Unit(s) SubCutaneous every 6 hours  lidocaine 4% Laryngotracheal Topical Anesthesia Kit 4 milliLiter(s) Oral Mucosa once  metoprolol tartrate 12.5 milliGRAM(s) Oral every 12 hours  psyllium Powder 1 Packet(s) Oral daily  scopolamine 1 mG/72 Hr(s) Patch 1 Patch Transdermal every 72 hours  tenofovir disoproxil fumarate (VIREAD) 300 milliGRAM(s) Oral every 24 hours    MEDICATIONS  (PRN):  acetaminophen    Suspension .. 650 milliGRAM(s) Oral every 6 hours PRN Temp greater or equal to 38C (100.4F), Mild Pain (1 - 3)  dextrose 40% Gel 15 Gram(s) Oral once PRN Blood Glucose LESS THAN 70 milliGRAM(s)/deciliter  glucagon  Injectable 1 milliGRAM(s) IntraMuscular once PRN Glucose LESS THAN 70 milligrams/deciliter  glucagon  Injectable 1 milliGRAM(s) IntraMuscular once PRN Glucose LESS THAN 70 milligrams/deciliter  hydrALAZINE Injectable 10 milliGRAM(s) IV Push every 2 hours PRN SBP > 140  Mometasone Furoate 0.1% Ointment 1 Application(s) 1 Application(s) Topical two times a day PRN dry/itchy skin  sodium chloride 0.9% for Nebulization 3 milliLiter(s) Nebulizer three times a day PRN for congestion/secretions  sodium chloride 0.9% lock flush 10 milliLiter(s) IV Push every 1 hour PRN Pre/post blood products, medications, blood draw, and to maintain line patency        Vital Signs Last 24 Hrs  T(C): 36.7 (30 Aug 2020 13:13), Max: 36.8 (29 Aug 2020 21:57)  T(F): 98.1 (30 Aug 2020 13:13), Max: 98.3 (29 Aug 2020 21:57)  HR: 78 (30 Aug 2020 14:38) (76 - 94)  BP: 140/81 (30 Aug 2020 14:38) (140/81 - 156/82)  BP(mean): 105 (30 Aug 2020 14:38) (102 - 116)  RR: 18 (30 Aug 2020 14:38) (16 - 18)  SpO2: 97% (30 Aug 2020 14:38) (97% - 99%)    PHYSICAL EXAM:  Constitutional:Well-developed, well nourished  Eyes:HARLEY, EOMI  Ear/Nose/Throat: no oral lesion, no sinus tenderness on percussion	  Neck:no JVD, no lymphadenopathy, supple  Respiratory: CTA lew  Cardiovascular: S1S2 RRR, no murmurs  Gastrointestinal:soft, (+) BS, no HSM  Extremities:no e/e/c  Vascular: DP Pulse:	right normal; left normal      LABS:                        8.8    9.48  )-----------( 455      ( 30 Aug 2020 05:50 )             28.5     08-30    139  |  100  |  9   ----------------------------<  134<H>  3.8   |  27  |  0.47<L>    Ca    8.8      30 Aug 2020 05:50  Phos  3.6     08-30  Mg     2.5     08-30        Urinalysis Basic - ( 28 Aug 2020 18:51 )    Color: Yellow / Appearance: Clear / S.025 / pH: x  Gluc: x / Ketone: NEGATIVE  / Bili: Negative / Urobili: 0.2 E.U./dL   Blood: x / Protein: 30 mg/dL / Nitrite: NEGATIVE   Leuk Esterase: NEGATIVE / RBC: 5-10 /HPF / WBC < 5 /HPF   Sq Epi: x / Non Sq Epi: 0-5 /HPF / Bacteria: Present /HPF        MICROBIOLOGY: Culture - Sputum (20 @ 17:52)    Gram Stain:   Few epithelial cells  Rare WBC's  Rare Gram positive cocci in pairs  Rare Gram Negative Rods    Specimen Source: .Sputum    Culture Results:   Moderate Staphylococcus aureus Susceptibility to follow.  Accompanied by normal respiratory aliza        RADIOLOGY & ADDITIONAL STUDIES: reviewed

## 2020-08-30 NOTE — PROGRESS NOTE ADULT - ASSESSMENT
Cerebellar hemorrhage, vocal cord paralysis, course c/b recurrent fever and aspiration. Most recent sputum cx MSSA for which he has been on cefazolin--continue cefazolin 2g IV q8h through 9/1 to complete 7 day course; aspiration precautions; ask nsgy d/w ENT role for repeat vocal cord injection or role for scopolamine for anticholinergic effect. Unfortunately, antibiotics will not prevent aspiration and he will likely have fever from pneumonitis in setting of aspiration regardless of antibiotics.    Please reconsult with ?    ID Team 1

## 2020-08-30 NOTE — PROGRESS NOTE ADULT - SUBJECTIVE AND OBJECTIVE BOX
HPI:  59 year old male with PMH of HLD and unclear liver disease had acute onset of severe headache, dizziness and vomiting x1 today, was brought into [facility] with continued symptoms, as well as hypertension and multiple episodes of vomiting. Patient was intubated for airway protection with CT Head performed demonstrating a large left cerebellar hemorrhage. CTA Head/Neck performed is suggestive of underlying vascular pathology such as AVM. RIght frontal ventriculostomy placed at [facility] and patient transferred to [facility] for further work-up. Patient arrives to [facility] intubated, sedated and on Nicardipine drip. Patient's son provided history and denies any Aspirin or other anticoagulant use. Denies any prior history of hypertension, smoking, or ETOH abuse. (04 Aug 2020 05:01)    Hospital Course:   POD# 0 S/P cerebral angio, negative for AVM. s/p SOC craniectomy evacuation of cerebellar hematoma.   POD # 1:   overnight tachycardic, ekg without significant changes, lactate wnl, low grade fever, s/p tyl. bolus x 1L , NS increased to 100cc/hr; neuro improving.  EVD at 5 cmH2O   8/6 POD#3, BD#4, Tmax 100.6, Pan Cx 7/5 NGTD, Vanc/Zosyn until 8/10, Vanc trough due today @ 6pm, EEG = no seizure, Lasix overnight for Pulm congest and tachycard, ruff placed for strict I&Os, pending anemia w/u, EVD @ 5, CPAP trials, Card GTT for SBP<140, Started Norvasc for BP optimization  :  :  S/P cerebral angio, negative for AVM. s/p SOC craniectomy evacuation of cerebellar hematoma. No significant events overnight. On CPAP this morning   8/10: fevers overnight, resolved this morning, Pancultured, (+) Enterbacter, on CPAP this AM  8/11 POD#7 SOC, BD # 8, EVD replaced (hard pass), Neuro exam stable, EVD @ 5cm H2O, Zosyn for sputum + enterobact, Plan to extubated today, HCT in AM  8/12 POD#8 s/p SOC, BD#9, s/p EVD replacement (hard pass on 8/10/20), neuro exam stable, EVD @5cm H2O. Zosyn for enterobacter in sputum. O/n EVD stopped draining, flushed distally and proximally, waveform poor, neuro exam remained stable throughout, ICPs <8 prior.     EVD soft pass yesterday, spiked temp today and re cultured o/w neuro exam stable  : EMIL overnight, neuro stable. Mucomyst for secretions overnight   8/15 EVD dc/d, temp spike to 104, pan culture, ID consulted, vanco and cefepime started   8/15 levo started, presumed sepsis from aspiration, continues to have diarrhea  : EMIL  8: EMIL. Neuro exam stable  : Increased secretions overnight. Chest xray obtained. NGT replaced. Neuro exam stable. Patient transferred to ICU for monitoring due to congestion and possible L effusion on CXR along with desaturation and frequent suctioning.   : POD16, on EEG, EMIL o/n, transferred to 5E, plan for PEG tomorrow   POD#17 overnight given Keppra 1g IV x 1 dose for blank stare and EEG findings: pending final read on EEG, Cefep for PNA, PEG today, NPO, Amonia Serum level today w/u for enceph, vEEG,    POD#18, EMIL overnight, PEG placed, start TF 9 AM today, Neuro exam stable   POD #19 EMIL overnight, repeat CT head stable.  : POD#20 EMIL overnight   8: POD21. EMIL o/n, neuro stable. on vEEG. vanc/cefepime for possible PNA. requiring frequent suctioning   : POD22. EMIL o/n, neuro stable. off vEEG (was negative). on vanc/cefepime PNA coverage. pre op for vocal cord injection w/ ENT today  : POD#23. OR yesterday with ENT for left vocal cord injection. Cranial sutures removed yesterday. Remains on vanco (dose increased to 1500 bid) and cefepime for empiric PNA coverage.  : POD#24 EMIL overnight. Oral secretions improving. Remains on PNA antibiotic coverage  : POD#25 transferred to SDU. No acute events pending rehab  : POD#26 Off of voice rest today. Tolerates PEG feeds. NO fevers overnight  : POD#27 EMIL overnight. Neuro exam stable. Afebrile o/n    Vital Signs Last 24 Hrs  T(C): 36.8 (29 Aug 2020 21:57), Max: 37.8 (29 Aug 2020 06:00)  T(F): 98.3 (29 Aug 2020 21:57), Max: 100.1 (29 Aug 2020 06:00)  HR: 76 (29 Aug 2020 23:45) (76 - 86)  BP: 148/74 (29 Aug 2020 23:45) (129/68 - 148/74)  BP(mean): 104 (29 Aug 2020 23:45) (91 - 106)  RR: 17 (29 Aug 2020 23:45) (17 - 18)  SpO2: 97% (29 Aug 2020 23:45) (92% - 97%)    I&O's Summary    28 Aug 2020 07:  -  29 Aug 2020 07:00  --------------------------------------------------------  IN: 1296 mL / OUT: 2203 mL / NET: -907 mL    29 Aug 2020 07:01  -  30 Aug 2020 00:18  --------------------------------------------------------  IN: 926 mL / OUT: 2100 mL / NET: -1174 mL      PHYSICAL EXAM:  GEN: laying in bed, appears well, NAD  NEURO: alert, no tracking. FC (better in cantonese), OE spont, verbal (hypophonic voice,) face symmetric. CNII-XII intact. PERRL, EOMI. MAEx4 with good strength  CARD: RRR +S1/S2  PULM: +rhonchi  GI: Abd soft, NT/ND  EXT: ext warm, dry, nontender  WOUND: SOC site c/d/i    TUBES/LINES:  pivs    DIET:  [] NPO  [] Mechanical  [x Tube feeds    LABS:                        8.2    11.54 )-----------( 420      ( 29 Aug 2020 07:10 )             26.5         140  |  103  |  10  ----------------------------<  134<H>  3.9   |  27  |  0.53    Ca    8.7      29 Aug 2020 07:10  Phos  3.1       Mg     2.2             Urinalysis Basic - ( 28 Aug 2020 18:51 )    Color: Yellow / Appearance: Clear / S.025 / pH: x  Gluc: x / Ketone: NEGATIVE  / Bili: Negative / Urobili: 0.2 E.U./dL   Blood: x / Protein: 30 mg/dL / Nitrite: NEGATIVE   Leuk Esterase: NEGATIVE / RBC: 5-10 /HPF / WBC < 5 /HPF   Sq Epi: x / Non Sq Epi: 0-5 /HPF / Bacteria: Present /HPF          CAPILLARY BLOOD GLUCOSE      POCT Blood Glucose.: 115 mg/dL (29 Aug 2020 23:25)  POCT Blood Glucose.: 135 mg/dL (29 Aug 2020 21:32)  POCT Blood Glucose.: 142 mg/dL (29 Aug 2020 16:10)  POCT Blood Glucose.: 130 mg/dL (29 Aug 2020 11:03)  POCT Blood Glucose.: 128 mg/dL (29 Aug 2020 07:03)      Drug Levels: [] N/A  Vancomycin Level, Trough: 13.0 ug/mL ( @ 13:59)  Vancomycin Level, Trough: 4.2 ug/mL ( @ 00:14)  Vancomycin Level, Trough: 6.4 ug/mL ( @ 19:45)    CSF Analysis: [] N/A      Allergies    No Known Allergies    Intolerances      MEDICATIONS:  Antibiotics:  ceFAZolin   IVPB 2000 milliGRAM(s) IV Intermittent every 8 hours  tenofovir disoproxil fumarate (VIREAD) 300 milliGRAM(s) Oral every 24 hours    Neuro:  acetaminophen    Suspension .. 650 milliGRAM(s) Oral every 6 hours PRN  amantadine 100 milliGRAM(s) Oral two times a day    Anticoagulation:    OTHER:  acetylcysteine 20%  Inhalation 4 milliLiter(s) Inhalation every 6 hours  albuterol/ipratropium for Nebulization. 3 milliLiter(s) Nebulizer every 6 hours  artificial tears (preservative free) Ophthalmic Solution 1 Drop(s) Both EYES three times a day  chlorhexidine 2% Cloths 1 Application(s) Topical <User Schedule>  dextrose 40% Gel 15 Gram(s) Oral once PRN  dextrose 50% Injectable 12.5 Gram(s) IV Push once  dextrose 50% Injectable 25 Gram(s) IV Push once  doxazosin 4 milliGRAM(s) Oral at bedtime  finasteride 5 milliGRAM(s) Oral daily  glucagon  Injectable 1 milliGRAM(s) IntraMuscular once PRN  glucagon  Injectable 1 milliGRAM(s) IntraMuscular once PRN  hydrALAZINE Injectable 10 milliGRAM(s) IV Push every 2 hours PRN  insulin regular  human corrective regimen sliding scale.   SubCutaneous every 6 hours  insulin regular  human recombinant 4 Unit(s) SubCutaneous every 6 hours  lidocaine 4% Laryngotracheal Topical Anesthesia Kit 4 milliLiter(s) Oral Mucosa once  metoprolol tartrate 12.5 milliGRAM(s) Oral every 12 hours  Mometasone Furoate 0.1% Ointment 1 Application(s) 1 Application(s) Topical two times a day PRN  psyllium Powder 1 Packet(s) Oral daily  sodium chloride 0.9% for Nebulization 3 milliLiter(s) Nebulizer three times a day PRN    IVF:  dextrose 5%. 1000 milliLiter(s) IV Continuous <Continuous>    CULTURES:  Culture Results:   No growth at 1 day. ( @ 18:03)  Culture Results:   Moderate Staphylococcus aureus  Accompanied by normal respiratory aliza ( @ 16:38)    RADIOLOGY & ADDITIONAL TESTS:      ASSESSMENT:  60y Male with ICH/IVH s/p SOC crani POD #27 (20), s/p EVD placement, removed (20)    INTRACRANIAL BLEED  No pertinent family history in first degree relatives  Handoff  MEWS Score  Hepatitis B  HLD (hyperlipidemia)  ICH (intracerebral hemorrhage)  ICH (intracerebral hemorrhage)  Hepatitis B  HLD (hyperlipidemia)  HIV (human immunodeficiency virus infection)  Cerebellar hemorrhage  Laryngoscopy, direct, adult  Ultrasound guided venous access  Insertion, catheter, intravenous  EGD, with PEG  Craniectomy, subocciptal, with cervical laminectomy for medulla and spinal cord decompression  Percutaneous insertion of arterial line  Angiogram, carotid and cerebral vessels, bilateral  Foot fracture, left    PLAN:  - neuro/vitals checks  - pain control  - cranial sutures removed   - ENT following s/p left vocal cord injection   - cont suctioning prn  - cont ancef   - lovenox ppx for MSSA PNA, f/u pancx from , f/u sputum cx  - d/w Dr. Blank    DVT PROPHYLAXIS:  [x] Venodynes                                [] Heparin/Lovenox    DISPOSITION: Full code  KELY    Assessment:  Present when checked    []  GCS  E   V  M     Heart Failure: []Acute, [] acute on chronic , []chronic  Heart Failure:  [] Diastolic (HFpEF), [] Systolic (HFrEF), []Combined (HFpEF and HFrEF), [] RHF, [] Pulm HTN, [] Other    [] SARAH, [] ATN, [] AIN, [] other  [] CKD1, [] CKD2, [] CKD 3, [] CKD 4, [] CKD 5, []ESRD    Encephalopathy: [] Metabolic, [] Hepatic, [] toxic, [] Neurological, [] Other    Abnormal Nurtitional Status: [] malnurtition (see nutrition note), [ ]underweight: BMI < 19, [] morbid obesity: BMI >40, [] Cachexia    [] Sepsis  [] hypovolemic shock,[] cardiogenic shock, [] hemorrhagic shock, [] neuogenic shock  [] Acute Respiratory Failure  []Cerebral edema, [] Brain compression/ herniation,   [] Functional quadriplegia  [] Acute blood loss anemia

## 2020-08-31 LAB
-  CEFAZOLIN: SIGNIFICANT CHANGE UP
-  CEFAZOLIN: SIGNIFICANT CHANGE UP
-  CLINDAMYCIN: SIGNIFICANT CHANGE UP
-  CLINDAMYCIN: SIGNIFICANT CHANGE UP
-  ERYTHROMYCIN: SIGNIFICANT CHANGE UP
-  ERYTHROMYCIN: SIGNIFICANT CHANGE UP
-  LINEZOLID: SIGNIFICANT CHANGE UP
-  LINEZOLID: SIGNIFICANT CHANGE UP
-  OXACILLIN: SIGNIFICANT CHANGE UP
-  OXACILLIN: SIGNIFICANT CHANGE UP
-  RIFAMPIN: SIGNIFICANT CHANGE UP
-  RIFAMPIN: SIGNIFICANT CHANGE UP
-  TRIMETHOPRIM/SULFAMETHOXAZOLE: SIGNIFICANT CHANGE UP
-  TRIMETHOPRIM/SULFAMETHOXAZOLE: SIGNIFICANT CHANGE UP
-  VANCOMYCIN: SIGNIFICANT CHANGE UP
-  VANCOMYCIN: SIGNIFICANT CHANGE UP
ANION GAP SERPL CALC-SCNC: 10 MMOL/L — SIGNIFICANT CHANGE UP (ref 5–17)
BUN SERPL-MCNC: 13 MG/DL — SIGNIFICANT CHANGE UP (ref 7–23)
CALCIUM SERPL-MCNC: 9.3 MG/DL — SIGNIFICANT CHANGE UP (ref 8.4–10.5)
CHLORIDE SERPL-SCNC: 97 MMOL/L — SIGNIFICANT CHANGE UP (ref 96–108)
CO2 SERPL-SCNC: 29 MMOL/L — SIGNIFICANT CHANGE UP (ref 22–31)
CREAT SERPL-MCNC: 0.6 MG/DL — SIGNIFICANT CHANGE UP (ref 0.5–1.3)
GLUCOSE BLDC GLUCOMTR-MCNC: 130 MG/DL — HIGH (ref 70–99)
GLUCOSE BLDC GLUCOMTR-MCNC: 133 MG/DL — HIGH (ref 70–99)
GLUCOSE BLDC GLUCOMTR-MCNC: 133 MG/DL — HIGH (ref 70–99)
GLUCOSE BLDC GLUCOMTR-MCNC: 135 MG/DL — HIGH (ref 70–99)
GLUCOSE SERPL-MCNC: 124 MG/DL — HIGH (ref 70–99)
HCT VFR BLD CALC: 31.3 % — LOW (ref 39–50)
HGB BLD-MCNC: 9.5 G/DL — LOW (ref 13–17)
MAGNESIUM SERPL-MCNC: 2.5 MG/DL — SIGNIFICANT CHANGE UP (ref 1.6–2.6)
MCHC RBC-ENTMCNC: 27.2 PG — SIGNIFICANT CHANGE UP (ref 27–34)
MCHC RBC-ENTMCNC: 30.4 GM/DL — LOW (ref 32–36)
MCV RBC AUTO: 89.7 FL — SIGNIFICANT CHANGE UP (ref 80–100)
METHOD TYPE: SIGNIFICANT CHANGE UP
METHOD TYPE: SIGNIFICANT CHANGE UP
NRBC # BLD: 1 /100 WBCS — HIGH (ref 0–0)
ORGANISM # SPEC MICROSCOPIC CNT: SIGNIFICANT CHANGE UP
ORGANISM # SPEC MICROSCOPIC CNT: SIGNIFICANT CHANGE UP
PHOSPHATE SERPL-MCNC: 4 MG/DL — SIGNIFICANT CHANGE UP (ref 2.5–4.5)
PLATELET # BLD AUTO: 509 K/UL — HIGH (ref 150–400)
POTASSIUM SERPL-MCNC: 4.2 MMOL/L — SIGNIFICANT CHANGE UP (ref 3.5–5.3)
POTASSIUM SERPL-SCNC: 4.2 MMOL/L — SIGNIFICANT CHANGE UP (ref 3.5–5.3)
RBC # BLD: 3.49 M/UL — LOW (ref 4.2–5.8)
RBC # FLD: 15.5 % — HIGH (ref 10.3–14.5)
SODIUM SERPL-SCNC: 136 MMOL/L — SIGNIFICANT CHANGE UP (ref 135–145)
WBC # BLD: 12.41 K/UL — HIGH (ref 3.8–10.5)
WBC # FLD AUTO: 12.41 K/UL — HIGH (ref 3.8–10.5)

## 2020-08-31 PROCEDURE — 71045 X-RAY EXAM CHEST 1 VIEW: CPT | Mod: 26

## 2020-08-31 PROCEDURE — 99024 POSTOP FOLLOW-UP VISIT: CPT

## 2020-08-31 RX ADMIN — Medication 4 MILLIGRAM(S): at 22:25

## 2020-08-31 RX ADMIN — INSULIN HUMAN 4 UNIT(S): 100 INJECTION, SOLUTION SUBCUTANEOUS at 06:22

## 2020-08-31 RX ADMIN — TENOFOVIR DISOPROXIL FUMARATE 300 MILLIGRAM(S): 300 TABLET, FILM COATED ORAL at 22:25

## 2020-08-31 RX ADMIN — Medication 4 MILLILITER(S): at 14:32

## 2020-08-31 RX ADMIN — CHLORHEXIDINE GLUCONATE 1 APPLICATION(S): 213 SOLUTION TOPICAL at 05:41

## 2020-08-31 RX ADMIN — Medication 3 MILLILITER(S): at 09:45

## 2020-08-31 RX ADMIN — Medication 4 MILLILITER(S): at 22:25

## 2020-08-31 RX ADMIN — Medication 12.5 MILLIGRAM(S): at 05:41

## 2020-08-31 RX ADMIN — Medication 100 MILLIGRAM(S): at 17:06

## 2020-08-31 RX ADMIN — Medication 12.5 MILLIGRAM(S): at 17:06

## 2020-08-31 RX ADMIN — Medication 3 MILLILITER(S): at 05:00

## 2020-08-31 RX ADMIN — ENOXAPARIN SODIUM 40 MILLIGRAM(S): 100 INJECTION SUBCUTANEOUS at 22:25

## 2020-08-31 RX ADMIN — Medication 100 MILLIGRAM(S): at 05:36

## 2020-08-31 RX ADMIN — Medication 100 MILLIGRAM(S): at 22:25

## 2020-08-31 RX ADMIN — Medication 3 MILLILITER(S): at 15:10

## 2020-08-31 RX ADMIN — Medication 1 DROP(S): at 05:38

## 2020-08-31 RX ADMIN — Medication 100 MILLIGRAM(S): at 14:22

## 2020-08-31 RX ADMIN — Medication 1 DROP(S): at 22:25

## 2020-08-31 RX ADMIN — Medication 4 MILLILITER(S): at 04:00

## 2020-08-31 RX ADMIN — Medication 100 MILLIGRAM(S): at 05:41

## 2020-08-31 RX ADMIN — INSULIN HUMAN 4 UNIT(S): 100 INJECTION, SOLUTION SUBCUTANEOUS at 11:23

## 2020-08-31 RX ADMIN — SCOPALAMINE 1 PATCH: 1 PATCH, EXTENDED RELEASE TRANSDERMAL at 06:00

## 2020-08-31 RX ADMIN — INSULIN HUMAN 4 UNIT(S): 100 INJECTION, SOLUTION SUBCUTANEOUS at 00:16

## 2020-08-31 RX ADMIN — Medication 1 PACKET(S): at 11:01

## 2020-08-31 RX ADMIN — FINASTERIDE 5 MILLIGRAM(S): 5 TABLET, FILM COATED ORAL at 11:00

## 2020-08-31 RX ADMIN — Medication 1 DROP(S): at 14:22

## 2020-08-31 RX ADMIN — Medication 3 MILLILITER(S): at 22:26

## 2020-08-31 RX ADMIN — Medication 4 MILLILITER(S): at 08:41

## 2020-08-31 RX ADMIN — INSULIN HUMAN 4 UNIT(S): 100 INJECTION, SOLUTION SUBCUTANEOUS at 17:06

## 2020-08-31 RX ADMIN — SCOPALAMINE 1 PATCH: 1 PATCH, EXTENDED RELEASE TRANSDERMAL at 16:52

## 2020-08-31 NOTE — PROGRESS NOTE ADULT - SUBJECTIVE AND OBJECTIVE BOX
ENT Progress Note    Subjective    60 yo male s/p decompression craniotomy for left cerebellar intraparenchymal hemorrhagic stroke. Patient has failed SLP trials due to risk for aspiration and was intubated for 5 days due to neuro status. Pt was examined 8/15 for voice hoarseness and was found to have complete immobility of L VC on scope exam (likely secondary to stroke or traumatic intubation). Pt subsequently underwent L vocal cord augmentation with injection.     Today, pt was seen and examined at bedside for laryngoscopy to reassess the vocal folds after vocal cord augmentation on 8/15.    PAST MEDICAL & SURGICAL HISTORY:  Hepatitis B  HLD (hyperlipidemia)  Foot fracture, left: s/p repair 2019    No Known Allergies    MEDICATIONS  (STANDING):  acetylcysteine 20%  Inhalation 4 milliLiter(s) Inhalation every 6 hours  albuterol/ipratropium for Nebulization. 3 milliLiter(s) Nebulizer every 6 hours  amantadine 100 milliGRAM(s) Oral two times a day  artificial tears (preservative free) Ophthalmic Solution 1 Drop(s) Both EYES three times a day  ceFAZolin   IVPB 2000 milliGRAM(s) IV Intermittent every 8 hours  chlorhexidine 2% Cloths 1 Application(s) Topical <User Schedule>  dextrose 5%. 1000 milliLiter(s) (50 mL/Hr) IV Continuous <Continuous>  dextrose 50% Injectable 12.5 Gram(s) IV Push once  dextrose 50% Injectable 25 Gram(s) IV Push once  doxazosin 4 milliGRAM(s) Oral at bedtime  enoxaparin Injectable 40 milliGRAM(s) SubCutaneous at bedtime  finasteride 5 milliGRAM(s) Oral daily  insulin regular  human corrective regimen sliding scale.   SubCutaneous every 6 hours  insulin regular  human recombinant 4 Unit(s) SubCutaneous every 6 hours  lidocaine 4% Laryngotracheal Topical Anesthesia Kit 4 milliLiter(s) Oral Mucosa once  metoprolol tartrate 12.5 milliGRAM(s) Oral every 12 hours  psyllium Powder 1 Packet(s) Oral daily  scopolamine 1 mG/72 Hr(s) Patch 1 Patch Transdermal every 72 hours  tenofovir disoproxil fumarate (VIREAD) 300 milliGRAM(s) Oral every 24 hours    MEDICATIONS  (PRN):  acetaminophen    Suspension .. 650 milliGRAM(s) Oral every 6 hours PRN Temp greater or equal to 38C (100.4F), Mild Pain (1 - 3)  dextrose 40% Gel 15 Gram(s) Oral once PRN Blood Glucose LESS THAN 70 milliGRAM(s)/deciliter  glucagon  Injectable 1 milliGRAM(s) IntraMuscular once PRN Glucose LESS THAN 70 milligrams/deciliter  glucagon  Injectable 1 milliGRAM(s) IntraMuscular once PRN Glucose LESS THAN 70 milligrams/deciliter  hydrALAZINE Injectable 10 milliGRAM(s) IV Push every 2 hours PRN SBP > 140  Mometasone Furoate 0.1% Ointment 1 Application(s) 1 Application(s) Topical two times a day PRN dry/itchy skin  sodium chloride 0.9% for Nebulization 3 milliLiter(s) Nebulizer three times a day PRN for congestion/secretions  sodium chloride 0.9% lock flush 10 milliLiter(s) IV Push every 1 hour PRN Pre/post blood products, medications, blood draw, and to maintain line patency      Objective    ICU Vital Signs Last 24 Hrs  T(C): 36.8 (31 Aug 2020 16:00), Max: 37.7 (31 Aug 2020 05:00)  T(F): 98.3 (31 Aug 2020 16:00), Max: 99.9 (31 Aug 2020 05:00)  HR: 78 (31 Aug 2020 17:54) (74 - 94)  BP: 110/72 (31 Aug 2020 17:54) (110/56 - 145/78)  BP(mean): 86 (31 Aug 2020 17:54) (77 - 105)  RR: 16 (31 Aug 2020 17:54) (16 - 17)  SpO2: 92% (31 Aug 2020 17:54) (92% - 99%)    PHYSICAL EXAM:    CONSTITUTIONAL: Well nourished, well developed. Follows commands. Voice soft and breathy  HEAD: bifrontal craniotomy scar c/d/i  EARS: The right/left pinna was normal.   NOSE: Normal external nose.  ORAL CAVITY/OROPHARYNX: moist mucous membranes  RESPIRATORY: Respirations unlabored, no increased work of breathing with use of accessory muscles and retractions. No stridor.  CARDIAC: Warm extremities, no cyanosis.     Fiberoptic nasolaryngoscopy:  Nasal passages clear, nasopharynx clear, posterior oropharynx clear, base of tongue WNL, epiglottis sharp, pooling of secretions in b/l pyriformis, left vocal cord paresis with injection area visualized in the middle, right vocal cord normal mobility, incomplete glottic closure mainly posteriorly. Airway patent.                           9.5    12.41 )-----------( 509      ( 31 Aug 2020 06:14 )             31.3     08-31    136  |  97  |  13  ----------------------------<  124<H>  4.2   |  29  |  0.60    Ca    9.3      31 Aug 2020 06:14  Phos  4.0     08-31  Mg     2.5     08-31        I&O's Summary    30 Aug 2020 07:01  -  31 Aug 2020 07:00  --------------------------------------------------------  IN: 1468 mL / OUT: 1804 mL / NET: -336 mL    31 Aug 2020 07:01  -  31 Aug 2020 18:05  --------------------------------------------------------  IN: 384 mL / OUT: 600 mL / NET: -216 mL        RECENT CULTURES:  08-14 @ 14:14 .Sputum Sputum     Moderate Klebsiella variicola Susceptibility to follow.  Moderate Enterobacter cloacae Susceptibility to follow.  Moderate Staphylococcus aureus Presumptive Methicillin susceptible  Confirmation to follow within 24 hrs. Susceptibility to follow.  Accompanied with Normal Respiratory Aliza    Few epithelial cells  Numerous white blood cells  Numerous Gram Positive Rods  Moderate Gram Negative Diplococci  Few Gram positive cocci in pairs  Few Gram Negative Rods  Rare Yeast    08-14 @ 13:25 .Blood Blood     No growth at 1 day.      08-14 @ 13:18 .CSF CSF     No growth to date    Rare White blood cells  No organisms seen    08-13 @ 15:58 .CSF CSF     No growth to date    No organisms seen  No WBC's seen.    08-13 @ 15:50 .Blood Blood   g  No growth at 2 days.      08-11 @ 17:29 .Sputum Sputum Enterobacter cloacae complex  Klebsiella species    Moderate Enterobacter cloacae complex  Moderate Klebsiella pneumoniae / variicola  Accompanied by normal respiratory aliza    Rare epithelial cells  Rare WBC's  Few Gram Negative Rods    08-11 @ 15:24 .Blood Blood-Peripheral     No growth at 4 days.      08-11 @ 09:11 .CSF CSF     No growth to date    No organisms seen  No White blood cells

## 2020-08-31 NOTE — PROGRESS NOTE ADULT - ASSESSMENT
A/P: 58 yo male s/p decompression craniotomy for left cerebellar intraparenchymal hemorrhagic stroke, with exam showing complete immobility of the left vocal cord likely 2/2 to cerebellar stroke vs. traumatic intubation, not fully protecting airway, but can be intubated orally. Pt underwent vocal cord augmentation and re-examined with scope today. L vocal fold is still paretic/paralyzed but improvement in glottic closure in the middle; still incomplete glottic closure, particularly posteriorly.    -Recommend surveillance of vocal cords when pt is discharged to rehab and possible future vocal cord augmentation based on findings  -Patient can see Dr. Musa at [phone]  -C/w overall care per neurosurgery  -Please page ENT with questions or concerns

## 2020-08-31 NOTE — CHART NOTE - NSCHARTNOTEFT_GEN_A_CORE
Admitting Diagnosis:   Patient is a 60y old  Male who presents with a chief complaint of Left cerebellar intraparenchymal hemorrhage (31 Aug 2020 08:35)      PAST MEDICAL & SURGICAL HISTORY:  Hepatitis B  HLD (hyperlipidemia)  Foot fracture, left: s/p repair 2019      Current Nutrition Order:  Vital 1.5 @48ml/hr x24hrs via PEG. Provides 1152ml TV, 1728kcal, 77g pro, 880ml FW.   *feeds infusing at goal at time of assessment    PO Intake: Good (%) [   ]  Fair (50-75%) [   ] Poor (<25%) [   ] N/A NPO w/ TF    GI Issues:   No apparent GI distress  Having loose stool per RN but not high output/watery  Ordered for metamucil, scopolamine  PEG placed 8/21    Pain:  DUY- not in apparent distress  Not on pain regimen    Skin Integrity:  Surgical incision  Zachariah score 15    Labs:   08-31    136  |  97  |  13  ----------------------------<  124<H>  4.2   |  29  |  0.60    Ca    9.3      31 Aug 2020 06:14  Phos  4.0     08-31  Mg     2.5     08-31      CAPILLARY BLOOD GLUCOSE      POCT Blood Glucose.: 133 mg/dL (31 Aug 2020 06:08)  POCT Blood Glucose.: 133 mg/dL (31 Aug 2020 00:14)  POCT Blood Glucose.: 122 mg/dL (30 Aug 2020 16:28)  POCT Blood Glucose.: 130 mg/dL (30 Aug 2020 11:19)      Medications:  MEDICATIONS  (STANDING):  acetylcysteine 20%  Inhalation 4 milliLiter(s) Inhalation every 6 hours  albuterol/ipratropium for Nebulization. 3 milliLiter(s) Nebulizer every 6 hours  amantadine 100 milliGRAM(s) Oral two times a day  artificial tears (preservative free) Ophthalmic Solution 1 Drop(s) Both EYES three times a day  ceFAZolin   IVPB 2000 milliGRAM(s) IV Intermittent every 8 hours  chlorhexidine 2% Cloths 1 Application(s) Topical <User Schedule>  dextrose 5%. 1000 milliLiter(s) (50 mL/Hr) IV Continuous <Continuous>  dextrose 50% Injectable 12.5 Gram(s) IV Push once  dextrose 50% Injectable 25 Gram(s) IV Push once  doxazosin 4 milliGRAM(s) Oral at bedtime  enoxaparin Injectable 40 milliGRAM(s) SubCutaneous at bedtime  finasteride 5 milliGRAM(s) Oral daily  insulin regular  human corrective regimen sliding scale.   SubCutaneous every 6 hours  insulin regular  human recombinant 4 Unit(s) SubCutaneous every 6 hours  lidocaine 4% Laryngotracheal Topical Anesthesia Kit 4 milliLiter(s) Oral Mucosa once  metoprolol tartrate 12.5 milliGRAM(s) Oral every 12 hours  psyllium Powder 1 Packet(s) Oral daily  scopolamine 1 mG/72 Hr(s) Patch 1 Patch Transdermal every 72 hours  tenofovir disoproxil fumarate (VIREAD) 300 milliGRAM(s) Oral every 24 hours    MEDICATIONS  (PRN):  acetaminophen    Suspension .. 650 milliGRAM(s) Oral every 6 hours PRN Temp greater or equal to 38C (100.4F), Mild Pain (1 - 3)  dextrose 40% Gel 15 Gram(s) Oral once PRN Blood Glucose LESS THAN 70 milliGRAM(s)/deciliter  glucagon  Injectable 1 milliGRAM(s) IntraMuscular once PRN Glucose LESS THAN 70 milligrams/deciliter  glucagon  Injectable 1 milliGRAM(s) IntraMuscular once PRN Glucose LESS THAN 70 milligrams/deciliter  hydrALAZINE Injectable 10 milliGRAM(s) IV Push every 2 hours PRN SBP > 140  Mometasone Furoate 0.1% Ointment 1 Application(s) 1 Application(s) Topical two times a day PRN dry/itchy skin  sodium chloride 0.9% for Nebulization 3 milliLiter(s) Nebulizer three times a day PRN for congestion/secretions  sodium chloride 0.9% lock flush 10 milliLiter(s) IV Push every 1 hour PRN Pre/post blood products, medications, blood draw, and to maintain line patency    Admitted Anthropometrics:  Height: 5'3", IBW 124lbs+/-10%, %%, BMI 23.3     Weight: 131lbs (8/4)  Daily     Weight Change:   Please obtain udpated weights for accuracy and trending    Estimated energy needs:   ABW (63.7kg) used for calculations as pt between % of IBW.   Nutrient needs based on [facility] standards of care for maintenance in adults.   Needs adjusted for age and post-op healing  1490-1788kcal/day (25-30kcal/kg)  72-83g pro/day (1.2-1.4g pro/kg)  1490-1788ml fluid/day (30-35ml/kg)    Subjective:   61yo M with PMH of HLD and unclear liver disease had acute onset of severe headache, dizziness and vomiting x1 today, was brought into [facility] with continued symptoms, as well as hypertension and multiple episodes of vomiting. Pt was intubated for airway protection with CT Head performed demonstrating a large left cerebellar hemorrhage. CTA Head/Neck performed is suggestive of underlying vascular pathology such as AVM. RIght frontal ventriculostomy placed at [facility] and patient transferred to [facility] for further work-up. S/P cerebral angio, negative for AVM and s/p SOC craniectomy evacuation of cerebellar hematoma on 8/4, s/p EVD placement. Extubated 8/12. S/p PEG 8/20. SLP to see patient today.     Pt seen in room, resting in bed. Unable to participate in interview questions 2/2 non-verbal. Receiving Vital 1.5 @ 48ml/hr (ordered goal); adequately meeting needs. Was switched over to from Jevity to Vital 8/27- unclear why but was also ordered for metamucil so possibly had diarrhea. Tolerating feeds well, having BMs that are loose but not frequent/watery. Continue w/ current order. F/u with SLP recommendations today. RD to follow.     Previous Nutrition Diagnosis:  Increased nutrient need RT increased protein demand AEB post-op  Active [  x ]  Resolved [   ]    If resolved, new PES:   inadequate oral intake RT unable to take PO 2/2 poor swallow function AEB EN dependant for meeting 100% EER    Goal:  Pt to consistently meet % of estimated needs via most appropriate route    Recommendations:  1. Continue on current regimen  2. Monitor for s/s intolerance. Maintain aspiration precautions at all times  3. Monitor for s/s GI distress and need to be on specialized peptide based formula  4. Pain and bowel regimen per team  5. BMP, BG Q6hrs, CBC per MD discretion     Education:   N/A AMS    Risk Level: High [ x  ] Moderate [   ] Low [   ]

## 2020-08-31 NOTE — PROGRESS NOTE ADULT - SUBJECTIVE AND OBJECTIVE BOX
HPI:  59 year old male with PMH of HLD and unclear liver disease had acute onset of severe headache, dizziness and vomiting x1 today, was brought into [facility] with continued symptoms, as well as hypertension and multiple episodes of vomiting. Patient was intubated for airway protection with CT Head performed demonstrating a large left cerebellar hemorrhage. CTA Head/Neck performed is suggestive of underlying vascular pathology such as AVM. RIght frontal ventriculostomy placed at [facility] and patient transferred to [facility] for further work-up. Patient arrives to [facility] intubated, sedated and on Nicardipine drip. Patient's son provided history and denies any Aspirin or other anticoagulant use. Denies any prior history of hypertension, smoking, or ETOH abuse. (04 Aug 2020 05:01)    Hospital Course:   POD# 0 S/P cerebral angio, negative for AVM. s/p SOC craniectomy evacuation of cerebellar hematoma.   POD # 1:   overnight tachycardic, ekg without significant changes, lactate wnl, low grade fever, s/p tyl. bolus x 1L , NS increased to 100cc/hr; neuro improving.  EVD at 5 cmH2O   8/6 POD#3, BD#4, Tmax 100.6, Pan Cx 7/5 NGTD, Vanc/Zosyn until 8/10, Vanc trough due today @ 6pm, EEG = no seizure, Lasix overnight for Pulm congest and tachycard, ruff placed for strict I&Os, pending anemia w/u, EVD @ 5, CPAP trials, Card GTT for SBP<140, Started Norvasc for BP optimization  :  :  S/P cerebral angio, negative for AVM. s/p SOC craniectomy evacuation of cerebellar hematoma. No significant events overnight. On CPAP this morning   8/10: fevers overnight, resolved this morning, Pancultured, (+) Enterbacter, on CPAP this AM  8/11 POD#7 SOC, BD # 8, EVD replaced (hard pass), Neuro exam stable, EVD @ 5cm H2O, Zosyn for sputum + enterobact, Plan to extubated today, HCT in AM  8/12 POD#8 s/p SOC, BD#9, s/p EVD replacement (hard pass on 8/10/20), neuro exam stable, EVD @5cm H2O. Zosyn for enterobacter in sputum. O/n EVD stopped draining, flushed distally and proximally, waveform poor, neuro exam remained stable throughout, ICPs <8 prior.     EVD soft pass yesterday, spiked temp today and re cultured o/w neuro exam stable  : EMIL overnight, neuro stable. Mucomyst for secretions overnight   8/15 EVD dc/d, temp spike to 104, pan culture, ID consulted, vanco and cefepime started   8/15 levo started, presumed sepsis from aspiration, continues to have diarrhea  : EMIL  8: EMIL. Neuro exam stable  : Increased secretions overnight. Chest xray obtained. NGT replaced. Neuro exam stable. Patient transferred to ICU for monitoring due to congestion and possible L effusion on CXR along with desaturation and frequent suctioning.   : POD16, on EEG, EMIL o/n, transferred to 5E, plan for PEG tomorrow   POD#17 overnight given Keppra 1g IV x 1 dose for blank stare and EEG findings: pending final read on EEG, Cefep for PNA, PEG today, NPO, Amonia Serum level today w/u for enceph, vEEG,    POD#18, EMIL overnight, PEG placed, start TF 9 AM today, Neuro exam stable   POD #19 EMIL overnight, repeat CT head stable.  : POD#20 EMIL overnight   8: POD21. EMIL o/n, neuro stable. on vEEG. vanc/cefepime for possible PNA. requiring frequent suctioning   : POD22. EMIL o/n, neuro stable. off vEEG (was negative). on vanc/cefepime PNA coverage. pre op for vocal cord injection w/ ENT today  : POD#23. OR yesterday with ENT for left vocal cord injection. Cranial sutures removed yesterday. Remains on vanco (dose increased to 1500 bid) and cefepime for empiric PNA coverage.  : POD#24 EMIL overnight. Oral secretions improving. Remains on PNA antibiotic coverage  : POD#25 transferred to SDU. No acute events pending rehab  : POD#26 Off of voice rest today. Tolerates PEG feeds. NO fevers overnight  8/30: POD#27 EMIL overnight. Neuro exam stable. Afebrile o/n   POD#28 EMIL overnight, Ancef BLAINE Sputum until  as per ID, AR placement pending    ICU Vital Signs Last 24 Hrs  T(C): 37.7 (31 Aug 2020 05:00), Max: 37.7 (31 Aug 2020 05:00)  T(F): 99.9 (31 Aug 2020 05:00), Max: 99.9 (31 Aug 2020 05:00)  HR: 78 (31 Aug 2020 08:17) (74 - 94)  BP: 110/56 (31 Aug 2020 08:17) (110/56 - 145/78)  BP(mean): 77 (31 Aug 2020 08:17) (77 - 105)  ABP: --  ABP(mean): --  RR: 17 (31 Aug 2020 03:45) (17 - 18)  SpO2: 94% (31 Aug 2020 08:17) (93% - 98%)      PHYSICAL EXAM:  GEN: laying in bed, appears well, NAD  NEURO: alert, no tracking. FC (better in cantonese), OE spont, verbal (hypophonic voice,) face symmetric. CNII-XII intact. PERRL, EOMI. MAEx4 with good strength  CARD: RRR +S1/S2  PULM: +rhonchi  GI: Abd soft, NT/ND  EXT: ext warm, dry, nontender  WOUND: SOC site c/d/i    TUBES/LINES:  pivs    DIET:  [] NPO  [] Mechanical  [x Tube feeds    LABS:                                          9.5    12.41 )-----------( 509      ( 31 Aug 2020 06:14 )             31.3   08-    136  |  97  |  13  ----------------------------<  124<H>  4.2   |  29  |  0.60    Ca    9.3      31 Aug 2020 06:14  Phos  4.0     08-  Mg     2.5     08-          Urinalysis Basic - ( 28 Aug 2020 18:51 )    Color: Yellow / Appearance: Clear / S.025 / pH: x  Gluc: x / Ketone: NEGATIVE  / Bili: Negative / Urobili: 0.2 E.U./dL   Blood: x / Protein: 30 mg/dL / Nitrite: NEGATIVE   Leuk Esterase: NEGATIVE / RBC: 5-10 /HPF / WBC < 5 /HPF   Sq Epi: x / Non Sq Epi: 0-5 /HPF / Bacteria: Present /HPF          CAPILLARY BLOOD GLUCOSE      POCT Blood Glucose.: 115 mg/dL (29 Aug 2020 23:25)  POCT Blood Glucose.: 135 mg/dL (29 Aug 2020 21:32)  POCT Blood Glucose.: 142 mg/dL (29 Aug 2020 16:10)  POCT Blood Glucose.: 130 mg/dL (29 Aug 2020 11:03)  POCT Blood Glucose.: 128 mg/dL (29 Aug 2020 07:03)      Drug Levels: [] N/A  Vancomycin Level, Trough: 13.0 ug/mL ( @ 13:59)  Vancomycin Level, Trough: 4.2 ug/mL ( @ 00:14)  Vancomycin Level, Trough: 6.4 ug/mL ( @ 19:45)    CSF Analysis: [] N/A      Allergies    No Known Allergies    Intolerances      MEDICATIONS:  Antibiotics:  ceFAZolin   IVPB 2000 milliGRAM(s) IV Intermittent every 8 hours  tenofovir disoproxil fumarate (VIREAD) 300 milliGRAM(s) Oral every 24 hours    Neuro:  acetaminophen    Suspension .. 650 milliGRAM(s) Oral every 6 hours PRN  amantadine 100 milliGRAM(s) Oral two times a day    Anticoagulation:    OTHER:  acetylcysteine 20%  Inhalation 4 milliLiter(s) Inhalation every 6 hours  albuterol/ipratropium for Nebulization. 3 milliLiter(s) Nebulizer every 6 hours  artificial tears (preservative free) Ophthalmic Solution 1 Drop(s) Both EYES three times a day  chlorhexidine 2% Cloths 1 Application(s) Topical <User Schedule>  dextrose 40% Gel 15 Gram(s) Oral once PRN  dextrose 50% Injectable 12.5 Gram(s) IV Push once  dextrose 50% Injectable 25 Gram(s) IV Push once  doxazosin 4 milliGRAM(s) Oral at bedtime  finasteride 5 milliGRAM(s) Oral daily  glucagon  Injectable 1 milliGRAM(s) IntraMuscular once PRN  glucagon  Injectable 1 milliGRAM(s) IntraMuscular once PRN  hydrALAZINE Injectable 10 milliGRAM(s) IV Push every 2 hours PRN  insulin regular  human corrective regimen sliding scale.   SubCutaneous every 6 hours  insulin regular  human recombinant 4 Unit(s) SubCutaneous every 6 hours  lidocaine 4% Laryngotracheal Topical Anesthesia Kit 4 milliLiter(s) Oral Mucosa once  metoprolol tartrate 12.5 milliGRAM(s) Oral every 12 hours  Mometasone Furoate 0.1% Ointment 1 Application(s) 1 Application(s) Topical two times a day PRN  psyllium Powder 1 Packet(s) Oral daily  sodium chloride 0.9% for Nebulization 3 milliLiter(s) Nebulizer three times a day PRN    IVF:  dextrose 5%. 1000 milliLiter(s) IV Continuous <Continuous>    CULTURES:  Culture Results:   No growth at 1 day. ( @ 18:03)  Culture Results:   Moderate Staphylococcus aureus  Accompanied by normal respiratory aliza ( @ 16:38)    RADIOLOGY & ADDITIONAL TESTS:      ASSESSMENT:  60y Male with ICH/IVH s/p SOC crani POD #28 (20), s/p EVD placement, removed (20)    INTRACRANIAL BLEED  No pertinent family history in first degree relatives  Handoff  MEWS Score  Hepatitis B  HLD (hyperlipidemia)  ICH (intracerebral hemorrhage)  ICH (intracerebral hemorrhage)  Hepatitis B  HLD (hyperlipidemia)  HIV (human immunodeficiency virus infection)  Cerebellar hemorrhage  Laryngoscopy, direct, adult  Ultrasound guided venous access  Insertion, catheter, intravenous  EGD, with PEG  Craniectomy, subocciptal, with cervical laminectomy for medulla and spinal cord decompression  Percutaneous insertion of arterial line  Angiogram, carotid and cerebral vessels, bilateral  Foot fracture, left    PLAN:  - neuro/vitals checks  - pain control  - cranial sutures removed   - ENT following s/p left vocal cord injection   - cont suctioning prn  - cont ancef until  as per ID  - lovenox ppx for MSSA PNA, f/u pancx from , f/u sputum cx  - d/w Dr. Blank    DVT PROPHYLAXIS:  [x] Venodynes                                [] Heparin/Lovenox    DISPOSITION: Full code  KELY    Assessment:  Present when checked    []  GCS  E   V  M     Heart Failure: []Acute, [] acute on chronic , []chronic  Heart Failure:  [] Diastolic (HFpEF), [] Systolic (HFrEF), []Combined (HFpEF and HFrEF), [] RHF, [] Pulm HTN, [] Other    [] SARAH, [] ATN, [] AIN, [] other  [] CKD1, [] CKD2, [] CKD 3, [] CKD 4, [] CKD 5, []ESRD    Encephalopathy: [] Metabolic, [] Hepatic, [] toxic, [] Neurological, [] Other    Abnormal Nurtitional Status: [] malnurtition (see nutrition note), [ ]underweight: BMI < 19, [] morbid obesity: BMI >40, [] Cachexia    [] Sepsis  [] hypovolemic shock,[] cardiogenic shock, [] hemorrhagic shock, [] neuogenic shock  [] Acute Respiratory Failure  []Cerebral edema, [] Brain compression/ herniation,   [] Functional quadriplegia  [] Acute blood loss anemia HPI:  59 year old male with PMH of HLD and unclear liver disease had acute onset of severe headache, dizziness and vomiting x1 today, was brought into [facility] with continued symptoms, as well as hypertension and multiple episodes of vomiting. Patient was intubated for airway protection with CT Head performed demonstrating a large left cerebellar hemorrhage. CTA Head/Neck performed is suggestive of underlying vascular pathology such as AVM. [place]ht frontal [place] [facility] and [place] to [facility] for further work-up. Patient arrives to [facility] intubated, sedated and on Nicardipine drip. Patient's son provided history and denies any Aspirin or other anticoagulant use. Denies any prior history of hypertension, smoking, or ETOH abuse. (04 Aug 2020 05:01)    Hospital Course:   POD# 0 S/P cerebral angio, negative for AVM. s/p SOC craniectomy evacuation of cerebellar hematoma.   POD # 1:   overnight tachycardic, ekg without significant changes, lactate wnl, low grade fever, s/p tyl. bolus x 1L , NS increased to 100cc/hr; neuro improving.  EVD at 5 cmH2O   8/6 POD#3, BD#4, Tmax 100.6, Pan Cx 7/5 NGTD, Vanc/Zosyn until 8/10, Vanc trough due today @ 6pm, EEG = no seizure, Lasix overnight for Pulm congest and tachycard, ruff placed for strict I&Os, pending anemia w/u, EVD @ 5, CPAP trials, Card GTT for SBP<140, Started Norvasc for BP optimization  :  :  S/P cerebral angio, negative for AVM. s/p SOC craniectomy evacuation of cerebellar hematoma. No significant events overnight. On CPAP this morning   8/10: fevers overnight, resolved this morning, Pancultured, (+) Enterbacter, on CPAP this AM  8/11 POD#7 SOC, BD # 8, EVD replaced (hard pass), Neuro exam stable, EVD @ 5cm H2O, Zosyn for sputum + enterobact, Plan to extubated today, HCT in AM  8/12 POD#8 s/p SOC, BD#9, s/p EVD replacement (hard pass on 8/10/20), neuro exam stable, EVD @5cm H2O. Zosyn for enterobacter in sputum. O/n EVD stopped draining, flushed distally and proximally, waveform poor, neuro exam remained stable throughout, ICPs <8 prior.     EVD soft pass yesterday, spiked temp today and re cultured o/w neuro exam stable  : EMIL overnight, neuro stable. Mucomyst for secretions overnight   8/15 EVD dc/d, temp spike to 104, pan culture, ID consulted, vanco and cefepime started   8/15 levo started, presumed sepsis from aspiration, continues to have diarrhea  : EMIL  8: EMIL. Neuro exam stable  : Increased secretions overnight. Chest xray obtained. NGT replaced. Neuro exam stable. Patient transferred to ICU for monitoring due to congestion and possible L effusion on CXR along with desaturation and frequent suctioning.   : POD16, on EEG, EMIL o/n, transferred to 5E, plan for PEG tomorrow   POD#17 overnight given Keppra 1g IV x 1 dose for blank stare and EEG findings: pending final read on EEG, Cefep for PNA, PEG today, NPO, Amonia Serum level today w/u for enceph, vEEG,    POD#18, EMIL overnight, PEG placed, start TF 9 AM today, Neuro exam stable   POD #19 EMIL overnight, repeat CT head stable.  : POD#20 EMIL overnight   8: POD21. EMIL o/n, neuro stable. on vEEG. vanc/cefepime for possible PNA. requiring frequent suctioning   : POD22. EMIL o/n, neuro stable. off vEEG (was negative). on vanc/cefepime PNA coverage. pre op for vocal cord injection w/ ENT today  : POD#23. OR yesterday with ENT for left vocal cord injection. Cranial sutures removed yesterday. Remains on vanco (dose increased to 1500 bid) and cefepime for empiric PNA coverage.  : POD#24 EMIL overnight. Oral secretions improving. Remains on PNA antibiotic coverage  : POD#25 transferred to SDU. No acute events pending rehab  : POD#26 Off of voice rest today. Tolerates PEG feeds. NO fevers overnight  : POD#27 EMIL overnight. Neuro exam stable. Afebrile o/n   POD#28 EMIL overnight, Ancef BLAINE Sputum until  as per ID, AR placement pending  Pt pending rehab   will continue to evaluate  ICU Vital Signs Last 24 Hrs  T(C): 37.7 (31 Aug 2020 05:00), Max: 37.7 (31 Aug 2020 05:00)  T(F): 99.9 (31 Aug 2020 05:00), Max: 99.9 (31 Aug 2020 05:00)  HR: 78 (31 Aug 2020 08:17) (74 - 94)  BP: 110/56 (31 Aug 2020 08:17) (110/56 - 145/78)  BP(mean): 77 (31 Aug 2020 08:17) (77 - 105)  ABP: --  ABP(mean): --  RR: 17 (31 Aug 2020 03:45) (17 - 18)  SpO2: 94% (31 Aug 2020 08:17) (93% - 98%)      PHYSICAL EXAM:  GEN: laying in bed, appears well, NAD  NEURO: alert, no tracking. FC (better in cantonese), OE spont, verbal (hypophonic voice,) face symmetric. CNII-XII intact. PERRL, EOMI. MAEx4 with good strength  CARD: RRR +S1/S2  PULM: +rhonchi  GI: Abd soft, NT/ND  EXT: ext warm, dry, nontender  WOUND: SOC site c/d/i    TUBES/LINES:  pivs    DIET:  [] NPO  [] Mechanical  [x Tube feeds    LABS:                                          9.5    12.41 )-----------( 509      ( 31 Aug 2020 06:14 )             31.3   08-31    136  |  97  |  13  ----------------------------<  124<H>  4.2   |  29  |  0.60    Ca    9.3      31 Aug 2020 06:14  Phos  4.0     08-31  Mg     2.5     08-31          Urinalysis Basic - ( 28 Aug 2020 18:51 )    Color: Yellow / Appearance: Clear / S.025 / pH: x  Gluc: x / Ketone: NEGATIVE  / Bili: Negative / Urobili: 0.2 E.U./dL   Blood: x / Protein: 30 mg/dL / Nitrite: NEGATIVE   Leuk Esterase: NEGATIVE / RBC: 5-10 /HPF / WBC < 5 /HPF   Sq Epi: x / Non Sq Epi: 0-5 /HPF / Bacteria: Present /HPF          CAPILLARY BLOOD GLUCOSE      POCT Blood Glucose.: 115 mg/dL (29 Aug 2020 23:25)  POCT Blood Glucose.: 135 mg/dL (29 Aug 2020 21:32)  POCT Blood Glucose.: 142 mg/dL (29 Aug 2020 16:10)  POCT Blood Glucose.: 130 mg/dL (29 Aug 2020 11:03)  POCT Blood Glucose.: 128 mg/dL (29 Aug 2020 07:03)      Drug Levels: [] N/A  Vancomycin Level, Trough: 13.0 ug/mL ( @ 13:59)  Vancomycin Level, Trough: 4.2 ug/mL ( @ 00:14)  Vancomycin Level, Trough: 6.4 ug/mL ( @ 19:45)    CSF Analysis: [] N/A      Allergies    No Known Allergies    Intolerances      MEDICATIONS:  Antibiotics:  ceFAZolin   IVPB 2000 milliGRAM(s) IV Intermittent every 8 hours  tenofovir disoproxil fumarate (VIREAD) 300 milliGRAM(s) Oral every 24 hours    Neuro:  acetaminophen    Suspension .. 650 milliGRAM(s) Oral every 6 hours PRN  amantadine 100 milliGRAM(s) Oral two times a day    Anticoagulation:    OTHER:  acetylcysteine 20%  Inhalation 4 milliLiter(s) Inhalation every 6 hours  albuterol/ipratropium for Nebulization. 3 milliLiter(s) Nebulizer every 6 hours  artificial tears (preservative free) Ophthalmic Solution 1 Drop(s) Both EYES three times a day  chlorhexidine 2% Cloths 1 Application(s) Topical <User Schedule>  dextrose 40% Gel 15 Gram(s) Oral once PRN  dextrose 50% Injectable 12.5 Gram(s) IV Push once  dextrose 50% Injectable 25 Gram(s) IV Push once  doxazosin 4 milliGRAM(s) Oral at bedtime  finasteride 5 milliGRAM(s) Oral daily  glucagon  Injectable 1 milliGRAM(s) IntraMuscular once PRN  glucagon  Injectable 1 milliGRAM(s) IntraMuscular once PRN  hydrALAZINE Injectable 10 milliGRAM(s) IV Push every 2 hours PRN  insulin regular  human corrective regimen sliding scale.   SubCutaneous every 6 hours  insulin regular  human recombinant 4 Unit(s) SubCutaneous every 6 hours  lidocaine 4% Laryngotracheal Topical Anesthesia Kit 4 milliLiter(s) Oral Mucosa once  metoprolol tartrate 12.5 milliGRAM(s) Oral every 12 hours  Mometasone Furoate 0.1% Ointment 1 Application(s) 1 Application(s) Topical two times a day PRN  psyllium Powder 1 Packet(s) Oral daily  sodium chloride 0.9% for Nebulization 3 milliLiter(s) Nebulizer three times a day PRN    IVF:  dextrose 5%. 1000 milliLiter(s) IV Continuous <Continuous>    CULTURES:  Culture Results:   No growth at 1 day. ( @ 18:03)  Culture Results:   Moderate Staphylococcus aureus  Accompanied by normal respiratory aliza ( @ 16:38)    RADIOLOGY & ADDITIONAL TESTS:      ASSESSMENT:  60y Male with ICH/IVH s/p SOC crani POD #28 (20), s/p EVD placement, removed (20)    INTRACRANIAL BLEED  No pertinent family history in first degree relatives  Handoff  MEWS Score  Hepatitis B  HLD (hyperlipidemia)  ICH (intracerebral hemorrhage)  ICH (intracerebral hemorrhage)  Hepatitis B  HLD (hyperlipidemia)  HIV (human immunodeficiency virus infection)  Cerebellar hemorrhage  Laryngoscopy, direct, adult  Ultrasound guided venous access  Insertion, catheter, intravenous  EGD, with PEG  Craniectomy, subocciptal, with cervical laminectomy for medulla and spinal cord decompression  Percutaneous insertion of arterial line  Angiogram, carotid and cerebral vessels, bilateral  Foot fracture, left    PLAN:  - neuro/vitals checks  - pain control  - cranial sutures removed   - ENT following s/p left vocal cord injection   - cont suctioning prn  - cont ancef until  as per ID  - lovenox ppx for MSSA PNA, f/u pancx from , f/u sputum cx  - d/w Dr. Blank    DVT PROPHYLAXIS:  [x] Venodynes                                [] Heparin/Lovenox    DISPOSITION: Full code  KELY    Assessment:  Present when checked    []  GCS  E   V  M     Heart Failure: []Acute, [] acute on chronic , []chronic  Heart Failure:  [] Diastolic (HFpEF), [] Systolic (HFrEF), []Combined (HFpEF and HFrEF), [] RHF, [] Pulm HTN, [] Other    [] SARAH, [] ATN, [] AIN, [] other  [] CKD1, [] CKD2, [] CKD 3, [] CKD 4, [] CKD 5, []ESRD    Encephalopathy: [] Metabolic, [] Hepatic, [] toxic, [] Neurological, [] Other    Abnormal Nurtitional Status: [] malnurtition (see nutrition note), [ ]underweight: BMI < 19, [] morbid obesity: BMI >40, [] Cachexia    [] Sepsis  [] hypovolemic shock,[] cardiogenic shock, [] hemorrhagic shock, [] neuogenic shock  [] Acute Respiratory Failure  []Cerebral edema, [] Brain compression/ herniation,   [] Functional quadriplegia  [] Acute blood loss anemia

## 2020-09-01 DIAGNOSIS — R13.10 DYSPHAGIA, UNSPECIFIED: ICD-10-CM

## 2020-09-01 DIAGNOSIS — J18.9 PNEUMONIA, UNSPECIFIED ORGANISM: ICD-10-CM

## 2020-09-01 LAB
ANION GAP SERPL CALC-SCNC: 11 MMOL/L — SIGNIFICANT CHANGE UP (ref 5–17)
BUN SERPL-MCNC: 15 MG/DL — SIGNIFICANT CHANGE UP (ref 7–23)
CALCIUM SERPL-MCNC: 9 MG/DL — SIGNIFICANT CHANGE UP (ref 8.4–10.5)
CHLORIDE SERPL-SCNC: 100 MMOL/L — SIGNIFICANT CHANGE UP (ref 96–108)
CO2 SERPL-SCNC: 28 MMOL/L — SIGNIFICANT CHANGE UP (ref 22–31)
CREAT SERPL-MCNC: 0.62 MG/DL — SIGNIFICANT CHANGE UP (ref 0.5–1.3)
GLUCOSE BLDC GLUCOMTR-MCNC: 120 MG/DL — HIGH (ref 70–99)
GLUCOSE BLDC GLUCOMTR-MCNC: 121 MG/DL — HIGH (ref 70–99)
GLUCOSE BLDC GLUCOMTR-MCNC: 141 MG/DL — HIGH (ref 70–99)
GLUCOSE BLDC GLUCOMTR-MCNC: 147 MG/DL — HIGH (ref 70–99)
GLUCOSE SERPL-MCNC: 127 MG/DL — HIGH (ref 70–99)
HCT VFR BLD CALC: 29.3 % — LOW (ref 39–50)
HGB BLD-MCNC: 9 G/DL — LOW (ref 13–17)
MAGNESIUM SERPL-MCNC: 2.5 MG/DL — SIGNIFICANT CHANGE UP (ref 1.6–2.6)
MCHC RBC-ENTMCNC: 27.2 PG — SIGNIFICANT CHANGE UP (ref 27–34)
MCHC RBC-ENTMCNC: 30.7 GM/DL — LOW (ref 32–36)
MCV RBC AUTO: 88.5 FL — SIGNIFICANT CHANGE UP (ref 80–100)
NRBC # BLD: 1 /100 WBCS — HIGH (ref 0–0)
PHOSPHATE SERPL-MCNC: 4.1 MG/DL — SIGNIFICANT CHANGE UP (ref 2.5–4.5)
PLATELET # BLD AUTO: 437 K/UL — HIGH (ref 150–400)
POTASSIUM SERPL-MCNC: 4.2 MMOL/L — SIGNIFICANT CHANGE UP (ref 3.5–5.3)
POTASSIUM SERPL-SCNC: 4.2 MMOL/L — SIGNIFICANT CHANGE UP (ref 3.5–5.3)
RBC # BLD: 3.31 M/UL — LOW (ref 4.2–5.8)
RBC # FLD: 15.4 % — HIGH (ref 10.3–14.5)
SODIUM SERPL-SCNC: 139 MMOL/L — SIGNIFICANT CHANGE UP (ref 135–145)
WBC # BLD: 10.32 K/UL — SIGNIFICANT CHANGE UP (ref 3.8–10.5)
WBC # FLD AUTO: 10.32 K/UL — SIGNIFICANT CHANGE UP (ref 3.8–10.5)

## 2020-09-01 RX ORDER — FINASTERIDE 5 MG/1
1 TABLET, FILM COATED ORAL
Qty: 0 | Refills: 0 | DISCHARGE
Start: 2020-09-01

## 2020-09-01 RX ORDER — SCOPALAMINE 1 MG/3D
1 PATCH, EXTENDED RELEASE TRANSDERMAL
Qty: 0 | Refills: 0 | DISCHARGE
Start: 2020-09-01

## 2020-09-01 RX ORDER — TENOFOVIR DISOPROXIL FUMARATE 300 MG/1
1 TABLET, FILM COATED ORAL
Qty: 0 | Refills: 0 | DISCHARGE

## 2020-09-01 RX ORDER — AMANTADINE HCL 100 MG
1 CAPSULE ORAL
Qty: 0 | Refills: 0 | DISCHARGE
Start: 2020-09-01

## 2020-09-01 RX ORDER — ACETAMINOPHEN 500 MG
20 TABLET ORAL
Qty: 0 | Refills: 0 | DISCHARGE
Start: 2020-09-01

## 2020-09-01 RX ORDER — OMEGA-3 ACID ETHYL ESTERS 1 G
1 CAPSULE ORAL
Qty: 0 | Refills: 0 | DISCHARGE

## 2020-09-01 RX ORDER — ACETYLCYSTEINE 200 MG/ML
4 VIAL (ML) MISCELLANEOUS
Qty: 0 | Refills: 0 | DISCHARGE
Start: 2020-09-01

## 2020-09-01 RX ORDER — CHLORHEXIDINE GLUCONATE 213 G/1000ML
1 SOLUTION TOPICAL
Qty: 0 | Refills: 0 | DISCHARGE
Start: 2020-09-01

## 2020-09-01 RX ORDER — SODIUM CHLORIDE 9 MG/ML
3 INJECTION INTRAMUSCULAR; INTRAVENOUS; SUBCUTANEOUS
Qty: 0 | Refills: 0 | DISCHARGE
Start: 2020-09-01

## 2020-09-01 RX ORDER — ENOXAPARIN SODIUM 100 MG/ML
40 INJECTION SUBCUTANEOUS
Qty: 0 | Refills: 0 | DISCHARGE
Start: 2020-09-01

## 2020-09-01 RX ORDER — PSYLLIUM SEED (WITH DEXTROSE)
1 POWDER (GRAM) ORAL
Qty: 0 | Refills: 0 | DISCHARGE
Start: 2020-09-01

## 2020-09-01 RX ORDER — TENOFOVIR DISOPROXIL FUMARATE 300 MG/1
1 TABLET, FILM COATED ORAL
Qty: 0 | Refills: 0 | DISCHARGE
Start: 2020-09-01

## 2020-09-01 RX ORDER — IPRATROPIUM/ALBUTEROL SULFATE 18-103MCG
3 AEROSOL WITH ADAPTER (GRAM) INHALATION
Qty: 0 | Refills: 0 | DISCHARGE
Start: 2020-09-01

## 2020-09-01 RX ORDER — METOPROLOL TARTRATE 50 MG
12.5 TABLET ORAL
Qty: 0 | Refills: 0 | DISCHARGE
Start: 2020-09-01

## 2020-09-01 RX ORDER — DOXAZOSIN MESYLATE 4 MG
1 TABLET ORAL
Qty: 0 | Refills: 0 | DISCHARGE
Start: 2020-09-01

## 2020-09-01 RX ORDER — INSULIN HUMAN 100 [IU]/ML
2 INJECTION, SOLUTION SUBCUTANEOUS
Qty: 1 | Refills: 0
Start: 2020-09-01

## 2020-09-01 RX ADMIN — Medication 1 PACKET(S): at 12:11

## 2020-09-01 RX ADMIN — INSULIN HUMAN 4 UNIT(S): 100 INJECTION, SOLUTION SUBCUTANEOUS at 00:24

## 2020-09-01 RX ADMIN — Medication 4 MILLILITER(S): at 04:15

## 2020-09-01 RX ADMIN — Medication 1 DROP(S): at 06:09

## 2020-09-01 RX ADMIN — SCOPALAMINE 1 PATCH: 1 PATCH, EXTENDED RELEASE TRANSDERMAL at 06:03

## 2020-09-01 RX ADMIN — ENOXAPARIN SODIUM 40 MILLIGRAM(S): 100 INJECTION SUBCUTANEOUS at 22:28

## 2020-09-01 RX ADMIN — Medication 4 MILLILITER(S): at 09:38

## 2020-09-01 RX ADMIN — Medication 3 MILLILITER(S): at 15:31

## 2020-09-01 RX ADMIN — Medication 4 MILLIGRAM(S): at 22:27

## 2020-09-01 RX ADMIN — Medication 1 DROP(S): at 13:28

## 2020-09-01 RX ADMIN — INSULIN HUMAN 4 UNIT(S): 100 INJECTION, SOLUTION SUBCUTANEOUS at 12:11

## 2020-09-01 RX ADMIN — Medication 3 MILLILITER(S): at 22:28

## 2020-09-01 RX ADMIN — Medication 1 DROP(S): at 22:00

## 2020-09-01 RX ADMIN — INSULIN HUMAN 4 UNIT(S): 100 INJECTION, SOLUTION SUBCUTANEOUS at 16:48

## 2020-09-01 RX ADMIN — CHLORHEXIDINE GLUCONATE 1 APPLICATION(S): 213 SOLUTION TOPICAL at 06:03

## 2020-09-01 RX ADMIN — Medication 3 MILLILITER(S): at 09:38

## 2020-09-01 RX ADMIN — TENOFOVIR DISOPROXIL FUMARATE 300 MILLIGRAM(S): 300 TABLET, FILM COATED ORAL at 22:28

## 2020-09-01 RX ADMIN — Medication 3 MILLILITER(S): at 04:16

## 2020-09-01 RX ADMIN — INSULIN HUMAN 4 UNIT(S): 100 INJECTION, SOLUTION SUBCUTANEOUS at 06:04

## 2020-09-01 RX ADMIN — Medication 100 MILLIGRAM(S): at 06:09

## 2020-09-01 RX ADMIN — Medication 100 MILLIGRAM(S): at 18:48

## 2020-09-01 RX ADMIN — Medication 4 MILLILITER(S): at 22:28

## 2020-09-01 RX ADMIN — FINASTERIDE 5 MILLIGRAM(S): 5 TABLET, FILM COATED ORAL at 12:10

## 2020-09-01 RX ADMIN — Medication 4 MILLILITER(S): at 15:31

## 2020-09-01 NOTE — PROGRESS NOTE ADULT - SUBJECTIVE AND OBJECTIVE BOX
HPI:  59 year old male with PMH of HLD and unclear liver disease had acute onset of severe headache, dizziness and vomiting x1 today, was brought into [facility] with continued symptoms, as well as hypertension and multiple episodes of vomiting. Patient was intubated for airway protection with CT Head performed demonstrating a large left cerebellar hemorrhage. CTA Head/Neck performed is suggestive of underlying vascular pathology such as AVM. [place]ht [place] [facility] and [place] to [facility] for further work-up. Patient arrives to [facility] intubated, sedated and on Nicardipine drip. Patient's son provided history and denies any Aspirin or other anticoagulant use. Denies any prior history of hypertension, smoking, or ETOH abuse. (04 Aug 2020 05:01)  Hospital Course:   POD# 0 S/P cerebral angio, negative for AVM. s/p SOC craniectomy evacuation of cerebellar hematoma.   POD # 1:   overnight tachycardic, ekg without significant changes, lactate wnl, low grade fever, s/p tyl. bolus x 1L , NS increased to 100cc/hr; neuro improving.  EVD at 5 cmH2O   8/6 POD#3, BD#4, Tmax 100.6, Pan Cx 7/5 NGTD, Vanc/Zosyn until 8/10, Vanc trough due today @ 6pm, EEG = no seizure, Lasix overnight for Pulm congest and tachycard, ruff placed for strict I&Os, pending anemia w/u, EVD @ 5, CPAP trials, Card GTT for SBP<140, Started Norvasc for BP optimization  8/7:  8/8:  S/P cerebral angio, negative for AVM. s/p SOC craniectomy evacuation of cerebellar hematoma. No significant events overnight. On CPAP this morning   8/10: fevers overnight, resolved this morning, Pancultured, (+) Enterbacter, on CPAP this AM  8/11 POD#7 SOC, BD # 8, EVD replaced (hard pass), Neuro exam stable, EVD @ 5cm H2O, Zosyn for sputum + enterobact, Plan to extubated today, HCT in AM  8/12 POD#8 s/p SOC, BD#9, s/p EVD replacement (hard pass on 8/10/20), neuro exam stable, EVD @5cm H2O. Zosyn for enterobacter in sputum. O/n EVD stopped draining, flushed distally and proximally, waveform poor, neuro exam remained stable throughout, ICPs <8 prior.    8/13 EVD soft pass yesterday, spiked temp today and re cultured o/w neuro exam stable  8/14: EMIL overnight, neuro stable. Mucomyst for secretions overnight   8/15 EVD dc/d, temp spike to 104, pan culture, ID consulted, vanco and cefepime started   8/15 levo started, presumed sepsis from aspiration, continues to have diarrhea  8/16: EMIL  8/17: EMIL. Neuro exam stable  8/18: Increased secretions overnight. Chest xray obtained. NGT replaced. Neuro exam stable. Patient transferred to ICU for monitoring due to congestion and possible L effusion on CXR along with desaturation and frequent suctioning.   8/19: POD16, on EEG, EMIL o/n, transferred to 5E, plan for PEG tomorrow  8/20 POD#17 overnight given Keppra 1g IV x 1 dose for blank stare and EEG findings: pending final read on EEG, Cefep for PNA, PEG today, NPO, Amonia Serum level today w/u for enceph, vEEG,   8/21 POD#18, EMIL overnight, PEG placed, start TF 9 AM today, Neuro exam stable  8/22 POD #19 EMIL overnight, repeat CT head stable.  8/23: POD#20 EMIL overnight   8/24: POD21. EMIL o/n, neuro stable. on vEEG. vanc/cefepime for possible PNA. requiring frequent suctioning   8/25: POD22. EMIL o/n, neuro stable. off vEEG (was negative). on vanc/cefepime PNA coverage. pre op for vocal cord injection w/ ENT today  8/26: POD#23. OR yesterday with ENT for left vocal cord injection. Cranial sutures removed yesterday. Remains on vanco (dose increased to 1500 bid) and cefepime for empiric PNA coverage.  8/27: POD#24 EMIL overnight. Oral secretions improving. Remains on PNA antibiotic coverage  8/28: POD#25 transferred to SDU. No acute events pending rehab  8/29: POD#26 Off of voice rest today. Tolerates PEG feeds. NO fevers overnight  8/30: POD#27 EMIL overnight. Neuro exam stable. Afebrile o/n  8/31 POD#28 EMIL overnight, Ancef BLAINE Sputum until 9/1 as per ID, AR placement pending  OVERNIGHT EVENTS: EMIL o/n, remained on one to one observation  9/1: EMIL o/n.       Vital Signs Last 24 Hrs  T(C): 36.8 (31 Aug 2020 22:00), Max: 37.7 (31 Aug 2020 05:00)  T(F): 98.2 (31 Aug 2020 22:00), Max: 99.9 (31 Aug 2020 05:00)  HR: 84 (01 Sep 2020 02:30) (78 - 86)  BP: 85/59 (01 Sep 2020 02:30) (85/59 - 136/65)  BP(mean): 69 (01 Sep 2020 02:30) (69 - 100)  RR: 15 (01 Sep 2020 02:30) (15 - 16)  SpO2: 92% (01 Sep 2020 02:30) (92% - 99%)    I&O's Summary    30 Aug 2020 07:01  -  31 Aug 2020 07:00  --------------------------------------------------------  IN: 1468 mL / OUT: 1804 mL / NET: -336 mL    31 Aug 2020 07:01  -  01 Sep 2020 03:50  --------------------------------------------------------  IN: 770 mL / OUT: 1100 mL / NET: -330 mL        PHYSICAL EXAM:    GEN: laying in bed, appears well, NAD  NEURO: alert, no tracking. not FC, OE spont, verbal (hypophonic voice,) face symmetric. CNII-XII intact. PERRL, EOMI. MAEx4 with good strength  CARD: RRR +S1/S2  PULM: +rhonchi  GI: Abd soft, NT/ND  EXT: ext warm, dry, nontender  WOUND: SOC site c/d/i    TUBES/LINES:  pivs    DIET:  [] NPO  [] Mechanical  [x Tube feeds      LABS:                        9.5    12.41 )-----------( 509      ( 31 Aug 2020 06:14 )             31.3     08-31    136  |  97  |  13  ----------------------------<  124<H>  4.2   |  29  |  0.60    Ca    9.3      31 Aug 2020 06:14  Phos  4.0     08-31  Mg     2.5     08-31              CAPILLARY BLOOD GLUCOSE      POCT Blood Glucose.: 121 mg/dL (01 Sep 2020 00:15)  POCT Blood Glucose.: 130 mg/dL (31 Aug 2020 16:44)  POCT Blood Glucose.: 135 mg/dL (31 Aug 2020 11:18)  POCT Blood Glucose.: 133 mg/dL (31 Aug 2020 06:08)      Drug Levels: [] N/A  Vancomycin Level, Trough: 13.0 ug/mL (08-29 @ 13:59)  Vancomycin Level, Trough: 4.2 ug/mL (08-28 @ 00:14)  Vancomycin Level, Trough: 6.4 ug/mL (08-25 @ 19:45)    CSF Analysis: [] N/A      Allergies    No Known Allergies    Intolerances      MEDICATIONS:  Antibiotics:  ceFAZolin   IVPB 2000 milliGRAM(s) IV Intermittent every 8 hours  tenofovir disoproxil fumarate (VIREAD) 300 milliGRAM(s) Oral every 24 hours    Neuro:  acetaminophen    Suspension .. 650 milliGRAM(s) Oral every 6 hours PRN  amantadine 100 milliGRAM(s) Oral two times a day  scopolamine 1 mG/72 Hr(s) Patch 1 Patch Transdermal every 72 hours    Anticoagulation:  enoxaparin Injectable 40 milliGRAM(s) SubCutaneous at bedtime    OTHER:  acetylcysteine 20%  Inhalation 4 milliLiter(s) Inhalation every 6 hours  albuterol/ipratropium for Nebulization. 3 milliLiter(s) Nebulizer every 6 hours  artificial tears (preservative free) Ophthalmic Solution 1 Drop(s) Both EYES three times a day  chlorhexidine 2% Cloths 1 Application(s) Topical <User Schedule>  dextrose 40% Gel 15 Gram(s) Oral once PRN  dextrose 50% Injectable 12.5 Gram(s) IV Push once  dextrose 50% Injectable 25 Gram(s) IV Push once  doxazosin 4 milliGRAM(s) Oral at bedtime  finasteride 5 milliGRAM(s) Oral daily  glucagon  Injectable 1 milliGRAM(s) IntraMuscular once PRN  glucagon  Injectable 1 milliGRAM(s) IntraMuscular once PRN  hydrALAZINE Injectable 10 milliGRAM(s) IV Push every 2 hours PRN  insulin regular  human corrective regimen sliding scale.   SubCutaneous every 6 hours  insulin regular  human recombinant 4 Unit(s) SubCutaneous every 6 hours  lidocaine 4% Laryngotracheal Topical Anesthesia Kit 4 milliLiter(s) Oral Mucosa once  metoprolol tartrate 12.5 milliGRAM(s) Oral every 12 hours  Mometasone Furoate 0.1% Ointment 1 Application(s) 1 Application(s) Topical two times a day PRN  psyllium Powder 1 Packet(s) Oral daily  sodium chloride 0.9% for Nebulization 3 milliLiter(s) Nebulizer three times a day PRN    IVF:  dextrose 5%. 1000 milliLiter(s) IV Continuous <Continuous>    CULTURES:  Culture Results:   Moderate Staphylococcus aureus Susceptibility to follow.  Accompanied by normal respiratory aliza (08-29 @ 17:52)  Culture Results:   No growth at 3 days. (08-28 @ 18:03)    RADIOLOGY & ADDITIONAL TESTS:      ASSESSMENT:  60y Male with ICH/IVH s/p SOC crani POD #29 (8/4/20), s/p EVD placement, removed (8/14/20)    INTRACRANIAL BLEED  No pertinent family history in first degree relatives  Handoff  MEWS Score  Hepatitis B  HLD (hyperlipidemia)  ICH (intracerebral hemorrhage)  ICH (intracerebral hemorrhage)  Hepatitis B  HLD (hyperlipidemia)  HIV (human immunodeficiency virus infection)  Cerebellar hemorrhage  Laryngoscopy, direct, adult  Ultrasound guided venous access  Insertion, catheter, intravenous  EGD, with PEG  Craniectomy, subocciptal, with cervical laminectomy for medulla and spinal cord decompression  Percutaneous insertion of arterial line  Angiogram, carotid and cerebral vessels, bilateral  Foot fracture, left      PLAN:  PLAN:  - neuro/vitals checks  - pain control  - cranial sutures removed   - ENT following s/p left vocal cord injection   - cont suctioning prn  - cont ancef until 9/1 as per ID  - lovenox ppx for MSSA PNA, f/u pancx from 8/28, f/u sputum cx  - enhanced supervision as needed     - d/w Dr. Blank    DVT PROPHYLAXIS:  [x] Venodynes                                [] Heparin/Lovenox    DISPOSITION: Full code  KELY    d/w Dr. Blank     Assessment:  Present when checked    []  GCS  E   V  M     Heart Failure: []Acute, [] acute on chronic , []chronic  Heart Failure:  [] Diastolic (HFpEF), [] Systolic (HFrEF), []Combined (HFpEF and HFrEF), [] RHF, [] Pulm HTN, [] Other    [] SARAH, [] ATN, [] AIN, [] other  [] CKD1, [] CKD2, [] CKD 3, [] CKD 4, [] CKD 5, []ESRD    Encephalopathy: [] Metabolic, [] Hepatic, [] toxic, [] Neurological, [] Other    Abnormal Nurtitional Status: [] malnurtition (see nutrition note), [ ]underweight: BMI < 19, [] morbid obesity: BMI >40, [] Cachexia    [] Sepsis  [] hypovolemic shock,[] cardiogenic shock, [] hemorrhagic shock, [] neuogenic shock  [] Acute Respiratory Failure  []Cerebral edema, [] Brain compression/ herniation,   [] Functional quadriplegia  [] Acute blood loss anemia

## 2020-09-02 ENCOUNTER — TRANSCRIPTION ENCOUNTER (OUTPATIENT)
Age: 60
End: 2020-09-02

## 2020-09-02 VITALS
DIASTOLIC BLOOD PRESSURE: 72 MMHG | OXYGEN SATURATION: 96 % | HEART RATE: 80 BPM | SYSTOLIC BLOOD PRESSURE: 122 MMHG | RESPIRATION RATE: 16 BRPM

## 2020-09-02 PROBLEM — B19.10 UNSPECIFIED VIRAL HEPATITIS B WITHOUT HEPATIC COMA: Chronic | Status: ACTIVE | Noted: 2020-08-04

## 2020-09-02 PROBLEM — E78.5 HYPERLIPIDEMIA, UNSPECIFIED: Chronic | Status: ACTIVE | Noted: 2020-08-04

## 2020-09-02 LAB
ANION GAP SERPL CALC-SCNC: 11 MMOL/L — SIGNIFICANT CHANGE UP (ref 5–17)
BUN SERPL-MCNC: 12 MG/DL — SIGNIFICANT CHANGE UP (ref 7–23)
CALCIUM SERPL-MCNC: 9.9 MG/DL — SIGNIFICANT CHANGE UP (ref 8.4–10.5)
CHLORIDE SERPL-SCNC: 103 MMOL/L — SIGNIFICANT CHANGE UP (ref 96–108)
CO2 SERPL-SCNC: 29 MMOL/L — SIGNIFICANT CHANGE UP (ref 22–31)
CREAT SERPL-MCNC: 0.71 MG/DL — SIGNIFICANT CHANGE UP (ref 0.5–1.3)
CULTURE RESULTS: SIGNIFICANT CHANGE UP
GLUCOSE BLDC GLUCOMTR-MCNC: 124 MG/DL — HIGH (ref 70–99)
GLUCOSE BLDC GLUCOMTR-MCNC: 130 MG/DL — HIGH (ref 70–99)
GLUCOSE BLDC GLUCOMTR-MCNC: 146 MG/DL — HIGH (ref 70–99)
GLUCOSE SERPL-MCNC: 123 MG/DL — HIGH (ref 70–99)
HCT VFR BLD CALC: 31.5 % — LOW (ref 39–50)
HGB BLD-MCNC: 9.5 G/DL — LOW (ref 13–17)
MAGNESIUM SERPL-MCNC: 2.6 MG/DL — SIGNIFICANT CHANGE UP (ref 1.6–2.6)
MCHC RBC-ENTMCNC: 27.5 PG — SIGNIFICANT CHANGE UP (ref 27–34)
MCHC RBC-ENTMCNC: 30.2 GM/DL — LOW (ref 32–36)
MCV RBC AUTO: 91 FL — SIGNIFICANT CHANGE UP (ref 80–100)
NRBC # BLD: 0 /100 WBCS — SIGNIFICANT CHANGE UP (ref 0–0)
PHOSPHATE SERPL-MCNC: 3.6 MG/DL — SIGNIFICANT CHANGE UP (ref 2.5–4.5)
PLATELET # BLD AUTO: 451 K/UL — HIGH (ref 150–400)
POTASSIUM SERPL-MCNC: 5.8 MMOL/L — HIGH (ref 3.5–5.3)
POTASSIUM SERPL-SCNC: 5.8 MMOL/L — HIGH (ref 3.5–5.3)
RBC # BLD: 3.46 M/UL — LOW (ref 4.2–5.8)
RBC # FLD: 15.6 % — HIGH (ref 10.3–14.5)
SARS-COV-2 RNA SPEC QL NAA+PROBE: SIGNIFICANT CHANGE UP
SODIUM SERPL-SCNC: 143 MMOL/L — SIGNIFICANT CHANGE UP (ref 135–145)
SPECIMEN SOURCE: SIGNIFICANT CHANGE UP
WBC # BLD: 11.91 K/UL — HIGH (ref 3.8–10.5)
WBC # FLD AUTO: 11.91 K/UL — HIGH (ref 3.8–10.5)

## 2020-09-02 PROCEDURE — C1713: CPT

## 2020-09-02 PROCEDURE — 85025 COMPLETE CBC W/AUTO DIFF WBC: CPT

## 2020-09-02 PROCEDURE — 81001 URINALYSIS AUTO W/SCOPE: CPT

## 2020-09-02 PROCEDURE — 83735 ASSAY OF MAGNESIUM: CPT

## 2020-09-02 PROCEDURE — 87046 STOOL CULTR AEROBIC BACT EA: CPT

## 2020-09-02 PROCEDURE — 83930 ASSAY OF BLOOD OSMOLALITY: CPT

## 2020-09-02 PROCEDURE — 86803 HEPATITIS C AB TEST: CPT

## 2020-09-02 PROCEDURE — C1894: CPT

## 2020-09-02 PROCEDURE — 80048 BASIC METABOLIC PNL TOTAL CA: CPT

## 2020-09-02 PROCEDURE — 84295 ASSAY OF SERUM SODIUM: CPT

## 2020-09-02 PROCEDURE — 93005 ELECTROCARDIOGRAM TRACING: CPT

## 2020-09-02 PROCEDURE — L8699: CPT

## 2020-09-02 PROCEDURE — 92526 ORAL FUNCTION THERAPY: CPT

## 2020-09-02 PROCEDURE — 97164 PT RE-EVAL EST PLAN CARE: CPT

## 2020-09-02 PROCEDURE — 80074 ACUTE HEPATITIS PANEL: CPT

## 2020-09-02 PROCEDURE — 84133 ASSAY OF URINE POTASSIUM: CPT

## 2020-09-02 PROCEDURE — 71250 CT THORAX DX C-: CPT

## 2020-09-02 PROCEDURE — 93970 EXTREMITY STUDY: CPT

## 2020-09-02 PROCEDURE — 83615 LACTATE (LD) (LDH) ENZYME: CPT

## 2020-09-02 PROCEDURE — 84443 ASSAY THYROID STIM HORMONE: CPT

## 2020-09-02 PROCEDURE — C1889: CPT

## 2020-09-02 PROCEDURE — 87045 FECES CULTURE AEROBIC BACT: CPT

## 2020-09-02 PROCEDURE — 92610 EVALUATE SWALLOWING FUNCTION: CPT

## 2020-09-02 PROCEDURE — 84132 ASSAY OF SERUM POTASSIUM: CPT

## 2020-09-02 PROCEDURE — 87507 IADNA-DNA/RNA PROBE TQ 12-25: CPT

## 2020-09-02 PROCEDURE — 95714 VEEG EA 12-26 HR UNMNTR: CPT

## 2020-09-02 PROCEDURE — 70450 CT HEAD/BRAIN W/O DYE: CPT

## 2020-09-02 PROCEDURE — 97162 PT EVAL MOD COMPLEX 30 MIN: CPT

## 2020-09-02 PROCEDURE — 86901 BLOOD TYPING SEROLOGIC RH(D): CPT

## 2020-09-02 PROCEDURE — 82945 GLUCOSE OTHER FLUID: CPT

## 2020-09-02 PROCEDURE — 83935 ASSAY OF URINE OSMOLALITY: CPT

## 2020-09-02 PROCEDURE — 85730 THROMBOPLASTIN TIME PARTIAL: CPT

## 2020-09-02 PROCEDURE — 71045 X-RAY EXAM CHEST 1 VIEW: CPT

## 2020-09-02 PROCEDURE — 87389 HIV-1 AG W/HIV-1&-2 AB AG IA: CPT

## 2020-09-02 PROCEDURE — 87040 BLOOD CULTURE FOR BACTERIA: CPT

## 2020-09-02 PROCEDURE — 82330 ASSAY OF CALCIUM: CPT

## 2020-09-02 PROCEDURE — 80076 HEPATIC FUNCTION PANEL: CPT

## 2020-09-02 PROCEDURE — C1760: CPT

## 2020-09-02 PROCEDURE — 80061 LIPID PANEL: CPT

## 2020-09-02 PROCEDURE — 87186 SC STD MICRODIL/AGAR DIL: CPT

## 2020-09-02 PROCEDURE — 85027 COMPLETE CBC AUTOMATED: CPT

## 2020-09-02 PROCEDURE — 87070 CULTURE OTHR SPECIMN AEROBIC: CPT

## 2020-09-02 PROCEDURE — 84100 ASSAY OF PHOSPHORUS: CPT

## 2020-09-02 PROCEDURE — 94003 VENT MGMT INPAT SUBQ DAY: CPT

## 2020-09-02 PROCEDURE — 84300 ASSAY OF URINE SODIUM: CPT

## 2020-09-02 PROCEDURE — C1769: CPT

## 2020-09-02 PROCEDURE — 74177 CT ABD & PELVIS W/CONTRAST: CPT

## 2020-09-02 PROCEDURE — 97530 THERAPEUTIC ACTIVITIES: CPT

## 2020-09-02 PROCEDURE — 87449 NOS EACH ORGANISM AG IA: CPT

## 2020-09-02 PROCEDURE — 86850 RBC ANTIBODY SCREEN: CPT

## 2020-09-02 PROCEDURE — 97110 THERAPEUTIC EXERCISES: CPT

## 2020-09-02 PROCEDURE — 87324 CLOSTRIDIUM AG IA: CPT

## 2020-09-02 PROCEDURE — 36415 COLL VENOUS BLD VENIPUNCTURE: CPT

## 2020-09-02 PROCEDURE — 94640 AIRWAY INHALATION TREATMENT: CPT

## 2020-09-02 PROCEDURE — 80053 COMPREHEN METABOLIC PANEL: CPT

## 2020-09-02 PROCEDURE — 83036 HEMOGLOBIN GLYCOSYLATED A1C: CPT

## 2020-09-02 PROCEDURE — 97116 GAIT TRAINING THERAPY: CPT

## 2020-09-02 PROCEDURE — 82436 ASSAY OF URINE CHLORIDE: CPT

## 2020-09-02 PROCEDURE — 85610 PROTHROMBIN TIME: CPT

## 2020-09-02 PROCEDURE — 92612 ENDOSCOPY SWALLOW (FEES) VID: CPT

## 2020-09-02 PROCEDURE — 87635 SARS-COV-2 COVID-19 AMP PRB: CPT

## 2020-09-02 PROCEDURE — 82803 BLOOD GASES ANY COMBINATION: CPT

## 2020-09-02 PROCEDURE — 97112 NEUROMUSCULAR REEDUCATION: CPT

## 2020-09-02 PROCEDURE — 95700 EEG CONT REC W/VID EEG TECH: CPT

## 2020-09-02 PROCEDURE — 87205 SMEAR GRAM STAIN: CPT

## 2020-09-02 PROCEDURE — C1757: CPT

## 2020-09-02 PROCEDURE — 85018 HEMOGLOBIN: CPT

## 2020-09-02 PROCEDURE — C1878: CPT

## 2020-09-02 PROCEDURE — 83605 ASSAY OF LACTIC ACID: CPT

## 2020-09-02 PROCEDURE — 82962 GLUCOSE BLOOD TEST: CPT

## 2020-09-02 PROCEDURE — 84145 PROCALCITONIN (PCT): CPT

## 2020-09-02 PROCEDURE — 82140 ASSAY OF AMMONIA: CPT

## 2020-09-02 PROCEDURE — 89051 BODY FLUID CELL COUNT: CPT

## 2020-09-02 PROCEDURE — 80202 ASSAY OF VANCOMYCIN: CPT

## 2020-09-02 PROCEDURE — 84157 ASSAY OF PROTEIN OTHER: CPT

## 2020-09-02 PROCEDURE — 84484 ASSAY OF TROPONIN QUANT: CPT

## 2020-09-02 PROCEDURE — 93307 TTE W/O DOPPLER COMPLETE: CPT

## 2020-09-02 RX ORDER — DIPHENHYDRAMINE HCL 50 MG
25 CAPSULE ORAL ONCE
Refills: 0 | Status: COMPLETED | OUTPATIENT
Start: 2020-09-02 | End: 2020-09-02

## 2020-09-02 RX ORDER — LANOLIN ALCOHOL/MO/W.PET/CERES
5 CREAM (GRAM) TOPICAL ONCE
Refills: 0 | Status: COMPLETED | OUTPATIENT
Start: 2020-09-02 | End: 2020-09-02

## 2020-09-02 RX ADMIN — CHLORHEXIDINE GLUCONATE 1 APPLICATION(S): 213 SOLUTION TOPICAL at 05:15

## 2020-09-02 RX ADMIN — Medication 12.5 MILLIGRAM(S): at 05:18

## 2020-09-02 RX ADMIN — Medication 3 MILLILITER(S): at 10:44

## 2020-09-02 RX ADMIN — FINASTERIDE 5 MILLIGRAM(S): 5 TABLET, FILM COATED ORAL at 12:24

## 2020-09-02 RX ADMIN — INSULIN HUMAN 4 UNIT(S): 100 INJECTION, SOLUTION SUBCUTANEOUS at 00:00

## 2020-09-02 RX ADMIN — Medication 5 MILLIGRAM(S): at 02:09

## 2020-09-02 RX ADMIN — SCOPALAMINE 1 PATCH: 1 PATCH, EXTENDED RELEASE TRANSDERMAL at 05:15

## 2020-09-02 RX ADMIN — Medication 25 MILLIGRAM(S): at 08:08

## 2020-09-02 RX ADMIN — Medication 4 MILLILITER(S): at 10:44

## 2020-09-02 RX ADMIN — INSULIN HUMAN 4 UNIT(S): 100 INJECTION, SOLUTION SUBCUTANEOUS at 12:24

## 2020-09-02 RX ADMIN — INSULIN HUMAN 4 UNIT(S): 100 INJECTION, SOLUTION SUBCUTANEOUS at 07:04

## 2020-09-02 RX ADMIN — Medication 1 DROP(S): at 05:18

## 2020-09-02 RX ADMIN — Medication 1 PACKET(S): at 12:24

## 2020-09-02 RX ADMIN — Medication 25 MILLIGRAM(S): at 00:36

## 2020-09-02 NOTE — DISCHARGE NOTE NURSING/CASE MANAGEMENT/SOCIAL WORK - PATIENT PORTAL LINK FT
You can access the FollowMyHealth Patient Portal offered by [facility] by registering at the following website: [email or web address]. By joining [facility]’s FollowMyHealth portal, you will also be able to view your health information using other applications (apps) compatible with our system.

## 2020-09-02 NOTE — DISCHARGE NOTE NURSING/CASE MANAGEMENT/SOCIAL WORK - NSDCPEPTSTRK_GEN_ALL_CORE
Stroke support groups for patients, families, and friends/Call 911 for stroke/Need for follow up after discharge/Stroke education booklet/Prescribed medications/Risk factors for stroke/Stroke warning signs and symptoms/Signs and symptoms of stroke

## 2020-09-02 NOTE — DISCHARGE NOTE NURSING/CASE MANAGEMENT/SOCIAL WORK - NSDCFUADDAPPT_GEN_ALL_CORE_FT
Needs to follow up with ENT s/p Vocal Chord Injection done on 8/26.    The patient will follow-up on 9/15/2020 at 3PM with Sabrina. The patient needs to call with insurance information.

## 2020-09-02 NOTE — CHART NOTE - NSCHARTNOTEFT_GEN_A_CORE
Admitting Diagnosis:   Patient is a 60y old  Male who presents with a chief complaint of Left cerebellar intraparenchymal hemorrhage (02 Sep 2020 05:54)      PAST MEDICAL & SURGICAL HISTORY:  Hepatitis B  HLD (hyperlipidemia)  Foot fracture, left: s/p repair 2019      Current Nutrition Order:  Vital 1.5 @48ml/hr x24hrs via PEG. Provides 1152ml TV, 1728kcal, 77g pro, 880ml FW.   *feeds infusing at goal at time of assessment    PO Intake: Good (%) [   ]  Fair (50-75%) [   ] Poor (<25%) [   ] N/A NPO w/ TF    GI Issues:   No apparent GI distress  Having loose stool per RN but doesn't consider it to be diarrhea  Ordered for metamucil, scopolamine  PEG placed 8/21    Pain:  DUY- not in apparent distress  Not on pain regimen    Skin Integrity:  Surgical incision  Zachariah score 16      Labs:   09-02    143  |  103  |  12  ----------------------------<  123<H>  5.8<H>   |  29  |  0.71    Ca    9.9      02 Sep 2020 06:43  Phos  3.6     09-02  Mg     2.6     09-02      CAPILLARY BLOOD GLUCOSE      POCT Blood Glucose.: 130 mg/dL (02 Sep 2020 11:19)  POCT Blood Glucose.: 124 mg/dL (02 Sep 2020 06:39)  POCT Blood Glucose.: 146 mg/dL (02 Sep 2020 00:42)  POCT Blood Glucose.: 120 mg/dL (01 Sep 2020 16:30)      Medications:  MEDICATIONS  (STANDING):  acetylcysteine 20%  Inhalation 4 milliLiter(s) Inhalation every 6 hours  albuterol/ipratropium for Nebulization. 3 milliLiter(s) Nebulizer every 6 hours  artificial tears (preservative free) Ophthalmic Solution 1 Drop(s) Both EYES three times a day  chlorhexidine 2% Cloths 1 Application(s) Topical <User Schedule>  dextrose 5%. 1000 milliLiter(s) (50 mL/Hr) IV Continuous <Continuous>  dextrose 50% Injectable 12.5 Gram(s) IV Push once  dextrose 50% Injectable 25 Gram(s) IV Push once  doxazosin 4 milliGRAM(s) Oral at bedtime  enoxaparin Injectable 40 milliGRAM(s) SubCutaneous at bedtime  finasteride 5 milliGRAM(s) Oral daily  insulin regular  human corrective regimen sliding scale.   SubCutaneous every 6 hours  insulin regular  human recombinant 4 Unit(s) SubCutaneous every 6 hours  lidocaine 4% Laryngotracheal Topical Anesthesia Kit 4 milliLiter(s) Oral Mucosa once  metoprolol tartrate 12.5 milliGRAM(s) Oral every 12 hours  psyllium Powder 1 Packet(s) Oral daily  scopolamine 1 mG/72 Hr(s) Patch 1 Patch Transdermal every 72 hours  tenofovir disoproxil fumarate (VIREAD) 300 milliGRAM(s) Oral every 24 hours    MEDICATIONS  (PRN):  acetaminophen    Suspension .. 650 milliGRAM(s) Oral every 6 hours PRN Temp greater or equal to 38C (100.4F), Mild Pain (1 - 3)  dextrose 40% Gel 15 Gram(s) Oral once PRN Blood Glucose LESS THAN 70 milliGRAM(s)/deciliter  glucagon  Injectable 1 milliGRAM(s) IntraMuscular once PRN Glucose LESS THAN 70 milligrams/deciliter  glucagon  Injectable 1 milliGRAM(s) IntraMuscular once PRN Glucose LESS THAN 70 milligrams/deciliter  hydrALAZINE Injectable 10 milliGRAM(s) IV Push every 2 hours PRN SBP > 140  Mometasone Furoate 0.1% Ointment 1 Application(s) 1 Application(s) Topical two times a day PRN dry/itchy skin  sodium chloride 0.9% for Nebulization 3 milliLiter(s) Nebulizer three times a day PRN for congestion/secretions  sodium chloride 0.9% lock flush 10 milliLiter(s) IV Push every 1 hour PRN Pre/post blood products, medications, blood draw, and to maintain line patency      Admitted Anthropometrics:  Height: 5'3", IBW 124lbs+/-10%, %%, BMI 23.3     Weight: 131lbs (8/4)  Daily     Weight Change:   140lbs per bed scale weight today 9/2  Please obtain updated weights for accuracy and trending    Estimated energy needs:   ABW (63.7kg) used for calculations as pt between % of IBW.   Nutrient needs based on [facility] standards of care for maintenance in adults.   Needs adjusted for age and post-op healing  1490-1788kcal/day (25-30kcal/kg)  72-83g pro/day (1.2-1.4g pro/kg)  1490-1788ml fluid/day (30-35ml/kg)    Subjective:   59yo M with PMH of HLD and unclear liver disease had acute onset of severe headache, dizziness and vomiting x1 today, was brought into [facility] with continued symptoms, as well as hypertension and multiple episodes of vomiting. Pt was intubated for airway protection with CT Head performed demonstrating a large left cerebellar hemorrhage. CTA Head/Neck performed is suggestive of underlying vascular pathology such as AVM. RIght frontal ventriculostomy placed at [facility] [place] and patient transferred to [facility] for further work-up. S/P cerebral angio, negative for AVM and s/p SOC craniectomy evacuation of cerebellar hematoma on 8/4, s/p EVD placement. Extubated 8/12. S/p PEG 8/20. SLP to see patient today.     Pt seen in room, resting in bed. Unable to participate in interview questions 2/2 non-verbal. Receiving Vital 1.5 @ 48ml/hr (ordered goal); adequately meeting needs. Was switched over to from Jevity to Vital 8/27- unclear why but was also ordered for metamucil so possibly had diarrhea. Continues to have loose stool. Continue w/ current order. SLP continues to recommend NPO w/ PEG feeds poor pharyngeal swallow. RD to follow.     Previous Nutrition Diagnosis:  Increased nutrient need RT increased protein demand AEB post-op  Active [  x ]  Resolved [   ]    If resolved, new PES:   inadequate oral intake RT unable to take PO 2/2 poor swallow function AEB EN dependant for meeting 100% EER    Goal:  Pt to consistently meet % of estimated needs via most appropriate route    Recommendations:  1. Continue on current regimen  2. Monitor for s/s intolerance. Maintain aspiration precautions at all times  3. Monitor for s/s GI distress and need to be on specialized peptide based formula  4. Pain and bowel regimen per team  5. BMP, BG Q6hrs, CBC per MD discretion     Education:   N/A AMS    Risk Level: High [ x  ] Moderate [   ] Low [   ].

## 2020-09-02 NOTE — PROGRESS NOTE ADULT - SUBJECTIVE AND OBJECTIVE BOX
HPI:  59 year old male with PMH of HLD and unclear liver disease had acute onset of severe headache, dizziness and vomiting x1 today, was brought into [facility] with continued symptoms, as well as hypertension and multiple episodes of vomiting. Patient was intubated for airway protection with CT Head performed demonstrating a large left cerebellar hemorrhage. CTA Head/Neck performed is suggestive of underlying vascular pathology such as AVM. RIght frontal ventriculostomy placed at [facility] and patient transferred to [facility] for further work-up. Patient arrives to [facility] intubated, sedated and on Nicardipine drip. Patient's son provided history and denies any Aspirin or other anticoagulant use. Denies any prior history of hypertension, smoking, or ETOH abuse. (04 Aug 2020 05:01)  Hospital Course:   POD# 0 S/P cerebral angio, negative for AVM. s/p SOC craniectomy evacuation of cerebellar hematoma.   POD # 1:   overnight tachycardic, ekg without significant changes, lactate wnl, low grade fever, s/p tyl. bolus x 1L , NS increased to 100cc/hr; neuro improving.  EVD at 5 cmH2O   8/6 POD#3, BD#4, Tmax 100.6, Pan Cx 7/5 NGTD, Vanc/Zosyn until 8/10, Vanc trough due today @ 6pm, EEG = no seizure, Lasix overnight for Pulm congest and tachycard, ruff placed for strict I&Os, pending anemia w/u, EVD @ 5, CPAP trials, Card GTT for SBP<140, Started Norvasc for BP optimization  8/7:  8/8:  S/P cerebral angio, negative for AVM. s/p SOC craniectomy evacuation of cerebellar hematoma. No significant events overnight. On CPAP this morning   8/10: fevers overnight, resolved this morning, Pancultured, (+) Enterbacter, on CPAP this AM  8/11 POD#7 SOC, BD # 8, EVD replaced (hard pass), Neuro exam stable, EVD @ 5cm H2O, Zosyn for sputum + enterobact, Plan to extubated today, HCT in AM  8/12 POD#8 s/p SOC, BD#9, s/p EVD replacement (hard pass on 8/10/20), neuro exam stable, EVD @5cm H2O. Zosyn for enterobacter in sputum. O/n EVD stopped draining, flushed distally and proximally, waveform poor, neuro exam remained stable throughout, ICPs <8 prior.    8/13 EVD soft pass yesterday, spiked temp today and re cultured o/w neuro exam stable  8/14: EMIL overnight, neuro stable. Mucomyst for secretions overnight   8/15 EVD dc/d, temp spike to 104, pan culture, ID consulted, vanco and cefepime started   8/15 levo started, presumed sepsis from aspiration, continues to have diarrhea  8/16: EMIL  8/17: EMIL. Neuro exam stable  8/18: Increased secretions overnight. Chest xray obtained. NGT replaced. Neuro exam stable. Patient transferred to ICU for monitoring due to congestion and possible L effusion on CXR along with desaturation and frequent suctioning.   8/19: POD16, on EEG, EMIL o/n, transferred to 5E, plan for PEG tomorrow  8/20 POD#17 overnight given Keppra 1g IV x 1 dose for blank stare and EEG findings: pending final read on EEG, Cefep for PNA, PEG today, NPO, Amonia Serum level today w/u for enceph, vEEG,   8/21 POD#18, EMIL overnight, PEG placed, start TF 9 AM today, Neuro exam stable  8/22 POD #19 EMIL overnight, repeat CT head stable.  8/23: POD#20 EMIL overnight   8/24: POD21. EMIL o/n, neuro stable. on vEEG. vanc/cefepime for possible PNA. requiring frequent suctioning   8/25: POD22. EMIL o/n, neuro stable. off vEEG (was negative). on vanc/cefepime PNA coverage. pre op for vocal cord injection w/ ENT today  8/26: POD#23. OR yesterday with ENT for left vocal cord injection. Cranial sutures removed yesterday. Remains on vanco (dose increased to 1500 bid) and cefepime for empiric PNA coverage.  8/27: POD#24 EMIL overnight. Oral secretions improving. Remains on PNA antibiotic coverage  8/28: POD#25 transferred to SDU. No acute events pending rehab  8/29: POD#26 Off of voice rest today. Tolerates PEG feeds. NO fevers overnight  8/30: POD#27 EMIL overnight. Neuro exam stable. Afebrile o/n  8/31 POD#28 EMIL overnight, Ancef BLAINE Sputum until 9/1 as per ID, AR placement pending  OVERNIGHT EVENTS: EMIL o/n, remained on one to one observation  9/1: EMIL o/n.   9/2: POD# 30. EMIL. Restless overnight. We stopped Amantadine. COVID test negative    OVERNIGHT EVENTS: Restless, not combative likely secondary to Amantadine. It was discontinued. Clinically cleared for discharge to acute  rehab  Vital Signs Last 24 Hrs  T(C): 36.7 (02 Sep 2020 04:46), Max: 37.3 (01 Sep 2020 13:00)  T(F): 98 (02 Sep 2020 04:46), Max: 99.2 (01 Sep 2020 13:00)  HR: 80 (02 Sep 2020 05:26) (80 - 94)  BP: 117/70 (02 Sep 2020 05:26) (90/56 - 127/73)  BP(mean): 88 (02 Sep 2020 05:26) (66 - 95)  RR: 16 (02 Sep 2020 05:26) (15 - 16)  SpO2: 95% (02 Sep 2020 05:26) (92% - 95%)    I&O's Summary    31 Aug 2020 07:01  -  01 Sep 2020 07:00  --------------------------------------------------------  IN: 1156 mL / OUT: 1275 mL / NET: -119 mL    01 Sep 2020 07:01  -  02 Sep 2020 05:54  --------------------------------------------------------  IN: 736 mL / OUT: 1135 mL / NET: -399 mL        PHYSICAL EXAM:  GEN: laying in bed, appears well, NAD  NEURO: alert, no tracking.  Follows simple commands intermittently , OE spont, verbal (hypophonic voice,) face symmetric. CNII-XII intact. PERRL, EOMI. MAEx4 with good strength  CARD: RRR +S1/S2  PULM: +rhonchi  GI: Abd soft, NT/ND  EXT: ext warm, dry, nontender  WOUND: SOC site c/d/i    TUBES/LINES: PEG    DIET: PEG feeds    LABS:                        9.0    10.32 )-----------( 437      ( 01 Sep 2020 05:58 )             29.3     09-01    139  |  100  |  15  ----------------------------<  127<H>  4.2   |  28  |  0.62    Ca    9.0      01 Sep 2020 05:57  Phos  4.1     09-01  Mg     2.5     09-01              CAPILLARY BLOOD GLUCOSE      POCT Blood Glucose.: 146 mg/dL (02 Sep 2020 00:42)  POCT Blood Glucose.: 120 mg/dL (01 Sep 2020 16:30)  POCT Blood Glucose.: 147 mg/dL (01 Sep 2020 11:36)  POCT Blood Glucose.: 141 mg/dL (01 Sep 2020 06:02)      Drug Levels: [] N/A  Vancomycin Level, Trough: 13.0 ug/mL (08-29 @ 13:59)  Vancomycin Level, Trough: 4.2 ug/mL (08-28 @ 00:14)    CSF Analysis: [] N/A      Allergies    No Known Allergies    Intolerances      MEDICATIONS:  Antibiotics:  tenofovir disoproxil fumarate (VIREAD) 300 milliGRAM(s) Oral every 24 hours    Neuro:  acetaminophen    Suspension .. 650 milliGRAM(s) Oral every 6 hours PRN  scopolamine 1 mG/72 Hr(s) Patch 1 Patch Transdermal every 72 hours    Anticoagulation:  enoxaparin Injectable 40 milliGRAM(s) SubCutaneous at bedtime    OTHER:  acetylcysteine 20%  Inhalation 4 milliLiter(s) Inhalation every 6 hours  albuterol/ipratropium for Nebulization. 3 milliLiter(s) Nebulizer every 6 hours  artificial tears (preservative free) Ophthalmic Solution 1 Drop(s) Both EYES three times a day  chlorhexidine 2% Cloths 1 Application(s) Topical <User Schedule>  dextrose 40% Gel 15 Gram(s) Oral once PRN  dextrose 50% Injectable 12.5 Gram(s) IV Push once  dextrose 50% Injectable 25 Gram(s) IV Push once  doxazosin 4 milliGRAM(s) Oral at bedtime  finasteride 5 milliGRAM(s) Oral daily  glucagon  Injectable 1 milliGRAM(s) IntraMuscular once PRN  glucagon  Injectable 1 milliGRAM(s) IntraMuscular once PRN  hydrALAZINE Injectable 10 milliGRAM(s) IV Push every 2 hours PRN  insulin regular  human corrective regimen sliding scale.   SubCutaneous every 6 hours  insulin regular  human recombinant 4 Unit(s) SubCutaneous every 6 hours  lidocaine 4% Laryngotracheal Topical Anesthesia Kit 4 milliLiter(s) Oral Mucosa once  metoprolol tartrate 12.5 milliGRAM(s) Oral every 12 hours  Mometasone Furoate 0.1% Ointment 1 Application(s) 1 Application(s) Topical two times a day PRN  psyllium Powder 1 Packet(s) Oral daily  sodium chloride 0.9% for Nebulization 3 milliLiter(s) Nebulizer three times a day PRN    IVF:  dextrose 5%. 1000 milliLiter(s) IV Continuous <Continuous>    CULTURES:  Culture Results:   Moderate Staphylococcus aureus Susceptibility to follow.  Accompanied by normal respiratory aliza (08-29 @ 17:52)  Culture Results:   No growth at 4 days. (08-28 @ 18:03)    RADIOLOGY & ADDITIONAL TESTS:      ASSESSMENT:  60y Male  with ICH/IVH s/p SOC crani POD #30 (8/4/20), s/p EVD placement, removed (8/14/20)      INTRACRANIAL BLEED  No pertinent family history in first degree relatives  Handoff  MEWS Score  Hepatitis B  HLD (hyperlipidemia)  ICH (intracerebral hemorrhage)  ICH (intracerebral hemorrhage)  Craniectomy, suboccipital, with cervical laminectomy  Cerebellar hemorrhage  Dysphagia  Pneumonia  Hepatitis B  HLD (hyperlipidemia)  HIV (human immunodeficiency virus infection)  Cerebellar hemorrhage  Laryngoscopy, direct, adult  Ultrasound guided venous access  Insertion, catheter, intravenous  EGD, with PEG  Craniectomy, subocciptal, with cervical laminectomy for medulla and spinal cord decompression  Percutaneous insertion of arterial line  Angiogram, carotid and cerebral vessels, bilateral  Foot fracture, left      PLAN:  - neuro/vitals checks  - pain control  - cranial sutures removed   - ENT following s/p left vocal cord injection   - cont suctioning prn  - cont ancef until 9/1 as per ID  - lovenox ppx for MSSA PNA, f/u pancx from 8/28, f/u sputum cx  - d/w Dr. Blank      DVT PROPHYLAXIS:  [x] Venodynes                                [x] Heparin/Lovenox    DISPOSITION: [facility] today pending bed availability.     Assessment:  Present when checked    []  GCS  E   V  M     Heart Failure: []Acute, [] acute on chronic , []chronic  Heart Failure:  [] Diastolic (HFpEF), [] Systolic (HFrEF), []Combined (HFpEF and HFrEF), [] RHF, [] Pulm HTN, [] Other    [] SARAH, [] ATN, [] AIN, [] other  [] CKD1, [] CKD2, [] CKD 3, [] CKD 4, [] CKD 5, []ESRD    Encephalopathy: [] Metabolic, [] Hepatic, [] toxic, [] Neurological, [] Other    Abnormal Nurtitional Status: [] malnurtition (see nutrition note), [ ]underweight: BMI < 19, [] morbid obesity: BMI >40, [] Cachexia    [] Sepsis  [] hypovolemic shock,[] cardiogenic shock, [] hemorrhagic shock, [] neuogenic shock  [] Acute Respiratory Failure  []Cerebral edema, [] Brain compression/ herniation,   [] Functional quadriplegia  [] Acute blood loss anemia

## 2020-09-02 NOTE — PROGRESS NOTE ADULT - PROVIDER SPECIALTY LIST ADULT
ENT
Infectious Disease
NSICU
Neurosurgery
Surgery
Neurosurgery
Neurology
Neurosurgery
Neurosurgery
NSICU

## 2020-09-02 NOTE — PROGRESS NOTE ADULT - REASON FOR ADMISSION
Left cerebellar intraparenchymal hemorrhage

## 2020-09-03 LAB
CULTURE RESULTS: SIGNIFICANT CHANGE UP
ORGANISM # SPEC MICROSCOPIC CNT: SIGNIFICANT CHANGE UP
ORGANISM # SPEC MICROSCOPIC CNT: SIGNIFICANT CHANGE UP
SPECIMEN SOURCE: SIGNIFICANT CHANGE UP

## 2020-09-04 DIAGNOSIS — I61.4 NONTRAUMATIC INTRACEREBRAL HEMORRHAGE IN CEREBELLUM: ICD-10-CM

## 2020-09-04 DIAGNOSIS — A41.9 SEPSIS, UNSPECIFIED ORGANISM: ICD-10-CM

## 2020-09-04 DIAGNOSIS — R19.7 DIARRHEA, UNSPECIFIED: ICD-10-CM

## 2020-09-04 DIAGNOSIS — B96.1 KLEBSIELLA PNEUMONIAE [K. PNEUMONIAE] AS THE CAUSE OF DISEASES CLASSIFIED ELSEWHERE: ICD-10-CM

## 2020-09-04 DIAGNOSIS — J15.211 PNEUMONIA DUE TO METHICILLIN SUSCEPTIBLE STAPHYLOCOCCUS AUREUS: ICD-10-CM

## 2020-09-04 DIAGNOSIS — K29.70 GASTRITIS, UNSPECIFIED, WITHOUT BLEEDING: ICD-10-CM

## 2020-09-04 DIAGNOSIS — R00.0 TACHYCARDIA, UNSPECIFIED: ICD-10-CM

## 2020-09-04 DIAGNOSIS — G93.6 CEREBRAL EDEMA: ICD-10-CM

## 2020-09-04 DIAGNOSIS — G93.5 COMPRESSION OF BRAIN: ICD-10-CM

## 2020-09-04 DIAGNOSIS — T85.01XA BREAKDOWN (MECHANICAL) OF VENTRICULAR INTRACRANIAL (COMMUNICATING) SHUNT, INITIAL ENCOUNTER: ICD-10-CM

## 2020-09-04 DIAGNOSIS — R49.0 DYSPHONIA: ICD-10-CM
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
## 2020-09-04 DIAGNOSIS — Z98.2 PRESENCE OF CEREBROSPINAL FLUID DRAINAGE DEVICE: ICD-10-CM

## 2020-09-04 DIAGNOSIS — J38.01 PARALYSIS OF VOCAL CORDS AND LARYNX, UNILATERAL: ICD-10-CM

## 2020-09-04 DIAGNOSIS — R40.2313 COMA SCALE, BEST MOTOR RESPONSE, NONE, AT HOSPITAL ADMISSION: ICD-10-CM
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
## 2020-09-04 DIAGNOSIS — I61.5 NONTRAUMATIC INTRACEREBRAL HEMORRHAGE, INTRAVENTRICULAR: ICD-10-CM

## 2020-09-04 DIAGNOSIS — R40.2113 COMA SCALE, EYES OPEN, NEVER, AT HOSPITAL ADMISSION: ICD-10-CM

## 2020-09-04 DIAGNOSIS — R13.10 DYSPHAGIA, UNSPECIFIED: ICD-10-CM

## 2020-09-04 DIAGNOSIS — B96.89 OTHER SPECIFIED BACTERIAL AGENTS AS THE CAUSE OF DISEASES CLASSIFIED ELSEWHERE: ICD-10-CM

## 2020-09-04 DIAGNOSIS — G91.1 OBSTRUCTIVE HYDROCEPHALUS: ICD-10-CM

## 2020-09-04 DIAGNOSIS — E78.5 HYPERLIPIDEMIA, UNSPECIFIED: ICD-10-CM

## 2020-09-04 DIAGNOSIS — E46 UNSPECIFIED PROTEIN-CALORIE MALNUTRITION: ICD-10-CM

## 2020-09-04 DIAGNOSIS — R65.21 SEVERE SEPSIS WITH SEPTIC SHOCK: ICD-10-CM

## 2020-09-04 DIAGNOSIS — B19.10 UNSPECIFIED VIRAL HEPATITIS B WITHOUT HEPATIC COMA: ICD-10-CM

## 2020-09-04 DIAGNOSIS — I10 ESSENTIAL (PRIMARY) HYPERTENSION: ICD-10-CM

## 2020-09-04 DIAGNOSIS — I95.9 HYPOTENSION, UNSPECIFIED: ICD-10-CM

## 2020-09-04 DIAGNOSIS — R40.2213 COMA SCALE, BEST VERBAL RESPONSE, NONE, AT HOSPITAL ADMISSION: ICD-10-CM

## 2020-09-14 ENCOUNTER — APPOINTMENT (OUTPATIENT)
Dept: NEUROSURGERY | Facility: CLINIC | Age: 60
End: 2020-09-14

## 2020-09-26 ENCOUNTER — INPATIENT (INPATIENT)
Facility: HOSPITAL | Age: 60
LOS: 4 days | Discharge: NURSING, REHAB OR LONG-TERM CARE | DRG: 856 | End: 2020-10-01
Attending: NEUROLOGICAL SURGERY | Admitting: NEUROLOGICAL SURGERY
Payer: COMMERCIAL

## 2020-09-26 ENCOUNTER — TRANSCRIPTION ENCOUNTER (OUTPATIENT)
Age: 60
End: 2020-09-26

## 2020-09-26 VITALS
RESPIRATION RATE: 20 BRPM | HEART RATE: 71 BPM | SYSTOLIC BLOOD PRESSURE: 136 MMHG | OXYGEN SATURATION: 97 % | DIASTOLIC BLOOD PRESSURE: 79 MMHG

## 2020-09-26 DIAGNOSIS — L02.811 CUTANEOUS ABSCESS OF HEAD [ANY PART, EXCEPT FACE]: ICD-10-CM

## 2020-09-26 DIAGNOSIS — G04.90 ENCEPHALITIS AND ENCEPHALOMYELITIS, UNSPECIFIED: ICD-10-CM

## 2020-09-26 DIAGNOSIS — H55.00 UNSPECIFIED NYSTAGMUS: ICD-10-CM

## 2020-09-26 DIAGNOSIS — K76.9 LIVER DISEASE, UNSPECIFIED: ICD-10-CM

## 2020-09-26 DIAGNOSIS — T81.43XA INFECTION FOLLOWING A PROCEDURE, ORGAN AND SPACE SURGICAL SITE, INITIAL ENCOUNTER: ICD-10-CM

## 2020-09-26 DIAGNOSIS — S92.902A UNSPECIFIED FRACTURE OF LEFT FOOT, INITIAL ENCOUNTER FOR CLOSED FRACTURE: Chronic | ICD-10-CM

## 2020-09-26 DIAGNOSIS — Z93.1 GASTROSTOMY STATUS: ICD-10-CM
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
## 2020-09-26 DIAGNOSIS — E44.0 MODERATE PROTEIN-CALORIE MALNUTRITION: ICD-10-CM

## 2020-09-26 DIAGNOSIS — G06.0 INTRACRANIAL ABSCESS AND GRANULOMA: ICD-10-CM

## 2020-09-26 DIAGNOSIS — Z98.2 PRESENCE OF CEREBROSPINAL FLUID DRAINAGE DEVICE: ICD-10-CM

## 2020-09-26 DIAGNOSIS — I10 ESSENTIAL (PRIMARY) HYPERTENSION: ICD-10-CM

## 2020-09-26 DIAGNOSIS — B95.61 METHICILLIN SUSCEPTIBLE STAPHYLOCOCCUS AUREUS INFECTION AS THE CAUSE OF DISEASES CLASSIFIED ELSEWHERE: ICD-10-CM

## 2020-09-26 DIAGNOSIS — B95.4 OTHER STREPTOCOCCUS AS THE CAUSE OF DISEASES CLASSIFIED ELSEWHERE: ICD-10-CM

## 2020-09-26 DIAGNOSIS — E78.5 HYPERLIPIDEMIA, UNSPECIFIED: ICD-10-CM

## 2020-09-26 DIAGNOSIS — B19.10 UNSPECIFIED VIRAL HEPATITIS B WITHOUT HEPATIC COMA: ICD-10-CM
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
## 2020-09-26 LAB
ANION GAP SERPL CALC-SCNC: 10 MMOL/L — SIGNIFICANT CHANGE UP (ref 5–17)
APTT BLD: 31.2 SEC — SIGNIFICANT CHANGE UP (ref 27.5–35.5)
BLD GP AB SCN SERPL QL: NEGATIVE — SIGNIFICANT CHANGE UP
BUN SERPL-MCNC: 14 MG/DL — SIGNIFICANT CHANGE UP (ref 7–23)
CALCIUM SERPL-MCNC: 9.7 MG/DL — SIGNIFICANT CHANGE UP (ref 8.4–10.5)
CHLORIDE SERPL-SCNC: 94 MMOL/L — LOW (ref 96–108)
CO2 SERPL-SCNC: 32 MMOL/L — HIGH (ref 22–31)
CREAT SERPL-MCNC: 0.68 MG/DL — SIGNIFICANT CHANGE UP (ref 0.5–1.3)
GLUCOSE BLDC GLUCOMTR-MCNC: 120 MG/DL — HIGH (ref 70–99)
GLUCOSE SERPL-MCNC: 89 MG/DL — SIGNIFICANT CHANGE UP (ref 70–99)
HCT VFR BLD CALC: 40.1 % — SIGNIFICANT CHANGE UP (ref 39–50)
HGB BLD-MCNC: 12 G/DL — LOW (ref 13–17)
INR BLD: 1.07 — SIGNIFICANT CHANGE UP (ref 0.88–1.16)
MAGNESIUM SERPL-MCNC: 2.6 MG/DL — SIGNIFICANT CHANGE UP (ref 1.6–2.6)
MCHC RBC-ENTMCNC: 26.3 PG — LOW (ref 27–34)
MCHC RBC-ENTMCNC: 29.9 GM/DL — LOW (ref 32–36)
MCV RBC AUTO: 87.9 FL — SIGNIFICANT CHANGE UP (ref 80–100)
NRBC # BLD: 0 /100 WBCS — SIGNIFICANT CHANGE UP (ref 0–0)
PHOSPHATE SERPL-MCNC: 3.8 MG/DL — SIGNIFICANT CHANGE UP (ref 2.5–4.5)
PLATELET # BLD AUTO: 502 K/UL — HIGH (ref 150–400)
POTASSIUM SERPL-MCNC: 3.8 MMOL/L — SIGNIFICANT CHANGE UP (ref 3.5–5.3)
POTASSIUM SERPL-SCNC: 3.8 MMOL/L — SIGNIFICANT CHANGE UP (ref 3.5–5.3)
PROTHROM AB SERPL-ACNC: 12.8 SEC — SIGNIFICANT CHANGE UP (ref 10.6–13.6)
RBC # BLD: 4.56 M/UL — SIGNIFICANT CHANGE UP (ref 4.2–5.8)
RBC # FLD: 16.4 % — HIGH (ref 10.3–14.5)
RH IG SCN BLD-IMP: POSITIVE — SIGNIFICANT CHANGE UP
SODIUM SERPL-SCNC: 136 MMOL/L — SIGNIFICANT CHANGE UP (ref 135–145)
WBC # BLD: 10.69 K/UL — HIGH (ref 3.8–10.5)
WBC # FLD AUTO: 10.69 K/UL — HIGH (ref 3.8–10.5)

## 2020-09-26 PROCEDURE — 71045 X-RAY EXAM CHEST 1 VIEW: CPT | Mod: 26

## 2020-09-26 RX ORDER — DEXTROSE 50 % IN WATER 50 %
12.5 SYRINGE (ML) INTRAVENOUS ONCE
Refills: 0 | Status: DISCONTINUED | OUTPATIENT
Start: 2020-09-26 | End: 2020-09-27

## 2020-09-26 RX ORDER — SODIUM CHLORIDE 9 MG/ML
1000 INJECTION, SOLUTION INTRAVENOUS
Refills: 0 | Status: DISCONTINUED | OUTPATIENT
Start: 2020-09-26 | End: 2020-09-27

## 2020-09-26 RX ORDER — SODIUM CHLORIDE 9 MG/ML
1000 INJECTION INTRAMUSCULAR; INTRAVENOUS; SUBCUTANEOUS
Refills: 0 | Status: DISCONTINUED | OUTPATIENT
Start: 2020-09-26 | End: 2020-09-27

## 2020-09-26 RX ORDER — DEXTROSE 50 % IN WATER 50 %
15 SYRINGE (ML) INTRAVENOUS ONCE
Refills: 0 | Status: DISCONTINUED | OUTPATIENT
Start: 2020-09-26 | End: 2020-09-27

## 2020-09-26 RX ORDER — FENTANYL CITRATE 50 UG/ML
25 INJECTION INTRAVENOUS ONCE
Refills: 0 | Status: DISCONTINUED | OUTPATIENT
Start: 2020-09-26 | End: 2020-09-26

## 2020-09-26 RX ORDER — INSULIN LISPRO 100/ML
VIAL (ML) SUBCUTANEOUS
Refills: 0 | Status: DISCONTINUED | OUTPATIENT
Start: 2020-09-26 | End: 2020-09-27

## 2020-09-26 RX ORDER — POVIDONE-IODINE 5 %
1 AEROSOL (ML) TOPICAL ONCE
Refills: 0 | Status: COMPLETED | OUTPATIENT
Start: 2020-09-27 | End: 2020-09-27

## 2020-09-26 RX ORDER — CHLORHEXIDINE GLUCONATE 213 G/1000ML
1 SOLUTION TOPICAL EVERY 12 HOURS
Refills: 0 | Status: DISCONTINUED | OUTPATIENT
Start: 2020-09-26 | End: 2020-09-27

## 2020-09-26 RX ORDER — SENNA PLUS 8.6 MG/1
2 TABLET ORAL AT BEDTIME
Refills: 0 | Status: DISCONTINUED | OUTPATIENT
Start: 2020-09-26 | End: 2020-09-27

## 2020-09-26 RX ORDER — GLUCAGON INJECTION, SOLUTION 0.5 MG/.1ML
1 INJECTION, SOLUTION SUBCUTANEOUS ONCE
Refills: 0 | Status: DISCONTINUED | OUTPATIENT
Start: 2020-09-26 | End: 2020-09-27

## 2020-09-26 RX ADMIN — FENTANYL CITRATE 25 MICROGRAM(S): 50 INJECTION INTRAVENOUS at 23:45

## 2020-09-26 RX ADMIN — SODIUM CHLORIDE 50 MILLILITER(S): 9 INJECTION INTRAMUSCULAR; INTRAVENOUS; SUBCUTANEOUS at 22:00

## 2020-09-26 NOTE — H&P ADULT - HISTORY OF PRESENT ILLNESS
59 year old male with PMH of HLD and unclear liver disease had acute onset of severe headache, dizziness and vomiting x1 today, was brought into [facility] with continued symptoms, as well as hypertension and multiple episodes of vomiting. Patient was intubated for airway protection with CT Head performed demonstrating a large left cerebellar hemorrhage. CTA Head/Neck performed is suggestive of underlying vascular pathology such as AVM. RIght frontal ventriculostomy placed at [facility] and patient transferred to [facility] for further work-up. Patient arrives to [facility] intubated, sedated and on Nicardipine drip. Patient's son provided history and denies any Aspirin or other anticoagulant use. Denies any prior history of hypertension, smoking, or ETOH abuse. 59 year old male with PMH of HLD and unclear liver disease s/p SOC for evacuation of cerebellar hematoma (8/4/2020) w/ Dr. Blank, subsequently discharged to [facility] now presents to [facility] for admission d/t purulent drainage from SOC site.  At rehab, pt reported to be febrile on 9/14, and was treated w/ 7 day course of Zosyn (9/14-9/21) for BLAINE PNA. At rehab pt had a ruff in-place which was removed for TOV. Pt had PEG placed on previous [facility] admission    Pt complaining of a mild headache at this time 3/10.   Denies vision changes, photophobia, nuchal rigidity, LOC, dizziness, recent travel or known sick contact.     HPI from previous admission (8/4/2020): Pt  had acute onset of severe headache, dizziness and vomiting x1 today, was brought into [facility] with continued symptoms, as well as hypertension and multiple episodes of vomiting. Patient was intubated for airway protection with CT Head performed demonstrating a large left cerebellar hemorrhage. CTA Head/Neck performed is suggestive of underlying vascular pathology such as AVM. RIght frontal ventriculostomy placed at [facility] and patient transferred to [facility] for further work-up. Patient arrives to [facility] intubated, sedated and on Nicardipine drip. Patient's son provided history and denies any Aspirin or other anticoagulant use. Denies any prior history of hypertension, smoking, or ETOH abuse. 59 year old male with PMH of HLD and unclear liver disease s/p SOC for evacuation of cerebellar hematoma (8/4/2020) w/ Dr. Blank, subsequently discharged to [facility] now presents to [facility] for admission d/t purulent drainage from SOC site.  Pt had PEG placed on previous [facility] admission. At rehab, pt reported to be febrile on 9/14, and was treated w/ 7 day course of Zosyn (9/14-9/21) for BLAINE PNA. At rehab pt had a ruff in-place which was removed for TOV. Prior to transfer from rehab to [facility], pt was afebrile w/ WBC 13. At this time pt complaining of a mild headache 3/10. Denies vision changes, photophobia, nuchal rigidity, LOC, dizziness, recent travel or known sick contact.     HPI from previous admission (8/4/2020): Pt  had acute onset of severe headache, dizziness and vomiting x1 today, was brought into [facility] with continued symptoms, as well as hypertension and multiple episodes of vomiting. Patient was intubated for airway protection with CT Head performed demonstrating a large left cerebellar hemorrhage. CTA Head/Neck performed is suggestive of underlying vascular pathology such as AVM. RIght frontal ventriculostomy placed at [facility] and patient transferred to [facility] for further work-up. Patient arrives to [facility] intubated, sedated and on Nicardipine drip. Patient's son provided history and denies any Aspirin or other anticoagulant use. Denies any prior history of hypertension, smoking, or ETOH abuse.

## 2020-09-26 NOTE — H&P ADULT - NSHPREVIEWOFSYSTEMS_GEN_ALL_CORE
REVIEW OF SYSTEMS:    CONSTITUTIONAL: No weakness, + fevers   EYES/ENT: No visual changes;  No vertigo or throat pain   NECK: No pain or stiffness  RESPIRATORY: No cough, wheezing, hemoptysis; No shortness of breath  CARDIOVASCULAR: No chest pain or palpitations  GASTROINTESTINAL: No abdominal or epigastric pain. No nausea, vomiting, or hematemesis; No diarrhea or constipation. No melena or hematochezia.  GENITOURINARY: No dysuria, frequency or hematuria  NEUROLOGICAL: No numbness or weakness. + headache.   SKIN: No itching, burning, rashes, or lesions   All other review of systems is negative unless indicated above.

## 2020-09-26 NOTE — H&P ADULT - NSHPPHYSICALEXAM_GEN_ALL_CORE
General: NAD, pt is comfortably sitting up in bed, A&O x3, on RA  HEENT: PERRL b/l 3mm, EOMI b/l, +nystagmus b/l, face symmetric, tongue midline   Cardiovascular: RRR, normal S1 and S2   Respiratory: lungs CTAB, no wheezing, rhonchi, or crackles   GI: normoactive BS to auscultation, abd soft, NTND   Neuro: no aphasia, speech clear, no dysmetria, no pronator drift  MUNOZ x4 spontaneously, strength 5/5 throughout x4 extremities   SILT throughout

## 2020-09-26 NOTE — H&P ADULT - ATTENDING COMMENTS
Plan to proceed with suboccipital wound washout, wound exploration, possible EVD placement this morning.  Multiple calls by me to all family members including 2 daughters and son made without answer. Consent obtained by inpatient PA team last night.    Bryan Blank M.D.

## 2020-09-26 NOTE — H&P ADULT - ASSESSMENT
59 year old male with PMH of HLD and unclear liver disease s/p SOC for evacuation of cerebellar hematoma (8/4/2020) w/ Dr. Blank, subsequently discharged to [facility] now presents to [facility] for admission d/t purulent drainage from SOC site. Plan for wound washout and revision of SOC incision.     Plan:     - pre-op for OR @ 7:30am on 9/27  - neuro checks q4hrs   - vitals checks   - pend COVID swab results   - pre-op MRI brain w/ and w/o contrast   - CXR and EKG   - blood cultures, UA, urine culture, sputum culture pending results   - start vancomycin and cefepime for empiric CSF coverage  - consent signed and in-chart   - dopplers ordered   - SCDs b/l LE, hold DVT chemoprophylaxis at this time     Assessment and plan discussed w/ Dr. Blank

## 2020-09-27 LAB
ANION GAP SERPL CALC-SCNC: 10 MMOL/L — SIGNIFICANT CHANGE UP (ref 5–17)
APPEARANCE CSF: CLEAR — SIGNIFICANT CHANGE UP
APPEARANCE SPUN FLD: COLORLESS — SIGNIFICANT CHANGE UP
APPEARANCE UR: CLEAR — SIGNIFICANT CHANGE UP
APTT BLD: 31.3 SEC — SIGNIFICANT CHANGE UP (ref 27.5–35.5)
BILIRUB UR-MCNC: NEGATIVE — SIGNIFICANT CHANGE UP
BLD GP AB SCN SERPL QL: NEGATIVE — SIGNIFICANT CHANGE UP
BUN SERPL-MCNC: 13 MG/DL — SIGNIFICANT CHANGE UP (ref 7–23)
CALCIUM SERPL-MCNC: 8.8 MG/DL — SIGNIFICANT CHANGE UP (ref 8.4–10.5)
CHLORIDE SERPL-SCNC: 97 MMOL/L — SIGNIFICANT CHANGE UP (ref 96–108)
CO2 SERPL-SCNC: 28 MMOL/L — SIGNIFICANT CHANGE UP (ref 22–31)
COLOR CSF: SIGNIFICANT CHANGE UP
COLOR SPEC: YELLOW — SIGNIFICANT CHANGE UP
CREAT SERPL-MCNC: 0.73 MG/DL — SIGNIFICANT CHANGE UP (ref 0.5–1.3)
CSF COMMENTS: SIGNIFICANT CHANGE UP
CULTURE RESULTS: SIGNIFICANT CHANGE UP
DIFF PNL FLD: NEGATIVE — SIGNIFICANT CHANGE UP
GLUCOSE BLDC GLUCOMTR-MCNC: 107 MG/DL — HIGH (ref 70–99)
GLUCOSE BLDC GLUCOMTR-MCNC: 114 MG/DL — HIGH (ref 70–99)
GLUCOSE BLDC GLUCOMTR-MCNC: 116 MG/DL — HIGH (ref 70–99)
GLUCOSE BLDC GLUCOMTR-MCNC: 120 MG/DL — HIGH (ref 70–99)
GLUCOSE CSF-MCNC: 59 MG/DL — SIGNIFICANT CHANGE UP (ref 40–70)
GLUCOSE SERPL-MCNC: 103 MG/DL — HIGH (ref 70–99)
GLUCOSE UR QL: NEGATIVE — SIGNIFICANT CHANGE UP
GRAM STN FLD: SIGNIFICANT CHANGE UP
HCT VFR BLD CALC: 33.5 % — LOW (ref 39–50)
HGB BLD-MCNC: 10.5 G/DL — LOW (ref 13–17)
INR BLD: 1.12 — SIGNIFICANT CHANGE UP (ref 0.88–1.16)
KETONES UR-MCNC: NEGATIVE — SIGNIFICANT CHANGE UP
LEUKOCYTE ESTERASE UR-ACNC: NEGATIVE — SIGNIFICANT CHANGE UP
LYMPHOCYTES # CSF: 6 % — LOW (ref 40–80)
MAGNESIUM SERPL-MCNC: 2.5 MG/DL — SIGNIFICANT CHANGE UP (ref 1.6–2.6)
MCHC RBC-ENTMCNC: 27.1 PG — SIGNIFICANT CHANGE UP (ref 27–34)
MCHC RBC-ENTMCNC: 31.3 GM/DL — LOW (ref 32–36)
MCV RBC AUTO: 86.3 FL — SIGNIFICANT CHANGE UP (ref 80–100)
MONOS+MACROS NFR CSF: 10 % — LOW (ref 15–45)
NEUTROPHILS # CSF: 14 % — HIGH (ref 0–6)
NITRITE UR-MCNC: NEGATIVE — SIGNIFICANT CHANGE UP
NRBC # BLD: 0 /100 WBCS — SIGNIFICANT CHANGE UP (ref 0–0)
NRBC NFR CSF: 30 /UL — HIGH (ref 0–5)
PH UR: 6.5 — SIGNIFICANT CHANGE UP (ref 5–8)
PHOSPHATE SERPL-MCNC: 2.8 MG/DL — SIGNIFICANT CHANGE UP (ref 2.5–4.5)
PLATELET # BLD AUTO: 404 K/UL — HIGH (ref 150–400)
POTASSIUM SERPL-MCNC: 4.1 MMOL/L — SIGNIFICANT CHANGE UP (ref 3.5–5.3)
POTASSIUM SERPL-SCNC: 4.1 MMOL/L — SIGNIFICANT CHANGE UP (ref 3.5–5.3)
PROT CSF-MCNC: 40 MG/DL — SIGNIFICANT CHANGE UP (ref 15–45)
PROT UR-MCNC: NEGATIVE MG/DL — SIGNIFICANT CHANGE UP
PROTHROM AB SERPL-ACNC: 13.4 SEC — SIGNIFICANT CHANGE UP (ref 10.6–13.6)
RBC # BLD: 3.88 M/UL — LOW (ref 4.2–5.8)
RBC # CSF: 12 /UL — HIGH (ref 0–0)
RBC # FLD: 16.1 % — HIGH (ref 10.3–14.5)
RH IG SCN BLD-IMP: POSITIVE — SIGNIFICANT CHANGE UP
SARS-COV-2 IGG SERPL QL IA: NEGATIVE — SIGNIFICANT CHANGE UP
SARS-COV-2 IGM SERPL IA-ACNC: 0.09 INDEX — SIGNIFICANT CHANGE UP
SARS-COV-2 RNA SPEC QL NAA+PROBE: SIGNIFICANT CHANGE UP
SODIUM SERPL-SCNC: 135 MMOL/L — SIGNIFICANT CHANGE UP (ref 135–145)
SP GR SPEC: 1.01 — SIGNIFICANT CHANGE UP (ref 1–1.03)
SPECIMEN SOURCE: SIGNIFICANT CHANGE UP
TUBE TYPE: SIGNIFICANT CHANGE UP
UROBILINOGEN FLD QL: 0.2 E.U./DL — SIGNIFICANT CHANGE UP
WBC # BLD: 14.51 K/UL — HIGH (ref 3.8–10.5)
WBC # FLD AUTO: 14.51 K/UL — HIGH (ref 3.8–10.5)

## 2020-09-27 PROCEDURE — 70553 MRI BRAIN STEM W/O & W/DYE: CPT | Mod: 26

## 2020-09-27 PROCEDURE — 99233 SBSQ HOSP IP/OBS HIGH 50: CPT | Mod: 24

## 2020-09-27 PROCEDURE — 99223 1ST HOSP IP/OBS HIGH 75: CPT

## 2020-09-27 PROCEDURE — 99254 IP/OBS CNSLTJ NEW/EST MOD 60: CPT

## 2020-09-27 PROCEDURE — 61321 CRNEC/CRNOT DRG ICR ABS ITTL: CPT | Mod: 78

## 2020-09-27 RX ORDER — ACETYLCYSTEINE 200 MG/ML
4 VIAL (ML) MISCELLANEOUS EVERY 6 HOURS
Refills: 0 | Status: DISCONTINUED | OUTPATIENT
Start: 2020-09-27 | End: 2020-10-01

## 2020-09-27 RX ORDER — ACETAMINOPHEN 500 MG
640 TABLET ORAL EVERY 6 HOURS
Refills: 0 | Status: DISCONTINUED | OUTPATIENT
Start: 2020-09-27 | End: 2020-09-27

## 2020-09-27 RX ORDER — DEXTROSE 50 % IN WATER 50 %
15 SYRINGE (ML) INTRAVENOUS ONCE
Refills: 0 | Status: DISCONTINUED | OUTPATIENT
Start: 2020-09-27 | End: 2020-10-01

## 2020-09-27 RX ORDER — PSYLLIUM SEED (WITH DEXTROSE)
1 POWDER (GRAM) ORAL DAILY
Refills: 0 | Status: DISCONTINUED | OUTPATIENT
Start: 2020-09-27 | End: 2020-09-27

## 2020-09-27 RX ORDER — CEFEPIME 1 G/1
2000 INJECTION, POWDER, FOR SOLUTION INTRAMUSCULAR; INTRAVENOUS EVERY 8 HOURS
Refills: 0 | Status: DISCONTINUED | OUTPATIENT
Start: 2020-09-27 | End: 2020-09-27

## 2020-09-27 RX ORDER — ACETAMINOPHEN 500 MG
640 TABLET ORAL EVERY 6 HOURS
Refills: 0 | Status: DISCONTINUED | OUTPATIENT
Start: 2020-09-27 | End: 2020-09-28

## 2020-09-27 RX ORDER — CEFEPIME 1 G/1
2000 INJECTION, POWDER, FOR SOLUTION INTRAMUSCULAR; INTRAVENOUS ONCE
Refills: 0 | Status: COMPLETED | OUTPATIENT
Start: 2020-09-27 | End: 2020-09-27

## 2020-09-27 RX ORDER — FINASTERIDE 5 MG/1
5 TABLET, FILM COATED ORAL DAILY
Refills: 0 | Status: DISCONTINUED | OUTPATIENT
Start: 2020-09-27 | End: 2020-09-27

## 2020-09-27 RX ORDER — TENOFOVIR DISOPROXIL FUMARATE 300 MG/1
300 TABLET, FILM COATED ORAL EVERY 24 HOURS
Refills: 0 | Status: DISCONTINUED | OUTPATIENT
Start: 2020-09-27 | End: 2020-09-27

## 2020-09-27 RX ORDER — DEXTROSE 50 % IN WATER 50 %
25 SYRINGE (ML) INTRAVENOUS ONCE
Refills: 0 | Status: DISCONTINUED | OUTPATIENT
Start: 2020-09-27 | End: 2020-10-01

## 2020-09-27 RX ORDER — FENTANYL CITRATE 50 UG/ML
25 INJECTION INTRAVENOUS ONCE
Refills: 0 | Status: DISCONTINUED | OUTPATIENT
Start: 2020-09-27 | End: 2020-09-27

## 2020-09-27 RX ORDER — ALBUTEROL 90 UG/1
2 AEROSOL, METERED ORAL EVERY 6 HOURS
Refills: 0 | Status: DISCONTINUED | OUTPATIENT
Start: 2020-09-27 | End: 2020-09-27

## 2020-09-27 RX ORDER — GLUCAGON INJECTION, SOLUTION 0.5 MG/.1ML
1 INJECTION, SOLUTION SUBCUTANEOUS ONCE
Refills: 0 | Status: DISCONTINUED | OUTPATIENT
Start: 2020-09-27 | End: 2020-10-01

## 2020-09-27 RX ORDER — SODIUM CHLORIDE 9 MG/ML
1000 INJECTION INTRAMUSCULAR; INTRAVENOUS; SUBCUTANEOUS
Refills: 0 | Status: DISCONTINUED | OUTPATIENT
Start: 2020-09-27 | End: 2020-09-27

## 2020-09-27 RX ORDER — VANCOMYCIN HCL 1 G
1000 VIAL (EA) INTRAVENOUS EVERY 12 HOURS
Refills: 0 | Status: DISCONTINUED | OUTPATIENT
Start: 2020-09-27 | End: 2020-09-28

## 2020-09-27 RX ORDER — VANCOMYCIN HCL 1 G
1000 VIAL (EA) INTRAVENOUS ONCE
Refills: 0 | Status: COMPLETED | OUTPATIENT
Start: 2020-09-27 | End: 2020-09-27

## 2020-09-27 RX ORDER — DOXAZOSIN MESYLATE 4 MG
4 TABLET ORAL AT BEDTIME
Refills: 0 | Status: DISCONTINUED | OUTPATIENT
Start: 2020-09-27 | End: 2020-10-01

## 2020-09-27 RX ORDER — INSULIN LISPRO 100/ML
VIAL (ML) SUBCUTANEOUS
Refills: 0 | Status: DISCONTINUED | OUTPATIENT
Start: 2020-09-27 | End: 2020-10-01

## 2020-09-27 RX ORDER — PSYLLIUM SEED (WITH DEXTROSE)
1 POWDER (GRAM) ORAL DAILY
Refills: 0 | Status: DISCONTINUED | OUTPATIENT
Start: 2020-09-27 | End: 2020-09-28

## 2020-09-27 RX ORDER — PROPRANOLOL HCL 160 MG
0 CAPSULE, EXTENDED RELEASE 24HR ORAL
Qty: 0 | Refills: 0 | DISCHARGE

## 2020-09-27 RX ORDER — DEXTROSE 50 % IN WATER 50 %
12.5 SYRINGE (ML) INTRAVENOUS ONCE
Refills: 0 | Status: DISCONTINUED | OUTPATIENT
Start: 2020-09-27 | End: 2020-10-01

## 2020-09-27 RX ORDER — CEFEPIME 1 G/1
2000 INJECTION, POWDER, FOR SOLUTION INTRAMUSCULAR; INTRAVENOUS EVERY 8 HOURS
Refills: 0 | Status: DISCONTINUED | OUTPATIENT
Start: 2020-09-27 | End: 2020-09-29

## 2020-09-27 RX ORDER — ALBUTEROL 90 UG/1
2 AEROSOL, METERED ORAL EVERY 6 HOURS
Refills: 0 | Status: DISCONTINUED | OUTPATIENT
Start: 2020-09-27 | End: 2020-10-01

## 2020-09-27 RX ORDER — VANCOMYCIN HCL 1 G
1000 VIAL (EA) INTRAVENOUS EVERY 12 HOURS
Refills: 0 | Status: DISCONTINUED | OUTPATIENT
Start: 2020-09-27 | End: 2020-09-27

## 2020-09-27 RX ORDER — SODIUM CHLORIDE 9 MG/ML
1000 INJECTION, SOLUTION INTRAVENOUS
Refills: 0 | Status: DISCONTINUED | OUTPATIENT
Start: 2020-09-27 | End: 2020-10-01

## 2020-09-27 RX ORDER — VANCOMYCIN HCL 1 G
VIAL (EA) INTRAVENOUS
Refills: 0 | Status: DISCONTINUED | OUTPATIENT
Start: 2020-09-27 | End: 2020-09-27

## 2020-09-27 RX ORDER — DOXAZOSIN MESYLATE 4 MG
4 TABLET ORAL AT BEDTIME
Refills: 0 | Status: DISCONTINUED | OUTPATIENT
Start: 2020-09-27 | End: 2020-09-27

## 2020-09-27 RX ORDER — ACETYLCYSTEINE 200 MG/ML
4 VIAL (ML) MISCELLANEOUS EVERY 6 HOURS
Refills: 0 | Status: DISCONTINUED | OUTPATIENT
Start: 2020-09-27 | End: 2020-09-27

## 2020-09-27 RX ORDER — CHLORHEXIDINE GLUCONATE 213 G/1000ML
1 SOLUTION TOPICAL EVERY 12 HOURS
Refills: 0 | Status: COMPLETED | OUTPATIENT
Start: 2020-09-27 | End: 2020-09-28

## 2020-09-27 RX ORDER — SENNA PLUS 8.6 MG/1
2 TABLET ORAL AT BEDTIME
Refills: 0 | Status: DISCONTINUED | OUTPATIENT
Start: 2020-09-27 | End: 2020-09-28

## 2020-09-27 RX ORDER — FINASTERIDE 5 MG/1
5 TABLET, FILM COATED ORAL DAILY
Refills: 0 | Status: DISCONTINUED | OUTPATIENT
Start: 2020-09-27 | End: 2020-10-01

## 2020-09-27 RX ORDER — TENOFOVIR DISOPROXIL FUMARATE 300 MG/1
300 TABLET, FILM COATED ORAL EVERY 24 HOURS
Refills: 0 | Status: DISCONTINUED | OUTPATIENT
Start: 2020-09-27 | End: 2020-10-01

## 2020-09-27 RX ORDER — POVIDONE-IODINE 5 %
1 AEROSOL (ML) TOPICAL ONCE
Refills: 0 | Status: COMPLETED | OUTPATIENT
Start: 2020-09-27 | End: 2020-09-27

## 2020-09-27 RX ORDER — CEFEPIME 1 G/1
INJECTION, POWDER, FOR SOLUTION INTRAMUSCULAR; INTRAVENOUS
Refills: 0 | Status: DISCONTINUED | OUTPATIENT
Start: 2020-09-27 | End: 2020-09-27

## 2020-09-27 RX ADMIN — CEFEPIME 100 MILLIGRAM(S): 1 INJECTION, POWDER, FOR SOLUTION INTRAMUSCULAR; INTRAVENOUS at 22:35

## 2020-09-27 RX ADMIN — FENTANYL CITRATE 25 MICROGRAM(S): 50 INJECTION INTRAVENOUS at 11:53

## 2020-09-27 RX ADMIN — Medication 1 DROP(S): at 14:10

## 2020-09-27 RX ADMIN — Medication 4 MILLIGRAM(S): at 22:35

## 2020-09-27 RX ADMIN — Medication 1 DROP(S): at 22:35

## 2020-09-27 RX ADMIN — SODIUM CHLORIDE 50 MILLILITER(S): 9 INJECTION INTRAMUSCULAR; INTRAVENOUS; SUBCUTANEOUS at 11:52

## 2020-09-27 RX ADMIN — Medication 250 MILLIGRAM(S): at 05:23

## 2020-09-27 RX ADMIN — Medication 250 MILLIGRAM(S): at 17:18

## 2020-09-27 RX ADMIN — CEFEPIME 100 MILLIGRAM(S): 1 INJECTION, POWDER, FOR SOLUTION INTRAMUSCULAR; INTRAVENOUS at 04:55

## 2020-09-27 RX ADMIN — FINASTERIDE 5 MILLIGRAM(S): 5 TABLET, FILM COATED ORAL at 14:10

## 2020-09-27 RX ADMIN — FENTANYL CITRATE 25 MICROGRAM(S): 50 INJECTION INTRAVENOUS at 00:07

## 2020-09-27 RX ADMIN — FENTANYL CITRATE 25 MICROGRAM(S): 50 INJECTION INTRAVENOUS at 00:10

## 2020-09-27 RX ADMIN — Medication 1 APPLICATION(S): at 08:06

## 2020-09-27 RX ADMIN — CHLORHEXIDINE GLUCONATE 1 APPLICATION(S): 213 SOLUTION TOPICAL at 04:33

## 2020-09-27 RX ADMIN — FENTANYL CITRATE 25 MICROGRAM(S): 50 INJECTION INTRAVENOUS at 00:15

## 2020-09-27 RX ADMIN — TENOFOVIR DISOPROXIL FUMARATE 300 MILLIGRAM(S): 300 TABLET, FILM COATED ORAL at 14:10

## 2020-09-27 RX ADMIN — CEFEPIME 100 MILLIGRAM(S): 1 INJECTION, POWDER, FOR SOLUTION INTRAMUSCULAR; INTRAVENOUS at 14:10

## 2020-09-27 NOTE — CONSULT NOTE ADULT - ASSESSMENT
58 yo M with HBV with acute cerebellar hemorrhage 8/4 s/p suboccipital craniectomy transferred from rehab with SSI - per NS PA and MRI read, appears to be extradural.   Suggest:  - F/U OR cultures  - Continue vancomycin 1 g IV q12h.  Trough prior to 4th dose.  Goal - 14-17 for now.  - Continue cefepime 2 g IV q8h  - Continue tenofovir for HBV.  Will follow with you - ID team 2.

## 2020-09-27 NOTE — PROGRESS NOTE ADULT - SUBJECTIVE AND OBJECTIVE BOX
Surgery: Suboccipital wound revision and washout   Consent Signed by HCP in chart                    NAME/NUMBER of HCP: Tammie Vizcarra     No Known Allergies      OVERNIGHT EVENTS:    T(C): 36.9 (09-26-20 @ 22:20), Max: 36.9 (09-26-20 @ 22:20)  HR: 68 (09-27-20 @ 00:50) (68 - 71)  BP: 161/83 (09-27-20 @ 00:50) (136/79 - 161/83)  RR: 18 (09-27-20 @ 00:50) (18 - 20)  SpO2: 95% (09-27-20 @ 00:50) (95% - 97%)  Wt(kg): --    EXAM:  General: NAD, pt is comfortably sitting up in bed, A&O x3, on RA  HEENT: PERRL b/l 3mm, EOMI b/l, +nystagmus b/l, face symmetric, tongue midline   Cardiovascular: RRR, normal S1 and S2   Respiratory: lungs CTAB, no wheezing, rhonchi, or crackles   GI: normoactive BS to auscultation, abd soft, NTND   Neuro: no aphasia, speech clear, no dysmetria, no pronator drift  MUNOZ x4 spontaneously, strength 5/5 throughout x4 extremities   SILT throughout        09-26    136  |  94<L>  |  14  ----------------------------<  89  3.8   |  32<H>  |  0.68    Ca    9.7      26 Sep 2020 22:13  Phos  3.8     09-26  Mg     2.6     09-26      CBC Full  -  ( 26 Sep 2020 22:13 )  WBC Count : 10.69 K/uL  RBC Count : 4.56 M/uL  Hemoglobin : 12.0 g/dL  Hematocrit : 40.1 %  Platelet Count - Automated : 502 K/uL  Mean Cell Volume : 87.9 fl  Mean Cell Hemoglobin : 26.3 pg  Mean Cell Hemoglobin Concentration : 29.9 gm/dL  Auto Neutrophil # : x  Auto Lymphocyte # : x  Auto Monocyte # : x  Auto Eosinophil # : x  Auto Basophil # : x  Auto Neutrophil % : x  Auto Lymphocyte % : x  Auto Monocyte % : x  Auto Eosinophil % : x  Auto Basophil % : x    PT/INR - ( 26 Sep 2020 22:13 )   PT: 12.8 sec;   INR: 1.07          PTT - ( 26 Sep 2020 22:13 )  PTT:31.2 sec    Pregnancy test: N/A  Type & Screen (in past 72hrs):   Type + Screen (09.26.20 @ 21:49)   ABO Interpretation: B   Rh Interpretation: Positive   Antibody Screen: Negative     CXR: pend results    EKG: pend results   ECHO: pend   Medical Clearances: N/A  Other Clearances:     Last dose of antiplatelet/anticoagulation drug: SQL 40mg 9/25    Implanted Devices (pacemaker, drug pump...etc):  []YES   [X] NO                  If yes --> EPS consulted to interrogate/adjust device:    3M nasal swab ordered?  _X Y  _N  Cranial surgery: Order written for hair to be shampooed night before surgery  [X] yes   []no                 Assessment:  59 year old male with PMH of HLD and unclear liver disease s/p SOC for evacuation of cerebellar hematoma (8/4/2020) w/ Dr. Blank, subsequently discharged to [facility] now presents to [facility] for admission d/t purulent drainage from SOC site. Plan for wound washout and revision of SOC incision.    Plan:    - pre-op for SOC wound revision and washout with Dr. Blank   - consent signed and in chart   - type and screen completed   - CXR/EKG completed   - 2 units PRBC on hold for OR   - 3M ordered prior to OR   - shampoo hair prior to OR ordered   - blood culture, UA, urine cx, sputum cx pend   - SQL on hold at this time       Assessment:  Present when checked    []  GCS  E   V  M     Heart Failure: []Acute, [] acute on chronic , []chronic  Heart Failure:  [] Diastolic (HFpEF), [] Systolic (HFrEF), []Combined (HFpEF and HFrEF), [] RHF, [] Pulm HTN, [] Other    [] SARAH, [] ATN, [] AIN, [] other  [] CKD1, [] CKD2, [] CKD 3, [] CKD 4, [] CKD 5, []ESRD    Encephalopathy: [] Metabolic, [] Hepatic, [] toxic, [] Neurological, [] Other    Abnormal Nurtitional Status: [] malnurtition (see nutrition note), [ ]underweight: BMI < 19, [] morbid obesity: BMI >40, [] Cachexia    [] Sepsis  [] hypovolemic shock,[] cardiogenic shock, [] hemorrhagic shock, [] neuogenic shock  [] Acute Respiratory Failure  []Cerebral edema, [] Brain compression/ herniation,   [] Functional quadriplegia  [] Acute blood loss anemia

## 2020-09-27 NOTE — CHART NOTE - TREATMENT: THE FOLLOWING DIET HAS BEEN RECOMMENDED
1. Recommend Vital 1.5 @51ml/hr x24hrs via PEG. Provides 1224ml TV, 1836kcal, 82g pro, 931ml FW. Recommend additional 1L FW/day via PEG.   2. Monitor for s/s intolerance. Maintain aspiration precautions at all times  3. if tolerating consider moving back onto generic formula vs. specialized peptide based one.  4. Pain and bowel regimen per team  5. BMP, BG Q6hrs, CBC per MD discretion   Called team to discuss, no answer. Will f/u.    Findings:  Of note pt has had a 16lb wt loss since last admission. Weight was 131lbs (8/4). Current BW is 115lbs (9/26). This could be due to either insufficient intake w/ EN regimen or inconsistent infusion of enteral feeds. Will adjust current recommendations based on weight loss.

## 2020-09-27 NOTE — PROGRESS NOTE ADULT - ASSESSMENT
POD 0 s/p SOC washout and debridement at neuro baseline  neuro checks  vitals  pain control  HMV record and drain  RA  a line for monitoring  resume TFs  AM labs  SCDs  ID to c/s, cont vanc/cef  PT/OT pending  as above d/w Dr. Blank and Álvaro

## 2020-09-27 NOTE — CONSULT NOTE ADULT - SUBJECTIVE AND OBJECTIVE BOX
HPI:  60 yo M with HBV (on tenofovir) with recent admission for acute cerebellar hemorrhage readmitted  with SSI.  ID consult for assistance with management.  He had been transferred to [facility] from [facility] on  with large acute cerebellar hemorrhage requiring intubation for airway protection/emergent suboccipital craniectomy with cervical laminectomy for evacuation of hematoma/medulla and spinal cord decompression.  He had masood tachycardic with low grade temp increases – was treated with vanc and pip-tazo from - for Enterobacter in sputum.  His EVD was replaced 8/10, . He was extubated on .  The day after his antibiotics were d/lindsey, he had temp 104.  Was started on vanc and cefepime.  Sputum culture from  grew Klebsiella variicola, Enterobacter cloacae and MSSA. CT chest on  showed patchy RLL consolidation.  Vanc was d/lindsey on . He was treated with cefepime from -.  He had T 102.4 on  – he received additional vanc and cefepime from -. Sputum culture grew MSSA and he was changed to cefazolin  - completed .  He was d/lindsey to [facility] on .  He returned yesterday due to purulent drainage from craniotomy craniotomy site.  On arrival, he was afebrile with WBC 10.7 à14,5 this morning.  This morning, he underwent exploration of suboccipital craniotomy wound with washout.  Purulent material was seen epidurally.  Cultures were sent.  He was started on vancomycin and cefepime.  ID consult requested.         PAST MEDICAL & SURGICAL HISTORY:  Hepatitis B    HLD (hyperlipidemia)    Foot fracture, left  s/p repair             MEDICATIONS  (STANDING):  artificial  tears Solution 1 Drop(s) Both EYES three times a day  cefepime   IVPB 2000 milliGRAM(s) IV Intermittent every 8 hours  cefepime   IVPB 2000 milliGRAM(s) IV Intermittent every 8 hours  chlorhexidine 2% Cloths 1 Application(s) Topical every 12 hours  dextrose 5%. 1000 milliLiter(s) (50 mL/Hr) IV Continuous <Continuous>  dextrose 50% Injectable 12.5 Gram(s) IV Push once  dextrose 50% Injectable 25 Gram(s) IV Push once  dextrose 50% Injectable 25 Gram(s) IV Push once  doxazosin 4 milliGRAM(s) Oral at bedtime  finasteride 5 milliGRAM(s) Oral daily  insulin lispro (HumaLOG) corrective regimen sliding scale   SubCutaneous Before meals and at bedtime  propranolol 10 milliGRAM(s) Oral four times a day  tenofovir disoproxil fumarate (VIREAD) 300 milliGRAM(s) Oral every 24 hours  vancomycin  IVPB 1000 milliGRAM(s) IV Intermittent every 12 hours    MEDICATIONS  (PRN):  acetaminophen    Suspension .. 640 milliGRAM(s) Oral every 6 hours PRN Temp greater or equal to 38C (100.4F), Mild Pain (1 - 3)  acetylcysteine 20%  Inhalation 4 milliLiter(s) Inhalation every 6 hours PRN SOB  ALBUTerol    90 MICROgram(s) HFA Inhaler 2 Puff(s) Inhalation every 6 hours PRN Shortness of Breath and/or Wheezing  bisacodyl 5 milliGRAM(s) Oral daily PRN Constipation  dextrose 40% Gel 15 Gram(s) Oral once PRN Blood Glucose LESS THAN 70 milliGRAM(s)/deciliter  glucagon  Injectable 1 milliGRAM(s) IntraMuscular once PRN Glucose LESS THAN 70 milligrams/deciliter  psyllium Powder 1 Packet(s) Oral daily PRN constipation  senna 2 Tablet(s) Oral at bedtime PRN Constipation      Allergies    No Known Allergies    Intolerances        SOCIAL HISTORY:  Unable to obtain-  is nonverbal    FAMILY HISTORY:  No pertinent family history in first degree relatives        Vital Signs Last 24 Hrs  T(C): 36.8 (27 Sep 2020 17:41), Max: 36.9 (26 Sep 2020 22:20)  T(F): 98.2 (27 Sep 2020 17:41), Max: 98.4 (26 Sep 2020 22:20)  HR: 67 (27 Sep 2020 19:00) (67 - 80)  BP: 144/84 (27 Sep 2020 19:00) (124/75 - 167/77)  BP(mean): 110 (27 Sep 2020 19:00) (95 - 113)  RR: 31 (27 Sep 2020 19:00) (11 - 32)  SpO2: 100% (27 Sep 2020 19:00) (95% - 100%)    PE:  Chronically ill-appearing, lying flat in bed.  Nonverbal - does not move mouth - but follows commands.  HEENT:  SOC incision with staples, drain with heme, sclerae anicteric, conjunctivae clear.  NC in place, no nasal exudate.  No buccal or pharyngeal lesions, erythema or exudate  Neck:  Supple, no adenopathy  Lungs:  Clear to auscultation  Cor:  RRR, S1, S2, no murmur appreciated  Abd:  Symmetric, normoactive BS.  Soft, nontender, no masses, guarding or rebound.  Liver and spleen not enlarged  Extrem:  No cyanosis or edema  Skin:  No rashes.    LABS:                        10.5   14.51 )-----------( 404      ( 27 Sep 2020 05:48 )             33.5     09-    135  |  97  |  13  ----------------------------<  103<H>  4.1   |  28  |  0.73    Ca    8.8      27 Sep 2020 05:48  Phos  2.8       Mg     2.5           Urinalysis Basic - ( 27 Sep 2020 01:38 )    Color: Yellow / Appearance: Clear / S.015 / pH: x  Gluc: x / Ketone: NEGATIVE  / Bili: Negative / Urobili: 0.2 E.U./dL   Blood: x / Protein: NEGATIVE mg/dL / Nitrite: NEGATIVE   Leuk Esterase: NEGATIVE / RBC: x / WBC x   Sq Epi: x / Non Sq Epi: x / Bacteria: x        RADIOLOGY & ADDITIONAL STUDIES:  < from: MR Head w/wo IV Cont (20 @ 00:50) >  INTERPRETATION:  IAnabell MD, have reviewed the images and the report and agree with the findings, with the following modification:    The patient is status post suboccipital craniectomy and resection of the midline portion of the posterior arch of C1. There is a fluid collection deep to the craniectomy site which does not demonstrate enhancement. There is a surgical excisional cavity within the paramedian left cerebellum with curvilinear focus of hyperintense T1 signal which may represent hemorrhagic products (series 201 image 93). There is enhancement from the posterior margin of the resection cavity to the fourth ventricle (series 1602 image 12). It is unclear whether this may represent postoperative changes versus infection. There is a suboccipital scalp fluid collection which demonstrates a rind of enhancement along its posterior margin extending to the skin surface (series 1602 image 5).This may represent underlying infection.    There is diffusion signal layering within the left occipital without abnormal enhancement. This may represent hemorrhage. There was narrowing hemorrhage on the prior CT.    < end of copied text >

## 2020-09-27 NOTE — CHART NOTE - NSCHARTNOTEFT_GEN_A_CORE
Admitting Diagnosis:   Patient is a 60y old  Male who presents with a chief complaint of SOC wound washout (27 Sep 2020 13:49)      Consult: Yes [  x ]  No [   ]    Reason for Initial Nutrition Assessment:  EN PTA    PAST MEDICAL & SURGICAL HISTORY:  Hepatitis B    HLD (hyperlipidemia)    Foot fracture, left  s/p repair 2019        Current Nutrition Order:  Vital 1.5 @48ml/hr x24hrs via PEG. Provides 1152ml TV, 1728kcal, 77g pro, 880ml FW.   *adv this afternoon    PO Intake: Good (%) [   ]  Fair (50-75%) [   ] Poor (<25%) [   ] N/A NPO w/ TF    GI Issues:   No apparent GI distress  Has yet to have a BM post-op  Ordered for metamucil, dulcolax, senna  PEG placed 8/21    Pain:  Ordered for tylenol    Skin Integrity:  Surgical incision      Labs:   09-27    135  |  97  |  13  ----------------------------<  103<H>  4.1   |  28  |  0.73    Ca    8.8      27 Sep 2020 05:48  Phos  2.8     09-27  Mg     2.5     09-27      CAPILLARY BLOOD GLUCOSE      POCT Blood Glucose.: 120 mg/dL (27 Sep 2020 11:48)  POCT Blood Glucose.: 114 mg/dL (27 Sep 2020 06:03)  POCT Blood Glucose.: 120 mg/dL (26 Sep 2020 22:00)    Nutritionally Pertinent Lab Values:  POCT , 120, 130, 124, Glu 103    Medications:  MEDICATIONS  (STANDING):  artificial  tears Solution 1 Drop(s) Both EYES three times a day  cefepime   IVPB 2000 milliGRAM(s) IV Intermittent every 8 hours  chlorhexidine 2% Cloths 1 Application(s) Topical every 12 hours  dextrose 5%. 1000 milliLiter(s) (50 mL/Hr) IV Continuous <Continuous>  dextrose 50% Injectable 12.5 Gram(s) IV Push once  dextrose 50% Injectable 25 Gram(s) IV Push once  dextrose 50% Injectable 25 Gram(s) IV Push once  doxazosin 4 milliGRAM(s) Oral at bedtime  finasteride 5 milliGRAM(s) Oral daily  insulin lispro (HumaLOG) corrective regimen sliding scale   SubCutaneous Before meals and at bedtime  propranolol 10 milliGRAM(s) Oral four times a day  sodium chloride 0.9%. 1000 milliLiter(s) (50 mL/Hr) IV Continuous <Continuous>  tenofovir disoproxil fumarate (VIREAD) 300 milliGRAM(s) Oral every 24 hours  vancomycin  IVPB 1000 milliGRAM(s) IV Intermittent every 12 hours    MEDICATIONS  (PRN):  acetaminophen    Suspension .. 640 milliGRAM(s) Oral every 6 hours PRN Temp greater or equal to 38C (100.4F), Mild Pain (1 - 3)  acetylcysteine 20%  Inhalation 4 milliLiter(s) Inhalation every 6 hours PRN SOB  ALBUTerol    90 MICROgram(s) HFA Inhaler 2 Puff(s) Inhalation every 6 hours PRN Shortness of Breath and/or Wheezing  bisacodyl 5 milliGRAM(s) Oral daily PRN Constipation  dextrose 40% Gel 15 Gram(s) Oral once PRN Blood Glucose LESS THAN 70 milliGRAM(s)/deciliter  glucagon  Injectable 1 milliGRAM(s) IntraMuscular once PRN Glucose LESS THAN 70 milligrams/deciliter  psyllium Powder 1 Packet(s) Oral daily PRN constipation  senna 2 Tablet(s) Oral at bedtime PRN Constipation      Admitted Anthropometrics:  Height: 5'5", IBW 136lbs+/-10%, %IBW 85%, BMI 19.2    Weight: 115lbs (9/26)  Daily     Weight Change:   Of note pt has had a 16lb wt loss since last admission. Weight was 131lbs (8/4). Current BW is 115lbs (9/26). This could be due to either insufficient intake w/ EN regimen or inconsitent infusion of enteral feeds. Will adjust current recommendations based on weight loss.     Estimated energy needs:   ABW (52.27kg) used for calculations as pt between % of IBW.   Nutrient needs based on [facility] standards of care for maintenance in adults.   Needs adjusted for post-op healing, unintentional wt loss. Suspected malnutrition.  1569-1830kcal/day (30-35kcal/kg)  73-83g pro/day (1.4-1.6g pro/kg)  1569-1830ml fluid/day (30-35ml/kg)    Subjective:   60 year old male with PMH of HLD and unclear liver disease s/p SOC for evacuation of cerebellar hematoma (8/4/2020) w/ Dr. Blank, subsequently discharged to [facility] now presents to [facility] for admission d/t purulent drainage from SOC site. Plan for wound washout and revision of SOC incision. Now POD0 s/p SOC washout and debridement.    Pt known to this RD from prior admission. Currently ordered for Vital @48ml/hr, same as EN regimen at prior admission. Of note, pt has had a 12% wt change x1.5mo. Suspect inadequate energy intake at rehab- will adjust EN rate accordingly. Malnutrition sticker placed today 9/27.NKFA or dietary restrictions. Unable to obtain in-depth diet/weight history 2/2 nonverbal. Skin: surgical incision; GI: pending post-op BM. See EN recs below. RD to follow.     Nutrition Diagnosis:  inadequate oral intake RT unable to take PO 2/2 poor swallow function AEB EN dependant for meeting 100% EER    Goal:  Pt to consistently meet % of estimated needs via most appropriate rout    Recommendations:  1. Recommend Vital 1.5 @51ml/hr x24hrs via PEG. Provides 1224ml TV, 1836kcal, 82g pro, 931ml FW. Recommend additional 1L FW/day via PEG.   2. Monitor for s/s intolerance. Maintain aspiration precautions at all times  3. if tolerating consider moving back onto generic formula vs. specialized peptide based one.  4. Pain and bowel regimen per team  5. BMP, BG Q6hrs, CBC per MD discretion   *d/w team    Education:   N/A AMS    Risk Level: High [ x  ] Moderate [   ] Low [   ].

## 2020-09-27 NOTE — PROGRESS NOTE ADULT - ASSESSMENT
60y M w/ recent IVH, ICH and prolonged hospital course, now s/p cranial wound washout and debridement of abscess that was extradural. Patient tolerated procedure without complication and brought back to NSICU in stable condition. Dosed with fent IVP x 1 for pain otherwise remains stable post op. Denies any new pain, weakness, CP, SOB or difficulty breathing,     POD 0 s/p SOC washout and debridement at neuro baseline  neuro checks  vitals  pain control  HMV record and drain  RA  a line for monitoring  resume TFs  AM labs  SCDs  ID to c/s, cont vanc/cef  PT/OT pending  as above d/w Dr. Balnk    NEUROCRITICAL CARE PROGRESS NOTE    VEDA WARREN   MRN-[id number]  Summary:  /RBC Count - Spinal Fluid: 12 /uL ( @ 10:46)  CSF Lymphocytes: 6 % ( @ 10:46)  Protein, CSF: 40 mg/dL ( @ 10:46)  Glucose, CSF: 59 mg/dL ( @ 10:46)    HPI:  59 year old male with PMH of HLD and unclear liver disease s/p SOC for evacuation of cerebellar hematoma (2020) w/ Dr. Blank, subsequently discharged to [facility] now presents to [facility] for admission d/t purulent drainage from SOC site.  Pt had PEG placed on previous [facility] admission. At rehab, pt reported to be febrile on , and was treated w/ 7 day course of Zosyn (-) for BLAINE PNA. At rehab pt had a ruff in-place which was removed for TOV. Prior to transfer from rehab to [facility], pt was afebrile w/ WBC 13. At this time pt complaining of a mild headache 3/10. Denies vision changes, photophobia, nuchal rigidity, LOC, dizziness, recent travel or known sick contact.     HPI from previous admission (2020): Pt  had acute onset of severe headache, dizziness and vomiting x1 today, was brought into [facility] with continued symptoms, as well as hypertension and multiple episodes of vomiting. Patient was intubated for airway protection with CT Head performed demonstrating a large left cerebellar hemorrhage. CTA Head/Neck performed is suggestive of underlying vascular pathology such as AVM. RIght frontal ventriculostomy placed at [facility] and patient transferred to [facility] for further work-up. Patient arrives to [facility] intubated, sedated and on Nicardipine drip. Patient's son provided history and denies any Aspirin or other anticoagulant use. Denies any prior history of hypertension, smoking, or ETOH abuse. (26 Sep 2020 21:19)      S/Overnight events:    POD#     EVD:    CSF :    ICPs:      Vital Signs Last 24 Hrs  T(C): 36.1 (27 Sep 2020 13:05), Max: 36.9 (26 Sep 2020 22:20)  T(F): 97 (27 Sep 2020 13:05), Max: 98.4 (26 Sep 2020 22:20)  HR: 72 (27 Sep 2020 13:30) (67 - 74)  BP: 135/80 (27 Sep 2020 13:30) (125/79 - 167/77)  BP(mean): 102 (27 Sep 2020 13:30) (102 - 113)  RR: 11 (27 Sep 2020 13:30) (11 - 27)  SpO2: 100% (27 Sep 2020 13:30) (95% - 100%)        I&O's Detail    26 Sep 2020 07:01  -  27 Sep 2020 07:00  --------------------------------------------------------  IN:    IV PiggyBack: 350 mL    sodium chloride 0.9%: 350 mL  Total IN: 700 mL    OUT:    Intermittent Catheterization - Urethral (mL): 400 mL  Total OUT: 400 mL    Total NET: 300 mL      27 Sep 2020 07:01  -  27 Sep 2020 13:52  --------------------------------------------------------  IN:    sodium chloride 0.9%: 50 mL  Total IN: 50 mL    OUT:    Indwelling Catheter - Urethral (mL): 150 mL  Total OUT: 150 mL    Total NET: -100 mL          LABS:                        10.5   14.51 )-----------( 404      ( 27 Sep 2020 05:48 )             33.5     09-27    135  |  97  |  13  ----------------------------<  103<H>  4.1   |  28  |  0.73    Ca    8.8      27 Sep 2020 05:48  Phos  2.8       Mg     2.5           PT/INR - ( 27 Sep 2020 05:48 )   PT: 13.4 sec;   INR: 1.12          PTT - ( 27 Sep 2020 05:48 )  PTT:31.3 sec  Urinalysis Basic - ( 27 Sep 2020 01:38 )    Color: Yellow / Appearance: Clear / S.015 / pH: x  Gluc: x / Ketone: NEGATIVE  / Bili: Negative / Urobili: 0.2 E.U./dL   Blood: x / Protein: NEGATIVE mg/dL / Nitrite: NEGATIVE   Leuk Esterase: NEGATIVE / RBC: x / WBC x   Sq Epi: x / Non Sq Epi: x / Bacteria: x          CAPILLARY BLOOD GLUCOSE      POCT Blood Glucose.: 120 mg/dL (27 Sep 2020 11:48)  POCT Blood Glucose.: 114 mg/dL (27 Sep 2020 06:03)  POCT Blood Glucose.: 120 mg/dL (26 Sep 2020 22:00)      Drug Levels: [] N/A    CSF Analysis: [] N/A  RBC Count - Spinal Fluid: 12 /uL ( @ 10:46)  CSF Lymphocytes: 6 % ( @ 10:46)  Protein, CSF: 40 mg/dL ( @ 10:46)  Glucose, CSF: 59 mg/dL ( @ 10:46)      Allergies    No Known Allergies    Intolerances      MEDICATIONS:  Antibiotics:  cefepime   IVPB 2000 milliGRAM(s) IV Intermittent every 8 hours  cefepime   IVPB 2000 milliGRAM(s) IV Intermittent once  tenofovir disoproxil fumarate (VIREAD) 300 milliGRAM(s) Oral every 24 hours  vancomycin  IVPB 1000 milliGRAM(s) IV Intermittent every 12 hours    Neuro:  acetaminophen    Suspension .. 640 milliGRAM(s) Oral every 6 hours PRN    Anticoagulation:    OTHER:  acetylcysteine 20%  Inhalation 4 milliLiter(s) Inhalation every 6 hours PRN  ALBUTerol    90 MICROgram(s) HFA Inhaler 2 Puff(s) Inhalation every 6 hours PRN  artificial  tears Solution 1 Drop(s) Both EYES three times a day  bisacodyl 5 milliGRAM(s) Oral daily PRN  chlorhexidine 2% Cloths 1 Application(s) Topical every 12 hours  dextrose 40% Gel 15 Gram(s) Oral once PRN  dextrose 50% Injectable 12.5 Gram(s) IV Push once  dextrose 50% Injectable 25 Gram(s) IV Push once  dextrose 50% Injectable 25 Gram(s) IV Push once  doxazosin 4 milliGRAM(s) Oral at bedtime  finasteride 5 milliGRAM(s) Oral daily  glucagon  Injectable 1 milliGRAM(s) IntraMuscular once PRN  insulin lispro (HumaLOG) corrective regimen sliding scale   SubCutaneous Before meals and at bedtime  propranolol 10 milliGRAM(s) Oral four times a day  psyllium Powder 1 Packet(s) Oral daily PRN  senna 2 Tablet(s) Oral at bedtime PRN    IVF:  dextrose 5%. 1000 milliLiter(s) IV Continuous <Continuous>  sodium chloride 0.9%. 1000 milliLiter(s) IV Continuous <Continuous>    CULTURES:  Culture Results:   No growth at 12 hours ( @ 22:59)  Culture Results:   No growth at 12 hours ( @ 22:59)

## 2020-09-27 NOTE — PROGRESS NOTE ADULT - SUBJECTIVE AND OBJECTIVE BOX
Post op Note    Dx: post op wound infection    60y    Male   s/p cranial wound washout and debridement earlier today. Patient tolerated procedure without complication and brought back to NSICU in stable condition. Dosed with fent IVP x 1 for pain otherwise remains stable post op. Denies any new pain, weakness, CP, SOB or difficulty breathing,           T(C): 36.4 (09-27-20 @ 11:20), Max: 36.9 (09-26-20 @ 22:20)  HR: 70 (09-27-20 @ 12:00) (67 - 74)  BP: 154/81 (09-27-20 @ 12:00) (125/79 - 167/77)  RR: 20 (09-27-20 @ 12:00) (16 - 27)  SpO2: 96% (09-27-20 @ 12:00) (95% - 99%)  Wt(kg): --      Physical exam  Awake, comfortable PERRL, EOMI  Follows commands, speech clear  MUNOZ X4 with good strength   Incision: C/D/I  + ruff  + a line

## 2020-09-27 NOTE — PROGRESS NOTE ADULT - SUBJECTIVE AND OBJECTIVE BOX
NEUROCRITICAL CARE PROGRESS NOTE    VEDA WARREN   MRN-[id number]  Summary:  /RBC Count - Spinal Fluid: 12 /uL ( @ 10:46)  CSF Lymphocytes: 6 % ( @ 10:46)  Protein, CSF: 40 mg/dL ( @ 10:46)  Glucose, CSF: 59 mg/dL ( @ 10:46)    HPI:  59 year old male with PMH of HLD and unclear liver disease s/p SOC for evacuation of cerebellar hematoma (2020) w/ Dr. Blank, subsequently discharged to [facility] now presents to [facility] for admission d/t purulent drainage from SOC site.  Pt had PEG placed on previous [facility] admission. At rehab, pt reported to be febrile on , and was treated w/ 7 day course of Zosyn (-) for BALINE PNA. At rehab pt had a ruff in-place which was removed for TOV. Prior to transfer from rehab to [facility], pt was afebrile w/ WBC 13. At this time pt complaining of a mild headache 3/10. Denies vision changes, photophobia, nuchal rigidity, LOC, dizziness, recent travel or known sick contact.     HPI from previous admission (2020): Pt  had acute onset of severe headache, dizziness and vomiting x1 today, was brought into [facility] with continued symptoms, as well as hypertension and multiple episodes of vomiting. Patient was intubated for airway protection with CT Head performed demonstrating a large left cerebellar hemorrhage. CTA Head/Neck performed is suggestive of underlying vascular pathology such as AVM. RIght frontal ventriculostomy placed at [facility] and patient transferred to [facility] for further work-up. Patient arrives to [facility] intubated, sedated and on Nicardipine drip. Patient's son provided history and denies any Aspirin or other anticoagulant use. Denies any prior history of hypertension, smoking, or ETOH abuse. (26 Sep 2020 21:19)      S/Overnight events:    POD#     EVD:    CSF :    ICPs:      Vital Signs Last 24 Hrs  T(C): 36.1 (27 Sep 2020 13:05), Max: 36.9 (26 Sep 2020 22:20)  T(F): 97 (27 Sep 2020 13:05), Max: 98.4 (26 Sep 2020 22:20)  HR: 72 (27 Sep 2020 13:30) (67 - 74)  BP: 135/80 (27 Sep 2020 13:30) (125/79 - 167/77)  BP(mean): 102 (27 Sep 2020 13:30) (102 - 113)  RR: 11 (27 Sep 2020 13:30) (11 - 27)  SpO2: 100% (27 Sep 2020 13:30) (95% - 100%)        I&O's Detail    26 Sep 2020 07:01  -  27 Sep 2020 07:00  --------------------------------------------------------  IN:    IV PiggyBack: 350 mL    sodium chloride 0.9%: 350 mL  Total IN: 700 mL    OUT:    Intermittent Catheterization - Urethral (mL): 400 mL  Total OUT: 400 mL    Total NET: 300 mL      27 Sep 2020 07:01  -  27 Sep 2020 13:52  --------------------------------------------------------  IN:    sodium chloride 0.9%: 50 mL  Total IN: 50 mL    OUT:    Indwelling Catheter - Urethral (mL): 150 mL  Total OUT: 150 mL    Total NET: -100 mL          LABS:                        10.5   14.51 )-----------( 404      ( 27 Sep 2020 05:48 )             33.5     27    135  |  97  |  13  ----------------------------<  103<H>  4.1   |  28  |  0.73    Ca    8.8      27 Sep 2020 05:48  Phos  2.8       Mg     2.5     27      PT/INR - ( 27 Sep 2020 05:48 )   PT: 13.4 sec;   INR: 1.12          PTT - ( 27 Sep 2020 05:48 )  PTT:31.3 sec  Urinalysis Basic - ( 27 Sep 2020 01:38 )    Color: Yellow / Appearance: Clear / S.015 / pH: x  Gluc: x / Ketone: NEGATIVE  / Bili: Negative / Urobili: 0.2 E.U./dL   Blood: x / Protein: NEGATIVE mg/dL / Nitrite: NEGATIVE   Leuk Esterase: NEGATIVE / RBC: x / WBC x   Sq Epi: x / Non Sq Epi: x / Bacteria: x          CAPILLARY BLOOD GLUCOSE      POCT Blood Glucose.: 120 mg/dL (27 Sep 2020 11:48)  POCT Blood Glucose.: 114 mg/dL (27 Sep 2020 06:03)  POCT Blood Glucose.: 120 mg/dL (26 Sep 2020 22:00)      Drug Levels: [] N/A    CSF Analysis: [] N/A  RBC Count - Spinal Fluid: 12 /uL ( @ 10:46)  CSF Lymphocytes: 6 % ( @ 10:46)  Protein, CSF: 40 mg/dL ( @ 10:46)  Glucose, CSF: 59 mg/dL ( @ 10:46)      Allergies    No Known Allergies    Intolerances      MEDICATIONS:  Antibiotics:  cefepime   IVPB 2000 milliGRAM(s) IV Intermittent every 8 hours  cefepime   IVPB 2000 milliGRAM(s) IV Intermittent once  tenofovir disoproxil fumarate (VIREAD) 300 milliGRAM(s) Oral every 24 hours  vancomycin  IVPB 1000 milliGRAM(s) IV Intermittent every 12 hours    Neuro:  acetaminophen    Suspension .. 640 milliGRAM(s) Oral every 6 hours PRN    Anticoagulation:    OTHER:  acetylcysteine 20%  Inhalation 4 milliLiter(s) Inhalation every 6 hours PRN  ALBUTerol    90 MICROgram(s) HFA Inhaler 2 Puff(s) Inhalation every 6 hours PRN  artificial  tears Solution 1 Drop(s) Both EYES three times a day  bisacodyl 5 milliGRAM(s) Oral daily PRN  chlorhexidine 2% Cloths 1 Application(s) Topical every 12 hours  dextrose 40% Gel 15 Gram(s) Oral once PRN  dextrose 50% Injectable 12.5 Gram(s) IV Push once  dextrose 50% Injectable 25 Gram(s) IV Push once  dextrose 50% Injectable 25 Gram(s) IV Push once  doxazosin 4 milliGRAM(s) Oral at bedtime  finasteride 5 milliGRAM(s) Oral daily  glucagon  Injectable 1 milliGRAM(s) IntraMuscular once PRN  insulin lispro (HumaLOG) corrective regimen sliding scale   SubCutaneous Before meals and at bedtime  propranolol 10 milliGRAM(s) Oral four times a day  psyllium Powder 1 Packet(s) Oral daily PRN  senna 2 Tablet(s) Oral at bedtime PRN    IVF:  dextrose 5%. 1000 milliLiter(s) IV Continuous <Continuous>  sodium chloride 0.9%. 1000 milliLiter(s) IV Continuous <Continuous>    CULTURES:  Culture Results:   No growth at 12 hours ( @ 22:59)  Culture Results:   No growth at 12 hours ( @ 22:59)        Physical exam  Awake, comfortable PERRL, EOMI  Follows commands, speech clear  MUNOZ X4 with good strength   Incision: C/D/I  + ruff  + a line

## 2020-09-28 LAB
ANION GAP SERPL CALC-SCNC: 8 MMOL/L — SIGNIFICANT CHANGE UP (ref 5–17)
BUN SERPL-MCNC: 13 MG/DL — SIGNIFICANT CHANGE UP (ref 7–23)
CALCIUM SERPL-MCNC: 8.8 MG/DL — SIGNIFICANT CHANGE UP (ref 8.4–10.5)
CHLORIDE SERPL-SCNC: 100 MMOL/L — SIGNIFICANT CHANGE UP (ref 96–108)
CO2 SERPL-SCNC: 27 MMOL/L — SIGNIFICANT CHANGE UP (ref 22–31)
CREAT SERPL-MCNC: 0.63 MG/DL — SIGNIFICANT CHANGE UP (ref 0.5–1.3)
CULTURE RESULTS: NO GROWTH — SIGNIFICANT CHANGE UP
GLUCOSE BLDC GLUCOMTR-MCNC: 112 MG/DL — HIGH (ref 70–99)
GLUCOSE BLDC GLUCOMTR-MCNC: 128 MG/DL — HIGH (ref 70–99)
GLUCOSE BLDC GLUCOMTR-MCNC: 140 MG/DL — HIGH (ref 70–99)
GLUCOSE BLDC GLUCOMTR-MCNC: 146 MG/DL — HIGH (ref 70–99)
GLUCOSE SERPL-MCNC: 115 MG/DL — HIGH (ref 70–99)
HCT VFR BLD CALC: 34.1 % — LOW (ref 39–50)
HGB BLD-MCNC: 10.4 G/DL — LOW (ref 13–17)
MAGNESIUM SERPL-MCNC: 2.4 MG/DL — SIGNIFICANT CHANGE UP (ref 1.6–2.6)
MCHC RBC-ENTMCNC: 26.5 PG — LOW (ref 27–34)
MCHC RBC-ENTMCNC: 30.5 GM/DL — LOW (ref 32–36)
MCV RBC AUTO: 86.8 FL — SIGNIFICANT CHANGE UP (ref 80–100)
NRBC # BLD: 0 /100 WBCS — SIGNIFICANT CHANGE UP (ref 0–0)
PHOSPHATE SERPL-MCNC: 3 MG/DL — SIGNIFICANT CHANGE UP (ref 2.5–4.5)
PLATELET # BLD AUTO: 366 K/UL — SIGNIFICANT CHANGE UP (ref 150–400)
POTASSIUM SERPL-MCNC: 4 MMOL/L — SIGNIFICANT CHANGE UP (ref 3.5–5.3)
POTASSIUM SERPL-SCNC: 4 MMOL/L — SIGNIFICANT CHANGE UP (ref 3.5–5.3)
RBC # BLD: 3.93 M/UL — LOW (ref 4.2–5.8)
RBC # FLD: 16.1 % — HIGH (ref 10.3–14.5)
SODIUM SERPL-SCNC: 135 MMOL/L — SIGNIFICANT CHANGE UP (ref 135–145)
SPECIMEN SOURCE: SIGNIFICANT CHANGE UP
VANCOMYCIN TROUGH SERPL-MCNC: 12.6 UG/ML — SIGNIFICANT CHANGE UP (ref 10–20)
WBC # BLD: 10.79 K/UL — HIGH (ref 3.8–10.5)
WBC # FLD AUTO: 10.79 K/UL — HIGH (ref 3.8–10.5)

## 2020-09-28 PROCEDURE — 99232 SBSQ HOSP IP/OBS MODERATE 35: CPT

## 2020-09-28 PROCEDURE — 70450 CT HEAD/BRAIN W/O DYE: CPT | Mod: 26

## 2020-09-28 RX ORDER — CHLORHEXIDINE GLUCONATE 213 G/1000ML
15 SOLUTION TOPICAL
Refills: 0 | Status: DISCONTINUED | OUTPATIENT
Start: 2020-09-28 | End: 2020-10-01

## 2020-09-28 RX ORDER — SENNA PLUS 8.6 MG/1
2 TABLET ORAL AT BEDTIME
Refills: 0 | Status: DISCONTINUED | OUTPATIENT
Start: 2020-09-28 | End: 2020-09-30

## 2020-09-28 RX ORDER — POLYETHYLENE GLYCOL 3350 17 G/17G
17 POWDER, FOR SOLUTION ORAL DAILY
Refills: 0 | Status: DISCONTINUED | OUTPATIENT
Start: 2020-09-28 | End: 2020-09-30

## 2020-09-28 RX ORDER — ACETAMINOPHEN 500 MG
650 TABLET ORAL EVERY 6 HOURS
Refills: 0 | Status: DISCONTINUED | OUTPATIENT
Start: 2020-09-28 | End: 2020-10-01

## 2020-09-28 RX ORDER — CHLORHEXIDINE GLUCONATE 213 G/1000ML
1 SOLUTION TOPICAL DAILY
Refills: 0 | Status: DISCONTINUED | OUTPATIENT
Start: 2020-09-28 | End: 2020-10-01

## 2020-09-28 RX ORDER — MIDAZOLAM HYDROCHLORIDE 1 MG/ML
1 INJECTION, SOLUTION INTRAMUSCULAR; INTRAVENOUS ONCE
Refills: 0 | Status: DISCONTINUED | OUTPATIENT
Start: 2020-09-28 | End: 2020-09-28

## 2020-09-28 RX ORDER — VANCOMYCIN HCL 1 G
1250 VIAL (EA) INTRAVENOUS EVERY 12 HOURS
Refills: 0 | Status: DISCONTINUED | OUTPATIENT
Start: 2020-09-29 | End: 2020-09-29

## 2020-09-28 RX ADMIN — Medication 250 MILLIGRAM(S): at 05:10

## 2020-09-28 RX ADMIN — SENNA PLUS 2 TABLET(S): 8.6 TABLET ORAL at 22:24

## 2020-09-28 RX ADMIN — Medication 1 DROP(S): at 05:11

## 2020-09-28 RX ADMIN — Medication 250 MILLIGRAM(S): at 19:24

## 2020-09-28 RX ADMIN — Medication 1 DROP(S): at 14:05

## 2020-09-28 RX ADMIN — FINASTERIDE 5 MILLIGRAM(S): 5 TABLET, FILM COATED ORAL at 11:12

## 2020-09-28 RX ADMIN — CHLORHEXIDINE GLUCONATE 1 APPLICATION(S): 213 SOLUTION TOPICAL at 05:10

## 2020-09-28 RX ADMIN — CEFEPIME 100 MILLIGRAM(S): 1 INJECTION, POWDER, FOR SOLUTION INTRAMUSCULAR; INTRAVENOUS at 05:11

## 2020-09-28 RX ADMIN — CEFEPIME 100 MILLIGRAM(S): 1 INJECTION, POWDER, FOR SOLUTION INTRAMUSCULAR; INTRAVENOUS at 14:05

## 2020-09-28 RX ADMIN — MIDAZOLAM HYDROCHLORIDE 1 MILLIGRAM(S): 1 INJECTION, SOLUTION INTRAMUSCULAR; INTRAVENOUS at 10:07

## 2020-09-28 RX ADMIN — CEFEPIME 100 MILLIGRAM(S): 1 INJECTION, POWDER, FOR SOLUTION INTRAMUSCULAR; INTRAVENOUS at 22:21

## 2020-09-28 RX ADMIN — Medication 1 DROP(S): at 23:16

## 2020-09-28 RX ADMIN — Medication 4 MILLIGRAM(S): at 22:21

## 2020-09-28 RX ADMIN — CHLORHEXIDINE GLUCONATE 15 MILLILITER(S): 213 SOLUTION TOPICAL at 19:24

## 2020-09-28 RX ADMIN — TENOFOVIR DISOPROXIL FUMARATE 300 MILLIGRAM(S): 300 TABLET, FILM COATED ORAL at 14:05

## 2020-09-28 NOTE — PROGRESS NOTE ADULT - SUBJECTIVE AND OBJECTIVE BOX
Infectious Diseases Progress Note:    SUBJECTIVE: Patient seen and examined at bedside. Denies headache, fever/chills.     INTRACRANIAL BLEED    POST OP COMPLICATIONS          Allergies    No Known Allergies    Intolerances        ANTIBIOTICS/RELEVANT:  antimicrobials  cefepime   IVPB 2000 milliGRAM(s) IV Intermittent every 8 hours  tenofovir disoproxil fumarate (VIREAD) 300 milliGRAM(s) Oral every 24 hours  vancomycin  IVPB 1000 milliGRAM(s) IV Intermittent every 12 hours    immunologic:      OTHER:  acetaminophen    Suspension .. 640 milliGRAM(s) Oral every 6 hours PRN  acetylcysteine 20%  Inhalation 4 milliLiter(s) Inhalation every 6 hours PRN  ALBUTerol    90 MICROgram(s) HFA Inhaler 2 Puff(s) Inhalation every 6 hours PRN  artificial  tears Solution 1 Drop(s) Both EYES three times a day  bisacodyl 5 milliGRAM(s) Oral daily PRN  chlorhexidine 0.12% Liquid 15 milliLiter(s) Oral Mucosa two times a day  chlorhexidine 2% Cloths 1 Application(s) Topical daily  dextrose 40% Gel 15 Gram(s) Oral once PRN  dextrose 5%. 1000 milliLiter(s) IV Continuous <Continuous>  dextrose 50% Injectable 12.5 Gram(s) IV Push once  dextrose 50% Injectable 25 Gram(s) IV Push once  dextrose 50% Injectable 25 Gram(s) IV Push once  doxazosin 4 milliGRAM(s) Oral at bedtime  finasteride 5 milliGRAM(s) Oral daily  glucagon  Injectable 1 milliGRAM(s) IntraMuscular once PRN  insulin lispro (HumaLOG) corrective regimen sliding scale   SubCutaneous Before meals and at bedtime  propranolol 10 milliGRAM(s) Oral four times a day  psyllium Powder 1 Packet(s) Oral daily PRN  senna 2 Tablet(s) Oral at bedtime PRN      Objective:  Vital Signs Last 24 Hrs  T(C): 36.2 (28 Sep 2020 08:50), Max: 36.8 (27 Sep 2020 17:41)  T(F): 97.1 (28 Sep 2020 08:50), Max: 98.2 (27 Sep 2020 17:41)  HR: 76 (28 Sep 2020 14:00) (67 - 80)  BP: 142/81 (28 Sep 2020 14:00) (83/53 - 163/85)  BP(mean): 105 (28 Sep 2020 14:00) (62 - 113)  RR: 24 (28 Sep 2020 14:00) (14 - 32)  SpO2: 97% (28 Sep 2020 14:00) (95% - 100%)    PHYSICAL EXAM:  General: WDWN, NAD, resting comfortably in bed  HEENT: NC/AT; anicteric sclera; incision site clean  Neck: supple  Cardiovascular: +S1/S2; RRR  Respiratory: CTA B/L; no W/R/R  Gastrointestinal: soft, NT/ND; +BSx4  Extremities: WWP; no edema, clubbing or cyanosis  Vascular: 2+ radial, DP/PT pulses B/L  Neurological: follows commands    LABS:                        10.4   10.79 )-----------( 366      ( 28 Sep 2020 05:44 )             34.1         135  |  100  |  13  ----------------------------<  115<H>  4.0   |  27  |  0.63    Ca    8.8      28 Sep 2020 05:44  Phos  3.0       Mg     2.4     28      PT/INR - ( 27 Sep 2020 05:48 )   PT: 13.4 sec;   INR: 1.12          PTT - ( 27 Sep 2020 05:48 )  PTT:31.3 sec  Urinalysis Basic - ( 27 Sep 2020 01:38 )    Color: Yellow / Appearance: Clear / S.015 / pH: x  Gluc: x / Ketone: NEGATIVE  / Bili: Negative / Urobili: 0.2 E.U./dL   Blood: x / Protein: NEGATIVE mg/dL / Nitrite: NEGATIVE   Leuk Esterase: NEGATIVE / RBC: x / WBC x   Sq Epi: x / Non Sq Epi: x / Bacteria: x        MICROBIOLOGY:            RADIOLOGY & ADDITIONAL STUDIES: Reviewed

## 2020-09-28 NOTE — PHYSICAL THERAPY INITIAL EVALUATION ADULT - MANUAL MUSCLE TESTING RESULTS, REHAB EVAL
MMT performed: please refer to OT Initial Evaluation for bilateral UE assessment (left shoulder noted with poor active movement against gravity); (R)LE 4/5 for all major pivots; (L)hip flexion 2+/5, (L)knee extension 3/5, (L)knee flexion 3-/5, (L)ankle DF 3+/5, (L)ankle PF 3+/5

## 2020-09-28 NOTE — PHYSICAL THERAPY INITIAL EVALUATION ADULT - ADDITIONAL COMMENTS
Patient admitted from Acute Rehab. Per daughter, at acute rehab, patient with improved static sitting balance allowing for toileting on bedside commode with supervision. Commenced forward ambulation with therapy however continued to require increased assist.

## 2020-09-28 NOTE — OCCUPATIONAL THERAPY INITIAL EVALUATION ADULT - BALANCE DISTURBANCE, IDENTIFIED IMPAIRMENT CONTRIBUTE, REHAB EVAL
impaired sensory feedback/abnormal muscle tone/decreased strength/impaired postural control/decreased ROM/impaired coordination

## 2020-09-28 NOTE — OCCUPATIONAL THERAPY INITIAL EVALUATION ADULT - RANGE OF MOTION EXAMINATION, UPPER EXTREMITY
bilateral UE Active Assistive ROM was WFL  (within functional limits)/RUE: shoulder 3-/5, elbow/forearm/wrist/digits 4/5; LUE: shoulder 1/5, elbow/forearm/wrist/digits 3/5

## 2020-09-28 NOTE — PHYSICAL THERAPY INITIAL EVALUATION ADULT - PHYSICAL ASSIST/NONPHYSICAL ASSIST: SIT/STAND, REHAB EVAL
verbal cues/2 person assist/slightly unsteady however achieves erect posture without manual cueing required/nonverbal cues (demo/gestures)

## 2020-09-28 NOTE — PROGRESS NOTE ADULT - ASSESSMENT
ASSESSMENT:    59y M PMH of HLD and unclear liver disease/ HBV s/p SOC for evacuation of cerebellar hematoma (8/4/2020) w/ Dr. Blank, subsequently discharged to [facility] now s/p SOC washout and debridement for purulent wound drainage POD # 2     Plan:  - neurological baseline, wound dry  - cont. cns abx coverage   - f/u OR culture   - f/u drain output   - post op imaging as per NSx     Cardio:   - normotensive goal     Pulm:   - IS   - RA     Renal:   - IVL  - remove ruff/ a-line     GI:   - resume TF   - Bowel regimen, metamucil     Heme:   - H&H stable     ID:   - afebrile, no leukocytosis   - ID following :  F/U OR cultures  - Continue vancomycin 1 g IV q12h.  Trough prior to 4th dose.  Goal - 14-17 for now.  - Continue cefepime 2 g IV q8h  - Continue tenofovir for HBV.  - f/u pan culture results    - start vancomycin and cefepime for empiric CSF coverage  - Trough 9/28 5 PM   ? PICC line     Endo:   - ISS     DVT prophylaxis:  - dopplers pending  - SCDs b/l LE, hold DVT chemoprophylaxis at this time

## 2020-09-28 NOTE — PHYSICAL THERAPY INITIAL EVALUATION ADULT - PLANNED THERAPY INTERVENTIONS, PT EVAL
motor coordination training/neuromuscular re-education/bed mobility training/transfer training/gait training/postural re-education/strengthening/balance training

## 2020-09-28 NOTE — OCCUPATIONAL THERAPY INITIAL EVALUATION ADULT - LEVEL OF INDEPENDENCE: STAND/SIT, REHAB EVAL
Health Maintenance Due   Topic Date Due   • DTaP/Tdap/Td Vaccine (1 - Tdap) 11/29/1951   • Shingles Vaccine (2 of 3) 10/24/2013   • DM/CKD Microalbumin  12/11/2018   • Medicare Wellness 65+  01/02/2020       Patient is due for topics as listed above but is not proceeding with Immunization(s) Dtap/Tdap/Td and Shingles, DM/CKD Microalbumin and MWV (Medicare Wellness Visit) at this time.  Appt scheduled to perform MWV (Medicare Wellness Visit) Orders placed for DM/CKD Microalbumin           moderate assist (50% patients effort)

## 2020-09-28 NOTE — PHYSICAL THERAPY INITIAL EVALUATION ADULT - GENERAL OBSERVATIONS, REHAB EVAL
Injectable Influenza Immunization Documentation    1.  Is the person to be vaccinated sick today?   No    2. Does the person to be vaccinated have an allergy to a component   of the vaccine?   No  Egg Allergy Algorithm Link    3. Has the person to be vaccinated ever had a serious reaction   to influenza vaccine in the past?   No    4. Has the person to be vaccinated ever had Guillain-Barré syndrome?   No    Form completed by Fara Gomez [facility]             PT IE completed. Chart reviewed. MRS:4. GaitFIM:1, StairFIM:n/a. Patient without complaints of pain at rest, agreeable to PT. Patient received semi-supine, NAD, +tele, +occipital cranial staples C/D/I, +occipital cranial DIPESH intact, +PEG (feeds held), +IV hep lock, +[place] cath, daughter (Tammie) at bedside, LEFTY Rizzo (enhanced supervision), AMBROSIO Cruz (nsx) cleared patient for treatment session (aware of pending B/L LE doppler, low suspicion of DVT).

## 2020-09-28 NOTE — PROGRESS NOTE ADULT - ASSESSMENT
60 yo M with HBV with acute cerebellar hemorrhage 8/4 s/p suboccipital craniectomy transferred from rehab with SSI - per NS PA and MRI read, appears to be extradural.   Suggest:  - F/U OR cultures  - Continue vancomycin 1 g IV q12h.  Trough prior to 4th dose.  Goal - 14-17 for now.  - Continue cefepime 2 g IV q8h  - Continue tenofovir for HBV.  Will follow with you - ID team 2.   60 yo M with HBV with acute cerebellar hemorrhage 8/4 s/p suboccipital craniectomy transferred from rehab with SSI - per NS PA and MRI read, appears to be extradural.  The cultures from the superficial scalp wound are growing MSSA, the deep scalp wound culture appears to be growing something that has not yet been identified.  The epidural and intradural wound cultures show NGTD.    Suggest:  - F/U OR cultures  - D/C vancomycin  - Continue cefepime 2 g IV q8h until the deep scalp wound culture results - will re-evaluate at that time  - Continue tenofovir for HBV.  Will follow with you - ID team 2.

## 2020-09-28 NOTE — PHYSICAL THERAPY INITIAL EVALUATION ADULT - IMPAIRMENTS CONTRIBUTING TO GAIT DEVIATIONS, PT EVAL
impaired motor control/impaired coordination/impaired balance/impaired postural control/decreased ROM/decreased strength/decreased flexibility

## 2020-09-28 NOTE — PHYSICAL THERAPY INITIAL EVALUATION ADULT - IMPAIRED TRANSFERS: SIT/STAND, REHAB EVAL
impaired coordination/decreased ROM/impaired balance/decreased strength/decreased flexibility/impaired motor control/impaired postural control

## 2020-09-28 NOTE — OCCUPATIONAL THERAPY INITIAL EVALUATION ADULT - TRANSFER SAFETY CONCERNS NOTED: SIT/STAND, REHAB EVAL
decreased weight-shifting ability/decreased safety awareness/losing balance/decreased sequencing ability/decreased balance during turns/decreased step length

## 2020-09-28 NOTE — PHYSICAL THERAPY INITIAL EVALUATION ADULT - IMPAIRMENTS CONTRIBUTING IMPAIRED BED MOBILITY, REHAB EVAL
impaired balance/impaired motor control/decreased flexibility/impaired postural control/decreased ROM/decreased strength

## 2020-09-28 NOTE — PROGRESS NOTE ADULT - ATTENDING COMMENTS
ATTENDING ATTESTATION:    I was physically present for the key portions of the evaluation and management (E/M) service provided.  I agree with the above history, physical and plan, which I have reviewed and edited where appropriate.    Patient at high risk for neurological deterioration or death due to: infections.    Critical care time:  I have personally provided 30 minutes of critical care time, excluding time spent on separately-billable procedures.    Plan discussed with multidisciplinary staff and attendings.
I have reviewed the medical record, including laboratory and radiographic studies, interviewed and examined the patient and discussed the plan with Dr. Escobar, the ID Resident.  Agree with above. Will continue to follow with you – ID Team 1.

## 2020-09-28 NOTE — OCCUPATIONAL THERAPY INITIAL EVALUATION ADULT - PLANNED THERAPY INTERVENTIONS, OT EVAL
balance training/cognitive, visual perceptual/neuromuscular re-education/ROM/strengthening/ADL retraining/fine motor coordination training/motor coordination training/parent/caregiver training.../transfer training/bed mobility training

## 2020-09-28 NOTE — PHYSICAL THERAPY INITIAL EVALUATION ADULT - SENSORY TESTS
Vision H-Test: bilateral tracking intact, poor smooth pursuit with inability to track vertically and inferiorly, +horizontal nystagmus bilaterally noted; Convergence/Divergence: N/T; Vision Quadrant Test: patient reports blurry vision bilaterally, noted (R)>>(L); more accurate answers during quadrant exam with (L)eye compared to (R)eye

## 2020-09-28 NOTE — PHYSICAL THERAPY INITIAL EVALUATION ADULT - GAIT DEVIATIONS NOTED, PT EVAL
decreased weight-shifting ability/decreased velocity of limb motion/decreased step length/unsteady gait; fair stepping with (R)LE compared to (L)LE; max bilateral weight-shifting prior to stepping required/decreased you/decreased stride length

## 2020-09-28 NOTE — PROGRESS NOTE ADULT - SUBJECTIVE AND OBJECTIVE BOX
HPI:  59 year old male with PMH of HLD and unclear liver disease s/p SOC for evacuation of cerebellar hematoma (2020) w/ Dr. Blank, subsequently discharged to [facility] now presents to [facility] for admission d/t purulent drainage from SOC site.  Pt had PEG placed on previous [facility] admission. At rehab, pt reported to be febrile on , and was treated w/ 7 day course of Zosyn (-) for BLAINE PNA. At rehab pt had a ruff in-place which was removed for TOV. Prior to transfer from rehab to [facility], pt was afebrile w/ WBC 13. At this time pt complaining of a mild headache 3/10. Denies vision changes, photophobia, nuchal rigidity, LOC, dizziness, recent travel or known sick contact.     HPI from previous admission (2020): Pt  had acute onset of severe headache, dizziness and vomiting x1 today, was brought into [facility] with continued symptoms, as well as hypertension and multiple episodes of vomiting. Patient was intubated for airway protection with CT Head performed demonstrating a large left cerebellar hemorrhage. CTA Head/Neck performed is suggestive of underlying vascular pathology such as AVM. [place] frontal [place] [facility] [place] transferred to [facility] for further work-up. Patient arrives to [facility] intubated, sedated and on Nicardipine drip. Patient's son provided history and denies any Aspirin or other anticoagulant use. Denies any prior history of hypertension, smoking, or ETOH abuse. (26 Sep 2020 21:19)        S/Overnight events:  EMIL o/n, neuro at baseline, wound c/d, dressing in place.       Hospital Course:     : s/p cranial wound washout and debridement.  fent IVP x 1 for pain  : EMIL o/n, neuro at baseline, on RA         Vital Signs Last 24 Hrs  T(C): 36.8 (27 Sep 2020 17:41), Max: 36.8 (27 Sep 2020 17:41)  T(F): 98.2 (27 Sep 2020 17:41), Max: 98.2 (27 Sep 2020 17:41)  HR: 80 (28 Sep 2020 00:00) (67 - 80)  BP: 97/62 (28 Sep 2020 00:00) (97/62 - 167/77)  BP(mean): 75 (28 Sep 2020 00:00) (75 - 113)  RR: 24 (28 Sep 2020 00:00) (11 - 32)  SpO2: 96% (28 Sep 2020 00:00) (95% - 100%)    I&O's Detail    26 Sep 2020 07:  -  27 Sep 2020 07:00  --------------------------------------------------------  IN:    IV PiggyBack: 350 mL    sodium chloride 0.9%: 350 mL  Total IN: 700 mL    OUT:    Intermittent Catheterization - Urethral (mL): 400 mL  Total OUT: 400 mL    Total NET: 300 mL      27 Sep 2020 07:01  -  28 Sep 2020 01:48  --------------------------------------------------------  IN:    IV PiggyBack: 200 mL    sodium chloride 0.9%: 200 mL    Vital1.5: 504 mL  Total IN: 904 mL    OUT:    Accordian (mL): 20 mL    Indwelling Catheter - Urethral (mL): 480 mL    Voided (mL): 150 mL  Total OUT: 650 mL    Total NET: 254 mL        I&O's Summary    26 Sep 2020 07:01  -  27 Sep 2020 07:00  --------------------------------------------------------  IN: 700 mL / OUT: 400 mL / NET: 300 mL    27 Sep 2020 07:01  -  28 Sep 2020 01:48  --------------------------------------------------------  IN: 904 mL / OUT: 650 mL / NET: 254 mL        PHYSICAL EXAM:  Awake, comfortable PERRL, EOMI  Follows commands, speech clear  MUNOZ X4 with good strength   Incision: SOC wound c/d/i, dressing in place   + HMV   + ruff  + a line    DEVICE/DRAIN DRESSING:    TUBES/LINES:  [] CVC  [x] A-line  [] Lumbar Drain  [] Ventriculostomy  [x] Other: Ruff     DIET:  [x] NPO  [] Mechanical  [x] Tube feeds    LABS:                        10.5   14.51 )-----------( 404      ( 27 Sep 2020 05:48 )             33.5         135  |  97  |  13  ----------------------------<  103<H>  4.1   |  28  |  0.73    Ca    8.8      27 Sep 2020 05:48  Phos  2.8       Mg     2.5           PT/INR - ( 27 Sep 2020 05:48 )   PT: 13.4 sec;   INR: 1.12          PTT - ( 27 Sep 2020 05:48 )  PTT:31.3 sec  Urinalysis Basic - ( 27 Sep 2020 01:38 )    Color: Yellow / Appearance: Clear / S.015 / pH: x  Gluc: x / Ketone: NEGATIVE  / Bili: Negative / Urobili: 0.2 E.U./dL   Blood: x / Protein: NEGATIVE mg/dL / Nitrite: NEGATIVE   Leuk Esterase: NEGATIVE / RBC: x / WBC x   Sq Epi: x / Non Sq Epi: x / Bacteria: x          CAPILLARY BLOOD GLUCOSE      POCT Blood Glucose.: 107 mg/dL (27 Sep 2020 21:43)  POCT Blood Glucose.: 116 mg/dL (27 Sep 2020 16:08)  POCT Blood Glucose.: 120 mg/dL (27 Sep 2020 11:48)  POCT Blood Glucose.: 114 mg/dL (27 Sep 2020 06:03)      Drug Levels: [] N/A    CSF Analysis: [] N/A  RBC Count - Spinal Fluid: 12 /uL ( @ 10:46)  CSF Lymphocytes: 6 % ( @ 10:46)  Protein, CSF: 40 mg/dL ( @ 10:46)  Glucose, CSF: 59 mg/dL ( @ 10:46)      Allergies    No Known Allergies    Intolerances      MEDICATIONS:  Antibiotics:  cefepime   IVPB 2000 milliGRAM(s) IV Intermittent every 8 hours  tenofovir disoproxil fumarate (VIREAD) 300 milliGRAM(s) Oral every 24 hours  vancomycin  IVPB 1000 milliGRAM(s) IV Intermittent every 12 hours    Neuro:  acetaminophen    Suspension .. 640 milliGRAM(s) Oral every 6 hours PRN    Anticoagulation:    OTHER:  acetylcysteine 20%  Inhalation 4 milliLiter(s) Inhalation every 6 hours PRN  ALBUTerol    90 MICROgram(s) HFA Inhaler 2 Puff(s) Inhalation every 6 hours PRN  artificial  tears Solution 1 Drop(s) Both EYES three times a day  bisacodyl 5 milliGRAM(s) Oral daily PRN  chlorhexidine 2% Cloths 1 Application(s) Topical every 12 hours  dextrose 40% Gel 15 Gram(s) Oral once PRN  dextrose 50% Injectable 12.5 Gram(s) IV Push once  dextrose 50% Injectable 25 Gram(s) IV Push once  dextrose 50% Injectable 25 Gram(s) IV Push once  doxazosin 4 milliGRAM(s) Oral at bedtime  finasteride 5 milliGRAM(s) Oral daily  glucagon  Injectable 1 milliGRAM(s) IntraMuscular once PRN  insulin lispro (HumaLOG) corrective regimen sliding scale   SubCutaneous Before meals and at bedtime  propranolol 10 milliGRAM(s) Oral four times a day  psyllium Powder 1 Packet(s) Oral daily PRN  senna 2 Tablet(s) Oral at bedtime PRN    IVF:  dextrose 5%. 1000 milliLiter(s) IV Continuous <Continuous>    CULTURES:  Culture Results:   Sputum specimen rejected.  Microscopic examination indicates  oropharyngeal contamination.  Please repeat. ( @ 03:22)  Culture Results:   No growth at 1 day. ( @ 22:59)    RADIOLOGY & ADDITIONAL TESTS:      ASSESSMENT:  59y M PMH of HLD and unclear liver disease/ HBV s/p SOC for evacuation of cerebellar hematoma (2020) w/ Dr. Blank, subsequently discharged to [facility] now s/p SOC washout and debridement for purulent wound drainage POD # 2     INTRACRANIAL BLEED  POST OP COMPLICATIONS  No pertinent family history in first degree relatives  Handoff  MEWS Score  Hepatitis B  HLD (hyperlipidemia)  Wound infection  Wound infection  Exploration, wound, head  Foot fracture, left  [id number]        Plan:  - neuro checks   - pain control   - vitals   - cont. home meds   - pt/ot   - cont. cns abx coverage   - f/u OR culture   - f/u drain output   - preop MRI was performed   - post op CT? imaging?     Cardio:   - normotensive goal   - wean off propranolol ?   Pulm:   - IS   - RA   Renal:   - IVL  - remove ruff/ a-line     GI:   - resume TF   - Bowel regimen, metamucil     Heme:   - H&H stable     ID:   - afebrile, no leukocytosis   - ID following :  F/U OR cultures  - Continue vancomycin 1 g IV q12h.  Trough prior to 4th dose.  Goal - 14-17 for now.  - Continue cefepime 2 g IV q8h  - Continue tenofovir for HBV.  - f/u pan culture results    - start vancomycin and cefepime for empiric CSF coverage  - Trough  5 PM   ? PICC line     Endo:   - ISS   dopplers ordered   SCDs b/l LE, hold DVT chemoprophylaxis at this time     DVT PROPHYLAXIS:  [x] Venodynes                                [] Heparin/Lovenox    FALL RISK:  [] Low Risk                                    [] Impulsive    DISPOSITION: step down today. PT/OT.       Assessment:  Present when checked    []  GCS  E   V  M     Heart Failure: []Acute, [] acute on chronic , []chronic  Heart Failure:  [] Diastolic (HFpEF), [] Systolic (HFrEF), []Combined (HFpEF and HFrEF), [] RHF, [] Pulm HTN, [] Other    [] SARAH, [] ATN, [] AIN, [] other  [] CKD1, [] CKD2, [] CKD 3, [] CKD 4, [] CKD 5, []ESRD    Encephalopathy: [] Metabolic, [] Hepatic, [] toxic, [] Neurological, [] Other    Abnormal Nurtitional Status: [] malnurtition (see nutrition note), [ ]underweight: BMI < 19, [] morbid obesity: BMI >40, [] Cachexia    [] Sepsis  [] hypovolemic shock,[] cardiogenic shock, [] hemorrhagic shock, [] neuogenic shock  [] Acute Respiratory Failure  []Cerebral edema, [] Brain compression/ herniation,   [] Functional quadriplegia  [] Acute blood loss anemia

## 2020-09-28 NOTE — OCCUPATIONAL THERAPY INITIAL EVALUATION ADULT - PERTINENT HX OF CURRENT PROBLEM, REHAB EVAL
59y M PMH of HLD and unclear liver disease/ HBV s/p SOC for evacuation of cerebellar hematoma (8/4/2020) w/ Dr. Blank, subsequently discharged to [facility] now s/p SOC washout and debridement for purulent wound drainage POD #1

## 2020-09-28 NOTE — PHYSICAL THERAPY INITIAL EVALUATION ADULT - PHYSICAL ASSIST/NONPHYSICAL ASSIST: SCOOT/BRIDGE, REHAB EVAL
2 person assist/nonverbal cues (demo/gestures)/scooting to EOB in sitting with antony pad assist/verbal cues

## 2020-09-28 NOTE — OCCUPATIONAL THERAPY INITIAL EVALUATION ADULT - DIAGNOSIS, OT EVAL
Pt presents POD1 s/p exploration of suboccipital crani wound w/washout with deficits in vision, perception, cognition, functional balance, coordination, activity tolerance, resulting in decreased ADLs/functional mobility.

## 2020-09-28 NOTE — PHYSICAL THERAPY INITIAL EVALUATION ADULT - PHYSICAL ASSIST/NONPHYSICAL ASSIST: SUPINE/SIT, REHAB EVAL
verbal cues/able to bring B/L LEs to EOB with VCs; increased assist for trunk mobility/2 person assist/nonverbal cues (demo/gestures)

## 2020-09-28 NOTE — OCCUPATIONAL THERAPY INITIAL EVALUATION ADULT - IMPAIRED TRANSFERS: SIT/STAND, REHAB EVAL
cognition/impaired balance/impaired coordination/decreased ROM/decreased strength/narrow base of support/impaired postural control/impaired sensory feedback/abnormal muscle tone

## 2020-09-28 NOTE — PHYSICAL THERAPY INITIAL EVALUATION ADULT - COORDINATION ASSESSED, REHAB EVAL
attempted finger to nose however difficult attempted finger to nose however difficult with multi-step command following; demo reaching for therapist's hand as target x 2-3 reps

## 2020-09-28 NOTE — PHYSICAL THERAPY INITIAL EVALUATION ADULT - IMPAIRMENTS FOUND, PT EVAL
ROM/muscle strength/gross motor/posture/fine motor/gait, locomotion, and balance/integumentary integrity/joint integrity and mobility/arousal, attention, and cognition/cranial and peripheral nerve integrity

## 2020-09-28 NOTE — PHYSICAL THERAPY INITIAL EVALUATION ADULT - PERTINENT HX OF CURRENT PROBLEM, REHAB EVAL
59 year old male with PMH of HLD and unclear liver disease s/p SOC for evacuation of cerebellar hematoma (8/4/2020) w/ Dr. Blank, subsequently discharged to [facility] now presents to [facility] for admission d/t purulent drainage from SOC site.  Pt had PEG placed on previous [facility] admission. At rehab, pt reported to be febrile on 9/14, and was treated w/ 7 day course of Zosyn (9/14-9/21) for BLAINE PNA. Please refer to H&P on [place] for remaining.

## 2020-09-28 NOTE — PROGRESS NOTE ADULT - SUBJECTIVE AND OBJECTIVE BOX
========================================================    NEUROCRITICAL CARE ATTENDING NOTE (2020 EVENING)  ========================================================    59 year old male with PMH of HLD and unclear liver disease s/p SOC for evacuation of cerebellar hematoma (2020) w/ Dr. Blank, subsequently discharged to [facility] now presents to [facility] for admission d/t purulent drainage from SOC site.  Pt had PEG placed on previous [facility] admission. At rehab, pt reported to be febrile on , and was treated w/ 7 day course of Zosyn (-) for BLAINE PNA. At rehab pt had a ruff in-place which was removed for TOV. Prior to transfer from rehab to [facility], pt was afebrile w/ WBC 13. At this time pt complaining of a mild headache 3/10. Denies vision changes, photophobia, nuchal rigidity, LOC, dizziness, recent travel or known sick contact.     HPI from previous admission (2020): Pt  had acute onset of severe headache, dizziness and vomiting x1 today, was brought into [facility] with continued symptoms, as well as hypertension and multiple episodes of vomiting. Patient was intubated for airway protection with CT Head performed demonstrating a large left cerebellar hemorrhage. CTA Head/Neck performed is suggestive of underlying vascular pathology such as AVM. RIght frontal ventriculostomy placed at [facility] and patient transferred to [facility] for further work-up. Patient arrives to [facility] intubated, sedated and on Nicardipine drip. Patient's son provided history and denies any Aspirin or other anticoagulant use. Denies any prior history of hypertension, smoking, or ETOH abuse. (26 Sep 2020 21:19)    Hospital Course:     : s/p cranial wound washout and debridement.  fent IVP x 1 for pain  : EMIL o/n, neuro at baseline, on RA; S/Overnight events:  EMIL o/n, neuro at baseline, wound c/d, dressing in place.     PAST MEDICAL & SURGICAL HISTORY:  Hepatitis B  hyperlipidemia    Allergies:  None known    [facility] Medications:  acetaminophen 160 mg/5 mL oral suspension: 20 milliliter(s) orally every 6 hours, As Needed - 3) (26 Sep 2020 23:35)  acetylcysteine 20% inhalation solution: 4 milliliter(s) inhaled every 6 hours (26 Sep 2020 23:35)  chlorhexidine 2% topical pad: 1 application topically  (26 Sep 2020 23:35)  Combivent Respimat 20 mcg-100 mcg/inh inhalation aerosol: 1 puff(s) inhaled 4 times a day (27 Sep 2020 03:11)  doxazosin 4 mg oral tablet: 1 tab(s) orally once a day (at bedtime) (26 Sep 2020 23:35)  enoxaparin: 40 milligram(s) subcutaneously once a day (at bedtime) (26 Sep 2020 23:35)  finasteride 5 mg oral tablet: 1 tab(s) orally once a day (26 Sep 2020 23:35)  ipratropium-albuterol 0.5 mg-2.5 mg/3 mLinhalation solution: 3 milliliter(s) inhaled every 6 hours (26 Sep 2020 23:35)  ocular lubricant ophthalmic solution: 1 drop(s) to each affected eye 3 times a day (26 Sep 2020 23:35)  propranolol 10 mg oral tablet: orally 4 times a day (27 Sep 2020 03:11)  psyllium 3.4 g/7 g oral powder for reconstitution: 1 packet(s) by gastrostomy tube once a day (26 Sep 2020 23:35)  Refresh Dry Eye Therapy ophthalmic solution: to each affected eye 3 times a day (26 Sep 2020 23:35)  scopolamine: 1 milligram(s) transdermally every 72 hours (26 Sep 2020 23:35)  Sodium Chloride, Inhalation 0.9% inhalation solution: 3 milliliter(s) inhaled 3 times a day, As needed, for congestion/secretions (26 Sep 2020 23:35)  tenofovir disoproxil fumarate 300 mg oral tablet: 1 tab(s) orally every 24 hours (26 Sep 2020 23:35)    Current Meds:  MEDICATIONS  (STANDING):  artificial  tears Solution 1 Drop(s) Both EYES three times a day  cefepime   IVPB 2000 milliGRAM(s) IV Intermittent every 8 hours  doxazosin 4 milliGRAM(s) Oral at bedtime  finasteride 5 milliGRAM(s) Oral daily  insulin lispro (HumaLOG) corrective regimen sliding scale   SubCutaneous Before meals and at bedtime  propranolol 10 milliGRAM(s) Oral four times a day  tenofovir disoproxil fumarate (VIREAD) 300 milliGRAM(s) Oral every 24 hours  vancomycin  IVPB 1000 milliGRAM(s) IV Intermittent every 12 hours    MEDICATIONS  (PRN):  acetaminophen    Suspension .. 640 milliGRAM(s) Oral every 6 hours PRN Temp greater or equal to 38C (100.4F), Mild Pain (1 - 3)  acetylcysteine 20%  Inhalation 4 milliLiter(s) Inhalation every 6 hours PRN SOB  ALBUTerol    90 MICROgram(s) HFA Inhaler 2 Puff(s) Inhalation every 6 hours PRN Shortness of Breath and/or Wheezing  bisacodyl 5 milliGRAM(s) Oral daily PRN Constipation  psyllium Powder 1 Packet(s) Oral daily PRN constipation  senna 2 Tablet(s) Oral at bedtime PRN Constipation    PHYSICAL EXAMINATION    ICU Vital Signs Last 24 Hrs  T(C): 36.8 (28 Sep 2020 01:37), Max: 36.8 (27 Sep 2020 17:41)  T(F): 98.2 (28 Sep 2020 01:37), Max: 98.2 (27 Sep 2020 17:41)  HR: 73 (28 Sep 2020 03:00) (67 - 80)  BP: 110/63 (28 Sep 2020 03:00) (83/53 - 167/77)  BP(mean): 81 (28 Sep 2020 03:00) (62 - 113)  ABP: 133/62 (27 Sep 2020 17:00) (128/61 - 189/83)  ABP(mean): 87 (27 Sep 2020 17:00) (84 - 129)  RR: 22 (28 Sep 2020 03:00) (11 - 32)  SpO2: 97% (28 Sep 2020 03:00) (95% - 100%)    Awake, comfortable PERRL, EOMI, horizontal bi-directional nystagmus  Follows commands in English, decreased speech output  Obeys 1st order commands X 4 extremities  Incision: SOC wound c/d/i, dressing in place   + HMV   + ruff  + a line    LABS:                        10.5   14.51 )-----------( 404      ( 27 Sep 2020 05:48 )             33.5         135  |  97  |  13  ----------------------------<  103<H>  4.1   |  28  |  0.73    Ca    8.8      27 Sep 2020 05:48  Phos  2.8       Mg     2.5         PT/INR - ( 27 Sep 2020 05:48 )   PT: 13.4 sec;   INR: 1.12     PTT - ( 27 Sep 2020 05:48 )  PTT:31.3 sec    Urinalysis Basic - ( 27 Sep 2020 01:38 )    Color: Yellow / Appearance: Clear / S.015 / pH: x  Gluc: x / Ketone: NEGATIVE  / Bili: Negative / Urobili: 0.2 E.U./dL   Blood: x / Protein: NEGATIVE mg/dL / Nitrite: NEGATIVE   Leuk Esterase: NEGATIVE / RBC: x / WBC x   Sq Epi: x / Non Sq Epi: x / Bacteria: x    CAPILLARY BLOOD GLUCOSE    POCT Blood Glucose.: 107 mg/dL (27 Sep 2020 21:43)  POCT Blood Glucose.: 116 mg/dL (27 Sep 2020 16:08)  POCT Blood Glucose.: 120 mg/dL (27 Sep 2020 11:48)  POCT Blood Glucose.: 114 mg/dL (27 Sep 2020 06:03)    CSF Analysis: [] N/A  RBC Count - Spinal Fluid: 12 /uL ( @ 10:46)  CSF Lymphocytes: 6 % ( @ 10:46)  Protein, CSF: 40 mg/dL ( @ 10:46)  Glucose, CSF: 59 mg/dL ( @ 10:46)    CULTURES:  Culture Results:   Sputum specimen rejected.  Microscopic examination indicates  oropharyngeal contamination.  Please repeat. ( @ 03:22)  Culture Results:   No growth at 1 day. ( @ 22:59)    Culture - Surgical Swab (.20 @ 11:56)    Gram Stain:   No organisms seen  No WBC's seen.    Specimen Source: .Surgical Swab (6)Intradural Wound    [Code] Full  [Goals] Aggressive  [Disposition] SDU

## 2020-09-28 NOTE — PHYSICAL THERAPY INITIAL EVALUATION ADULT - THERAPY FREQUENCY, PT EVAL
Patient and daughter educated on frequency of inpatient therapy at [facility], daughter verbalized understanding./3-5x/week

## 2020-09-28 NOTE — PHYSICAL THERAPY INITIAL EVALUATION ADULT - GROSSLY INTACT, SENSORY
unable to perform formal assessment 2/2 decreased multi-step command following/Grossly Intact/Right UE/Right LE/Head/Neck/Trunk

## 2020-09-28 NOTE — OCCUPATIONAL THERAPY INITIAL EVALUATION ADULT - IMPAIRMENTS CONTRIBUTING IMPAIRED BED MOBILITY, REHAB EVAL
impaired balance/cognition/impaired coordination/impaired sensory feedback/decreased strength/decreased ROM/impaired postural control/abnormal muscle tone

## 2020-09-29 LAB
-  CEFAZOLIN: SIGNIFICANT CHANGE UP
-  CLINDAMYCIN: SIGNIFICANT CHANGE UP
-  ERYTHROMYCIN: SIGNIFICANT CHANGE UP
-  LINEZOLID: SIGNIFICANT CHANGE UP
-  OXACILLIN: SIGNIFICANT CHANGE UP
-  RIFAMPIN: SIGNIFICANT CHANGE UP
-  TRIMETHOPRIM/SULFAMETHOXAZOLE: SIGNIFICANT CHANGE UP
-  VANCOMYCIN: SIGNIFICANT CHANGE UP
ANION GAP SERPL CALC-SCNC: 10 MMOL/L — SIGNIFICANT CHANGE UP (ref 5–17)
BUN SERPL-MCNC: 12 MG/DL — SIGNIFICANT CHANGE UP (ref 7–23)
CALCIUM SERPL-MCNC: 8.8 MG/DL — SIGNIFICANT CHANGE UP (ref 8.4–10.5)
CHLORIDE SERPL-SCNC: 100 MMOL/L — SIGNIFICANT CHANGE UP (ref 96–108)
CO2 SERPL-SCNC: 26 MMOL/L — SIGNIFICANT CHANGE UP (ref 22–31)
CREAT SERPL-MCNC: 0.59 MG/DL — SIGNIFICANT CHANGE UP (ref 0.5–1.3)
CULTURE RESULTS: SIGNIFICANT CHANGE UP
GLUCOSE BLDC GLUCOMTR-MCNC: 114 MG/DL — HIGH (ref 70–99)
GLUCOSE BLDC GLUCOMTR-MCNC: 123 MG/DL — HIGH (ref 70–99)
GLUCOSE BLDC GLUCOMTR-MCNC: 135 MG/DL — HIGH (ref 70–99)
GLUCOSE BLDC GLUCOMTR-MCNC: 136 MG/DL — HIGH (ref 70–99)
GLUCOSE SERPL-MCNC: 121 MG/DL — HIGH (ref 70–99)
HCT VFR BLD CALC: 30.7 % — LOW (ref 39–50)
HGB BLD-MCNC: 9.8 G/DL — LOW (ref 13–17)
MAGNESIUM SERPL-MCNC: 2.4 MG/DL — SIGNIFICANT CHANGE UP (ref 1.6–2.6)
MCHC RBC-ENTMCNC: 27 PG — SIGNIFICANT CHANGE UP (ref 27–34)
MCHC RBC-ENTMCNC: 31.9 GM/DL — LOW (ref 32–36)
MCV RBC AUTO: 84.6 FL — SIGNIFICANT CHANGE UP (ref 80–100)
METHOD TYPE: SIGNIFICANT CHANGE UP
NRBC # BLD: 0 /100 WBCS — SIGNIFICANT CHANGE UP (ref 0–0)
ORGANISM # SPEC MICROSCOPIC CNT: SIGNIFICANT CHANGE UP
PHOSPHATE SERPL-MCNC: 3.3 MG/DL — SIGNIFICANT CHANGE UP (ref 2.5–4.5)
PLATELET # BLD AUTO: 330 K/UL — SIGNIFICANT CHANGE UP (ref 150–400)
POTASSIUM SERPL-MCNC: 4 MMOL/L — SIGNIFICANT CHANGE UP (ref 3.5–5.3)
POTASSIUM SERPL-SCNC: 4 MMOL/L — SIGNIFICANT CHANGE UP (ref 3.5–5.3)
RBC # BLD: 3.63 M/UL — LOW (ref 4.2–5.8)
RBC # FLD: 16 % — HIGH (ref 10.3–14.5)
SODIUM SERPL-SCNC: 136 MMOL/L — SIGNIFICANT CHANGE UP (ref 135–145)
SPECIMEN SOURCE: SIGNIFICANT CHANGE UP
WBC # BLD: 8.95 K/UL — SIGNIFICANT CHANGE UP (ref 3.8–10.5)
WBC # FLD AUTO: 8.95 K/UL — SIGNIFICANT CHANGE UP (ref 3.8–10.5)

## 2020-09-29 PROCEDURE — 99232 SBSQ HOSP IP/OBS MODERATE 35: CPT

## 2020-09-29 PROCEDURE — 99024 POSTOP FOLLOW-UP VISIT: CPT

## 2020-09-29 PROCEDURE — 93970 EXTREMITY STUDY: CPT | Mod: 26

## 2020-09-29 RX ORDER — NAFCILLIN 10 G/100ML
2 INJECTION, POWDER, FOR SOLUTION INTRAVENOUS EVERY 4 HOURS
Refills: 0 | Status: DISCONTINUED | OUTPATIENT
Start: 2020-09-29 | End: 2020-10-01

## 2020-09-29 RX ADMIN — Medication 166.67 MILLIGRAM(S): at 07:51

## 2020-09-29 RX ADMIN — POLYETHYLENE GLYCOL 3350 17 GRAM(S): 17 POWDER, FOR SOLUTION ORAL at 11:28

## 2020-09-29 RX ADMIN — TENOFOVIR DISOPROXIL FUMARATE 300 MILLIGRAM(S): 300 TABLET, FILM COATED ORAL at 13:44

## 2020-09-29 RX ADMIN — CHLORHEXIDINE GLUCONATE 15 MILLILITER(S): 213 SOLUTION TOPICAL at 05:53

## 2020-09-29 RX ADMIN — CEFEPIME 100 MILLIGRAM(S): 1 INJECTION, POWDER, FOR SOLUTION INTRAMUSCULAR; INTRAVENOUS at 06:40

## 2020-09-29 RX ADMIN — Medication 4 MILLIGRAM(S): at 21:55

## 2020-09-29 RX ADMIN — NAFCILLIN 200 GRAM(S): 10 INJECTION, POWDER, FOR SOLUTION INTRAVENOUS at 18:25

## 2020-09-29 RX ADMIN — NAFCILLIN 200 GRAM(S): 10 INJECTION, POWDER, FOR SOLUTION INTRAVENOUS at 21:55

## 2020-09-29 RX ADMIN — CEFEPIME 100 MILLIGRAM(S): 1 INJECTION, POWDER, FOR SOLUTION INTRAMUSCULAR; INTRAVENOUS at 13:44

## 2020-09-29 RX ADMIN — Medication 1 DROP(S): at 21:16

## 2020-09-29 RX ADMIN — FINASTERIDE 5 MILLIGRAM(S): 5 TABLET, FILM COATED ORAL at 11:28

## 2020-09-29 RX ADMIN — Medication 1 DROP(S): at 13:44

## 2020-09-29 RX ADMIN — Medication 1 DROP(S): at 06:41

## 2020-09-29 NOTE — SWALLOW BEDSIDE ASSESSMENT ADULT - SLP PERTINENT HISTORY OF CURRENT PROBLEM
s/p SOC for evacuation of cerebellar hematoma (08/20), s/p PEG placement; s/p SOC washout and debridement for purulent wound drainage

## 2020-09-29 NOTE — SWALLOW BEDSIDE ASSESSMENT ADULT - NS SPL SWALLOW CLINIC TRIAL FT
Bolus acceptance varied throughout assessment; at times, pt clenched his teeth tight and refused to open his mouth. Pt presented with prolonged episodes of bolus holding followed by delayed labial spillage of ~1/2 liquid bolus. Remainder of bolus was suctioned with a yankauer x2. Laryngeal movement appreciated x1 only. Pt presented with immediate productive coughing following this trial suggestive of aspiration.

## 2020-09-29 NOTE — SWALLOW BEDSIDE ASSESSMENT ADULT - SWALLOW EVAL: DIAGNOSIS
Clinical signs of oropharyngeal dysphagia marked by bolus holding, reduced bolus containment, delayed/absent posterior bolus movement, and overt s/s of aspiration. Suspect minimal change in pt's clinical presentation compared to prior admission.

## 2020-09-29 NOTE — PROGRESS NOTE ADULT - ASSESSMENT
60 yo M with HBV with acute cerebellar hemorrhage 8/4 s/p suboccipital craniectomy transferred from rehab with SSI - per NS PA and MRI read, appears to be extradural.  The cultures from the superficial scalp wound are growing MSSA, the deep scalp wound culture appears to be growing something that has not yet been identified.  The epidural and intradural wound cultures show NGTD.    Suggest:  - F/U OR cultures  - Continue cefepime 2 g IV q8h until the deep scalp wound culture results - will re-evaluate at that time  -recommend nafcillin 2 g Q4 (2 week duration) for discharge. this is the preferred Abx selection confer whether this can be given at acute rehab  -if unable to do nafcillin at rehab, would recommend cefazolin 2 g Q8 for 2 week durations  - Continue tenofovir for HBV.  Will follow with you - ID team 2.   58 yo M with HBV with acute cerebellar hemorrhage 8/4 s/p suboccipital craniectomy transferred from rehab with SSI - per NS PA and MRI read, appears to be extradural.  The cultures from the superficial scalp wound are growing MSSA, the deep scalp wound culture appears to be growing something that has not yet been identified.  The epidural and intradural wound cultures show NGTD.    Suggest:  - F/U OR cultures  -recommend nafcillin 2 g Q4 (2 week duration) starting now. this is the preferred Abx selection confer whether this can be given at acute rehab. End date october 11th (to complete 2 week course from surgery)  -if unable to do nafcillin at rehab, would recommend cefazolin 2 g Q8 for 2 week durations  -patient will need weekly cbc, cmp, ESR, CRP that needs to be sent to Dr. Hendricks's office. Dr. Hendricks will setup appointment with patient   - Continue tenofovir for HBV.  ID will sign off   58 yo M with HBV with acute cerebellar hemorrhage 8/4 s/p suboccipital craniectomy transferred from rehab with SSI - per NS PA and MRI read, appears to be extradural.  The cultures from the superficial scalp wound are growing MSSA, the deep scalp wound culture appears to be growing something that has not yet been identified.  The epidural and intradural wound cultures show NGTD.    Suggest:  - F/U OR cultures  -recommend nafcillin 2 g Q4 (2 week duration) starting from drainage (9/27) this is the preferred Abx selection confer whether this can be given at acute rehab. End date october 11th (to complete 2 week course from surgery)  -if unable to do nafcillin at rehab, would recommend cefazolin 2 g Q8 for 2 week durations  -patient will need weekly cbc, cmp, ESR, CRP that needs to be sent to Dr. Hendricks's office. Dr. Hendricks will setup appointment with patient   - Continue tenofovir for HBV.  ID will sign off

## 2020-09-29 NOTE — SWALLOW BEDSIDE ASSESSMENT ADULT - SLP GENERAL OBSERVATIONS
Received sleeping in bed with sitter at bedside. Awoken given noxious stimuli. Minimal verbal output. Voice was severely dysphonic (known L TVF paralysis).

## 2020-09-29 NOTE — PROGRESS NOTE ADULT - SUBJECTIVE AND OBJECTIVE BOX
Patient was able to answer questions appropriately and denied any pertinent ROS    infectious diseases progress note:  VEDA WARREN is a 60yMale patient    INTRACRANIAL BLEED    POST OP COMPLICATIONS          ROS:  CONSTITUTIONAL:  Negative fever or chills, feels well, good appetite  EYES:  Negative  blurry vision or double vision  CARDIOVASCULAR:  Negative for chest pain or palpitations  RESPIRATORY:  Negative for cough, wheezing, or SOB   GASTROINTESTINAL:  Negative for nausea, vomiting, diarrhea, constipation, or abdominal pain  GENITOURINARY:  Negative frequency, urgency or dysuria  NEUROLOGIC:  No headache, confusion, dizziness, lightheadedness    Allergies    No Known Allergies    Intolerances        ANTIBIOTICS/RELEVANT:  antimicrobials  cefepime   IVPB 2000 milliGRAM(s) IV Intermittent every 8 hours  tenofovir disoproxil fumarate (VIREAD) 300 milliGRAM(s) Oral every 24 hours  vancomycin  IVPB 1250 milliGRAM(s) IV Intermittent every 12 hours    immunologic:    OTHER:  acetaminophen    Suspension .. 650 milliGRAM(s) Oral every 6 hours PRN  acetylcysteine 20%  Inhalation 4 milliLiter(s) Inhalation every 6 hours PRN  ALBUTerol    90 MICROgram(s) HFA Inhaler 2 Puff(s) Inhalation every 6 hours PRN  artificial  tears Solution 1 Drop(s) Both EYES three times a day  bisacodyl 5 milliGRAM(s) Oral at bedtime PRN  chlorhexidine 0.12% Liquid 15 milliLiter(s) Oral Mucosa two times a day  chlorhexidine 2% Cloths 1 Application(s) Topical daily  dextrose 40% Gel 15 Gram(s) Oral once PRN  dextrose 5%. 1000 milliLiter(s) IV Continuous <Continuous>  dextrose 50% Injectable 12.5 Gram(s) IV Push once  dextrose 50% Injectable 25 Gram(s) IV Push once  dextrose 50% Injectable 25 Gram(s) IV Push once  doxazosin 4 milliGRAM(s) Oral at bedtime  finasteride 5 milliGRAM(s) Oral daily  glucagon  Injectable 1 milliGRAM(s) IntraMuscular once PRN  insulin lispro (HumaLOG) corrective regimen sliding scale   SubCutaneous Before meals and at bedtime  polyethylene glycol 3350 17 Gram(s) Oral daily  propranolol 10 milliGRAM(s) Oral four times a day  senna 2 Tablet(s) Oral at bedtime      Objective:  Vital Signs Last 24 Hrs  T(C): 37 (29 Sep 2020 13:40), Max: 37.3 (28 Sep 2020 17:37)  T(F): 98.6 (29 Sep 2020 13:40), Max: 99.1 (28 Sep 2020 17:37)  HR: 68 (29 Sep 2020 13:35) (66 - 82)  BP: 173/96 (29 Sep 2020 13:35) (109/65 - 173/96)  BP(mean): 121 (29 Sep 2020 13:35) (81 - 124)  RR: 17 (29 Sep 2020 13:35) (16 - 22)  SpO2: 98% (29 Sep 2020 13:35) (94% - 98%)    PHYSICAL EXAM:  General: WDWN, NAD, resting comfortably in bed  HEENT: NC/AT; anicteric sclera; incision site clean  Neck: supple  Cardiovascular: +S1/S2; RRR  Respiratory: CTA B/L; no W/R/R  Gastrointestinal: soft, NT/ND; +BSx4  Extremities: WWP; no edema, clubbing or cyanosis  Vascular: 2+ radial, DP/PT pulses B/L  Neurological: follows commands        LABS:                        9.8    8.95  )-----------( 330      ( 29 Sep 2020 06:26 )             30.7     09-29    136  |  100  |  12  ----------------------------<  121<H>  4.0   |  26  |  0.59    Ca    8.8      29 Sep 2020 06:26  Phos  3.3     09-29  Mg     2.4     09-29              MICROBIOLOGY:        RADIOLOGY & ADDITIONAL STUDIES:

## 2020-09-29 NOTE — PROGRESS NOTE ADULT - SUBJECTIVE AND OBJECTIVE BOX
HPI:  59 year old male with PMH of HLD and unclear liver disease s/p SOC for evacuation of cerebellar hematoma (8/4/2020) w/ Dr. Blank, subsequently discharged to [facility] now presents to [facility] for admission d/t purulent drainage from SOC site.  Pt had PEG placed on previous [facility] admission. At rehab, pt reported to be febrile on 9/14, and was treated w/ 7 day course of Zosyn (9/14-9/21) for BLAINE PNA. At rehab pt had a ruff in-place which was removed for TOV. Prior to transfer from rehab to [facility], pt was afebrile w/ WBC 13. At this time pt complaining of a mild headache 3/10. Denies vision changes, photophobia, nuchal rigidity, LOC, dizziness, recent travel or known sick contact.     HPI from previous admission (8/4/2020): Pt  had acute onset of severe headache, dizziness and vomiting x1 today, was brought into [facility] with continued symptoms, as well as hypertension and multiple episodes of vomiting. Patient was intubated for airway protection with CT Head performed demonstrating a large left cerebellar hemorrhage. CTA Head/Neck performed is suggestive of underlying vascular pathology such as AVM. [place] [facility] [place] [facility] for further work-up. Patient arrives to [facility] intubated, sedated and on Nicardipine drip. Patient's son provided history and denies any Aspirin or other anticoagulant use. Denies any prior history of hypertension, smoking, or ETOH abuse. (26 Sep 2020 21:19)    Hospital Course:     9/27: s/p cranial wound washout and debridement.  fent IVP x 1 for pain  9/28: EMIL o/n, neuro at baseline, on RA   9/29: EMIL overnight, suctioning for secretions    Vital Signs Last 24 Hrs  T(C): 36.8 (29 Sep 2020 05:27), Max: 37.3 (28 Sep 2020 17:37)  T(F): 98.2 (29 Sep 2020 05:27), Max: 99.1 (28 Sep 2020 17:37)  HR: 74 (29 Sep 2020 03:50) (68 - 85)  BP: 109/65 (29 Sep 2020 03:50) (109/65 - 163/85)  BP(mean): 81 (29 Sep 2020 03:50) (81 - 119)  RR: 16 (29 Sep 2020 03:50) (16 - 27)  SpO2: 95% (29 Sep 2020 03:50) (94% - 99%)    I&O's Summary    27 Sep 2020 07:01  -  28 Sep 2020 07:00  --------------------------------------------------------  IN: 1261 mL / OUT: 1150 mL / NET: 111 mL    28 Sep 2020 07:01  -  29 Sep 2020 06:00  --------------------------------------------------------  IN: 509 mL / OUT: 453 mL / NET: 56 mL        PHYSICAL EXAM:  General: patient seen laying supine in bed in NAD  Neuro: AAOx3, FC, OE spontaneously, speech hypophonic, CNII-XI grossly intact, face symmetric,  strength 5/5 b/l UE and LE, SILT throughout  HEENT: PERRL, EOMI  Neck: supple  Cardiac: RRR, S1S2  Pulmonary: chest rise symmetric, coarse breath sounds throughout  Abdomen: soft, nontender, nondistended  Ext: warm, perfusing well  Incision: SOC wound c/d/i    cantonese : May [id number]    TUBES/LINES:  [] Ruff  [] Lumbar Drain  [x] Wound Drains - HMV x 2  [] Others      DIET:  [] NPO  [] Mechanical  [x] Tube feeds    LABS:                        10.4   10.79 )-----------( 366      ( 28 Sep 2020 05:44 )             34.1     09-28    135  |  100  |  13  ----------------------------<  115<H>  4.0   |  27  |  0.63    Ca    8.8      28 Sep 2020 05:44  Phos  3.0     09-28  Mg     2.4     09-28              CAPILLARY BLOOD GLUCOSE      POCT Blood Glucose.: 128 mg/dL (28 Sep 2020 22:22)  POCT Blood Glucose.: 112 mg/dL (28 Sep 2020 16:39)  POCT Blood Glucose.: 140 mg/dL (28 Sep 2020 10:42)      Drug Levels: [] N/A  Vancomycin Level, Trough: 12.6 ug/mL (09-28 @ 18:05)    CSF Analysis: [] N/A      Allergies    No Known Allergies    Intolerances      MEDICATIONS:  Antibiotics:  cefepime   IVPB 2000 milliGRAM(s) IV Intermittent every 8 hours  tenofovir disoproxil fumarate (VIREAD) 300 milliGRAM(s) Oral every 24 hours  vancomycin  IVPB 1250 milliGRAM(s) IV Intermittent every 12 hours    Neuro:  acetaminophen    Suspension .. 650 milliGRAM(s) Oral every 6 hours PRN    Anticoagulation:    OTHER:  acetylcysteine 20%  Inhalation 4 milliLiter(s) Inhalation every 6 hours PRN  ALBUTerol    90 MICROgram(s) HFA Inhaler 2 Puff(s) Inhalation every 6 hours PRN  artificial  tears Solution 1 Drop(s) Both EYES three times a day  bisacodyl 5 milliGRAM(s) Oral at bedtime PRN  chlorhexidine 0.12% Liquid 15 milliLiter(s) Oral Mucosa two times a day  chlorhexidine 2% Cloths 1 Application(s) Topical daily  dextrose 40% Gel 15 Gram(s) Oral once PRN  dextrose 50% Injectable 12.5 Gram(s) IV Push once  dextrose 50% Injectable 25 Gram(s) IV Push once  dextrose 50% Injectable 25 Gram(s) IV Push once  doxazosin 4 milliGRAM(s) Oral at bedtime  finasteride 5 milliGRAM(s) Oral daily  glucagon  Injectable 1 milliGRAM(s) IntraMuscular once PRN  insulin lispro (HumaLOG) corrective regimen sliding scale   SubCutaneous Before meals and at bedtime  polyethylene glycol 3350 17 Gram(s) Oral daily  propranolol 10 milliGRAM(s) Oral four times a day  senna 2 Tablet(s) Oral at bedtime    IVF:  dextrose 5%. 1000 milliLiter(s) IV Continuous <Continuous>    CULTURES:  Culture Results:   Few Staphylococcus aureus  Presumptive Methicillin susceptible  Confirmation to follow within 24 hrs.  Susceptibility to follow.  Culture in progress (09-27 @ 11:56)  Culture Results:   No growth to date (09-27 @ 11:56)    RADIOLOGY & ADDITIONAL TESTS:      ASSESSMENT:  59y M PMH of HLD and unclear liver disease/ HBV s/p SOC for evacuation of cerebellar hematoma (8/4/2020) w/ Dr. Blank, subsequently discharged to [facility] now s/p SOC washout and debridement for purulent wound drainage POD # 3    INTRACRANIAL BLEED    POST OP COMPLICATIONS    No pertinent family history in first degree relatives    Handoff    MEWS Score    Hepatitis B    HLD (hyperlipidemia)    Wound infection    Wound infection    Exploration, wound, head    Foot fracture, left    SysAdmin_VstLnk        PLAN:  - neuro checks   - pain control   - vitals   - cont. home meds   - pt/ot   - cont. cns abx coverage   - f/u OR culture   - f/u drain output       Cardio:   - normotensive goal     Pulm:   - IS   - RA     Renal:   - IVL  - remove ruff/ a-line     GI:   - resume TF   - Bowel regimen, metamucil     Heme:   - H&H stable     ID:   - afebrile, no leukocytosis   - ID following :  F/U OR cultures  - vancomycin increased to 1250mg q2h.  Trough prior to 4th dose.  Goal - 14-17 for now.  - Continue cefepime 2 g IV q8h  - Continue tenofovir for HBV.  - f/u pan culture results    - start vancomycin and cefepime for empiric CSF coverage  - Trough 9/30 4 PM   ? PICC line     Endo:   - ISS   dopplers ordered   SCDs b/l LE, hold DVT chemoprophylaxis at this time     DVT PROPHYLAXIS:  [x] Venodynes                                [] Heparin/Lovenox    DISPOSITION: SDU status, dispo pending, full code    Assessment:  Present when checked    []  GCS  E   V  M     Heart Failure: []Acute, [] acute on chronic , []chronic  Heart Failure:  [] Diastolic (HFpEF), [] Systolic (HFrEF), []Combined (HFpEF and HFrEF), [] RHF, [] Pulm HTN, [] Other    [] SARAH, [] ATN, [] AIN, [] other  [] CKD1, [] CKD2, [] CKD 3, [] CKD 4, [] CKD 5, []ESRD    Encephalopathy: [] Metabolic, [] Hepatic, [] toxic, [] Neurological, [] Other    Abnormal Nurtitional Status: [] malnurtition (see nutrition note), [ ]underweight: BMI < 19, [] morbid obesity: BMI >40, [] Cachexia    [] Sepsis  [] hypovolemic shock,[] cardiogenic shock, [] hemorrhagic shock, [] neuogenic shock  [] Acute Respiratory Failure  []Cerebral edema, [] Brain compression/ herniation,   [] Functional quadriplegia  [] Acute blood loss anemia

## 2020-09-29 NOTE — PROCEDURE NOTE - GENERAL PROCEDURE DETAILS
After a sterile prep, the hemovac was taken off suction prepped with chlorhexadine and carefully removed. 1 staple was placed for close and a sterile gauze dressing applied.

## 2020-09-30 ENCOUNTER — TRANSCRIPTION ENCOUNTER (OUTPATIENT)
Age: 60
End: 2020-09-30

## 2020-09-30 LAB
-  CEFAZOLIN: SIGNIFICANT CHANGE UP
-  CLINDAMYCIN: SIGNIFICANT CHANGE UP
-  ERYTHROMYCIN: SIGNIFICANT CHANGE UP
-  LINEZOLID: SIGNIFICANT CHANGE UP
-  OXACILLIN: SIGNIFICANT CHANGE UP
-  RIFAMPIN: SIGNIFICANT CHANGE UP
-  TRIMETHOPRIM/SULFAMETHOXAZOLE: SIGNIFICANT CHANGE UP
-  VANCOMYCIN: SIGNIFICANT CHANGE UP
ANION GAP SERPL CALC-SCNC: 10 MMOL/L — SIGNIFICANT CHANGE UP (ref 5–17)
BUN SERPL-MCNC: 11 MG/DL — SIGNIFICANT CHANGE UP (ref 7–23)
CALCIUM SERPL-MCNC: 8.6 MG/DL — SIGNIFICANT CHANGE UP (ref 8.4–10.5)
CHLORIDE SERPL-SCNC: 102 MMOL/L — SIGNIFICANT CHANGE UP (ref 96–108)
CO2 SERPL-SCNC: 26 MMOL/L — SIGNIFICANT CHANGE UP (ref 22–31)
CREAT SERPL-MCNC: 0.59 MG/DL — SIGNIFICANT CHANGE UP (ref 0.5–1.3)
CULTURE RESULTS: SIGNIFICANT CHANGE UP
GLUCOSE BLDC GLUCOMTR-MCNC: 119 MG/DL — HIGH (ref 70–99)
GLUCOSE BLDC GLUCOMTR-MCNC: 124 MG/DL — HIGH (ref 70–99)
GLUCOSE BLDC GLUCOMTR-MCNC: 132 MG/DL — HIGH (ref 70–99)
GLUCOSE BLDC GLUCOMTR-MCNC: 134 MG/DL — HIGH (ref 70–99)
GLUCOSE SERPL-MCNC: 146 MG/DL — HIGH (ref 70–99)
HCT VFR BLD CALC: 32.2 % — LOW (ref 39–50)
HGB BLD-MCNC: 10.1 G/DL — LOW (ref 13–17)
MAGNESIUM SERPL-MCNC: 2.4 MG/DL — SIGNIFICANT CHANGE UP (ref 1.6–2.6)
MCHC RBC-ENTMCNC: 27.2 PG — SIGNIFICANT CHANGE UP (ref 27–34)
MCHC RBC-ENTMCNC: 31.4 GM/DL — LOW (ref 32–36)
MCV RBC AUTO: 86.8 FL — SIGNIFICANT CHANGE UP (ref 80–100)
METHOD TYPE: SIGNIFICANT CHANGE UP
NRBC # BLD: 0 /100 WBCS — SIGNIFICANT CHANGE UP (ref 0–0)
ORGANISM # SPEC MICROSCOPIC CNT: SIGNIFICANT CHANGE UP
ORGANISM # SPEC MICROSCOPIC CNT: SIGNIFICANT CHANGE UP
PHOSPHATE SERPL-MCNC: 3.4 MG/DL — SIGNIFICANT CHANGE UP (ref 2.5–4.5)
PLATELET # BLD AUTO: 307 K/UL — SIGNIFICANT CHANGE UP (ref 150–400)
POTASSIUM SERPL-MCNC: 3.8 MMOL/L — SIGNIFICANT CHANGE UP (ref 3.5–5.3)
POTASSIUM SERPL-SCNC: 3.8 MMOL/L — SIGNIFICANT CHANGE UP (ref 3.5–5.3)
RBC # BLD: 3.71 M/UL — LOW (ref 4.2–5.8)
RBC # FLD: 15.9 % — HIGH (ref 10.3–14.5)
SODIUM SERPL-SCNC: 138 MMOL/L — SIGNIFICANT CHANGE UP (ref 135–145)
SPECIMEN SOURCE: SIGNIFICANT CHANGE UP
WBC # BLD: 10.87 K/UL — HIGH (ref 3.8–10.5)
WBC # FLD AUTO: 10.87 K/UL — HIGH (ref 3.8–10.5)

## 2020-09-30 PROCEDURE — 99024 POSTOP FOLLOW-UP VISIT: CPT

## 2020-09-30 PROCEDURE — 36569 INSJ PICC 5 YR+ W/O IMAGING: CPT

## 2020-09-30 PROCEDURE — 99232 SBSQ HOSP IP/OBS MODERATE 35: CPT

## 2020-09-30 PROCEDURE — 76937 US GUIDE VASCULAR ACCESS: CPT | Mod: 26,59

## 2020-09-30 RX ORDER — ENOXAPARIN SODIUM 100 MG/ML
40 INJECTION SUBCUTANEOUS AT BEDTIME
Refills: 0 | Status: DISCONTINUED | OUTPATIENT
Start: 2020-09-30 | End: 2020-09-30

## 2020-09-30 RX ORDER — POTASSIUM CHLORIDE 20 MEQ
20 PACKET (EA) ORAL ONCE
Refills: 0 | Status: COMPLETED | OUTPATIENT
Start: 2020-09-30 | End: 2020-09-30

## 2020-09-30 RX ORDER — CHLORHEXIDINE GLUCONATE 213 G/1000ML
1 SOLUTION TOPICAL
Refills: 0 | Status: DISCONTINUED | OUTPATIENT
Start: 2020-09-30 | End: 2020-10-01

## 2020-09-30 RX ORDER — SODIUM CHLORIDE 9 MG/ML
10 INJECTION INTRAMUSCULAR; INTRAVENOUS; SUBCUTANEOUS
Refills: 0 | Status: DISCONTINUED | OUTPATIENT
Start: 2020-09-30 | End: 2020-10-01

## 2020-09-30 RX ORDER — PSYLLIUM SEED (WITH DEXTROSE)
1 POWDER (GRAM) ORAL DAILY
Refills: 0 | Status: DISCONTINUED | OUTPATIENT
Start: 2020-09-30 | End: 2020-10-01

## 2020-09-30 RX ORDER — ENOXAPARIN SODIUM 100 MG/ML
40 INJECTION SUBCUTANEOUS EVERY 24 HOURS
Refills: 0 | Status: DISCONTINUED | OUTPATIENT
Start: 2020-09-30 | End: 2020-10-01

## 2020-09-30 RX ADMIN — Medication 1 DROP(S): at 14:07

## 2020-09-30 RX ADMIN — Medication 4 MILLIGRAM(S): at 22:57

## 2020-09-30 RX ADMIN — NAFCILLIN 200 GRAM(S): 10 INJECTION, POWDER, FOR SOLUTION INTRAVENOUS at 15:24

## 2020-09-30 RX ADMIN — ENOXAPARIN SODIUM 40 MILLIGRAM(S): 100 INJECTION SUBCUTANEOUS at 22:57

## 2020-09-30 RX ADMIN — NAFCILLIN 200 GRAM(S): 10 INJECTION, POWDER, FOR SOLUTION INTRAVENOUS at 03:21

## 2020-09-30 RX ADMIN — NAFCILLIN 200 GRAM(S): 10 INJECTION, POWDER, FOR SOLUTION INTRAVENOUS at 07:32

## 2020-09-30 RX ADMIN — CHLORHEXIDINE GLUCONATE 15 MILLILITER(S): 213 SOLUTION TOPICAL at 17:49

## 2020-09-30 RX ADMIN — NAFCILLIN 200 GRAM(S): 10 INJECTION, POWDER, FOR SOLUTION INTRAVENOUS at 11:21

## 2020-09-30 RX ADMIN — NAFCILLIN 200 GRAM(S): 10 INJECTION, POWDER, FOR SOLUTION INTRAVENOUS at 23:37

## 2020-09-30 RX ADMIN — TENOFOVIR DISOPROXIL FUMARATE 300 MILLIGRAM(S): 300 TABLET, FILM COATED ORAL at 14:58

## 2020-09-30 RX ADMIN — Medication 1 DROP(S): at 05:53

## 2020-09-30 RX ADMIN — Medication 20 MILLIEQUIVALENT(S): at 11:21

## 2020-09-30 RX ADMIN — Medication 1 DROP(S): at 21:49

## 2020-09-30 RX ADMIN — CHLORHEXIDINE GLUCONATE 15 MILLILITER(S): 213 SOLUTION TOPICAL at 06:17

## 2020-09-30 RX ADMIN — FINASTERIDE 5 MILLIGRAM(S): 5 TABLET, FILM COATED ORAL at 11:21

## 2020-09-30 RX ADMIN — Medication 1 PACKET(S): at 15:55

## 2020-09-30 RX ADMIN — NAFCILLIN 200 GRAM(S): 10 INJECTION, POWDER, FOR SOLUTION INTRAVENOUS at 19:23

## 2020-09-30 NOTE — DISCHARGE NOTE PROVIDER - HOSPITAL COURSE
59 year old male with PMH of HLD and unclear liver disease s/p SOC for evacuation of cerebellar hematoma (8/4/2020) w/ Dr. Blank, subsequently discharged to [facility] now presents to [facility] for admission d/t purulent drainage from SOC site.  Pt had PEG placed on previous [facility] admission. At rehab, pt reported to be febrile on 9/14, and was treated w/ 7 day course of Zosyn (9/14-9/21) for BLAINE PNA. At rehab pt had a ruff in-place which was removed for TOV. Prior to transfer from rehab to [facility], pt was afebrile w/ WBC 13. At this time pt complaining of a mild headache 3/10. Denies vision changes, photophobia, nuchal rigidity, LOC, dizziness, recent travel or known sick contact.     Hospital Course:  9/27: POD#0 s/p cranial wound washout and debridement. Fent IV P x 1 given for pain  9/28: EMIL o/n, neuro at baseline, on room air  9/29: EMIL overnight, suctioning for secretions. Hemovac removed today  9/30: EMIL overnight. Neuro exam stable. Pt remains on nafcillin for wound infection, for a total 6 weeks per ID team 59 year old male with PMH of HLD and unclear liver disease s/p SOC for evacuation of cerebellar hematoma (8/4/2020) w/ Dr. Blank, subsequently discharged to [facility] now presents to [facility] for admission d/t purulent drainage from SOC site.  Pt had PEG placed on previous [facility] admission. At rehab, pt reported to be febrile on 9/14, and was treated w/ 7 day course of Zosyn (9/14-9/21) for BLAINE PNA. At rehab pt had a ruff in-place which was removed for TOV. Prior to transfer from rehab to [facility], pt was afebrile w/ WBC 13. At this time pt complaining of a mild headache 3/10. Denies vision changes, photophobia, nuchal rigidity, LOC, dizziness, recent travel or known sick contact.     Hospital Course:  9/27: POD#0 s/p cranial wound washout and debridement. Fent IV P x 1 given for pain  9/28: EMIL o/n, neuro at baseline, on room air  9/29: EMIL overnight, suctioning for secretions. Hemovac removed today  9/30: EMIL overnight. Neuro exam stable. Pt remains on nafcillin for wound infection, for a total 6 weeks per ID team  10/1 Uneventful night COvid rapid testing performed

## 2020-09-30 NOTE — DISCHARGE NOTE PROVIDER - NSDCFUADDAPPT_GEN_ALL_CORE_FT
You have a follow-up appointment with Dr. Blank on You have a follow-up appointment with Dr. Blank on 10/9/20 at 1:30pm You have a follow-up appointment with Dr. Blank on 10/9/20 at 1:30pm  Last day of IV antibx 11/7  Follow up Dr Hendricks ID    Pleasee do weekly CBC with diff, Nasic metabolic, ESR and CRP levels and sent to ID Dr Hendricks

## 2020-09-30 NOTE — DISCHARGE NOTE PROVIDER - NSDCCPTREATMENT_GEN_ALL_CORE_FT
PRINCIPAL PROCEDURE  Procedure: Exploration, wound, head  Findings and Treatment: exploration of suboccipital craniotomy wound w/ washout

## 2020-09-30 NOTE — DISCHARGE NOTE PROVIDER - NSDCFUADDINST_GEN_ALL_CORE_FT
1. You should wash your hair starting 24 hours after being discharged. Use your normal  shampoo. During the shampoo, massage the staples to remove any dried blood  or crusting. This keeps your wound clean and helps healing. You should shampoo everyday.  2. Pat the wound dry with a clean towel afterwards and leave it open to air. Do not  apply creams or ointments to the wound. Mild swelling around the incision is  common.  3. Follow-up with one of the Nurse Practitioners for suture or staple removal 7-10  days after surgery.   4. Inform the doctor immediately if you have a fever (above 101), chills, night  sweats, wound drainage, increasing wound redness or pain, nausea, vomiting, or  worsening headache.  B. ACTIVITY LEVEL  1. Fatigue is common following brain surgery. Listen to your body and rest if  you feel tired.  2. You should get up and walk around every hour during the daytime.  3. No bending, lifting or twisting for the first 3 weeks, but walking is  recommended.  4. Drink plenty of water.  5. In summer, stay out of direct sun and heat; remain in the shade when  outdoors.   Pain Medications: You may be given a narcotic pain reliever such as Percocet  (oxycodone-acetaminophen). These are for use only for a short time after  surgery. They may cause drowsiness, nausea, and constipation. Take after food   and drink plenty of water to help reduce side effects. Do not drink alcohol with  these medications.

## 2020-09-30 NOTE — DISCHARGE NOTE PROVIDER - CARE PROVIDER_API CALL
Bryan Blank)  Neurosurgery  [address], [place] [facility]  Phone: [phone]  Fax: [phone]  Follow Up Time:

## 2020-09-30 NOTE — DISCHARGE NOTE PROVIDER - NSDCMRMEDTOKEN_GEN_ALL_CORE_FT
acetaminophen 160 mg/5 mL oral suspension: 20 milliliter(s) orally every 6 hours, As Needed - 3)  acetylcysteine 20% inhalation solution: 4 milliliter(s) inhaled every 6 hours  chlorhexidine 2% topical pad: 1 application topically   Combivent Respimat 20 mcg-100 mcg/inh inhalation aerosol: 1 puff(s) inhaled 4 times a day  doxazosin 4 mg oral tablet: 1 tab(s) orally once a day (at bedtime)  Elocon 0.1% topical ointment: Apply topically to affected area 2 times a day, As Needed  -for dry skin - for itching   enoxaparin: 40 milligram(s) subcutaneously once a day (at bedtime)  finasteride 5 mg oral tablet: 1 tab(s) orally once a day  ipratropium-albuterol 0.5 mg-2.5 mg/3 mLinhalation solution: 3 milliliter(s) inhaled every 6 hours  ocular lubricant ophthalmic solution: 1 drop(s) to each affected eye 3 times a day  propranolol 10 mg oral tablet: orally 4 times a day  psyllium 3.4 g/7 g oral powder for reconstitution: 1 packet(s) by gastrostomy tube once a day  Refresh Dry Eye Therapy ophthalmic solution: to each affected eye 3 times a day  scopolamine: 1 milligram(s) transdermally every 72 hours  Sodium Chloride, Inhalation 0.9% inhalation solution: 3 milliliter(s) inhaled 3 times a day, As needed, for congestion/secretions  tenofovir disoproxil fumarate 300 mg oral tablet: 1 tab(s) orally every 24 hours   acetaminophen 160 mg/5 mL oral suspension: 20 milliliter(s) orally every 6 hours, As Needed - 3)  acetylcysteine 20% inhalation solution: 4 milliliter(s) inhaled every 6 hours  albuterol 90 mcg/inh inhalation aerosol: 2 puff(s) inhaled every 6 hours, As needed, Shortness of Breath and/or Wheezing  doxazosin 4 mg oral tablet: 1 tab(s) orally once a day (at bedtime)  Elocon 0.1% topical ointment: Apply topically to affected area 2 times a day, As Needed  -for dry skin - for itching   enoxaparin: 40 milligram(s) subcutaneously once a day (at bedtime)  finasteride 5 mg oral tablet: 1 tab(s) orally once a day  nafcillin: 2 gram(s) intravenous every 4 hours  propranolol 10 mg oral tablet: orally 4 times a day  psyllium 3.4 g/7 g oral powder for reconstitution: 1 packet(s) by gastrostomy tube once a day  scopolamine: 1 milligram(s) transdermally every 72 hours  Sodium Chloride, Inhalation 0.9% inhalation solution: 3 milliliter(s) inhaled 3 times a day, As needed, for congestion/secretions  tenofovir disoproxil fumarate 300 mg oral tablet: 1 tab(s) orally every 24 hours

## 2020-09-30 NOTE — CHART NOTE - NSCHARTNOTEFT_GEN_A_CORE
Admitting Diagnosis:   Patient is a 60y old  Male who presents with a chief complaint of SOC wound washout (30 Sep 2020 12:34)      PAST MEDICAL & SURGICAL HISTORY:  Hepatitis B    HLD (hyperlipidemia)    Foot fracture, left  s/p repair 2019        Current Nutrition Order:  Vital 1.5 @51ml/hr x24hrs via PEG. Provides 1224ml TV, 1836kcal, 82g pro, 931ml FW  *infusing at ordered goal    PO Intake: Good (%) [   ]  Fair (50-75%) [   ] Poor (<25%) [   ] N/A NPO w/ TF    GI Issues:   No apparent GI distress  Last BM 9/29 per RN  Ordered for miralax, dulcolax, senna  PEG placed 8/21    Pain:  Ordered for tylenol    Skin Integrity:  Surgical incision  Hemovac removed 9/29  Wound infection-on nafcillin for 6 weeks  Zachariah score 14      Labs:   09-30    138  |  102  |  11  ----------------------------<  146<H>  3.8   |  26  |  0.59    Ca    8.6      30 Sep 2020 06:09  Phos  3.4     09-30  Mg     2.4     09-30      CAPILLARY BLOOD GLUCOSE      POCT Blood Glucose.: 134 mg/dL (30 Sep 2020 11:17)  POCT Blood Glucose.: 124 mg/dL (30 Sep 2020 06:33)  POCT Blood Glucose.: 136 mg/dL (29 Sep 2020 22:50)  POCT Blood Glucose.: 135 mg/dL (29 Sep 2020 17:02)      Medications:  MEDICATIONS  (STANDING):  artificial  tears Solution 1 Drop(s) Both EYES three times a day  chlorhexidine 0.12% Liquid 15 milliLiter(s) Oral Mucosa two times a day  chlorhexidine 2% Cloths 1 Application(s) Topical daily  chlorhexidine 2% Cloths 1 Application(s) Topical <User Schedule>  dextrose 5%. 1000 milliLiter(s) (50 mL/Hr) IV Continuous <Continuous>  dextrose 50% Injectable 12.5 Gram(s) IV Push once  dextrose 50% Injectable 25 Gram(s) IV Push once  dextrose 50% Injectable 25 Gram(s) IV Push once  doxazosin 4 milliGRAM(s) Oral at bedtime  enoxaparin Injectable 40 milliGRAM(s) SubCutaneous every 24 hours  finasteride 5 milliGRAM(s) Oral daily  insulin lispro (HumaLOG) corrective regimen sliding scale   SubCutaneous Before meals and at bedtime  nafcillin  IVPB 2 Gram(s) IV Intermittent every 4 hours  polyethylene glycol 3350 17 Gram(s) Oral daily  propranolol 10 milliGRAM(s) Oral four times a day  senna 2 Tablet(s) Oral at bedtime  tenofovir disoproxil fumarate (VIREAD) 300 milliGRAM(s) Oral every 24 hours    MEDICATIONS  (PRN):  acetaminophen    Suspension .. 650 milliGRAM(s) Oral every 6 hours PRN Temp greater or equal to 38C (100.4F), Mild Pain (1 - 3)  acetylcysteine 20%  Inhalation 4 milliLiter(s) Inhalation every 6 hours PRN SOB  ALBUTerol    90 MICROgram(s) HFA Inhaler 2 Puff(s) Inhalation every 6 hours PRN Shortness of Breath and/or Wheezing  bisacodyl 5 milliGRAM(s) Oral at bedtime PRN Constipation  dextrose 40% Gel 15 Gram(s) Oral once PRN Blood Glucose LESS THAN 70 milliGRAM(s)/deciliter  glucagon  Injectable 1 milliGRAM(s) IntraMuscular once PRN Glucose LESS THAN 70 milligrams/deciliter  sodium chloride 0.9% lock flush 10 milliLiter(s) IV Push every 1 hour PRN Pre/post blood products, medications, blood draw, and to maintain line patency      Admitted Anthropometrics:  Height: 5'5", IBW 136lbs+/-10%, %IBW 85%, BMI 19.2    Weight: 115lbs (9/26)  Daily     Weight Change:   Of note pt has had a 16lb wt loss since last admission. Weight was 131lbs (8/4). Current BW is 115lbs (9/26). This could be due to either insufficient intake w/ EN regimen or inconsistent infusion of enteral feeds. Will adjust current recommendations based on weight loss.     Estimated energy needs:   ABW (52.27kg) used for calculations as pt between % of IBW.   Nutrient needs based on [facility] standards of care for maintenance in adults.   Needs adjusted for post-op healing, unintentional wt loss. Suspected malnutrition.  1569-1830kcal/day (30-35kcal/kg)  73-83g pro/day (1.4-1.6g pro/kg)  1569-1830ml fluid/day (30-35ml/kg)    Subjective:   60 year old male with PMH of HLD and unclear liver disease s/p SOC for evacuation of cerebellar hematoma (8/4/2020) w/ Dr. Blank, subsequently discharged to [facility] now presents to [facility] for admission d/t purulent drainage from SOC site. Plan for wound washout and revision of SOC incision. Now S/p SOC washout and debridement 9/17.    Pt known to this RD from prior admission. A&Ox1, 1:1 aide at bedside. Currently ordered for Vital @51ml/hr (increased from prior admission 2/2 weight loss). No apparent GI distress, tolerating feeds well. On abx for 6 weeks for wound infection; s/p PICC placement. D/w RN obtaining updated weight when patient returns to bed. POCT , 136, 135mg/dL. RD to follow.      Nutrition Diagnosis:  inadequate oral intake RT unable to take PO 2/2 poor swallow function AEB EN dependant for meeting 100% EER    Goal:  Pt to consistently meet % of estimated needs via most appropriate rout    Recommendations:  1. Recommend Vital 1.5 @51ml/hr x24hrs via PEG. Provides 1224ml TV, 1836kcal, 82g pro, 931ml FW. Recommend additional 1L FW/day via PEG.   2. Monitor for s/s intolerance. Maintain aspiration precautions at all times  3. if tolerating consider moving back onto generic formula vs. specialized peptide based one.  4. Pain and bowel regimen per team  5. BMP, BG Q6hrs, CBC per MD discretion   *d/w team    Education:   N/A AMS    Risk Level: High [ x  ] Moderate [   ] Low [   ].

## 2020-09-30 NOTE — PROGRESS NOTE ADULT - SUBJECTIVE AND OBJECTIVE BOX
HPI:  59 year old male with PMH of HLD and unclear liver disease s/p SOC for evacuation of cerebellar hematoma (8/4/2020) w/ Dr. Blank, subsequently discharged to [facility] now presents to [facility] for admission d/t purulent drainage from SOC site.  Pt had PEG placed on previous [facility] admission. At rehab, pt reported to be febrile on 9/14, and was treated w/ 7 day course of Zosyn (9/14-9/21) for BLAINE PNA. At rehab pt had a ruff in-place which was removed for TOV. Prior to transfer from rehab to [facility], pt was afebrile w/ WBC 13. At this time pt complaining of a mild headache 3/10. Denies vision changes, photophobia, nuchal rigidity, LOC, dizziness, recent travel or known sick contact.     HPI from previous admission (8/4/2020): Pt  had acute onset of severe headache, dizziness and vomiting x1 today, was brought into [facility] with continued symptoms, as well as hypertension and multiple episodes of vomiting. Patient was intubated for airway protection with CT Head performed demonstrating a large left cerebellar hemorrhage. CTA Head/Neck performed is suggestive of underlying vascular pathology such as AVM. RIght frontal ventricul[place] at [facility] [place] and patient transferred to [facility] for further work-up. Patient arrives to [facility] intubated, sedated and on Nicardipine drip. Patient's son provided history and denies any Aspirin or other anticoagulant use. Denies any prior history of hypertension, smoking, or ETOH abuse. (26 Sep 2020 21:19)    Hospital Course:  9/27: s/p cranial wound washout and debridement. Fent IV P x 1 for pain  9/28: EMIL o/n, neuro at baseline, on RA   9/29: EMIL overnight, suctioning for secretions. Hemovac removed today  9/30: EMIL overnight. Neuro exam stable. Pt remains on nafcillin for wound infection, total 6 weeks    Vital Signs Last 24 Hrs  T(C): 36.9 (30 Sep 2020 08:45), Max: 37.1 (29 Sep 2020 22:11)  T(F): 98.5 (30 Sep 2020 08:45), Max: 98.7 (29 Sep 2020 22:11)  HR: 72 (30 Sep 2020 09:05) (66 - 82)  BP: 125/67 (30 Sep 2020 09:05) (122/76 - 173/96)  BP(mean): 90 (30 Sep 2020 09:05) (90 - 124)  RR: 19 (30 Sep 2020 08:25) (16 - 20)  SpO2: 97% (30 Sep 2020 09:05) (93% - 98%)    I&O's Summary    29 Sep 2020 07:01  -  30 Sep 2020 07:00  --------------------------------------------------------  IN: 711 mL / OUT: 725 mL / NET: -14 mL    30 Sep 2020 07:01  -  30 Sep 2020 10:04  --------------------------------------------------------  IN: 153 mL / OUT: 0 mL / NET: 153 mL    PHYSICAL EXAM:  General: patient seen laying supine in bed in NAD  [facility], OE spontaneously, speech hypophonic, CNII-XI grossly intact, face symmetric,  strength 5/5 b/l UE and LE, SILT throughout  HEENT: PERRL, EOMI  Neck: supple  Cardiac: RRR, S1S2  Pulmonary: chest rise symmetric, coarse breath sounds throughout  Abdomen: soft, nontender, nondistended  Ext: warm, perfusing well  Incision: SOC wound c/d/i    TUBES/LINES:  [] Ruff  [] Lumbar Drain  [] Wound Drains  [] Others    DIET:  [] NPO  [] Mechanical  [x] Tube feeds    LABS:                        10.1   10.87 )-----------( 307      ( 30 Sep 2020 06:09 )             32.2     09-30    138  |  102  |  11  ----------------------------<  146<H>  3.8   |  26  |  0.59    Ca    8.6      30 Sep 2020 06:09  Phos  3.4     09-30  Mg     2.4     09-30              CAPILLARY BLOOD GLUCOSE      POCT Blood Glucose.: 124 mg/dL (30 Sep 2020 06:33)  POCT Blood Glucose.: 136 mg/dL (29 Sep 2020 22:50)  POCT Blood Glucose.: 135 mg/dL (29 Sep 2020 17:02)  POCT Blood Glucose.: 123 mg/dL (29 Sep 2020 11:14)      Drug Levels: [] N/A  Vancomycin Level, Trough: 12.6 ug/mL (09-28 @ 18:05)    CSF Analysis: [] N/A      Allergies    No Known Allergies    Intolerances      MEDICATIONS:  Antibiotics:  nafcillin  IVPB 2 Gram(s) IV Intermittent every 4 hours  tenofovir disoproxil fumarate (VIREAD) 300 milliGRAM(s) Oral every 24 hours    Neuro:  acetaminophen    Suspension .. 650 milliGRAM(s) Oral every 6 hours PRN    Anticoagulation:    OTHER:  acetylcysteine 20%  Inhalation 4 milliLiter(s) Inhalation every 6 hours PRN  ALBUTerol    90 MICROgram(s) HFA Inhaler 2 Puff(s) Inhalation every 6 hours PRN  artificial  tears Solution 1 Drop(s) Both EYES three times a day  bisacodyl 5 milliGRAM(s) Oral at bedtime PRN  chlorhexidine 0.12% Liquid 15 milliLiter(s) Oral Mucosa two times a day  chlorhexidine 2% Cloths 1 Application(s) Topical daily  dextrose 40% Gel 15 Gram(s) Oral once PRN  dextrose 50% Injectable 12.5 Gram(s) IV Push once  dextrose 50% Injectable 25 Gram(s) IV Push once  dextrose 50% Injectable 25 Gram(s) IV Push once  doxazosin 4 milliGRAM(s) Oral at bedtime  finasteride 5 milliGRAM(s) Oral daily  glucagon  Injectable 1 milliGRAM(s) IntraMuscular once PRN  insulin lispro (HumaLOG) corrective regimen sliding scale   SubCutaneous Before meals and at bedtime  polyethylene glycol 3350 17 Gram(s) Oral daily  propranolol 10 milliGRAM(s) Oral four times a day  senna 2 Tablet(s) Oral at bedtime    IVF:  dextrose 5%. 1000 milliLiter(s) IV Continuous <Continuous>    CULTURES:  Culture Results:   Few Staphylococcus aureus  Rare Staphylococcus epidermidis (09-27 @ 11:56)  Culture Results:   No growth to date (09-27 @ 11:56)    RADIOLOGY & ADDITIONAL TESTS:      ASSESSMENT:  59y M PMH of HLD and unclear liver disease/ HBV s/p SOC for evacuation of cerebellar hematoma (8/4/2020) w/ Dr. Blank, subsequently discharged to [facility] now s/p SOC washout and debridement for purulent wound drainage POD #3    INTRACRANIAL BLEED    POST OP COMPLICATIONS    No pertinent family history in first degree relatives    Handoff    MEWS Score    Hepatitis B    HLD (hyperlipidemia)    Wound infection    Wound infection    Exploration, wound, head    Foot fracture, left    SysAdmin_VstLnk    PLAN:  - neuro checks   - pain control   - vitals   - cont. home meds   - pt/ot   - normotensive SBP goal   - TF, bowel regimen, metamucil   - ID following :  .OR cultures growing staph epidermidis and staph aureus. Continue nafcillin q4h x total 6 weeks   - obtain PICC line today  - Continue tenofovir for HBV  - f/u pan culture results    - ISS  - SCDs b/l LE, SQL    DVT PROPHYLAXIS:  [x] Venodynes                                [x] Heparin/Lovenox    DISPOSITION: SDU status, dispo pending back to [facility], full code    Assessment:  Present when checked    []  GCS  E   V  M     Heart Failure: []Acute, [] acute on chronic , []chronic  Heart Failure:  [] Diastolic (HFpEF), [] Systolic (HFrEF), []Combined (HFpEF and HFrEF), [] RHF, [] Pulm HTN, [] Other    [] SARAH, [] ATN, [] AIN, [] other  [] CKD1, [] CKD2, [] CKD 3, [] CKD 4, [] CKD 5, []ESRD    Encephalopathy: [] Metabolic, [] Hepatic, [] toxic, [] Neurological, [] Other    Abnormal Nurtitional Status: [] malnurtition (see nutrition note), [ ]underweight: BMI < 19, [] morbid obesity: BMI >40, [] Cachexia    [] Sepsis  [] hypovolemic shock,[] cardiogenic shock, [] hemorrhagic shock, [] neuogenic shock  [] Acute Respiratory Failure  []Cerebral edema, [] Brain compression/ herniation,   [] Functional quadriplegia  [] Acute blood loss anemia    DISPOSITION:

## 2020-09-30 NOTE — DISCHARGE NOTE PROVIDER - NSDCCPCAREPLAN_GEN_ALL_CORE_FT
PRINCIPAL DISCHARGE DIAGNOSIS  Diagnosis: Wound drainage  Assessment and Plan of Treatment: scalp

## 2020-09-30 NOTE — CONSULT NOTE ADULT - SUBJECTIVE AND OBJECTIVE BOX
Vascular Access Service Consult Note    60yMaleHEALTH ISSUES - PROBLEM Dx:             Diagnosis: scalp infection    Indications for Vascular Access (Check all that apply)  [  X]  Antibiotic Therapy       Antibiotic Prescribed:  nafcillin                                                                           Expected Duration of Therapy:  2 weeks             [  ]  IV Hydration  [  ]  Total Parenteral Nutrition  [  ]  Chemotherapy  [  ]  Difficult Venous Access  [  ]  CVP monitoring  [  ]  Medications with high potential for tissue necrosis on extravasation  [  ]  Other    Screening (Check all that apply)  Previous Radiation to chest  [  ] Yes      [  X]  No  Breast Cancer                          [  ] Left     [  ]  Right    [ X ]  No  Lymph Node Dissection         [  ] Left     [  ]  Right    [  X  No  Pacemaker or ICD                   [  ] Left     [  ]  Right    [X]  No  Chronic Kidney Disease         [  ]  Yes     [  X]  No  Hemodialysis                           [  ]  Yes     [ X ]  No  AV Fistula/ Graft                     [  ]  Left    [  ]  Right    X[  ]  No  Temp>101F in past 24 H       [  ]  Yes     [ X ]  No  H/O PICC/Midline                   [  ]  Yes     [ X ]  No    Lab data:                        10.1   10.87 )-----------( 307      ( 30 Sep 2020 06:09 )             32.2     09-30    138  |  102  |  11  ----------------------------<  146<H>  3.8   |  26  |  0.59    Ca    8.6      30 Sep 2020 06:09  Phos  3.4     09-30  Mg     2.4     09-30                I have reviewed the chart, interviewed and examined the patient and determined that this patient:  [X  ] Is a candidate for a PICC line  [  ] Is a candidate for a Midline  [  ] Is not a candidate for vascular access device (reason)    Lumens:    [X  ] Single  [  ] Double

## 2020-09-30 NOTE — PROCEDURE NOTE - NSPROCDETAILS_GEN_ALL_CORE
supine position/location identified, draped/prepped, sterile technique used/sterile dressing applied/sterile technique, catheter placed

## 2020-10-01 ENCOUNTER — TRANSCRIPTION ENCOUNTER (OUTPATIENT)
Age: 60
End: 2020-10-01

## 2020-10-01 VITALS — OXYGEN SATURATION: 96 % | HEART RATE: 76 BPM | RESPIRATION RATE: 18 BRPM

## 2020-10-01 LAB
ANION GAP SERPL CALC-SCNC: 13 MMOL/L — SIGNIFICANT CHANGE UP (ref 5–17)
BUN SERPL-MCNC: 9 MG/DL — SIGNIFICANT CHANGE UP (ref 7–23)
CALCIUM SERPL-MCNC: 9 MG/DL — SIGNIFICANT CHANGE UP (ref 8.4–10.5)
CHLORIDE SERPL-SCNC: 100 MMOL/L — SIGNIFICANT CHANGE UP (ref 96–108)
CO2 SERPL-SCNC: 25 MMOL/L — SIGNIFICANT CHANGE UP (ref 22–31)
CREAT SERPL-MCNC: 0.49 MG/DL — LOW (ref 0.5–1.3)
CULTURE RESULTS: SIGNIFICANT CHANGE UP
CULTURE RESULTS: SIGNIFICANT CHANGE UP
GLUCOSE BLDC GLUCOMTR-MCNC: 127 MG/DL — HIGH (ref 70–99)
GLUCOSE BLDC GLUCOMTR-MCNC: 127 MG/DL — HIGH (ref 70–99)
GLUCOSE SERPL-MCNC: 124 MG/DL — HIGH (ref 70–99)
HCT VFR BLD CALC: 34.4 % — LOW (ref 39–50)
HGB BLD-MCNC: 10.5 G/DL — LOW (ref 13–17)
MAGNESIUM SERPL-MCNC: 2.5 MG/DL — SIGNIFICANT CHANGE UP (ref 1.6–2.6)
MCHC RBC-ENTMCNC: 27.2 PG — SIGNIFICANT CHANGE UP (ref 27–34)
MCHC RBC-ENTMCNC: 30.5 GM/DL — LOW (ref 32–36)
MCV RBC AUTO: 89.1 FL — SIGNIFICANT CHANGE UP (ref 80–100)
NRBC # BLD: 0 /100 WBCS — SIGNIFICANT CHANGE UP (ref 0–0)
PHOSPHATE SERPL-MCNC: 3.3 MG/DL — SIGNIFICANT CHANGE UP (ref 2.5–4.5)
PLATELET # BLD AUTO: 335 K/UL — SIGNIFICANT CHANGE UP (ref 150–400)
POTASSIUM SERPL-MCNC: 4.2 MMOL/L — SIGNIFICANT CHANGE UP (ref 3.5–5.3)
POTASSIUM SERPL-SCNC: 4.2 MMOL/L — SIGNIFICANT CHANGE UP (ref 3.5–5.3)
RBC # BLD: 3.86 M/UL — LOW (ref 4.2–5.8)
RBC # FLD: 16.6 % — HIGH (ref 10.3–14.5)
SARS-COV-2 RNA SPEC QL NAA+PROBE: NEGATIVE — SIGNIFICANT CHANGE UP
SODIUM SERPL-SCNC: 138 MMOL/L — SIGNIFICANT CHANGE UP (ref 135–145)
SPECIMEN SOURCE: SIGNIFICANT CHANGE UP
SPECIMEN SOURCE: SIGNIFICANT CHANGE UP
WBC # BLD: 10.75 K/UL — HIGH (ref 3.8–10.5)
WBC # FLD AUTO: 10.75 K/UL — HIGH (ref 3.8–10.5)

## 2020-10-01 PROCEDURE — 97161 PT EVAL LOW COMPLEX 20 MIN: CPT

## 2020-10-01 PROCEDURE — 36415 COLL VENOUS BLD VENIPUNCTURE: CPT

## 2020-10-01 PROCEDURE — 36573 INSJ PICC RS&I 5 YR+: CPT

## 2020-10-01 PROCEDURE — 86850 RBC ANTIBODY SCREEN: CPT

## 2020-10-01 PROCEDURE — 84100 ASSAY OF PHOSPHORUS: CPT

## 2020-10-01 PROCEDURE — 87086 URINE CULTURE/COLONY COUNT: CPT

## 2020-10-01 PROCEDURE — 85027 COMPLETE CBC AUTOMATED: CPT

## 2020-10-01 PROCEDURE — 97112 NEUROMUSCULAR REEDUCATION: CPT

## 2020-10-01 PROCEDURE — 87635 SARS-COV-2 COVID-19 AMP PRB: CPT

## 2020-10-01 PROCEDURE — 86769 SARS-COV-2 COVID-19 ANTIBODY: CPT

## 2020-10-01 PROCEDURE — 87070 CULTURE OTHR SPECIMN AEROBIC: CPT

## 2020-10-01 PROCEDURE — 82945 GLUCOSE OTHER FLUID: CPT

## 2020-10-01 PROCEDURE — 97162 PT EVAL MOD COMPLEX 30 MIN: CPT

## 2020-10-01 PROCEDURE — 92610 EVALUATE SWALLOWING FUNCTION: CPT

## 2020-10-01 PROCEDURE — 85610 PROTHROMBIN TIME: CPT

## 2020-10-01 PROCEDURE — 81003 URINALYSIS AUTO W/O SCOPE: CPT

## 2020-10-01 PROCEDURE — 97530 THERAPEUTIC ACTIVITIES: CPT

## 2020-10-01 PROCEDURE — 89051 BODY FLUID CELL COUNT: CPT

## 2020-10-01 PROCEDURE — 71045 X-RAY EXAM CHEST 1 VIEW: CPT

## 2020-10-01 PROCEDURE — 86901 BLOOD TYPING SEROLOGIC RH(D): CPT

## 2020-10-01 PROCEDURE — 85730 THROMBOPLASTIN TIME PARTIAL: CPT

## 2020-10-01 PROCEDURE — 86900 BLOOD TYPING SEROLOGIC ABO: CPT

## 2020-10-01 PROCEDURE — 93970 EXTREMITY STUDY: CPT

## 2020-10-01 PROCEDURE — 97110 THERAPEUTIC EXERCISES: CPT

## 2020-10-01 PROCEDURE — 80048 BASIC METABOLIC PNL TOTAL CA: CPT

## 2020-10-01 PROCEDURE — 97116 GAIT TRAINING THERAPY: CPT

## 2020-10-01 PROCEDURE — 80202 ASSAY OF VANCOMYCIN: CPT

## 2020-10-01 PROCEDURE — 87075 CULTR BACTERIA EXCEPT BLOOD: CPT

## 2020-10-01 PROCEDURE — U0003: CPT

## 2020-10-01 PROCEDURE — 99239 HOSP IP/OBS DSCHRG MGMT >30: CPT

## 2020-10-01 PROCEDURE — C9399: CPT

## 2020-10-01 PROCEDURE — A9585: CPT

## 2020-10-01 PROCEDURE — C1889: CPT

## 2020-10-01 PROCEDURE — 87186 SC STD MICRODIL/AGAR DIL: CPT

## 2020-10-01 PROCEDURE — C1713: CPT

## 2020-10-01 PROCEDURE — 87040 BLOOD CULTURE FOR BACTERIA: CPT

## 2020-10-01 PROCEDURE — 70553 MRI BRAIN STEM W/O & W/DYE: CPT

## 2020-10-01 PROCEDURE — 70450 CT HEAD/BRAIN W/O DYE: CPT

## 2020-10-01 PROCEDURE — 84157 ASSAY OF PROTEIN OTHER: CPT

## 2020-10-01 PROCEDURE — 82962 GLUCOSE BLOOD TEST: CPT

## 2020-10-01 PROCEDURE — 87205 SMEAR GRAM STAIN: CPT

## 2020-10-01 PROCEDURE — 83735 ASSAY OF MAGNESIUM: CPT

## 2020-10-01 RX ORDER — IPRATROPIUM/ALBUTEROL SULFATE 18-103MCG
1 AEROSOL WITH ADAPTER (GRAM) INHALATION
Qty: 0 | Refills: 0 | DISCHARGE

## 2020-10-01 RX ORDER — NAFCILLIN 10 G/100ML
2 INJECTION, POWDER, FOR SOLUTION INTRAVENOUS
Qty: 0 | Refills: 0 | DISCHARGE
Start: 2020-10-01

## 2020-10-01 RX ORDER — ALBUTEROL 90 UG/1
2 AEROSOL, METERED ORAL
Qty: 0 | Refills: 0 | DISCHARGE
Start: 2020-10-01

## 2020-10-01 RX ADMIN — Medication 1 DROP(S): at 05:29

## 2020-10-01 RX ADMIN — Medication 1 DROP(S): at 14:07

## 2020-10-01 RX ADMIN — NAFCILLIN 200 GRAM(S): 10 INJECTION, POWDER, FOR SOLUTION INTRAVENOUS at 05:29

## 2020-10-01 RX ADMIN — NAFCILLIN 200 GRAM(S): 10 INJECTION, POWDER, FOR SOLUTION INTRAVENOUS at 16:57

## 2020-10-01 RX ADMIN — Medication 1 PACKET(S): at 13:28

## 2020-10-01 RX ADMIN — CHLORHEXIDINE GLUCONATE 15 MILLILITER(S): 213 SOLUTION TOPICAL at 05:29

## 2020-10-01 RX ADMIN — NAFCILLIN 200 GRAM(S): 10 INJECTION, POWDER, FOR SOLUTION INTRAVENOUS at 13:12

## 2020-10-01 RX ADMIN — FINASTERIDE 5 MILLIGRAM(S): 5 TABLET, FILM COATED ORAL at 13:27

## 2020-10-01 RX ADMIN — CHLORHEXIDINE GLUCONATE 1 APPLICATION(S): 213 SOLUTION TOPICAL at 05:30

## 2020-10-01 RX ADMIN — TENOFOVIR DISOPROXIL FUMARATE 300 MILLIGRAM(S): 300 TABLET, FILM COATED ORAL at 13:28

## 2020-10-01 RX ADMIN — NAFCILLIN 200 GRAM(S): 10 INJECTION, POWDER, FOR SOLUTION INTRAVENOUS at 08:43

## 2020-10-01 NOTE — PROGRESS NOTE ADULT - PROVIDER SPECIALTY LIST ADULT
Infectious Disease
Infectious Disease
NSICU
Neurosurgery

## 2020-10-01 NOTE — PROGRESS NOTE ADULT - SUBJECTIVE AND OBJECTIVE BOX
HPI:  59 year old male with PMH of HLD and unclear liver disease s/p SOC for evacuation of cerebellar hematoma (8/4/2020) w/ Dr. Blank, subsequently discharged to [facility] now presents to [facility] for admission d/t purulent drainage from SOC site.  Pt had PEG placed on previous [facility] admission. At rehab, pt reported to be febrile on 9/14, and was treated w/ 7 day course of Zosyn (9/14-9/21) for BLAINE PNA. At rehab pt had a ruff in-place which was removed for TOV. Prior to transfer from rehab to [facility], pt was afebrile w/ WBC 13. At this time pt complaining of a mild headache 3/10. Denies vision changes, photophobia, nuchal rigidity, LOC, dizziness, recent travel or known sick contact.     HPI from previous admission (8/4/2020): Pt  had acute onset of severe headache, dizziness and vomiting x1 today, was brought into [facility] with continued symptoms, as well as hypertension and multiple episodes of vomiting. Patient was intubated for airway protection with CT Head performed demonstrating a large left cerebellar hemorrhage. CTA Head/Neck performed is suggestive of underlying vascular pathology such as AVM. RIght frontal ventricul[place] placed at [facility] [place] and patient transferred to [facility] for further work-up. Patient arrives to [facility] intubated, sedated and on Nicardipine drip. Patient's son provided history and denies any Aspirin or other anticoagulant use. Denies any prior history of hypertension, smoking, or ETOH abuse. (26 Sep 2020 21:19)      Overnight events: EMIL o/n, neuro exam stable.     Hospital Course:  9/27: s/p cranial wound washout and debridement. Fent IV P x 1 for pain  9/28: EMIL o/n, neuro at baseline, on RA   9/29: EMIL overnight, suctioning for secretions. Hemovac removed today  9/30: EMIL overnight. Neuro exam stable. Pt remains on nafcillin for wound infection, total 6 weeks  10/1: EMIL o/n, neuro exam stable. PICC line placed 9/30. ID final recs Nafcillin 2g q4hrs IV for 6wks. Pend rehab placement.       OVERNIGHT EVENTS:  Vital Signs Last 24 Hrs  T(C): 37.1 (30 Sep 2020 22:31), Max: 37.2 (30 Sep 2020 18:10)  T(F): 98.8 (30 Sep 2020 22:31), Max: 98.9 (30 Sep 2020 18:10)  HR: 70 (30 Sep 2020 16:01) (66 - 72)  BP: 158/76 (30 Sep 2020 16:01) (122/76 - 158/76)  BP(mean): 109 (30 Sep 2020 16:01) (90 - 109)  RR: 19 (30 Sep 2020 16:01) (19 - 19)  SpO2: 96% (30 Sep 2020 16:01) (95% - 97%)    I&O's Summary    29 Sep 2020 07:01  -  30 Sep 2020 07:00  --------------------------------------------------------  IN: 711 mL / OUT: 725 mL / NET: -14 mL    30 Sep 2020 07:01  -  01 Oct 2020 03:12  --------------------------------------------------------  IN: 710 mL / OUT: 625 mL / NET: 85 mL        PHYSICAL EXAM:  Neurological:    Motor exam:         [] Upper extremity              Bi(c5)  WE(c6)  EE(c7)   FF(c8)                                                R         5/5        5/5        5/5       5/5                                               L          5/5        5/5        5/5       5/5         [] Lower extremeity          HF(l2)   KE(l3)    TA(l4)   EHL(l5)  GS(s1)                                                 R        5/5        5/5        5/5       5/5         5/5                                               L         5/5        5/5       5/5       5/5          5/5                                                        [] warm well perfused; capillary refill <3 seconds     Sensation: [] intact to light touch  [] decreased:       Cardiovascular:  Respiratory:  Gastrointestinal:  Genitourinary:  Extremities:  Incision/Wound:    TUBES/LINES:  [] Ruff  [] Lumbar Drain  [] Wound Drains  [] Others      DIET:  [] NPO  [] Mechanical  [] Tube feeds    LABS:                        10.1   10.87 )-----------( 307      ( 30 Sep 2020 06:09 )             32.2     09-30    138  |  102  |  11  ----------------------------<  146<H>  3.8   |  26  |  0.59    Ca    8.6      30 Sep 2020 06:09  Phos  3.4     09-30  Mg     2.4     09-30              CAPILLARY BLOOD GLUCOSE      POCT Blood Glucose.: 132 mg/dL (30 Sep 2020 22:10)  POCT Blood Glucose.: 119 mg/dL (30 Sep 2020 16:50)  POCT Blood Glucose.: 134 mg/dL (30 Sep 2020 11:17)  POCT Blood Glucose.: 124 mg/dL (30 Sep 2020 06:33)      Drug Levels: [] N/A  Vancomycin Level, Trough: 12.6 ug/mL (09-28 @ 18:05)    CSF Analysis: [] N/A      Allergies    No Known Allergies    Intolerances      MEDICATIONS:  Antibiotics:  nafcillin  IVPB 2 Gram(s) IV Intermittent every 4 hours  tenofovir disoproxil fumarate (VIREAD) 300 milliGRAM(s) Oral every 24 hours    Neuro:  acetaminophen    Suspension .. 650 milliGRAM(s) Oral every 6 hours PRN    Anticoagulation:  enoxaparin Injectable 40 milliGRAM(s) SubCutaneous every 24 hours    OTHER:  acetylcysteine 20%  Inhalation 4 milliLiter(s) Inhalation every 6 hours PRN  ALBUTerol    90 MICROgram(s) HFA Inhaler 2 Puff(s) Inhalation every 6 hours PRN  artificial  tears Solution 1 Drop(s) Both EYES three times a day  chlorhexidine 0.12% Liquid 15 milliLiter(s) Oral Mucosa two times a day  chlorhexidine 2% Cloths 1 Application(s) Topical daily  chlorhexidine 2% Cloths 1 Application(s) Topical <User Schedule>  dextrose 40% Gel 15 Gram(s) Oral once PRN  dextrose 50% Injectable 12.5 Gram(s) IV Push once  dextrose 50% Injectable 25 Gram(s) IV Push once  dextrose 50% Injectable 25 Gram(s) IV Push once  doxazosin 4 milliGRAM(s) Oral at bedtime  finasteride 5 milliGRAM(s) Oral daily  glucagon  Injectable 1 milliGRAM(s) IntraMuscular once PRN  insulin lispro (HumaLOG) corrective regimen sliding scale   SubCutaneous Before meals and at bedtime  propranolol 10 milliGRAM(s) Oral four times a day  psyllium Powder 1 Packet(s) Oral daily    IVF:  dextrose 5%. 1000 milliLiter(s) IV Continuous <Continuous>    CULTURES:  Culture Results:   Few Staphylococcus aureus  Rare Staphylococcus epidermidis (09-27 @ 11:56)  Culture Results:   No growth to date (09-27 @ 11:56)    RADIOLOGY & ADDITIONAL TESTS:      ASSESSMENT:  60y Male s/p    INTRACRANIAL BLEED    POST OP COMPLICATIONS    No pertinent family history in first degree relatives    Handoff    MEWS Score    Hepatitis B    HLD (hyperlipidemia)    Wound infection    Wound infection    Exploration, wound, head    Wound drainage    Exploration, wound, head    Foot fracture, left    SysAdmin_VstLnk        PLAN:  NEURO:    CARDIOVASCULAR:    PULMONARY:    RENAL:    GI:    HEME:    ID:    ENDO:    DVT PROPHYLAXIS:  [] Venodynes                                [] Heparin/Lovenox    DISPOSITION: HPI:  59 year old male with PMH of HLD and unclear liver disease s/p SOC for evacuation of cerebellar hematoma (8/4/2020) w/ Dr. Blank, subsequently discharged to [facility] now presents to [facility] for admission d/t purulent drainage from SOC site.  Pt had PEG placed on previous [facility] admission. At rehab, pt reported to be febrile on 9/14, and was treated w/ 7 day course of Zosyn (9/14-9/21) for BLAINE PNA. At rehab pt had a ruff in-place which was removed for TOV. Prior to transfer from rehab to [facility], pt was afebrile w/ WBC 13. At this time pt complaining of a mild headache 3/10. Denies vision changes, photophobia, nuchal rigidity, LOC, dizziness, recent travel or known sick contact.     HPI from previous admission (8/4/2020): Pt  had acute onset of severe headache, dizziness and vomiting x1 today, was brought into [facility] with continued symptoms, as well as hypertension and multiple episodes of vomiting. Patient was intubated for airway protection with CT Head performed demonstrating a large left cerebellar hemorrhage. CTA Head/Neck performed is suggestive of underlying vascular pathology such as AVM. [place] [facility] [place] to [facility] for further work-up. Patient arrives to [facility] intubated, sedated and on Nicardipine drip. Patient's son provided history and denies any Aspirin or other anticoagulant use. Denies any prior history of hypertension, smoking, or ETOH abuse. (26 Sep 2020 21:19)      Overnight events: EMIL o/n, neuro exam stable.     Hospital Course:  9/27: s/p cranial wound washout and debridement. Fent IV P x 1 for pain  9/28: EMIL o/n, neuro at baseline, on RA   9/29: EMIL overnight, suctioning for secretions. Hemovac removed today  9/30: EMIL overnight. Neuro exam stable. Pt remains on nafcillin for wound infection, total 6 weeks  10/1: EMIL o/n, neuro exam stable. PICC line placed 9/30. ID final recs Nafcillin 2g q4hrs IV for 6wks. Pend rehab placement.       OVERNIGHT EVENTS:  Vital Signs Last 24 Hrs  T(C): 37.1 (30 Sep 2020 22:31), Max: 37.2 (30 Sep 2020 18:10)  T(F): 98.8 (30 Sep 2020 22:31), Max: 98.9 (30 Sep 2020 18:10)  HR: 70 (30 Sep 2020 16:01) (66 - 72)  BP: 158/76 (30 Sep 2020 16:01) (122/76 - 158/76)  BP(mean): 109 (30 Sep 2020 16:01) (90 - 109)  RR: 19 (30 Sep 2020 16:01) (19 - 19)  SpO2: 96% (30 Sep 2020 16:01) (95% - 97%)    I&O's Summary    29 Sep 2020 07:01  -  30 Sep 2020 07:00  --------------------------------------------------------  IN: 711 mL / OUT: 725 mL / NET: -14 mL    30 Sep 2020 07:01  -  01 Oct 2020 03:12  --------------------------------------------------------  IN: 710 mL / OUT: 625 mL / NET: 85 mL        PHYSICAL EXAM:  General: patient seen laying supine in bed in NAD  [facility] FC, OE spontaneously, speech hypophonic, CNII-XI grossly intact, face symmetric,  strength 5/5 b/l UE and LE, SILT throughout  HEENT: PERRL, EOMI  Neck: supple  Cardiac: RRR, S1S2  Pulmonary: chest rise symmetric, coarse breath sounds throughout  Abdomen: soft, nontender, nondistended  Ext: warm, perfusing well  Incision: SOC wound c/d/i    TUBES/LINES:  [] Ruff  [] Lumbar Drain  [] Wound Drains  [] Others    DIET:  [] NPO  [] Mechanical  [x] Tube feeds        LABS:                        10.1   10.87 )-----------( 307      ( 30 Sep 2020 06:09 )             32.2     09-30    138  |  102  |  11  ----------------------------<  146<H>  3.8   |  26  |  0.59    Ca    8.6      30 Sep 2020 06:09  Phos  3.4     09-30  Mg     2.4     09-30              CAPILLARY BLOOD GLUCOSE      POCT Blood Glucose.: 132 mg/dL (30 Sep 2020 22:10)  POCT Blood Glucose.: 119 mg/dL (30 Sep 2020 16:50)  POCT Blood Glucose.: 134 mg/dL (30 Sep 2020 11:17)  POCT Blood Glucose.: 124 mg/dL (30 Sep 2020 06:33)      Drug Levels: [] N/A  Vancomycin Level, Trough: 12.6 ug/mL (09-28 @ 18:05)    CSF Analysis: [] N/A      Allergies    No Known Allergies    Intolerances      MEDICATIONS:  Antibiotics:  nafcillin  IVPB 2 Gram(s) IV Intermittent every 4 hours  tenofovir disoproxil fumarate (VIREAD) 300 milliGRAM(s) Oral every 24 hours    Neuro:  acetaminophen    Suspension .. 650 milliGRAM(s) Oral every 6 hours PRN    Anticoagulation:  enoxaparin Injectable 40 milliGRAM(s) SubCutaneous every 24 hours    OTHER:  acetylcysteine 20%  Inhalation 4 milliLiter(s) Inhalation every 6 hours PRN  ALBUTerol    90 MICROgram(s) HFA Inhaler 2 Puff(s) Inhalation every 6 hours PRN  artificial  tears Solution 1 Drop(s) Both EYES three times a day  chlorhexidine 0.12% Liquid 15 milliLiter(s) Oral Mucosa two times a day  chlorhexidine 2% Cloths 1 Application(s) Topical daily  chlorhexidine 2% Cloths 1 Application(s) Topical <User Schedule>  dextrose 40% Gel 15 Gram(s) Oral once PRN  dextrose 50% Injectable 12.5 Gram(s) IV Push once  dextrose 50% Injectable 25 Gram(s) IV Push once  dextrose 50% Injectable 25 Gram(s) IV Push once  doxazosin 4 milliGRAM(s) Oral at bedtime  finasteride 5 milliGRAM(s) Oral daily  glucagon  Injectable 1 milliGRAM(s) IntraMuscular once PRN  insulin lispro (HumaLOG) corrective regimen sliding scale   SubCutaneous Before meals and at bedtime  propranolol 10 milliGRAM(s) Oral four times a day  psyllium Powder 1 Packet(s) Oral daily    IVF:  dextrose 5%. 1000 milliLiter(s) IV Continuous <Continuous>    CULTURES:  Culture Results:   Few Staphylococcus aureus  Rare Staphylococcus epidermidis (09-27 @ 11:56)  Culture Results:   No growth to date (09-27 @ 11:56)    RADIOLOGY & ADDITIONAL TESTS:      ASSESSMENT:  59y M PMH of HLD and unclear liver disease/ HBV s/p SOC for evacuation of cerebellar hematoma (8/4/2020) w/ Dr. Blank, subsequently discharged to [facility] now s/p SOC washout and debridement for purulent wound drainage POD #4      INTRACRANIAL BLEED    POST OP COMPLICATIONS    No pertinent family history in first degree relatives    Handoff    MEWS Score    Hepatitis B    HLD (hyperlipidemia)    Wound infection    Wound infection    Exploration, wound, head    Wound drainage    Exploration, wound, head    Foot fracture, left    SysAdmin_VstLnk        PLAN:  - neuro checks   - pain control   - vitals   - cont. home meds   - pt/ot   - normotensive SBP goal   - TF, bowel regimen, metamucil   - ID following :  .OR cultures growing staph epidermidis and staph aureus. Continue nafcillin q4h x total 6 weeks   - obtain PICC line today  - Continue tenofovir for HBV  - f/u pan culture results    - ISS  - SCDs b/l LE, SQL    DVT PROPHYLAXIS:  [x] Venodynes                                [x] Heparin/Lovenox    DISPOSITION: SDU status, dispo pending back to [facility], full code    Assessment:  Present when checked    []  GCS  E   V  M     Heart Failure: []Acute, [] acute on chronic , []chronic  Heart Failure:  [] Diastolic (HFpEF), [] Systolic (HFrEF), []Combined (HFpEF and HFrEF), [] RHF, [] Pulm HTN, [] Other    [] SARAH, [] ATN, [] AIN, [] other  [] CKD1, [] CKD2, [] CKD 3, [] CKD 4, [] CKD 5, []ESRD    Encephalopathy: [] Metabolic, [] Hepatic, [] toxic, [] Neurological, [] Other    Abnormal Nurtitional Status: [] malnurtition (see nutrition note), [ ]underweight: BMI < 19, [] morbid obesity: BMI >40, [] Cachexia    [] Sepsis  [] hypovolemic shock,[] cardiogenic shock, [] hemorrhagic shock, [] neuogenic shock  [] Acute Respiratory Failure  []Cerebral edema, [] Brain compression/ herniation,   [] Functional quadriplegia  [] Acute blood loss anemia    DISPOSITION:

## 2020-10-01 NOTE — DISCHARGE NOTE NURSING/CASE MANAGEMENT/SOCIAL WORK - NSDCFUADDAPPT_GEN_ALL_CORE_FT
You have a follow-up appointment with Dr. Blank on 10/9/20 at 1:30pm  Last day of IV antibx 11/7  Follow up Dr Hendricks ID    Pleasee do weekly CBC with diff, Nasic metabolic, ESR and CRP levels and sent to ID Dr Hendricks

## 2020-10-01 NOTE — DISCHARGE NOTE NURSING/CASE MANAGEMENT/SOCIAL WORK - PATIENT PORTAL LINK FT
You can access the FollowMyHealth Patient Portal offered by [facility] by registering at the following website: [email or web address]. By joining [employer]’s FollowMyHealth portal, you will also be able to view your health information using other applications (apps) compatible with our system.

## 2020-10-01 NOTE — DISCHARGE NOTE NURSING/CASE MANAGEMENT/SOCIAL WORK - NSDCPEFALRISK_GEN_ALL_CORE
Pediatric Endocrinology Follow-Up Evaluation    Patient: Rashmi Flores MRN# [id number]   YOB: 2017 Age: 23month old   Date of Visit: Apr 23, 2018    Dear Dr. Roosevelt Huizar:    I had the pleasure of seeing your patient, Rashmi Flores in the Pediatric Endocrinology Clinic, [facility], on Apr 23, 2018 for hospital follow-up evaluation regarding Congenital Hypothyroidism.           Problem list:     Patient Active Problem List    Diagnosis Date Noted     Malnutrition of moderate degree (H) 08/08/2017     Priority: Medium     Gastrostomy tube dependent (H) 04/26/2017     Priority: Medium     Immunosuppression (H) 04/26/2017     Priority: Medium     Failure to thrive in childhood 04/26/2017     Priority: Medium     Heart replaced by transplant (H) 04/26/2017     Priority: Medium     Congenital hypothyroidism without goiter 01/19/2017     Priority: Medium     Growth deceleration 01/11/2017     Priority: Medium     S/P gastrostomy (H) 2016     Priority: Medium     S/P orthotopic heart transplant (H) 2016     Priority: Medium     Pulmonary atresia with intact ventricular septum 2016     Priority: Medium     Elevated TSH 2016     Priority: Medium     Hypoplasia of right heart 2016     Priority: Medium            HPI:   Rashmi Flores is a 23month old  female with a history of congenital heart disease who comes to clinic today for hospital follow-up of Congenital Hypothyroidism.  Rashmi was found to have an elevated TSH one week after she was born and further elevated one week later. Levothyroxine was started at that time. Rashmi has had stable thyroid functions over time on a low dose of levothyroxine (12.5 mcg daily).  At her last visit in January 2017, she was trialed off levothyroxine as labs were stable and she was on a very small dose for her size (~1.8 mg/kg/day).     Since her last visit on 1/19/2017, she has done well.  She has done well from a heart standpoint. She had thyroid labs checked about 3 months after stopping the levothyroxine in January 2017, and TSH was rising, so she was placed back on 12.5 mcg of levothyroxine daily. No issues getting the medication and no missed doses. She has good energy levels and sleeps well a night. She has had some diarrhea from fiber in the Pediasure, but is now better. No constipation. No heat or cold intolerance. Some dry skin typical for winter.     Overall she has been doing well, and getting better. They are still working on her feeding. She is currently taking Pediasure mostly by mouth and are in the process of switching to [employer] formula. She also takes small bites of food about 5-6 times per day. Dad has recently noticed that she chokes a lot when things tough her neck, and a pediatrician in [place] thought her tonsils were very enlarged. She has a meeting with ENT today, as this might be preventing her from eating more. They have been working with a dietician, Iliana, here at the [facility] and they are talking about going to a feeding clinic in [place].      Developmentally, she has been on track. She has been walking and talking on time.     Dad feels like she is getting bigger.     I have reviewed the available past laboratory evaluations, imaging studies, and medical records available to me at this visit. I have reviewed the [place]'s growth chart. Weight has decreased from the 11.5 to 2nd %ile. Length went up from 2.5 to 3.6%ile. BMI at the 41%ile.    History was obtained from patient's dad.           Past Medical History:     Past Medical History:   Diagnosis Date     Hypoplastic right heart syndrome      Hypothyroidism      Patent ductus arteriosus      Pulmonary atresia             Past Surgical History:     Past Surgical History:   Procedure Laterality Date     HEART CATH CHILD N/A 2016    Procedure: HEART CATH CHILD;  Surgeon: Marianna Correia MD;   [facility]     HEART CATH CHILD N/A 2016    Procedure: HEART CATH CHILD;  Surgeon: Marianna Correia MD;  [facility]     INSERT PICC LINE INFANT Left 2016    Procedure: INSERT PICC LINE INFANT;  Surgeon: Flash Damian MD;  [facility] OR     INSERT PICC LINE INFANT Left 2016    Procedure: INSERT PICC LINE INFANT;  Surgeon: Flash Damian MD;  [facility]     LAPAROSCOPIC ASSISTED INSERTION TUBE GASTROSTOMY INFANT N/A 2016    Procedure: LAPAROSCOPIC ASSISTED INSERTION TUBE GASTROSTOMY INFANT;  Surgeon: Reji Hoang MD;  [facility]     PERICARDIOCENTESIS (IN CATH LAB) N/A 2016    Procedure: PERICARDIOCENTESIS (IN CATH LAB);  Surgeon: Marianna Correia MD;  [facility]     TRANSPLANT HEART RECIPIENT INFANT N/A 2016    Procedure: TRANSPLANT HEART RECIPIENT INFANT;  Surgeon: Robles Delaney MD;  [facility] OR               Social History:     Social History     Social History Narrative      Rashmi lives with her parents.        Family History:   No Family History of Thyroid disease.    Family history reviewed and unchanged from last visit.         Allergies:   No Known Allergies          Medications:     Current Outpatient Prescriptions   Medication Sig Dispense Refill     acetaminophen (TYLENOL) 160 MG/5ML oral liquid Take 3 mLs (96 mg) by mouth every 6 hours as needed for mild pain or fever 148 mL 1     aspirin (ASPIRIN CHILDRENS) 81 MG chewable tablet Take 0.25 tablets (20.25 mg) by mouth daily 30 tablet 0     Enteral Nutrition Supplies MISC Pediasure 1.5--- 2.5 cans per day (clinic visit 3/1/18) 45 each 11     levothyroxine (SYNTHROID/LEVOTHROID) 25 MCG tablet 0.5 tablets (12.5 mcg) by Per G Tube route daily 90 tablet 1     magnesium sulfate 500 mg/mL SOLN 2 mLs (1,000 mg) by Per G Tube route daily 60 mL 11     mycophenolate (GENERIC EQUIVALENT) 200 MG/ML suspension Take 1ml (200mg) per NG tube twice daily  Patient information on fall and injury prevention "(Bottle expires 60 days after mixed) 160 mL 11     pantoprazole (PROTONIX) SUSP suspension Give 7ml (14 mg) once daily 220 mL 11     sodium chloride (OCEAN) 0.65 % nasal spray Spray 1 spray into both nostrils every 2 hours as needed for congestion 60 mL 1     tacrolimus (GENERIC EQUIVALENT) 1 mg/mL suspension Take 0.7ml (0.7mg) by mouth every 8 hours 60 mL 11     triamcinolone (KENALOG) 0.5 % cream Apply sparingly to affected area four times daily as needed 30 g 2             Review of Systems:   Gen: Negative  Eye: Negative  ENT: Enlarged tonsils-meeting with ENT today.   Pulmonary:  Negative  Cardio: s/p cardiac transplant   Gastrointestinal: Negative  Hematologic: Negative  Genitourinary: Negative  Musculoskeletal: Negative  Psychiatric: Negative  Neurologic: Negative  Skin: Negative  Endocrine: see HPI.            Physical Exam:   Blood pressure (!) 88/68, pulse 131, resp. rate 28, height 2' 7.69\" (80.5 cm), weight 19 lb 9.9 oz (8.9 kg), head circumference 46.3 cm (18.23\"), SpO2 98 %.  Blood pressure percentiles are 59 % systolic and 99 % diastolic based on NHBPEP's 4th Report. Blood pressure percentile targets: 90: 99/58, 95: 103/62, 99 + 5 mmH/74.  Height: 80.5 cm  5 %ile (Z= -1.69) based on WHO (Girls, 0-2 years) length-for-age data using vitals from 2018.  Weight: 8.9 kg (actual weight), 2 %ile (Z= -2.04) based on WHO (Girls, 0-2 years) weight-for-age data using vitals from 2018.  BMI: Body mass index is 13.73 kg/(m^2). 8 %ile (Z= -1.41) based on WHO (Girls, 0-2 years) BMI-for-age data using vitals from 2018.      GENERAL:  She is alert and in no apparent distress.   HEENT:  Head is  normocephalic and atraumatic.   Extraocular movements are intact.  Positive red reflex.  Nares are clear.  Oropharynx shows normal dentition uvula and palate. Tonsils 3+ bilaterally, but no erythema or exudates.  NECK:  Supple.  Thyroid was nonpalpable. No evidence of goiter.  LUNGS:  Clear to auscultation " bilaterally.   CARDIOVASCULAR:  Regular rate and rhythm without murmur, gallop or rub. Well-healed sternotomy scars.  BREASTS:  Hossein 1.  Axillary hair, odor and sweat were absent.   ABDOMEN:  Nondistended.  Positive bowel sounds, soft and nontender.  No hepatosplenomegaly or masses palpable. G-tube site clean and dry.  GENITOURINARY EXAM:  Pubic hair is Hossein 1.  Normal external female genitalia.   MUSCULOSKELETAL:  Normal muscle bulk and tone.  No evidence of scoliosis.   NEUROLOGIC:  Cranial nerves II-XII grossly intact.   SKIN:  Normal with no evidence of acne or oiliness.         Laboratory results:   Results for GABBY COX (MRN [id number]) as of 4/23/2018 08:14   Ref. Range 1/19/2017 09:41 4/12/2017 08:47 8/8/2017 09:48 10/12/2017 09:10   TSH Latest Ref Range: 0.40 - 4.00 mU/L 2.60 4.46 (H) 6.48 (H) 3.94   T4 Free Latest Ref Range: 0.76 - 1.46 ng/dL 1.36 1.11 1.16 1.37     Component      Latest Ref Rng & Units 4/23/2018   Sodium      133 - 143 mmol/L 141   Potassium      3.4 - 5.3 mmol/L 4.2   Chloride      96 - 110 mmol/L 105   Carbon Dioxide      20 - 32 mmol/L 23   Anion Gap      3 - 14 mmol/L 13   Glucose      70 - 99 mg/dL 87   Urea Nitrogen      9 - 22 mg/dL 19   Creatinine      0.15 - 0.53 mg/dL 0.23   GFR Estimate      mL/min/1.7m2 GFR not calculated, patient <16 years old.   GFR Estimate If Black      mL/min/1.7m2 GFR not calculated, patient <16 years old.   Calcium      9.1 - 10.3 mg/dL 9.8   Bilirubin Total      0.2 - 1.3 mg/dL 0.3   Albumin      3.4 - 5.0 g/dL 3.7   Protein Total      5.5 - 7.0 g/dL 7.4 (H)   Alkaline Phosphatase      110 - 320 U/L 136   ALT      0 - 50 U/L 23   AST      0 - 60 U/L 35   IGF Binding Protein3      0.7 - 3.6 ug/mL 3.6   IGF Binding Protein 3 SD Score       2.0   T4 Free      0.76 - 1.46 ng/dL 1.17   TSH      0.40 - 4.00 mU/L 2.54   Lab Scanned Result       IGF-1 PEDIATRIC-Scanned   Prealbumin      7 - 25 mg/dL 14   Vitamin D Deficiency screening      20 - 75  ug/L 40   Sed Rate      0 - 15 mm/h 22 (H)     18  IGF-1 to [facility]: 58 ng/dL ()  IGF-1 Z-Score: -0.3 SDS          Assessment and Plan:   1. Congenital Hypothyroidism   2. Congenital Heart Disease s/p Heart Transplant      Rashmi had a significantly elevated TSH in the post- period with a normal free T4 which may be due to congenital hypothyroidism vs. elevated TSH was a transient response to her significant cardiac illness.  Trialing off levothyroxine caused her TSH to start elevating, so she was restarted on levothyroxine. I would like to continue the current dose of levothyroxine. We can space checks out to every 6 months. Since she continues to be stable on such a low dose, we can trial off again at age 2 years (because levothyroxine is very important for brain development in the first 3 years of life).    Due to Growth Deceleration at the last visit, Rashmi had growth factors measured.  Her growth has improved and is stable along the 3rd percentile.    MD instructions: Today we will continue on the current dose of levothyroxine 12.5 mcg daily. We will let you know if there are changes based on today's thyroid labs. We will repeat thyroid labs in 6 months.     RESULTS INTERPRETATION: Thyroid functions are normal.  The IGF-1 is normal for age and pubertal status. The prealbumin, a marker of nutrition, is normal. The 25-hydroxy vitamin D, a marker of vitamin D stores and a screen for vitamin D deficiency, is normal.     Based upon these test results, I recommend continuing the current dose of levothyroxine. There is no evidence of Growth Hormone Deficiency. I recommend continuing the current dietary intake of vitamin D.      Follow-up in 6 months.    Thank you for allowing me to participate in the care of your patient.  Please do not hesitate to call with questions or concerns.    Sincerely,    Janelle Weiner MD  Pediatric Endocrine Fellow  [facility]    Supervised by:  I have  personally examined the patient, reviewed and edited the fellow's note and agree with the plan of care.  Santos Reese MD, PhD    Pediatric Endocrinology  [facility]  Phone: [phone]  Fax:  [phone]       Patient Care Team:  Roosevelt Huizar as PCP - General (Pediatrics)  Shelbi Yi MD as MD (Pediatric Cardiology)  Santos Reese MD as MD (Pediatrics)  Marianne Harris MD as MD (Pediatrics)  [facility] as Other (see comments)  [facility] as Specialty Provider (Gastroenterology)     Parents of Rashmi Flores  [id number]  [facility] [id number]

## 2020-10-01 NOTE — PROGRESS NOTE ADULT - REASON FOR ADMISSION
SOC wound washout

## 2020-10-02 LAB
CULTURE RESULTS: NO GROWTH — SIGNIFICANT CHANGE UP
SPECIMEN SOURCE: SIGNIFICANT CHANGE UP

## 2020-10-04 LAB
CULTURE RESULTS: SIGNIFICANT CHANGE UP
SPECIMEN SOURCE: SIGNIFICANT CHANGE UP

## 2020-10-09 ENCOUNTER — APPOINTMENT (OUTPATIENT)
Dept: NEUROSURGERY | Facility: CLINIC | Age: 60
End: 2020-10-09

## 2020-10-12 LAB — CULTURE RESULTS: NO GROWTH — SIGNIFICANT CHANGE UP

## 2020-11-17 NOTE — H&P ADULT - PROBLEM SELECTOR PLAN 2
Home Patient on Tenofovir 300mg daily,  CD4/Viral load testing On Omega 3 at home, will start statin therapy

## 2020-12-07 ENCOUNTER — APPOINTMENT (OUTPATIENT)
Dept: NEUROSURGERY | Facility: CLINIC | Age: 60
End: 2020-12-07

## 2021-01-15 ENCOUNTER — APPOINTMENT (OUTPATIENT)
Dept: NEUROSURGERY | Facility: CLINIC | Age: 61
End: 2021-01-15
Payer: COMMERCIAL

## 2021-01-15 VITALS
SYSTOLIC BLOOD PRESSURE: 119 MMHG | DIASTOLIC BLOOD PRESSURE: 82 MMHG | HEART RATE: 67 BPM | HEIGHT: 65 IN | WEIGHT: 140 LBS | OXYGEN SATURATION: 98 % | BODY MASS INDEX: 23.32 KG/M2 | RESPIRATION RATE: 16 BRPM | TEMPERATURE: 98.2 F

## 2021-01-15 DIAGNOSIS — G91.9 HYDROCEPHALUS, UNSPECIFIED: ICD-10-CM

## 2021-01-15 DIAGNOSIS — Z86.79 PERSONAL HISTORY OF OTHER DISEASES OF THE CIRCULATORY SYSTEM: ICD-10-CM

## 2021-01-15 DIAGNOSIS — Z86.19 PERSONAL HISTORY OF OTHER INFECTIOUS AND PARASITIC DISEASES: ICD-10-CM

## 2021-01-15 PROCEDURE — 99215 OFFICE O/P EST HI 40 MIN: CPT

## 2021-01-15 PROCEDURE — 99072 ADDL SUPL MATRL&STAF TM PHE: CPT

## 2021-01-17 NOTE — PHYSICAL EXAM
[General Appearance - Alert] : alert [General Appearance - Well Nourished] : well nourished [General Appearance - Well-Appearing] : healthy appearing [Longitudinal] : longitudinal [Transverse] : transverse [Clean] : clean [Well-Healed] : well-healed [No Drainage] : without drainage [Normal Skin] : normal [Oriented To Time, Place, And Person] : oriented to person, place, and time [Person] : oriented to person [Place] : oriented to place [Time] : oriented to time [Sclera] : the sclera and conjunctiva were normal [Outer Ear] : the ears and nose were normal in appearance [Examination Of The Oral Cavity] : the lips and gums were normal [Neck Appearance] : the appearance of the neck was normal [] : no respiratory distress [Abdomen Soft] : soft [Skin Color & Pigmentation] : normal skin color and pigmentation [FreeTextEntry1] : PEG inplace

## 2021-01-17 NOTE — ASSESSMENT
[FreeTextEntry1] : Continue to see Dr Perez (ENT) and PCP\par Will recommend supervised Physical therapy\par F/U in 3 months with MRI brain w w/o contrast (daughter will call with facility  of choice for imaging)\par

## 2021-01-17 NOTE — HISTORY OF PRESENT ILLNESS
[FreeTextEntry1] : 59 year old man with past medical history HLD and liver disease who developed severe acute headache, dizziness and vomiting and was taken to [facility]. CT head demonstrated large left cerebellar hemorrhage, CTA Head/ neck suggestive of underlying vascular pathology. He was transferred to [facility] For further management. \par \par Cerebral angiogram done on 8/4/2020 was negative for AVM. He was then taken to the OR for evacuation of cerebellar hematoma performed by Dr. Blank.\par \par His hospitalization was complicated by dysphagia requiring PEG tube placement and fevers. He also required a vocal cord injection with ENT which was done on 8/26/20 with Dr. Musa for left vocal fold immobility.\par \par He returned to [facility] ED with wound drainage and MRI suggested presence of suboccipital soft tissue and epidural abscess. He underwent re-exploration suboccipital craniectomy for evacuation of intracranial abscess on 9/27/2020 with Dr. Blank.

## 2021-01-17 NOTE — REASON FOR VISIT
[Other: _____] : [unfilled] [de-identified] : Suboccipital craniectomy, C1 laminectomy for evacuation of cerebellar intracerebral hemorrhage; Re-exploration of suboccipital craniectomy for evacuation of intracranial abscess [de-identified] : 8/4/2020 and 9/27/2020 [de-identified] : \par A workup for dizziness and nausea led to him being taken to [facility] for care (his family thought he may have had a "stomach bug"\par \par Following hospitalization, he developed intracranial posterior fossa abscess requiring surgical intervention on 9/27/2020\par He was discharged to acute rehab after evacuation of abscess and recommendation was for nafcillin for wound infection for total of 6 weeks. It was recommended he follow up with Dr. Hendricks after discharge.\par Discharged home in November 2020\par Doing well as per daughter but still needs assistance with some ADLs. He has fallen a few times  trying to walk w/o assistance\par Postop incision healed well w/o signs of infection\par \par Eating:\par PEG still inplace (eats soft foods)\par Still with difficulty swallowing liquids\par \par Denies any new focal deficits at today's visit\par \par

## 2021-01-22 ENCOUNTER — APPOINTMENT (OUTPATIENT)
Dept: OTOLARYNGOLOGY | Facility: CLINIC | Age: 61
End: 2021-01-22
Payer: COMMERCIAL

## 2021-01-22 VITALS
DIASTOLIC BLOOD PRESSURE: 93 MMHG | HEIGHT: 65 IN | BODY MASS INDEX: 23.32 KG/M2 | HEART RATE: 64 BPM | WEIGHT: 140 LBS | TEMPERATURE: 97.5 F | SYSTOLIC BLOOD PRESSURE: 136 MMHG

## 2021-01-22 DIAGNOSIS — Z09 ENCOUNTER FOR FOLLOW-UP EXAMINATION AFTER COMPLETED TREATMENT FOR CONDITIONS OTHER THAN MALIGNANT NEOPLASM: ICD-10-CM

## 2021-01-22 DIAGNOSIS — Z83.3 FAMILY HISTORY OF DIABETES MELLITUS: ICD-10-CM

## 2021-01-22 DIAGNOSIS — R49.0 DYSPHONIA: ICD-10-CM

## 2021-01-22 DIAGNOSIS — J38.01 PARALYSIS OF VOCAL CORDS AND LARYNX, UNILATERAL: ICD-10-CM

## 2021-01-22 DIAGNOSIS — Z87.891 PERSONAL HISTORY OF NICOTINE DEPENDENCE: ICD-10-CM

## 2021-01-22 DIAGNOSIS — Z51.89 ENCOUNTER FOR OTHER SPECIFIED AFTERCARE: ICD-10-CM

## 2021-01-22 DIAGNOSIS — Z43.1 ENCOUNTER FOR ATTENTION TO GASTROSTOMY: ICD-10-CM

## 2021-01-22 DIAGNOSIS — R13.14 DYSPHAGIA, PHARYNGOESOPHAGEAL PHASE: ICD-10-CM

## 2021-01-22 PROCEDURE — 99072 ADDL SUPL MATRL&STAF TM PHE: CPT

## 2021-01-22 PROCEDURE — 99214 OFFICE O/P EST MOD 30 MIN: CPT | Mod: 25

## 2021-01-22 PROCEDURE — 31575 DIAGNOSTIC LARYNGOSCOPY: CPT

## 2021-01-26 PROBLEM — Z87.891 FORMER SMOKER: Status: ACTIVE | Noted: 2021-01-22

## 2021-01-26 PROBLEM — Z83.3 FAMILY HISTORY OF DIABETES MELLITUS: Status: ACTIVE | Noted: 2021-01-22

## 2021-01-26 RX ORDER — TENOFOVIR DISOPROXIL FUMARATE 300 MG/1
300 TABLET, COATED ORAL
Refills: 0 | Status: ACTIVE | COMMUNITY

## 2021-01-26 RX ORDER — FINASTERIDE 5 MG/1
5 TABLET, FILM COATED ORAL
Refills: 0 | Status: ACTIVE | COMMUNITY

## 2021-01-26 RX ORDER — SPIRONOLACTONE 50 MG/1
50 TABLET ORAL
Refills: 0 | Status: ACTIVE | COMMUNITY

## 2021-01-29 ENCOUNTER — APPOINTMENT (OUTPATIENT)
Dept: INFECTIOUS DISEASE | Facility: CLINIC | Age: 61
End: 2021-01-29
Payer: COMMERCIAL

## 2021-01-29 VITALS
HEART RATE: 63 BPM | HEIGHT: 65 IN | TEMPERATURE: 97.3 F | SYSTOLIC BLOOD PRESSURE: 132 MMHG | OXYGEN SATURATION: 98 % | DIASTOLIC BLOOD PRESSURE: 84 MMHG

## 2021-01-29 DIAGNOSIS — R05 COUGH: ICD-10-CM

## 2021-01-29 PROCEDURE — 99072 ADDL SUPL MATRL&STAF TM PHE: CPT

## 2021-01-29 PROCEDURE — 36415 COLL VENOUS BLD VENIPUNCTURE: CPT

## 2021-01-29 PROCEDURE — 99214 OFFICE O/P EST MOD 30 MIN: CPT | Mod: 25

## 2021-01-29 NOTE — REVIEW OF SYSTEMS
[Cough] : cough [Sputum] : coughing up ~M sputum [As Noted in HPI] : as noted in HPI [Fever] : no fever [Chills] : no chills [Chest Pain] : no chest pain [FreeTextEntry7] : PEG tube [FreeTextEntry8] : incontinence

## 2021-01-29 NOTE — ASSESSMENT
[FreeTextEntry1] : 60 year old male with HLD, HBV (on tenofovir), admission for acute cerebellar hemorrhage s/p evacuation of cerebellar hematoma (8/4/2020) with Dr. Blank.  Post op c/b PNA.  Sputum culture from 8/14 grew Klebsiella variicola, Enterobacter cloacae, and MSSA. He was treated with cefepime from 8/16/20 - 8/20/20 then had another fever, culture +MSSA and he was treated with cefazolin until 9/1/20. He was readmitted 9/26/20 with purulent drainage from craniotomy site.  He underwent exploration of suboccipital craniotomy wound with washout on 9/27/20.  Superficial scalp wound grew MSSA, vancomycin discontinued.   OR cultures grew Staph epidermidis and MSSA. He was treated with nafcillin 2 g IV q 4 h x 6 weeks (9/27/20 - 11/7/20).  He is significantly improved since hospitalization.  Unclear if cough is related to aspiration or difficulty managing secretions.\par Plan: \par - CBC, CMP drawn and sent from office\par - CXR PA/Lat\par Will contact patient's daughter with results.  If unremarkable, f/u PRN.

## 2021-01-29 NOTE — HISTORY OF PRESENT ILLNESS
[FreeTextEntry1] : 60 year old male with HLD, HBV (on tenofovir), admission for acute cerebellar hemorrhage s/p evacuation of cerebellar hematoma (8/4/2020) with Dr. Blank.  Post op course c/b PNA.  Sputum culture from 8/14 grew Klebsiella variicola, Enterobacter cloacae, and MSSA. He was treated with cefepime from 8/16/20 - 8/20/20 then developed another fever, culture +MSSA and he was treated with cefazolin until 9/1/20. He was readmitted 9/26/20 with purulent drainage from craniotomy site.  He underwent exploration of suboccipital craniotomy wound with washout on 9/27/20. He was started on vancomycin and cefepime.  Superficial scalp wound grew MSSA, vancomycin discontinued.   OR cultures grew Staph epidermidis and MSSA. He was discharged to rehab on 10/1 on nafcillin 2 g IV q 4 h x 6 weeks (9/27/20 - 11/7/20).  He was lost to ID f/u.  He had post op f/u on 1/15/21, plan is for repeat MRI in 3 months.  He sees Dr. Musa (ENT) for paralysis of left vocal cord/dysphonia. PEG tube noted to be cracked, it was recommended he f/u with surgery.  Following completion of antibiotics he had diarrhea x 1 month, now improved.  Daughter reports pain at PEG site.  He has appointment with general surgery and GI next week at [facility].  Daughter also reports that he has had ongoing cough with frequent production of clear sputum, is somewhat less but they do not know the cause.

## 2021-01-29 NOTE — PHYSICAL EXAM
[General Appearance - Alert] : alert [General Appearance - In No Acute Distress] : in no acute distress [Sclera] : the sclera and conjunctiva were normal [Outer Ear] : the ears and nose were normal in appearance [Examination Of The Oral Cavity] : the lips and gums were normal [Oropharynx] : the oropharynx was normal with no thrush [Auscultation Breath Sounds / Voice Sounds] : lungs were clear to auscultation bilaterally [Heart Rate And Rhythm] : heart rate was normal and rhythm regular [Heart Sounds] : normal S1 and S2 [Murmurs] : no murmurs [Abdomen Soft] : soft [Abdomen Tenderness] : non-tender [Motor Tone] : muscle strength and tone were normal [Skin Color & Pigmentation] : normal skin color and pigmentation [] : no rash [FreeTextEntry1] : PEG sit with mild surrounding erythema, no exudate

## 2021-02-01 ENCOUNTER — NON-APPOINTMENT (OUTPATIENT)
Age: 61
End: 2021-02-01

## 2021-02-01 LAB
ALBUMIN SERPL ELPH-MCNC: 4.2 G/DL
ALP BLD-CCNC: 115 U/L
ALT SERPL-CCNC: 45 U/L
ANION GAP SERPL CALC-SCNC: 14 MMOL/L
AST SERPL-CCNC: 34 U/L
BILIRUB SERPL-MCNC: 0.2 MG/DL
BUN SERPL-MCNC: 30 MG/DL
CALCIUM SERPL-MCNC: 9.5 MG/DL
CHLORIDE SERPL-SCNC: 92 MMOL/L
CO2 SERPL-SCNC: 20 MMOL/L
CREAT SERPL-MCNC: 0.94 MG/DL
GLUCOSE SERPL-MCNC: 102 MG/DL
POTASSIUM SERPL-SCNC: 5.3 MMOL/L
PROT SERPL-MCNC: 7.5 G/DL
SODIUM SERPL-SCNC: 127 MMOL/L

## 2021-02-04 PROBLEM — Z09 POSTOP CHECK: Status: RESOLVED | Noted: 2021-01-15 | Resolved: 2021-02-04

## 2021-02-04 PROBLEM — Z51.89 VISIT FOR WOUND CHECK: Status: RESOLVED | Noted: 2021-01-15 | Resolved: 2021-02-04

## 2021-02-04 RX ORDER — LACTOBACILLUS RHAMNOSUS GG 10B CELL
CAPSULE ORAL
Refills: 0 | Status: ACTIVE | COMMUNITY

## 2021-02-04 RX ORDER — GUAIFENESIN 600 MG/1
600 TABLET, EXTENDED RELEASE ORAL
Refills: 0 | Status: ACTIVE | COMMUNITY

## 2021-02-04 RX ORDER — DOXAZOSIN 4 MG/1
4 TABLET ORAL
Refills: 0 | Status: ACTIVE | COMMUNITY

## 2021-02-04 NOTE — HISTORY OF PRESENT ILLNESS
[de-identified] : Mr. Vizcarra is a 60 year old man who was referred by Dr. Blank for f/u after vocal cord injection in [facility]. \par He was accompanied by with his daughter who provided most of the history.\par \par S/p microlaryngoscopy and injection medialization of left true vocal fold on 8/25/2020 at [facility].\par He is s/p suboccipital craniotomy, cervical laminectomy and spinal decompression for cerebellar hemorrhage 8/04/2020.\par Since discharge from the nursing home in November, his voice has been improving.  Family is able to understand his speech again.\par Still trouble swallowing ,didn’t pass swallow test at NH. Aspirates with liquids.\par Eating oatmeal and meats. Still getting feeds and meds through his PEG.\par PEG site has small discharge and irritation; tube seems to be cracking.  In September/October he pulled the PEG, and it was latera replaced.\par

## 2021-02-04 NOTE — PROCEDURE
[de-identified] : \par Indication:   dysphagia\par -Verbal consent was obtained from patient prior to procedure.\par -Luis Alberto-Synephrine and lidocaine 2% spray applied to the nasal cavities.\par Flexible laryngoscopy was performed via right nostril and revealed the following:\par   -- Nasopharynx had no mass or exudate.\par   -- Base of tongue was symmetric and not enlarged.\par   -- Vallecula was clear\par   -- Epiglottis, both aryepiglottic folds and both false vocal folds were normal\par   -- Arytenoids both with mild edema and no erythema \par   -- True vocal fold on right was fully mobile; on left was 2/4 mobility and has good approximation on phonation with effort \par   -- Post cricoid area was clear.\par   -- Interarytenoid edema was present.\par \par The patient tolerated the procedure well.\par

## 2021-02-04 NOTE — ASSESSMENT
[FreeTextEntry1] : Mr. 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  \par Patient insurance is not par with [facility] speech therapists\par \par 2.) Pharyngoesophageal dysphagia\par --> FEES \par \par 3.)  PEG site irritation and tube cracked\par --> F/u with surgery Dr Mercado, who placed the PEG.  \par Pt daughter making appt.\par \par Return in 2 months\par

## 2021-02-04 NOTE — PHYSICAL EXAM
[Midline] : trachea located in midline position [Laryngoscopy Performed] : laryngoscopy was performed, see procedure section for findings [Normal] : no rashes [FreeTextEntry1] : Patient is in a wheelchair.  Voice is hoarse, slightly breathy and low but comprehendible. [de-identified] : Crusting removed from right nostril. [de-identified] : Carotid pulses 2+ bilateral.  [de-identified] : intact except for left TVF mobility

## 2021-02-04 NOTE — REVIEW OF SYSTEMS
[Patient Intake Form Reviewed] : Patient intake form was reviewed [As Noted in HPI] : as noted in HPI [Recent Weight Loss (___ Lbs)] : recent [unfilled] ~Ulb weight loss [Eyesight Problems] : eyesight problems [Shortness Of Breath] : shortness of breath [Cough] : cough [Incontinence] : incontinence [Negative] : Heme/Lymph [FreeTextEntry5] : high BP [FreeTextEntry6] : wheezing [FreeTextEntry8] : difficulty on urination [de-identified] : tremors, imbalance, abnormal gait, memory loss

## 2021-02-04 NOTE — CONSULT LETTER
[Dear  ___] : Dear  [unfilled], [Courtesy Letter:] : I had the pleasure of seeing your patient, [unfilled], in my office today. [Please see my note below.] : Please see my note below. [Consult Closing:] : Thank you very much for allowing me to participate in the care of this patient.  If you have any questions, please do not hesitate to contact me. [Sincerely,] : Sincerely, [DrElva  ___] : Dr. MALAGON [DrElva ___] : Dr. MALAGON [FreeTextEntry2] : Bryan Blank MD\par [address]\[place] [address] [FreeTextEntry3] : \par Rachelle Musa MD \par Otolaryngology, Head and Neck Surgery \par \par \par

## 2021-03-19 NOTE — DISCHARGE NOTE PROVIDER - NSDCHC_MEDRECSTATUS_GEN_ALL_CORE
Admission Reconciliation is Completed  Discharge Reconciliation is Not Complete Admission Reconciliation is Completed  Discharge Reconciliation is Completed pt report given to MS Serrano

## 2021-04-01 NOTE — PROVIDER CONTACT NOTE (CRITICAL VALUE NOTIFICATION) - NS PROVIDER READ BACK
yes 25yo m pmhx tree nuts, pw shortness of breath wheezing allergic rxn after eating granola bar. sx started pta. now reports shortness of breath, wheezing, chest tightness, difficultly breathing. Denies recent trauma, fevers, chills, headache, dizziness, nausea, vomiting, dysuria, freq, hematuria, diarrhea, constipation, cough.

## 2021-04-16 ENCOUNTER — APPOINTMENT (OUTPATIENT)
Dept: NEUROSURGERY | Facility: CLINIC | Age: 61
End: 2021-04-16

## 2021-05-10 ENCOUNTER — APPOINTMENT (OUTPATIENT)
Dept: NEUROSURGERY | Facility: CLINIC | Age: 61
End: 2021-05-10
Payer: COMMERCIAL

## 2021-05-10 VITALS
HEART RATE: 79 BPM | WEIGHT: 150 LBS | HEIGHT: 65 IN | DIASTOLIC BLOOD PRESSURE: 88 MMHG | TEMPERATURE: 96.8 F | SYSTOLIC BLOOD PRESSURE: 131 MMHG | RESPIRATION RATE: 16 BRPM | OXYGEN SATURATION: 98 % | BODY MASS INDEX: 24.99 KG/M2

## 2021-05-10 DIAGNOSIS — T81.49XA INFECTION FOLLOWING A PROCEDURE, OTHER SURGICAL SITE, INITIAL ENCOUNTER: ICD-10-CM

## 2021-05-10 DIAGNOSIS — R26.9 UNSPECIFIED ABNORMALITIES OF GAIT AND MOBILITY: ICD-10-CM

## 2021-05-10 PROCEDURE — 99213 OFFICE O/P EST LOW 20 MIN: CPT

## 2021-05-10 PROCEDURE — 99072 ADDL SUPL MATRL&STAF TM PHE: CPT

## 2021-05-11 PROBLEM — T81.49XA SURGICAL SITE INFECTION: Status: ACTIVE | Noted: 2021-01-29

## 2021-05-11 PROBLEM — R26.9 GAIT DISTURBANCE: Status: ACTIVE | Noted: 2021-01-15

## 2021-05-11 NOTE — ASSESSMENT
[FreeTextEntry1] : MRI brain with and without contrast reviewed by Dr. Blank with patient and patient's daughter, demonstrates no underlying abnormal enhancement.\par Recommend continue physical therapy for gait training, muscular strengthening, balance and coordination.\par Recommend speech therapy for swallow evaluation.\par Return to the office in 6 months to evaluate progress (November 2021). \par \par Patient and patient's family verbalize agreement and understanding with plan of care.\par \par I, Dr. Blank, personally performed the evaluation and management (E/M) services for this established patient who presents today with (a) new problem(s)/exacerbation of (an) existing condition(s).  That E/M includes conducting the examination, assessing all new/exacerbated conditions, and establishing a new plan of care.  Today, my ACP, Sabrina Merchant, was here to observe my evaluation and management services for this new problem/exacerbated condition to be followed going forward.\par \par \par -------------------------------------------------\par Number and Complexity of Problems: Low - History of ICH; dysphagia, generalized weakness, memory loss\par \par Amount/Complexity of Data Reviewed/Analyzed: Moderate - Dr. Blank independently reviewed and interpreted MRI brain with and without contrast\par \par Risk of Complications and/or Morbidity or Mortality of Patient Management: Low\par \par

## 2021-05-11 NOTE — REASON FOR VISIT
[FreeTextEntry1] : Returns to review repeat MRI brain with and without contrast done on 5/4/21 which demonstrates no underlying abnormal enhancement. \par \par His daughter, Yael, is present for the visit. She states her father does walk a few steps with walker independently at home. He is participating in PT and has shown improvement.\par \par He has PEG tube. Per daughter, he is being scheduled for speech therapy and swallow evaluation through [facility].\par  \par She states sodium levels have improved but states his last WBC was 12.3. She states it is unclear why WBC remains elevated. Denies fever, chills. \par

## 2021-05-11 NOTE — PHYSICAL EXAM
[General Appearance - Alert] : alert [General Appearance - Well Nourished] : well nourished [General Appearance - Well-Appearing] : healthy appearing [Longitudinal] : longitudinal [Transverse] : transverse [Clean] : clean [Well-Healed] : well-healed [No Drainage] : without drainage [Normal Skin] : normal [Oriented To Time, Place, And Person] : oriented to person, place, and time [Person] : oriented to person [Place] : oriented to place [Time] : oriented to time [Sclera] : the sclera and conjunctiva were normal [Outer Ear] : the ears and nose were normal in appearance [Examination Of The Oral Cavity] : the lips and gums were normal [Neck Appearance] : the appearance of the neck was normal [] : no respiratory distress [Skin Color & Pigmentation] : normal skin color and pigmentation [Abdomen Soft] : soft [Cranial Nerves Optic (II)] : visual acuity intact bilaterally,  pupils equal round and reactive to light [Cranial Nerves Oculomotor (III)] : extraocular motion intact [Cranial Nerves Trigeminal (V)] : facial sensation intact symmetrically [Cranial Nerves Facial (VII)] : face symmetrical [Cranial Nerves Vestibulocochlear (VIII)] : hearing was intact bilaterally [Cranial Nerves Glossopharyngeal (IX)] : tongue and palate midline [Cranial Nerves Accessory (XI - Cranial And Spinal)] : head turning and shoulder shrug symmetric [Cranial Nerves Hypoglossal (XII)] : there was no tongue deviation with protrusion [Motor Tone] : muscle tone was normal in all four extremities [Motor Strength] : muscle strength was normal in all four extremities [FreeTextEntry1] : PEG inplace

## 2021-05-11 NOTE — HISTORY OF PRESENT ILLNESS
[FreeTextEntry1] : 59 year old man with past medical history HLD and liver disease who developed severe acute headache, dizziness and vomiting and was taken to [facility]. CT head demonstrated large left cerebellar hemorrhage, CTA Head/ neck suggestive of underlying vascular pathology. He was transferred to [facility] For further management. \par \par Cerebral angiogram done on 8/4/2020 was negative for AVM. He was then taken to the OR for evacuation of cerebellar hematoma performed by Dr. Blank.\par \par His hospitalization was complicated by dysphagia requiring PEG tube placement and fevers. He also required a vocal cord injection with ENT which was done on 8/26/20 with Dr. Musa for left vocal fold immobility.\par \par He returned to [facility] ED with wound drainage and MRI suggested presence of suboccipital soft tissue and epidural abscess. He underwent re-exploration suboccipital craniectomy for evacuation of intracranial abscess on 9/27/2020 with Dr. Blank.

## 2021-12-27 ENCOUNTER — APPOINTMENT (OUTPATIENT)
Dept: NEUROSURGERY | Facility: CLINIC | Age: 61
End: 2021-12-27
Payer: COMMERCIAL

## 2021-12-27 DIAGNOSIS — I61.4 NONTRAUMATIC INTRACEREBRAL HEMORRHAGE IN CEREBELLUM: ICD-10-CM

## 2021-12-27 PROCEDURE — 99213 OFFICE O/P EST LOW 20 MIN: CPT | Mod: 95

## 2021-12-27 RX ORDER — METOPROLOL TARTRATE 25 MG/1
25 TABLET, FILM COATED ORAL
Refills: 0 | Status: DISCONTINUED | COMMUNITY
End: 2021-12-27

## 2021-12-27 NOTE — REASON FOR VISIT
[Other: _____] : [unfilled] [FreeTextEntry1] : s/p Suboccipital craniectomy, C1 laminectomy for evacuation of cerebellar intracerebral hemorrhage 8/4/20; Re-exploration of suboccipital craniectomy for evacuation of intracranial abscess on 9/27/20

## 2021-12-27 NOTE — ASSESSMENT
[FreeTextEntry1] : Doing well.\par Recommend continuing speech therapy and physical therapy\par Follow up in one year to evaluate progress.\par \par Patient and patient's family verbalize agreement and understanding with plan of care.\par \par I, Dr. Blank, personally performed the evaluation and management (E/M) services for this established patient who presents today with (a) new problem(s)/exacerbation of (an) existing condition(s).  That E/M includes conducting the examination, assessing all new/exacerbated conditions, and establishing a new plan of care.  Today, my ACP, Sabrina Merchant, was here to observe my evaluation and management services for this new problem/exacerbated condition to be followed going forward.\par \par

## 2021-12-27 NOTE — HISTORY OF PRESENT ILLNESS
[Home] : at home, [unfilled] , at the time of the visit. [Medical Office: ([facility])___] : at the medical office located in  [Verbal consent obtained from patient] : the patient, [unfilled] [FreeTextEntry1] : 61 year old man with past medical history HLD and liver disease who developed severe acute headache, dizziness and vomiting and was taken to [facility]. CT head demonstrated large left cerebellar hemorrhage, CTA Head/ neck suggestive of underlying vascular pathology. He was transferred to [facility] For further management. \par \par Cerebral angiogram done on 8/4/2020 was negative for AVM. He was then taken to the OR for evacuation of cerebellar hematoma performed by Dr. Blank.\par \par His hospitalization was complicated by dysphagia requiring PEG tube placement and fevers. He also required a vocal cord injection with ENT which was done on 8/26/20 with Dr. Musa for left vocal fold immobility.\par \par He returned to [facility] ED with wound drainage and MRI suggested presence of suboccipital soft tissue and epidural abscess. He underwent re-exploration suboccipital craniectomy for evacuation of intracranial abscess on 9/27/2020 with Dr. Blank.\par \par At prior visit, MRI brain with and without contrast done on 5/4/21 was reviewed which demonstrated no underlying abnormal enhancement. \par \par His daughter, Yael,  reported that her father walks a few steps with walker independently at home. He is participating in PT and has shown improvement.\par \par He returns today via telemedicine to evaluate progress.\par He is accompanied by his daughter, Shantell. She states he is doing well. For insurance reasons, PT and speech therapy was stopped but she plans to resume after the new year.\par States his swallowing has improved and he does not require PEG tube. He is eating on his own. \par

## 2022-01-01 NOTE — PROGRESS NOTE ADULT - NUTRITIONAL ASSESSMENT
This patient has been assessed with a concern for Malnutrition and has been determined to have a diagnosis/diagnoses of Moderate protein-calorie malnutrition.    This patient is being managed with:   Diet NPO with Tube Feed-  Tube Feeding Modality: Gastrostomy  Vital 1.5 Tone (VITAL1.5)  Total Volume for 24 Hours (mL): 1224  Total Number of Cans: 6  Continuous  Until Goal Tube Feed Rate (mL per Hour): 51  Tube Feed Duration (in Hours): 24  Tube Feed Start Time: 13:00  Entered: Sep 27 2020  3:05PM    
Statement Selected

## 2022-06-14 NOTE — PROGRESS NOTE ADULT - SUBJECTIVE AND OBJECTIVE BOX
[FreeTextEntry1] : 56 y.o. man with multiple medical comorbidities now with hypertensive urgency  NEUROSURGERY POST OP NOTE:    POD# 0 S/P cerebral angio, negative for AVM.   s/p SOC craniectomy evacuation of cerebellar hematoma.     S: post op slow to wake up, received rocuronium intraop. post op scan stable, eeg ordered.     T(C): 36 (08-04-20 @ 14:47), Max: 36.7 (08-04-20 @ 04:30)  HR: 82 (08-04-20 @ 20:00) (69 - 82)  BP: 122/71 (08-04-20 @ 15:15) (90/52 - 159/92)  RR: 13 (08-04-20 @ 20:00) (12 - 22)  SpO2: 100% (08-04-20 @ 20:00) (99% - 100%)      08-03-20 @ 07:01  -  08-04-20 @ 07:00  --------------------------------------------------------  IN: 142.5 mL / OUT: 385 mL / NET: -242.5 mL    08-04-20 @ 07:01  -  08-04-20 @ 20:47  --------------------------------------------------------  IN: 451.7 mL / OUT: 643 mL / NET: -191.3 mL        acetaminophen   Tablet .. 650 milliGRAM(s) Oral every 6 hours PRN  atorvastatin 80 milliGRAM(s) Oral at bedtime  ceFAZolin   IVPB 2000 milliGRAM(s) IV Intermittent once  chlorhexidine 0.12% Liquid 15 milliLiter(s) Oral Mucosa every 12 hours  chlorhexidine 4% Liquid 1 Application(s) Topical <User Schedule>  dexAMETHasone  Injectable 6 milliGRAM(s) IV Push every 6 hours  dextrose 40% Gel 15 Gram(s) Oral once PRN  dextrose 5%. 1000 milliLiter(s) IV Continuous <Continuous>  dextrose 50% Injectable 12.5 Gram(s) IV Push once  dextrose 50% Injectable 25 Gram(s) IV Push once  dextrose 50% Injectable 25 Gram(s) IV Push once  dextrose 50% Injectable 25 Gram(s) IV Push once  glucagon  Injectable 1 milliGRAM(s) IntraMuscular once PRN  glucagon  Injectable 1 milliGRAM(s) IntraMuscular once PRN  hydrALAZINE Injectable 10 milliGRAM(s) IV Push every 2 hours PRN  insulin lispro (HumaLOG) corrective regimen sliding scale   SubCutaneous every 6 hours  levETIRAcetam  IVPB 1000 milliGRAM(s) IV Intermittent every 12 hours  niCARdipine Infusion 5 mG/Hr IV Continuous <Continuous>  pantoprazole  Injectable 40 milliGRAM(s) IV Push daily  propofol Infusion 5 MICROgram(s)/kG/Min IV Continuous <Continuous>  senna 2 Tablet(s) Oral at bedtime PRN  sodium chloride 0.9% lock flush 10 milliLiter(s) IV Push every 1 hour PRN  sodium chloride 0.9%. 1000 milliLiter(s) IV Continuous <Continuous>  tenofovir disoproxil fumarate (VIREAD) 300 milliGRAM(s) Oral every 24 hours      RADIOLOGY:     Exam:  Intubated, on propofol. off propofol pt. doesn't OE, doesn't fc   no corneal, no gag, pupil non reactive 3mm bilaterally   no motor response to noxious       WOUND/DRAINS:    DEVICES: EVD @ 5cm H20    Total Crystalloids/fluids in OR:   Total UO:   EBL: 800 cc    Assessment: 59yMale HLD, ?Hep B? presents with acute severe headache, dizziness and vomiting, found to have large left cerebellar IPH with IVH, s/p right frontal EVD at [facility] and transfer to [facility] for further intervention, NIH 28, ICH5, BD#1      Plan:  SBP < 140; on cardene   keppra 1 g BID   decadron 6 q 6 1 wk taper   NPO + NGT   central line pulled   f/u xray   f/u eeg   f/u repeat bmp , keep > 140  stroke core measure   keep npo tonight   scd's only   f/u baseline doppler - neg for DVT   keep vent support   wean off propofol in am   - ppi for ulcer ppx   d/w Dr. Blank and Dr. Sneed

## 2023-07-03 NOTE — OCCUPATIONAL THERAPY INITIAL EVALUATION ADULT - TOILETING, PREVIOUS LEVEL OF FUNCTION, OT EVAL
independent [Follow - Up] : a follow-up visit [Formal Caregiver] : formal caregiver [DM Type 1] : DM Type 1

## 2024-01-31 NOTE — DISCHARGE NOTE NURSING/CASE MANAGEMENT/SOCIAL WORK - NSDCPELOVENOXDIET_GEN_ALL_CORE
Unable to answer due to medical condition/unresponsive/etc... Eat healthy foods you enjoy. Enoxaparin/Lovenox DOES NOT have a special diet. Limit your alcohol intake.

## 2024-02-01 NOTE — DISCHARGE NOTE PROVIDER - NSCORESITESY/N_GEN_A_CORE_RD
PROCEDURE:  Transrectal ultrasound-guided prostate biopsy with MRI fusion      Pre OP Dx:  Elevated PSA,     Post OP Dx: Same with pathology pending    PROCEDURE:  Transrectal ultrasound-guided biopsy of the prostate including reza-prostatic nerve block    SURGEON: Alma Barraza MD      ANESTHETIC: MAC      ANTIBIOTICS: see note    ESTIMATED BLOOD LOSS:  Minimal    SPECIMENS: Prostate needle biopsies for permanent examination    COMPLICATIONS: None    Drains/implants/grafts: None.     INDICATIONS: Pt presents with above diagnosis.  Risks/benefits were discussed.  He completed the enema pre-procedure and completed the appropriate antiobiotics.     PROCEDURE: The patient was placed in the decubitus position, and the prostate was imaged using a [employer] 7.5 MHz transrectal ultrasound probe. The prostate was imaged in both the sagittal and transverse mode. The seminal vesicles were examined, as was the prostate. Volume calculations were carried out.  Prostate size was determined, periprostatic block was given, and ultrasound-guided biopsies were performed as noted below.     ADDITIONAL PROCEDURE: Ultrasound-guided biopsy was performed in the following manner: using 1% lidocaine local, both neurovascular bundles were blocked at the junction between the prostate and the seminal vesicles using 5 cubic centimeters of 1% lidocaine on both sides.     PROSTATE BIOPSY ARRAY:  12 core biopsies were taken of the prostate in the following array:  2 right base, 2 right apex, 2 right mid, 2 left base, 2 left mid, 2 left apex.      Using software to fuse the MRI images with our real-time ultrasound images the region of interest on MRI was localized on ultrasound and 3 biopsy cores were taken from this region of interest and sent separately    FINDINGS:  Prostate volume = 40 cc.  Seminal vesicles are symmetric and have a normal ultrasound appearance.     The region of interest was clearly biopsied following the needle  path      FOLLOW UP: Pt tolerated the procedure well, no excessive bleeding was seen.  The patient was advised to call us in 5-7 days' time for the results of biopsies.  Patient will call if he develops excessive fever chills or shakes understands potential risk of postoperative bacteremia and need for hospitalization with additional antibiotics     No

## 2024-02-15 NOTE — PHYSICAL THERAPY INITIAL EVALUATION ADULT - FOLLOWS COMMANDS/ANSWERS QUESTIONS, REHAB EVAL
MSW phoned [facility] and spoke with Luly who informed that they will resume with the  clinic very soon. MSW sent communication to [facility] To confirm timeframe.    able to follow single-step instructions/100% of the time
